# Patient Record
Sex: FEMALE | Race: BLACK OR AFRICAN AMERICAN | NOT HISPANIC OR LATINO
[De-identification: names, ages, dates, MRNs, and addresses within clinical notes are randomized per-mention and may not be internally consistent; named-entity substitution may affect disease eponyms.]

---

## 2017-01-27 ENCOUNTER — MEDICATION RENEWAL (OUTPATIENT)
Age: 32
End: 2017-01-27

## 2017-02-01 ENCOUNTER — APPOINTMENT (OUTPATIENT)
Dept: PULMONOLOGY | Facility: CLINIC | Age: 32
End: 2017-02-01

## 2017-02-22 ENCOUNTER — APPOINTMENT (OUTPATIENT)
Dept: PULMONOLOGY | Facility: CLINIC | Age: 32
End: 2017-02-22

## 2017-03-28 ENCOUNTER — MEDICATION RENEWAL (OUTPATIENT)
Age: 32
End: 2017-03-28

## 2017-03-28 ENCOUNTER — APPOINTMENT (OUTPATIENT)
Dept: PULMONOLOGY | Facility: CLINIC | Age: 32
End: 2017-03-28

## 2017-03-28 VITALS
DIASTOLIC BLOOD PRESSURE: 70 MMHG | RESPIRATION RATE: 17 BRPM | OXYGEN SATURATION: 94 % | BODY MASS INDEX: 43.4 KG/M2 | SYSTOLIC BLOOD PRESSURE: 130 MMHG | WEIGHT: 293 LBS | HEIGHT: 69 IN | TEMPERATURE: 97.7 F | HEART RATE: 89 BPM

## 2017-04-18 ENCOUNTER — APPOINTMENT (OUTPATIENT)
Dept: CARDIOLOGY | Facility: CLINIC | Age: 32
End: 2017-04-18

## 2017-04-26 ENCOUNTER — RX RENEWAL (OUTPATIENT)
Age: 32
End: 2017-04-26

## 2017-05-02 ENCOUNTER — MEDICATION RENEWAL (OUTPATIENT)
Age: 32
End: 2017-05-02

## 2017-05-30 ENCOUNTER — APPOINTMENT (OUTPATIENT)
Dept: CARDIOLOGY | Facility: CLINIC | Age: 32
End: 2017-05-30

## 2017-05-30 ENCOUNTER — NON-APPOINTMENT (OUTPATIENT)
Age: 32
End: 2017-05-30

## 2017-05-30 VITALS
HEIGHT: 69 IN | WEIGHT: 293 LBS | BODY MASS INDEX: 43.4 KG/M2 | DIASTOLIC BLOOD PRESSURE: 91 MMHG | SYSTOLIC BLOOD PRESSURE: 125 MMHG | HEART RATE: 82 BPM

## 2017-05-30 DIAGNOSIS — E66.01 MORBID (SEVERE) OBESITY DUE TO EXCESS CALORIES: ICD-10-CM

## 2017-05-30 RX ORDER — RIOCIGUAT 1 MG/1
1 TABLET, FILM COATED ORAL
Qty: 90 | Refills: 0 | Status: DISCONTINUED | COMMUNITY
Start: 2017-04-26 | End: 2017-05-30

## 2017-07-01 RX ORDER — RIOCIGUAT 1.5 MG/1
1 TABLET, FILM COATED ORAL
Qty: 0 | Refills: 0 | DISCHARGE
Start: 2017-07-01

## 2017-07-18 ENCOUNTER — APPOINTMENT (OUTPATIENT)
Dept: PULMONOLOGY | Facility: CLINIC | Age: 32
End: 2017-07-18

## 2017-08-14 ENCOUNTER — RX RENEWAL (OUTPATIENT)
Age: 32
End: 2017-08-14

## 2017-08-17 ENCOUNTER — MEDICATION RENEWAL (OUTPATIENT)
Age: 32
End: 2017-08-17

## 2017-10-06 ENCOUNTER — RX RENEWAL (OUTPATIENT)
Age: 32
End: 2017-10-06

## 2017-10-09 ENCOUNTER — RX RENEWAL (OUTPATIENT)
Age: 32
End: 2017-10-09

## 2017-10-24 ENCOUNTER — APPOINTMENT (OUTPATIENT)
Dept: PULMONOLOGY | Facility: CLINIC | Age: 32
End: 2017-10-24

## 2017-11-03 ENCOUNTER — APPOINTMENT (OUTPATIENT)
Dept: PULMONOLOGY | Facility: CLINIC | Age: 32
End: 2017-11-03

## 2017-12-06 ENCOUNTER — MOBILE ON CALL (OUTPATIENT)
Age: 32
End: 2017-12-06

## 2018-01-02 ENCOUNTER — APPOINTMENT (OUTPATIENT)
Dept: PULMONOLOGY | Facility: CLINIC | Age: 33
End: 2018-01-02
Payer: MEDICAID

## 2018-01-02 VITALS
OXYGEN SATURATION: 93 % | HEIGHT: 69 IN | TEMPERATURE: 96.7 F | WEIGHT: 293 LBS | DIASTOLIC BLOOD PRESSURE: 70 MMHG | RESPIRATION RATE: 15 BRPM | BODY MASS INDEX: 43.4 KG/M2 | HEART RATE: 85 BPM | SYSTOLIC BLOOD PRESSURE: 122 MMHG

## 2018-01-02 PROCEDURE — 36415 COLL VENOUS BLD VENIPUNCTURE: CPT

## 2018-01-02 PROCEDURE — 99215 OFFICE O/P EST HI 40 MIN: CPT | Mod: 25

## 2018-01-03 LAB
ALBUMIN SERPL ELPH-MCNC: 3.4 G/DL
ALP BLD-CCNC: 37 U/L
ALT SERPL-CCNC: 10 U/L
ANION GAP SERPL CALC-SCNC: 15 MMOL/L
AST SERPL-CCNC: 18 U/L
BASOPHILS # BLD AUTO: 0.03 K/UL
BASOPHILS NFR BLD AUTO: 0.8 %
BILIRUB SERPL-MCNC: 1.4 MG/DL
BUN SERPL-MCNC: 8 MG/DL
CALCIUM SERPL-MCNC: 9.3 MG/DL
CHLORIDE SERPL-SCNC: 100 MMOL/L
CO2 SERPL-SCNC: 25 MMOL/L
CREAT SERPL-MCNC: 1 MG/DL
EOSINOPHIL # BLD AUTO: 0.07 K/UL
EOSINOPHIL NFR BLD AUTO: 1.9 %
GLUCOSE SERPL-MCNC: 84 MG/DL
HCG SERPL-MCNC: <1 MIU/ML
HCT VFR BLD CALC: 43.2 %
HGB BLD-MCNC: 14 G/DL
IMM GRANULOCYTES NFR BLD AUTO: 0.3 %
INR PPP: 1.48 RATIO
LYMPHOCYTES # BLD AUTO: 2.05 K/UL
LYMPHOCYTES NFR BLD AUTO: 55.6 %
MAN DIFF?: NORMAL
MCHC RBC-ENTMCNC: 26.8 PG
MCHC RBC-ENTMCNC: 32.4 GM/DL
MCV RBC AUTO: 82.6 FL
MONOCYTES # BLD AUTO: 0.34 K/UL
MONOCYTES NFR BLD AUTO: 9.2 %
NEUTROPHILS # BLD AUTO: 1.19 K/UL
NEUTROPHILS NFR BLD AUTO: 32.2 %
NT-PROBNP SERPL-MCNC: 1821 PG/ML
PLATELET # BLD AUTO: 263 K/UL
POTASSIUM SERPL-SCNC: 3.5 MMOL/L
PROT SERPL-MCNC: 7.1 G/DL
PT BLD: 16.9 SEC
RBC # BLD: 5.23 M/UL
RBC # FLD: 16.2 %
SODIUM SERPL-SCNC: 140 MMOL/L
WBC # FLD AUTO: 3.69 K/UL

## 2018-01-10 ENCOUNTER — APPOINTMENT (OUTPATIENT)
Dept: INTERNAL MEDICINE | Facility: CLINIC | Age: 33
End: 2018-01-10

## 2018-01-17 ENCOUNTER — APPOINTMENT (OUTPATIENT)
Dept: INTERNAL MEDICINE | Facility: CLINIC | Age: 33
End: 2018-01-17

## 2018-01-18 ENCOUNTER — CHART COPY (OUTPATIENT)
Age: 33
End: 2018-01-18

## 2018-01-22 ENCOUNTER — CHART COPY (OUTPATIENT)
Age: 33
End: 2018-01-22

## 2018-01-23 ENCOUNTER — APPOINTMENT (OUTPATIENT)
Dept: INTERNAL MEDICINE | Facility: CLINIC | Age: 33
End: 2018-01-23
Payer: MEDICAID

## 2018-01-23 VITALS — OXYGEN SATURATION: 95 % | RESPIRATION RATE: 16 BRPM | HEART RATE: 96 BPM

## 2018-01-23 LAB
INR PPP: 1.3 RATIO
POCT-PROTHROMBIN TIME: 15.9 SECS
QUALITY CONTROL: YES

## 2018-01-23 PROCEDURE — 99211 OFF/OP EST MAY X REQ PHY/QHP: CPT | Mod: 25

## 2018-01-23 PROCEDURE — 85610 PROTHROMBIN TIME: CPT | Mod: QW

## 2018-01-30 ENCOUNTER — APPOINTMENT (OUTPATIENT)
Dept: INTERNAL MEDICINE | Facility: CLINIC | Age: 33
End: 2018-01-30
Payer: MEDICAID

## 2018-01-30 VITALS — HEART RATE: 99 BPM | RESPIRATION RATE: 17 BRPM | OXYGEN SATURATION: 97 %

## 2018-01-30 PROCEDURE — 85610 PROTHROMBIN TIME: CPT | Mod: QW

## 2018-01-30 PROCEDURE — 99211 OFF/OP EST MAY X REQ PHY/QHP: CPT | Mod: 25

## 2018-01-31 LAB
INR PPP: 1.4 RATIO
POCT-PROTHROMBIN TIME: 16.8 SECS
QUALITY CONTROL: YES

## 2018-02-02 ENCOUNTER — APPOINTMENT (OUTPATIENT)
Dept: CARDIOLOGY | Facility: CLINIC | Age: 33
End: 2018-02-02
Payer: MEDICAID

## 2018-02-02 PROCEDURE — 93306 TTE W/DOPPLER COMPLETE: CPT

## 2018-02-06 ENCOUNTER — APPOINTMENT (OUTPATIENT)
Dept: INTERNAL MEDICINE | Facility: CLINIC | Age: 33
End: 2018-02-06
Payer: MEDICAID

## 2018-02-06 VITALS — OXYGEN SATURATION: 97 % | RESPIRATION RATE: 17 BRPM | HEART RATE: 98 BPM

## 2018-02-06 LAB
INR PPP: 2.2 RATIO
POCT-PROTHROMBIN TIME: 26.2 SECS
QUALITY CONTROL: YES

## 2018-02-06 PROCEDURE — 85610 PROTHROMBIN TIME: CPT | Mod: QW

## 2018-02-06 PROCEDURE — 99211 OFF/OP EST MAY X REQ PHY/QHP: CPT | Mod: 25

## 2018-02-13 ENCOUNTER — APPOINTMENT (OUTPATIENT)
Dept: INTERNAL MEDICINE | Facility: CLINIC | Age: 33
End: 2018-02-13
Payer: MEDICAID

## 2018-02-13 ENCOUNTER — RX RENEWAL (OUTPATIENT)
Age: 33
End: 2018-02-13

## 2018-02-13 VITALS — HEART RATE: 79 BPM | OXYGEN SATURATION: 97 % | RESPIRATION RATE: 17 BRPM

## 2018-02-13 LAB
INR PPP: 3.1 RATIO
POCT-PROTHROMBIN TIME: 37.2 SECS
QUALITY CONTROL: YES

## 2018-02-13 PROCEDURE — 85610 PROTHROMBIN TIME: CPT | Mod: QW

## 2018-02-13 PROCEDURE — 99211 OFF/OP EST MAY X REQ PHY/QHP: CPT | Mod: 25

## 2018-02-15 ENCOUNTER — APPOINTMENT (OUTPATIENT)
Dept: PULMONOLOGY | Facility: CLINIC | Age: 33
End: 2018-02-15

## 2018-02-20 ENCOUNTER — APPOINTMENT (OUTPATIENT)
Dept: INTERNAL MEDICINE | Facility: CLINIC | Age: 33
End: 2018-02-20
Payer: MEDICAID

## 2018-02-20 VITALS — RESPIRATION RATE: 19 BRPM | OXYGEN SATURATION: 92 % | HEART RATE: 82 BPM

## 2018-02-20 LAB
INR PPP: 3 RATIO
POCT-PROTHROMBIN TIME: 35.5 SECS
QUALITY CONTROL: YES

## 2018-02-20 PROCEDURE — 85610 PROTHROMBIN TIME: CPT | Mod: QW

## 2018-02-20 PROCEDURE — 99211 OFF/OP EST MAY X REQ PHY/QHP: CPT | Mod: 25

## 2018-02-27 ENCOUNTER — APPOINTMENT (OUTPATIENT)
Dept: INTERNAL MEDICINE | Facility: CLINIC | Age: 33
End: 2018-02-27
Payer: MEDICAID

## 2018-02-27 VITALS — HEART RATE: 98 BPM | RESPIRATION RATE: 19 BRPM | OXYGEN SATURATION: 93 %

## 2018-02-27 LAB
INR PPP: 3 RATIO
POCT-PROTHROMBIN TIME: 35.7 SECS
QUALITY CONTROL: YES

## 2018-02-27 PROCEDURE — 85610 PROTHROMBIN TIME: CPT | Mod: QW

## 2018-02-27 PROCEDURE — 99211 OFF/OP EST MAY X REQ PHY/QHP: CPT | Mod: 25

## 2018-03-06 ENCOUNTER — APPOINTMENT (OUTPATIENT)
Dept: INTERNAL MEDICINE | Facility: CLINIC | Age: 33
End: 2018-03-06
Payer: MEDICAID

## 2018-03-06 VITALS — OXYGEN SATURATION: 91 % | HEART RATE: 99 BPM | RESPIRATION RATE: 19 BRPM

## 2018-03-06 LAB
INR PPP: 3.1 RATIO
POCT-PROTHROMBIN TIME: 37.7 SECS
QUALITY CONTROL: YES

## 2018-03-06 PROCEDURE — 85610 PROTHROMBIN TIME: CPT | Mod: QW

## 2018-03-06 PROCEDURE — 99211 OFF/OP EST MAY X REQ PHY/QHP: CPT | Mod: 25

## 2018-03-07 ENCOUNTER — APPOINTMENT (OUTPATIENT)
Dept: PULMONOLOGY | Facility: CLINIC | Age: 33
End: 2018-03-07

## 2018-03-16 ENCOUNTER — APPOINTMENT (OUTPATIENT)
Dept: INTERNAL MEDICINE | Facility: CLINIC | Age: 33
End: 2018-03-16
Payer: MEDICAID

## 2018-03-16 VITALS — OXYGEN SATURATION: 92 % | HEART RATE: 83 BPM

## 2018-03-16 LAB
INR PPP: 2.7 RATIO
POCT-PROTHROMBIN TIME: 23.5 SECS
QUALITY CONTROL: YES

## 2018-03-16 PROCEDURE — 99211 OFF/OP EST MAY X REQ PHY/QHP: CPT | Mod: 25

## 2018-03-16 PROCEDURE — 85610 PROTHROMBIN TIME: CPT | Mod: QW

## 2018-04-03 ENCOUNTER — APPOINTMENT (OUTPATIENT)
Dept: INTERNAL MEDICINE | Facility: CLINIC | Age: 33
End: 2018-04-03
Payer: MEDICAID

## 2018-04-03 ENCOUNTER — MEDICATION RENEWAL (OUTPATIENT)
Age: 33
End: 2018-04-03

## 2018-04-03 VITALS — RESPIRATION RATE: 20 BRPM | OXYGEN SATURATION: 96 % | HEART RATE: 74 BPM

## 2018-04-03 LAB
INR PPP: 1.4 RATIO
POCT-PROTHROMBIN TIME: 16.7 SECS
QUALITY CONTROL: YES

## 2018-04-03 PROCEDURE — 85610 PROTHROMBIN TIME: CPT | Mod: QW

## 2018-04-03 PROCEDURE — 99211 OFF/OP EST MAY X REQ PHY/QHP: CPT | Mod: 25

## 2018-04-10 ENCOUNTER — APPOINTMENT (OUTPATIENT)
Dept: INTERNAL MEDICINE | Facility: CLINIC | Age: 33
End: 2018-04-10

## 2018-04-11 ENCOUNTER — APPOINTMENT (OUTPATIENT)
Dept: INTERNAL MEDICINE | Facility: CLINIC | Age: 33
End: 2018-04-11
Payer: MEDICAID

## 2018-04-11 VITALS — RESPIRATION RATE: 18 BRPM | HEART RATE: 77 BPM | OXYGEN SATURATION: 99 %

## 2018-04-11 LAB
INR PPP: 1.9 RATIO
POCT-PROTHROMBIN TIME: 22.4 SECS
QUALITY CONTROL: YES

## 2018-04-11 PROCEDURE — 99211 OFF/OP EST MAY X REQ PHY/QHP: CPT | Mod: 25

## 2018-04-11 PROCEDURE — 85610 PROTHROMBIN TIME: CPT | Mod: QW

## 2018-04-24 ENCOUNTER — APPOINTMENT (OUTPATIENT)
Dept: INTERNAL MEDICINE | Facility: CLINIC | Age: 33
End: 2018-04-24
Payer: MEDICAID

## 2018-04-24 VITALS — OXYGEN SATURATION: 99 % | HEART RATE: 77 BPM | RESPIRATION RATE: 19 BRPM

## 2018-04-24 LAB
INR PPP: 2.1 RATIO
POCT-PROTHROMBIN TIME: 25.5 SECS
QUALITY CONTROL: YES

## 2018-04-24 PROCEDURE — 85610 PROTHROMBIN TIME: CPT | Mod: QW

## 2018-04-24 PROCEDURE — 99211 OFF/OP EST MAY X REQ PHY/QHP: CPT

## 2018-04-30 ENCOUNTER — APPOINTMENT (OUTPATIENT)
Dept: PULMONOLOGY | Facility: CLINIC | Age: 33
End: 2018-04-30

## 2018-05-02 ENCOUNTER — APPOINTMENT (OUTPATIENT)
Dept: INTERNAL MEDICINE | Facility: CLINIC | Age: 33
End: 2018-05-02
Payer: MEDICAID

## 2018-05-02 VITALS — OXYGEN SATURATION: 87 % | RESPIRATION RATE: 20 BRPM | HEART RATE: 98 BPM

## 2018-05-02 LAB
INR PPP: 2.6 RATIO
POCT-PROTHROMBIN TIME: 31 SECS
QUALITY CONTROL: YES

## 2018-05-02 PROCEDURE — 85610 PROTHROMBIN TIME: CPT | Mod: QW

## 2018-05-02 PROCEDURE — 99211 OFF/OP EST MAY X REQ PHY/QHP: CPT

## 2018-05-08 ENCOUNTER — APPOINTMENT (OUTPATIENT)
Dept: INTERNAL MEDICINE | Facility: CLINIC | Age: 33
End: 2018-05-08
Payer: MEDICAID

## 2018-05-08 VITALS — RESPIRATION RATE: 20 BRPM | OXYGEN SATURATION: 99 % | HEART RATE: 99 BPM

## 2018-05-08 LAB
INR PPP: 2.5 RATIO
POCT-PROTHROMBIN TIME: 30.3 SECS
QUALITY CONTROL: YES

## 2018-05-08 PROCEDURE — 99211 OFF/OP EST MAY X REQ PHY/QHP: CPT

## 2018-05-08 PROCEDURE — 85610 PROTHROMBIN TIME: CPT | Mod: QW

## 2018-05-15 ENCOUNTER — APPOINTMENT (OUTPATIENT)
Dept: INTERNAL MEDICINE | Facility: CLINIC | Age: 33
End: 2018-05-15
Payer: MEDICAID

## 2018-05-15 VITALS — OXYGEN SATURATION: 99 % | HEART RATE: 99 BPM | RESPIRATION RATE: 19 BRPM

## 2018-05-15 DIAGNOSIS — Z79.01 ENCOUNTER FOR THERAPEUTIC DRUG LVL MONITORING: ICD-10-CM

## 2018-05-15 DIAGNOSIS — Z79.01 LONG TERM (CURRENT) USE OF ANTICOAGULANTS: ICD-10-CM

## 2018-05-15 DIAGNOSIS — Z51.81 ENCOUNTER FOR THERAPEUTIC DRUG LVL MONITORING: ICD-10-CM

## 2018-05-15 LAB
INR PPP: 1.5 RATIO
POCT-PROTHROMBIN TIME: 17.4 SECS
QUALITY CONTROL: YES

## 2018-05-15 PROCEDURE — 93793 ANTICOAG MGMT PT WARFARIN: CPT

## 2018-05-17 PROBLEM — Z51.81 ANTICOAGULATION GOAL OF INR 2 TO 3: Status: ACTIVE | Noted: 2018-05-17

## 2018-05-20 ENCOUNTER — RESULT CHARGE (OUTPATIENT)
Age: 33
End: 2018-05-20

## 2018-05-21 ENCOUNTER — RESULT REVIEW (OUTPATIENT)
Age: 33
End: 2018-05-21

## 2018-05-21 ENCOUNTER — LABORATORY RESULT (OUTPATIENT)
Age: 33
End: 2018-05-21

## 2018-05-29 ENCOUNTER — APPOINTMENT (OUTPATIENT)
Dept: INTERNAL MEDICINE | Facility: CLINIC | Age: 33
End: 2018-05-29
Payer: MEDICAID

## 2018-05-29 VITALS — RESPIRATION RATE: 18 BRPM | HEART RATE: 99 BPM | OXYGEN SATURATION: 99 %

## 2018-05-29 LAB
INR PPP: 3.9 RATIO
POCT-PROTHROMBIN TIME: 46.8 SECS
QUALITY CONTROL: YES

## 2018-05-29 PROCEDURE — 93793 ANTICOAG MGMT PT WARFARIN: CPT

## 2018-06-12 ENCOUNTER — APPOINTMENT (OUTPATIENT)
Dept: INTERNAL MEDICINE | Facility: CLINIC | Age: 33
End: 2018-06-12
Payer: MEDICAID

## 2018-06-12 VITALS — RESPIRATION RATE: 17 BRPM | OXYGEN SATURATION: 99 % | HEART RATE: 89 BPM

## 2018-06-12 LAB
INR PPP: 3.6 RATIO
POCT-PROTHROMBIN TIME: 43.7 SECS
QUALITY CONTROL: YES

## 2018-06-12 PROCEDURE — 85610 PROTHROMBIN TIME: CPT | Mod: QW

## 2018-06-12 PROCEDURE — 93793 ANTICOAG MGMT PT WARFARIN: CPT

## 2018-06-16 ENCOUNTER — EMERGENCY (EMERGENCY)
Facility: HOSPITAL | Age: 33
LOS: 1 days | Discharge: HOME | End: 2018-06-16
Attending: EMERGENCY MEDICINE | Admitting: EMERGENCY MEDICINE
Payer: MEDICAID

## 2018-06-16 VITALS
HEART RATE: 80 BPM | TEMPERATURE: 98 F | SYSTOLIC BLOOD PRESSURE: 116 MMHG | RESPIRATION RATE: 16 BRPM | OXYGEN SATURATION: 100 % | DIASTOLIC BLOOD PRESSURE: 68 MMHG

## 2018-06-16 VITALS — RESPIRATION RATE: 19 BRPM | OXYGEN SATURATION: 95 %

## 2018-06-16 PROCEDURE — 71046 X-RAY EXAM CHEST 2 VIEWS: CPT | Mod: 26

## 2018-06-16 PROCEDURE — 99284 EMERGENCY DEPT VISIT MOD MDM: CPT | Mod: 25

## 2018-06-16 RX ORDER — ALBUTEROL 90 UG/1
3 AEROSOL, METERED ORAL
Qty: 90 | Refills: 0
Start: 2018-06-16 | End: 2018-07-15

## 2018-06-16 RX ORDER — IPRATROPIUM/ALBUTEROL SULFATE 18-103MCG
3 AEROSOL WITH ADAPTER (GRAM) INHALATION ONCE
Qty: 0 | Refills: 0 | Status: COMPLETED | OUTPATIENT
Start: 2018-06-16 | End: 2018-06-16

## 2018-06-16 RX ADMIN — Medication 3 MILLILITER(S): at 04:52

## 2018-06-16 NOTE — ED PROVIDER NOTE - ATTENDING CONTRIBUTION TO CARE
Dr. Antonio: I have personally seen and examined this patient at the bedside. I have fully participated in the care of this patient. I have reviewed all pertinent clinical information, including history, physical exam, plan and the Resident's note and agree except as noted. HPI above as by me. PE above as by me. DDX r/o pna, suspect related asthma or post nasal drip PLAN duoneb, xr

## 2018-06-16 NOTE — ED PROVIDER NOTE - PROGRESS NOTE DETAILS
Paco att: NAD. Spoke extensively with patient. Recommend incr lasix, decongestants and allegra po. Patient concerned re side effects. Patient advised to f/u with her Pulmonologist this week and discuss medication changes.

## 2018-06-16 NOTE — ED PROVIDER NOTE - OBJECTIVE STATEMENT
33 F h/o asthma, pulm HTN diagnosed in 2016, on coumadin, p/w cough x 1 mo. Had seen pmd approx one week ago given 5 days course of steroids and using albuterol bid, but still with non productive cough. Now also for last few days when patient is coughing she develops dizziness and had a few episodes of post tussive emesis. Felt better while on steroids but now coughing again and feels she is wheezing. No fevers/chills. 33 F h/o asthma, pulm HTN diagnosed in 2016, on coumadin, p/w cough x 1 mo. Had seen pmd approx one week ago given 5 days course of steroids and using albuterol bid, but still with non productive cough. Now also for last few days when patient is coughing she develops dizziness and had a few episodes of post tussive emesis. Felt better while on steroids but now coughing again and feels she is wheezing. No fevers/chills.    05:58 Antonio att: 33F h/o htn, asthma, pulm htn 2006, cva prophylaxis coumadin c/o cough x 1 mo. Cough x 1 mo, dry, frequent, throat irritation. Albuterol BID neg relief. Cough frequent, causes post tussive emesis and dizziness. Two weeks bilateral lower extremity duplex negative. Completed five day course of prednisone, tapered to spiriva and symbicort. Came to ER tonight for nagging hacking cough, chest congestion, and persistence of symptoms. PMH as above PSH x MED x ALL nkda SMOKE x PCP [facility] 33 F h/o asthma, pulm HTN diagnosed in 2016, on coumadin, p/w cough x 1 mo. Had seen pmd approx one week ago given 5 days course of steroids and using albuterol bid, but still with non productive cough. Now also for last few days when patient is coughing she develops dizziness and had a few episodes of post tussive emesis. Felt better while on steroids but now coughing again and feels she is wheezing. No fevers/chills.    05:58 Antonio att: 33F h/o htn, asthma, pulm htn 2006, cva prophylaxis coumadin c/o cough x 1 mo. Cough x 1 mo, dry, frequent, throat irritation. Albuterol BID neg relief. Cough frequent, causes post tussive emesis and dizziness. Two weeks bilateral lower extremity duplex negative. Completed five day course of prednisone, tapered to spiriva and symbicort. Came to ER tonight for nagging hacking cough, chest congestion, and persistence of symptoms. Patient also notes bilateral feet heavy and filled with fluid. Takes lasix 20 mg qd, has been advised if bipedal edema to double or triple dose. Patient has not increased dose, worried about side effects. PMH as above PSH x MED x ALL nkda SMOKE x PCP [facility]

## 2018-06-16 NOTE — ED PROVIDER NOTE - PLAN OF CARE
Seen in ER for persistent cough, chest congestion and feet heaviness. Please continue home medications. Please call Dr. Lisker and discuss your lasix dose, decongestants, and either Allegra or Zyrtec. Return to ER for any new or worsening symptoms.

## 2018-06-16 NOTE — ED ADULT TRIAGE NOTE - CHIEF COMPLAINT QUOTE
pt comes to ED for multiple medical complaints. pt comes to ED for cough x 1 month pt states when she coughs she get dizzy and she threw up twice today. pt states she was on steroids but not help cough. VSS NAD 18 g L AC by EMS

## 2018-06-16 NOTE — ED PROVIDER NOTE - CARE PLAN
Principal Discharge DX:	Cough  Assessment and plan of treatment:	Seen in ER for persistent cough, chest congestion and feet heaviness. Please continue home medications. Please call Dr. Lisker and discuss your lasix dose, decongestants, and either Allegra or Zyrtec. Return to ER for any new or worsening symptoms.

## 2018-06-16 NOTE — ED ADULT NURSE NOTE - OBJECTIVE STATEMENT
pt presents to ed c/o dizziness, cough and swelling BLE. pt is a&ox4 and ambulatory at baseline, skin intact, respirations even and unlabored, abd soft and non-distended. pt endorses hx of asthma, and pulmonary HTN. pt reports that she has had a productive cough f2ixuoc, states that the "coughing leads to vomiting which leads to her feeling dizzy". pt reports being on steroids and finished a few days ago- to "no relief". pt denies cp, fever, chills or any other symptoms. pt came in with an 18 gauge in left arm placed by EMS. Will continue to monitor.

## 2018-06-26 ENCOUNTER — APPOINTMENT (OUTPATIENT)
Dept: INTERNAL MEDICINE | Facility: CLINIC | Age: 33
End: 2018-06-26

## 2018-06-26 ENCOUNTER — RX RENEWAL (OUTPATIENT)
Age: 33
End: 2018-06-26

## 2018-06-26 ENCOUNTER — APPOINTMENT (OUTPATIENT)
Dept: CARDIOLOGY | Facility: CLINIC | Age: 33
End: 2018-06-26

## 2018-06-27 ENCOUNTER — MEDICATION RENEWAL (OUTPATIENT)
Age: 33
End: 2018-06-27

## 2018-07-01 ENCOUNTER — RESULT CHARGE (OUTPATIENT)
Age: 33
End: 2018-07-01

## 2018-07-01 ENCOUNTER — OUTPATIENT (OUTPATIENT)
Dept: OUTPATIENT SERVICES | Facility: HOSPITAL | Age: 33
LOS: 1 days | End: 2018-07-01
Payer: COMMERCIAL

## 2018-07-02 ENCOUNTER — LABORATORY RESULT (OUTPATIENT)
Age: 33
End: 2018-07-02

## 2018-07-03 ENCOUNTER — NON-APPOINTMENT (OUTPATIENT)
Age: 33
End: 2018-07-03

## 2018-07-07 ENCOUNTER — INPATIENT (INPATIENT)
Facility: HOSPITAL | Age: 33
LOS: 3 days | Discharge: HOME | End: 2018-07-11
Attending: INTERNAL MEDICINE | Admitting: INTERNAL MEDICINE
Payer: MEDICAID

## 2018-07-07 VITALS
SYSTOLIC BLOOD PRESSURE: 142 MMHG | TEMPERATURE: 98 F | DIASTOLIC BLOOD PRESSURE: 94 MMHG | OXYGEN SATURATION: 99 % | HEART RATE: 92 BPM | RESPIRATION RATE: 18 BRPM

## 2018-07-07 RX ORDER — ACETAMINOPHEN 500 MG
650 TABLET ORAL ONCE
Qty: 0 | Refills: 0 | Status: COMPLETED | OUTPATIENT
Start: 2018-07-07 | End: 2018-07-07

## 2018-07-07 RX ORDER — FAMOTIDINE 10 MG/ML
20 INJECTION INTRAVENOUS ONCE
Qty: 0 | Refills: 0 | Status: COMPLETED | OUTPATIENT
Start: 2018-07-07 | End: 2018-07-07

## 2018-07-07 RX ORDER — ONDANSETRON 8 MG/1
4 TABLET, FILM COATED ORAL ONCE
Qty: 0 | Refills: 0 | Status: COMPLETED | OUTPATIENT
Start: 2018-07-07 | End: 2018-07-07

## 2018-07-07 NOTE — ED PROVIDER NOTE - CARE PLAN
Principal Discharge DX:	Shortness of breath  Secondary Diagnosis:	Parainfluenza  Secondary Diagnosis:	Abdominal pain

## 2018-07-07 NOTE — ED PROVIDER NOTE - CARDIAC, MLM
Normal rate, regular rhythm.  Heart sounds S1, S2.  No murmurs, rubs or gallops. B/l LE pitting edema up to mid abdomen.

## 2018-07-07 NOTE — ED PROVIDER NOTE - PROGRESS NOTE DETAILS
Sanya PGY2: hypoxic to 91%, per patient- supposed to be on home o2 or was recommended but not compliant

## 2018-07-07 NOTE — ED PROVIDER NOTE - MEDICAL DECISION MAKING DETAILS
32yo female pmh pulmonary HTN, HTN, DM, obesity p/w cough, vomiting, posttussive emesis, sore throat. Appears to be fluid retaining with bilateral LE pitting edema, orthopnea, dyspnea. Pt of Dr Lisker. EKG, CXR, RUQ US (RUQ TTP), labs with lipase, lactate, LFTs. Will discuss with PCU for admission for decompensated pulmonary HTN if abdominal work up is negative.

## 2018-07-07 NOTE — ED PROVIDER NOTE - RESPIRATORY, MLM
Breath sounds clear and equal bilaterally, slightly labored breathing, mild use of accessory muscles. Dry cough during examinatoion

## 2018-07-07 NOTE — ED PROVIDER NOTE - FAMILY HISTORY
Sibling  Still living? Yes, Estimated age: Age Unknown  Family history of leukemia, Age at diagnosis: Age Unknown

## 2018-07-07 NOTE — ED ADULT TRIAGE NOTE - CHIEF COMPLAINT QUOTE
Complaining of vomiting ,decrease PO intake and bilateral leg edema x 1 month.  History of pulmonary hypertension on Lasix.  Denies chest pain, dizziness or fever.

## 2018-07-07 NOTE — ED PROVIDER NOTE - ATTENDING CONTRIBUTION TO CARE
DR. BLOCH, ATTENDING MD-  I performed a face to face bedside interview with patient regarding history of present illness, review of symptoms and past medical history. I completed an independent physical exam.  I have discussed patient's plan of care with the resident.  Patient is obese, tachypneic, lungs clear, ext 4+ edema, heart sounds nml neuro nml, abd obese,epigastric tenderness .

## 2018-07-07 NOTE — ED PROVIDER NOTE - OBJECTIVE STATEMENT
34yo female pmh pulmonary HTN, CVA ppx with coumadin, HTN p/w vomiting, posttussive emesis, sore throat, difficulty swallowing, cough productive of yellow sputum x 1 month. Emesis occasionally postprandial a/w epigastric pain, non-radiating. Denies fever, chills, chest pain, palpitations, dizziness, weakness, diarrhea, bladder and bowel problems, back pain, sick contact, recent long travel. Also c/o worsening LE swelling, orthopnea.     Significant past medical hx/surgical hx/social hx and review of systems can be found above in the history of present illness.       Pulmonologist - Dr Lisker

## 2018-07-08 DIAGNOSIS — J44.9 CHRONIC OBSTRUCTIVE PULMONARY DISEASE, UNSPECIFIED: ICD-10-CM

## 2018-07-08 DIAGNOSIS — Z29.9 ENCOUNTER FOR PROPHYLACTIC MEASURES, UNSPECIFIED: ICD-10-CM

## 2018-07-08 DIAGNOSIS — R10.9 UNSPECIFIED ABDOMINAL PAIN: ICD-10-CM

## 2018-07-08 DIAGNOSIS — B33.8 OTHER SPECIFIED VIRAL DISEASES: ICD-10-CM

## 2018-07-08 DIAGNOSIS — I27.20 PULMONARY HYPERTENSION, UNSPECIFIED: ICD-10-CM

## 2018-07-08 DIAGNOSIS — R06.02 SHORTNESS OF BREATH: ICD-10-CM

## 2018-07-08 DIAGNOSIS — R76.8 OTHER SPECIFIED ABNORMAL IMMUNOLOGICAL FINDINGS IN SERUM: ICD-10-CM

## 2018-07-08 LAB
ALBUMIN SERPL ELPH-MCNC: 3.8 G/DL — SIGNIFICANT CHANGE UP (ref 3.3–5)
ALP SERPL-CCNC: 57 U/L — SIGNIFICANT CHANGE UP (ref 40–120)
ALT FLD-CCNC: 13 U/L — SIGNIFICANT CHANGE UP (ref 4–33)
APTT BLD: 39.3 SEC — HIGH (ref 27.5–37.4)
AST SERPL-CCNC: 31 U/L — SIGNIFICANT CHANGE UP (ref 4–32)
B PERT DNA SPEC QL NAA+PROBE: SIGNIFICANT CHANGE UP
BASE EXCESS BLDV CALC-SCNC: -0.8 MMOL/L — SIGNIFICANT CHANGE UP
BASOPHILS # BLD AUTO: 0.04 K/UL — SIGNIFICANT CHANGE UP (ref 0–0.2)
BASOPHILS NFR BLD AUTO: 1.1 % — SIGNIFICANT CHANGE UP (ref 0–2)
BILIRUB SERPL-MCNC: 2.1 MG/DL — HIGH (ref 0.2–1.2)
BLOOD GAS VENOUS - CREATININE: 1 MG/DL — SIGNIFICANT CHANGE UP (ref 0.5–1.3)
BUN SERPL-MCNC: 7 MG/DL — SIGNIFICANT CHANGE UP (ref 7–23)
C PNEUM DNA SPEC QL NAA+PROBE: NOT DETECTED — SIGNIFICANT CHANGE UP
CALCIUM SERPL-MCNC: 9.2 MG/DL — SIGNIFICANT CHANGE UP (ref 8.4–10.5)
CHLORIDE BLDV-SCNC: 103 MMOL/L — SIGNIFICANT CHANGE UP (ref 96–108)
CHLORIDE SERPL-SCNC: 96 MMOL/L — LOW (ref 98–107)
CO2 SERPL-SCNC: 21 MMOL/L — LOW (ref 22–31)
CREAT SERPL-MCNC: 1.02 MG/DL — SIGNIFICANT CHANGE UP (ref 0.5–1.3)
EOSINOPHIL # BLD AUTO: 0.02 K/UL — SIGNIFICANT CHANGE UP (ref 0–0.5)
EOSINOPHIL NFR BLD AUTO: 0.5 % — SIGNIFICANT CHANGE UP (ref 0–6)
FLUAV H1 2009 PAND RNA SPEC QL NAA+PROBE: NOT DETECTED — SIGNIFICANT CHANGE UP
FLUAV H1 RNA SPEC QL NAA+PROBE: NOT DETECTED — SIGNIFICANT CHANGE UP
FLUAV H3 RNA SPEC QL NAA+PROBE: NOT DETECTED — SIGNIFICANT CHANGE UP
FLUAV SUBTYP SPEC NAA+PROBE: SIGNIFICANT CHANGE UP
FLUBV RNA SPEC QL NAA+PROBE: NOT DETECTED — SIGNIFICANT CHANGE UP
GAS PNL BLDV: 131 MMOL/L — LOW (ref 136–146)
GLUCOSE BLDV-MCNC: 105 — HIGH (ref 70–99)
GLUCOSE SERPL-MCNC: 102 MG/DL — HIGH (ref 70–99)
HADV DNA SPEC QL NAA+PROBE: NOT DETECTED — SIGNIFICANT CHANGE UP
HCG SERPL-ACNC: < 5 MIU/ML — SIGNIFICANT CHANGE UP
HCO3 BLDV-SCNC: 23 MMOL/L — SIGNIFICANT CHANGE UP (ref 20–27)
HCOV 229E RNA SPEC QL NAA+PROBE: NOT DETECTED — SIGNIFICANT CHANGE UP
HCOV HKU1 RNA SPEC QL NAA+PROBE: NOT DETECTED — SIGNIFICANT CHANGE UP
HCOV NL63 RNA SPEC QL NAA+PROBE: NOT DETECTED — SIGNIFICANT CHANGE UP
HCOV OC43 RNA SPEC QL NAA+PROBE: NOT DETECTED — SIGNIFICANT CHANGE UP
HCT VFR BLD CALC: 38.1 % — SIGNIFICANT CHANGE UP (ref 34.5–45)
HCT VFR BLDV CALC: 39.6 % — SIGNIFICANT CHANGE UP (ref 34.5–45)
HGB BLD-MCNC: 12.2 G/DL — SIGNIFICANT CHANGE UP (ref 11.5–15.5)
HGB BLDV-MCNC: 12.9 G/DL — SIGNIFICANT CHANGE UP (ref 11.5–15.5)
HIV COMBO RESULT: SIGNIFICANT CHANGE UP
HIV1+2 AB SPEC QL: SIGNIFICANT CHANGE UP
HMPV RNA SPEC QL NAA+PROBE: NOT DETECTED — SIGNIFICANT CHANGE UP
HPIV1 RNA SPEC QL NAA+PROBE: NOT DETECTED — SIGNIFICANT CHANGE UP
HPIV2 RNA SPEC QL NAA+PROBE: NOT DETECTED — SIGNIFICANT CHANGE UP
HPIV3 RNA SPEC QL NAA+PROBE: POSITIVE — HIGH
HPIV4 RNA SPEC QL NAA+PROBE: NOT DETECTED — SIGNIFICANT CHANGE UP
IMM GRANULOCYTES # BLD AUTO: 0.02 # — SIGNIFICANT CHANGE UP
IMM GRANULOCYTES NFR BLD AUTO: 0.5 % — SIGNIFICANT CHANGE UP (ref 0–1.5)
INR BLD: 3.98 — HIGH (ref 0.88–1.17)
LACTATE BLDV-MCNC: 3 MMOL/L — HIGH (ref 0.5–2)
LIDOCAIN IGE QN: 9.6 U/L — SIGNIFICANT CHANGE UP (ref 7–60)
LYMPHOCYTES # BLD AUTO: 1.48 K/UL — SIGNIFICANT CHANGE UP (ref 1–3.3)
LYMPHOCYTES # BLD AUTO: 40 % — SIGNIFICANT CHANGE UP (ref 13–44)
M PNEUMO DNA SPEC QL NAA+PROBE: NOT DETECTED — SIGNIFICANT CHANGE UP
MAGNESIUM SERPL-MCNC: 2 MG/DL — SIGNIFICANT CHANGE UP (ref 1.6–2.6)
MCHC RBC-ENTMCNC: 25.1 PG — LOW (ref 27–34)
MCHC RBC-ENTMCNC: 32 % — SIGNIFICANT CHANGE UP (ref 32–36)
MCV RBC AUTO: 78.2 FL — LOW (ref 80–100)
MONOCYTES # BLD AUTO: 0.44 K/UL — SIGNIFICANT CHANGE UP (ref 0–0.9)
MONOCYTES NFR BLD AUTO: 11.9 % — SIGNIFICANT CHANGE UP (ref 2–14)
NEUTROPHILS # BLD AUTO: 1.7 K/UL — LOW (ref 1.8–7.4)
NEUTROPHILS NFR BLD AUTO: 46 % — SIGNIFICANT CHANGE UP (ref 43–77)
NRBC # FLD: 0 — SIGNIFICANT CHANGE UP
PCO2 BLDV: 43 MMHG — SIGNIFICANT CHANGE UP (ref 41–51)
PH BLDV: 7.36 PH — SIGNIFICANT CHANGE UP (ref 7.32–7.43)
PLATELET # BLD AUTO: 248 K/UL — SIGNIFICANT CHANGE UP (ref 150–400)
PMV BLD: 10.8 FL — SIGNIFICANT CHANGE UP (ref 7–13)
PO2 BLDV: 33 MMHG — LOW (ref 35–40)
POTASSIUM BLDV-SCNC: 3.5 MMOL/L — SIGNIFICANT CHANGE UP (ref 3.4–4.5)
POTASSIUM SERPL-MCNC: 3.6 MMOL/L — SIGNIFICANT CHANGE UP (ref 3.5–5.3)
POTASSIUM SERPL-SCNC: 3.6 MMOL/L — SIGNIFICANT CHANGE UP (ref 3.5–5.3)
PROT SERPL-MCNC: 6.9 G/DL — SIGNIFICANT CHANGE UP (ref 6–8.3)
PROTHROM AB SERPL-ACNC: 47.1 SEC — HIGH (ref 9.8–13.1)
RBC # BLD: 4.87 M/UL — SIGNIFICANT CHANGE UP (ref 3.8–5.2)
RBC # FLD: 19.8 % — HIGH (ref 10.3–14.5)
RSV RNA SPEC QL NAA+PROBE: NOT DETECTED — SIGNIFICANT CHANGE UP
RV+EV RNA SPEC QL NAA+PROBE: NOT DETECTED — SIGNIFICANT CHANGE UP
SAO2 % BLDV: 51.1 % — LOW (ref 60–85)
SODIUM SERPL-SCNC: 132 MMOL/L — LOW (ref 135–145)
WBC # BLD: 3.7 K/UL — LOW (ref 3.8–10.5)
WBC # FLD AUTO: 3.7 K/UL — LOW (ref 3.8–10.5)

## 2018-07-08 PROCEDURE — 99223 1ST HOSP IP/OBS HIGH 75: CPT | Mod: AI

## 2018-07-08 PROCEDURE — 76705 ECHO EXAM OF ABDOMEN: CPT | Mod: 26

## 2018-07-08 PROCEDURE — 78226 HEPATOBILIARY SYSTEM IMAGING: CPT | Mod: 26,GC

## 2018-07-08 PROCEDURE — 71045 X-RAY EXAM CHEST 1 VIEW: CPT | Mod: 26

## 2018-07-08 RX ORDER — BENZOCAINE AND MENTHOL 5; 1 G/100ML; G/100ML
1 LIQUID ORAL ONCE
Qty: 0 | Refills: 0 | Status: COMPLETED | OUTPATIENT
Start: 2018-07-08 | End: 2018-07-08

## 2018-07-08 RX ORDER — ALBUTEROL 90 UG/1
2 AEROSOL, METERED ORAL EVERY 6 HOURS
Qty: 0 | Refills: 0 | Status: DISCONTINUED | OUTPATIENT
Start: 2018-07-08 | End: 2018-07-11

## 2018-07-08 RX ORDER — ALBUTEROL 90 UG/1
2.5 AEROSOL, METERED ORAL EVERY 4 HOURS
Qty: 0 | Refills: 0 | Status: DISCONTINUED | OUTPATIENT
Start: 2018-07-08 | End: 2018-07-08

## 2018-07-08 RX ORDER — WARFARIN SODIUM 2.5 MG/1
5 TABLET ORAL ONCE
Qty: 0 | Refills: 0 | Status: DISCONTINUED | OUTPATIENT
Start: 2018-07-08 | End: 2018-07-08

## 2018-07-08 RX ORDER — BUDESONIDE AND FORMOTEROL FUMARATE DIHYDRATE 160; 4.5 UG/1; UG/1
2 AEROSOL RESPIRATORY (INHALATION)
Qty: 0 | Refills: 0 | Status: DISCONTINUED | OUTPATIENT
Start: 2018-07-08 | End: 2018-07-11

## 2018-07-08 RX ORDER — INFLUENZA VIRUS VACCINE 15; 15; 15; 15 UG/.5ML; UG/.5ML; UG/.5ML; UG/.5ML
0.5 SUSPENSION INTRAMUSCULAR ONCE
Qty: 0 | Refills: 0 | Status: DISCONTINUED | OUTPATIENT
Start: 2018-07-08 | End: 2018-07-08

## 2018-07-08 RX ORDER — IPRATROPIUM/ALBUTEROL SULFATE 18-103MCG
3 AEROSOL WITH ADAPTER (GRAM) INHALATION EVERY 6 HOURS
Qty: 0 | Refills: 0 | Status: DISCONTINUED | OUTPATIENT
Start: 2018-07-08 | End: 2018-07-11

## 2018-07-08 RX ORDER — IPRATROPIUM/ALBUTEROL SULFATE 18-103MCG
3 AEROSOL WITH ADAPTER (GRAM) INHALATION ONCE
Qty: 0 | Refills: 0 | Status: COMPLETED | OUTPATIENT
Start: 2018-07-08 | End: 2018-07-08

## 2018-07-08 RX ORDER — DILTIAZEM HCL 120 MG
180 CAPSULE, EXT RELEASE 24 HR ORAL DAILY
Qty: 0 | Refills: 0 | Status: DISCONTINUED | OUTPATIENT
Start: 2018-07-08 | End: 2018-07-11

## 2018-07-08 RX ORDER — LOSARTAN POTASSIUM 100 MG/1
25 TABLET, FILM COATED ORAL DAILY
Qty: 0 | Refills: 0 | Status: DISCONTINUED | OUTPATIENT
Start: 2018-07-08 | End: 2018-07-11

## 2018-07-08 RX ORDER — FUROSEMIDE 40 MG
40 TABLET ORAL DAILY
Qty: 0 | Refills: 0 | Status: DISCONTINUED | OUTPATIENT
Start: 2018-07-08 | End: 2018-07-10

## 2018-07-08 RX ADMIN — BENZOCAINE AND MENTHOL 1 LOZENGE: 5; 1 LIQUID ORAL at 02:30

## 2018-07-08 RX ADMIN — Medication 650 MILLIGRAM(S): at 00:16

## 2018-07-08 RX ADMIN — Medication 650 MILLIGRAM(S): at 01:20

## 2018-07-08 RX ADMIN — ONDANSETRON 4 MILLIGRAM(S): 8 TABLET, FILM COATED ORAL at 00:16

## 2018-07-08 RX ADMIN — Medication 3 MILLILITER(S): at 21:19

## 2018-07-08 RX ADMIN — Medication 3 MILLILITER(S): at 00:26

## 2018-07-08 RX ADMIN — FAMOTIDINE 104 MILLIGRAM(S): 10 INJECTION INTRAVENOUS at 00:16

## 2018-07-08 RX ADMIN — Medication 0.5 MILLIGRAM(S): at 09:57

## 2018-07-08 RX ADMIN — Medication 40 MILLIGRAM(S): at 16:25

## 2018-07-08 RX ADMIN — LOSARTAN POTASSIUM 25 MILLIGRAM(S): 100 TABLET, FILM COATED ORAL at 13:23

## 2018-07-08 RX ADMIN — Medication 180 MILLIGRAM(S): at 13:23

## 2018-07-08 RX ADMIN — Medication 40 MILLIGRAM(S): at 13:23

## 2018-07-08 NOTE — ED ADULT NURSE REASSESSMENT NOTE - NS ED NURSE REASSESS COMMENT FT1
pt. awaiting US outside Rm. 21, c/o feeling uncomfortable and itchy on her throat, satting 95% on 2L O2. pt. denies any SOB, pt. speaks clearly and in full sentences. MD made aware. pt. awaiting US outside Rm. 21, c/o feeling uncomfortable and itchy on her throat, satting 95% on 2L O2. pt. denies any SOB, pt. speaks clearly and in full sentences. MD Segundo made aware.

## 2018-07-08 NOTE — H&P ADULT - PMH
Anticardiolipin antibody positive    COPD (chronic obstructive pulmonary disease)    Essential hypertension    Morbid obesity    Pulmonary HTN

## 2018-07-08 NOTE — CONSULT NOTE ADULT - ATTENDING COMMENTS
I have reviewed the history, pertinent labs and imaging, and discussed the care with the consult resident.  Agree.    The Acute Care Surgery (B Team) Attending Group Practice:  Dr. Dejon Nieto, Dr. Alejandra Cordero, Dr. Sohail Martinez, Dr. Kati Rios, Dr. Emery Etienne    urgent issues - spectra [id number] or [id number]  nonurgent issues - [phone]  patient appointments or afterhours - [phone]

## 2018-07-08 NOTE — H&P ADULT - HISTORY OF PRESENT ILLNESS
Pt is 33F severe pulmonary HTN, COPD, CVA ppx with warfarin, anticardiolipin Ab pos, HTN, p/w vomiting, posttussive emesis, sore throat, difficulty swallowing, cough productive of yellow sputum x 1 month. Emesis occasionally postprandial a/w epigastric pain, non-radiating. Denies fever, chills, chest pain, palpitations, dizziness, weakness, diarrhea, bladder and bowel problems, back pain, sick contact, recent long travel. Also c/o worsening LE swelling, orthopnea. In PCU this morning feels somewhat better, still with cough, denies abd pain, hungry, wants to eat.    Does not use BiPAP CPAP or supplemental O2 at home.

## 2018-07-08 NOTE — H&P ADULT - NSHPLABSRESULTS_GEN_ALL_CORE
< from: NM Hepatobiliary Scan w/wo Gall Bladder (07.08.18 @ 10:55) >  EXAM:  NM HEPATOBILIARY IMG    PROCEDURE DATE:  Jul 8 2018   INTERPRETATION:  RADIOPHARMACEUTICAL: 3 mCi Tc-99m-Mebrofenin, I.V.    CLINICAL INFORMATION: 33-year-old female with right upper quadrant and   epigastric tenderness, cholelithiasis, gallbladder wall thickening and   positive [place]'s sign on sonogram, evaluate for acute cholecystitis.    TECHNIQUE: Dynamic imaging of the anterior abdomen was performed for 60   minutes following injection of radiotracer. Static images of the abdomen   in the anterior, right lateral and right anterior oblique views were   obtained immediately thereafter.    COMPARISON: No prior hepatobiliary scan is available for comparison.     FINDINGS: There is prompt, homogeneous uptake of radiotracer by the   hepatocytes. Activity is first seen in the gallbladder at 30 minutes and   in the bowel at 50 minutes. There is good clearance of activity from the   liver by the end of the study.    IMPRESSION: Normal hepatobiliary scan.    No radionuclide evidence of acute cholecystitis.  KAZ WARNER M.D., NUCLEAR MEDICINE ATTENDING  This document has been electronically signed. Jul 8 2018 11:06AM     < end of copied text >    EKG NSR 90 RVH

## 2018-07-08 NOTE — CONSULT NOTE ADULT - ASSESSMENT
34yo F with PMH pulm htn, asthma, on coumadin presents with parainfluenza, LE swelling, supratherapeutic INR, and abdominal pain. Pt with gallbladder wall thickening and cholelithiasis on imaging with RUQ tenderness.    - npo, ivf  - HIDA scan  - serial abdominal exams  - will follow closely  - medical management of pulm HTN, parainfluenza, and edema    will discuss with Dr. Gil HYMAN Team   [id number] 34yo F with PMH pulm htn, asthma, on coumadin presents with parainfluenza, LE swelling, supratherapeutic INR, and abdominal pain. Pt with gallbladder wall thickening and cholelithiasis on imaging with RUQ tenderness.    - npo, ivf  - HIDA scan  - am LFTs - trend bilirubin  - serial abdominal exams  - will follow closely  - medical management of pulm HTN, parainfluenza, and edema    will discuss with Dr. Gil HYMAN Team   [id number] 34yo F with PMH pulm htn, asthma, on coumadin presents with parainfluenza, LE swelling, supratherapeutic INR, and abdominal pain. Pt with gallbladder wall thickening and cholelithiasis on imaging with RUQ tenderness.    - npo, ivf  - HIDA scan  - am LFTs - trend bilirubin  - serial abdominal exams  - will follow closely  - medical management of pulm HTN, parainfluenza, and edema  - hold coumadin for possible intervention - heparin drip as needed     will discuss with Dr. Gil HYMAN Team   [id number]

## 2018-07-08 NOTE — CONSULT NOTE ADULT - SUBJECTIVE AND OBJECTIVE BOX
GENERAL SURGERY CONSULT NOTE    Patient is a 33y old  Female who presents with a chief complaint of cough    HPI:  32yo F with PMH of pulm HTN, asthma on coumadin for CVA prevention presents with 1 month of cough and abdominal pain. Pt reports that she started feeling poorly 1 month ago with cough, constipation, nausea, vomiting, headache, dizziness, night sweats and chills. She has noticed recent increased LE swelling bilaterally that is not improving with lasix. She also reports on and off RUQ pain for 1 month that gets worse with drinking water. non radiating and crampy. No history of gallstones in the past. No recent travel. mom with a similar cold and cough and home.     PAST MEDICAL & SURGICAL HISTORY:  Pulmonary HTN  Morbid obesity  Diabetes  No significant past surgical history    [  ] No significant past history as reviewed with the patient and family    FAMILY HISTORY:  Family history of leukemia (Sibling)    [  ] Family history not pertinent as reviewed with the patient and family    SOCIAL HISTORY:  quit smoking as a teenager  does not drink    MEDICATIONS:   lasix, coumadin, irbesartan, adempas  pt does not recall doses at this time    Allergies    No Known Allergies    Intolerances        Vital Signs Last 24 Hrs  T(C): 35.9 (08 Jul 2018 01:05), Max: 36.4 (07 Jul 2018 22:03)  T(F): 96.7 (08 Jul 2018 01:05), Max: 97.6 (07 Jul 2018 22:03)  HR: 90 (08 Jul 2018 01:05) (90 - 92)  BP: 132/81 (08 Jul 2018 01:05) (132/81 - 142/94)  BP(mean): --  RR: 17 (08 Jul 2018 01:05) (17 - 18)  SpO2: 95% (08 Jul 2018 01:05) (95% - 99%)  Daily     Daily     Exam:  General: awake, alert, NAD  HEENT: NCAT, MMM  Resp: nonlabored, course breath sounds  Chest: equal chest rise  Abd: soft, ND, RUQ tenderness, no rebound, no guarding, obese  Ext: HARRISON  Neuro: intact                          12.2   3.70  )-----------( 248      ( 07 Jul 2018 23:43 )             38.1     07-07    132<L>  |  96<L>  |  7   ----------------------------<  102<H>  3.6   |  21<L>  |  1.02    Ca    9.2      07 Jul 2018 23:43  Mg     2.0     07-07    TPro  6.9  /  Alb  3.8  /  TBili  2.1<H>  /  DBili  x   /  AST  31  /  ALT  13  /  AlkPhos  57  07-07    PT/INR - ( 07 Jul 2018 23:56 )   PT: 47.1 SEC;   INR: 3.98          PTT - ( 07 Jul 2018 23:56 )  PTT:39.3 SEC      IMAGING STUDIES:    < from: Xray Chest 1 View- PORTABLE-Urgent (07.08.18 @ 00:19) >  INTERPRETATION:  no emergent findings    < end of copied text >    < from: US Abdomen Limited (07.08.18 @ 02:07) >  IMPRESSION:     Gallbladder wall thickening with cholelithiasis and positive sonographic   [place]'s sign most consistent with acute cholecystitis.    < end of copied text >

## 2018-07-08 NOTE — H&P ADULT - PROBLEM SELECTOR PLAN 4
Denies history of VTE.  Continue warfarin, supratherapeutic, will give reduced dose tonight 5mg instead of usual 10

## 2018-07-08 NOTE — ED ADULT NURSE NOTE - OBJECTIVE STATEMENT
Pt recd A+Ox3. C/o vomiting x1 month. Pt reports mid and lower abd pain with vomiting after she eats, and when she coughs very hard also triggers vomiting. Also c/o SOB but pt is SOB at baseline. Unable to lay down flat comfortably and had +3 pitting edema on b/l LE and abd. PMH pulm HTN, cardiomyopathy, afib on coumadin, asthma. O2 sat 95% on RA. Able to speak in full sentences with no distress at this time. Will continue to monitor.

## 2018-07-08 NOTE — H&P ADULT - ASSESSMENT
33F Severe pulmonary hypertension, obesity, anticardiolipin Ab pos on warfarin HTN with parainfluenza virus infection

## 2018-07-08 NOTE — PROVIDER CONTACT NOTE (OTHER) - ASSESSMENT
pt alert. no complaints of pain or discomfort. pt states she feels she will void soon and is not uncomfortable.

## 2018-07-08 NOTE — H&P ADULT - PROBLEM SELECTOR PLAN 2
Appreciate surgery recs.  HIDA negative, high surgery risk, not a candidate for cholecystectomy at this time.   Abd pain may be due to parainfluenza infection.  Currently pain free.  Will advance diet.

## 2018-07-08 NOTE — PROGRESS NOTE ADULT - SUBJECTIVE AND OBJECTIVE BOX
B Team Surgery Consult - Daily Progress Note    SUBJECTIVE:  Patient states that her pain is resolved.   Eating without increasing pain or nausea.   Denies fevers, chills, or diaphoresis.     OBJECTIVE:     ** VITAL SIGNS / I&O's **    T(C): 36.1 (07-08-18 @ 13:20), Max: 36.4 (07-07-18 @ 22:03)  T(F): 97 (07-08-18 @ 13:20), Max: 97.6 (07-07-18 @ 22:03)  HR: 80 (07-08-18 @ 13:20) (80 - 104)  BP: 116/68 (07-08-18 @ 13:20) (110/79 - 147/80)  RR: 22 (07-08-18 @ 13:20) (17 - 26)  SpO2: 98% (07-08-18 @ 13:20) (95% - 99%)      ** PHYSICAL EXAM **    -- CONSTITUTIONAL: AOx3. NAD.   -- CARDIOVASCULAR: normotensive, regular rate   -- RESPIRATORY: breathing comfortably   -- ABDOMEN: soft, nontender, nondistended     ** LABS **                 12.2   3.70   )----------(  248       ( 07 Jul 2018 23:43 )               38.1      132    |  96     |  7      ----------------------------<  102        ( 07 Jul 2018 23:43 )  3.6     |  21     |  1.02     Ca    9.2        ( 07 Jul 2018 23:43 )  Mg     2.0       ( 07 Jul 2018 23:43 )    TPro  6.9    /  Alb  3.8    /  TBili  2.1    /  DBili  x      /  AST  31     /  ALT  13     /  AlkPhos  57     ( 07 Jul 2018 23:43 )    PT/INR -  47.1 SEC / 3.98    ( 07 Jul 2018 23:56 )       PTT -  39.3 SEC   ( 07 Jul 2018 23:56 )  CAPILLARY BLOOD GLUCOSE    < from: NM Hepatobiliary Scan w/wo Gall Bladder (07.08.18 @ 10:55) >  No radionuclide evidence of acute cholecystitis.    < end of copied text >

## 2018-07-08 NOTE — H&P ADULT - NSHPPHYSICALEXAM_GEN_ALL_CORE
Vital Signs Last 24 Hrs  T(C): 36.2 (08 Jul 2018 09:58), Max: 36.4 (07 Jul 2018 22:03)  T(F): 97.2 (08 Jul 2018 09:58), Max: 97.6 (07 Jul 2018 22:03)  HR: 104 (08 Jul 2018 09:58) (82 - 104)  BP: 125/85 (08 Jul 2018 09:58) (110/79 - 147/80)  BP(mean): --  RR: 26 (08 Jul 2018 09:58) (17 - 26)  SpO2: 96% (08 Jul 2018 09:58) (95% - 99%)  GENERAL: NAD, obese  HEAD:  Atraumatic, Normocephalic  EYES: EOMI, conjunctiva and sclera clear  NECK: Supple, No JVD  CHEST/LUNG: scattered bilaterally; No wheeze  HEART: Regular rate and rhythm; + syst murmur  ABDOMEN: Soft, Nontender, Nondistended; Bowel sounds present  EXTREMITIES:  2+ Peripheral Pulses, No clubbing, cyanosis, 1+ BLE edema  PSYCH: AAOx3  NEUROLOGY: non-focal  SKIN: No rashes or lesions

## 2018-07-08 NOTE — ED ADULT NURSE REASSESSMENT NOTE - NS ED NURSE REASSESS COMMENT FT1
rec'd pt. a&ox3, on 2L O2 via nc, with g20 saline lock on left ac with no ss of infiltration. c/o "Itchiness" on throat worse with coughing, denies any SOB. awaits further eval. for US. will reassess upon return. rec'd pt. a&ox3, on 2L O2 via nc, with g20 saline lock on left ac with no ss of infiltration. c/o "Itchiness" on throat worse with coughing, denies any SOB. awaits further eval. noted Lactate level - 3.0, MD Segundo aware, no further interventions for it at this time as per Resident. pt. for US. will reassess upon return.

## 2018-07-08 NOTE — H&P ADULT - NSHPREVIEWOFSYSTEMS_GEN_ALL_CORE
CONSTITUTIONAL: No fever, weight loss, + fatigue  EYES: No eye pain, visual disturbances, or discharge  ENMT:  No difficulty hearing, tinnitus, vertigo; + sore throat  NECK: No pain or stiffness  RESPIRATORY: See HPI  CARDIOVASCULAR: No chest pain, palpitations, dizziness, + leg swelling  GASTROINTESTINAL: See HPI  GENITOURINARY: No dysuria, frequency, hematuria, or incontinence  NEUROLOGICAL: No headaches, memory loss, loss of strength, numbness, or tremors  SKIN: No itching, burning, rashes, or lesions   LYMPH NODES: No enlarged glands  ENDOCRINE: No heat or cold intolerance; No hair loss  MUSCULOSKELETAL: No joint pain or swelling; No muscle, back, or extremity pain  PSYCHIATRIC: No depression, anxiety, mood swings, or difficulty sleeping  HEME/LYMPH: No easy bruising, or bleeding gums  ALLERY AND IMMUNOLOGIC: No hives or eczema

## 2018-07-09 LAB
BASE EXCESS BLDA CALC-SCNC: -4.6 MMOL/L — SIGNIFICANT CHANGE UP
BUN SERPL-MCNC: 6 MG/DL — LOW (ref 7–23)
CALCIUM SERPL-MCNC: 8.9 MG/DL — SIGNIFICANT CHANGE UP (ref 8.4–10.5)
CHLORIDE SERPL-SCNC: 94 MMOL/L — LOW (ref 98–107)
CO2 SERPL-SCNC: 20 MMOL/L — LOW (ref 22–31)
CREAT SERPL-MCNC: 0.9 MG/DL — SIGNIFICANT CHANGE UP (ref 0.5–1.3)
GLUCOSE SERPL-MCNC: 228 MG/DL — HIGH (ref 70–99)
HCO3 BLDA-SCNC: 21 MMOL/L — LOW (ref 22–26)
HCT VFR BLD CALC: 38.4 % — SIGNIFICANT CHANGE UP (ref 34.5–45)
HGB BLD-MCNC: 12 G/DL — SIGNIFICANT CHANGE UP (ref 11.5–15.5)
INR BLD: 4.45 — HIGH (ref 0.88–1.17)
MCHC RBC-ENTMCNC: 24.8 PG — LOW (ref 27–34)
MCHC RBC-ENTMCNC: 31.3 % — LOW (ref 32–36)
MCV RBC AUTO: 79.3 FL — LOW (ref 80–100)
NRBC # FLD: 0 — SIGNIFICANT CHANGE UP
PCO2 BLDA: 36 MMHG — SIGNIFICANT CHANGE UP (ref 32–48)
PH BLDA: 7.36 PH — SIGNIFICANT CHANGE UP (ref 7.35–7.45)
PLATELET # BLD AUTO: 224 K/UL — SIGNIFICANT CHANGE UP (ref 150–400)
PMV BLD: 10.4 FL — SIGNIFICANT CHANGE UP (ref 7–13)
PO2 BLDA: 75 MMHG — LOW (ref 83–108)
POTASSIUM SERPL-MCNC: 3.9 MMOL/L — SIGNIFICANT CHANGE UP (ref 3.5–5.3)
POTASSIUM SERPL-SCNC: 3.9 MMOL/L — SIGNIFICANT CHANGE UP (ref 3.5–5.3)
PROTHROM AB SERPL-ACNC: 52.8 SEC — HIGH (ref 9.8–13.1)
RBC # BLD: 4.84 M/UL — SIGNIFICANT CHANGE UP (ref 3.8–5.2)
RBC # FLD: 19.8 % — HIGH (ref 10.3–14.5)
SAO2 % BLDA: 93.7 % — LOW (ref 95–99)
SODIUM SERPL-SCNC: 132 MMOL/L — LOW (ref 135–145)
SPECIMEN SOURCE: SIGNIFICANT CHANGE UP
SPECIMEN SOURCE: SIGNIFICANT CHANGE UP
WBC # BLD: 3.37 K/UL — LOW (ref 3.8–10.5)
WBC # FLD AUTO: 3.37 K/UL — LOW (ref 3.8–10.5)

## 2018-07-09 PROCEDURE — 99233 SBSQ HOSP IP/OBS HIGH 50: CPT | Mod: GC

## 2018-07-09 RX ORDER — SODIUM CHLORIDE 0.65 %
1 AEROSOL, SPRAY (ML) NASAL
Qty: 0 | Refills: 0 | Status: DISCONTINUED | OUTPATIENT
Start: 2018-07-09 | End: 2018-07-11

## 2018-07-09 RX ORDER — CHLORHEXIDINE GLUCONATE 213 G/1000ML
1 SOLUTION TOPICAL
Qty: 0 | Refills: 0 | Status: DISCONTINUED | OUTPATIENT
Start: 2018-07-09 | End: 2018-07-11

## 2018-07-09 RX ORDER — BENZOCAINE AND MENTHOL 5; 1 G/100ML; G/100ML
1 LIQUID ORAL
Qty: 0 | Refills: 0 | Status: DISCONTINUED | OUTPATIENT
Start: 2018-07-09 | End: 2018-07-11

## 2018-07-09 RX ADMIN — BUDESONIDE AND FORMOTEROL FUMARATE DIHYDRATE 2 PUFF(S): 160; 4.5 AEROSOL RESPIRATORY (INHALATION) at 09:11

## 2018-07-09 RX ADMIN — Medication 3 MILLILITER(S): at 21:50

## 2018-07-09 RX ADMIN — Medication 40 MILLIGRAM(S): at 05:15

## 2018-07-09 RX ADMIN — Medication 3 MILLILITER(S): at 03:27

## 2018-07-09 RX ADMIN — BENZOCAINE AND MENTHOL 1 LOZENGE: 5; 1 LIQUID ORAL at 01:26

## 2018-07-09 RX ADMIN — CHLORHEXIDINE GLUCONATE 1 APPLICATION(S): 213 SOLUTION TOPICAL at 21:38

## 2018-07-09 RX ADMIN — Medication 40 MILLIGRAM(S): at 05:14

## 2018-07-09 RX ADMIN — Medication 3 MILLILITER(S): at 16:31

## 2018-07-09 RX ADMIN — Medication 180 MILLIGRAM(S): at 05:14

## 2018-07-09 RX ADMIN — BUDESONIDE AND FORMOTEROL FUMARATE DIHYDRATE 2 PUFF(S): 160; 4.5 AEROSOL RESPIRATORY (INHALATION) at 21:53

## 2018-07-09 RX ADMIN — Medication 1 SPRAY(S): at 14:55

## 2018-07-09 RX ADMIN — Medication 3 MILLILITER(S): at 09:16

## 2018-07-09 NOTE — PROGRESS NOTE ADULT - SUBJECTIVE AND OBJECTIVE BOX
CHIEF COMPLAINT:    Interval Events:    REVIEW OF SYSTEMS:  Constitutional:   Eyes:  ENT:  CV:  Resp:  GI:  :  MSK:  Integumentary:  Neurological:  Psychiatric:  Endocrine:  Hematologic/Lymphatic:  Allergic/Immunologic:  [ ] All other systems negative  [ ] Unable to assess ROS because ________    OBJECTIVE:  ICU Vital Signs Last 24 Hrs  T(C): 36.3 (09 Jul 2018 05:08), Max: 36.3 (09 Jul 2018 05:08)  T(F): 97.4 (09 Jul 2018 05:08), Max: 97.4 (09 Jul 2018 05:08)  HR: 81 (09 Jul 2018 09:17) (77 - 93)  BP: 109/64 (09 Jul 2018 05:08) (109/64 - 116/85)  BP(mean): --  ABP: --  ABP(mean): --  RR: 20 (09 Jul 2018 05:08) (20 - 22)  SpO2: 92% (09 Jul 2018 09:17) (90% - 98%)        CAPILLARY BLOOD GLUCOSE          PHYSICAL EXAM:  General:   HEENT:   Lymph Nodes:  Neck:   Respiratory:   Cardiovascular:   Abdomen:   Extremities:   Skin:   Neurological:  Psychiatry:    HOSPITAL MEDICATIONS:  MEDICATIONS  (STANDING):  Adempas (riociguat) 1.5 milliGRAM(s) 1.5 milliGRAM(s) Oral three times a day  ALBUTerol/ipratropium for Nebulization 3 milliLiter(s) Nebulizer every 6 hours  buDESOnide 160 MICROgram(s)/formoterol 4.5 MICROgram(s) Inhaler 2 Puff(s) Inhalation two times a day  diltiazem    milliGRAM(s) Oral daily  furosemide    Tablet 40 milliGRAM(s) Oral daily  losartan 25 milliGRAM(s) Oral daily  methylPREDNISolone sodium succinate Injectable 40 milliGRAM(s) IV Push daily    MEDICATIONS  (PRN):  ALBUTerol    90 MICROgram(s) HFA Inhaler 2 Puff(s) Inhalation every 6 hours PRN Shortness of Breath and/or Wheezing  benzocaine 15 mG/menthol 3.6 mG Lozenge 1 Lozenge Oral four times a day PRN Sore Throat      LABS:                        12.0   3.37  )-----------( 224      ( 09 Jul 2018 06:35 )             38.4     07-09    132<L>  |  94<L>  |  6<L>  ----------------------------<  228<H>  3.9   |  20<L>  |  0.90    Ca    8.9      09 Jul 2018 06:35  Mg     2.0     07-07    TPro  6.9  /  Alb  3.8  /  TBili  2.1<H>  /  DBili  x   /  AST  31  /  ALT  13  /  AlkPhos  57  07-07    PT/INR - ( 09 Jul 2018 06:35 )   PT: 52.8 SEC;   INR: 4.45          PTT - ( 07 Jul 2018 23:56 )  PTT:39.3 SEC    Arterial Blood Gas:  07-09 @ 04:50  7.36/36/75/21/93.7/-4.6  ABG lactate: --    Venous Blood Gas:  07-07 @ 23:43  7.36/43/33/23/51.1  VBG Lactate: 3.0      MICROBIOLOGY:     ABG - ( 09 Jul 2018 04:50 )  pH, Arterial: 7.36  pH, Blood: x     /  pCO2: 36    /  pO2: 75    / HCO3: 21    / Base Excess: -4.6  /  SaO2: 93.7                RADIOLOGY:  [ ] Reviewed and interpreted by me    PULMONARY FUNCTION TESTS:    EKG: CHIEF COMPLAINT: SOB 2/2 to parainfluenza virus    Interval Events: no events overnight    REVIEW OF SYSTEMS:    CV: S1 S2  Resp: SOB on FERRO    Hematologic/Lymphatic:  Allergic/Immunologic:  [ ] All other systems negative  [ ] Unable to assess ROS because ________    OBJECTIVE:  ICU Vital Signs Last 24 Hrs  T(C): 36.3 (09 Jul 2018 05:08), Max: 36.3 (09 Jul 2018 05:08)  T(F): 97.4 (09 Jul 2018 05:08), Max: 97.4 (09 Jul 2018 05:08)  HR: 81 (09 Jul 2018 09:17) (77 - 93)  BP: 109/64 (09 Jul 2018 05:08) (109/64 - 116/85)  BP(mean): --  ABP: --  ABP(mean): --  RR: 20 (09 Jul 2018 05:08) (20 - 22)  SpO2: 92% (09 Jul 2018 09:17) (90% - 98%)        CAPILLARY BLOOD GLUCOSE          PHYSICAL EXAM:  General:   HEENT:   Lymph Nodes:  Neck:   Respiratory:   Cardiovascular:   Abdomen:   Extremities:   Skin:   Neurological:  Psychiatry:    HOSPITAL MEDICATIONS:  MEDICATIONS  (STANDING):  Adempas (riociguat) 1.5 milliGRAM(s) 1.5 milliGRAM(s) Oral three times a day  ALBUTerol/ipratropium for Nebulization 3 milliLiter(s) Nebulizer every 6 hours  buDESOnide 160 MICROgram(s)/formoterol 4.5 MICROgram(s) Inhaler 2 Puff(s) Inhalation two times a day  diltiazem    milliGRAM(s) Oral daily  furosemide    Tablet 40 milliGRAM(s) Oral daily  losartan 25 milliGRAM(s) Oral daily  methylPREDNISolone sodium succinate Injectable 40 milliGRAM(s) IV Push daily    MEDICATIONS  (PRN):  ALBUTerol    90 MICROgram(s) HFA Inhaler 2 Puff(s) Inhalation every 6 hours PRN Shortness of Breath and/or Wheezing  benzocaine 15 mG/menthol 3.6 mG Lozenge 1 Lozenge Oral four times a day PRN Sore Throat      LABS:                        12.0   3.37  )-----------( 224      ( 09 Jul 2018 06:35 )             38.4     07-09    132<L>  |  94<L>  |  6<L>  ----------------------------<  228<H>  3.9   |  20<L>  |  0.90    Ca    8.9      09 Jul 2018 06:35  Mg     2.0     07-07    TPro  6.9  /  Alb  3.8  /  TBili  2.1<H>  /  DBili  x   /  AST  31  /  ALT  13  /  AlkPhos  57  07-07    PT/INR - ( 09 Jul 2018 06:35 )   PT: 52.8 SEC;   INR: 4.45          PTT - ( 07 Jul 2018 23:56 )  PTT:39.3 SEC    Arterial Blood Gas:  07-09 @ 04:50  7.36/36/75/21/93.7/-4.6  ABG lactate: --    Venous Blood Gas:  07-07 @ 23:43  7.36/43/33/23/51.1  VBG Lactate: 3.0      MICROBIOLOGY:     ABG - ( 09 Jul 2018 04:50 )  pH, Arterial: 7.36  pH, Blood: x     /  pCO2: 36    /  pO2: 75    / HCO3: 21    / Base Excess: -4.6  /  SaO2: 93.7                RADIOLOGY:  [ ] Reviewed and interpreted by me    PULMONARY FUNCTION TESTS:    EKG:

## 2018-07-10 ENCOUNTER — TRANSCRIPTION ENCOUNTER (OUTPATIENT)
Age: 33
End: 2018-07-10

## 2018-07-10 ENCOUNTER — APPOINTMENT (OUTPATIENT)
Dept: INTERNAL MEDICINE | Facility: CLINIC | Age: 33
End: 2018-07-10

## 2018-07-10 LAB
ALBUMIN SERPL ELPH-MCNC: 3.9 G/DL — SIGNIFICANT CHANGE UP (ref 3.3–5)
ALP SERPL-CCNC: 55 U/L — SIGNIFICANT CHANGE UP (ref 40–120)
ALT FLD-CCNC: 18 U/L — SIGNIFICANT CHANGE UP (ref 4–33)
AST SERPL-CCNC: 44 U/L — HIGH (ref 4–32)
BASOPHILS # BLD AUTO: 0.01 K/UL — SIGNIFICANT CHANGE UP (ref 0–0.2)
BASOPHILS NFR BLD AUTO: 0.1 % — SIGNIFICANT CHANGE UP (ref 0–2)
BILIRUB SERPL-MCNC: 1.5 MG/DL — HIGH (ref 0.2–1.2)
BUN SERPL-MCNC: 5 MG/DL — LOW (ref 7–23)
CALCIUM SERPL-MCNC: 8.9 MG/DL — SIGNIFICANT CHANGE UP (ref 8.4–10.5)
CHLORIDE SERPL-SCNC: 98 MMOL/L — SIGNIFICANT CHANGE UP (ref 98–107)
CO2 SERPL-SCNC: 23 MMOL/L — SIGNIFICANT CHANGE UP (ref 22–31)
CREAT SERPL-MCNC: 0.82 MG/DL — SIGNIFICANT CHANGE UP (ref 0.5–1.3)
EOSINOPHIL # BLD AUTO: 0 K/UL — SIGNIFICANT CHANGE UP (ref 0–0.5)
EOSINOPHIL NFR BLD AUTO: 0 % — SIGNIFICANT CHANGE UP (ref 0–6)
GLUCOSE SERPL-MCNC: 153 MG/DL — HIGH (ref 70–99)
HCT VFR BLD CALC: 39.2 % — SIGNIFICANT CHANGE UP (ref 34.5–45)
HGB BLD-MCNC: 12.5 G/DL — SIGNIFICANT CHANGE UP (ref 11.5–15.5)
IMM GRANULOCYTES # BLD AUTO: 0.02 # — SIGNIFICANT CHANGE UP
IMM GRANULOCYTES NFR BLD AUTO: 0.3 % — SIGNIFICANT CHANGE UP (ref 0–1.5)
INR BLD: 4.97 — CRITICAL HIGH (ref 0.88–1.17)
LYMPHOCYTES # BLD AUTO: 0.87 K/UL — LOW (ref 1–3.3)
LYMPHOCYTES # BLD AUTO: 12.2 % — LOW (ref 13–44)
MCHC RBC-ENTMCNC: 24.9 PG — LOW (ref 27–34)
MCHC RBC-ENTMCNC: 31.9 % — LOW (ref 32–36)
MCV RBC AUTO: 78.1 FL — LOW (ref 80–100)
MONOCYTES # BLD AUTO: 0.54 K/UL — SIGNIFICANT CHANGE UP (ref 0–0.9)
MONOCYTES NFR BLD AUTO: 7.5 % — SIGNIFICANT CHANGE UP (ref 2–14)
NEUTROPHILS # BLD AUTO: 5.72 K/UL — SIGNIFICANT CHANGE UP (ref 1.8–7.4)
NEUTROPHILS NFR BLD AUTO: 79.9 % — HIGH (ref 43–77)
NRBC # FLD: 0 — SIGNIFICANT CHANGE UP
PLATELET # BLD AUTO: 247 K/UL — SIGNIFICANT CHANGE UP (ref 150–400)
PMV BLD: 10.3 FL — SIGNIFICANT CHANGE UP (ref 7–13)
POTASSIUM SERPL-MCNC: 3.9 MMOL/L — SIGNIFICANT CHANGE UP (ref 3.5–5.3)
POTASSIUM SERPL-SCNC: 3.9 MMOL/L — SIGNIFICANT CHANGE UP (ref 3.5–5.3)
PROT SERPL-MCNC: 7.2 G/DL — SIGNIFICANT CHANGE UP (ref 6–8.3)
PROTHROM AB SERPL-ACNC: 57 SEC — HIGH (ref 9.8–13.1)
RBC # BLD: 5.02 M/UL — SIGNIFICANT CHANGE UP (ref 3.8–5.2)
RBC # FLD: 19.6 % — HIGH (ref 10.3–14.5)
SODIUM SERPL-SCNC: 135 MMOL/L — SIGNIFICANT CHANGE UP (ref 135–145)
WBC # BLD: 7.16 K/UL — SIGNIFICANT CHANGE UP (ref 3.8–10.5)
WBC # FLD AUTO: 7.16 K/UL — SIGNIFICANT CHANGE UP (ref 3.8–10.5)

## 2018-07-10 PROCEDURE — 99233 SBSQ HOSP IP/OBS HIGH 50: CPT | Mod: GC

## 2018-07-10 RX ORDER — FUROSEMIDE 40 MG
40 TABLET ORAL
Qty: 0 | Refills: 0 | Status: DISCONTINUED | OUTPATIENT
Start: 2018-07-10 | End: 2018-07-11

## 2018-07-10 RX ORDER — CALCIUM CARBONATE 500(1250)
1 TABLET ORAL THREE TIMES A DAY
Qty: 0 | Refills: 0 | Status: DISCONTINUED | OUTPATIENT
Start: 2018-07-10 | End: 2018-07-11

## 2018-07-10 RX ADMIN — LOSARTAN POTASSIUM 25 MILLIGRAM(S): 100 TABLET, FILM COATED ORAL at 05:07

## 2018-07-10 RX ADMIN — Medication 3 MILLILITER(S): at 10:15

## 2018-07-10 RX ADMIN — Medication 40 MILLIGRAM(S): at 17:20

## 2018-07-10 RX ADMIN — Medication 1 TABLET(S): at 13:20

## 2018-07-10 RX ADMIN — BUDESONIDE AND FORMOTEROL FUMARATE DIHYDRATE 2 PUFF(S): 160; 4.5 AEROSOL RESPIRATORY (INHALATION) at 10:13

## 2018-07-10 RX ADMIN — Medication 3 MILLILITER(S): at 15:52

## 2018-07-10 RX ADMIN — CHLORHEXIDINE GLUCONATE 1 APPLICATION(S): 213 SOLUTION TOPICAL at 22:45

## 2018-07-10 RX ADMIN — Medication 40 MILLIGRAM(S): at 05:07

## 2018-07-10 RX ADMIN — Medication 40 MILLIGRAM(S): at 08:50

## 2018-07-10 RX ADMIN — Medication 3 MILLILITER(S): at 22:16

## 2018-07-10 RX ADMIN — Medication 3 MILLILITER(S): at 03:18

## 2018-07-10 RX ADMIN — BENZOCAINE AND MENTHOL 1 LOZENGE: 5; 1 LIQUID ORAL at 00:16

## 2018-07-10 RX ADMIN — Medication 180 MILLIGRAM(S): at 05:07

## 2018-07-10 RX ADMIN — BUDESONIDE AND FORMOTEROL FUMARATE DIHYDRATE 2 PUFF(S): 160; 4.5 AEROSOL RESPIRATORY (INHALATION) at 22:18

## 2018-07-10 NOTE — PROGRESS NOTE ADULT - PROBLEM SELECTOR PLAN 3
- Continue adempas.    - Outpt pulm f/u with Dr. Unger - Continue adempas.    - Outpt pulm f/u with Dr. Unger  - setting up home oxygen - Continue adempas.    - Outpt pulm f/u with Dr. Unger  -Pt requires home oxygen- O2 sat at rest on RA 82%                                          O2 sat at rest on 5L oxygen via NC 88%

## 2018-07-10 NOTE — DISCHARGE NOTE ADULT - HOSPITAL COURSE
33F Severe pulmonary hypertension, obesity, anticardiolipin Ab pos on warfarin HTN with parainfluenza virus infection 33F Severe pulmonary hypertension, obesity, anticardiolipin Ab pos on warfarin HTN with parainfluenza virus infection      Parainfluenza.    Supportive care.     Abdominal pain.    Appreciate surgery recs.  HIDA negative, high surgery risk, not a candidate for cholecystectomy at this time.   Abd pain may be due to parainfluenza infection.  Currently pain free.  Will advance diet.     Pulmonary HTN.    Continue adempas.  Outpt pulm f/u with Dr. Ute Nunez increased to 40mg 2x day     Anticardiolipin antibody positive.    Denies history of VTE.  Continue warfarin, supratherapeutic, COUMADIN IS ON HOLD - PT WILL FOLLOW UP AT HER COUMADIN CLINIC ON FRIDAY LAST INR 3.99   Pt was scheduled for VQ scan and refused when brought to nuclear medicine - explained importance by NP/MD and still refusing     COPD (chronic obstructive pulmonary disease).     Continue bronchodilators.     Prophylactic measure.    no VTE ppx, pt fully anticoagulated.

## 2018-07-10 NOTE — DISCHARGE NOTE ADULT - DURABLE MEDICAL EQUIPMENT AGENCY
Pending sale to [facility] [phone]: A Home Oxygen Concentrator w/Home Fill Portable System will be delivered to your home..  A  Portable Oxygen Tank will be delivered to your Hospital Room for Transport Home.

## 2018-07-10 NOTE — DISCHARGE NOTE ADULT - MEDICATION SUMMARY - MEDICATIONS TO TAKE
I will START or STAY ON the medications listed below when I get home from the hospital:    irbesartan 75 mg oral tablet  -- 1 tab(s) by mouth once a day  -- Indication: For Essential hypertension    calcium carbonate 500 mg (200 mg elemental calcium) oral tablet, chewable  -- 1 tab(s) by mouth 3 times a day, As needed, Heartburn  -- Indication: For gerd    Cartia  mg/24 hours oral capsule, extended release  -- 1 cap(s) by mouth once a day  -- Indication: For Essential hypertension    Symbicort 160 mcg-4.5 mcg/inh inhalation aerosol  -- 1 puff(s) inhaled 2 times a day  -- Indication: For COPD (chronic obstructive pulmonary disease)    albuterol 2.5 mg/3 mL (0.083%) inhalation solution  -- 3 milliliter(s) by nebulizer every 6 hours -for bronchospasm   -- For inhalation only.  It is very important that you take or use this exactly as directed.  Do not skip doses or discontinue unless directed by your doctor.  Obtain medical advice before taking any non-prescription drugs as some may affect the action of this medication.    -- Indication: For COPD (chronic obstructive pulmonary disease)    ProAir HFA 90 mcg/inh inhalation aerosol  -- 2 puff(s) inhaled 4 times a day, As Needed  -- Indication: For COPD (chronic obstructive pulmonary disease)    furosemide 40 mg oral tablet  -- 1 tab(s) by mouth 2 times a day  -- Indication: For Pulmonary HTN    sodium chloride 0.65% nasal spray  -- 2 puff(s) into nose 4 times a day, As Needed  -- Indication: For nasal congestin     riociguat 1.5 mg oral tablet  -- 1 tab(s) by mouth 3 times a day  -- Indication: For Pulmonary HTN

## 2018-07-10 NOTE — DISCHARGE NOTE ADULT - CARE PROVIDER_API CALL
Francy Unger), Critical Care Medicine; Internal Medicine; Pulmonary Disease  [address]  [address]  [place] [address]  Phone: [phone]  Fax: [phone]

## 2018-07-10 NOTE — DISCHARGE NOTE ADULT - MEDICATION SUMMARY - MEDICATIONS TO STOP TAKING
I will STOP taking the medications listed below when I get home from the hospital:    warfarin 5 mg oral tablet  -- 2 tab(s) by mouth once a day

## 2018-07-10 NOTE — DISCHARGE NOTE ADULT - PLAN OF CARE
symptom management and resolution use home oxygen as needed for shortness of breath and dyspnea. Follow up with pulmonologist Dr Unger within 1 week. HIDA negative, high surgery risk, not a candidate for cholecystectomy at this time.  Follow up with surgery as outpatient [phone]. INR-  coumadin Continue adempas. Follow up with Dr. Unger INR- 3.99  NO COUMADIN- YOU MUST FOLLOW UP AT COUMADIN CLINIC FOR BLOOD TO BE DONE   coumadin

## 2018-07-10 NOTE — DISCHARGE NOTE ADULT - CARE PLAN
Principal Discharge DX:	Parainfluenza  Goal:	symptom management and resolution  Assessment and plan of treatment:	use home oxygen as needed for shortness of breath and dyspnea. Follow up with pulmonologist Dr Unger within 1 week.  Secondary Diagnosis:	Abdominal pain  Assessment and plan of treatment:	HIDA negative, high surgery risk, not a candidate for cholecystectomy at this time.  Follow up with surgery as outpatient [phone].  Secondary Diagnosis:	Anticardiolipin antibody positive  Assessment and plan of treatment:	INR-  coumadin  Secondary Diagnosis:	Pulmonary HTN  Assessment and plan of treatment:	Continue adempas. Follow up with Dr. Unger Principal Discharge DX:	Parainfluenza  Goal:	symptom management and resolution  Assessment and plan of treatment:	use home oxygen as needed for shortness of breath and dyspnea. Follow up with pulmonologist Dr Unger within 1 week.  Secondary Diagnosis:	Abdominal pain  Assessment and plan of treatment:	HIDA negative, high surgery risk, not a candidate for cholecystectomy at this time.  Follow up with surgery as outpatient [phone].  Secondary Diagnosis:	Anticardiolipin antibody positive  Assessment and plan of treatment:	INR- 3.99  NO COUMADIN- YOU MUST FOLLOW UP AT COUMADIN CLINIC FOR BLOOD TO BE DONE   coumadin  Secondary Diagnosis:	Pulmonary HTN  Assessment and plan of treatment:	Continue adempas. Follow up with Dr. Unger

## 2018-07-10 NOTE — DISCHARGE NOTE ADULT - PATIENT PORTAL LINK FT
You can access the [email or web address] Patient Portal, offered by [facility], by registering with the following website: [email or web address]

## 2018-07-10 NOTE — PROGRESS NOTE ADULT - SUBJECTIVE AND OBJECTIVE BOX
CHIEF COMPLAINT: Patient is a 33y old  Female who presents with a chief complaint of cough, abd pain (08 Jul 2018 11:39)    Interval Events:    REVIEW OF SYSTEMS:  CV:  Resp:  [ ] All other systems negative  [ ] Unable to assess ROS because ________    OBJECTIVE:  ICU Vital Signs Last 24 Hrs  T(C): 36.3 (10 Jul 2018 05:04), Max: 36.3 (09 Jul 2018 13:34)  T(F): 97.4 (10 Jul 2018 05:04), Max: 97.4 (09 Jul 2018 13:34)  HR: 86 (10 Jul 2018 05:04) (81 - 94)  BP: 115/75 (10 Jul 2018 05:04) (114/68 - 129/75)  BP(mean): --  ABP: --  ABP(mean): --  RR: 18 (10 Jul 2018 05:04) (18 - 19)  SpO2: 97% (10 Jul 2018 05:04) (90% - 97%)        CAPILLARY BLOOD GLUCOSE          PHYSICAL EXAM:  General:   HEENT:   Lymph Nodes:  Neck:   Respiratory:   Cardiovascular:   Abdomen:   Extremities:   Skin:   Neurological:  Psychiatry:    HOSPITAL MEDICATIONS:  MEDICATIONS  (STANDING):  Adempas (riociguat) 1.5 milliGRAM(s) 1.5 milliGRAM(s) Oral three times a day  ALBUTerol/ipratropium for Nebulization 3 milliLiter(s) Nebulizer every 6 hours  buDESOnide 160 MICROgram(s)/formoterol 4.5 MICROgram(s) Inhaler 2 Puff(s) Inhalation two times a day  chlorhexidine 4% Liquid 1 Application(s) Topical <User Schedule>  diltiazem    milliGRAM(s) Oral daily  furosemide   Injectable 40 milliGRAM(s) IV Push two times a day  losartan 25 milliGRAM(s) Oral daily    MEDICATIONS  (PRN):  ALBUTerol    90 MICROgram(s) HFA Inhaler 2 Puff(s) Inhalation every 6 hours PRN Shortness of Breath and/or Wheezing  benzocaine 15 mG/menthol 3.6 mG Lozenge 1 Lozenge Oral four times a day PRN Sore Throat  sodium chloride 0.65% Nasal 1 Spray(s) Both Nostrils every 2 hours PRN Nasal Congestion      LABS:                        12.5   7.16  )-----------( 247      ( 10 Jul 2018 06:45 )             39.2     07-10    135  |  98  |  5<L>  ----------------------------<  153<H>  3.9   |  23  |  0.82    Ca    8.9      10 Jul 2018 06:45    TPro  7.2  /  Alb  3.9  /  TBili  1.5<H>  /  DBili  x   /  AST  44<H>  /  ALT  18  /  AlkPhos  55  07-10    PT/INR - ( 10 Jul 2018 06:45 )   PT: 57.0 SEC;   INR: 4.97              Arterial Blood Gas:  07-09 @ 04:50  7.36/36/75/21/93.7/-4.6  ABG lactate: --        MICROBIOLOGY:     RADIOLOGY:  [ ] Reviewed and interpreted by me    PULMONARY FUNCTION TESTS:    EKG: CHIEF COMPLAINT: Patient is a 33y old  Female who presents with a chief complaint of cough, abd pain (08 Jul 2018 11:39)    Interval Events:    REVIEW OF SYSTEMS:  CV: normal rate and rhythm  Resp: SOB/FERRO  [x ] All other systems negative  [ ] Unable to assess ROS because ________    OBJECTIVE:  ICU Vital Signs Last 24 Hrs  T(C): 36.3 (10 Jul 2018 05:04), Max: 36.3 (09 Jul 2018 13:34)  T(F): 97.4 (10 Jul 2018 05:04), Max: 97.4 (09 Jul 2018 13:34)  HR: 86 (10 Jul 2018 05:04) (81 - 94)  BP: 115/75 (10 Jul 2018 05:04) (114/68 - 129/75)  BP(mean): --  ABP: --  ABP(mean): --  RR: 18 (10 Jul 2018 05:04) (18 - 19)  SpO2: 97% (10 Jul 2018 05:04) (90% - 97%)        CAPILLARY BLOOD GLUCOSE          HOSPITAL MEDICATIONS:  MEDICATIONS  (STANDING):  Adempas (riociguat) 1.5 milliGRAM(s) 1.5 milliGRAM(s) Oral three times a day  ALBUTerol/ipratropium for Nebulization 3 milliLiter(s) Nebulizer every 6 hours  buDESOnide 160 MICROgram(s)/formoterol 4.5 MICROgram(s) Inhaler 2 Puff(s) Inhalation two times a day  chlorhexidine 4% Liquid 1 Application(s) Topical <User Schedule>  diltiazem    milliGRAM(s) Oral daily  furosemide   Injectable 40 milliGRAM(s) IV Push two times a day  losartan 25 milliGRAM(s) Oral daily    MEDICATIONS  (PRN):  ALBUTerol    90 MICROgram(s) HFA Inhaler 2 Puff(s) Inhalation every 6 hours PRN Shortness of Breath and/or Wheezing  benzocaine 15 mG/menthol 3.6 mG Lozenge 1 Lozenge Oral four times a day PRN Sore Throat  sodium chloride 0.65% Nasal 1 Spray(s) Both Nostrils every 2 hours PRN Nasal Congestion      LABS:                        12.5   7.16  )-----------( 247      ( 10 Jul 2018 06:45 )             39.2     07-10    135  |  98  |  5<L>  ----------------------------<  153<H>  3.9   |  23  |  0.82    Ca    8.9      10 Jul 2018 06:45    TPro  7.2  /  Alb  3.9  /  TBili  1.5<H>  /  DBili  x   /  AST  44<H>  /  ALT  18  /  AlkPhos  55  07-10    PT/INR - ( 10 Jul 2018 06:45 )   PT: 57.0 SEC;   INR: 4.97              Arterial Blood Gas:  07-09 @ 04:50  7.36/36/75/21/93.7/-4.6  ABG lactate: --        MICROBIOLOGY:     RADIOLOGY:  [ ] Reviewed and interpreted by me    PULMONARY FUNCTION TESTS:    EKG:

## 2018-07-11 VITALS
DIASTOLIC BLOOD PRESSURE: 51 MMHG | TEMPERATURE: 97 F | RESPIRATION RATE: 18 BRPM | HEART RATE: 84 BPM | SYSTOLIC BLOOD PRESSURE: 90 MMHG | OXYGEN SATURATION: 90 %

## 2018-07-11 LAB
BUN SERPL-MCNC: 6 MG/DL — LOW (ref 7–23)
CALCIUM SERPL-MCNC: 9.2 MG/DL — SIGNIFICANT CHANGE UP (ref 8.4–10.5)
CHLORIDE SERPL-SCNC: 96 MMOL/L — LOW (ref 98–107)
CO2 SERPL-SCNC: 27 MMOL/L — SIGNIFICANT CHANGE UP (ref 22–31)
CREAT SERPL-MCNC: 0.84 MG/DL — SIGNIFICANT CHANGE UP (ref 0.5–1.3)
GLUCOSE SERPL-MCNC: 144 MG/DL — HIGH (ref 70–99)
HCT VFR BLD CALC: 38.5 % — SIGNIFICANT CHANGE UP (ref 34.5–45)
HGB BLD-MCNC: 12.3 G/DL — SIGNIFICANT CHANGE UP (ref 11.5–15.5)
INR BLD: 3.99 — HIGH (ref 0.88–1.17)
MCHC RBC-ENTMCNC: 24.9 PG — LOW (ref 27–34)
MCHC RBC-ENTMCNC: 31.9 % — LOW (ref 32–36)
MCV RBC AUTO: 77.9 FL — LOW (ref 80–100)
NRBC # FLD: 0 — SIGNIFICANT CHANGE UP
PLATELET # BLD AUTO: 264 K/UL — SIGNIFICANT CHANGE UP (ref 150–400)
PMV BLD: 10.8 FL — SIGNIFICANT CHANGE UP (ref 7–13)
POTASSIUM SERPL-MCNC: 3.6 MMOL/L — SIGNIFICANT CHANGE UP (ref 3.5–5.3)
POTASSIUM SERPL-SCNC: 3.6 MMOL/L — SIGNIFICANT CHANGE UP (ref 3.5–5.3)
PROTHROM AB SERPL-ACNC: 47.2 SEC — HIGH (ref 9.8–13.1)
RBC # BLD: 4.94 M/UL — SIGNIFICANT CHANGE UP (ref 3.8–5.2)
RBC # FLD: 19.8 % — HIGH (ref 10.3–14.5)
SODIUM SERPL-SCNC: 135 MMOL/L — SIGNIFICANT CHANGE UP (ref 135–145)
WBC # BLD: 7.91 K/UL — SIGNIFICANT CHANGE UP (ref 3.8–10.5)
WBC # FLD AUTO: 7.91 K/UL — SIGNIFICANT CHANGE UP (ref 3.8–10.5)

## 2018-07-11 PROCEDURE — 71045 X-RAY EXAM CHEST 1 VIEW: CPT | Mod: 26

## 2018-07-11 PROCEDURE — 99233 SBSQ HOSP IP/OBS HIGH 50: CPT | Mod: GC

## 2018-07-11 RX ORDER — ACETAMINOPHEN 500 MG
650 TABLET ORAL ONCE
Qty: 0 | Refills: 0 | Status: COMPLETED | OUTPATIENT
Start: 2018-07-11 | End: 2018-07-11

## 2018-07-11 RX ORDER — CALCIUM CARBONATE 500(1250)
1 TABLET ORAL
Qty: 0 | Refills: 0 | DISCHARGE
Start: 2018-07-11

## 2018-07-11 RX ORDER — WARFARIN SODIUM 2.5 MG/1
2 TABLET ORAL
Qty: 0 | Refills: 0 | COMMUNITY

## 2018-07-11 RX ORDER — SODIUM CHLORIDE 0.65 %
2 AEROSOL, SPRAY (ML) NASAL
Qty: 0 | Refills: 0 | COMMUNITY
Start: 2018-07-11

## 2018-07-11 RX ORDER — FUROSEMIDE 40 MG
1 TABLET ORAL
Qty: 0 | Refills: 0 | COMMUNITY

## 2018-07-11 RX ADMIN — Medication 40 MILLIGRAM(S): at 05:01

## 2018-07-11 RX ADMIN — Medication 650 MILLIGRAM(S): at 00:40

## 2018-07-11 RX ADMIN — BUDESONIDE AND FORMOTEROL FUMARATE DIHYDRATE 2 PUFF(S): 160; 4.5 AEROSOL RESPIRATORY (INHALATION) at 11:20

## 2018-07-11 RX ADMIN — Medication 3 MILLILITER(S): at 11:13

## 2018-07-11 RX ADMIN — Medication 650 MILLIGRAM(S): at 00:10

## 2018-07-11 RX ADMIN — Medication 1 TABLET(S): at 02:28

## 2018-07-11 RX ADMIN — Medication 3 MILLILITER(S): at 03:47

## 2018-07-11 RX ADMIN — Medication 180 MILLIGRAM(S): at 05:01

## 2018-07-11 RX ADMIN — LOSARTAN POTASSIUM 25 MILLIGRAM(S): 100 TABLET, FILM COATED ORAL at 05:01

## 2018-07-11 NOTE — PROGRESS NOTE ADULT - ATTENDING COMMENTS
32yo F with obesity, underlying severe pulmonary HTN of unclear etiology despite extensive workup, on adempis  admitted for N/V, sore throat  +Parainfluenza  Chronic hypoxemic respiratory failure on 5 LPM -- will likely need home O2  Fluid overload -- diuresing with 40mg lasix IV BID  Will perform VQ scan
34yo F with obesity, underlying severe pulmonary HTN of unclear etiology despite extensive workup, on adempis  admitted for N/V, sore throat  +Parainfluenza  Chronic hypoxemic respiratory failure on 5 LPM -- will likely need home O2  Fluid overload -- will diurese with 40mg lasix IV BID
32yo F with obesity, underlying severe pulmonary HTN of unclear etiology despite extensive workup, on adempis  admitted for N/V, sore throat  +Parainfluenza  Chronic hypoxemic respiratory failure on 5 LPM  INR 4, holding coumadin

## 2018-07-11 NOTE — PROGRESS NOTE ADULT - PROBLEM SELECTOR PLAN 4
- continue warfarin,   -supratherapeutic-  will hold tonight
- continue warfarin,   -supratherapeutic-  will hold tonight
-supratherapeutic-  will hold tonight  -Pt to follow up with her coumdin clinic  - Pt went down for VQ scan and refused - explanation given and still refuses

## 2018-07-11 NOTE — PROGRESS NOTE ADULT - ASSESSMENT
ASSESSMENT  34yo F with PMH pulm htn, asthma, on coumadin presents with parainfluenza, LE swelling, supratherapeutic INR, and abdominal pain. Pt with gallbladder wall thickening and cholelithiasis on imaging with RUQ tenderness. Negative HIDA scan No acute cholecystitis     - No indication for surgery at this time; may anticoagulate   - medical management of pulm HTN, parainfluenza, and edema  - Call with further questions     Dakota Mantilla, PGY2  B Team   [id number]
33F Severe pulmonary hypertension, obesity, anticardiolipin Ab pos on warfarin HTN with parainfluenza virus infection

## 2018-07-11 NOTE — PROGRESS NOTE ADULT - CARDIOVASCULAR
detailed exam
Regular rate & rhythm, normal S1, S2; no murmurs, gallops or rubs; no S3, S4
Regular rate & rhythm, normal S1, S2; no murmurs, gallops or rubs; no S3, S4

## 2018-07-11 NOTE — PROGRESS NOTE ADULT - RS GEN PE MLT RESP DETAILS PC
airway patent/breath sounds equal/dminished at bases
diminished breath sounds, R/breath sounds equal/diminished breath sounds, L
wheezes/airway patent/breath sounds equal

## 2018-07-11 NOTE — PROGRESS NOTE ADULT - PROBLEM SELECTOR PLAN 5
Continue bronchodilators  - ABG reviewed  - oxygen supplement   - c/w steroids  - c/w pro-air

## 2018-07-11 NOTE — PROGRESS NOTE ADULT - SUBJECTIVE AND OBJECTIVE BOX
CHIEF COMPLAINT:    Interval Events:    REVIEW OF SYSTEMS:  Constitutional:   Eyes:  ENT:  CV:  Resp:  GI:  :  MSK:  Integumentary:  Neurological:  Psychiatric:  Endocrine:  Hematologic/Lymphatic:  Allergic/Immunologic:  [ ] All other systems negative  [ ] Unable to assess ROS because ________    OBJECTIVE:  ICU Vital Signs Last 24 Hrs  T(C): 36.3 (11 Jul 2018 05:00), Max: 36.4 (10 Jul 2018 13:22)  T(F): 97.4 (11 Jul 2018 05:00), Max: 97.6 (10 Jul 2018 13:22)  HR: 90 (11 Jul 2018 07:21) (81 - 98)  BP: 116/89 (11 Jul 2018 05:00) (106/68 - 119/77)  BP(mean): --  ABP: --  ABP(mean): --  RR: 20 (11 Jul 2018 07:21) (18 - 20)  SpO2: 92% (11 Jul 2018 07:21) (82% - 95%)        07-10 @ 07:01  -  07-11 @ 07:00  --------------------------------------------------------  IN: 0 mL / OUT: 2400 mL / NET: -2400 mL      CAPILLARY BLOOD GLUCOSE          PHYSICAL EXAM:  General:   HEENT:   Lymph Nodes:  Neck:   Respiratory:   Cardiovascular:   Abdomen:   Extremities:   Skin:   Neurological:  Psychiatry:    HOSPITAL MEDICATIONS:  MEDICATIONS  (STANDING):  Adempas (riociguat) 1.5 milliGRAM(s) 1.5 milliGRAM(s) Oral three times a day  ALBUTerol/ipratropium for Nebulization 3 milliLiter(s) Nebulizer every 6 hours  buDESOnide 160 MICROgram(s)/formoterol 4.5 MICROgram(s) Inhaler 2 Puff(s) Inhalation two times a day  chlorhexidine 4% Liquid 1 Application(s) Topical <User Schedule>  diltiazem    milliGRAM(s) Oral daily  furosemide   Injectable 40 milliGRAM(s) IV Push two times a day  losartan 25 milliGRAM(s) Oral daily    MEDICATIONS  (PRN):  ALBUTerol    90 MICROgram(s) HFA Inhaler 2 Puff(s) Inhalation every 6 hours PRN Shortness of Breath and/or Wheezing  benzocaine 15 mG/menthol 3.6 mG Lozenge 1 Lozenge Oral four times a day PRN Sore Throat  calcium carbonate    500 mG (Tums) Chewable 1 Tablet(s) Chew three times a day PRN Heartburn  sodium chloride 0.65% Nasal 1 Spray(s) Both Nostrils every 2 hours PRN Nasal Congestion      LABS:                        12.3   7.91  )-----------( 264      ( 11 Jul 2018 06:18 )             38.5     07-11    135  |  96<L>  |  6<L>  ----------------------------<  144<H>  3.6   |  27  |  0.84    Ca    9.2      11 Jul 2018 06:18    TPro  7.2  /  Alb  3.9  /  TBili  1.5<H>  /  DBili  x   /  AST  44<H>  /  ALT  18  /  AlkPhos  55  07-10    PT/INR - ( 11 Jul 2018 06:18 )   PT: 47.2 SEC;   INR: 3.99                    MICROBIOLOGY:     RADIOLOGY:  [ ] Reviewed and interpreted by me    PULMONARY FUNCTION TESTS:    EKG: CHIEF COMPLAINT: Patient is a 33y old  Female who presents with a chief complaint of cough, abd pain (10 Jul 2018 13:32)      Interval Events: refused VQ scan     REVIEW OF SYSTEMS:  Constitutional: No fever or chills, asking for dc home   CV: Denies   Resp: + FERRO   [x ] All other systems negative      OBJECTIVE:  ICU Vital Signs Last 24 Hrs  T(C): 36.3 (11 Jul 2018 05:00), Max: 36.4 (10 Jul 2018 13:22)  T(F): 97.4 (11 Jul 2018 05:00), Max: 97.6 (10 Jul 2018 13:22)  HR: 90 (11 Jul 2018 07:21) (81 - 98)  BP: 116/89 (11 Jul 2018 05:00) (106/68 - 119/77)  BP(mean): --  ABP: --  ABP(mean): --  RR: 20 (11 Jul 2018 07:21) (18 - 20)  SpO2: 92% (11 Jul 2018 07:21) (82% - 95%)        07-10 @ 07:01  -  07-11 @ 07:00  --------------------------------------------------------  IN: 0 mL / OUT: 2400 mL / NET: -2400 mL      CAPILLARY BLOOD GLUCOSE      [facility] MEDICATIONS:  MEDICATIONS  (STANDING):  Adempas (riociguat) 1.5 milliGRAM(s) 1.5 milliGRAM(s) Oral three times a day  ALBUTerol/ipratropium for Nebulization 3 milliLiter(s) Nebulizer every 6 hours  buDESOnide 160 MICROgram(s)/formoterol 4.5 MICROgram(s) Inhaler 2 Puff(s) Inhalation two times a day  chlorhexidine 4% Liquid 1 Application(s) Topical <User Schedule>  diltiazem    milliGRAM(s) Oral daily  furosemide   Injectable 40 milliGRAM(s) IV Push two times a day  losartan 25 milliGRAM(s) Oral daily    MEDICATIONS  (PRN):  ALBUTerol    90 MICROgram(s) HFA Inhaler 2 Puff(s) Inhalation every 6 hours PRN Shortness of Breath and/or Wheezing  benzocaine 15 mG/menthol 3.6 mG Lozenge 1 Lozenge Oral four times a day PRN Sore Throat  calcium carbonate    500 mG (Tums) Chewable 1 Tablet(s) Chew three times a day PRN Heartburn  sodium chloride 0.65% Nasal 1 Spray(s) Both Nostrils every 2 hours PRN Nasal Congestion      LABS:                        12.3   7.91  )-----------( 264      ( 11 Jul 2018 06:18 )             38.5     07-11    135  |  96<L>  |  6<L>  ----------------------------<  144<H>  3.6   |  27  |  0.84    Ca    9.2      11 Jul 2018 06:18    TPro  7.2  /  Alb  3.9  /  TBili  1.5<H>  /  DBili  x   /  AST  44<H>  /  ALT  18  /  AlkPhos  55  07-10    PT/INR - ( 11 Jul 2018 06:18 )   PT: 47.2 SEC;   INR: 3.99                    MICROBIOLOGY:     RADIOLOGY:  [ ] Reviewed and interpreted by me    PULMONARY FUNCTION TESTS:    EKG:

## 2018-07-11 NOTE — PROGRESS NOTE ADULT - PSYCHIATRIC
Affect and characteristics of appearance, verbalizations, behaviors are appropriate
detailed exam
Affect and characteristics of appearance, verbalizations, behaviors are appropriate

## 2018-07-11 NOTE — PROGRESS NOTE ADULT - PROBLEM SELECTOR PLAN 2
- surgery cs appreciated  - HIDA negative, high surgery risk, not a candidate for cholecystectomy at this time.   - Abd pain may be due to parainfluenza infection.    - Currently pain free.    - Will advance diet.
- surgery cs appreciated  - HIDA negative, high surgery risk, not a candidate for cholecystectomy at this time.   - Abd pain may be due to parainfluenza infection.    - Currently pain free.    - Will advance diet.
- surgery cs appreciated  - HIDA negative, high surgery risk, not a candidate for cholecystectomy at this time.   - Abd pain may be due to parainfluenza infection.    - Currently pain free.    - advance diet and tolerated

## 2018-07-11 NOTE — PROGRESS NOTE ADULT - PROBLEM SELECTOR PROBLEM 4
Anticardiolipin antibody positive

## 2018-07-11 NOTE — PROGRESS NOTE ADULT - PROBLEM SELECTOR PLAN 3
- Continue adempas.    - Outpt pulm f/u with Dr. Unger  -Pt requires home oxygen- O2 sat at rest on RA 82%                                          O2 sat at rest on 5L oxygen via NC 88%

## 2018-07-13 LAB
BACTERIA BLD CULT: SIGNIFICANT CHANGE UP
BACTERIA BLD CULT: SIGNIFICANT CHANGE UP

## 2018-07-18 ENCOUNTER — APPOINTMENT (OUTPATIENT)
Dept: INTERNAL MEDICINE | Facility: CLINIC | Age: 33
End: 2018-07-18

## 2018-07-18 PROBLEM — J44.9 CHRONIC OBSTRUCTIVE PULMONARY DISEASE, UNSPECIFIED: Chronic | Status: ACTIVE | Noted: 2018-07-08

## 2018-07-18 PROBLEM — R76.8 OTHER SPECIFIED ABNORMAL IMMUNOLOGICAL FINDINGS IN SERUM: Chronic | Status: ACTIVE | Noted: 2018-07-08

## 2018-07-18 PROBLEM — I10 ESSENTIAL (PRIMARY) HYPERTENSION: Chronic | Status: ACTIVE | Noted: 2018-07-08

## 2018-07-25 ENCOUNTER — INPATIENT (INPATIENT)
Facility: HOSPITAL | Age: 33
LOS: 15 days | Discharge: HOME | End: 2018-08-10
Attending: HOSPITALIST | Admitting: HOSPITALIST
Payer: MEDICAID

## 2018-07-25 VITALS
HEART RATE: 89 BPM | TEMPERATURE: 98 F | RESPIRATION RATE: 20 BRPM | SYSTOLIC BLOOD PRESSURE: 125 MMHG | OXYGEN SATURATION: 92 % | DIASTOLIC BLOOD PRESSURE: 88 MMHG

## 2018-07-25 DIAGNOSIS — I10 ESSENTIAL (PRIMARY) HYPERTENSION: ICD-10-CM

## 2018-07-25 DIAGNOSIS — R60.0 LOCALIZED EDEMA: ICD-10-CM

## 2018-07-25 DIAGNOSIS — I27.20 PULMONARY HYPERTENSION, UNSPECIFIED: ICD-10-CM

## 2018-07-25 DIAGNOSIS — R76.8 OTHER SPECIFIED ABNORMAL IMMUNOLOGICAL FINDINGS IN SERUM: ICD-10-CM

## 2018-07-25 DIAGNOSIS — R07.9 CHEST PAIN, UNSPECIFIED: ICD-10-CM

## 2018-07-25 DIAGNOSIS — E66.01 MORBID (SEVERE) OBESITY DUE TO EXCESS CALORIES: ICD-10-CM

## 2018-07-25 DIAGNOSIS — I50.811 ACUTE RIGHT HEART FAILURE: ICD-10-CM

## 2018-07-25 DIAGNOSIS — J44.9 CHRONIC OBSTRUCTIVE PULMONARY DISEASE, UNSPECIFIED: ICD-10-CM

## 2018-07-25 DIAGNOSIS — R55 SYNCOPE AND COLLAPSE: ICD-10-CM

## 2018-07-25 DIAGNOSIS — Z29.9 ENCOUNTER FOR PROPHYLACTIC MEASURES, UNSPECIFIED: ICD-10-CM

## 2018-07-25 LAB
ALBUMIN SERPL ELPH-MCNC: 3.6 G/DL — SIGNIFICANT CHANGE UP (ref 3.3–5)
ALP SERPL-CCNC: 52 U/L — SIGNIFICANT CHANGE UP (ref 40–120)
ALT FLD-CCNC: 15 U/L — SIGNIFICANT CHANGE UP (ref 4–33)
APPEARANCE UR: CLEAR — SIGNIFICANT CHANGE UP
APTT BLD: 32 SEC — SIGNIFICANT CHANGE UP (ref 27.5–37.4)
AST SERPL-CCNC: 28 U/L — SIGNIFICANT CHANGE UP (ref 4–32)
BACTERIA # UR AUTO: SIGNIFICANT CHANGE UP
BASE EXCESS BLDV CALC-SCNC: 4.2 MMOL/L — SIGNIFICANT CHANGE UP
BASOPHILS # BLD AUTO: 0.03 K/UL — SIGNIFICANT CHANGE UP (ref 0–0.2)
BASOPHILS NFR BLD AUTO: 0.8 % — SIGNIFICANT CHANGE UP (ref 0–2)
BILIRUB SERPL-MCNC: 2.5 MG/DL — HIGH (ref 0.2–1.2)
BILIRUB UR-MCNC: NEGATIVE — SIGNIFICANT CHANGE UP
BLOOD GAS VENOUS - CREATININE: 1 MG/DL — SIGNIFICANT CHANGE UP (ref 0.5–1.3)
BLOOD UR QL VISUAL: HIGH
BUN SERPL-MCNC: 10 MG/DL — SIGNIFICANT CHANGE UP (ref 7–23)
CALCIUM SERPL-MCNC: 8.9 MG/DL — SIGNIFICANT CHANGE UP (ref 8.4–10.5)
CHLORIDE BLDV-SCNC: 98 MMOL/L — SIGNIFICANT CHANGE UP (ref 96–108)
CHLORIDE SERPL-SCNC: 94 MMOL/L — LOW (ref 98–107)
CK MB BLD-MCNC: 1.2 — SIGNIFICANT CHANGE UP (ref 0–2.5)
CK MB BLD-MCNC: 2.58 NG/ML — SIGNIFICANT CHANGE UP (ref 1–4.7)
CK SERPL-CCNC: 208 U/L — HIGH (ref 25–170)
CO2 SERPL-SCNC: 28 MMOL/L — SIGNIFICANT CHANGE UP (ref 22–31)
COLOR SPEC: SIGNIFICANT CHANGE UP
CREAT SERPL-MCNC: 1.11 MG/DL — SIGNIFICANT CHANGE UP (ref 0.5–1.3)
EOSINOPHIL # BLD AUTO: 0.01 K/UL — SIGNIFICANT CHANGE UP (ref 0–0.5)
EOSINOPHIL NFR BLD AUTO: 0.3 % — SIGNIFICANT CHANGE UP (ref 0–6)
GAS PNL BLDV: 134 MMOL/L — LOW (ref 136–146)
GLUCOSE BLDV-MCNC: 89 — SIGNIFICANT CHANGE UP (ref 70–99)
GLUCOSE SERPL-MCNC: 91 MG/DL — SIGNIFICANT CHANGE UP (ref 70–99)
GLUCOSE UR-MCNC: NEGATIVE — SIGNIFICANT CHANGE UP
HCG SERPL-ACNC: < 5 MIU/ML — SIGNIFICANT CHANGE UP
HCO3 BLDV-SCNC: 26 MMOL/L — SIGNIFICANT CHANGE UP (ref 20–27)
HCT VFR BLD CALC: 36.7 % — SIGNIFICANT CHANGE UP (ref 34.5–45)
HCT VFR BLDV CALC: 38.5 % — SIGNIFICANT CHANGE UP (ref 34.5–45)
HGB BLD-MCNC: 11.9 G/DL — SIGNIFICANT CHANGE UP (ref 11.5–15.5)
HGB BLDV-MCNC: 12.5 G/DL — SIGNIFICANT CHANGE UP (ref 11.5–15.5)
IMM GRANULOCYTES # BLD AUTO: 0 # — SIGNIFICANT CHANGE UP
IMM GRANULOCYTES NFR BLD AUTO: 0 % — SIGNIFICANT CHANGE UP (ref 0–1.5)
INR BLD: 2.18 — HIGH (ref 0.88–1.17)
KETONES UR-MCNC: NEGATIVE — SIGNIFICANT CHANGE UP
LACTATE BLDV-MCNC: 1.8 MMOL/L — SIGNIFICANT CHANGE UP (ref 0.5–2)
LEUKOCYTE ESTERASE UR-ACNC: NEGATIVE — SIGNIFICANT CHANGE UP
LYMPHOCYTES # BLD AUTO: 1.52 K/UL — SIGNIFICANT CHANGE UP (ref 1–3.3)
LYMPHOCYTES # BLD AUTO: 41.1 % — SIGNIFICANT CHANGE UP (ref 13–44)
MCHC RBC-ENTMCNC: 25.4 PG — LOW (ref 27–34)
MCHC RBC-ENTMCNC: 32.4 % — SIGNIFICANT CHANGE UP (ref 32–36)
MCV RBC AUTO: 78.3 FL — LOW (ref 80–100)
MONOCYTES # BLD AUTO: 0.34 K/UL — SIGNIFICANT CHANGE UP (ref 0–0.9)
MONOCYTES NFR BLD AUTO: 9.2 % — SIGNIFICANT CHANGE UP (ref 2–14)
MUCOUS THREADS # UR AUTO: SIGNIFICANT CHANGE UP
NEUTROPHILS # BLD AUTO: 1.8 K/UL — SIGNIFICANT CHANGE UP (ref 1.8–7.4)
NEUTROPHILS NFR BLD AUTO: 48.6 % — SIGNIFICANT CHANGE UP (ref 43–77)
NITRITE UR-MCNC: NEGATIVE — SIGNIFICANT CHANGE UP
NRBC # FLD: 0 — SIGNIFICANT CHANGE UP
NT-PROBNP SERPL-SCNC: 1876 PG/ML — SIGNIFICANT CHANGE UP
PCO2 BLDV: 48 MMHG — SIGNIFICANT CHANGE UP (ref 41–51)
PH BLDV: 7.4 PH — SIGNIFICANT CHANGE UP (ref 7.32–7.43)
PH UR: 7 — SIGNIFICANT CHANGE UP (ref 4.6–8)
PLATELET # BLD AUTO: 295 K/UL — SIGNIFICANT CHANGE UP (ref 150–400)
PMV BLD: 9.9 FL — SIGNIFICANT CHANGE UP (ref 7–13)
PO2 BLDV: < 24 MMHG — LOW (ref 35–40)
POTASSIUM BLDV-SCNC: 3.6 MMOL/L — SIGNIFICANT CHANGE UP (ref 3.4–4.5)
POTASSIUM SERPL-MCNC: 3.9 MMOL/L — SIGNIFICANT CHANGE UP (ref 3.5–5.3)
POTASSIUM SERPL-SCNC: 3.9 MMOL/L — SIGNIFICANT CHANGE UP (ref 3.5–5.3)
PROT SERPL-MCNC: 6.8 G/DL — SIGNIFICANT CHANGE UP (ref 6–8.3)
PROT UR-MCNC: 10 MG/DL — SIGNIFICANT CHANGE UP
PROTHROM AB SERPL-ACNC: 24.6 SEC — HIGH (ref 9.8–13.1)
RBC # BLD: 4.69 M/UL — SIGNIFICANT CHANGE UP (ref 3.8–5.2)
RBC # FLD: 21.4 % — HIGH (ref 10.3–14.5)
RBC CASTS # UR COMP ASSIST: HIGH (ref 0–?)
SAO2 % BLDV: 24 % — LOW (ref 60–85)
SODIUM SERPL-SCNC: 134 MMOL/L — LOW (ref 135–145)
SP GR SPEC: 1 — LOW (ref 1–1.04)
SQUAMOUS # UR AUTO: SIGNIFICANT CHANGE UP
TROPONIN T, HIGH SENSITIVITY: 29 NG/L — SIGNIFICANT CHANGE UP (ref ?–14)
TROPONIN T, HIGH SENSITIVITY: 29 NG/L — SIGNIFICANT CHANGE UP (ref ?–14)
UROBILINOGEN FLD QL: NORMAL MG/DL — SIGNIFICANT CHANGE UP
WBC # BLD: 3.7 K/UL — LOW (ref 3.8–10.5)
WBC # FLD AUTO: 3.7 K/UL — LOW (ref 3.8–10.5)
WBC UR QL: SIGNIFICANT CHANGE UP (ref 0–?)

## 2018-07-25 PROCEDURE — 99233 SBSQ HOSP IP/OBS HIGH 50: CPT | Mod: GC

## 2018-07-25 PROCEDURE — 71275 CT ANGIOGRAPHY CHEST: CPT | Mod: 26

## 2018-07-25 PROCEDURE — 99223 1ST HOSP IP/OBS HIGH 75: CPT

## 2018-07-25 PROCEDURE — 71045 X-RAY EXAM CHEST 1 VIEW: CPT | Mod: 26

## 2018-07-25 RX ORDER — FUROSEMIDE 40 MG
40 TABLET ORAL DAILY
Qty: 0 | Refills: 0 | Status: DISCONTINUED | OUTPATIENT
Start: 2018-07-25 | End: 2018-07-25

## 2018-07-25 RX ORDER — LOSARTAN POTASSIUM 100 MG/1
25 TABLET, FILM COATED ORAL DAILY
Qty: 0 | Refills: 0 | Status: DISCONTINUED | OUTPATIENT
Start: 2018-07-25 | End: 2018-08-04

## 2018-07-25 RX ORDER — DILTIAZEM HCL 120 MG
180 CAPSULE, EXT RELEASE 24 HR ORAL DAILY
Qty: 0 | Refills: 0 | Status: DISCONTINUED | OUTPATIENT
Start: 2018-07-25 | End: 2018-07-29

## 2018-07-25 RX ORDER — ALBUTEROL 90 UG/1
2 AEROSOL, METERED ORAL EVERY 6 HOURS
Qty: 0 | Refills: 0 | Status: DISCONTINUED | OUTPATIENT
Start: 2018-07-25 | End: 2018-08-10

## 2018-07-25 RX ORDER — WARFARIN SODIUM 2.5 MG/1
5 TABLET ORAL ONCE
Qty: 0 | Refills: 0 | Status: COMPLETED | OUTPATIENT
Start: 2018-07-25 | End: 2018-07-25

## 2018-07-25 RX ORDER — BUDESONIDE AND FORMOTEROL FUMARATE DIHYDRATE 160; 4.5 UG/1; UG/1
2 AEROSOL RESPIRATORY (INHALATION)
Qty: 0 | Refills: 0 | Status: DISCONTINUED | OUTPATIENT
Start: 2018-07-25 | End: 2018-08-10

## 2018-07-25 RX ORDER — CALCIUM CARBONATE 500(1250)
1 TABLET ORAL THREE TIMES A DAY
Qty: 0 | Refills: 0 | Status: DISCONTINUED | OUTPATIENT
Start: 2018-07-25 | End: 2018-08-10

## 2018-07-25 RX ORDER — FUROSEMIDE 40 MG
5 TABLET ORAL
Qty: 500 | Refills: 0 | Status: DISCONTINUED | OUTPATIENT
Start: 2018-07-25 | End: 2018-07-27

## 2018-07-25 RX ORDER — FUROSEMIDE 40 MG
40 TABLET ORAL ONCE
Qty: 0 | Refills: 0 | Status: DISCONTINUED | OUTPATIENT
Start: 2018-07-25 | End: 2018-07-25

## 2018-07-25 RX ADMIN — Medication 180 MILLIGRAM(S): at 21:56

## 2018-07-25 RX ADMIN — Medication 2.5 MG/HR: at 21:56

## 2018-07-25 RX ADMIN — LOSARTAN POTASSIUM 25 MILLIGRAM(S): 100 TABLET, FILM COATED ORAL at 21:56

## 2018-07-25 RX ADMIN — Medication 40 MILLIGRAM(S): at 17:36

## 2018-07-25 RX ADMIN — BUDESONIDE AND FORMOTEROL FUMARATE DIHYDRATE 2 PUFF(S): 160; 4.5 AEROSOL RESPIRATORY (INHALATION) at 21:56

## 2018-07-25 RX ADMIN — ALBUTEROL 2 PUFF(S): 90 AEROSOL, METERED ORAL at 20:02

## 2018-07-25 NOTE — CONSULT NOTE ADULT - ATTENDING COMMENTS
Group 1 PAH.  Continue diuresis, no need for drip , can change to lasix 40 mg IV BID, but will need close I/O monitoring.

## 2018-07-25 NOTE — H&P ADULT - PROBLEM SELECTOR PLAN 9
Patient at high risk for VTE with PMHx. Patient should be kept on anticoagulation on Warfarin and monitor INRs

## 2018-07-25 NOTE — H&P ADULT - NEGATIVE GASTROINTESTINAL SYMPTOMS
no hematochezia/no change in bowel habits/no abdominal pain/no nausea/no vomiting/no diarrhea/no melena

## 2018-07-25 NOTE — H&P ADULT - NEUROLOGICAL DETAILS
sensation intact/alert and oriented x 3/cranial nerves intact/no spontaneous movement/responds to verbal commands

## 2018-07-25 NOTE — H&P ADULT - HISTORY OF PRESENT ILLNESS
33 year old female with PMHx of Anticardiolipin Ab positive, pulm HTN, CVA 33 year old female with PMHx of Anticardiolipin Ab positive, pulm HTN, CVA on Coumadin, HTN and COPD presents with 10/10 sharp constant left sided chest pain radiating to shoulder and down left arm causing numbness in the fingers, associated with SOB and dizziness, exertion and palpitation aggravating chest pain, beginning at 6:00 AM this morning. Patient woke up with the episode and attempted to alleviate SOB with utilizing home O2, without alleviation of symptoms, pain is not associated with diet, deep inhalation or position. Patient denies having this kind of pain before.  Patent admits to feeling 'wheezy' lately, but has not needed rescue inhaler since left the hospital on 7/11, and states was discharged feeling well. Pt admits to b/l swollen lower extremities for 1 month, stating that they are 'so heavy she cant even lift her feet' and that the swelling travels up to her upper thigh from her feet. Patient states she has difficulty walking and performing ADLs from the b/l LE swelling.   Patient states she takes all her medications daily and as prescribed.   Patient denies fever/chills, headache weight  loss or gain, CHange in vision, palpitations, difficulty breathing, abdominal pain, n/v/d, loss of sensation, confusion, slurred speech, hemiparesis, rashes, dysuria, change in urinary frequency 33 year old female with PMHx of Anticardiolipin Ab positive, pulm HTN, CVA on Coumadin, HTN and COPD presents with 10/10 sharp constant left sided chest pain radiating to shoulder and down left arm causing numbness in the fingers, associated with SOB and dizziness, exertion and palpitation aggravating chest pain, beginning at 6:00 AM this morning. Patient woke up with the episode and attempted to alleviate SOB with utilizing home O2, without alleviation of symptoms, pain is not associated with diet, deep inhalation or position. Patient denies having this kind of pain before.  Patent admits to feeling 'wheezy' lately, but has not needed rescue inhaler since left the hospital on 7/11, and states was discharged feeling well. Pt admits to b/l swollen lower extremities for 1 month, stating that they are 'so heavy she cant even lift her feet' and that the swelling travels up to her upper thigh from her feet. Patient states she has difficulty walking and performing ADLs from the b/l LE swelling.   Patient states she takes all her medications daily and as prescribed.  Patient also notes syncopal event last night prior to presentation, was feeling short of breath and lost consciousness, unsure of how long she was out, denied head trauma.   Patient denies fever/chills, headache weight  loss or gain, CHange in vision, palpitations, difficulty breathing, abdominal pain, n/v/d, loss of sensation, confusion, slurred speech, hemiparesis, rashes, dysuria, change in urinary frequency

## 2018-07-25 NOTE — CONSULT NOTE ADULT - ASSESSMENT
32 y/o female PMHx pulmonary HTN, CVA prophylaxis with coumadin, HTN, COPD presents with SOB, chest pain, and worsening leg swelling refractory to home dose of furosemide with right heart strain on EKG most likely in the setting of acute on chronic right systolic heart failure, chest pain most likely secondary to elevated cardiac pressures. An acute viral respiratory illness vs PE.       Acute on chronic right systolic heart failure  - Would recommend Furosemide 40 mg IVP and reassess for clinical improvement of SOB and LE b/l edema.   - Recommend ECHO to evaluate any changes in right sided pressures from previous one done in 2015.   - Recommend ruling out any infectious etiology maybe possibly DVT study.   - Continue with home meds as prescribed. 34 y/o female PMHx pulmonary HTN, CVA prophylaxis with coumadin, HTN, COPD presents with SOB, chest pain, and worsening leg swelling refractory to home dose of furosemide with right heart strain on EKG most likely in the setting of acute on chronic right systolic heart failure, chest pain most likely secondary to elevated cardiac pressures. An acute viral respiratory illness vs PE.       Acute on chronic right systolic heart failure  - Would recommend Furosemide 40 mg IVP BID and reassess for clinical improvement of SOB and LE b/l edema.   - Recommend ECHO to evaluate any changes in right sided pressures from previous one.   - Recommend Pulmonology consult.   - Recommend ruling out any infectious etiology maybe possibly DVT study.   - Continue with home meds as prescribed.

## 2018-07-25 NOTE — H&P ADULT - PROBLEM SELECTOR PLAN 1
Admit to Telemetry. Continuous monitoring to assess for arrhythmias.   Serial Alesia and EKGs to r/o ACS. Keep anticoagulated on Warfarin, Monitor INRs  Consider CTA to R/O PE. Consider Echo to assess RVHF  Cardiology consult

## 2018-07-25 NOTE — ED PROVIDER NOTE - MEDICAL DECISION MAKING DETAILS
32 y/o female c/o chest pain, sob and bl lower extremity swelling  -r/o acs, chf  -labs trop bnp cxr  -likely admit 32 y/o female c/o chest pain, sob and bl lower extremity swelling.  Labs, CXR, EKG, analgesia PRN and likely admission.    -labs trop bnp cxr  -likely admit

## 2018-07-25 NOTE — ED ADULT TRIAGE NOTE - CHIEF COMPLAINT QUOTE
pt brought in by ambulance c/o left sided chest pain x2 days. c/o progressively worsening sob x 1 week. b/l lower extremity edema noted. 02 88% on RA. on home 02 PRN.  hx-pulm htn,copd

## 2018-07-25 NOTE — H&P ADULT - ATTENDING COMMENTS
Agree with PA Note Above, edits made where appropriate, case discussed with PA    Patient seen and examined. This is a 33F being admitted for chest pain, dyspnea, and remote syncope. Currently she feels fine, the chest pain is intermittent, and is no longer dizzy or dyspnea. She is currently on 4 L NC. She also endorses abdominal bloating and severe leg swelling.  VS and PE as above  Labs, imaging reviewed  EKG - [id number], NSR   Agree with plan as above, appreciate Pulmonary and Cardiology consults. c/w aggressive diuresis, anticoagulation, will need CT PE given evidence of right heart strain on EKG, could be due to recurrent pulmonary embolism versus primary cardiac cause.

## 2018-07-25 NOTE — ED ADULT NURSE NOTE - OBJECTIVE STATEMENT
A&Ox3, labored breathing observed, speaks in full sentences A&Ox3, labored breathing observed, speaks in full sentences, O2 sat 94% 4LNC, bilateral lower extremity edema, non-palpable pulses, + capillary refill, right shin skin tear .5x4, patient refused to remove pants  for continue assessment of lower extremities, patient refused completion of skin assessment. Patient complains of chest pain "for a few days I don't remember how many days", accompanied by worsening of lower extremity edema, takes diuretics at home with no improvement, increase in shortness of breath. Patient reports worsening SOB today prompting EMS to be called. Hx pulmonary HTN, Asthma, on coumadin due to "my blood is too thick". Right AC 18g IV placed, labs drawn and sent.

## 2018-07-25 NOTE — H&P ADULT - PROBLEM SELECTOR PLAN 4
Continue Riociguat   EKG shows MANSI from the Pulm HTN  Consider Echo to assess RVHF Continue Cardia and Irbesartan and Furosemide  Monitor BP   Low salt diet Patient has anticardiolipin Ab positive. Keep patient on O2 if needed  Albuterol nebulizer as needed if feeling wheezing  PFTs  Pulmonary to consult

## 2018-07-25 NOTE — H&P ADULT - NSHPSOCIALHISTORY_GEN_ALL_CORE
Patient lives at home with sister and mother, who help her when necessary. Patient utilizes at home O2 on a PRN basis. Patient does not use cigarettes, tobacco products, ETOH, or illicit drug use.

## 2018-07-25 NOTE — H&P ADULT - PROBLEM SELECTOR PLAN 3
Continue Cardia and Irbesartan and Furosemide  Monitor BP   Low salt diet Patient has anticardiolipin Ab positive. Keep patient on O2 if needed  Albuterol nebulizer as needed if feeling wheezing  PFTs IV Lasix per cardiology consult - likely a lasix gtt once patient has a room  TTE to eval RVEF and pulm HTN.

## 2018-07-25 NOTE — H&P ADULT - PROBLEM SELECTOR PLAN 8
Hypercoagulable state  Keep patient anticoagulated with warfarin, monitor INR Nutritionist referral and education on diet and exercise

## 2018-07-25 NOTE — H&P ADULT - PROBLEM SELECTOR PLAN 5
Nutritionist referral and education on diet and exercise Continue Riociguat   EKG shows MANSI from the Pulm HTN  Consider Echo to assess RVHF Continue Cardia and Irbesartan and Furosemide  Monitor BP   Low salt diet

## 2018-07-25 NOTE — H&P ADULT - GASTROINTESTINAL DETAILS
nontender/no guarding/normal/no rebound tenderness/soft no rebound tenderness/normal/no guarding/nontender

## 2018-07-25 NOTE — H&P ADULT - NSHPPHYSICALEXAM_GEN_ALL_CORE
ICU Vital Signs Last 24 Hrs  T(C): 35.8 (25 Jul 2018 10:11), Max: 36.5 (25 Jul 2018 09:08)  T(F): 96.4 (25 Jul 2018 10:11), Max: 97.7 (25 Jul 2018 09:08)  HR: 78 (25 Jul 2018 14:28) (78 - 89)  BP: 90/60 (25 Jul 2018 14:28) (90/60 - 125/88)  BP(mean): --  ABP: --  ABP(mean): --  RR: 20 (25 Jul 2018 14:28) (18 - 20)  SpO2: 100% (25 Jul 2018 14:28) (92% - 100%)

## 2018-07-25 NOTE — CONSULT NOTE ADULT - SUBJECTIVE AND OBJECTIVE BOX
Patient seen and evaluated at bedside    Chief Complaint:    HPI:      PMHx:   COPD (chronic obstructive pulmonary disease)  Anticardiolipin antibody positive  Essential hypertension  Pulmonary HTN  Morbid obesity  Diabetes      PSHx:   No significant past surgical history      Allergies:  No Known Allergies      Home Meds:    Current Medications:       FAMILY HISTORY:  Family history of leukemia (Sibling)      Social History:  Smoking History:  Alcohol Use:  Drug Use:    REVIEW OF SYSTEMS:  CONSTITUTIONAL: No weakness, fevers or chills  EYES/ENT: No visual changes;  No dysphagia  NECK: No pain or stiffness  RESPIRATORY: No cough, wheezing, hemoptysis; No shortness of breath  CARDIOVASCULAR: No chest pain or palpitations; No lower extremity edema  GASTROINTESTINAL: No abdominal or epigastric pain. No nausea, vomiting, or hematemesis; No diarrhea or constipation. No melena or hematochezia.  BACK: No back pain  GENITOURINARY: No dysuria, frequency or hematuria  NEUROLOGICAL: No numbness or weakness  SKIN: No itching, burning, rashes, or lesions   All other review of systems is negative unless indicated above.    Physical Exam:  T(F): 96.4 (07-25), Max: 97.7 (07-25)  HR: 79 (07-25) (79 - 89)  BP: 110/58 (07-25) (110/58 - 125/88)  RR: 18 (07-25)  SpO2: 98% (07-25)  GENERAL: No acute distress, well-developed  HEAD:  Atraumatic, Normocephalic  ENT: EOMI, PERRLA, conjunctiva and sclera clear, Neck supple, No JVD, moist mucosa  CHEST/LUNG: Clear to auscultation bilaterally; No wheeze, equal breath sounds bilaterally   BACK: No spinal tenderness  HEART: Regular rate and rhythm; No murmurs, rubs, or gallops  ABDOMEN: Soft, Nontender, Nondistended; Bowel sounds present  EXTREMITIES:  No clubbing, cyanosis, or edema  PSYCH: Nl behavior, nl affect  NEUROLOGY: AAOx3, non-focal, cranial nerves intact  SKIN: Normal color, No rashes or lesions  LINES:    Cardiovascular Diagnostic Testing:    ECG: Personally reviewed:    Echo: Personally reviewed:    Stress Testing:    Cath:    Imaging:    CXR: Personally reviewed    Labs: Personally reviewed                        11.9   3.70  )-----------( 295      ( 25 Jul 2018 10:30 )             36.7     07-25    134<L>  |  94<L>  |  10  ----------------------------<  91  3.9   |  28  |  1.11    Ca    8.9      25 Jul 2018 10:30    TPro  6.8  /  Alb  3.6  /  TBili  2.5<H>  /  DBili  x   /  AST  28  /  ALT  15  /  AlkPhos  52  07-25    PT/INR - ( 25 Jul 2018 10:30 )   PT: 24.6 SEC;   INR: 2.18          PTT - ( 25 Jul 2018 10:30 )  PTT:32.0 SEC          Serum Pro-Brain Natriuretic Peptide: 1876 pg/mL (07-25 @ 10:30) Patient seen and evaluated at bedside    Chief Complaint: SOB    HPI:      PMHx:   COPD (chronic obstructive pulmonary disease)  Anticardiolipin antibody positive  Essential hypertension  Pulmonary HTN  Morbid obesity  Diabetes      PSHx:   No significant past surgical history      Allergies:  No Known Allergies      Home Meds:  · 	sodium chloride 0.65% nasal spray: 2 puff(s) nasal 4 times a day, As Needed  · 	calcium carbonate 500 mg (200 mg elemental calcium) oral tablet, chewable: 1 tab(s) orally 3 times a day, As needed, Heartburn  · 	albuterol 2.5 mg/3 mL (0.083%) inhalation solution: 3 milliliter(s) by nebulizer every 6 hours -for bronchospasm   · 	irbesartan 75 mg oral tablet: 1 tab(s) orally once a day  · 	riociguat 1.5 mg oral tablet: 1 tab(s) orally 3 times a day  · 	ProAir HFA 90 mcg/inh inhalation aerosol: 2 puff(s) inhaled 4 times a day, As Needed  · 	Cartia  mg/24 hours oral capsule, extended release: 1 cap(s) orally once a day  · 	Symbicort 160 mcg-4.5 mcg/inh inhalation aerosol: 1 puff(s) inhaled 2 times a day          · furosemide 40 mg oral tablet: 1 tab(s) orally 2 times a day    Current Medications:       FAMILY HISTORY:  Family history of leukemia (Sibling)      Social History:  Smoking History:  Alcohol Use:  Drug Use:    REVIEW OF SYSTEMS:  CONSTITUTIONAL: No weakness, fevers or chills  EYES/ENT: No visual changes;  No dysphagia  NECK: No pain or stiffness  RESPIRATORY: No cough, wheezing, hemoptysis; No shortness of breath  CARDIOVASCULAR: No chest pain or palpitations; No lower extremity edema  GASTROINTESTINAL: No abdominal or epigastric pain. No nausea, vomiting, or hematemesis; No diarrhea or constipation. No melena or hematochezia.  BACK: No back pain  GENITOURINARY: No dysuria, frequency or hematuria  NEUROLOGICAL: No numbness or weakness  SKIN: No itching, burning, rashes, or lesions   All other review of systems is negative unless indicated above.    Physical Exam:  T(F): 96.4 (07-25), Max: 97.7 (07-25)  HR: 79 (07-25) (79 - 89)  BP: 110/58 (07-25) (110/58 - 125/88)  RR: 18 (07-25)  SpO2: 98% (07-25)  GENERAL: No acute distress, well-developed  HEAD:  Atraumatic, Normocephalic  ENT: EOMI, PERRLA, conjunctiva and sclera clear, Neck supple, No JVD, moist mucosa  CHEST/LUNG: Clear to auscultation bilaterally; No wheeze, equal breath sounds bilaterally   BACK: No spinal tenderness  HEART: Regular rate and rhythm; No murmurs, rubs, or gallops  ABDOMEN: Soft, Nontender, Nondistended; Bowel sounds present  EXTREMITIES:  No clubbing, cyanosis, or edema  PSYCH: Nl behavior, nl affect  NEUROLOGY: AAOx3, non-focal, cranial nerves intact  SKIN: Normal color, No rashes or lesions  LINES:    Cardiovascular Diagnostic Testing:    ECG: Personally reviewed:    Echo: Personally reviewed:    Stress Testing:    Cath:    Imaging:    CXR: Personally reviewed    Labs: Personally reviewed                        11.9   3.70  )-----------( 295      ( 25 Jul 2018 10:30 )             36.7     07-25    134<L>  |  94<L>  |  10  ----------------------------<  91  3.9   |  28  |  1.11    Ca    8.9      25 Jul 2018 10:30    TPro  6.8  /  Alb  3.6  /  TBili  2.5<H>  /  DBili  x   /  AST  28  /  ALT  15  /  AlkPhos  52  07-25    PT/INR - ( 25 Jul 2018 10:30 )   PT: 24.6 SEC;   INR: 2.18          PTT - ( 25 Jul 2018 10:30 )  PTT:32.0 SEC    CARDIAC MARKERS ( 25 Jul 2018 11:35 )  x     / x     / x     / 208 u/L / 2.58 ng/mL / x            Serum Pro-Brain Natriuretic Peptide: 1876 pg/mL (07-25 @ 10:30) Patient seen and evaluated at bedside    Chief Complaint: SOB    HPI: 34 y/o female PMHx pulmonary HTN, CVA prophylaxis with coumadin, HTN, COPD presents to ED c/o chest pain and sob. Patient recently discharged  from [facility] weeks ago for parainfluenza/respiratory distress - states since being home has been developing worsening left sided chest pain (10/10 radiating down left arm with numbness tingling and mild weakness), shortness of breath worsened with exertion that started 2-3 days ago. She has worsening bilateral lower extremities swelling not responding to her home Furosemide (typically has edema which responds to Furosemide). She also noted cuts b/l on her LE that wasn't there before with fluid leak, but no bleeding. She endorses abdominal pain, nausea, vomiting, decreased PO intake, and dry/productive cough (mother was sick a week ago with cough). She denies any orthopnea, fevers, palpitations, calf pain, and pleuritic chest pain.     In the ED she is sitting with supplemental O2. Talking in full sentences. Endorses improved chest pain, not currently SOB on bed, no nausea or vomiting, no palpitations. Says that she has been taking her medications as prescribed.         PMHx:   COPD (chronic obstructive pulmonary disease) (Denies this diagnosis)   Anticardiolipin antibody positive  Essential hypertension  Pulmonary HTN  Morbid obesity  Diabetes  Asthma    PSHx:   No significant past surgical history      Allergies:  No Known Allergies    Home Meds:  · 	sodium chloride 0.65% nasal spray: 2 puff(s) nasal 4 times a day, As Needed  · 	calcium carbonate 500 mg (200 mg elemental calcium) oral tablet, chewable: 1 tab(s) orally 3 times a day, As needed, Heartburn  · 	albuterol 2.5 mg/3 mL (0.083%) inhalation solution: 3 milliliter(s) by nebulizer every 6 hours -for bronchospasm   · 	irbesartan 75 mg oral tablet: 1 tab(s) orally once a day  · 	riociguat 1.5 mg oral tablet: 1 tab(s) orally 3 times a day  · 	ProAir HFA 90 mcg/inh inhalation aerosol: 2 puff(s) inhaled 4 times a day, As Needed  · 	Cartia  mg/24 hours oral capsule, extended release: 1 cap(s) orally once a day  · 	Symbicort 160 mcg-4.5 mcg/inh inhalation aerosol: 1 puff(s) inhaled 2 times a day          ·      furosemide 40 mg oral tablet: 1 tab(s) orally 2 times a day    Current Medications:       FAMILY HISTORY:  - Family history of leukemia (Sibling)  - Recent Stroke in paternal uncle.       Social History: Lives at home with mom and sister.   Smoking History:   Alcohol Use: Denies ETOH use  Drug Use: Denies illicit or recreational drug use.     REVIEW OF SYSTEMS:  CONSTITUTIONAL: No weakness, fevers or chills  EYES/ENT: No visual changes;  No dysphagia  NECK: No pain or stiffness  RESPIRATORY: (+) SOB (+)cough   CARDIOVASCULAR: (+) chest pain  GASTROINTESTINAL: (+) abdominal pain with walking. (+) nausea/vomiting (+) constipation  BACK: No back pain  GENITOURINARY: No dysuria, frequency or hematuria  NEUROLOGICAL: (+numbness)  SKIN: (+) swelling of LE's , (+) cuts on b/l LE.   All other review of systems is negative unless indicated above.    Physical Exam:  T(F): 96.4 (07-25), Max: 97.7 (07-25)  HR: 79 (07-25) (79 - 89)  BP: 110/58 (07-25) (110/58 - 125/88)  RR: 18 (07-25)  SpO2: 98% (07-25)  GENERAL: No acute distress on NC supplemental O2. Obese female.   HEAD:  Atraumatic, Normocephalic  ENT: EOMI, PERRLA, conjunctiva and sclera clear, Neck supple, No JVD, moist mucosa  CHEST/LUNG: Clear to auscultation bilaterally; No wheeze, equal breath sounds bilaterally   BACK: No spinal tenderness  HEART: Regular rate and rhythm. No rubs, or gallops. (+)Soft systolic murmur  ABDOMEN: Soft, Nontender, Nondistended; Bowel sounds present  EXTREMITIES:  B/L pitting edema up to thighs +2. Cuts b/l anterior surface of legs tender.   PSYCH: Nl behavior, nl affect  NEUROLOGY: AAOx3, non-focal, cranial nerves intact  SKIN: Cuts b/l lower extremity  LINES: PIV    Cardiovascular Diagnostic Testing:    ECG: Personally reviewed: SR, Incomplete RBBB, T wave inversions maybe right heart strain.     Echo: Personally reviewed: Last ECHO 2015 showing Ef 56-60%, Tricuspid regurgitation, Grade 1 diastolic dysfunction, RV pressure systolic 95.     Stress Testing:    Cath: n/A    Imaging:    CXR: Personally reviewed     There is cardiomegaly despite projection but unchanged from the last   exam. Lungs are clear. Prominent bilateral hilar structures may indicate   pulmonary hypertension.    No effusions or congestion to indicate CHF.    Labs: Personally reviewed                        11.9   3.70  )-----------( 295      ( 25 Jul 2018 10:30 )             36.7     07-25    134<L>  |  94<L>  |  10  ----------------------------<  91  3.9   |  28  |  1.11    Ca    8.9      25 Jul 2018 10:30    TPro  6.8  /  Alb  3.6  /  TBili  2.5<H>  /  DBili  x   /  AST  28  /  ALT  15  /  AlkPhos  52  07-25    PT/INR - ( 25 Jul 2018 10:30 )   PT: 24.6 SEC;   INR: 2.18          PTT - ( 25 Jul 2018 10:30 )  PTT:32.0 SEC    CARDIAC MARKERS ( 25 Jul 2018 11:35 )  x     / x     / x     / 208 u/L / 2.58 ng/mL / x            Serum Pro-Brain Natriuretic Peptide: 1876 pg/mL (07-25 @ 10:30) Patient seen and evaluated at bedside    Chief Complaint: SOB    HPI: 32 y/o female PMHx pulmonary HTN, CVA prophylaxis with coumadin, HTN, COPD presents to ED c/o chest pain and sob. Patient recently discharged  from [facility] weeks ago for parainfluenza/respiratory distress - states since being home has been developing worsening left sided chest pain (10/10 radiating down left arm with numbness tingling and mild weakness), shortness of breath worsened with exertion that started 2-3 days ago. She has worsening bilateral lower extremities swelling not responding to her home Furosemide (typically has edema which responds to Furosemide). She also noted cuts b/l on her LE that wasn't there before with fluid leak, but no bleeding. She endorses abdominal pain, nausea, vomiting, decreased PO intake, and dry/productive cough (mother was sick a week ago with cough). She denies any orthopnea, fevers, palpitations, calf pain, and pleuritic chest pain.     In the ED she is sitting with supplemental O2. Talking in full sentences. Endorses improved chest pain, not currently SOB on bed, no nausea or vomiting, no palpitations. Says that she has been taking her medications as prescribed.         PMHx:   COPD (chronic obstructive pulmonary disease) (Denies this diagnosis)   Anticardiolipin antibody positive  Essential hypertension  Pulmonary HTN  Morbid obesity  Diabetes  Asthma    PSHx:   No significant past surgical history      Allergies:  No Known Allergies    Home Meds:  · 	sodium chloride 0.65% nasal spray: 2 puff(s) nasal 4 times a day, As Needed  · 	calcium carbonate 500 mg (200 mg elemental calcium) oral tablet, chewable: 1 tab(s) orally 3 times a day, As needed, Heartburn  · 	albuterol 2.5 mg/3 mL (0.083%) inhalation solution: 3 milliliter(s) by nebulizer every 6 hours -for bronchospasm   · 	irbesartan 75 mg oral tablet: 1 tab(s) orally once a day  · 	riociguat 1.5 mg oral tablet: 1 tab(s) orally 3 times a day  · 	ProAir HFA 90 mcg/inh inhalation aerosol: 2 puff(s) inhaled 4 times a day, As Needed  · 	Cartia  mg/24 hours oral capsule, extended release: 1 cap(s) orally once a day  · 	Symbicort 160 mcg-4.5 mcg/inh inhalation aerosol: 1 puff(s) inhaled 2 times a day          ·      furosemide 40 mg oral tablet: 1 tab(s) orally 2 times a day          ·     Warfarin oral tablet: 	  Current Medications:       FAMILY HISTORY:  - Family history of leukemia (Sibling)  - Recent Stroke in paternal uncle.       Social History: Lives at home with mom and sister.   Smoking History:   Alcohol Use: Denies ETOH use  Drug Use: Denies illicit or recreational drug use.     REVIEW OF SYSTEMS:  CONSTITUTIONAL: No weakness, fevers or chills  EYES/ENT: No visual changes;  No dysphagia  NECK: No pain or stiffness  RESPIRATORY: (+) SOB (+)cough   CARDIOVASCULAR: (+) chest pain  GASTROINTESTINAL: (+) abdominal pain with walking. (+) nausea/vomiting (+) constipation  BACK: No back pain  GENITOURINARY: No dysuria, frequency or hematuria  NEUROLOGICAL: (+numbness)  SKIN: (+) swelling of LE's , (+) cuts on b/l LE.   All other review of systems is negative unless indicated above.    Physical Exam:  T(F): 96.4 (07-25), Max: 97.7 (07-25)  HR: 79 (07-25) (79 - 89)  BP: 110/58 (07-25) (110/58 - 125/88)  RR: 18 (07-25)  SpO2: 98% (07-25)  GENERAL: No acute distress on NC supplemental O2. Obese female.   HEAD:  Atraumatic, Normocephalic  ENT: EOMI, PERRLA, conjunctiva and sclera clear, Neck supple, Mild JVD, moist mucosa  CHEST/LUNG: Clear to auscultation bilaterally; No wheeze, equal breath sounds bilaterally   BACK: No spinal tenderness  HEART: Regular rate and rhythm. No rubs, or gallops. (+)Soft systolic murmur  ABDOMEN: Soft, Nontender, Nondistended; Bowel sounds present  EXTREMITIES:  B/L pitting edema up to thighs +2. Cuts b/l anterior surface of legs tender.   PSYCH: Nl behavior, nl affect  NEUROLOGY: AAOx3, non-focal, cranial nerves intact  SKIN: Cuts b/l lower extremity  LINES: PIV    Cardiovascular Diagnostic Testing:    ECG: Personally reviewed: SR, Incomplete RBBB, T wave inversions maybe right heart strain.     Echo: Personally reviewed: Last ECHO 2015 showing Ef 56-60%, Tricuspid regurgitation, Grade 1 diastolic dysfunction, RV pressure systolic 95.     Stress Testing:    Cath: N/A    Imaging:    CXR: Personally reviewed     There is cardiomegaly despite projection but unchanged from the last   exam. Lungs are clear. Prominent bilateral hilar structures may indicate   pulmonary hypertension.    No effusions or congestion to indicate CHF.    Labs: Personally reviewed                        11.9   3.70  )-----------( 295      ( 25 Jul 2018 10:30 )             36.7     07-25    134<L>  |  94<L>  |  10  ----------------------------<  91  3.9   |  28  |  1.11    Ca    8.9      25 Jul 2018 10:30    TPro  6.8  /  Alb  3.6  /  TBili  2.5<H>  /  DBili  x   /  AST  28  /  ALT  15  /  AlkPhos  52  07-25    PT/INR - ( 25 Jul 2018 10:30 )   PT: 24.6 SEC;   INR: 2.18          PTT - ( 25 Jul 2018 10:30 )  PTT:32.0 SEC    CARDIAC MARKERS ( 25 Jul 2018 11:35 )  x     / x     / x     / 208 u/L / 2.58 ng/mL / x            Serum Pro-Brain Natriuretic Peptide: 1876 pg/mL (07-25 @ 10:30)

## 2018-07-25 NOTE — H&P ADULT - ASSESSMENT
33 year old female with PMHx of Anticardiolipin Ab positive, pulm HTN, CVA on Coumadin, HTN and COPD presents with 10/10 sharp constant left sided chest pain radiating to shoulder and down left arm causing numbness in the fingers, associated with SOB and dizziness, exertion and palpitation aggravating chest pain, beginning at 6:00 AM this morning. Admit patient to Telemetry to R/O PE vs ACS

## 2018-07-25 NOTE — H&P ADULT - PROBLEM SELECTOR PLAN 2
Patient has anticardiolipin Ab positive. Keep patient on O2 if needed  Albuterol nebulizer as needed if feeling wheezing  PFTs B/L LE edema for one month  patient continues to take furosemide  R/O nephrotic syndrome with UA   Echo results will also help asses heart function Syncopal event with unknown duration of loss of consciousness prior to admission  ?PE as cause - though on coumadin so may be less likely.

## 2018-07-25 NOTE — H&P ADULT - RS GEN PE MLT RESP DETAILS PC
respirations non-labored/no rhonchi/no wheezes/no rales/airway patent/normal/no chest wall tenderness/breath sounds equal/good air movement/clear to auscultation bilaterally/no intercostal retractions

## 2018-07-25 NOTE — CONSULT NOTE ADULT - ATTENDING COMMENTS
Patient appears to follow sporadically with outpt Pulmonary (Dr. Unger) and Cardiology   Most recent echo in outpt [facility] from Feb of this year done in Dr. Janes Jha's office, with chronic findings of RVSP of 75 preserved LV systolic function, RA and RV enlargement, normal RV systolic function  Unclear reason for decline in past month despite continuing her usual diuretic, and pulmonary meds  Gross anasarca, would diurese with lasix gtt  Continue ARB, Dose coumadin  Would obtain Pulmonary input  ??Primary pulm htn, etiology is unclear

## 2018-07-25 NOTE — CONSULT NOTE ADULT - SUBJECTIVE AND OBJECTIVE BOX
CHIEF COMPLAINT:  Dyspnea, Chest pain    HPI:  33 F with anticardiolipin Ab, severe pHTN on Adempas, CVA on Coumadin here with sharp left sided chest pain and dyspnea that has been present since recent discharge here (7/7 - 7/11) where she was admitted for parainfluenza virus. Since discharge has had intermittent left sided chest pain that radiates at times down left arm. Pain is not associated with movement or inspiration. It comes and goes without warning. Shortness of breath is also associated with worsening LE swelling. She takes Lasix at home and says she has been compliant with her medications. Currently at bedside she appears comfortable, saturating well on 3 L NC. She denies fevers or coughing. Does says she's "blacks out" several times but denies any trauma.    PAST MEDICAL & SURGICAL HISTORY:  COPD (chronic obstructive pulmonary disease)  Anticardiolipin antibody positive  Essential hypertension  Pulmonary HTN  Morbid obesity  No significant past surgical history      FAMILY HISTORY:  Family history of leukemia (Sibling)      SOCIAL HISTORY:  Smoking: [ ] Never Smoked [x] Former Smoker (__ packs x ___ years) [ ] Current Smoker  (__ packs x ___ years)  Substance Use: [x] Never Used [ ] Used ____  EtOH Use: Denies  Marital Status: [x] Single [ ]  [ ]  [ ]   Sexual History:   Occupation:  Recent Travel:  Country of Birth:  Advance Directives:    Allergies    No Known Allergies    Intolerances        HOME MEDICATIONS:    REVIEW OF SYSTEMS:  Constitutional: [ ] negative [-] fevers [-] chills [ ] weight loss [ ] weight gain  HEENT: [ ] negative [ ] dry eyes [ ] eye irritation [ ] postnasal drip [ ] nasal congestion  CV: [ ] negative  [+] chest pain [-] orthopnea [-] palpitations [ ] murmur  Resp: [ ] negative [-] cough [+] shortness of breath [ ] dyspnea [-] wheezing [-] sputum [-] hemoptysis  GI: [ ] negative [-] nausea [-] vomiting [-] diarrhea [-] constipation [-] abd pain [ ] dysphagia   : [ ] negative [-] dysuria [ ] nocturia [ ] hematuria [ ] increased urinary frequency  Musculoskeletal: [ ] negative [ ] back pain [-] myalgias [-] arthralgias [ ] fracture  Skin: [ ] negative [-] rash [ ] itch  Neurological: [ ] negative [-] headache [-] dizziness [ ] syncope [ ] weakness [ ] numbness  Psychiatric: [ ] negative [ ] anxiety [ ] depression  Endocrine: [ ] negative [ ] diabetes [ ] thyroid problem  Hematologic/Lymphatic: [ ] negative [ ] anemia [ ] bleeding problem  Allergic/Immunologic: [ ] negative [ ] itchy eyes [ ] nasal discharge [ ] hives [ ] angioedema  [ ] All other systems negative  [ ] Unable to assess ROS because ________    OBJECTIVE:  ICU Vital Signs Last 24 Hrs  T(C): 36.7 (25 Jul 2018 16:58), Max: 36.7 (25 Jul 2018 16:58)  T(F): 98 (25 Jul 2018 16:58), Max: 98 (25 Jul 2018 16:58)  HR: 80 (25 Jul 2018 16:58) (78 - 89)  BP: 129/69 (25 Jul 2018 16:58) (90/60 - 129/69)  BP(mean): --  ABP: --  ABP(mean): --  RR: 20 (25 Jul 2018 16:58) (18 - 20)  SpO2: 98% (25 Jul 2018 16:58) (92% - 100%)        PHYSICAL EXAM:  General: No apparent distress  HEENT: NC/AT  Neck: Supple  Respiratory: CTAB, no wheezing or crackles, good air entry  Chest: Reproducible pain left side with palpation  Cardiovascular: RRR, no murmur, 3+ pitting edema  Abdomen: Soft, nontender, nondistended  Extremities: Warm  Skin: Intact  Neurological: A&Ox3, no focal deficits  Psychiatry: Normal mood and affect    HOSPITAL MEDICATIONS:  warfarin 5 milliGRAM(s) Oral once      diltiazem    milliGRAM(s) Oral daily  furosemide   Injectable 40 milliGRAM(s) IV Push daily  losartan 25 milliGRAM(s) Oral daily      ALBUTerol    90 MICROgram(s) HFA Inhaler 2 Puff(s) Inhalation every 6 hours PRN  buDESOnide  80 MICROgram(s)/formoterol 4.5 MICROgram(s) Inhaler 2 Puff(s) Inhalation two times a day      calcium carbonate    500 mG (Tums) Chewable 1 Tablet(s) Chew three times a day PRN        LABS:                        11.9   3.70  )-----------( 295      ( 25 Jul 2018 10:30 )             36.7     Hgb Trend: 11.9<--  07-25    134<L>  |  94<L>  |  10  ----------------------------<  91  3.9   |  28  |  1.11    Ca    8.9      25 Jul 2018 10:30    TPro  6.8  /  Alb  3.6  /  TBili  2.5<H>  /  DBili  x   /  AST  28  /  ALT  15  /  AlkPhos  52  07-25    Creatinine Trend: 1.11<--, 0.84<--, 0.82<--, 0.90<--, 1.02<--  PT/INR - ( 25 Jul 2018 10:30 )   PT: 24.6 SEC;   INR: 2.18          PTT - ( 25 Jul 2018 10:30 )  PTT:32.0 SEC      Venous Blood Gas:  07-25 @ 10:02  7.40/48/< 24/26/24.0  VBG Lactate: 1.8      MICROBIOLOGY:     RADIOLOGY:  [ ] Reviewed and interpreted by me    ASSESSMENT AND RECOMMENDATION:    33 F with anticardiolipin Ab, severe pHTN on Adempas, CVA on Coumadin here with dyspnea and intermittent chest pain. She appears volume overloaded with severe LE edema extending up to proximal lower extremity. Chest pain is reproducible on exam. Does not sound cardiac. INR is therapeutic. PE on differential but less likely. Patient needs diuresis. She appears comfortable at this time and saturating well with 3 L NC.    Diurese with Lasix IV with goal of net neg 1 L over next 24 hours  Check echo  Follow up CT chest (pending order by ED)  Continue Adezuleykaas    Arpan Pagan MD  Pulmonary and Critical Care Fellow  [phone]

## 2018-07-25 NOTE — ED ADULT NURSE REASSESSMENT NOTE - NS ED NURSE REASSESS COMMENT FT1
pt a/o x3 ambulate to bathroom with out assistance.  steady gait . feeling shortness off breathing after ambulation . pt having bilateral leg edema with right leg skin break on the shin and back of  the leg. on o2 supplement 3l by nc. tolerates po food .  right 18 gauge iv ml present . flushing good. pt tRY ing to give urine specimen for ucg and UA . REPORT GIVEN TO night rn pt a/o x3 ambulate to bathroom with out assistance.  steady gait . feeling shortness off breathing after ambulation . pt having bilateral leg edema with right leg skin break on the shin and back of  the leg. on o2 supplement 3l by nc. tolerates po food .  right 18 gauge iv ml present . flushing good. pt tRY ing to give urine specimen for ucg and UA . REPORT GIVEN TO night dimitri curran ed, rn

## 2018-07-25 NOTE — ED PROVIDER NOTE - OBJECTIVE STATEMENT
32yo female pmh pulmonary HTN, CVA ppx with coumadin, HTN, COPD presents to ED c/o chest pain and sob. Pt. recently dc'd from [facility] 2 weeks ago for parainfluenza/respiratory distress - states since being home has been developing worsening left sided chest pain, shortness of breath worsened with exertion and states that her bl lower extremities are becoming much more swollen. States at times 10/10 left sided chest pain radiating to left arm and says pain worse with arm movement at times and admits to intermittent numbness of left arm.  Denies fever chills nt sweats weakness.

## 2018-07-25 NOTE — H&P ADULT - NEGATIVE NEUROLOGICAL SYMPTOMS
no syncope/no headache/no confusion/no paresthesias/no transient paralysis/no weakness/no loss of sensation/no loss of consciousness/no hemiparesis/no facial palsy no loss of sensation/no transient paralysis/no hemiparesis/no paresthesias/no weakness/no headache/no facial palsy/no confusion

## 2018-07-25 NOTE — H&P ADULT - NSHPLABSRESULTS_GEN_ALL_CORE
11.9   3.70  )-----------( 295      ( 25 Jul 2018 10:30 )             36.7   07-25    134<L>  |  94<L>  |  10  ----------------------------<  91  3.9   |  28  |  1.11    Ca    8.9      25 Jul 2018 10:30    TPro  6.8  /  Alb  3.6  /  TBili  2.5<H>  /  DBili  x   /  AST  28  /  ALT  15  /  AlkPhos  52  07-25    CXR: There is cardiomegaly despite projection but unchanged from the last exam. Lungs are clear. Prominent bilateral hilar structures may indicate pulmonary hypertension.  No effusions or congestion to indicate CHF.    EKG -- NSR @ 79 bpm, S1Q3 and TWI in III, possible MANSI with enlarged P waves

## 2018-07-25 NOTE — H&P ADULT - MUSCULOSKELETAL
details… detailed exam normal/ROM intact/no calf tenderness/no joint erythema/normal strength b/l UE and LE 5/5

## 2018-07-25 NOTE — H&P ADULT - PROBLEM SELECTOR PLAN 6
Patient at high risk for VTE with PMHx. Patient should be kept on anticoagulation on Warfarin and monitor INRs Nutritionist referral and education on diet and exercise Continue Riociguat   EKG shows MANSI from the Pulm HTN  Consider Echo to assess RVHF

## 2018-07-25 NOTE — H&P ADULT - NEGATIVE OPHTHALMOLOGIC SYMPTOMS
no diplopia/no photophobia/no loss of vision L/no blurred vision R/no blurred vision L/no loss of vision R

## 2018-07-25 NOTE — H&P ADULT - PROBLEM SELECTOR PLAN 7
Hypercoagulable state  Keep patient anticoagulated with warfarin, monitor INR Patient at high risk for VTE with PMHx. Patient should be kept on anticoagulation on Warfarin and monitor INRs

## 2018-07-26 DIAGNOSIS — Z71.89 OTHER SPECIFIED COUNSELING: ICD-10-CM

## 2018-07-26 LAB
BUN SERPL-MCNC: 9 MG/DL — SIGNIFICANT CHANGE UP (ref 7–23)
CALCIUM SERPL-MCNC: 8.9 MG/DL — SIGNIFICANT CHANGE UP (ref 8.4–10.5)
CHLORIDE SERPL-SCNC: 99 MMOL/L — SIGNIFICANT CHANGE UP (ref 98–107)
CHOLEST SERPL-MCNC: 87 MG/DL — LOW (ref 120–199)
CO2 SERPL-SCNC: 27 MMOL/L — SIGNIFICANT CHANGE UP (ref 22–31)
CREAT SERPL-MCNC: 1.02 MG/DL — SIGNIFICANT CHANGE UP (ref 0.5–1.3)
GLUCOSE SERPL-MCNC: 92 MG/DL — SIGNIFICANT CHANGE UP (ref 70–99)
HBA1C BLD-MCNC: 6.7 % — HIGH (ref 4–5.6)
HCT VFR BLD CALC: 38.2 % — SIGNIFICANT CHANGE UP (ref 34.5–45)
HDLC SERPL-MCNC: 22 MG/DL — LOW (ref 45–65)
HGB BLD-MCNC: 12.2 G/DL — SIGNIFICANT CHANGE UP (ref 11.5–15.5)
INR BLD: 2.29 — HIGH (ref 0.88–1.17)
LIPID PNL WITH DIRECT LDL SERPL: 60 MG/DL — SIGNIFICANT CHANGE UP
MAGNESIUM SERPL-MCNC: 1.9 MG/DL — SIGNIFICANT CHANGE UP (ref 1.6–2.6)
MCHC RBC-ENTMCNC: 24.8 PG — LOW (ref 27–34)
MCHC RBC-ENTMCNC: 31.9 % — LOW (ref 32–36)
MCV RBC AUTO: 77.8 FL — LOW (ref 80–100)
NRBC # FLD: 0 — SIGNIFICANT CHANGE UP
PLATELET # BLD AUTO: 299 K/UL — SIGNIFICANT CHANGE UP (ref 150–400)
PMV BLD: 10.2 FL — SIGNIFICANT CHANGE UP (ref 7–13)
POTASSIUM SERPL-MCNC: 3.9 MMOL/L — SIGNIFICANT CHANGE UP (ref 3.5–5.3)
POTASSIUM SERPL-SCNC: 3.9 MMOL/L — SIGNIFICANT CHANGE UP (ref 3.5–5.3)
PROTHROM AB SERPL-ACNC: 25.9 SEC — HIGH (ref 9.8–13.1)
RBC # BLD: 4.91 M/UL — SIGNIFICANT CHANGE UP (ref 3.8–5.2)
RBC # FLD: 21.8 % — HIGH (ref 10.3–14.5)
SODIUM SERPL-SCNC: 140 MMOL/L — SIGNIFICANT CHANGE UP (ref 135–145)
TRIGL SERPL-MCNC: 58 MG/DL — SIGNIFICANT CHANGE UP (ref 10–149)
WBC # BLD: 3.18 K/UL — LOW (ref 3.8–10.5)
WBC # FLD AUTO: 3.18 K/UL — LOW (ref 3.8–10.5)

## 2018-07-26 PROCEDURE — 93306 TTE W/DOPPLER COMPLETE: CPT | Mod: 26

## 2018-07-26 PROCEDURE — 99232 SBSQ HOSP IP/OBS MODERATE 35: CPT | Mod: GC

## 2018-07-26 PROCEDURE — 99233 SBSQ HOSP IP/OBS HIGH 50: CPT

## 2018-07-26 PROCEDURE — 99223 1ST HOSP IP/OBS HIGH 75: CPT | Mod: GC

## 2018-07-26 RX ORDER — DEXTROSE 50 % IN WATER 50 %
15 SYRINGE (ML) INTRAVENOUS ONCE
Qty: 0 | Refills: 0 | Status: DISCONTINUED | OUTPATIENT
Start: 2018-07-26 | End: 2018-07-28

## 2018-07-26 RX ORDER — WARFARIN SODIUM 2.5 MG/1
5 TABLET ORAL ONCE
Qty: 0 | Refills: 0 | Status: COMPLETED | OUTPATIENT
Start: 2018-07-26 | End: 2018-07-26

## 2018-07-26 RX ORDER — INSULIN LISPRO 100/ML
VIAL (ML) SUBCUTANEOUS AT BEDTIME
Qty: 0 | Refills: 0 | Status: DISCONTINUED | OUTPATIENT
Start: 2018-07-26 | End: 2018-07-28

## 2018-07-26 RX ORDER — SODIUM CHLORIDE 9 MG/ML
1000 INJECTION, SOLUTION INTRAVENOUS
Qty: 0 | Refills: 0 | Status: DISCONTINUED | OUTPATIENT
Start: 2018-07-26 | End: 2018-07-28

## 2018-07-26 RX ORDER — DEXTROSE 50 % IN WATER 50 %
25 SYRINGE (ML) INTRAVENOUS ONCE
Qty: 0 | Refills: 0 | Status: DISCONTINUED | OUTPATIENT
Start: 2018-07-26 | End: 2018-07-28

## 2018-07-26 RX ORDER — INFLUENZA VIRUS VACCINE 15; 15; 15; 15 UG/.5ML; UG/.5ML; UG/.5ML; UG/.5ML
0.5 SUSPENSION INTRAMUSCULAR ONCE
Qty: 0 | Refills: 0 | Status: DISCONTINUED | OUTPATIENT
Start: 2018-07-25 | End: 2018-08-10

## 2018-07-26 RX ORDER — INSULIN LISPRO 100/ML
VIAL (ML) SUBCUTANEOUS
Qty: 0 | Refills: 0 | Status: DISCONTINUED | OUTPATIENT
Start: 2018-07-26 | End: 2018-07-28

## 2018-07-26 RX ORDER — GLUCAGON INJECTION, SOLUTION 0.5 MG/.1ML
1 INJECTION, SOLUTION SUBCUTANEOUS ONCE
Qty: 0 | Refills: 0 | Status: DISCONTINUED | OUTPATIENT
Start: 2018-07-26 | End: 2018-07-28

## 2018-07-26 RX ORDER — DEXTROSE 50 % IN WATER 50 %
12.5 SYRINGE (ML) INTRAVENOUS ONCE
Qty: 0 | Refills: 0 | Status: DISCONTINUED | OUTPATIENT
Start: 2018-07-26 | End: 2018-07-28

## 2018-07-26 RX ADMIN — Medication 2.5 MG/HR: at 18:19

## 2018-07-26 RX ADMIN — LOSARTAN POTASSIUM 25 MILLIGRAM(S): 100 TABLET, FILM COATED ORAL at 06:45

## 2018-07-26 RX ADMIN — BUDESONIDE AND FORMOTEROL FUMARATE DIHYDRATE 2 PUFF(S): 160; 4.5 AEROSOL RESPIRATORY (INHALATION) at 22:09

## 2018-07-26 RX ADMIN — WARFARIN SODIUM 5 MILLIGRAM(S): 2.5 TABLET ORAL at 10:28

## 2018-07-26 RX ADMIN — Medication 180 MILLIGRAM(S): at 06:45

## 2018-07-26 RX ADMIN — BUDESONIDE AND FORMOTEROL FUMARATE DIHYDRATE 2 PUFF(S): 160; 4.5 AEROSOL RESPIRATORY (INHALATION) at 09:30

## 2018-07-26 NOTE — PROGRESS NOTE ADULT - SUBJECTIVE AND OBJECTIVE BOX
Patient seen and examined at bedside.    Overnight Events:     Review Of Systems: No chest pain, shortness of breath, or palpitations            Current Meds:  ALBUTerol    90 MICROgram(s) HFA Inhaler 2 Puff(s) Inhalation every 6 hours PRN  buDESOnide  80 MICROgram(s)/formoterol 4.5 MICROgram(s) Inhaler 2 Puff(s) Inhalation two times a day  calcium carbonate    500 mG (Tums) Chewable 1 Tablet(s) Chew three times a day PRN  dextrose 40% Gel 15 Gram(s) Oral once PRN  dextrose 5%. 1000 milliLiter(s) IV Continuous <Continuous>  dextrose 50% Injectable 12.5 Gram(s) IV Push once  dextrose 50% Injectable 25 Gram(s) IV Push once  dextrose 50% Injectable 25 Gram(s) IV Push once  diltiazem    milliGRAM(s) Oral daily  furosemide Infusion 5 mG/Hr IV Continuous <Continuous>  glucagon  Injectable 1 milliGRAM(s) IntraMuscular once PRN  influenza   Vaccine 0.5 milliLiter(s) IntraMuscular once  insulin lispro (HumaLOG) corrective regimen sliding scale   SubCutaneous three times a day before meals  insulin lispro (HumaLOG) corrective regimen sliding scale   SubCutaneous at bedtime  losartan 25 milliGRAM(s) Oral daily  warfarin 5 milliGRAM(s) Oral once      Vitals:  T(F): 98.3 (-), Max: 98.4 ()  HR: 84 (-) (78 - 89)  BP: 112/63 (-) (90/60 - 129/69)  RR: 20 (-)  SpO2: 94% (-)  I&O's Summary      Physical Exam:  Appearance: No acute distress; well appearing  Eyes: PERRL, EOMI, pink conjunctiva  HENT: Normal oral mucosa  Cardiovascular: RRR, S1, S2, no murmurs, rubs, or gallops; no edema; no JVD  Respiratory: Clear to auscultation bilaterally  Gastrointestinal: soft, non-tender, non-distended with normal bowel sounds  Musculoskeletal: No clubbing; no joint deformity   Neurologic: Non-focal  Lymphatic: No lymphadenopathy  Psychiatry: AAOx3, mood & affect appropriate  Skin: No rashes, ecchymoses, or cyanosis                          12.2   3.18  )-----------( 299      ( 2018 07:05 )             38.2         140  |  99  |  9   ----------------------------<  92  3.9   |  27  |  1.02    Ca    8.9      2018 07:05  Mg     1.9         TPro  6.8  /  Alb  3.6  /  TBili  2.5<H>  /  DBili  x   /  AST  28  /  ALT  15  /  AlkPhos  52      PT/INR - ( 2018 07:05 )   PT: 25.9 SEC;   INR: 2.29          PTT - ( 2018 10:30 )  PTT:32.0 SEC  CARDIAC MARKERS ( 2018 11:35 )  x     / x     / x     / 208 u/L / 2.58 ng/mL / x          Serum Pro-Brain Natriuretic Peptide: 1876 pg/mL ( @ 10:30)    Total Cholesterol: 87  LDL: 60  HDL: 22  T    Hemoglobin A1C, Whole Blood: 6.7 % ( @ 07:05)      New ECG(s): Personally reviewed    Echo:    Stress Testing:     Cath:    Imaging:    Interpretation of Telemetry: Patient seen and examined at bedside. No complaints. Subjective improvement of  B/L leg swelling and pain. Has good UOP with Furosemide infusion.    Overnight Events: No acute events overnight.    Review Of Systems: No chest pain, shortness of breath, or palpitations, nausea, vomiting, abdominal pain. B/L LE Swelling is present but has subjectively improved. Denies any new calf tenderness.            Current Meds:  ALBUTerol    90 MICROgram(s) HFA Inhaler 2 Puff(s) Inhalation every 6 hours PRN  buDESOnide  80 MICROgram(s)/formoterol 4.5 MICROgram(s) Inhaler 2 Puff(s) Inhalation two times a day  calcium carbonate    500 mG (Tums) Chewable 1 Tablet(s) Chew three times a day PRN  dextrose 40% Gel 15 Gram(s) Oral once PRN  dextrose 5%. 1000 milliLiter(s) IV Continuous <Continuous>  dextrose 50% Injectable 12.5 Gram(s) IV Push once  dextrose 50% Injectable 25 Gram(s) IV Push once  dextrose 50% Injectable 25 Gram(s) IV Push once  diltiazem    milliGRAM(s) Oral daily  furosemide Infusion 5 mG/Hr IV Continuous <Continuous>  glucagon  Injectable 1 milliGRAM(s) IntraMuscular once PRN  influenza   Vaccine 0.5 milliLiter(s) IntraMuscular once  insulin lispro (HumaLOG) corrective regimen sliding scale   SubCutaneous three times a day before meals  insulin lispro (HumaLOG) corrective regimen sliding scale   SubCutaneous at bedtime  losartan 25 milliGRAM(s) Oral daily  warfarin 5 milliGRAM(s) Oral once      Vitals:  T(F): 98.3 (-), Max: 98.4 ()  HR: 84 (-) (78 - 89)  BP: 112/63 () (90/60 - 129/69)  RR: 20 (-)  SpO2: 94% (-)  I&O's Summary      Physical Exam:  Appearance: No acute distress; well appearing  Eyes: PERRL, EOMI, pink conjunctiva  HENT: Normal oral mucosa  Cardiovascular: RRR, S1, S2, no murmurs, rubs, or gallops; no edema; mild JVD difficult appreciate.  Respiratory: Clear to auscultation bilaterally  Gastrointestinal: soft, non-tender, non-distended with normal bowel sounds  Musculoskeletal: No clubbing; no joint deformity   Neurologic: Non-focal  Lymphatic: No lymphadenopathy  Psychiatry: AAOx3, mood & affect appropriate  Skin: No rashes, ecchymoses, or cyanosis. B/L LE pitting edema +2 up to thighs with minimal improvement compared to yesterday.                           12.2   3.18  )-----------( 299      ( 2018 07:05 )             38.2         140  |  99  |  9   ----------------------------<  92  3.9   |  27  |  1.02    Ca    8.9      2018 07:05  Mg     1.9         TPro  6.8  /  Alb  3.6  /  TBili  2.5<H>  /  DBili  x   /  AST  28  /  ALT  15  /  AlkPhos  52      PT/INR - ( 2018 07:05 )   PT: 25.9 SEC;   INR: 2.29          PTT - ( 2018 10:30 )  PTT:32.0 SEC  CARDIAC MARKERS ( 2018 11:35 )  x     / x     / x     / 208 u/L / 2.58 ng/mL / x          Serum Pro-Brain Natriuretic Peptide: 1876 pg/mL ( @ 10:30)    Total Cholesterol: 87  LDL: 60  HDL: 22  T    Hemoglobin A1C, Whole Blood: 6.7 % ( @ 07:05)      New ECG(s): Personally reviewed    Echo: New Echo Pending    Stress Testing: N/A    Cath: N/A    Imaging:  CT Angio Done yesterday (18)    Shows    No pulmonary embolism.    Extensive right heart enlargement and pulmonary arterial dilatation   consistent with known pulmonary hypertension.    Interpretation of Telemetry: Sinus Rhythm in the s.

## 2018-07-26 NOTE — PROGRESS NOTE ADULT - ASSESSMENT
34 y/o female PMHx pulmonary HTN, CVA prophylaxis with coumadin, HTN, COPD presents with SOB, chest pain, and worsening leg swelling refractory to home dose of furosemide with right heart strain on EKG most likely in the setting of acute on chronic right systolic heart failure, chest pain most likely secondary to elevated cardiac pressures with negative CTA for PE. 34 y/o female PMHx pulmonary HTN, CVA prophylaxis with coumadin, HTN, COPD presents with SOB, chest pain, and worsening leg swelling refractory to home dose of furosemide with right heart strain on EKG most likely in the setting of acute on chronic right systolic heart failure, chest pain most likely secondary to elevated cardiac pressures with negative CTA for PE.     Acute Exacerbation of Pulmonary HTN  - Continue with Furosemide IV drip, monitor strict I&O's. Continue to assess for clinical improvement.   - TTE to evaluate heart.   - Pulmonary recs noted.

## 2018-07-26 NOTE — PROGRESS NOTE ADULT - SUBJECTIVE AND OBJECTIVE BOX
Patient is a 33y old  Female who presents with a chief complaint of "I had chest pain and SOB" this morning (2018 15:47)    SUBJECTIVE / OVERNIGHT EVENTS:  Patient denying any chest pain/dyspnea at this time. She reports the leg edema/abdominal swelling has been progressing over months.     MEDICATIONS  (STANDING):  buDESOnide  80 MICROgram(s)/formoterol 4.5 MICROgram(s) Inhaler 2 Puff(s) Inhalation two times a day  dextrose 5%. 1000 milliLiter(s) (50 mL/Hr) IV Continuous <Continuous>  dextrose 50% Injectable 12.5 Gram(s) IV Push once  dextrose 50% Injectable 25 Gram(s) IV Push once  dextrose 50% Injectable 25 Gram(s) IV Push once  diltiazem    milliGRAM(s) Oral daily  furosemide Infusion 5 mG/Hr (2.5 mL/Hr) IV Continuous <Continuous>  influenza   Vaccine 0.5 milliLiter(s) IntraMuscular once  insulin lispro (HumaLOG) corrective regimen sliding scale   SubCutaneous three times a day before meals  insulin lispro (HumaLOG) corrective regimen sliding scale   SubCutaneous at bedtime  losartan 25 milliGRAM(s) Oral daily    MEDICATIONS  (PRN):  ALBUTerol    90 MICROgram(s) HFA Inhaler 2 Puff(s) Inhalation every 6 hours PRN Shortness of Breath and/or Wheezing  calcium carbonate    500 mG (Tums) Chewable 1 Tablet(s) Chew three times a day PRN Heartburn  dextrose 40% Gel 15 Gram(s) Oral once PRN Blood Glucose LESS THAN 70 milliGRAM(s)/deciliter  glucagon  Injectable 1 milliGRAM(s) IntraMuscular once PRN Glucose LESS THAN 70 milligrams/deciliter      Vital Signs Last 24 Hrs  T(C): 36.8 (2018 06:41), Max: 36.9 (2018 21:57)  T(F): 98.3 (2018 06:41), Max: 98.4 (2018 21:57)  HR: 84 (2018 06:41) (78 - 89)  BP: 112/63 (2018 06:41) (90/60 - 129/69)  BP(mean): --  RR: 20 (2018 06:41) (19 - 22)  SpO2: 94% (2018 06:41) (91% - 100%)  CAPILLARY BLOOD GLUCOSE        I&O's Summary    2018 07:01  -  2018 12:21  --------------------------------------------------------  IN: 120 mL / OUT: 0 mL / NET: 120 mL      PHYSICAL EXAM  GENERAL: NAD, morbidly obese woman, very cooperative with exam  HEAD:  Atraumatic, Normocephalic  EYES: EOMI, PERRLA, conjunctiva and sclera clear  NECK: Supple, No JVD  CHEST/LUNG: Clear to auscultation bilaterally; No wheeze  HEART: Regular rate and rhythm; No murmurs, rubs, or gallops  ABDOMEN: Soft, non-tender, distended, bowel sounds present  EXTREMITIES:  2+ Peripheral Pulses, 4+ pitting edema   PSYCH: AAOx3  SKIN: No rashes or lesions    LABS:                        12.2   3.18  )-----------( 299      ( 2018 07:05 )             38.2     07-26    140  |  99  |  9   ----------------------------<  92  3.9   |  27  |  1.02    Ca    8.9      2018 07:05  Mg     1.9     -    TPro  6.8  /  Alb  3.6  /  TBili  2.5<H>  /  DBili  x   /  AST  28  /  ALT  15  /  AlkPhos  52  07-25    PT/INR - ( 2018 07:05 )   PT: 25.9 SEC;   INR: 2.29          PTT - ( 2018 10:30 )  PTT:32.0 SEC  CARDIAC MARKERS ( 2018 11:35 )  x     / x     / 208 u/L / 2.58 ng/mL / x          Urinalysis Basic - ( 2018 21:02 )    Color: PLYEL / Appearance: CLEAR / S.004 / pH: 7.0  Gluc: NEGATIVE / Ketone: NEGATIVE  / Bili: NEGATIVE / Urobili: NORMAL mg/dL   Blood: MODERATE / Protein: 10 mg/dL / Nitrite: NEGATIVE   Leuk Esterase: NEGATIVE / RBC: 5-10 / WBC 0-2   Sq Epi: OCC / Non Sq Epi: x / Bacteria: FEW          RADIOLOGY & ADDITIONAL TESTS:    Imaging Personally Reviewed:  Consultant(s) Notes Reviewed:    Care Discussed with Consultants/Other Providers:

## 2018-07-26 NOTE — PROGRESS NOTE ADULT - PROBLEM SELECTOR PLAN 2
CTA negative for PE, characterization of pain atypical for ACS, most likely secondary to elevated cardiac pressures, EKG with Rt heart strain consistent with acute on chronic Rt systolic heart failure  - continue tx for CHF as above

## 2018-07-26 NOTE — PROGRESS NOTE ADULT - PROBLEM SELECTOR PLAN 1
Likely cor pulmonale in the setting of severe pulmonary HTN, Group 4  - continue with lasix gtt  - continue adempas for PHTN (patient to bring from home)  - F/U TTE to eval RVEF and pulm HTN  - Cards consult, recommendations appreciated  - Pulm consult, recommendations appreciated

## 2018-07-26 NOTE — PROGRESS NOTE ADULT - PROBLEM SELECTOR PLAN 4
Syncopal event with unknown duration of loss of consciousness prior to admission, suspected in the setting of CHFe  - continue to monitor closely

## 2018-07-26 NOTE — PROGRESS NOTE ADULT - ASSESSMENT
[facility]  [address] [place] [address]  Phone: [phone]  Fax: [phone]                  Gabby Dill   2017 2:00 PM   Office Visit    Description:  Female : 1961   Provider:  Harinder Galdamez MD   Department:  [facility]           Reason for Visit     Follow-up     Cyst           Diagnoses this Visit        Comments    Routine health maintenance    -  Primary     Need for diphtheria-tetanus-pertussis (Tdap) vaccine         Need for Tdap vaccination                To Do List           Goals (5 Years of Data)     None      [facility]     [facility] -  Assistance  Unless otherwise directed by your provider, please contact [facility], our nurse care line that is available for  assistance.     Registered nurses in the [facility] Center provide: appointment scheduling, clinical advisement, health education, and other advisory services.  Call: [phone] (toll free)               Medications           Message regarding Medications     Verify the changes and/or additions to your medication regime listed below are the same as discussed with your clinician today.  If any of these changes or additions are incorrect, please notify your healthcare provider.        STOP taking these medications     tramadol (ULTRAM) 50 mg tablet Take 50 mg by mouth every 6 (six) hours as needed.            Verify that the below list of medications is an accurate representation of the medications you are currently taking.  If none reported, the list may be blank. If incorrect, please contact your healthcare provider. Carry this list with you in case of emergency.           Current Medications     alprazolam (XANAX) 0.5 MG tablet Take 1 tablet (0.5 mg total) by mouth daily as needed for Anxiety.    BAICALIN/CATECHIN (LIMBREL ORAL) Take by mouth 2 (two) times daily.    hydrocodone-acetaminophen 5-325mg (NORCO) 5-325 mg per  tablet Take 1 tablet by mouth 2 (two) times daily.     LIALDA 1.2 gram TbEC Take 1.2 g by mouth daily with breakfast.     lisinopril-hydrochlorothiazide (PRINZIDE,ZESTORETIC) 20-12.5 mg per tablet Take 1 tablet by mouth once daily.    methocarbamol (ROBAXIN) 750 MG Tab 750 mg 2 (two) times daily.     multivitamin (ONE DAILY MULTIVITAMIN) per tablet Take 1 tablet by mouth once daily.    rabeprazole (ACIPHEX) 20 mg tablet Take 20 mg by mouth once daily.            Clinical Reference Information           Your Vitals Were     BP                   128/84           Blood Pressure          Most Recent Value    BP  128/84      Allergies as of 4/18/2017     Nsaids (Non-steroidal Anti-inflammatory Drug)    Sulfa (Sulfonamide Antibiotics)      Immunizations Administered on Date of Encounter - 4/18/2017     Name Date Dose VIS Date Route    TDAP  Incomplete 0.5 mL 2/24/2015 Intramuscular    TDAP  Incomplete 0.5 mL 2/24/2015 Intramuscular      Orders Placed During Today's Visit      Normal Orders This Visit    Tdap Vaccine     Tdap Vaccine     Future Labs/Procedures Expected by Expires    Comprehensive metabolic panel  As directed 4/18/2018    Lipid panel  As directed 4/18/2018      Language Assistance Services     ATTENTION: Language assistance services are available, free of charge. Please call [phone].      ATENCIÓN: Si michael samaria, tiene a payan disposición servicios gratuitos de asistencia lingüística. Llame al [phone].     [facility] Ý: N?u b?n nói Ti?ng Vi?t, có các d?ch v? h? tr? ngôn ng? mi?n phí dành cho b?n. G?i s? [phone].         [facility] complies with applicable Federal civil rights laws and does not discriminate on the basis of race, color, national origin, age, disability, or sex.         Ms. Burleson is a 33 year old female with PMHx of anticardiolipin Ab positive, pulm HTN, CVA on Coumadin, HTN and COPD recently DC July 11th, hospitalization for a parainfluenza infection who presents on this admission with chest pain and worsening leg edema/abdominal swelling admitted for further management of a CHF exacerbation.

## 2018-07-26 NOTE — PROGRESS NOTE ADULT - PROBLEM SELECTOR PLAN 3
Patient has anticardiolipin Ab positive, O2 at baseline  - continue albuterol prn, symbicort  - Pulmonary consult

## 2018-07-26 NOTE — PROGRESS NOTE ADULT - ASSESSMENT
33 F with anticardiolipin Ab, severe pHTN on Adempas, CVA on Coumadin here with dyspnea and intermittent chest pain. She appears volume overloaded with severe LE edema extending up to proximal lower extremity. Chest pain is reproducible on exam. Does not sound cardiac. INR is therapeutic. PE on differential but less likely. Patient needs diuresis. She appears comfortable at this time and saturating well off NC.     Continue diureses with Lasix IV (consider switching to TID dosing)  Please monitor ins/outs  Check TTE  Continue Adempas    Emery Del Rosario MD, PGY4  Pulmonary and Critical Care Fellow  [phone] 33 F with anticardiolipin Ab, severe pHTN on Adempas, CVA on Coumadin here with dyspnea and intermittent chest pain. She appears volume overloaded with severe LE edema extending up to proximal lower extremity. Chest pain is reproducible on exam. Does not sound cardiac. INR is therapeutic. PE on differential but less likely. Patient needs diuresis. She appears comfortable at this time and saturating well off NC.     Continue diureses with Lasix IV (would switch to 40 TID IVP for now)  Please monitor ins/outs  Continue Adempas  Will discuss with Dr. Unger regarding plans for further optimization of PAH  Needs outpatient sleep study    Emery Del Rosario MD, PGY4  Pulmonary and Critical Care Fellow  [phone]

## 2018-07-26 NOTE — PROGRESS NOTE ADULT - SUBJECTIVE AND OBJECTIVE BOX
CHIEF COMPLAINT:    Interval Events: Started on lasix gtt overnight. Patient reporting increased UOP but no I/Os available. Continues to note cough, dry throat and LE edema. Frustrated that she is unable to ambulate.     REVIEW OF SYSTEMS:  Constitutional: No fevers or chills. No weight loss. No fatigue or generalised malaise.  Eyes: No itching or discharge from the eyes  ENT: No ear pain. No ear discharge. No nasal congestion. No post nasal drip. No epistaxis. No throat pain. No sore throat. No difficulty swallowing.   CV: No chest pain. No palpitations. No lightheadedness or dizziness.   Resp: No dyspnea at rest. No dyspnea on exertion. No orthopnea. No wheezing. No cough. No stridor. No sputum production. No chest pain with respiration.  GI: No nausea. No vomiting. No diarrhea.  MSK: No joint pain or pain in any extremities  Integumentary: No skin lesions. No pedal edema.  Neurological: No gross motor weakness. No sensory changes.  [ ] All other systems negative  [ ] Unable to assess ROS because ________    OBJECTIVE:  ICU Vital Signs Last 24 Hrs  T(C): 36.8 (2018 06:41), Max: 36.9 (2018 21:57)  T(F): 98.3 (2018 06:41), Max: 98.4 (2018 21:57)  HR: 84 (2018 06:41) (78 - 89)  BP: 112/63 (2018 06:41) (90/60 - 129/69)  BP(mean): --  ABP: --  ABP(mean): --  RR: 20 (2018 06:41) (19 - 22)  SpO2: 94% (2018 06:41) (91% - 100%)         @ 07:  -   @ 10:11  --------------------------------------------------------  IN: 120 mL / OUT: 0 mL / NET: 120 mL      CAPILLARY BLOOD GLUCOSE      PHYSICAL EXAM:  General: No apparent distress  HEENT: NC/AT  Neck: Supple  Respiratory: CTAB, no wheezing or crackles, good air entry  Chest: Reproducible pain left side with palpation  Cardiovascular: RRR, no murmur, 3+ pitting edema above the knees  Abdomen: Soft, nontender, nondistended  Extremities: Warm  Skin: Intact  Neurological: A&Ox3, no focal deficits  Psychiatry: Normal mood and affect    HOSPITAL MEDICATIONS:  MEDICATIONS  (STANDING):  buDESOnide  80 MICROgram(s)/formoterol 4.5 MICROgram(s) Inhaler 2 Puff(s) Inhalation two times a day  dextrose 5%. 1000 milliLiter(s) (50 mL/Hr) IV Continuous <Continuous>  dextrose 50% Injectable 12.5 Gram(s) IV Push once  dextrose 50% Injectable 25 Gram(s) IV Push once  dextrose 50% Injectable 25 Gram(s) IV Push once  diltiazem    milliGRAM(s) Oral daily  furosemide Infusion 5 mG/Hr (2.5 mL/Hr) IV Continuous <Continuous>  influenza   Vaccine 0.5 milliLiter(s) IntraMuscular once  insulin lispro (HumaLOG) corrective regimen sliding scale   SubCutaneous three times a day before meals  insulin lispro (HumaLOG) corrective regimen sliding scale   SubCutaneous at bedtime  losartan 25 milliGRAM(s) Oral daily  warfarin 5 milliGRAM(s) Oral once    MEDICATIONS  (PRN):  ALBUTerol    90 MICROgram(s) HFA Inhaler 2 Puff(s) Inhalation every 6 hours PRN Shortness of Breath and/or Wheezing  calcium carbonate    500 mG (Tums) Chewable 1 Tablet(s) Chew three times a day PRN Heartburn  dextrose 40% Gel 15 Gram(s) Oral once PRN Blood Glucose LESS THAN 70 milliGRAM(s)/deciliter  glucagon  Injectable 1 milliGRAM(s) IntraMuscular once PRN Glucose LESS THAN 70 milligrams/deciliter      LABS:                        12.2   3.18  )-----------( 299      ( 2018 07:05 )             38.2     07-    140  |  99  |  9   ----------------------------<  92  3.9   |  27  |  1.02    Ca    8.9      2018 07:05  Mg     1.9     -    TPro  6.8  /  Alb  3.6  /  TBili  2.5<H>  /  DBili  x   /  AST  28  /  ALT  15  /  AlkPhos  52  07-25    PT/INR - ( 2018 07:05 )   PT: 25.9 SEC;   INR: 2.29          PTT - ( 2018 10:30 )  PTT:32.0 SEC  Urinalysis Basic - ( 2018 21:02 )    Color: PLYEL / Appearance: CLEAR / S.004 / pH: 7.0  Gluc: NEGATIVE / Ketone: NEGATIVE  / Bili: NEGATIVE / Urobili: NORMAL mg/dL   Blood: MODERATE / Protein: 10 mg/dL / Nitrite: NEGATIVE   Leuk Esterase: NEGATIVE / RBC: 5-10 / WBC 0-2   Sq Epi: OCC / Non Sq Epi: x / Bacteria: FEW      Venous Blood Gas:   @ 10:02  7.40/48/< 24/26/24.0  VBG Lactate: 1.8      MICROBIOLOGY:     RADIOLOGY:  [x ] Reviewed and interpreted by me

## 2018-07-26 NOTE — PROGRESS NOTE ADULT - ATTENDING COMMENTS
Need accurate I/O's   Daily weights  Continue lasix gtt  No PE on CTPA  Significantly elevated right heart pressures with enlargement  Will obtain HF input tomorrow  ? Primary Pulm HTN, unclear depth of workup in the past known anticardiolipin antibody positive, has been compliant with Riociguat but apparently has not had significant benefit

## 2018-07-27 DIAGNOSIS — I51.9 HEART DISEASE, UNSPECIFIED: ICD-10-CM

## 2018-07-27 DIAGNOSIS — R05 COUGH: ICD-10-CM

## 2018-07-27 DIAGNOSIS — I27.20 PULMONARY HYPERTENSION, UNSPECIFIED: ICD-10-CM

## 2018-07-27 DIAGNOSIS — F33.1 MAJOR DEPRESSIVE DISORDER, RECURRENT, MODERATE: ICD-10-CM

## 2018-07-27 DIAGNOSIS — F32.9 MAJOR DEPRESSIVE DISORDER, SINGLE EPISODE, UNSPECIFIED: ICD-10-CM

## 2018-07-27 LAB
BUN SERPL-MCNC: 8 MG/DL — SIGNIFICANT CHANGE UP (ref 7–23)
CALCIUM SERPL-MCNC: 8.9 MG/DL — SIGNIFICANT CHANGE UP (ref 8.4–10.5)
CHLORIDE SERPL-SCNC: 99 MMOL/L — SIGNIFICANT CHANGE UP (ref 98–107)
CO2 SERPL-SCNC: 26 MMOL/L — SIGNIFICANT CHANGE UP (ref 22–31)
CREAT SERPL-MCNC: 0.92 MG/DL — SIGNIFICANT CHANGE UP (ref 0.5–1.3)
GLUCOSE SERPL-MCNC: 86 MG/DL — SIGNIFICANT CHANGE UP (ref 70–99)
HCT VFR BLD CALC: 36.9 % — SIGNIFICANT CHANGE UP (ref 34.5–45)
HGB BLD-MCNC: 11.9 G/DL — SIGNIFICANT CHANGE UP (ref 11.5–15.5)
INR BLD: 2.03 — HIGH (ref 0.88–1.17)
MAGNESIUM SERPL-MCNC: 1.9 MG/DL — SIGNIFICANT CHANGE UP (ref 1.6–2.6)
MCHC RBC-ENTMCNC: 25.2 PG — LOW (ref 27–34)
MCHC RBC-ENTMCNC: 32.2 % — SIGNIFICANT CHANGE UP (ref 32–36)
MCV RBC AUTO: 78.2 FL — LOW (ref 80–100)
NRBC # FLD: 0 — SIGNIFICANT CHANGE UP
PLATELET # BLD AUTO: 304 K/UL — SIGNIFICANT CHANGE UP (ref 150–400)
PMV BLD: 10.4 FL — SIGNIFICANT CHANGE UP (ref 7–13)
POTASSIUM SERPL-MCNC: 3.2 MMOL/L — LOW (ref 3.5–5.3)
POTASSIUM SERPL-SCNC: 3.2 MMOL/L — LOW (ref 3.5–5.3)
PROTHROM AB SERPL-ACNC: 23.7 SEC — HIGH (ref 9.8–13.1)
RBC # BLD: 4.72 M/UL — SIGNIFICANT CHANGE UP (ref 3.8–5.2)
RBC # FLD: 21.7 % — HIGH (ref 10.3–14.5)
SODIUM SERPL-SCNC: 139 MMOL/L — SIGNIFICANT CHANGE UP (ref 135–145)
WBC # BLD: 3.5 K/UL — LOW (ref 3.8–10.5)
WBC # FLD AUTO: 3.5 K/UL — LOW (ref 3.8–10.5)

## 2018-07-27 PROCEDURE — 99233 SBSQ HOSP IP/OBS HIGH 50: CPT

## 2018-07-27 PROCEDURE — 99232 SBSQ HOSP IP/OBS MODERATE 35: CPT | Mod: GC

## 2018-07-27 PROCEDURE — 90792 PSYCH DIAG EVAL W/MED SRVCS: CPT

## 2018-07-27 RX ORDER — FUROSEMIDE 40 MG
10 TABLET ORAL
Qty: 500 | Refills: 0 | Status: DISCONTINUED | OUTPATIENT
Start: 2018-07-27 | End: 2018-07-30

## 2018-07-27 RX ORDER — POTASSIUM CHLORIDE 20 MEQ
40 PACKET (EA) ORAL ONCE
Qty: 0 | Refills: 0 | Status: DISCONTINUED | OUTPATIENT
Start: 2018-07-27 | End: 2018-07-27

## 2018-07-27 RX ORDER — LORATADINE 10 MG/1
10 TABLET ORAL DAILY
Qty: 0 | Refills: 0 | Status: DISCONTINUED | OUTPATIENT
Start: 2018-07-27 | End: 2018-08-10

## 2018-07-27 RX ORDER — SERTRALINE 25 MG/1
25 TABLET, FILM COATED ORAL DAILY
Qty: 0 | Refills: 0 | Status: DISCONTINUED | OUTPATIENT
Start: 2018-07-27 | End: 2018-08-10

## 2018-07-27 RX ORDER — POTASSIUM CHLORIDE 20 MEQ
40 PACKET (EA) ORAL EVERY 4 HOURS
Qty: 0 | Refills: 0 | Status: COMPLETED | OUTPATIENT
Start: 2018-07-27 | End: 2018-07-27

## 2018-07-27 RX ORDER — WARFARIN SODIUM 2.5 MG/1
5 TABLET ORAL ONCE
Qty: 0 | Refills: 0 | Status: COMPLETED | OUTPATIENT
Start: 2018-07-27 | End: 2018-07-27

## 2018-07-27 RX ADMIN — Medication 40 MILLIEQUIVALENT(S): at 10:12

## 2018-07-27 RX ADMIN — LOSARTAN POTASSIUM 25 MILLIGRAM(S): 100 TABLET, FILM COATED ORAL at 05:41

## 2018-07-27 RX ADMIN — Medication 180 MILLIGRAM(S): at 05:41

## 2018-07-27 RX ADMIN — BUDESONIDE AND FORMOTEROL FUMARATE DIHYDRATE 2 PUFF(S): 160; 4.5 AEROSOL RESPIRATORY (INHALATION) at 09:06

## 2018-07-27 RX ADMIN — Medication 40 MILLIEQUIVALENT(S): at 14:21

## 2018-07-27 RX ADMIN — WARFARIN SODIUM 5 MILLIGRAM(S): 2.5 TABLET ORAL at 18:19

## 2018-07-27 RX ADMIN — Medication 5 MG/HR: at 16:56

## 2018-07-27 RX ADMIN — BUDESONIDE AND FORMOTEROL FUMARATE DIHYDRATE 2 PUFF(S): 160; 4.5 AEROSOL RESPIRATORY (INHALATION) at 23:03

## 2018-07-27 NOTE — PROGRESS NOTE ADULT - PROBLEM SELECTOR PLAN 5
Continue cardia, losartan 25 mg, lasix drip as above  Monitor BP   Low salt diet Continue diltiazem 180 mg, losartan 25 mg, lasix drip as above  Monitor BP   Low salt diet Patient has anticardiolipin Ab positive, O2 at baseline  - continue albuterol prn, symbicort  - Pulmonary consult

## 2018-07-27 NOTE — PROGRESS NOTE ADULT - SUBJECTIVE AND OBJECTIVE BOX
Patient seen and examined at bedside. Symptoms have improved since admission, is no longer SOB. Denies any nausea and vomiting. Has a cough similar to the one she had prior to admission which is dry/productive at times. Subjectively feels less discomfort and pain from leg swelling. Has PO intake and is urinating with IV diuretics. Has concerns about going home. Endorses constipation since prior to admission.    Overnight Events: No acute events overnight.    Review Of Systems: No chest pain, shortness of breath, palpitations, new leg swelling, abdominal pain.           Current Meds:  Adempas(riociguat) 1.5mg Tablet 1 Tablet(s) 1 Tablet(s) Oral every 8 hours  ALBUTerol    90 MICROgram(s) HFA Inhaler 2 Puff(s) Inhalation every 6 hours PRN  buDESOnide  80 MICROgram(s)/formoterol 4.5 MICROgram(s) Inhaler 2 Puff(s) Inhalation two times a day  calcium carbonate    500 mG (Tums) Chewable 1 Tablet(s) Chew three times a day PRN  dextrose 40% Gel 15 Gram(s) Oral once PRN  dextrose 5%. 1000 milliLiter(s) IV Continuous <Continuous>  dextrose 50% Injectable 12.5 Gram(s) IV Push once  dextrose 50% Injectable 25 Gram(s) IV Push once  dextrose 50% Injectable 25 Gram(s) IV Push once  diltiazem    milliGRAM(s) Oral daily  furosemide Infusion 5 mG/Hr IV Continuous <Continuous>  glucagon  Injectable 1 milliGRAM(s) IntraMuscular once PRN  influenza   Vaccine 0.5 milliLiter(s) IntraMuscular once  insulin lispro (HumaLOG) corrective regimen sliding scale   SubCutaneous three times a day before meals  insulin lispro (HumaLOG) corrective regimen sliding scale   SubCutaneous at bedtime  losartan 25 milliGRAM(s) Oral daily  potassium chloride    Tablet ER 40 milliEquivalent(s) Oral every 4 hours      Vitals:  T(F): 97.8 (), Max: 97.8 ()  HR: 80 () (80 - 82)  BP: 126/73 () (109/58 - 126/73)  RR: 18 (-)  SpO2: 94% ()  I&O's Summary    2018 07:01  -  2018 07:00  --------------------------------------------------------  IN: 360 mL / OUT: 1600 mL / NET: -1240 mL    2018 07:01  -  2018 11:24  --------------------------------------------------------  IN: 150 mL / OUT: 1600 mL / NET: -1450 mL        Physical Exam:  Appearance: No acute distress; well appearing  Eyes: PERRL, EOMI, pink conjunctiva  HENT: Normal oral mucosa  Cardiovascular: RRR, S1, S2, no murmurs, rubs, or gallops; no edema; difficult to appreciate JVD.  Respiratory: Clear to auscultation bilaterally  Gastrointestinal: soft, non-tender, non-distended with normal bowel sounds  Musculoskeletal: No clubbing; no joint deformity   Neurologic: Non-focal  Lymphatic: No lymphadenopathy  Psychiatry: AAOx3, mood & affect appropriate  Skin: No rashes, ecchymoses, or cyanosis. +2 pitting edema now up to lower thigh. Mild improvement compared to yesterday.                          11.9   3.50  )-----------( 304      ( 2018 07:57 )             36.9         139  |  99  |  8   ----------------------------<  86  3.2<L>   |  26  |  0.92    Ca    8.9      2018 07:57  Mg     1.9           PT/INR - ( 2018 07:57 )   PT: 23.7 SEC;   INR: 2.03            CARDIAC MARKERS ( 2018 11:35 )  x     / x     / x     / 208 u/L / 2.58 ng/mL / x          Serum Pro-Brain Natriuretic Peptide: 1876 pg/mL ( @ 10:30)          New ECG(s): Personally reviewed    Echo: 18    1. Normal mitral valve. Minimal mitral regurgitation.  2. Endocardium not well visualized; grossly normal left  ventricular systolic function.  3. Severe right atrial enlargement.  4. Right ventricular enlargement with decreased right  ventricular systolic function.  Systolic and diastolic  flattening of the interventricular septum, consistent with  RV volume and pressure overload.  5. Estimated right ventricular systolic pressure equals 98  mm Hg, assuming right atrial pressure equals 10 mm Hg,  consistent with severe pulmonary hypertension.  6. Normal tricuspid valve.  Moderate-severe tricuspid  regurgitation.    Stress Testing:     Cath:    Imagin/25 CT Angiogram    No pulmonary embolism.    Extensive right heart enlargement and pulmonary arterial dilatation   consistent with known pulmonary hypertension.  Interpretation of Telemetry: Sinus Rhythm 80's.

## 2018-07-27 NOTE — BEHAVIORAL HEALTH ASSESSMENT NOTE - NSBHCHARTREVIEWLAB_PSY_A_CORE FT
Labs reviewed personally [X]                        11.9   3.50  )-----------( 304      ( 2018 07:57 )             36.9     Hgb Trend: 11.9<--, 12.2<--, 11.9<--    07-27    139  |  99  |  8   ----------------------------<  86  3.2<L>   |  26  |  0.92    Ca    8.9      2018 07:57  Mg     1.9           Creatinine Trend: 0.92<--, 1.02<--, 1.11<--, 0.84<--, 0.82<--, 0.90<--  PT/INR - ( 2018 07:57 )   PT: 23.7 SEC;   INR: 2.03            Urinalysis Basic - ( 2018 21:02 )    Color: PLYEL / Appearance: CLEAR / S.004 / pH: 7.0  Gluc: NEGATIVE / Ketone: NEGATIVE  / Bili: NEGATIVE / Urobili: NORMAL mg/dL   Blood: MODERATE / Protein: 10 mg/dL / Nitrite: NEGATIVE   Leuk Esterase: NEGATIVE / RBC: 5-10 / WBC 0-2   Sq Epi: OCC / Non Sq Epi: x / Bacteria: FEW

## 2018-07-27 NOTE — PROGRESS NOTE ADULT - ASSESSMENT
32 y/o female PMHx pulmonary HTN, CVA prophylaxis with coumadin, HTN, COPD presents with SOB, chest pain, and worsening leg swelling refractory to home dose of furosemide with right heart strain on EKG most likely in the setting of acute on chronic right systolic heart failure, chest pain most likely secondary to elevated cardiac pressures with negative CTA for PE now with improvement in symptoms but still appears clinically volume overloaded      Pulmonary HTN with Right Sided CHF Exacerbation  - Continue with diuresis with STRICT I&O's assess clinically for improvement.  - Will discuss with Heart Failure team for further management.  - Continue with Riociguat as per Pulmonary team.

## 2018-07-27 NOTE — BEHAVIORAL HEALTH ASSESSMENT NOTE - RISK ASSESSMENT
Risk factors: h/o remote suicide attempt (OD on tylenol in adolescence), poor physical health, hopelessness, single    Protective factors: no current active suicidal ideation/intent/plan, no h/o psych admissions, no active substance abuse, no psychosis, domiciled, social supports (mother, sister, niece)    Overall, pt is a low acute risk of harm to self/others and does not require psychiatric admission for safety and stabilization.

## 2018-07-27 NOTE — DIETITIAN INITIAL EVALUATION ADULT. - NS AS NUTRI INTERV MEALS SNACK
Other (specify)/Diets modified for specific foods and ingredients/1. Suggest: PO diet rx: Regular, Consistent Carbohydrate (no snacks), DASH/TLC (cholesterol and sodium restricted), Low Fat; Fluid Restriction per MD discretion;              2. Encourage & assist Pt with meals; Monitor PO diet tolerance;          3. Monitor labs, weights, hydration status;          4. Recommend: to follow up with weight management dietitians;

## 2018-07-27 NOTE — PROGRESS NOTE ADULT - PROBLEM SELECTOR PLAN 3
Patient has anticardiolipin Ab positive, O2 at baseline  - continue albuterol prn, symbicort  - Pulmonary consult Does not appear to be infectious in etiology, suspected upper airway cough syndrome in setting of recent parainfluenza infection  - start zyrtec  - Pulm consult, recommendations appreciated Does not appear to be infectious in etiology, suspected upper airway cough syndrome in setting of recent parainfluenza infection  - start loratidine  - Pulm consult, recommendations appreciated

## 2018-07-27 NOTE — PROGRESS NOTE ADULT - PROBLEM SELECTOR PLAN 2
CTA negative for PE, characterization of pain atypical for ACS, most likely secondary to elevated cardiac pressures, EKG with Rt heart strain consistent with acute on chronic Rt systolic heart failure  - continue tx for CHF as above Pleuritic in nature, CTA negative for PE, atypical for ACS, most likely secondary to elevated cardiac pressures, EKG with Rt heart strain consistent with acute on chronic Rt systolic heart failure  - continue tx for CHF as above

## 2018-07-27 NOTE — DIETITIAN INITIAL EVALUATION ADULT. - PROBLEM SELECTOR PLAN 2
Syncopal event with unknown duration of loss of consciousness prior to admission  ?PE as cause - though on coumadin so may be less likely.

## 2018-07-27 NOTE — PROGRESS NOTE ADULT - ASSESSMENT
Ms. Burleson is a 33 year old female with PMHx of anticardiolipin Ab positive, pulm HTN, CVA on Coumadin, HTN and COPD recently DC July 11th, hospitalization for a parainfluenza infection who presents on this admission with chest pain and worsening leg edema/abdominal swelling admitted for further management of a CHF exacerbation.

## 2018-07-27 NOTE — PROGRESS NOTE ADULT - PROBLEM SELECTOR PLAN 6
EKG shows MANSI from the Pulm HTN, TTE significant for RVSP 98 mmHg  - continue adempas as above  - Pulmonary consult as above Syncopal event with unknown duration of loss of consciousness prior to admission, suspected in the setting of CHFe  - continue to monitor closely Multiple stressors in life, affecting ability to care for comorbid conditions  - Psych consult, recommendations appreciated

## 2018-07-27 NOTE — PROGRESS NOTE ADULT - PROBLEM SELECTOR PLAN 9
Patient at high risk for VTE with PMHx. Patient should be kept on anticoagulation on Warfarin and monitor INRs Nutritionist referral and education on diet and exercise Hypercoagulable state  - continue with warfarin, monitor INR

## 2018-07-27 NOTE — BEHAVIORAL HEALTH ASSESSMENT NOTE - NSBHCHARTREVIEWINVESTIGATE_PSY_A_CORE FT
Ventricular Rate 79 BPM    Atrial Rate 79 BPM    P-R Interval 174 ms    QRS Duration 102 ms    Q-T Interval 392 ms    QTC Calculation(Bezet) 449 ms    P Axis 43 degrees    R Axis 110 degrees    T Axis -33 degrees    Diagnosis Line Normal sinus rhythm  Possible Left atrial enlargement  Right ventricular hypertrophy with repolarization abnormality  Cannot rule out Inferior infarct , age undetermined  T wave abnormality, consider lateral ischemia  Abnormal ECG

## 2018-07-27 NOTE — DIETITIAN INITIAL EVALUATION ADULT. - PERTINENT LABORATORY DATA
(7/27) WBC 3.50 L, K 3.2 L;     (7/26) Cholesterol 87 L, HDL 22 L, HbA1c 6.7% H;         (7/25) Albumin 3.6

## 2018-07-27 NOTE — DIETITIAN INITIAL EVALUATION ADULT. - OTHER INFO
Nutrition Consult X Education, Assessment. Pt 34 yo female appears alert, oriented. Per Pt her appetite usually "so so". No chew/swallow problem voiced; no nausea/vomiting/diarrhea reported @ present. At home Pt usually eats Regular food reported. Per Pt her UBW “Two hundred and change”. Pt gained weight 2/2 fluid retention reported. Of note Pt's HbA1c level6.7% (7/26). Better food choices discussed with Pt. Weight management nutrition therapy discussed with Pt as well. Written materials on diet also provided; Pt verbalized understanding. RDN remains available, Pt made aware.

## 2018-07-27 NOTE — BEHAVIORAL HEALTH ASSESSMENT NOTE - NSBHCONSULTRECOMMENDOTHER_PSY_A_CORE FT
Recommend patient follow up with outpatient psychiatry and therapy. Referral provided to [facility] ([address], [address]nd [place] [address], phone [phone])

## 2018-07-27 NOTE — PROGRESS NOTE ADULT - PROBLEM SELECTOR PLAN 1
Likely cor pulmonale in the setting of severe pulmonary HTN, Group 4; TTE significant for severe right atrial enlargement, right ventricular enlargement with decreased right ventricular systolic function, systolic and diastolic flattening of  the interventricular septum, consistent with RV volume and pressure overload   - continue with lasix gtt  - replete K accordingly  - continue adempas for PHTN (patient to bring from home)  - Cards consult, recommendations appreciated  - Pulm consult, recommendations appreciated
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
## 2018-07-27 NOTE — CONSULT NOTE ADULT - PROBLEM SELECTOR RECOMMENDATION 9
RHC from 2013 noted. 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   Now coming in w/ gross volume overload.   Suggest increase Lasix to 10 mg/hr.   SBP stable.   Continue Adempas, cardizem.  Discussed w/ Dr. Adamson who recommends transfer to [facility].

## 2018-07-27 NOTE — BEHAVIORAL HEALTH ASSESSMENT NOTE - NSBHSUICRISKFACTOR_PSY_A_CORE
Hopelessness/Access to means (pills, firearms, etc.)/Perceived burden on family and others/Chronic pain or acute medical issue/History of abuse/trauma

## 2018-07-27 NOTE — CONSULT NOTE ADULT - ATTENDING COMMENTS
Severe pre-capillary pHTN due to ?pulm emboli, ?obesity/hypoventilation syndrome.  On riociguat.  Still with significant edema and dyspnea with minimal exertion.  Increase Lasix to 10 mg/hr.  May transfer to [facility] on Monday. Severe pre-capillary pHTN due to ?pulm emboli, ?obesity/hypoventilation syndrome (however, PCO2 36 on 7/9).  On riociguat.  Still with significant edema and dyspnea with minimal exertion.  Increase Lasix to 10 mg/hr.  May transfer to [facility] on Monday.

## 2018-07-27 NOTE — PROGRESS NOTE ADULT - PROBLEM SELECTOR PLAN 4
Syncopal event with unknown duration of loss of consciousness prior to admission, suspected in the setting of CHFe  - continue to monitor closely EKG shows MANSI from the Pulm HTN, TTE significant for RVSP 98 mmHg  - continue adempas as above  - Pulmonary consult as above

## 2018-07-27 NOTE — PROGRESS NOTE ADULT - ASSESSMENT
33 F with anticardiolipin Ab, severe pHTN on Adempas, CVA on Coumadin here with dyspnea and intermittent chest pain. She appears volume overloaded with severe LE edema extending up to proximal lower extremity. Chest pain is reproducible on exam. Does not sound cardiac. INR is therapeutic. PE on differential but less likely. Patient needs diuresis. She appears comfortable at this time and saturating well off NC.     Continue diureses with Lasix IV (can switch to TID dosing)  Please monitor ins/outs  Continue Adempas  Will discuss with Dr. Unger regarding plans for further optimization of PAH  Needs outpatient sleep study    Emery Del Rosario MD, PGY4  Pulmonary and Critical Care Fellow  [phone] 33 F with anticardiolipin Ab, severe pHTN on Adempas, CVA on Coumadin here with dyspnea and intermittent chest pain. She appears volume overloaded with severe LE edema extending up to proximal lower extremity. Chest pain is reproducible on exam. Does not sound cardiac. INR is therapeutic. PE on differential but less likely. Patient needs diuresis. She appears comfortable at this time and saturating well off NC though desaturates with minimal effort      Continue diureses with Lasix IV -- would switch to TID dosing to best assess home diuretic needs and for patient comfort  Please monitor ins/outs  Continue Adempas  Will discuss with Dr. Unger regarding plans for further optimization of PAH  Needs outpatient sleep study    Emery Del Rosario MD, PGY4  Pulmonary and Critical Care Fellow  [phone]

## 2018-07-27 NOTE — DIETITIAN INITIAL EVALUATION ADULT. - PROBLEM SELECTOR PLAN 4
Patient has anticardiolipin Ab positive. Keep patient on O2 if needed  Albuterol nebulizer as needed if feeling wheezing  PFTs  Pulmonary to consult

## 2018-07-27 NOTE — CONSULT NOTE ADULT - SUBJECTIVE AND OBJECTIVE BOX
Date of Admission:    CHIEF COMPLAINT:    HISTORY OF PRESENT ILLNESS:      Allergies    No Known Allergies    Intolerances      MEDICATIONS:  diltiazem    milliGRAM(s) Oral daily  furosemide Infusion 5 mG/Hr IV Continuous <Continuous>  losartan 25 milliGRAM(s) Oral daily  ALBUTerol    90 MICROgram(s) HFA Inhaler 2 Puff(s) Inhalation every 6 hours PRN  buDESOnide  80 MICROgram(s)/formoterol 4.5 MICROgram(s) Inhaler 2 Puff(s) Inhalation two times a day  loratadine 10 milliGRAM(s) Oral daily  calcium carbonate    500 mG (Tums) Chewable 1 Tablet(s) Chew three times a day PRN  dextrose 40% Gel 15 Gram(s) Oral once PRN  dextrose 50% Injectable 12.5 Gram(s) IV Push once  dextrose 50% Injectable 25 Gram(s) IV Push once  dextrose 50% Injectable 25 Gram(s) IV Push once  glucagon  Injectable 1 milliGRAM(s) IntraMuscular once PRN  insulin lispro (HumaLOG) corrective regimen sliding scale   SubCutaneous three times a day before meals  insulin lispro (HumaLOG) corrective regimen sliding scale   SubCutaneous at bedtime  dextrose 5%. 1000 milliLiter(s) IV Continuous <Continuous>  influenza   Vaccine 0.5 milliLiter(s) IntraMuscular once  potassium chloride    Tablet ER 40 milliEquivalent(s) Oral every 4 hours      PAST MEDICAL & SURGICAL HISTORY:  COPD (chronic obstructive pulmonary disease)  Anticardiolipin antibody positive  Essential hypertension  Pulmonary HTN  Morbid obesity  No significant past surgical history      FAMILY HISTORY:  Family history of leukemia (Sibling)      SOCIAL HISTORY:      REVIEW OF SYSTEMS:  CONSTITUTIONAL: No fever, weight loss, or fatigue  EYES: No eye pain, visual disturbances, or discharge  ENMT:  No difficulty hearing, tinnitus, vertigo; No sinus or throat pain  NECK: No pain or stiffness  RESPIRATORY: No cough, wheezing, chills or hemoptysis; No Shortness of Breath  CARDIOVASCULAR: No chest pain, palpitations, passing out, dizziness, or leg swelling  GASTROINTESTINAL: No abdominal or epigastric pain. No nausea, vomiting, or hematemesis; No diarrhea or constipation. No melena or hematochezia.  GENITOURINARY: No dysuria, frequency, hematuria, or incontinence  NEUROLOGICAL: No headaches, memory loss, loss of strength, numbness, or tremors  SKIN: No itching, burning, rashes, or lesions   LYMPH Nodes: No enlarged glands  ENDOCRINE: No heat or cold intolerance; No hair loss  MUSCULOSKELETAL: No joint pain or swelling; No muscle, back, or extremity pain  PSYCHIATRIC: No depression, anxiety, mood swings, or difficulty sleeping  HEME/LYMPH: No easy bruising, or bleeding gums  ALLERY AND IMMUNOLOGIC: No hives or eczema	    [ ] All others negative	  [ ] Unable to obtain    PHYSICAL EXAM:  T(C): 36.6 (18 @ 13:20), Max: 36.6 (18 @ 05:38)  HR: 80 (18 @ 13:20) (80 - 80)  BP: 126/73 (18 @ 05:38) (110/71 - 126/73)  RR: 18 (18 @ 13:20) (18 - 18)  SpO2: 99% (18 @ 13:20) (91% - 99%)  Wt(kg): --  I&O's Summary    2018 07:  -  2018 07:00  --------------------------------------------------------  IN: 360 mL / OUT: 1600 mL / NET: -1240 mL    2018 07:  -  2018 13:39  --------------------------------------------------------  IN: 150 mL / OUT: 1600 mL / NET: -1450 mL        Appearance: Normal	  HEENT:   Normal oral mucosa, PERRL, EOMI	  Lymphatic: No lymphadenopathy  Cardiovascular: Normal S1 S2, No JVD, No murmurs, No edema  Respiratory: Lungs clear to auscultation	  Psychiatry: A & O x 3, Mood & affect appropriate  Gastrointestinal:  Soft, Non-tender, + BS	  Skin: No rashes, No ecchymoses, No cyanosis	  Neurologic: Non-focal  Extremities: Normal range of motion, No clubbing, cyanosis or edema  Vascular: Peripheral pulses palpable 2+ bilaterally        LABS:	 	    CBC Full  -  ( 2018 07:57 )  WBC Count : 3.50 K/uL  Hemoglobin : 11.9 g/dL  Hematocrit : 36.9 %  Platelet Count - Automated : 304 K/uL  Mean Cell Volume : 78.2 fL  Mean Cell Hemoglobin : 25.2 pg  Mean Cell Hemoglobin Concentration : 32.2 %  Auto Neutrophil # : x  Auto Lymphocyte # : x  Auto Monocyte # : x  Auto Eosinophil # : x  Auto Basophil # : x  Auto Neutrophil % : x  Auto Lymphocyte % : x  Auto Monocyte % : x  Auto Eosinophil % : x  Auto Basophil % : x        139  |  99  |  8   ----------------------------<  86  3.2<L>   |  26  |  0.92      140  |  99  |  9   ----------------------------<  92  3.9   |  27  |  1.02    Ca    8.9      2018 07:57  Ca    8.9      2018 07:05  Mg     1.9       Mg     1.9             proBNP:   Lipid Profile:   HgA1c:   TSH:       CARDIAC MARKERS:      CKMB: 2.58 ng/mL ( @ 11:35)    CKMB Relative Index: 1.2 ( @ 11:35)    < from: Transthoracic Echocardiogram (18 @ 15:19) >  OBSERVATIONS:  Mitral Valve: Normal mitral valve. Minimal mitral  regurgitation.  AorticRoot: Normal aortic root.  Aortic Valve: Normal trileaflet aortic valve.  Left Atrium: Normal left atrium.  Left Ventricle: Endocardium not well visualized; grossly  normal left ventricular systolic function.  Right Heart: Severe right atrial enlargement. Right  ventricular enlargement with decreased right ventricular  systolic function.  Systolic and diastolic flattening of  the interventricular septum, consistent with RV volume and  pressure overload. Normal tricuspid valve.  Moderate-severe  tricuspid regurgitation. Normal pulmonic valve. Mild  pulmonic regurgitation.  Pericardium/PleuraNormal pericardium with no pericardial  effusion.  Hemodynamic: Estimated right ventricular systolic pressure  equals 98 mm Hg, assuming right atrial pressure equals 10  mm Hg, consistent with severe pulmonary hypertension.  ------------------------------------------------------------------------  CONCLUSIONS:  1. Normal mitral valve. Minimal mitral regurgitation.  2. Endocardium not well visualized; grossly normal left  ventricular systolic function.  3. Severe right atrial enlargement.  4. Right ventricular enlargement with decreased right  ventricular systolic function.  Systolic and diastolic  flattening of the interventricular septum, consistent with  RV volume and pressure overload.  5. Estimated right ventricular systolic pressure equals 98  mm Hg, assuming right atrial pressure equals 10 mm Hg,  consistent with severe pulmonary hypertension.  6. Normal tricuspid valve.  Moderate-severe tricuspid  regurgitation.  *** Compared with echocardiogram of 2014, no  significant changes noted.  ------------------------------------------------------------------------    < end of copied text >    < from: Cardiac Cath Lab (13 @ 14:08) >  LAD:   --  LAD: Normal.  CX:   --  Circumflex: Normal.  RCA:   --  RCA: Normal. The vessel arose anomalously high and anterior.  COMPLICATIONS: There were no complications.  SUMMARY:  HEMODYNAMICS: Hemodynamic assessment demonstrates moderate systemic  hypertension, normal LVEDP, normal cardiac output, and normal pulmonary  capillary wedge pressure.  DIAGNOSTIC RECOMMENDATIONS: Consider Cardiac CT (gated) for evaluation of  anomalous coronaries. Patient management should include aggressive medical  therapy. The patient should follow a low fat and low calorie diet. Medical  management is recommended.  INTERVENTIONAL RECOMMENDATIONS: Consider Cardiac CT (gated) for evaluation  of anomalous coronaries.  Prepared and signed by  Magaly Lane M.D.  Signed 2013 17:32:46  HEMODYNAMIC TABLES  Pressures:  Baseline  Pressures:  - HR: 68  Pressures:  - Rhythm:  Pressures:  -- Aortic Pressure (S/D/M): 112/85/96  Pressures:  -- Left Ventricle (s/edp): 118/10/--  Pressures:  -- Pulmonary Artery (S/D/M): 107/43/65  Pressures:  -- Pulmonary Capillary Wedge: 13/15/9  Pressures:  -- Right Atrium (a/v/M): 20/16/15  Pressures:  -- Right Ventricle (s/edp): 107/26/--  O2 Sats:  Baseline  O2 Sats:  - HR: 68  O2 Sats:  - Rhythm:  O2 Sats:  -- AO: 14.6/90.5/17.97  O2 Sats:  -- PA: 14.8/62.1/12.5  Outputs:  Baseline  Outputs:  -- CALCULATIONS[age]  Outputs:  -- CALCULATIONS: Body Surface Area: 2.43  Outputs:  -- CALCULATIONS: Height in cm: 175.00  Outputs:  -- CALCULATIONS: Sex: Female  Outputs:  -- CALCULATIONS: Weight in k.00  Outputs:  -- OUTPUTS: CO by Todd: 5.55  Outputs:  -- OUTPUTS: Todd cardiac index: 2.29  Outputs:  -- OUTPUTS: O2 consumption: 303.47  Outputs:  -- OUTPUTS: Vo2 Indexed: 125.00  Outputs:  -- RESISTANCES: Left ventricular stroke work: 97.97  Outputs:  -- RESISTANCES: Left Ventricular Stroke Work index: 40.35  Outputs:  -- RESISTANCES: Pulmonary vascular index (dsc): 1960.03  Outputs:  -- RESISTANCES: Pulmonary vascular index (Wood Units): 24.51  Outputs:  -- RESISTANCES: Pulmonary vascular resistance (dsc): 807.34  Outputs:  -- RESISTANCES: Pulmonary vascular resistance (Wood Units): 10.09  Outputs:  -- RESISTANCES: PVR_SVR Ratio: 0.69  Outputs:  -- RESISTANCES: Right ventricular stroke work: 50.98  Outputs:  -- RESISTANCES: Right ventricular stroke work index: 21.00  Outputs:  -- RESISTANCES: Systemic vascular index (dsc): 2835.04  Outputs:  -- RESISTANCES: Systemic vascular index (Wood Units): 35.45  Outputs:  -- RESISTANCES: Systemic vascular resistance (dsc): 1167.76  Outputs:  -- RESISTANCES: Systemic vascular resistance (Wood Units): 14.60  Outputs:  -- RESISTANCES: Total pulmonary index (dsc): 2275.03  Outputs:  -- RESISTANCES: Total pulmonary index (Wood Units): 28.45  Outputs:  -- RESISTANCES: Total pulmonary resistance (dsc): 937.09  Outputs:  -- RESISTANCES: Total pulmonary resistance (Wood Units): 11.72  Outputs:-- RESISTANCES: Total vascular index (Wood Units): 42.01  Outputs:  -- RESISTANCES: Total vascular resistance (dsc): 1384.01  Outputs:  -- RESISTANCES: Total vascular resistance (Wood Units): 17.30  Outputs:  -- RESISTANCES: Total vascular resistanceindex (dsc): 3360.05  Outputs:  -- RESISTANCES: TPR_TVR Ratio: 0.68  Outputs:  -- SHUNTS: Pulmonary flow: 5.55  Outputs:  -- SHUNTS: Qp Indexed: 2.29  Outputs:  -- SHUNTS: Qs Indexed: 2.29  Outputs:  -- SHUNTS: Systemic flow: 5.55    < end of copied text > Date of Admission: 18    CHIEF COMPLAINT: LE edema and FERRO     HISTORY OF PRESENT ILLNESS:    32 y/o pleasant  female w/ PMHX of obesity, severe pulmonary hypertension and asthma comes in with complaints of worsening SOB and LE x 2 weeks. She was first told she had pulmonary hypertension and heart failure in  at an OSH in [place] but tells me that she was never offered treatment. She was then re-hospitilzied in  at [facility] and had a LHC- which showed normal coronaries, and RHC which showed that she had severe pulmonary hypertension w/ low normal wedge. Since then she has been followed by Dr. Unger and Dr. Haines.   She states she is not able to work or go to school b/c of frequent LE edema, fatigue and inability to function normally.     She was recently admitted for SOB and was found to have parainfluenza 3, and was told to increase her PO fluids. She was discharged after 3 days, and for a while felt good, but then started developing FERRO. Currently she can only take about 10 steps before she gets SOB, and has frequent PND that wakes her up. No SOB at rest, orthopnea, CP, palpitations, dizziness. Her dry weight is about 290 lbs, currently she is 330 lbs.       Allergies    No Known Allergies    MEDICATIONS:  diltiazem    milliGRAM(s) Oral daily  furosemide Infusion 5 mG/Hr IV Continuous <Continuous>  losartan 25 milliGRAM(s) Oral daily  ALBUTerol    90 MICROgram(s) HFA Inhaler 2 Puff(s) Inhalation every 6 hours PRN  buDESOnide  80 MICROgram(s)/formoterol 4.5 MICROgram(s) Inhaler 2 Puff(s) Inhalation two times a day  loratadine 10 milliGRAM(s) Oral daily  calcium carbonate    500 mG (Tums) Chewable 1 Tablet(s) Chew three times a day PRN  dextrose 40% Gel 15 Gram(s) Oral once PRN  dextrose 50% Injectable 12.5 Gram(s) IV Push once  dextrose 50% Injectable 25 Gram(s) IV Push once  dextrose 50% Injectable 25 Gram(s) IV Push once  glucagon  Injectable 1 milliGRAM(s) IntraMuscular once PRN  insulin lispro (HumaLOG) corrective regimen sliding scale   SubCutaneous three times a day before meals  insulin lispro (HumaLOG) corrective regimen sliding scale   SubCutaneous at bedtime  dextrose 5%. 1000 milliLiter(s) IV Continuous <Continuous>  influenza   Vaccine 0.5 milliLiter(s) IntraMuscular once  potassium chloride    Tablet ER 40 milliEquivalent(s) Oral every 4 hours      PAST MEDICAL & SURGICAL HISTORY:  COPD (chronic obstructive pulmonary disease)  Anticardiolipin antibody positive  Essential hypertension  Pulmonary HTN  Morbid obesity  No significant past surgical history      FAMILY HISTORY:  Family history of leukemia (Sibling)      SOCIAL HISTORY:      REVIEW OF SYSTEMS:  CONSTITUTIONAL: No fever, weight loss, admits to fatigue  EYES: No eye pain, visual disturbances, or discharge  ENMT:  No difficulty hearing, tinnitus, vertigo; No sinus or throat pain  NECK: No pain or stiffness  RESPIRATORY: No cough, wheezing, chills or hemoptysis; admits to Shortness of Breath  CARDIOVASCULAR: No chest pain, palpitations, passing out, dizziness, admits to leg swelling  GASTROINTESTINAL: No abdominal or epigastric pain. No nausea, vomiting, or hematemesis; No diarrhea or constipation. No melena or hematochezia.  GENITOURINARY: No dysuria, frequency, hematuria, or incontinence  NEUROLOGICAL: No headaches, memory loss, loss of strength, numbness, or tremors  SKIN: No itching, burning, rashes, or lesions   LYMPH Nodes: No enlarged glands  ENDOCRINE: No heat or cold intolerance; No hair loss  MUSCULOSKELETAL: No joint pain or swelling; No muscle, back, or extremity pain  PSYCHIATRIC: No depression, anxiety, mood swings, or difficulty sleeping  HEME/LYMPH: No easy bruising, or bleeding gums  ALLERY AND IMMUNOLOGIC: No hives or eczema	      PHYSICAL EXAM:  T(C): 36.6 (18 @ 13:20), Max: 36.6 (18 @ 05:38)  HR: 80 (18 @ 13:20) (80 - 80)  BP: 126/73 (18 @ 05:38) (110/71 - 126/73)  RR: 18 (18 @ 13:20) (18 - 18)  SpO2: 99% (18 @ 13:20) (91% - 99%)  Wt(kg): --  I&O's Summary    2018 07:01  -  2018 07:00  --------------------------------------------------------  IN: 360 mL / OUT: 1600 mL / NET: -1240 mL    2018 07:01  -  2018 13:39  --------------------------------------------------------  IN: 150 mL / OUT: 1600 mL / NET: -1450 mL        Appearance: Normal	  HEENT:   Normal oral mucosa, PERRL, EOMI	  Lymphatic: No lymphadenopathy  Cardiovascular: Normal S1 S2, moderately elevated JVP, No murmurs, 3+ LE edema b/l. Warm  Respiratory: Lungs clear to auscultation	  Psychiatry: A & O x 3, Mood & affect appropriate  Gastrointestinal:  Soft, Non-tender, + BS	  Skin: No rashes, No ecchymoses, No cyanosis	  Neurologic: Non-focal  Extremities: Normal range of motion, No clubbing, cyanosis   Vascular: Peripheral pulses palpable 2+ bilaterally        LABS:	 	    CBC Full  -  ( 2018 07:57 )  WBC Count : 3.50 K/uL  Hemoglobin : 11.9 g/dL  Hematocrit : 36.9 %  Platelet Count - Automated : 304 K/uL  Mean Cell Volume : 78.2 fL  Mean Cell Hemoglobin : 25.2 pg  Mean Cell Hemoglobin Concentration : 32.2 %  Auto Neutrophil # : x  Auto Lymphocyte # : x  Auto Monocyte # : x  Auto Eosinophil # : x  Auto Basophil # : x  Auto Neutrophil % : x  Auto Lymphocyte % : x  Auto Monocyte % : x  Auto Eosinophil % : x  Auto Basophil % : x        139  |  99  |  8   ----------------------------<  86  3.2<L>   |  26  |  0.92      140  |  99  |  9   ----------------------------<  92  3.9   |  27  |  1.02    Ca    8.9      2018 07:57  Ca    8.9      2018 07:05  Mg     1.9     07-27  Mg     1.9             proBNP:   Lipid Profile:   HgA1c:   TSH:       CARDIAC MARKERS:      CKMB: 2.58 ng/mL ( @ 11:35)    CKMB Relative Index: 1.2 ( @ 11:35)    < from: Transthoracic Echocardiogram (18 @ 15:19) >  OBSERVATIONS:  Mitral Valve: Normal mitral valve. Minimal mitral  regurgitation.  AorticRoot: Normal aortic root.  Aortic Valve: Normal trileaflet aortic valve.  Left Atrium: Normal left atrium.  Left Ventricle: Endocardium not well visualized; grossly  normal left ventricular systolic function.  Right Heart: Severe right atrial enlargement. Right  ventricular enlargement with decreased right ventricular  systolic function.  Systolic and diastolic flattening of  the interventricular septum, consistent with RV volume and  pressure overload. Normal tricuspid valve.  Moderate-severe  tricuspid regurgitation. Normal pulmonic valve. Mild  pulmonic regurgitation.  Pericardium/PleuraNormal pericardium with no pericardial  effusion.  Hemodynamic: Estimated right ventricular systolic pressure  equals 98 mm Hg, assuming right atrial pressure equals 10  mm Hg, consistent with severe pulmonary hypertension.  ------------------------------------------------------------------------  CONCLUSIONS:  1. Normal mitral valve. Minimal mitral regurgitation.  2. Endocardium not well visualized; grossly normal left  ventricular systolic function.  3. Severe right atrial enlargement.  4. Right ventricular enlargement with decreased right  ventricular systolic function.  Systolic and diastolic  flattening of the interventricular septum, consistent with  RV volume and pressure overload.  5. Estimated right ventricular systolic pressure equals 98  mm Hg, assuming right atrial pressure equals 10 mm Hg,  consistent with severe pulmonary hypertension.  6. Normal tricuspid valve.  Moderate-severe tricuspid  regurgitation.  *** Compared with echocardiogram of 2014, no  significant changes noted.  ------------------------------------------------------------------------    < end of copied text >    < from: Cardiac Cath Lab (13 @ 14:08) >  LAD:   --  LAD: Normal.  CX:   --  Circumflex: Normal.  RCA:   --  RCA: Normal. The vessel arose anomalously high and anterior.  COMPLICATIONS: There were no complications.  SUMMARY:  HEMODYNAMICS: Hemodynamic assessment demonstrates moderate systemic  hypertension, normal LVEDP, normal cardiac output, and normal pulmonary  capillary wedge pressure.  DIAGNOSTIC RECOMMENDATIONS: Consider Cardiac CT (gated) for evaluation of  anomalous coronaries. Patient management should include aggressive medical  therapy. The patient should follow a low fat and low calorie diet. Medical  management is recommended.  INTERVENTIONAL RECOMMENDATIONS: Consider Cardiac CT (gated) for evaluation  of anomalous coronaries.  Prepared and signed by  Magaly Lane M.D.  Signed 2013 17:32:46  HEMODYNAMIC TABLES  Pressures:  Baseline  Pressures:  - HR: 68  Pressures:  - Rhythm:  Pressures:  -- Aortic Pressure (S/D/M): 112/85/96  Pressures:  -- Left Ventricle (s/edp): 118/10/--  Pressures:  -- Pulmonary Artery (S/D/M): 107/43/65  Pressures:  -- Pulmonary Capillary Wedge: 13/15/9  Pressures:  -- Right Atrium (a/v/M): 20/16/15  Pressures:  -- Right Ventricle (s/edp): 107/26/--  O2 Sats:  Baseline  O2 Sats:  - HR: 68  O2 Sats:  - Rhythm:  O2 Sats:  -- AO: 14.6/90.5/17.97  O2 Sats:  -- PA: 14.8/62.1/12.5  Outputs:  Baseline  Outputs:  -- CALCULATIONS[age]  Outputs:  -- CALCULATIONS: Body Surface Area: 2.43  Outputs:  -- CALCULATIONS: Height in cm: 175.00  Outputs:  -- CALCULATIONS: Sex: Female  Outputs:  -- CALCULATIONS: Weight in k.00  Outputs:  -- OUTPUTS: CO by Todd: 5.55  Outputs:  -- OUTPUTS: Tdod cardiac index: 2.29  Outputs:  -- OUTPUTS: O2 consumption: 303.47  Outputs:  -- OUTPUTS: Vo2 Indexed: 125.00  Outputs:  -- RESISTANCES: Left ventricular stroke work: 97.97  Outputs:  -- RESISTANCES: Left Ventricular Stroke Work index: 40.35  Outputs:  -- RESISTANCES: Pulmonary vascular index (dsc): 1960.03  Outputs:  -- RESISTANCES: Pulmonary vascular index (Wood Units): 24.51  Outputs:  -- RESISTANCES: Pulmonary vascular resistance (dsc): 807.34  Outputs:  -- RESISTANCES: Pulmonary vascular resistance (Wood Units): 10.09  Outputs:  -- RESISTANCES: PVR_SVR Ratio: 0.69  Outputs:  -- RESISTANCES: Right ventricular stroke work: 50.98  Outputs:  -- RESISTANCES: Right ventricular stroke work index: 21.00  Outputs:  -- RESISTANCES: Systemic vascular index (dsc): 2835.04  Outputs:  -- RESISTANCES: Systemic vascular index (Wood Units): 35.45  Outputs:  -- RESISTANCES: Systemic vascular resistance (dsc): 1167.76  Outputs:  -- RESISTANCES: Systemic vascular resistance (Wood Units): 14.60  Outputs:  -- RESISTANCES: Total pulmonary index (dsc): 2275.03  Outputs:  -- RESISTANCES: Total pulmonary index (Wood Units): 28.45  Outputs:  -- RESISTANCES: Total pulmonary resistance (dsc): 937.09  Outputs:  -- RESISTANCES: Total pulmonary resistance (Wood Units): 11.72  Outputs:-- RESISTANCES: Total vascular index (Wood Units): 42.01  Outputs:  -- RESISTANCES: Total vascular resistance (dsc): 1384.01  Outputs:  -- RESISTANCES: Total vascular resistance (Wood Units): 17.30  Outputs:  -- RESISTANCES: Total vascular resistanceindex (dsc): 3360.05  Outputs:  -- RESISTANCES: TPR_TVR Ratio: 0.68  Outputs:  -- SHUNTS: Pulmonary flow: 5.55  Outputs:  -- SHUNTS: Qp Indexed: 2.29  Outputs:  -- SHUNTS: Qs Indexed: 2.29  Outputs:  -- SHUNTS: Systemic flow: 5.55    < end of copied text >
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
## 2018-07-27 NOTE — BEHAVIORAL HEALTH ASSESSMENT NOTE - DIFFERENTIAL
Major Depressive Disorder, recurrent, moderate without psychotic features vs. Adjustment disorder 2/2 medical illness

## 2018-07-27 NOTE — PROGRESS NOTE ADULT - PROBLEM SELECTOR PLAN 8
Nutritionist referral and education on diet and exercise Hypercoagulable state  - continue with warfarin, monitor INR Continue diltiazem 180 mg, losartan 25 mg, lasix drip as above  Monitor BP   Low salt diet

## 2018-07-27 NOTE — BEHAVIORAL HEALTH ASSESSMENT NOTE - NSBHCHARTREVIEWVS_PSY_A_CORE FT
Vital Signs Last 24 Hrs  T(C): 36.6 (27 Jul 2018 13:20), Max: 36.6 (27 Jul 2018 05:38)  T(F): 97.9 (27 Jul 2018 13:20), Max: 97.9 (27 Jul 2018 13:20)  HR: 87 (27 Jul 2018 14:17) (80 - 87)  BP: 118/54 (27 Jul 2018 14:17) (110/71 - 126/73)  BP(mean): --  RR: 18 (27 Jul 2018 13:20) (18 - 18)  SpO2: 99% (27 Jul 2018 13:20) (91% - 99%)
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
## 2018-07-27 NOTE — PROGRESS NOTE ADULT - PROBLEM SELECTOR PLAN 7
Hypercoagulable state  - continue with warfarin, monitor INR Continue diltiazem 180 mg, losartan 25 mg, lasix drip as above  Monitor BP   Low salt diet Syncopal event with unknown duration of loss of consciousness prior to admission, suspected in the setting of CHFe  - continue to monitor closely

## 2018-07-27 NOTE — DIETITIAN INITIAL EVALUATION ADULT. - PROBLEM SELECTOR PLAN 3
IV Lasix per cardiology consult - likely a lasix gtt once patient has a room  TTE to eval RVEF and pulm HTN.
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
## 2018-07-27 NOTE — BEHAVIORAL HEALTH ASSESSMENT NOTE - NSBHSUICPROTECTFACT_PSY_A_CORE
Supportive social network or family/Responsibility to family and others/Identifies reasons for living

## 2018-07-27 NOTE — PROGRESS NOTE ADULT - SUBJECTIVE AND OBJECTIVE BOX
Patient is a 33y old  Female who presents with a chief complaint of "I had chest pain and SOB" this morning (2018 15:47)    SUBJECTIVE / OVERNIGHT EVENTS:      MEDICATIONS  (STANDING):  Adempas(riociguat) 1.5mg Tablet 1 Tablet(s) 1 Tablet(s) Oral every 8 hours  buDESOnide  80 MICROgram(s)/formoterol 4.5 MICROgram(s) Inhaler 2 Puff(s) Inhalation two times a day  dextrose 5%. 1000 milliLiter(s) (50 mL/Hr) IV Continuous <Continuous>  dextrose 50% Injectable 12.5 Gram(s) IV Push once  dextrose 50% Injectable 25 Gram(s) IV Push once  dextrose 50% Injectable 25 Gram(s) IV Push once  diltiazem    milliGRAM(s) Oral daily  furosemide Infusion 5 mG/Hr (2.5 mL/Hr) IV Continuous <Continuous>  influenza   Vaccine 0.5 milliLiter(s) IntraMuscular once  insulin lispro (HumaLOG) corrective regimen sliding scale   SubCutaneous three times a day before meals  insulin lispro (HumaLOG) corrective regimen sliding scale   SubCutaneous at bedtime  losartan 25 milliGRAM(s) Oral daily  potassium chloride    Tablet ER 40 milliEquivalent(s) Oral every 4 hours    MEDICATIONS  (PRN):  ALBUTerol    90 MICROgram(s) HFA Inhaler 2 Puff(s) Inhalation every 6 hours PRN Shortness of Breath and/or Wheezing  calcium carbonate    500 mG (Tums) Chewable 1 Tablet(s) Chew three times a day PRN Heartburn  dextrose 40% Gel 15 Gram(s) Oral once PRN Blood Glucose LESS THAN 70 milliGRAM(s)/deciliter  glucagon  Injectable 1 milliGRAM(s) IntraMuscular once PRN Glucose LESS THAN 70 milligrams/deciliter      Vital Signs Last 24 Hrs  T(C): 36.6 (2018 05:38), Max: 36.6 (2018 05:38)  T(F): 97.8 (2018 05:38), Max: 97.8 (2018 05:38)  HR: 80 (2018 05:38) (80 - 82)  BP: 126/73 (2018 05:38) (109/58 - 126/73)  BP(mean): --  RR: 18 (2018 05:38) (18 - 18)  SpO2: 94% (2018 05:38) (91% - 94%)  CAPILLARY BLOOD GLUCOSE      POCT Blood Glucose.: 85 mg/dL (2018 09:06)  POCT Blood Glucose.: 111 mg/dL (2018 21:51)  POCT Blood Glucose.: 90 mg/dL (2018 17:40)  POCT Blood Glucose.: 101 mg/dL (2018 13:19)    I&O's Summary    2018 07:  -  2018 07:00  --------------------------------------------------------  IN: 360 mL / OUT: 1600 mL / NET: -1240 mL    2018 07:01  -  2018 09:46  --------------------------------------------------------  IN: 0 mL / OUT: 1600 mL / NET: -1600 mL      PHYSICAL EXAM  GENERAL: NAD, morbidly obese woman, very cooperative with exam  HEAD:  Atraumatic, Normocephalic  EYES: EOMI, PERRLA, conjunctiva and sclera clear  NECK: Supple, No JVD  CHEST/LUNG: Clear to auscultation bilaterally; No wheeze  HEART: Regular rate and rhythm; No murmurs, rubs, or gallops  ABDOMEN: Soft, non-tender, distended, bowel sounds present  EXTREMITIES:  2+ Peripheral Pulses, 4+ pitting edema   PSYCH: AAOx3  SKIN: No rashes or lesions      LABS:                        11.9   3.50  )-----------( 304      ( 2018 07:57 )             36.9         139  |  99  |  8   ----------------------------<  86  3.2<L>   |  26  |  0.92    Ca    8.9      2018 07:57  Mg     1.9         TPro  6.8  /  Alb  3.6  /  TBili  2.5<H>  /  DBili  x   /  AST  28  /  ALT  15  /  AlkPhos  52  07-25    PT/INR - ( 2018 07:57 )   PT: 23.7 SEC;   INR: 2.03          PTT - ( 2018 10:30 )  PTT:32.0 SEC  CARDIAC MARKERS ( 2018 11:35 )  x     / x     / 208 u/L / 2.58 ng/mL / x          Urinalysis Basic - ( 2018 21:02 )    Color: PLYEL / Appearance: CLEAR / S.004 / pH: 7.0  Gluc: NEGATIVE / Ketone: NEGATIVE  / Bili: NEGATIVE / Urobili: NORMAL mg/dL   Blood: MODERATE / Protein: 10 mg/dL / Nitrite: NEGATIVE   Leuk Esterase: NEGATIVE / RBC: 5-10 / WBC 0-2   Sq Epi: OCC / Non Sq Epi: x / Bacteria: FEW          RADIOLOGY & ADDITIONAL TESTS:    Imaging Personally Reviewed:  Consultant(s) Notes Reviewed:    Care Discussed with Consultants/Other Providers: Patient is a 33y old  Female who presents with a chief complaint of "I had chest pain and SOB" this morning (2018 15:47)    SUBJECTIVE / OVERNIGHT EVENTS:  Patient complaining of chest pain assocated with a dry cough that has been going on before admission, denies any fevers/chills. Very tearful, admits that she has not been taking her adempas (PHTN rx) for a year, reports stress from being the primary caretaker of her sister with down syndrome and mother, father passed away, also unable to maintain a job due to declining health.     MEDICATIONS  (STANDING):  Adempas(riociguat) 1.5mg Tablet 1 Tablet(s) 1 Tablet(s) Oral every 8 hours  buDESOnide  80 MICROgram(s)/formoterol 4.5 MICROgram(s) Inhaler 2 Puff(s) Inhalation two times a day  dextrose 5%. 1000 milliLiter(s) (50 mL/Hr) IV Continuous <Continuous>  dextrose 50% Injectable 12.5 Gram(s) IV Push once  dextrose 50% Injectable 25 Gram(s) IV Push once  dextrose 50% Injectable 25 Gram(s) IV Push once  diltiazem    milliGRAM(s) Oral daily  furosemide Infusion 5 mG/Hr (2.5 mL/Hr) IV Continuous <Continuous>  influenza   Vaccine 0.5 milliLiter(s) IntraMuscular once  insulin lispro (HumaLOG) corrective regimen sliding scale   SubCutaneous three times a day before meals  insulin lispro (HumaLOG) corrective regimen sliding scale   SubCutaneous at bedtime  losartan 25 milliGRAM(s) Oral daily  potassium chloride    Tablet ER 40 milliEquivalent(s) Oral every 4 hours    MEDICATIONS  (PRN):  ALBUTerol    90 MICROgram(s) HFA Inhaler 2 Puff(s) Inhalation every 6 hours PRN Shortness of Breath and/or Wheezing  calcium carbonate    500 mG (Tums) Chewable 1 Tablet(s) Chew three times a day PRN Heartburn  dextrose 40% Gel 15 Gram(s) Oral once PRN Blood Glucose LESS THAN 70 milliGRAM(s)/deciliter  glucagon  Injectable 1 milliGRAM(s) IntraMuscular once PRN Glucose LESS THAN 70 milligrams/deciliter    Vital Signs Last 24 Hrs  T(C): 36.6 (2018 05:38), Max: 36.6 (2018 05:38)  T(F): 97.8 (2018 05:38), Max: 97.8 (2018 05:38)  HR: 80 (2018 05:38) (80 - 82)  BP: 126/73 (2018 05:38) (109/58 - 126/73)  BP(mean): --  RR: 18 (2018 05:38) (18 - 18)  SpO2: 94% (2018 05:38) (91% - 94%)  CAPILLARY BLOOD GLUCOSE      POCT Blood Glucose.: 85 mg/dL (2018 09:06)  POCT Blood Glucose.: 111 mg/dL (2018 21:51)  POCT Blood Glucose.: 90 mg/dL (2018 17:40)  POCT Blood Glucose.: 101 mg/dL (2018 13:19)    I&O's Summary    2018 07:01  -  2018 07:00  --------------------------------------------------------  IN: 360 mL / OUT: 1600 mL / NET: -1240 mL    2018 07:01  -  2018 09:46  --------------------------------------------------------  IN: 0 mL / OUT: 1600 mL / NET: -1600 mL      PHYSICAL EXAM  GENERAL: NAD, morbidly obese woman, very cooperative with exam  HEAD:  Atraumatic, Normocephalic  EYES: EOMI, PERRLA, conjunctiva and sclera clear  NECK: Supple, No JVD  CHEST/LUNG: Clear to auscultation bilaterally; No wheeze  HEART: Regular rate and rhythm; No murmurs, rubs, or gallops  ABDOMEN: Soft, non-tender, distended, bowel sounds present  EXTREMITIES:  2+ Peripheral Pulses, 4+ pitting edema   PSYCH: AAOx3  SKIN: No rashes or lesions      LABS:                        11.9   3.50  )-----------( 304      ( 2018 07:57 )             36.9     07-27    139  |  99  |  8   ----------------------------<  86  3.2<L>   |  26  |  0.92    Ca    8.9      2018 07:57  Mg     1.9     07-27    TPro  6.8  /  Alb  3.6  /  TBili  2.5<H>  /  DBili  x   /  AST  28  /  ALT  15  /  AlkPhos  52  07-25    PT/INR - ( 2018 07:57 )   PT: 23.7 SEC;   INR: 2.03          PTT - ( 2018 10:30 )  PTT:32.0 SEC  CARDIAC MARKERS ( 2018 11:35 )  x     / x     / 208 u/L / 2.58 ng/mL / x          Urinalysis Basic - ( 2018 21:02 )    Color: PLYEL / Appearance: CLEAR / S.004 / pH: 7.0  Gluc: NEGATIVE / Ketone: NEGATIVE  / Bili: NEGATIVE / Urobili: NORMAL mg/dL   Blood: MODERATE / Protein: 10 mg/dL / Nitrite: NEGATIVE   Leuk Esterase: NEGATIVE / RBC: 5-10 / WBC 0-2   Sq Epi: OCC / Non Sq Epi: x / Bacteria: FEW        RADIOLOGY & ADDITIONAL TESTS:    Imaging Personally Reviewed:  Consultant(s) Notes Reviewed:    Care Discussed with Consultants/Other Providers:

## 2018-07-27 NOTE — PROGRESS NOTE ADULT - SUBJECTIVE AND OBJECTIVE BOX
CHIEF COMPLAINT: SOB    Interval Events: Symptoms improving. Still with pleuritic chest pain and cough. LE slowly improving    REVIEW OF SYSTEMS:  [x] All other systems negative  [ ] Unable to assess ROS because ________    OBJECTIVE:  ICU Vital Signs Last 24 Hrs  T(C): 36.6 (2018 13:20), Max: 36.6 (2018 05:38)  T(F): 97.9 (2018 13:20), Max: 97.9 (2018 13:20)  HR: 87 (2018 14:17) (80 - 87)  BP: 118/54 (2018 14:17) (110/71 - 126/73)  BP(mean): --  ABP: --  ABP(mean): --  RR: 18 (2018 13:20) (18 - 18)  SpO2: 99% (2018 13:20) (91% - 99%)         @ 07: @ 07:00  --------------------------------------------------------  IN: 360 mL / OUT: 1600 mL / NET: -1240 mL     @ 07: @ 15:29  --------------------------------------------------------  IN: 268 mL / OUT: 1600 mL / NET: -1332 mL      CAPILLARY BLOOD GLUCOSE      POCT Blood Glucose.: 103 mg/dL (2018 13:19)    PHYSICAL EXAM:  General: No apparent distress  HEENT: NC/AT  Neck: Supple  Respiratory: CTAB, no wheezing or crackles, good air entry  Chest: Reproducible pain left side with palpation  Cardiovascular: RRR, no murmur, 3+ pitting edema above the knees  Abdomen: Soft, nontender, nondistended  Extremities: Warm  Skin: Intact  Neurological: A&Ox3, no focal deficits  Psychiatry: Normal mood and affect    HOSPITAL MEDICATIONS:  MEDICATIONS  (STANDING):  Adempas(riociguat) 1.5mg Tablet 1 Tablet(s) 1 Tablet(s) Oral every 8 hours  buDESOnide  80 MICROgram(s)/formoterol 4.5 MICROgram(s) Inhaler 2 Puff(s) Inhalation two times a day  dextrose 5%. 1000 milliLiter(s) (50 mL/Hr) IV Continuous <Continuous>  dextrose 50% Injectable 12.5 Gram(s) IV Push once  dextrose 50% Injectable 25 Gram(s) IV Push once  dextrose 50% Injectable 25 Gram(s) IV Push once  diltiazem    milliGRAM(s) Oral daily  furosemide Infusion 5 mG/Hr (2.5 mL/Hr) IV Continuous <Continuous>  influenza   Vaccine 0.5 milliLiter(s) IntraMuscular once  insulin lispro (HumaLOG) corrective regimen sliding scale   SubCutaneous three times a day before meals  insulin lispro (HumaLOG) corrective regimen sliding scale   SubCutaneous at bedtime  loratadine 10 milliGRAM(s) Oral daily  losartan 25 milliGRAM(s) Oral daily  sertraline 25 milliGRAM(s) Oral daily    MEDICATIONS  (PRN):  ALBUTerol    90 MICROgram(s) HFA Inhaler 2 Puff(s) Inhalation every 6 hours PRN Shortness of Breath and/or Wheezing  calcium carbonate    500 mG (Tums) Chewable 1 Tablet(s) Chew three times a day PRN Heartburn  dextrose 40% Gel 15 Gram(s) Oral once PRN Blood Glucose LESS THAN 70 milliGRAM(s)/deciliter  glucagon  Injectable 1 milliGRAM(s) IntraMuscular once PRN Glucose LESS THAN 70 milligrams/deciliter  LORazepam     Tablet 0.5 milliGRAM(s) Oral every 8 hours PRN Anxiety      LABS:                        11.9   3.50  )-----------( 304      ( 2018 07:57 )             36.9     07-27    139  |  99  |  8   ----------------------------<  86  3.2<L>   |  26  |  0.92    Ca    8.9      2018 07:57  Mg     1.9     07-27      PT/INR - ( 2018 07:57 )   PT: 23.7 SEC;   INR: 2.03            Urinalysis Basic - ( 2018 21:02 )    Color: PLYEL / Appearance: CLEAR / S.004 / pH: 7.0  Gluc: NEGATIVE / Ketone: NEGATIVE  / Bili: NEGATIVE / Urobili: NORMAL mg/dL   Blood: MODERATE / Protein: 10 mg/dL / Nitrite: NEGATIVE   Leuk Esterase: NEGATIVE / RBC: 5-10 / WBC 0-2   Sq Epi: OCC / Non Sq Epi: x / Bacteria: FEW

## 2018-07-27 NOTE — BEHAVIORAL HEALTH ASSESSMENT NOTE - NSBHCONSULTFOLLOWAFTERCARE_PSY_A_CORE FT
Recommend patient follow up with outpatient psychiatry and therapy. Referral provided to [facility] ([address], [address] [place] [address], phone [phone])

## 2018-07-27 NOTE — CONSULT NOTE ADULT - ASSESSMENT
34 y/o pleasant  female w/ PMHX of obesity, severe pulmonary hypertension w/ RV systolic failure and asthma comes in with complaints of worsening SOB and LE x 2 weeks. She was first told she had pulmonary hypertension and heart failure in 2006 at an OSH in [place] but tells me that she was never offered treatment. She was then re-hospitilzied in 2013 at [facility] and had a LHC- which showed normal coronaries, and RHC which showed that she had severe pulmonary hypertension w/ low normal wedge. Since then she has been followed by Dr. Unger and Dr. Haines. She states she is not able to work or go to school b/c of frequent LE edema, fatigue and inability to function normally.     She was recently admitted for SOB and was found to have parainfluenza 3, and was told to increase her PO fluids. She was discharged after 3 days, and for a while felt good, but then started developing FERRO. Currently she can only take about 10 steps before she gets SOB, and has frequent PND that wakes her up. No SOB at rest, orthopnea, CP, palpitations, dizziness. Her dry weight is about 290 lbs, currently she is 330 lbs.       Severe volume overload

## 2018-07-28 LAB
BUN SERPL-MCNC: 7 MG/DL — SIGNIFICANT CHANGE UP (ref 7–23)
CALCIUM SERPL-MCNC: 9 MG/DL — SIGNIFICANT CHANGE UP (ref 8.4–10.5)
CHLORIDE SERPL-SCNC: 98 MMOL/L — SIGNIFICANT CHANGE UP (ref 98–107)
CO2 SERPL-SCNC: 27 MMOL/L — SIGNIFICANT CHANGE UP (ref 22–31)
CREAT SERPL-MCNC: 0.99 MG/DL — SIGNIFICANT CHANGE UP (ref 0.5–1.3)
GLUCOSE SERPL-MCNC: 106 MG/DL — HIGH (ref 70–99)
HCT VFR BLD CALC: 37.7 % — SIGNIFICANT CHANGE UP (ref 34.5–45)
HGB BLD-MCNC: 12.3 G/DL — SIGNIFICANT CHANGE UP (ref 11.5–15.5)
INR BLD: 1.86 — HIGH (ref 0.88–1.17)
MAGNESIUM SERPL-MCNC: 2 MG/DL — SIGNIFICANT CHANGE UP (ref 1.6–2.6)
MCHC RBC-ENTMCNC: 25.4 PG — LOW (ref 27–34)
MCHC RBC-ENTMCNC: 32.6 % — SIGNIFICANT CHANGE UP (ref 32–36)
MCV RBC AUTO: 77.9 FL — LOW (ref 80–100)
NRBC # FLD: 0 — SIGNIFICANT CHANGE UP
PLATELET # BLD AUTO: 316 K/UL — SIGNIFICANT CHANGE UP (ref 150–400)
PMV BLD: 10.1 FL — SIGNIFICANT CHANGE UP (ref 7–13)
POTASSIUM SERPL-MCNC: 3.1 MMOL/L — LOW (ref 3.5–5.3)
POTASSIUM SERPL-SCNC: 3.1 MMOL/L — LOW (ref 3.5–5.3)
PROTHROM AB SERPL-ACNC: 20.9 SEC — HIGH (ref 9.8–13.1)
RBC # BLD: 4.84 M/UL — SIGNIFICANT CHANGE UP (ref 3.8–5.2)
RBC # FLD: 21.8 % — HIGH (ref 10.3–14.5)
SODIUM SERPL-SCNC: 139 MMOL/L — SIGNIFICANT CHANGE UP (ref 135–145)
WBC # BLD: 3.93 K/UL — SIGNIFICANT CHANGE UP (ref 3.8–10.5)
WBC # FLD AUTO: 3.93 K/UL — SIGNIFICANT CHANGE UP (ref 3.8–10.5)

## 2018-07-28 PROCEDURE — 99233 SBSQ HOSP IP/OBS HIGH 50: CPT

## 2018-07-28 RX ORDER — WARFARIN SODIUM 2.5 MG/1
7.5 TABLET ORAL ONCE
Qty: 0 | Refills: 0 | Status: COMPLETED | OUTPATIENT
Start: 2018-07-28 | End: 2018-07-28

## 2018-07-28 RX ORDER — ACETAMINOPHEN 500 MG
650 TABLET ORAL EVERY 6 HOURS
Qty: 0 | Refills: 0 | Status: DISCONTINUED | OUTPATIENT
Start: 2018-07-28 | End: 2018-08-10

## 2018-07-28 RX ORDER — POTASSIUM CHLORIDE 20 MEQ
40 PACKET (EA) ORAL EVERY 4 HOURS
Qty: 0 | Refills: 0 | Status: COMPLETED | OUTPATIENT
Start: 2018-07-28 | End: 2018-07-28

## 2018-07-28 RX ADMIN — LOSARTAN POTASSIUM 25 MILLIGRAM(S): 100 TABLET, FILM COATED ORAL at 13:55

## 2018-07-28 RX ADMIN — Medication 40 MILLIEQUIVALENT(S): at 11:13

## 2018-07-28 RX ADMIN — BUDESONIDE AND FORMOTEROL FUMARATE DIHYDRATE 2 PUFF(S): 160; 4.5 AEROSOL RESPIRATORY (INHALATION) at 23:03

## 2018-07-28 RX ADMIN — BUDESONIDE AND FORMOTEROL FUMARATE DIHYDRATE 2 PUFF(S): 160; 4.5 AEROSOL RESPIRATORY (INHALATION) at 11:13

## 2018-07-28 RX ADMIN — Medication 650 MILLIGRAM(S): at 20:26

## 2018-07-28 RX ADMIN — Medication 40 MILLIEQUIVALENT(S): at 18:02

## 2018-07-28 RX ADMIN — Medication 40 MILLIEQUIVALENT(S): at 15:25

## 2018-07-28 RX ADMIN — WARFARIN SODIUM 7.5 MILLIGRAM(S): 2.5 TABLET ORAL at 10:09

## 2018-07-28 RX ADMIN — Medication 650 MILLIGRAM(S): at 19:26

## 2018-07-28 RX ADMIN — LORATADINE 10 MILLIGRAM(S): 10 TABLET ORAL at 12:17

## 2018-07-28 RX ADMIN — Medication 180 MILLIGRAM(S): at 05:09

## 2018-07-28 NOTE — PROGRESS NOTE ADULT - PROBLEM SELECTOR PLAN 8
Hypercoagulable state  - continue with warfarin --> increased dose to 7.5mg today due to supratherapeutic INR  - monitor INR

## 2018-07-28 NOTE — PROGRESS NOTE ADULT - SUBJECTIVE AND OBJECTIVE BOX
Patient is a 33y old  Female who presents with a chief complaint of "I had chest pain and SOB" this morning (25 Jul 2018 15:47)    SUBJECTIVE / OVERNIGHT EVENTS:  Patient complaining of urinating a lot but no SOB. Pt currently on 4L sat'ing 92%. No chest pain.    MEDICATIONS  (STANDING):  Adempas(riociguat) 1.5mg Tablet 1 Tablet(s) 1 Tablet(s) Oral every 8 hours  buDESOnide  80 MICROgram(s)/formoterol 4.5 MICROgram(s) Inhaler 2 Puff(s) Inhalation two times a day  dextrose 5%. 1000 milliLiter(s) (50 mL/Hr) IV Continuous <Continuous>  dextrose 50% Injectable 12.5 Gram(s) IV Push once  dextrose 50% Injectable 25 Gram(s) IV Push once  dextrose 50% Injectable 25 Gram(s) IV Push once  diltiazem    milliGRAM(s) Oral daily  furosemide Infusion 10 mG/Hr (5 mL/Hr) IV Continuous <Continuous>  influenza   Vaccine 0.5 milliLiter(s) IntraMuscular once  insulin lispro (HumaLOG) corrective regimen sliding scale   SubCutaneous three times a day before meals  insulin lispro (HumaLOG) corrective regimen sliding scale   SubCutaneous at bedtime  loratadine 10 milliGRAM(s) Oral daily  losartan 25 milliGRAM(s) Oral daily  potassium chloride    Tablet ER 40 milliEquivalent(s) Oral every 4 hours  sertraline 25 milliGRAM(s) Oral daily    MEDICATIONS  (PRN):  ALBUTerol    90 MICROgram(s) HFA Inhaler 2 Puff(s) Inhalation every 6 hours PRN Shortness of Breath and/or Wheezing  calcium carbonate    500 mG (Tums) Chewable 1 Tablet(s) Chew three times a day PRN Heartburn  dextrose 40% Gel 15 Gram(s) Oral once PRN Blood Glucose LESS THAN 70 milliGRAM(s)/deciliter  glucagon  Injectable 1 milliGRAM(s) IntraMuscular once PRN Glucose LESS THAN 70 milligrams/deciliter  LORazepam     Tablet 0.5 milliGRAM(s) Oral every 8 hours PRN Anxiety      Vital Signs Last 24 Hrs  T(C): 37.3 (28 Jul 2018 13:46), Max: 37.3 (28 Jul 2018 13:46)  T(F): 99.1 (28 Jul 2018 13:46), Max: 99.1 (28 Jul 2018 13:46)  HR: 83 (28 Jul 2018 13:46) (82 - 95)  BP: 107/51 (28 Jul 2018 13:46) (107/51 - 126/63)  BP(mean): --  RR: 18 (28 Jul 2018 13:46) (18 - 18)  SpO2: 92% (28 Jul 2018 13:46) (92% - 98%)  CAPILLARY BLOOD GLUCOSE      POCT Blood Glucose.: 85 mg/dL (28 Jul 2018 13:09)  POCT Blood Glucose.: 95 mg/dL (28 Jul 2018 08:41)  POCT Blood Glucose.: 82 mg/dL (27 Jul 2018 22:29)  POCT Blood Glucose.: 131 mg/dL (27 Jul 2018 17:29)    I&O's Summary    27 Jul 2018 07:01  -  28 Jul 2018 07:00  --------------------------------------------------------  IN: 1249 mL / OUT: 9450 mL / NET: -8201 mL    28 Jul 2018 07:01  -  28 Jul 2018 15:17  --------------------------------------------------------  IN: 653 mL / OUT: 3750 mL / NET: -3097 mL        PHYSICAL EXAM  GENERAL: NAD, morbidly obese woman  HEAD:  Atraumatic, Normocephalic  EYES: EOMI, PERRLA, conjunctiva and sclera clear  NECK: Supple, No JVD  CHEST/LUNG: Clear to auscultation bilaterally; No wheeze  HEART: Regular rate and rhythm; No murmurs, rubs, or gallops  ABDOMEN: Soft, non-tender, distended, bowel sounds present  EXTREMITIES:  2+ Peripheral Pulses, 4+ pitting edema   PSYCH: AAOx3        LABS:                        12.3   3.93  )-----------( 316      ( 28 Jul 2018 05:45 )             37.7     07-28    139  |  98  |  7   ----------------------------<  106<H>  3.1<L>   |  27  |  0.99    Ca    9.0      28 Jul 2018 05:45  Mg     2.0     07-28      PT/INR - ( 28 Jul 2018 05:45 )   PT: 20.9 SEC;   INR: 1.86            Consultant(s) Notes Reviewed:  Yes  Care Discussed with Consultants/Other Providers: Yes

## 2018-07-28 NOTE — PROGRESS NOTE ADULT - SUBJECTIVE AND OBJECTIVE BOX
24H hour events: no acute events overnight. volume in legs improving slightly.     MEDICATIONS:  diltiazem    milliGRAM(s) Oral daily  furosemide Infusion 10 mG/Hr IV Continuous <Continuous>  losartan 25 milliGRAM(s) Oral daily  ALBUTerol    90 MICROgram(s) HFA Inhaler 2 Puff(s) Inhalation every 6 hours PRN  buDESOnide  80 MICROgram(s)/formoterol 4.5 MICROgram(s) Inhaler 2 Puff(s) Inhalation two times a day  loratadine 10 milliGRAM(s) Oral daily  LORazepam     Tablet 0.5 milliGRAM(s) Oral every 8 hours PRN  sertraline 25 milliGRAM(s) Oral daily  calcium carbonate    500 mG (Tums) Chewable 1 Tablet(s) Chew three times a day PRN  dextrose 40% Gel 15 Gram(s) Oral once PRN  dextrose 50% Injectable 12.5 Gram(s) IV Push once  dextrose 50% Injectable 25 Gram(s) IV Push once  dextrose 50% Injectable 25 Gram(s) IV Push once  glucagon  Injectable 1 milliGRAM(s) IntraMuscular once PRN  insulin lispro (HumaLOG) corrective regimen sliding scale   SubCutaneous three times a day before meals  insulin lispro (HumaLOG) corrective regimen sliding scale   SubCutaneous at bedtime  dextrose 5%. 1000 milliLiter(s) IV Continuous <Continuous>  influenza   Vaccine 0.5 milliLiter(s) IntraMuscular once  potassium chloride    Tablet ER 40 milliEquivalent(s) Oral every 4 hours      REVIEW OF SYSTEMS:  Complete 10point ROS negative except as documented above.    PHYSICAL EXAM:  T(C): 36.6 (07-28-18 @ 05:06), Max: 36.7 (07-27-18 @ 23:05)  HR: 95 (07-28-18 @ 05:06) (80 - 95)  BP: 126/63 (07-28-18 @ 05:06) (112/75 - 126/63)  RR: 18 (07-28-18 @ 05:06) (18 - 18)  SpO2: 97% (07-28-18 @ 05:06) (97% - 99%)  Wt(kg): --  I&O's Summary    27 Jul 2018 07:01  -  28 Jul 2018 07:00  --------------------------------------------------------  IN: 1249 mL / OUT: 9450 mL / NET: -8201 mL    28 Jul 2018 07:01  -  28 Jul 2018 11:06  --------------------------------------------------------  IN: 118 mL / OUT: 1550 mL / NET: -1432 mL    Appearance: Normal	  HEENT:   Normal oral mucosa, PERRL, EOMI	  Lymphatic: No lymphadenopathy  Cardiovascular: Normal S1 S2, No JVD, No murmurs, No edema  Respiratory: Lungs clear to auscultation	  Psychiatry: A & O x 3, Mood & affect appropriate  Gastrointestinal:  Soft, Non-tender, + BS	  Skin: No rashes, No ecchymoses, No cyanosis	  Neurologic: Non-focal  Extremities: +3 edema  Vascular: Peripheral pulses palpable 2+ bilaterally    LABS:	 	    CBC Full  -  ( 28 Jul 2018 05:45 )  WBC Count : 3.93 K/uL  Hemoglobin : 12.3 g/dL  Hematocrit : 37.7 %  Platelet Count - Automated : 316 K/uL  Mean Cell Volume : 77.9 fL  Mean Cell Hemoglobin : 25.4 pg  Mean Cell Hemoglobin Concentration : 32.6 %  Auto Neutrophil # : x  Auto Lymphocyte # : x  Auto Monocyte # : x  Auto Eosinophil # : x  Auto Basophil # : x  Auto Neutrophil % : x  Auto Lymphocyte % : x  Auto Monocyte % : x  Auto Eosinophil % : x  Auto Basophil % : x    07-28    139  |  98  |  7   ----------------------------<  106<H>  3.1<L>   |  27  |  0.99  07-27    139  |  99  |  8   ----------------------------<  86  3.2<L>   |  26  |  0.92    Ca    9.0      28 Jul 2018 05:45  Ca    8.9      27 Jul 2018 07:57  Mg     2.0     07-28  Mg     1.9     07-27      proBNP: Serum Pro-Brain Natriuretic Peptide: 1876 pg/mL (07-25 @ 10:30)    PREVIOUS DIAGNOSTIC TESTING:    [ ] Echocardiogram:  [ ]  Catheterization:  [ ] Stress Test:  	  	  ASSESSMENT/PLAN:

## 2018-07-28 NOTE — PROGRESS NOTE ADULT - PROBLEM SELECTOR PLAN 6
Multiple stressors in life, affecting ability to care for comorbid conditions  - Psych consult, recommendations appreciated

## 2018-07-28 NOTE — PROGRESS NOTE ADULT - PROBLEM SELECTOR PLAN 4
EKG shows MANSI from the Pulm HTN, TTE significant for RVSP 98 mmHg  - continue adempas as above  - Pulmonary consult as above

## 2018-07-28 NOTE — PROGRESS NOTE ADULT - PROBLEM SELECTOR PLAN 3
Does not appear to be infectious in etiology, suspected upper airway cough syndrome in setting of recent parainfluenza infection  - continue loratidine  - Pulm consult, recommendations appreciated

## 2018-07-28 NOTE — PROGRESS NOTE ADULT - ASSESSMENT
34 y/o female PMHx pulmonary HTN, CVA prophylaxis with coumadin, HTN, COPD presents with SOB, chest pain, and worsening leg swelling refractory to home dose of furosemide with right heart strain on EKG most likely in the setting of acute on chronic right systolic heart failure, chest pain most likely secondary to elevated cardiac pressures with negative CTA for PE now with improvement in symptoms but still appears clinically volume overloaded.    Pulmonary HTN with Right Sided CHF Exacerbation  Severe Pulmonary hypertension with RV dysfunction. No signs of cardiac output compromise but still massively overloaded.   - Continue with diuresis of furosemide 10 mg gtt with STRICT I&O's assess clinically for improvement.  - Will discuss with Heart Failure team for further management.  - Continue with Riociguat as per Pulmonary team.     Please call on call cardiology team at [phone] with any further questions.

## 2018-07-28 NOTE — PROGRESS NOTE ADULT - PROBLEM SELECTOR PLAN 2
Pleuritic in nature, CTA negative for PE, atypical for ACS, most likely secondary to elevated cardiac pressures, EKG with Rt heart strain consistent with acute on chronic Rt systolic heart failure  - continue tx for CHF as above

## 2018-07-29 LAB
BUN SERPL-MCNC: 9 MG/DL — SIGNIFICANT CHANGE UP (ref 7–23)
BUN SERPL-MCNC: 9 MG/DL — SIGNIFICANT CHANGE UP (ref 7–23)
CALCIUM SERPL-MCNC: 9.1 MG/DL — SIGNIFICANT CHANGE UP (ref 8.4–10.5)
CALCIUM SERPL-MCNC: 9.6 MG/DL — SIGNIFICANT CHANGE UP (ref 8.4–10.5)
CHLORIDE SERPL-SCNC: 95 MMOL/L — LOW (ref 98–107)
CHLORIDE SERPL-SCNC: 95 MMOL/L — LOW (ref 98–107)
CO2 SERPL-SCNC: 26 MMOL/L — SIGNIFICANT CHANGE UP (ref 22–31)
CO2 SERPL-SCNC: 26 MMOL/L — SIGNIFICANT CHANGE UP (ref 22–31)
CREAT SERPL-MCNC: 0.95 MG/DL — SIGNIFICANT CHANGE UP (ref 0.5–1.3)
CREAT SERPL-MCNC: 1.03 MG/DL — SIGNIFICANT CHANGE UP (ref 0.5–1.3)
GLUCOSE SERPL-MCNC: 87 MG/DL — SIGNIFICANT CHANGE UP (ref 70–99)
GLUCOSE SERPL-MCNC: 89 MG/DL — SIGNIFICANT CHANGE UP (ref 70–99)
HCT VFR BLD CALC: 44.6 % — SIGNIFICANT CHANGE UP (ref 34.5–45)
HGB BLD-MCNC: 14.3 G/DL — SIGNIFICANT CHANGE UP (ref 11.5–15.5)
INR BLD: 2.23 — HIGH (ref 0.88–1.17)
MAGNESIUM SERPL-MCNC: 1.9 MG/DL — SIGNIFICANT CHANGE UP (ref 1.6–2.6)
MAGNESIUM SERPL-MCNC: 2 MG/DL — SIGNIFICANT CHANGE UP (ref 1.6–2.6)
MCHC RBC-ENTMCNC: 25.2 PG — LOW (ref 27–34)
MCHC RBC-ENTMCNC: 32.1 % — SIGNIFICANT CHANGE UP (ref 32–36)
MCV RBC AUTO: 78.7 FL — LOW (ref 80–100)
NRBC # FLD: 0 — SIGNIFICANT CHANGE UP
PLATELET # BLD AUTO: 369 K/UL — SIGNIFICANT CHANGE UP (ref 150–400)
PMV BLD: 10 FL — SIGNIFICANT CHANGE UP (ref 7–13)
POTASSIUM SERPL-MCNC: 3.4 MMOL/L — LOW (ref 3.5–5.3)
POTASSIUM SERPL-MCNC: 3.7 MMOL/L — SIGNIFICANT CHANGE UP (ref 3.5–5.3)
POTASSIUM SERPL-SCNC: 3.4 MMOL/L — LOW (ref 3.5–5.3)
POTASSIUM SERPL-SCNC: 3.7 MMOL/L — SIGNIFICANT CHANGE UP (ref 3.5–5.3)
PROTHROM AB SERPL-ACNC: 26.1 SEC — HIGH (ref 9.8–13.1)
RBC # BLD: 5.67 M/UL — HIGH (ref 3.8–5.2)
RBC # FLD: 22.3 % — HIGH (ref 10.3–14.5)
SODIUM SERPL-SCNC: 135 MMOL/L — SIGNIFICANT CHANGE UP (ref 135–145)
SODIUM SERPL-SCNC: 138 MMOL/L — SIGNIFICANT CHANGE UP (ref 135–145)
WBC # BLD: 4.46 K/UL — SIGNIFICANT CHANGE UP (ref 3.8–10.5)
WBC # FLD AUTO: 4.46 K/UL — SIGNIFICANT CHANGE UP (ref 3.8–10.5)

## 2018-07-29 PROCEDURE — 99233 SBSQ HOSP IP/OBS HIGH 50: CPT

## 2018-07-29 RX ORDER — WARFARIN SODIUM 2.5 MG/1
5 TABLET ORAL ONCE
Qty: 0 | Refills: 0 | Status: COMPLETED | OUTPATIENT
Start: 2018-07-29 | End: 2018-07-29

## 2018-07-29 RX ORDER — POTASSIUM CHLORIDE 20 MEQ
40 PACKET (EA) ORAL EVERY 4 HOURS
Qty: 0 | Refills: 0 | Status: COMPLETED | OUTPATIENT
Start: 2018-07-29 | End: 2018-07-30

## 2018-07-29 RX ORDER — POTASSIUM CHLORIDE 20 MEQ
40 PACKET (EA) ORAL EVERY 4 HOURS
Qty: 0 | Refills: 0 | Status: COMPLETED | OUTPATIENT
Start: 2018-07-29 | End: 2018-07-29

## 2018-07-29 RX ORDER — BACITRACIN ZINC 500 UNIT/G
1 OINTMENT IN PACKET (EA) TOPICAL
Qty: 0 | Refills: 0 | Status: DISCONTINUED | OUTPATIENT
Start: 2018-07-29 | End: 2018-08-03

## 2018-07-29 RX ADMIN — BUDESONIDE AND FORMOTEROL FUMARATE DIHYDRATE 2 PUFF(S): 160; 4.5 AEROSOL RESPIRATORY (INHALATION) at 21:43

## 2018-07-29 RX ADMIN — Medication 40 MILLIEQUIVALENT(S): at 12:18

## 2018-07-29 RX ADMIN — Medication 180 MILLIGRAM(S): at 05:45

## 2018-07-29 RX ADMIN — LORATADINE 10 MILLIGRAM(S): 10 TABLET ORAL at 11:22

## 2018-07-29 RX ADMIN — Medication 40 MILLIEQUIVALENT(S): at 16:35

## 2018-07-29 RX ADMIN — Medication 40 MILLIEQUIVALENT(S): at 21:43

## 2018-07-29 RX ADMIN — BUDESONIDE AND FORMOTEROL FUMARATE DIHYDRATE 2 PUFF(S): 160; 4.5 AEROSOL RESPIRATORY (INHALATION) at 10:57

## 2018-07-29 RX ADMIN — LOSARTAN POTASSIUM 25 MILLIGRAM(S): 100 TABLET, FILM COATED ORAL at 05:45

## 2018-07-29 RX ADMIN — WARFARIN SODIUM 5 MILLIGRAM(S): 2.5 TABLET ORAL at 17:21

## 2018-07-29 RX ADMIN — Medication 1 APPLICATION(S): at 23:58

## 2018-07-29 NOTE — PROGRESS NOTE ADULT - PROBLEM SELECTOR PLAN 7
Patient with low normal BP  - discontinue diltiazem 180 mg  - continue losartan 25 mg, lasix drip as above  Monitor BP   Low salt diet

## 2018-07-29 NOTE — PROGRESS NOTE ADULT - ATTENDING COMMENTS
Agree with above assessment and plan  Cont current medications as outline above  Await possible transfer to [facility] in AM

## 2018-07-29 NOTE — PROGRESS NOTE ADULT - ASSESSMENT
34 y/o female PMHx pulmonary HTN, CVA prophylaxis with coumadin, HTN, ?COPD presents with SOB, chest pain, and worsening leg swelling refractory to home dose of furosemide with right heart strain on EKG most likely in the setting of acute on chronic right systolic heart failure, chest pain most likely secondary to elevated cardiac pressures with negative CTA for PE now with improvement in symptoms but still appears clinically volume overloaded.    ??diagnosis COPD in 33 year old woman - appears pulm following, patient on riociguat, known pre cap pulm HTN    Pulmonary HTN with Right Sided CHF Exacerbation; precap pulm HTN, see above [facility] 2013  Severe Pulmonary hypertension with RV dysfunction. No signs of cardiac output compromise but still massively overloaded.   - Continue with diuresis of furosemide 10 mg gtt with STRICT I&O's assess clinically for improvement. - 11lb weight loss  - - please check electrolytes bid and replete  - Continue with Riociguat as per Pulmonary team.   -? why is she on diltiazem? - does not appear hypertensive, if no other need please consider d/c in setting RV failure    Please call on call cardiology team at [phone] with any further questions.

## 2018-07-29 NOTE — PROGRESS NOTE ADULT - SUBJECTIVE AND OBJECTIVE BOX
Patient is a 33y old  Female who presents with a chief complaint of "I had chest pain and SOB" this morning (25 Jul 2018 15:47)        SUBJECTIVE / OVERNIGHT EVENTS:      MEDICATIONS  (STANDING):  Adempas(riociguat) 1.5mg Tablet 1 Tablet(s) 1 Tablet(s) Oral every 8 hours  buDESOnide  80 MICROgram(s)/formoterol 4.5 MICROgram(s) Inhaler 2 Puff(s) Inhalation two times a day  diltiazem    milliGRAM(s) Oral daily  furosemide Infusion 10 mG/Hr (5 mL/Hr) IV Continuous <Continuous>  influenza   Vaccine 0.5 milliLiter(s) IntraMuscular once  loratadine 10 milliGRAM(s) Oral daily  losartan 25 milliGRAM(s) Oral daily  sertraline 25 milliGRAM(s) Oral daily    MEDICATIONS  (PRN):  acetaminophen   Tablet. 650 milliGRAM(s) Oral every 6 hours PRN Mild Pain (1 - 3)  ALBUTerol    90 MICROgram(s) HFA Inhaler 2 Puff(s) Inhalation every 6 hours PRN Shortness of Breath and/or Wheezing  calcium carbonate    500 mG (Tums) Chewable 1 Tablet(s) Chew three times a day PRN Heartburn  LORazepam     Tablet 0.5 milliGRAM(s) Oral every 8 hours PRN Anxiety      Vital Signs Last 24 Hrs  T(C): 37 (29 Jul 2018 05:33), Max: 37.3 (28 Jul 2018 13:46)  T(F): 98.6 (29 Jul 2018 05:33), Max: 99.1 (28 Jul 2018 13:46)  HR: 98 (29 Jul 2018 05:33) (83 - 98)  BP: 122/70 (29 Jul 2018 05:33) (98/51 - 122/70)  BP(mean): --  RR: 18 (29 Jul 2018 05:33) (18 - 18)  SpO2: 94% (29 Jul 2018 05:33) (92% - 94%)  CAPILLARY BLOOD GLUCOSE      POCT Blood Glucose.: 85 mg/dL (28 Jul 2018 13:09)    I&O's Summary    28 Jul 2018 07:01  -  29 Jul 2018 07:00  --------------------------------------------------------  IN: 1108 mL / OUT: 5700 mL / NET: -4592 mL          PHYSICAL EXAM  GENERAL: NAD, well-developed  HEAD:  Atraumatic, Normocephalic  EYES: EOMI, PERRLA, conjunctiva and sclera clear  NECK: Supple, No JVD  CHEST/LUNG: Clear to auscultation bilaterally; No wheeze  HEART: Regular rate and rhythm; No murmurs, rubs, or gallops  ABDOMEN: Soft, Nontender, Nondistended; Bowel sounds present  EXTREMITIES:  2+ Peripheral Pulses, No clubbing, cyanosis, or edema  PSYCH: AAOx3  SKIN: No rashes or lesions    LABS:                        14.3   4.46  )-----------( 369      ( 29 Jul 2018 06:28 )             44.6     07-29    138  |  95<L>  |  9   ----------------------------<  87  3.4<L>   |  26  |  1.03    Ca    9.6      29 Jul 2018 06:28  Mg     2.0     07-29      PT/INR - ( 29 Jul 2018 07:30 )   PT: 26.1 SEC;   INR: 2.23                    RADIOLOGY & ADDITIONAL TESTS:    Imaging Personally Reviewed:  Consultant(s) Notes Reviewed:    Care Discussed with Consultants/Other Providers: Patient is a 33y old  Female who presents with a chief complaint of "I had chest pain and SOB" this morning (25 Jul 2018 15:47)    SUBJECTIVE / OVERNIGHT EVENTS:  No events overnight. Patient reports she notices improvement in her leg swelling bilaterally and she is still urinating a lot. No chest pain or SOB currently    MEDICATIONS  (STANDING):  Adempas(riociguat) 1.5mg Tablet 1 Tablet(s) 1 Tablet(s) Oral every 8 hours  buDESOnide  80 MICROgram(s)/formoterol 4.5 MICROgram(s) Inhaler 2 Puff(s) Inhalation two times a day  diltiazem    milliGRAM(s) Oral daily  furosemide Infusion 10 mG/Hr (5 mL/Hr) IV Continuous <Continuous>  influenza   Vaccine 0.5 milliLiter(s) IntraMuscular once  loratadine 10 milliGRAM(s) Oral daily  losartan 25 milliGRAM(s) Oral daily  sertraline 25 milliGRAM(s) Oral daily    MEDICATIONS  (PRN):  acetaminophen   Tablet. 650 milliGRAM(s) Oral every 6 hours PRN Mild Pain (1 - 3)  ALBUTerol    90 MICROgram(s) HFA Inhaler 2 Puff(s) Inhalation every 6 hours PRN Shortness of Breath and/or Wheezing  calcium carbonate    500 mG (Tums) Chewable 1 Tablet(s) Chew three times a day PRN Heartburn  LORazepam     Tablet 0.5 milliGRAM(s) Oral every 8 hours PRN Anxiety      Vital Signs Last 24 Hrs  T(C): 37 (29 Jul 2018 05:33), Max: 37.3 (28 Jul 2018 13:46)  T(F): 98.6 (29 Jul 2018 05:33), Max: 99.1 (28 Jul 2018 13:46)  HR: 98 (29 Jul 2018 05:33) (83 - 98)  BP: 122/70 (29 Jul 2018 05:33) (98/51 - 122/70)  BP(mean): --  RR: 18 (29 Jul 2018 05:33) (18 - 18)  SpO2: 94% (29 Jul 2018 05:33) (92% - 94%)  CAPILLARY BLOOD GLUCOSE      POCT Blood Glucose.: 85 mg/dL (28 Jul 2018 13:09)    I&O's Summary    28 Jul 2018 07:01  -  29 Jul 2018 07:00  --------------------------------------------------------  IN: 1108 mL / OUT: 5700 mL / NET: -4592 mL        PHYSICAL EXAM  GENERAL: NAD, morbidly obese woman  CHEST/LUNG: Clear to auscultation bilaterally; No wheeze  HEART: Regular rate and rhythm; No murmurs, rubs, or gallops  ABDOMEN: Soft, non-tender, distended, bowel sounds present  EXTREMITIES:  2+ Peripheral Pulses, 4+ LE pitting edema   PSYCH: AAOx3      LABS:                        14.3   4.46  )-----------( 369      ( 29 Jul 2018 06:28 )             44.6     07-29    138  |  95<L>  |  9   ----------------------------<  87  3.4<L>   |  26  |  1.03    Ca    9.6      29 Jul 2018 06:28  Mg     2.0     07-29      PT/INR - ( 29 Jul 2018 07:30 )   PT: 26.1 SEC;   INR: 2.23              Consultant(s) Notes Reviewed:  Yes  Care Discussed with Consultants/Other Providers: Yes

## 2018-07-29 NOTE — PROGRESS NOTE ADULT - SUBJECTIVE AND OBJECTIVE BOX
24H hour events: No acute events.    MEDICATIONS:  diltiazem    milliGRAM(s) Oral daily  furosemide Infusion 10 mG/Hr IV Continuous <Continuous>  losartan 25 milliGRAM(s) Oral daily  ALBUTerol    90 MICROgram(s) HFA Inhaler 2 Puff(s) Inhalation every 6 hours PRN  buDESOnide  80 MICROgram(s)/formoterol 4.5 MICROgram(s) Inhaler 2 Puff(s) Inhalation two times a day  loratadine 10 milliGRAM(s) Oral daily  acetaminophen   Tablet. 650 milliGRAM(s) Oral every 6 hours PRN  LORazepam     Tablet 0.5 milliGRAM(s) Oral every 8 hours PRN  sertraline 25 milliGRAM(s) Oral daily  calcium carbonate    500 mG (Tums) Chewable 1 Tablet(s) Chew three times a day PRN  influenza   Vaccine 0.5 milliLiter(s) IntraMuscular once    REVIEW OF SYSTEMS:  Complete 10point ROS negative.  Everything "ok," disinterested in my presence.     PHYSICAL EXAM:  T(C): 37 (07-29-18 @ 05:33), Max: 37.3 (07-28-18 @ 13:46)  HR: 98 (07-29-18 @ 05:33) (83 - 98)  BP: 122/70 (07-29-18 @ 05:33) (98/51 - 122/70)  RR: 18 (07-29-18 @ 05:33) (18 - 18)  SpO2: 94% (07-29-18 @ 05:33) (92% - 94%)  Wt(kg): --  I&O's Summary    28 Jul 2018 07:01  -  29 Jul 2018 07:00  --------------------------------------------------------  IN: 1108 mL / OUT: 5700 mL / NET: -4592 mL      Appearance: NAD, sleeping comfortably, morbidly obese  HEENT:   Normal oral mucosa, PERRL, EOMI	  Cardiovascular: Normal S1 S2, No JVD, No murmurs, LE pitting edema to thigh 2+  Respiratory: Lungs clear to auscultation	  Psychiatry: A & O x 3, Mood & affect appropriate although poor insight  Gastrointestinal:  Soft, Non-tender, + BS	  Skin: No rashes, No ecchymoses, No cyanosis	  Neurologic: Non-focal  Vascular: Peripheral pulses palpable 2+ bilaterally      LABS:	 	    CBC Full  -  ( 29 Jul 2018 06:28 )  WBC Count : 4.46 K/uL  Hemoglobin : 14.3 g/dL  Hematocrit : 44.6 %  Platelet Count - Automated : 369 K/uL  Mean Cell Volume : 78.7 fL  Mean Cell Hemoglobin : 25.2 pg  Mean Cell Hemoglobin Concentration : 32.1 %    07-29    138  |  95<L>  |  9   ----------------------------<  87  3.4<L>   |  26  |  1.03  07-28    139  |  98  |  7   ----------------------------<  106<H>  3.1<L>   |  27  |  0.99    Ca    9.6      29 Jul 2018 06:28  Ca    9.0      28 Jul 2018 05:45  Mg     2.0     07-29  Mg     2.0     07-28      proBNP: Serum Pro-Brain Natriuretic Peptide: 1876 pg/mL (07-25 @ 10:30)    TELEMETRY: sinus 80s        < from: Transthoracic Echocardiogram (07.26.18 @ 15:19) >  CONCLUSIONS:  1. Normal mitral valve. Minimal mitral regurgitation.  2. Endocardium not well visualized; grossly normal left  ventricular systolic function.  3. Severe right atrial enlargement.  4. Right ventricular enlargement with decreased right  ventricular systolic function.  Systolic and diastolic  flattening of the interventricular septum, consistent with  RV volume and pressure overload.  5. Estimated right ventricular systolic pressure equals 98  mm Hg, assuming right atrial pressure equals 10 mm Hg,  consistent with severe pulmonary hypertension.  6. Normal tricuspid valve.  Moderate-severe tricuspid  regurgitation.  *** Compared with echocardiogram of 12/16/2014, no  significant changes noted.    < end of copied text >    < from: Cardiac Cath Lab (12.30.13 @ 14:08) >  Pressures:  -- Aortic Pressure (S/D/M): 112/85/96  Pressures:  -- Left Ventricle (s/edp): 118/10/--  Pressures:  -- Pulmonary Artery (S/D/M): 107/43/65  Pressures:  -- Pulmonary Capillary Wedge: 13/15/9  Pressures:  -- Right Atrium (a/v/M): 20/16/15  Pressures:  -- Right Ventricle (s/edp): 107/26/--    < end of copied text >  < from: Cardiac Cath Lab (12.30.13 @ 14:08) >  Outputs:  -- RESISTANCES: Pulmonary vascular resistance (Wood Units): 10.09    < end of copied text >

## 2018-07-30 ENCOUNTER — OTHER (OUTPATIENT)
Age: 33
End: 2018-07-30

## 2018-07-30 ENCOUNTER — TRANSCRIPTION ENCOUNTER (OUTPATIENT)
Age: 33
End: 2018-07-30

## 2018-07-30 LAB
BUN SERPL-MCNC: 10 MG/DL — SIGNIFICANT CHANGE UP (ref 7–23)
BUN SERPL-MCNC: 9 MG/DL — SIGNIFICANT CHANGE UP (ref 7–23)
BUN SERPL-MCNC: 9 MG/DL — SIGNIFICANT CHANGE UP (ref 7–23)
CALCIUM SERPL-MCNC: 9.2 MG/DL — SIGNIFICANT CHANGE UP (ref 8.4–10.5)
CALCIUM SERPL-MCNC: 9.4 MG/DL — SIGNIFICANT CHANGE UP (ref 8.4–10.5)
CALCIUM SERPL-MCNC: 9.6 MG/DL — SIGNIFICANT CHANGE UP (ref 8.4–10.5)
CHLORIDE SERPL-SCNC: 94 MMOL/L — LOW (ref 98–107)
CHLORIDE SERPL-SCNC: 95 MMOL/L — LOW (ref 98–107)
CHLORIDE SERPL-SCNC: 96 MMOL/L — LOW (ref 98–107)
CO2 SERPL-SCNC: 27 MMOL/L — SIGNIFICANT CHANGE UP (ref 22–31)
CREAT SERPL-MCNC: 0.95 MG/DL — SIGNIFICANT CHANGE UP (ref 0.5–1.3)
CREAT SERPL-MCNC: 0.96 MG/DL — SIGNIFICANT CHANGE UP (ref 0.5–1.3)
CREAT SERPL-MCNC: 1.05 MG/DL — SIGNIFICANT CHANGE UP (ref 0.5–1.3)
GLUCOSE SERPL-MCNC: 83 MG/DL — SIGNIFICANT CHANGE UP (ref 70–99)
GLUCOSE SERPL-MCNC: 93 MG/DL — SIGNIFICANT CHANGE UP (ref 70–99)
GLUCOSE SERPL-MCNC: 94 MG/DL — SIGNIFICANT CHANGE UP (ref 70–99)
HCT VFR BLD CALC: 43.7 % — SIGNIFICANT CHANGE UP (ref 34.5–45)
HGB BLD-MCNC: 14 G/DL — SIGNIFICANT CHANGE UP (ref 11.5–15.5)
INR BLD: 1.87 — HIGH (ref 0.88–1.17)
MAGNESIUM SERPL-MCNC: 1.9 MG/DL — SIGNIFICANT CHANGE UP (ref 1.6–2.6)
MAGNESIUM SERPL-MCNC: 2.1 MG/DL — SIGNIFICANT CHANGE UP (ref 1.6–2.6)
MCHC RBC-ENTMCNC: 24.8 PG — LOW (ref 27–34)
MCHC RBC-ENTMCNC: 32 % — SIGNIFICANT CHANGE UP (ref 32–36)
MCV RBC AUTO: 77.5 FL — LOW (ref 80–100)
NRBC # FLD: 0 — SIGNIFICANT CHANGE UP
PLATELET # BLD AUTO: 387 K/UL — SIGNIFICANT CHANGE UP (ref 150–400)
PMV BLD: 10.3 FL — SIGNIFICANT CHANGE UP (ref 7–13)
POTASSIUM SERPL-MCNC: 3.5 MMOL/L — SIGNIFICANT CHANGE UP (ref 3.5–5.3)
POTASSIUM SERPL-MCNC: 3.5 MMOL/L — SIGNIFICANT CHANGE UP (ref 3.5–5.3)
POTASSIUM SERPL-MCNC: 3.7 MMOL/L — SIGNIFICANT CHANGE UP (ref 3.5–5.3)
POTASSIUM SERPL-SCNC: 3.5 MMOL/L — SIGNIFICANT CHANGE UP (ref 3.5–5.3)
POTASSIUM SERPL-SCNC: 3.5 MMOL/L — SIGNIFICANT CHANGE UP (ref 3.5–5.3)
POTASSIUM SERPL-SCNC: 3.7 MMOL/L — SIGNIFICANT CHANGE UP (ref 3.5–5.3)
PROTHROM AB SERPL-ACNC: 21 SEC — HIGH (ref 9.8–13.1)
RBC # BLD: 5.64 M/UL — HIGH (ref 3.8–5.2)
RBC # FLD: 22.1 % — HIGH (ref 10.3–14.5)
SODIUM SERPL-SCNC: 136 MMOL/L — SIGNIFICANT CHANGE UP (ref 135–145)
SODIUM SERPL-SCNC: 136 MMOL/L — SIGNIFICANT CHANGE UP (ref 135–145)
SODIUM SERPL-SCNC: 137 MMOL/L — SIGNIFICANT CHANGE UP (ref 135–145)
WBC # BLD: 4.2 K/UL — SIGNIFICANT CHANGE UP (ref 3.8–10.5)
WBC # FLD AUTO: 4.2 K/UL — SIGNIFICANT CHANGE UP (ref 3.8–10.5)

## 2018-07-30 PROCEDURE — 99233 SBSQ HOSP IP/OBS HIGH 50: CPT

## 2018-07-30 PROCEDURE — 99232 SBSQ HOSP IP/OBS MODERATE 35: CPT

## 2018-07-30 PROCEDURE — 99232 SBSQ HOSP IP/OBS MODERATE 35: CPT | Mod: GC

## 2018-07-30 RX ORDER — SPIRONOLACTONE 25 MG/1
25 TABLET, FILM COATED ORAL EVERY 12 HOURS
Qty: 0 | Refills: 0 | Status: DISCONTINUED | OUTPATIENT
Start: 2018-07-30 | End: 2018-07-31

## 2018-07-30 RX ORDER — FUROSEMIDE 40 MG
40 TABLET ORAL ONCE
Qty: 0 | Refills: 0 | Status: COMPLETED | OUTPATIENT
Start: 2018-07-30 | End: 2018-07-30

## 2018-07-30 RX ORDER — WARFARIN SODIUM 2.5 MG/1
2 TABLET ORAL ONCE
Qty: 0 | Refills: 0 | Status: COMPLETED | OUTPATIENT
Start: 2018-07-30 | End: 2018-07-30

## 2018-07-30 RX ORDER — SPIRONOLACTONE 25 MG/1
25 TABLET, FILM COATED ORAL DAILY
Qty: 0 | Refills: 0 | Status: DISCONTINUED | OUTPATIENT
Start: 2018-07-30 | End: 2018-07-30

## 2018-07-30 RX ORDER — FUROSEMIDE 40 MG
15 TABLET ORAL
Qty: 500 | Refills: 0 | Status: DISCONTINUED | OUTPATIENT
Start: 2018-07-30 | End: 2018-07-30

## 2018-07-30 RX ORDER — LANOLIN ALCOHOL/MO/W.PET/CERES
6 CREAM (GRAM) TOPICAL AT BEDTIME
Qty: 0 | Refills: 0 | Status: DISCONTINUED | OUTPATIENT
Start: 2018-07-30 | End: 2018-08-10

## 2018-07-30 RX ORDER — POTASSIUM CHLORIDE 20 MEQ
40 PACKET (EA) ORAL EVERY 4 HOURS
Qty: 0 | Refills: 0 | Status: COMPLETED | OUTPATIENT
Start: 2018-07-30 | End: 2018-07-30

## 2018-07-30 RX ORDER — POTASSIUM CHLORIDE 20 MEQ
40 PACKET (EA) ORAL ONCE
Qty: 0 | Refills: 0 | Status: COMPLETED | OUTPATIENT
Start: 2018-07-30 | End: 2018-07-30

## 2018-07-30 RX ADMIN — Medication 1 APPLICATION(S): at 06:07

## 2018-07-30 RX ADMIN — Medication 40 MILLIGRAM(S): at 12:39

## 2018-07-30 RX ADMIN — Medication 7.5 MG/HR: at 13:08

## 2018-07-30 RX ADMIN — LOSARTAN POTASSIUM 25 MILLIGRAM(S): 100 TABLET, FILM COATED ORAL at 06:07

## 2018-07-30 RX ADMIN — Medication 40 MILLIEQUIVALENT(S): at 03:30

## 2018-07-30 RX ADMIN — BUDESONIDE AND FORMOTEROL FUMARATE DIHYDRATE 2 PUFF(S): 160; 4.5 AEROSOL RESPIRATORY (INHALATION) at 23:13

## 2018-07-30 RX ADMIN — Medication 40 MILLIEQUIVALENT(S): at 09:16

## 2018-07-30 RX ADMIN — Medication 40 MILLIEQUIVALENT(S): at 18:33

## 2018-07-30 RX ADMIN — BUDESONIDE AND FORMOTEROL FUMARATE DIHYDRATE 2 PUFF(S): 160; 4.5 AEROSOL RESPIRATORY (INHALATION) at 09:16

## 2018-07-30 RX ADMIN — WARFARIN SODIUM 2 MILLIGRAM(S): 2.5 TABLET ORAL at 13:09

## 2018-07-30 RX ADMIN — LORATADINE 10 MILLIGRAM(S): 10 TABLET ORAL at 13:09

## 2018-07-30 NOTE — DISCHARGE NOTE ADULT - CARE PLAN
Principal Discharge DX:	Acute right-sided heart failure  Goal:	To relieve and prevent worsening symptoms associated with congestive heart failure, to improve quality of life, and to treat underlying conditions such as coronary heart disease, high blood pressure, or diabetes, and to maintain euvolemia.  Assessment and plan of treatment:	Low salt diet, fluid restriction to 1500 ml daily, monitor your fluid intake and weight daily, exercise as tolerated, and follow up with cardiology within 1-2 weeks; call for appointment.  Secondary Diagnosis:	Pulmonary HTN  Goal:	Resolution of symptoms.  Assessment and plan of treatment:	Continue adempas as prescribed.  Continue supplemental oxygen.  Follow up with your pulmonologist within 1-2 weeks; call for appointment.  Secondary Diagnosis:	Essential hypertension  Goal:	To maintain a normal blood pressure to prevent heart attack, stroke and renal failure.  Assessment and plan of treatment:	Low sodium and fat diet, continue anti-hypertensive medications, and follow up with primary care physician.  Secondary Diagnosis:	COPD (chronic obstructive pulmonary disease)  Goal:	To slow down the progression of the disease by avoiding triggers, such as air pollution.  Continue current medications as prescribed to prevent as shortness of breath, COPD exacerbation and to improve your overall health with regular activity and quality of life.  Follow up routine appointment.  Assessment and plan of treatment:	Continue current medications as prescribed. Follow up with your routine physician's appointments.  Continue supplemental oxygen.  Secondary Diagnosis:	Anticardiolipin antibody positive  Goal:	Continue blood thinner to prevent clots from forming.  Assessment and plan of treatment:	Continue coumadin as prescribed.  Follow up with your PCP to have your INR level checked; call for appointment.  Secondary Diagnosis:	Depression, unspecified depression type  Goal:	Outpatient psychiatry follow up.  Assessment and plan of treatment:	Continue sertraline as prescribed.  Follow up with outpatient psychiatry and therapy.  You may follow up at the [facility] ([address] [facility], 2nd [place] [address], phone [phone]); call for appointment.  Secondary Diagnosis:	Morbid obesity  Goal:	Weight loss.  Assessment and plan of treatment:	Eat a low salt, low fat, fluid restricted (less than 1.5 L/day) diet.  Exercise as tolerated.  Follow up with your PCP within 1-2 weeks; call for appointment. Principal Discharge DX:	Acute right-sided heart failure  Goal:	To relieve and prevent worsening symptoms associated with congestive heart failure, to improve quality of life, and to treat underlying conditions such as coronary heart disease, high blood pressure, or diabetes, and to maintain euvolemia.  Assessment and plan of treatment:	Low salt diet, fluid restriction to 1500 ml daily, monitor your fluid intake and weight daily, exercise as tolerated, and follow up with cardiology within 1-2 weeks; call for appointment.  Secondary Diagnosis:	Pulmonary HTN  Goal:	Resolution of symptoms.  Assessment and plan of treatment:	Continue adempas as prescribed.  Continue supplemental oxygen.  You have an appointment with Dr. Unger on 8/13/18 at 12:30 pm at [address] [place] [address]. Please all [phone] if you have to reschedule.  Secondary Diagnosis:	Essential hypertension  Goal:	To maintain a normal blood pressure to prevent heart attack, stroke and renal failure.  Assessment and plan of treatment:	Low sodium and fat diet, continue anti-hypertensive medications, and follow up with primary care physician.  Secondary Diagnosis:	COPD (chronic obstructive pulmonary disease)  Goal:	To slow down the progression of the disease by avoiding triggers, such as air pollution.  Continue current medications as prescribed to prevent as shortness of breath, COPD exacerbation and to improve your overall health with regular activity and quality of life.  Follow up routine appointment.  Assessment and plan of treatment:	Continue current medications as prescribed. Follow up with your routine physician's appointments.  Continue supplemental oxygen.  Secondary Diagnosis:	Anticardiolipin antibody positive  Goal:	Continue blood thinner to prevent clots from forming.  Assessment and plan of treatment:	Continue coumadin as prescribed.  Follow up with your PCP to have your INR level checked; call for appointment.  Secondary Diagnosis:	Depression, unspecified depression type  Goal:	Outpatient psychiatry follow up.  Assessment and plan of treatment:	Continue sertraline as prescribed.  Follow up with outpatient psychiatry and therapy.  You may follow up at the [facility] ([address] [facility], 2nd [place] [address], phone [phone]); call for appointment.  Secondary Diagnosis:	Morbid obesity  Goal:	Weight loss.  Assessment and plan of treatment:	Eat a low salt, low fat, fluid restricted (less than 1.5 L/day) diet.  Exercise as tolerated.  Follow up with your PCP within 1-2 weeks; call for appointment. Principal Discharge DX:	Acute right-sided heart failure  Goal:	To relieve and prevent worsening symptoms associated with congestive heart failure, to improve quality of life, and to treat underlying conditions such as coronary heart disease, high blood pressure, or diabetes, and to maintain euvolemia.  Assessment and plan of treatment:	Low salt diet, fluid restriction to 1500 ml daily, monitor your fluid intake and weight daily, exercise as tolerated, and follow up with cardiology/heart failure specialist Dr Shrestha 8/15 @ 3:15pm  Aldactone, bumex, potassium supplement  Please follow up with  Secondary Diagnosis:	Pulmonary HTN  Goal:	Resolution of symptoms.  Assessment and plan of treatment:	Continue adempas as prescribed.  Continue supplemental oxygen.  - She has an outpatient appt with Dr. Unger on 8/13/18 which is included in her discharge note.  You have an appointment with Dr. Unger on 8/13/18 at 12:30 pm at [address] [place] [address]. Please all [phone] if you have to reschedule.  Needs outpatient sleep study & VQ scan as per CHF  Secondary Diagnosis:	Essential hypertension  Goal:	To maintain a normal blood pressure to prevent heart attack, stroke and renal failure.  Assessment and plan of treatment:	Low sodium and fat diet, continue anti-hypertensive medications, and follow up with primary care physician.  Secondary Diagnosis:	COPD (chronic obstructive pulmonary disease)  Goal:	To slow down the progression of the disease by avoiding triggers, such as air pollution.  Continue current medications as prescribed to prevent as shortness of breath, COPD exacerbation and to improve your overall health with regular activity and quality of life.  Follow up routine appointment.  Assessment and plan of treatment:	Continue current medications as prescribed. Follow up with your routine physician's appointments.  Continue supplemental oxygen.  Symcort, albuterol.  Secondary Diagnosis:	Anticardiolipin antibody positive  Goal:	Continue blood thinner to prevent clots from forming.  Assessment and plan of treatment:	Continue coumadin as prescribed.  Follow up with your PCP to have your INR level checked; call for an appointment. Follow up on Monday to check PTINR labs with your primary care doctor  Secondary Diagnosis:	Depression, unspecified depression type  Goal:	Outpatient psychiatry follow up.  Assessment and plan of treatment:	Continue sertraline as prescribed.  Follow up with outpatient psychiatry and therapy.  You may follow up at the [facility] ([address] [facility], 2nd [place] [address], phone [phone]); call for appointment.  Secondary Diagnosis:	Morbid obesity  Goal:	Weight loss.  Assessment and plan of treatment:	Eat a low salt, low fat, fluid restricted (less than 1.5 L/day) diet.  Exercise as tolerated.  Follow up with your PCP within 1-2 weeks; call for appointment.

## 2018-07-30 NOTE — PROGRESS NOTE BEHAVIORAL HEALTH - NSBHFUPINTERVALHXFT_PSY_A_CORE
Pt received no PRNs overnight. Pt seen and examined at the bedside. Pt states that she did not take recommended Zoloft because she was fearful that it would effect her other medications. Pt also states that she was nervous of experiencing side effects. Pt informed that psych team spoke with medicine team and agreed that Zoloft would be safe to begin, patient agreeable to taking Zoloft today 7/30. Pt reports decreased lower extremity swelling and that she is able to walk with more ease. Pt states that she is hopeful things are going to get better.

## 2018-07-30 NOTE — PROGRESS NOTE ADULT - SUBJECTIVE AND OBJECTIVE BOX
CHIEF COMPLAINT: dyspnea and swelling    Interval Events:    REVIEW OF SYSTEMS:  Constitutional: [x ] negative [ ] fevers [ ] chills [ ] weight loss [ ] weight gain  HEENT: [x ] negative [ ] dry eyes [ ] eye irritation [ ] postnasal drip [ ] nasal congestion  CV: [x ] negative  [ ] chest pain [ ] orthopnea [ ] palpitations [ ] murmur  Resp: [x ] negative [ ] cough [ ] shortness of breath [ ] dyspnea [ ] wheezing [ ] sputum [ ] hemoptysis  GI: [x ] negative [ ] nausea [ ] vomiting [ ] diarrhea [ ] constipation [ ] abd pain [ ] dysphagia   : [x ] negative [ ] dysuria [ ] nocturia [ ] hematuria [ ] increased urinary frequency  Musculoskeletal: [ ] negative [ ] back pain [ ] myalgias [ ] arthralgias [x ] swelling in legs.  Skin: [x ] negative [ ] rash [ ] itch  Neurological: [x ] negative [ ] headache [ ] dizziness [ ] syncope [ ] weakness [ ] numbness  Psychiatric: [ ] negative [ ] anxiety [ ] depression  Endocrine: [x ] negative [ ] diabetes [ ] thyroid problem  Hematologic/Lymphatic: [ ] negative [ ] anemia [ ] bleeding problem  Allergic/Immunologic: [ ] negative [ ] itchy eyes [ ] nasal discharge [ ] hives [ ] angioedema  [ ] All other systems negative  [ ] Unable to assess ROS because ________    OBJECTIVE:  T(C): 36.9 (30 Jul 2018 05:54), Max: 37.3 (29 Jul 2018 19:57)  T(F): 98.4 (30 Jul 2018 05:54), Max: 99.1 (29 Jul 2018 19:57)  HR: 70 (30 Jul 2018 05:54) (70 - 93)  BP: 110/72 (30 Jul 2018 05:54) (90/55 - 131/68)  RR: 18 (30 Jul 2018 05:54) (18 - 18)  SpO2: 94% (30 Jul 2018 05:54) (92% - 94%)        07-29 @ 07:01  -  07-30 @ 07:00  --------------------------------------------------------  IN: 610 mL / OUT: 4100 mL / NET: -3490 mL    07-30 @ 07:01  -  07-30 @ 13:38  --------------------------------------------------------  IN: 0 mL / OUT: 2200 mL / NET: -2200 mL      CAPILLARY BLOOD GLUCOSE          PHYSICAL EXAM:  General: NAD  HEENT: HECTOR  Lymph Nodes:  Neck: No JVD  Respiratory: CTA b/l  Cardiovascular: RRR  Abdomen: S/NT/ND  Extremities:2+ edema pitting to below knees b/l.   Skin: stable lymphedema changes in skin in b/l LE.  Neurological: non-focal.  Psychiatry:    HOSPITAL MEDICATIONS:      furosemide Infusion 15 mG/Hr IV Continuous <Continuous>  losartan 25 milliGRAM(s) Oral daily  spironolactone 25 milliGRAM(s) Oral daily      ALBUTerol    90 MICROgram(s) HFA Inhaler 2 Puff(s) Inhalation every 6 hours PRN  buDESOnide  80 MICROgram(s)/formoterol 4.5 MICROgram(s) Inhaler 2 Puff(s) Inhalation two times a day  loratadine 10 milliGRAM(s) Oral daily    acetaminophen   Tablet. 650 milliGRAM(s) Oral every 6 hours PRN  LORazepam     Tablet 0.5 milliGRAM(s) Oral every 8 hours PRN  sertraline 25 milliGRAM(s) Oral daily    calcium carbonate    500 mG (Tums) Chewable 1 Tablet(s) Chew three times a day PRN          influenza   Vaccine 0.5 milliLiter(s) IntraMuscular once    BACItracin   Ointment 1 Application(s) Topical two times a day    Adempas(riociguat) 1.5mg Tablet 1 Tablet(s) 1 Tablet(s) Oral every 8 hours      LABS:                        14.0   4.20  )-----------( 387      ( 30 Jul 2018 03:42 )             43.7     Hgb Trend: 14.0<--, 14.3<--, 12.3<--, 11.9<--, 12.2<--  07-30    136  |  94<L>  |  9   ----------------------------<  94  3.5   |  27  |  0.96    Ca    9.4      30 Jul 2018 03:42  Mg     1.9     07-30      Creatinine Trend: 0.96<--, 0.95<--, 1.03<--, 0.99<--, 0.92<--, 1.02<--  PT/INR - ( 30 Jul 2018 03:42 )   PT: 21.0 SEC;   INR: 1.87                    MICROBIOLOGY:     RADIOLOGY:  [ ] Reviewed and interpreted by me    PULMONARY FUNCTION TESTS:    EKG:

## 2018-07-30 NOTE — PROGRESS NOTE ADULT - PROBLEM SELECTOR PLAN 2
Plan as above.   Diuretics as above.  Is losartan for SBP?  K low 3.5. Supplement to keep 4.0-4.5.  Was given 2 x 40 meq.  Will give [place] 25 mg po qd.

## 2018-07-30 NOTE — PROGRESS NOTE ADULT - SUBJECTIVE AND OBJECTIVE BOX
Patient is a 33y old  Female who presents with chest pain, SOB        SUBJECTIVE / OVERNIGHT EVENTS: feeling better, legs less heavy, able to walk easier; SOB improved      MEDICATIONS  (STANDING):  Adempas(riociguat) 1.5mg Tablet 1 Tablet(s) 1 Tablet(s) Oral every 8 hours  BACItracin   Ointment 1 Application(s) Topical two times a day  buDESOnide  80 MICROgram(s)/formoterol 4.5 MICROgram(s) Inhaler 2 Puff(s) Inhalation two times a day  furosemide Infusion 15 mG/Hr (7.5 mL/Hr) IV Continuous <Continuous>  influenza   Vaccine 0.5 milliLiter(s) IntraMuscular once  loratadine 10 milliGRAM(s) Oral daily  losartan 25 milliGRAM(s) Oral daily  sertraline 25 milliGRAM(s) Oral daily  spironolactone 25 milliGRAM(s) Oral daily    MEDICATIONS  (PRN):  acetaminophen   Tablet. 650 milliGRAM(s) Oral every 6 hours PRN Mild Pain (1 - 3)  ALBUTerol    90 MICROgram(s) HFA Inhaler 2 Puff(s) Inhalation every 6 hours PRN Shortness of Breath and/or Wheezing  calcium carbonate    500 mG (Tums) Chewable 1 Tablet(s) Chew three times a day PRN Heartburn  LORazepam     Tablet 0.5 milliGRAM(s) Oral every 8 hours PRN Anxiety      Vital Signs Last 24 Hrs  T(F): 98.4 (30 Jul 2018 05:54), Max: 99.1 (29 Jul 2018 19:57)  HR: 70 (30 Jul 2018 05:54) (70 - 93)  BP: 110/72 (30 Jul 2018 05:54) (110/72 - 131/68)  RR: 18 (30 Jul 2018 05:54) (18 - 18)  SpO2: 94% (30 Jul 2018 05:54) (92% - 94%)          I&O's Summary    29 Jul 2018 07:01  -  30 Jul 2018 07:00  --------------------------------------------------------  IN: 610 mL / OUT: 4100 mL / NET: -3490 mL    30 Jul 2018 07:01  -  30 Jul 2018 13:44  --------------------------------------------------------  IN: 0 mL / OUT: 2200 mL / NET: -2200 mL          PHYSICAL EXAM  GENERAL: NAD, well-developed  HEAD:  Atraumatic, Normocephalic  EYES: EOMI, PERRLA, conjunctiva and sclera clear  CHEST/LUNG: dec BS at bases  HEART: Regular rate and rhythm  ABDOMEN: Soft, Nontender, Nondistended; Bowel sounds present; obese  EXTREMITIES:  ++ edema  PSYCH: AAOx3      LABS:                        14.0   4.20  )-----------( 387      ( 30 Jul 2018 03:42 )             43.7     07-30    136  |  94<L>  |  9   ----------------------------<  94  3.5   |  27  |  0.96    Ca    9.4      30 Jul 2018 03:42  Mg     1.9     07-30      PT/INR - ( 30 Jul 2018 03:42 )   PT: 21.0 SEC;   INR: 1.87                Care Discussed with Consultants/Other Providers: Dr Alcantar

## 2018-07-30 NOTE — DISCHARGE NOTE ADULT - MEDICATION SUMMARY - MEDICATIONS TO TAKE
I will START or STAY ON the medications listed below when I get home from the hospital:    Aldactone 25 mg oral tablet  -- 2 tab(s) by mouth 2 times a day  -- Indication: For Acute on chronic right heart failure    calcium carbonate 500 mg (200 mg elemental calcium) oral tablet, chewable  -- 1 tab(s) by mouth 3 times a day, As needed, Heartburn  -- Indication: For Supplements    warfarin 2 mg oral tablet  -- 1 tab(s) by mouth once a day  -- Indication: For Antiphospholipid antibody syndrome    albuterol 2.5 mg/3 mL (0.083%) inhalation solution  -- 3 milliliter(s) by nebulizer every 6 hours -for bronchospasm   -- For inhalation only.  It is very important that you take or use this exactly as directed.  Do not skip doses or discontinue unless directed by your doctor.  Obtain medical advice before taking any non-prescription drugs as some may affect the action of this medication.    -- Indication: For COPD (chronic obstructive pulmonary disease)    ProAir HFA 90 mcg/inh inhalation aerosol  -- 2 puff(s) inhaled 4 times a day, As Needed  -- Indication: For COPD (chronic obstructive pulmonary disease)    Symbicort 160 mcg-4.5 mcg/inh inhalation aerosol  -- 1 puff(s) inhaled 2 times a day  -- Indication: For COPD (chronic obstructive pulmonary disease)    bumetanide 2 mg oral tablet  -- 1 tab(s) by mouth 2 times a day  -- Indication: For Acute right-sided heart failure    Innerclean oral tablet  -- 1 tab(s) by mouth once  -- Indication: For COnstipation    Colace 100 mg oral capsule  -- 1 cap(s) by mouth once a day  -- Indication: For COnstipation    Klor-Con M20 oral tablet, extended release  -- 2 tab(s) by mouth once a day   -- Indication: For Supplements    riociguat 1.5 mg oral tablet  -- 1 tab(s) by mouth 3 times a day  -- Indication: For Pulmonary hypertension I will START or STAY ON the medications listed below when I get home from the hospital:    Aldactone 25 mg oral tablet  -- 2 tab(s) by mouth 2 times a day  -- Indication: For Acute on chronic right heart failure    calcium carbonate 500 mg (200 mg elemental calcium) oral tablet, chewable  -- 1 tab(s) by mouth 3 times a day, As needed, Heartburn  -- Indication: For Antacid    warfarin 2 mg oral tablet  -- 1 tab(s) by mouth once a day  -- Indication: For Anticardiolipin antibody positive    albuterol 2.5 mg/3 mL (0.083%) inhalation solution  -- 3 milliliter(s) by nebulizer every 6 hours -for bronchospasm   -- For inhalation only.  It is very important that you take or use this exactly as directed.  Do not skip doses or discontinue unless directed by your doctor.  Obtain medical advice before taking any non-prescription drugs as some may affect the action of this medication.    -- Indication: For COPD (chronic obstructive pulmonary disease)    ProAir HFA 90 mcg/inh inhalation aerosol  -- 2 puff(s) inhaled 4 times a day, As Needed  -- Indication: For COPD (chronic obstructive pulmonary disease)    Symbicort 160 mcg-4.5 mcg/inh inhalation aerosol  -- 1 puff(s) inhaled 2 times a day  -- Indication: For COPD (chronic obstructive pulmonary disease)    bumetanide 2 mg oral tablet  -- 1 tab(s) by mouth 2 times a day  -- Indication: For Acute on chronic right heart failure    Innerclean oral tablet  -- 1 tab(s) by mouth once  -- Indication: For COnstipation    Colace 100 mg oral capsule  -- 1 cap(s) by mouth once a day  -- Indication: For COnstipation    Klor-Con M20 oral tablet, extended release  -- 2 tab(s) by mouth once a day   -- Indication: For Supplement     riociguat 1.5 mg oral tablet  -- 1 tab(s) by mouth 3 times a day  -- Indication: For Pulmonary HTN

## 2018-07-30 NOTE — DISCHARGE NOTE ADULT - CARE PROVIDER_API CALL
Natalie Frost (MD; PhD), Adv Heart Fail Trnsplnt Cardio; Cardiovascular Disease; Internal Medicine  [address] [place] [address]  Phone: [phone]  Fax: [phone]    [facility],   [facility] [facility], [address] [place] [address]  Phone: [phone]  Fax: (       - Natalie Frost; PhD), Adv Heart Fail Trnsplnt Cardio; Cardiovascular Disease; Internal Medicine  [address] [place] [address]  Phone: [phone]  Fax: [phone]    [facility],   [address] [facility], [address]nd [place] [address]  Phone: [phone]  Fax: (   )    -    Day Lu), Critical Care Medicine; Internal Medicine; Pulmonary Disease; Sleep Medicine  [address] [place] [address]  Phone: [phone]  Fax: [phone] Natalie Frost (MD; PhD), Adv Heart Fail Trnsplnt Cardio; Cardiovascular Disease; Internal Medicine  [address] [place] [address]  Phone: [phone]  Fax: [phone]    [facility],   [facility] [facility], 2nd [place] [address]  Phone: [phone]  Fax: (   )    -    Ute MOON), Critical Care Medicine,  Pulmonary Disease, [facility]  You have an appointment scheduled for 8/13/13 at 12:30 pm with Dr. Unger at   [address] [place] [address], [address]  Phone: [phone]  Fax: [phone]

## 2018-07-30 NOTE — DISCHARGE NOTE ADULT - CARE PROVIDERS DIRECT ADDRESSES
,[email or web address],DirectAddress_Unknown ,[email or web address],[email or web address],[email or web address],DirectAddress_Unknown,DirectAddress_Unknown

## 2018-07-30 NOTE — DISCHARGE NOTE ADULT - INSTRUCTIONS
Low salt, low fat, fluid restricted (less than 1.5 L/day) diet. Low salt, low fat, fluid restricted (less than 1.5 L/day) diet.  low salt, low fat, low cholesterol  No driving x 24 hours. No strenuous activity for three weeks. Monitor site of procedure and notify your doctor for any  bleeding / swelling/redness/ discharge/pain. You may shower but no baths or swimming x one week. No heavy lifting x 1 week.

## 2018-07-30 NOTE — PROGRESS NOTE ADULT - SUBJECTIVE AND OBJECTIVE BOX
Patient seen and examined at bedside.    Overnight Events:     Review Of Systems: No chest pain, shortness of breath, or palpitations            Current Meds:  acetaminophen   Tablet. 650 milliGRAM(s) Oral every 6 hours PRN  Adempas(riociguat) 1.5mg Tablet 1 Tablet(s) 1 Tablet(s) Oral every 8 hours  ALBUTerol    90 MICROgram(s) HFA Inhaler 2 Puff(s) Inhalation every 6 hours PRN  BACItracin   Ointment 1 Application(s) Topical two times a day  buDESOnide  80 MICROgram(s)/formoterol 4.5 MICROgram(s) Inhaler 2 Puff(s) Inhalation two times a day  calcium carbonate    500 mG (Tums) Chewable 1 Tablet(s) Chew three times a day PRN  furosemide Infusion 10 mG/Hr IV Continuous <Continuous>  influenza   Vaccine 0.5 milliLiter(s) IntraMuscular once  loratadine 10 milliGRAM(s) Oral daily  LORazepam     Tablet 0.5 milliGRAM(s) Oral every 8 hours PRN  losartan 25 milliGRAM(s) Oral daily  potassium chloride    Tablet ER 40 milliEquivalent(s) Oral once  sertraline 25 milliGRAM(s) Oral daily      Vitals:  T(F): 98.4 (07-30), Max: 99.1 (07-29)  HR: 70 (07-30) (70 - 93)  BP: 110/72 (07-30) (90/55 - 131/68)  RR: 18 (07-30)  SpO2: 94% (07-30)  I&O's Summary    29 Jul 2018 07:01  -  30 Jul 2018 07:00  --------------------------------------------------------  IN: 610 mL / OUT: 4100 mL / NET: -3490 mL    30 Jul 2018 07:01  -  30 Jul 2018 09:09  --------------------------------------------------------  IN: 0 mL / OUT: 1000 mL / NET: -1000 mL        Physical Exam:  Appearance: No acute distress; well appearing  Eyes: PERRL, EOMI, pink conjunctiva  HENT: Normal oral mucosa  Cardiovascular: RRR, S1, S2, no murmurs, rubs, or gallops; no edema; no JVD  Respiratory: Clear to auscultation bilaterally  Gastrointestinal: soft, non-tender, non-distended with normal bowel sounds  Musculoskeletal: No clubbing; no joint deformity   Neurologic: Non-focal  Lymphatic: No lymphadenopathy  Psychiatry: AAOx3, mood & affect appropriate  Skin: No rashes, ecchymoses, or cyanosis                          14.0   4.20  )-----------( 387      ( 30 Jul 2018 03:42 )             43.7     07-30    136  |  94<L>  |  9   ----------------------------<  94  3.5   |  27  |  0.96    Ca    9.4      30 Jul 2018 03:42  Mg     1.9     07-30      PT/INR - ( 30 Jul 2018 03:42 )   PT: 21.0 SEC;   INR: 1.87            CARDIAC MARKERS ( 25 Jul 2018 11:35 )  x     / x     / x     / 208 u/L / 2.58 ng/mL / x          Serum Pro-Brain Natriuretic Peptide: 1876 pg/mL (07-25 @ 10:30)          New ECG(s): Personally reviewed    Echo:    Stress Testing:     Cath:    Imaging:    Interpretation of Telemetry: Patient seen and examined at bedside. Denies any current chest pain, palpitations, SOB, new leg swelling. Reports further symptomatic relief of leg swelling and SOB.    Overnight Events: No acute events overnight.     Review Of Systems: No chest pain, shortness of breath, or palpitations            Current Meds:  acetaminophen   Tablet. 650 milliGRAM(s) Oral every 6 hours PRN  Adempas(riociguat) 1.5mg Tablet 1 Tablet(s) 1 Tablet(s) Oral every 8 hours  ALBUTerol    90 MICROgram(s) HFA Inhaler 2 Puff(s) Inhalation every 6 hours PRN  BACItracin   Ointment 1 Application(s) Topical two times a day  buDESOnide  80 MICROgram(s)/formoterol 4.5 MICROgram(s) Inhaler 2 Puff(s) Inhalation two times a day  calcium carbonate    500 mG (Tums) Chewable 1 Tablet(s) Chew three times a day PRN  furosemide Infusion 10 mG/Hr IV Continuous <Continuous>  influenza   Vaccine 0.5 milliLiter(s) IntraMuscular once  loratadine 10 milliGRAM(s) Oral daily  LORazepam     Tablet 0.5 milliGRAM(s) Oral every 8 hours PRN  losartan 25 milliGRAM(s) Oral daily  potassium chloride    Tablet ER 40 milliEquivalent(s) Oral once  sertraline 25 milliGRAM(s) Oral daily      Vitals:  T(F): 98.4 (07-30), Max: 99.1 (07-29)  HR: 70 (07-30) (70 - 93)  BP: 110/72 (07-30) (90/55 - 131/68)  RR: 18 (07-30)  SpO2: 94% (07-30)  I&O's Summary    29 Jul 2018 07:01  -  30 Jul 2018 07:00  --------------------------------------------------------  IN: 610 mL / OUT: 4100 mL / NET: -3490 mL    30 Jul 2018 07:01  -  30 Jul 2018 09:09  --------------------------------------------------------  IN: 0 mL / OUT: 1000 mL / NET: -1000 mL        Physical Exam:  Appearance: No acute distress; well appearing  Eyes: PERRL, EOMI, pink conjunctiva  HENT: Normal oral mucosa  Cardiovascular: RRR, S1, S2, no murmurs, rubs, or gallops; no edema; difficult to appreciate JVD.   Respiratory: Clear to auscultation bilaterally  Gastrointestinal: soft, non-tender, non-distended with normal bowel sounds  Musculoskeletal: No clubbing; no joint deformity   Neurologic: Non-focal  Lymphatic: No lymphadenopathy  Psychiatry: AAOx3, mood & affect appropriate  Skin: No rashes, ecchymoses, or cyanosis. 2+ pitting edema up to thigh right above knee.                          14.0   4.20  )-----------( 387      ( 30 Jul 2018 03:42 )             43.7     07-30    136  |  94<L>  |  9   ----------------------------<  94  3.5   |  27  |  0.96    Ca    9.4      30 Jul 2018 03:42  Mg     1.9     07-30      PT/INR - ( 30 Jul 2018 03:42 )   PT: 21.0 SEC;   INR: 1.87            CARDIAC MARKERS ( 25 Jul 2018 11:35 )  x     / x     / x     / 208 u/L / 2.58 ng/mL / x          Serum Pro-Brain Natriuretic Peptide: 1876 pg/mL (07-25 @ 10:30)          New ECG(s): Personally reviewed    Echo: As per prior note    Stress Testing: As per prior note    Cath: As per prior note    Imaging: As per prior note    Interpretation of Telemetry:

## 2018-07-30 NOTE — DISCHARGE NOTE ADULT - PLAN OF CARE
Resolution of symptoms. Continue adempas as prescribed.  Continue supplemental oxygen.  Follow up with your pulmonologist within 1-2 weeks; call for appointment. To maintain a normal blood pressure to prevent heart attack, stroke and renal failure. Low sodium and fat diet, continue anti-hypertensive medications, and follow up with primary care physician. To slow down the progression of the disease by avoiding triggers, such as air pollution.  Continue current medications as prescribed to prevent as shortness of breath, COPD exacerbation and to improve your overall health with regular activity and quality of life.  Follow up routine appointment. Continue current medications as prescribed. Follow up with your routine physician's appointments.  Continue supplemental oxygen. Continue blood thinner to prevent clots from forming. Continue coumadin as prescribed.  Follow up with your PCP to have your INR level checked; call for appointment. Outpatient psychiatry follow up. Continue sertraline as prescribed.  Follow up with outpatient psychiatry and therapy.  You may follow up at the [facility] ([address] [facility], [address] [place] [address], phone [phone]); call for appointment. Weight loss. Eat a low salt, low fat, fluid restricted (less than 1.5 L/day) diet.  Exercise as tolerated.  Follow up with your PCP within 1-2 weeks; call for appointment. To relieve and prevent worsening symptoms associated with congestive heart failure, to improve quality of life, and to treat underlying conditions such as coronary heart disease, high blood pressure, or diabetes, and to maintain euvolemia. Low salt diet, fluid restriction to 1500 ml daily, monitor your fluid intake and weight daily, exercise as tolerated, and follow up with cardiology within 1-2 weeks; call for appointment. Continue adempas as prescribed.  Continue supplemental oxygen.  You have an appointment with Dr. Unger on 8/13/18 at 12:30 pm at [address] [place] [address]. Please all [phone] if you have to reschedule. Low salt diet, fluid restriction to 1500 ml daily, monitor your fluid intake and weight daily, exercise as tolerated, and follow up with cardiology/heart failure specialist Dr Shrestha 8/15 @ 3:15pm  Aldactone, bumex, potassium supplement  Please follow up with Continue adempas as prescribed.  Continue supplemental oxygen.  - She has an outpatient appt with Dr. Unger on 8/13/18 which is included in her discharge note.  You have an appointment with Dr. Unger on 8/13/18 at 12:30 pm at [address] [place] [address]. Please all [phone] if you have to reschedule.  Needs outpatient sleep study & VQ scan as per CHF Continue current medications as prescribed. Follow up with your routine physician's appointments.  Continue supplemental oxygen.  Symcort, albuterol. Continue coumadin as prescribed.  Follow up with your PCP to have your INR level checked; call for an appointment. Follow up on Monday to check PTINR labs with your primary care doctor
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
## 2018-07-30 NOTE — DISCHARGE NOTE ADULT - PROVIDER TOKENS
TOKEN:'[id number]',FREE:[LAST:[[facility]],PHONE:[phone]],FAX:[(   )    -],ADDRESS:[[address] [place] [address]]] TOKEN:'[id number]',FREE:[LAST:[[facility]],PHONE:[phone]:[(   )    -],ADDRESS:[[address] [place] [address]]],TOKEN:'[id number]' TOKEN:'[id number]',FREE:[LAST:[[facility]],PHONE:[phone]],FAX:[(   )    -],ADDRESS:[[address] [place] [address], [address] [place] [address]]],FREE:[LAST:[[facility]), Critical Care Medicine,  Pulmonary Disease],FIRST:[[facility]],PHONE:[phone]:[phone]],ADDRESS:[You have an appointment scheduled for 8/13/13 at 12:30 pm with Dr. Unger at   [address] [place] [address], [address]]]

## 2018-07-30 NOTE — PROGRESS NOTE ADULT - ATTENDING COMMENTS
She is feeling better and notices an improvement in her LE edema, but she remains massively fluid overloaded despite the aggressive diuresis. She has had hypokalemia requiring significant repletion. Lasix infusion increased to 15 mg/hr this morning, K 3.5, normal SCr.    Please increase the spironolactone to 50 mg po BID starting this evening and check BMP BID while she is requiring this much diuretic. When K > 4.8, can reduce the spironolactone back to 25 mg BID. Unclear why she is on cardizem as this is a negative inotrope for the RV and may be worsening her status. Will need to review with Dr. Unger whether she had responded to NO in the past. If not, would stop the cardizem.

## 2018-07-30 NOTE — PROGRESS NOTE ADULT - PROBLEM SELECTOR PLAN 1
RHC from 2013 noted. 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   Still w/ gross volume overload. But weight is trending down (admission 152 kg, now 136 kg. Diuresing 3.5-4.5 L a day.   Gave Lasix 40 mg IV bolus now, and inceased  Lasix to 15 mg/hr.   SBP stable.   Continue Adempas. Why was cardizem held?  Discussed w/ Dr. Adamson who recommends transfer to [facility]. Patient wanted to stay at [facility] over the weekend, but now is open to transfer. Will d/w Dr. Frost.  Please get O2 sat on room air. D/w nurse. RHC from 2013 noted. 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   Still w/ gross volume overload. But weight is trending down (admission 152 kg, now 136 kg. Diuresing 3.5-4.5 L a day.   Gave Lasix 40 mg IV bolus now, and increased  Lasix to 15 mg/hr. Ordered STAT BMP to check K. Made Spironolactone 25 mg po BID. If K under 4.0, please hold Lasix gtt supplement K to 4.0-4.5, then restart Lasix gtt at current dose. Consider increase Krishna to 50 mg po BID while to aggressive diuresis.   SBP stable.   Continue Adempas. Cardizem was for pulm HTN (home meds) although I do not see documentation of NO challenge in cath.   Discussed w/ Dr. Frost, would be best managed at [facility], but patient does not want to go right now.

## 2018-07-30 NOTE — PROGRESS NOTE ADULT - SUBJECTIVE AND OBJECTIVE BOX
Patient seen and examined. She is feeling a lot better as far as SOB at rest, but still has orthopnea, cough,  and LE edema w/ wounds.   Weight is trending down.     Medications:  acetaminophen   Tablet. 650 milliGRAM(s) Oral every 6 hours PRN  Adempas(riociguat) 1.5mg Tablet 1 Tablet(s) 1 Tablet(s) Oral every 8 hours  ALBUTerol    90 MICROgram(s) HFA Inhaler 2 Puff(s) Inhalation every 6 hours PRN  BACItracin   Ointment 1 Application(s) Topical two times a day  buDESOnide  80 MICROgram(s)/formoterol 4.5 MICROgram(s) Inhaler 2 Puff(s) Inhalation two times a day  calcium carbonate    500 mG (Tums) Chewable 1 Tablet(s) Chew three times a day PRN  furosemide   Injectable 40 milliGRAM(s) IV Push once  furosemide Infusion 15 mG/Hr IV Continuous <Continuous>  influenza   Vaccine 0.5 milliLiter(s) IntraMuscular once  loratadine 10 milliGRAM(s) Oral daily  LORazepam     Tablet 0.5 milliGRAM(s) Oral every 8 hours PRN  losartan 25 milliGRAM(s) Oral daily  sertraline 25 milliGRAM(s) Oral daily  warfarin 2 milliGRAM(s) Oral once      Vitals:  Vital Signs Last 24 Hrs  T(C): 36.9 (2018 05:54), Max: 37.3 (2018 19:57)  T(F): 98.4 (2018 05:54), Max: 99.1 (2018 19:57)  HR: 70 (2018 05:54) (70 - 93)  BP: 110/72 (2018 05:54) (90/55 - 131/68)  BP(mean): --  RR: 18 (2018 05:54) (18 - 18)  SpO2: 94% (2018 05:54) (92% - 94%)    Daily     Daily Weight in k.7 (2018 02:49)    I&O's Detail    2018 07:01  -  2018 07:00  --------------------------------------------------------  IN:    Oral Fluid: 610 mL  Total IN: 610 mL    OUT:    Voided: 4100 mL  Total OUT: 4100 mL    Total NET: -3490 mL      2018 07:01  -  2018 12:28  --------------------------------------------------------  IN:  Total IN: 0 mL    OUT:    Voided: 1000 mL  Total OUT: 1000 mL    Total NET: -1000 mL          Physical Exam:     General: No distress. Comfortable.  HEENT: EOM intact.  Neck: Neck supple. JVP hard to evaluate. No masses  Chest: Clear to auscultation bilaterally  CV: Normal S1 and S2. No murmurs, rub, or gallops. Radial pulses normal. 3 + pitting  LE edema b/l.   Abdomen: Soft, non-distended, non-tender  Skin: No rashes or skin breakdown  Neurology: Alert and oriented times three. Sensation intact  Psych: Affect normal    Labs:                        14.0   4.20  )-----------( 387      ( 2018 03:42 )             43.7     07    136  |  94<L>  |  9   ----------------------------<  94  3.5   |  27  |  0.96    Ca    9.4      2018 03:42  Mg     1.9           PT/INR - ( 2018 03:42 )   PT: 21.0 SEC;   INR: 1.87       < from: Transthoracic Echocardiogram (18 @ 15:19) >  OBSERVATIONS:  Mitral Valve: Normal mitral valve. Minimal mitral  regurgitation.  AorticRoot: Normal aortic root.  Aortic Valve: Normal trileaflet aortic valve.  Left Atrium: Normal left atrium.  Left Ventricle: Endocardium not well visualized; grossly  normal left ventricular systolic function.  Right Heart: Severe right atrial enlargement. Right  ventricular enlargement with decreased right ventricular  systolic function.  Systolic and diastolic flattening of  the interventricular septum, consistent with RV volume and  pressure overload. Normal tricuspid valve.  Moderate-severe  tricuspid regurgitation. Normal pulmonic valve. Mild  pulmonic regurgitation.  Pericardium/PleuraNormal pericardium with no pericardial  effusion.  Hemodynamic: Estimated right ventricular systolic pressure  equals 98 mm Hg, assuming right atrial pressure equals 10  mm Hg, consistent with severe pulmonary hypertension.  ------------------------------------------------------------------------  CONCLUSIONS:  1. Normal mitral valve. Minimal mitral regurgitation.  2. Endocardium not well visualized; grossly normal left  ventricular systolic function.  3. Severe right atrial enlargement.  4. Right ventricular enlargement with decreased right  ventricular systolic function.  Systolic and diastolic  flattening of the interventricular septum, consistent with  RV volume and pressure overload.  5. Estimated right ventricular systolic pressure equals 98  mm Hg, assuming right atrial pressure equals 10 mm Hg,  consistent with severe pulmonary hypertension.  6. Normal tricuspid valve.  Moderate-severe tricuspid  regurgitation.  *** Compared with echocardiogram of 2014, no  significant changes noted.    < end of copied text > Patient seen and examined. She is feeling a lot better as far as SOB at rest, but still has orthopnea, cough,  and LE edema w/ wounds.   Weight is trending down.     Medications:  acetaminophen   Tablet. 650 milliGRAM(s) Oral every 6 hours PRN  Adempas(riociguat) 1.5mg Tablet 1 Tablet(s) 1 Tablet(s) Oral every 8 hours  ALBUTerol    90 MICROgram(s) HFA Inhaler 2 Puff(s) Inhalation every 6 hours PRN  BACItracin   Ointment 1 Application(s) Topical two times a day  buDESOnide  80 MICROgram(s)/formoterol 4.5 MICROgram(s) Inhaler 2 Puff(s) Inhalation two times a day  calcium carbonate    500 mG (Tums) Chewable 1 Tablet(s) Chew three times a day PRN  furosemide   Injectable 40 milliGRAM(s) IV Push once  furosemide Infusion 15 mG/Hr IV Continuous <Continuous>  influenza   Vaccine 0.5 milliLiter(s) IntraMuscular once  loratadine 10 milliGRAM(s) Oral daily  LORazepam     Tablet 0.5 milliGRAM(s) Oral every 8 hours PRN  losartan 25 milliGRAM(s) Oral daily  sertraline 25 milliGRAM(s) Oral daily  warfarin 2 milliGRAM(s) Oral once      Vitals:  Vital Signs Last 24 Hrs  T(C): 36.9 (2018 05:54), Max: 37.3 (2018 19:57)  T(F): 98.4 (2018 05:54), Max: 99.1 (2018 19:57)  HR: 70 (2018 05:54) (70 - 93)  BP: 110/72 (2018 05:54) (90/55 - 131/68)  BP(mean): --  RR: 18 (2018 05:54) (18 - 18)  SpO2: 94% (2018 05:54) (92% - 94%)    Daily     Daily Weight in k.7 (2018 02:49)    I&O's Detail    2018 07:01  -  2018 07:00  --------------------------------------------------------  IN:    Oral Fluid: 610 mL  Total IN: 610 mL    OUT:    Voided: 4100 mL  Total OUT: 4100 mL    Total NET: -3490 mL      2018 07:01  -  2018 12:28  --------------------------------------------------------  IN:  Total IN: 0 mL    OUT:    Voided: 1000 mL  Total OUT: 1000 mL    Total NET: -1000 mL          Physical Exam:     General: No distress. Comfortable.  HEENT: EOM intact.  Neck: Neck supple. JVP hard to evaluate. No masses  Chest: Clear to auscultation bilaterally  CV: Normal S1 and S2. No murmurs, rub, or gallops. Radial pulses normal. 3 + pitting  LE edema b/l.   Abdomen: Soft, non-distended, non-tender  Skin: No rashes or skin breakdown  Neurology: Alert and oriented times three. Sensation intact  Psych: Affect normal    Labs:                        14.0   4.20  )-----------( 387      ( 2018 03:42 )             43.7     07    136  |  94<L>  |  9   ----------------------------<  94  3.5   |  27  |  0.96    Ca    9.4      2018 03:42  Mg     1.9           PT/INR - ( 2018 03:42 )   PT: 21.0 SEC;   INR: 1.87       < from: Transthoracic Echocardiogram (18 @ 15:19) >  OBSERVATIONS:  Mitral Valve: Normal mitral valve. Minimal mitral  regurgitation.  AorticRoot: Normal aortic root.  Aortic Valve: Normal trileaflet aortic valve.  Left Atrium: Normal left atrium.  Left Ventricle: Endocardium not well visualized; grossly  normal left ventricular systolic function.  Right Heart: Severe right atrial enlargement. Right  ventricular enlargement with decreased right ventricular  systolic function.  Systolic and diastolic flattening of  the interventricular septum, consistent with RV volume and  pressure overload. Normal tricuspid valve.  Moderate-severe  tricuspid regurgitation. Normal pulmonic valve. Mild  pulmonic regurgitation.  Pericardium/PleuraNormal pericardium with no pericardial  effusion.  Hemodynamic: Estimated right ventricular systolic pressure  equals 98 mm Hg, assuming right atrial pressure equals 10  mm Hg, consistent with severe pulmonary hypertension.  ------------------------------------------------------------------------  CONCLUSIONS:  1. Normal mitral valve. Minimal mitral regurgitation.  2. Endocardium not well visualized; grossly normal left  ventricular systolic function.  3. Severe right atrial enlargement.  4. Right ventricular enlargement with decreased right  ventricular systolic function.  Systolic and diastolic  flattening of the interventricular septum, consistent with  RV volume and pressure overload.  5. Estimated right ventricular systolic pressure equals 98  mm Hg, assuming right atrial pressure equals 10 mm Hg,  consistent with severe pulmonary hypertension.  6. Normal tricuspid valve.  Moderate-severe tricuspid  regurgitation.  *** Compared with echocardiogram of 2014, no  significant changes noted.    < from: Cardiac Cath Lab (13 @ 14:08) >  HEMODYNAMIC TABLES  Pressures:  Baseline  Pressures:  - HR: 68  Pressures:  - Rhythm:  Pressures:  -- Aortic Pressure (S/D/M): 112/85/96  Pressures:  -- Left Ventricle (s/edp): 118/10/--  Pressures:  -- Pulmonary Artery (S/D/M): 107/43/65  Pressures:  -- Pulmonary Capillary Wedge: 13/15/9  Pressures:  -- Right Atrium (a/v/M): 20/16/15  Pressures:  -- Right Ventricle (s/edp): 107/26/--  O2 Sats:  Baseline  O2 Sats:  - HR: 68  O2 Sats:  - Rhythm:  O2 Sats:  -- AO: 14.6/90.5/17.97  O2 Sats:  -- PA: 14.8/62.1/12.5  Outputs:  Baseline  Outputs:  -- CALCULATIONS[age]  Outputs:  -- CALCULATIONS: Body Surface Area: 2.43  Outputs:  -- CALCULATIONS: Height in cm: 175.00  Outputs:  -- CALCULATIONS: Sex: Female  Outputs:  -- CALCULATIONS: Weight in k.00  Outputs:  -- OUTPUTS: CO by Todd: 5.55  Outputs:  -- OUTPUTS: Todd cardiac index: 2.29  Outputs:  -- OUTPUTS: O2 consumption: 303.47  Outputs:  -- OUTPUTS: Vo2 Indexed: 125.00  Outputs:  -- RESISTANCES: Left ventricular stroke work: 97.97  Outputs:  -- RESISTANCES: Left Ventricular Stroke Work index: 40.35  Outputs:  -- RESISTANCES: Pulmonary vascular index (dsc): 1960.03  Outputs:  -- RESISTANCES: Pulmonary vascular index (Wood Units): 24.51  Outputs:  -- RESISTANCES: Pulmonary vascular resistance (dsc): 807.34  Outputs:  -- RESISTANCES: Pulmonary vascular resistance (Wood Units): 10.09  Outputs:  -- RESISTANCES: PVR_SVR Ratio: 0.69  Outputs:  -- RESISTANCES: Right ventricular stroke work: 50.98  Outputs:  -- RESISTANCES: Right ventricular stroke work index: 21.00  Outputs:  -- RESISTANCES: Systemic vascular index (dsc): 2835.04  Outputs:  -- RESISTANCES: Systemic vascular index (Wood Units): 35.45  Outputs:  -- RESISTANCES: Systemic vascular resistance (dsc): 1167.76  Outputs:  -- RESISTANCES: Systemic vascular resistance (Wood Units): 14.60  Outputs:  -- RESISTANCES: Total pulmonary index (dsc): 2275.03  Outputs:  -- RESISTANCES: Total pulmonary index (Wood Units): 28.45  Outputs:  -- RESISTANCES: Total pulmonary resistance (dsc): 937.09  Outputs:  -- RESISTANCES: Total pulmonary resistance (Wood Units): 11.72  Outputs:-- RESISTANCES: Total vascular index (Wood Units): 42.01  Outputs:  -- RESISTANCES: Total vascular resistance (dsc): 1384.01  Outputs:  -- RESISTANCES: Total vascular resistance (Wood Units): 17.30  Outputs:  -- RESISTANCES: Total vascular resistanceindex (dsc): 3360.05  Outputs:  -- RESISTANCES: TPR_TVR Ratio: 0.68  Outputs:  -- SHUNTS: Pulmonary flow: 5.55  Outputs:  -- SHUNTS: Qp Indexed: 2.29  Outputs:  -- SHUNTS: Qs Indexed: 2.29  Outputs:  -- SHUNTS: Systemic flow: 5.55    < end of copied text >      < end of copied text >    Magnesium, Serum: 1.9 mg/dL (18 @ 03:42)

## 2018-07-30 NOTE — PROGRESS NOTE ADULT - ASSESSMENT
32 y/o pleasant  female w/ PMHX of obesity, severe pulmonary hypertension w/ RV systolic failure and asthma comes in with complaints of worsening SOB and LE x 2 weeks. She was first told she had pulmonary hypertension and heart failure in 2006 at an OSH in [place] but tells me that she was never offered treatment. She was then re-hospitilzied in 2013 at [facility] and had a LHC- which showed normal coronaries, and RHC which showed that she had severe pulmonary hypertension w/ low normal wedge. Since then she has been followed by Dr. Unger and Dr. Haines. She states she is not able to work or go to school b/c of frequent LE edema, fatigue and inability to function normally.     She was recently admitted for SOB and was found to have parainfluenza 3, and was told to increase her PO fluids. She was discharged after 3 days, and for a while felt good, but then started developing FERRO. Currently she can only take about 10 steps before she gets SOB, and has frequent PND that wakes her up. No SOB at rest, orthopnea, CP, palpitations, dizziness. Her dry weight is about 290 lbs, currently she is 330 lbs.       Still with severe volume overload, but weight is trending down.
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
## 2018-07-30 NOTE — DISCHARGE NOTE ADULT - MEDICATION SUMMARY - MEDICATIONS TO STOP TAKING
I will STOP taking the medications listed below when I get home from the hospital:    irbesartan 75 mg oral tablet  -- 1 tab(s) by mouth once a day    Cartia  mg/24 hours oral capsule, extended release  -- 1 cap(s) by mouth once a day    furosemide 40 mg oral tablet  -- 1 tab(s) by mouth 2 times a day

## 2018-07-30 NOTE — DISCHARGE NOTE ADULT - ADDITIONAL INSTRUCTIONS
Follow up with your PCP within 1-2 weeks; call for appointment.  Follow up with cardiology & pulmonology within 1-2 weeks; call for appointments.  You may follow up at the [facility] ([address] [place] [address], phone [phone]); call for appointment. Follow up with your PCP within 1-2 weeks; call for appointment.  Follow up with cardiology & pulmonology within 1-2 weeks; call for appointments.  You may follow up at the [facility] ([address] [place] [address], [address] [place] [id number], phone [phone]); call for appointment.    Psych: Recommend patient follow up with outpatient psychiatry and therapy. Referral provided to [facility] ([address] [place] [address], [address] [place] [address], phone [phone])    No driving x 24 hours. No strenuous activity for three weeks. Monitor site of procedure ( right arm) and notify your doctor for any  bleeding / swelling/redness/ discharge/pain. You may shower but no baths or swimming x one week. No heavy lifting x 1 week.

## 2018-07-30 NOTE — PROGRESS NOTE ADULT - PROBLEM SELECTOR PLAN 4
Speech therapy order acknowledged, chart reviewed, and RN consulted. Per RN, pt is not appropriate for a clinical swallow evaluation as he is currently strict NPO. ST will continue to monitor and attempt again as able. on lasix drip   may be tx to [facility] for further mgmt  cards and HF team to determine need of diltiazem; currently on hold due to cards recs

## 2018-07-30 NOTE — DISCHARGE NOTE ADULT - SECONDARY DIAGNOSIS.
Essential hypertension COPD (chronic obstructive pulmonary disease) Anticardiolipin antibody positive Depression, unspecified depression type Morbid obesity Pulmonary HTN

## 2018-07-30 NOTE — PROGRESS NOTE ADULT - ASSESSMENT
33 F with anticardiolipin Ab, severe pHTN on Adempas, CVA on Coumadin here with dyspnea and intermittent chest pain. She appears volume overloaded with severe LE edema extending up to proximal lower extremity. Chest pain is reproducible on exam. Does not sound cardiac. INR is therapeutic. PE on differential but less likely. Patient needs diuresis. She appears comfortable at this time and saturating well off NC though desaturates with minimal effort      - Please continue Lasix gtt for now. She still has edema in b/l LE and is still tight but she is less Orthopneic and has improved breathing.  - Most likely can get off Lasix gtt tomorrow to 80mg IV Q 8  - Goal to discharge home on Bumex or Torsemide +/- Metalozone 5mg. She will need supplemental KCL on discharge.  - Please continue Adempas.  - Will continue to follow.  - Consider repeating TTE in 2 days.

## 2018-07-30 NOTE — PROGRESS NOTE ADULT - ASSESSMENT
34 y/o female PMHx pulmonary HTN, CVA prophylaxis with coumadin, HTN, presents with SOB, chest pain, and worsening leg swelling refractory to home dose of furosemide with right heart strain on EKG most likely in the setting of acute on chronic right systolic heart failure, chest pain most likely secondary to elevated cardiac pressures with negative CTA for PE now with improvement in symptoms but still appears clinically volume overloaded.    Pulmonary HTN with Right Sided CHF Exacerbation; precap pulm HTN, see above RHC 2013  Severe Pulmonary hypertension with RV dysfunction. No signs of cardiac output compromise but still massively overloaded.   - Continue with diuresis of furosemide 10 mg gtt with STRICT I&O's assess clinically for improvement. - 11lb weight loss. Since admission is negative 18L.   - Replete electrolytes as needed.   - Continue with Riociguat as per Pulmonary team.   - If no other need for Diltiazem patient can be discontinued on it.     Please call on [place] cardiology [place] at [phone] with any further questions. 32 y/o female PMHx pulmonary HTN, CVA prophylaxis with coumadin, HTN, presents with SOB, chest pain, and worsening leg swelling refractory to home dose of furosemide with right heart strain on EKG most likely in the setting of acute on chronic right systolic heart failure, chest pain most likely secondary to elevated cardiac pressures with negative CTA for PE now with improvement in symptoms but still appears clinically volume overloaded.    Pulmonary HTN with Right Sided CHF Exacerbation; precap pulm HTN, see above RHC 2013  Severe Pulmonary hypertension with RV dysfunction. No signs of cardiac output compromise but still massively overloaded.   - Management as per HF team.   - Continue with diuresis of furosemide 10 mg gtt with STRICT I&O's assess clinically for improvement. - 11lb weight loss. Since admission is negative 18L.   - Replete electrolytes as needed.   - Continue with Riociguat as per Pulmonary team.   - If no other need for Diltiazem patient can be discontinued on it.     Please call on call cardiology team at [phone] with any further questions.

## 2018-07-30 NOTE — PROGRESS NOTE BEHAVIORAL HEALTH - NSBHCHARTREVIEWVS_PSY_A_CORE FT
Vital Signs Last 24 Hrs  T(C): 36.9 (30 Jul 2018 05:54), Max: 37.3 (29 Jul 2018 19:57)  T(F): 98.4 (30 Jul 2018 05:54), Max: 99.1 (29 Jul 2018 19:57)  HR: 70 (30 Jul 2018 05:54) (70 - 93)  BP: 110/72 (30 Jul 2018 05:54) (110/72 - 131/68)  BP(mean): --  RR: 18 (30 Jul 2018 05:54) (18 - 18)  SpO2: 94% (30 Jul 2018 05:54) (92% - 94%)

## 2018-07-30 NOTE — DISCHARGE NOTE ADULT - PATIENT PORTAL LINK FT
You can access the [facility] Patient Portal, offered by [facility], by registering with the following website: [email or web address]

## 2018-07-31 ENCOUNTER — APPOINTMENT (OUTPATIENT)
Dept: PULMONOLOGY | Facility: CLINIC | Age: 33
End: 2018-07-31

## 2018-07-31 ENCOUNTER — APPOINTMENT (OUTPATIENT)
Dept: INTERNAL MEDICINE | Facility: CLINIC | Age: 33
End: 2018-07-31

## 2018-07-31 DIAGNOSIS — E87.6 HYPOKALEMIA: ICD-10-CM

## 2018-07-31 DIAGNOSIS — F43.23 ADJUSTMENT DISORDER WITH MIXED ANXIETY AND DEPRESSED MOOD: ICD-10-CM

## 2018-07-31 LAB
BUN SERPL-MCNC: 7 MG/DL — SIGNIFICANT CHANGE UP (ref 7–23)
BUN SERPL-MCNC: 8 MG/DL — SIGNIFICANT CHANGE UP (ref 7–23)
CALCIUM SERPL-MCNC: 8.9 MG/DL — SIGNIFICANT CHANGE UP (ref 8.4–10.5)
CALCIUM SERPL-MCNC: 9.1 MG/DL — SIGNIFICANT CHANGE UP (ref 8.4–10.5)
CHLORIDE SERPL-SCNC: 96 MMOL/L — LOW (ref 98–107)
CHLORIDE SERPL-SCNC: 99 MMOL/L — SIGNIFICANT CHANGE UP (ref 98–107)
CO2 SERPL-SCNC: 24 MMOL/L — SIGNIFICANT CHANGE UP (ref 22–31)
CO2 SERPL-SCNC: 26 MMOL/L — SIGNIFICANT CHANGE UP (ref 22–31)
CREAT SERPL-MCNC: 0.87 MG/DL — SIGNIFICANT CHANGE UP (ref 0.5–1.3)
CREAT SERPL-MCNC: 0.87 MG/DL — SIGNIFICANT CHANGE UP (ref 0.5–1.3)
GLUCOSE SERPL-MCNC: 81 MG/DL — SIGNIFICANT CHANGE UP (ref 70–99)
GLUCOSE SERPL-MCNC: 93 MG/DL — SIGNIFICANT CHANGE UP (ref 70–99)
HCT VFR BLD CALC: 41.2 % — SIGNIFICANT CHANGE UP (ref 34.5–45)
HGB BLD-MCNC: 13.5 G/DL — SIGNIFICANT CHANGE UP (ref 11.5–15.5)
INR BLD: 1.64 — HIGH (ref 0.88–1.17)
MAGNESIUM SERPL-MCNC: 2.1 MG/DL — SIGNIFICANT CHANGE UP (ref 1.6–2.6)
MCHC RBC-ENTMCNC: 25.4 PG — LOW (ref 27–34)
MCHC RBC-ENTMCNC: 32.8 % — SIGNIFICANT CHANGE UP (ref 32–36)
MCV RBC AUTO: 77.4 FL — LOW (ref 80–100)
NRBC # FLD: 0 — SIGNIFICANT CHANGE UP
PLATELET # BLD AUTO: 333 K/UL — SIGNIFICANT CHANGE UP (ref 150–400)
PMV BLD: 9.7 FL — SIGNIFICANT CHANGE UP (ref 7–13)
POTASSIUM SERPL-MCNC: 3.5 MMOL/L — SIGNIFICANT CHANGE UP (ref 3.5–5.3)
POTASSIUM SERPL-MCNC: 3.7 MMOL/L — SIGNIFICANT CHANGE UP (ref 3.5–5.3)
POTASSIUM SERPL-SCNC: 3.5 MMOL/L — SIGNIFICANT CHANGE UP (ref 3.5–5.3)
POTASSIUM SERPL-SCNC: 3.7 MMOL/L — SIGNIFICANT CHANGE UP (ref 3.5–5.3)
PROTHROM AB SERPL-ACNC: 19.1 SEC — HIGH (ref 9.8–13.1)
RBC # BLD: 5.32 M/UL — HIGH (ref 3.8–5.2)
RBC # FLD: 22.2 % — HIGH (ref 10.3–14.5)
SODIUM SERPL-SCNC: 136 MMOL/L — SIGNIFICANT CHANGE UP (ref 135–145)
SODIUM SERPL-SCNC: 137 MMOL/L — SIGNIFICANT CHANGE UP (ref 135–145)
WBC # BLD: 4.22 K/UL — SIGNIFICANT CHANGE UP (ref 3.8–10.5)
WBC # FLD AUTO: 4.22 K/UL — SIGNIFICANT CHANGE UP (ref 3.8–10.5)

## 2018-07-31 PROCEDURE — 99233 SBSQ HOSP IP/OBS HIGH 50: CPT

## 2018-07-31 PROCEDURE — 99232 SBSQ HOSP IP/OBS MODERATE 35: CPT

## 2018-07-31 RX ORDER — POTASSIUM CHLORIDE 20 MEQ
20 PACKET (EA) ORAL ONCE
Qty: 0 | Refills: 0 | Status: COMPLETED | OUTPATIENT
Start: 2018-07-31 | End: 2018-07-31

## 2018-07-31 RX ORDER — WARFARIN SODIUM 2.5 MG/1
5 TABLET ORAL ONCE
Qty: 0 | Refills: 0 | Status: COMPLETED | OUTPATIENT
Start: 2018-07-31 | End: 2018-07-31

## 2018-07-31 RX ORDER — BENZOCAINE AND MENTHOL 5; 1 G/100ML; G/100ML
1 LIQUID ORAL ONCE
Qty: 0 | Refills: 0 | Status: COMPLETED | OUTPATIENT
Start: 2018-07-31 | End: 2018-07-31

## 2018-07-31 RX ORDER — SPIRONOLACTONE 25 MG/1
50 TABLET, FILM COATED ORAL EVERY 12 HOURS
Qty: 0 | Refills: 0 | Status: DISCONTINUED | OUTPATIENT
Start: 2018-07-31 | End: 2018-08-01

## 2018-07-31 RX ORDER — POTASSIUM CHLORIDE 20 MEQ
40 PACKET (EA) ORAL ONCE
Qty: 0 | Refills: 0 | Status: COMPLETED | OUTPATIENT
Start: 2018-07-31 | End: 2018-07-31

## 2018-07-31 RX ORDER — FUROSEMIDE 40 MG
15 TABLET ORAL
Qty: 500 | Refills: 0 | Status: DISCONTINUED | OUTPATIENT
Start: 2018-07-31 | End: 2018-08-01

## 2018-07-31 RX ADMIN — BENZOCAINE AND MENTHOL 1 LOZENGE: 5; 1 LIQUID ORAL at 18:45

## 2018-07-31 RX ADMIN — BUDESONIDE AND FORMOTEROL FUMARATE DIHYDRATE 2 PUFF(S): 160; 4.5 AEROSOL RESPIRATORY (INHALATION) at 10:06

## 2018-07-31 RX ADMIN — Medication 7.5 MG/HR: at 11:24

## 2018-07-31 RX ADMIN — Medication 40 MILLIEQUIVALENT(S): at 08:39

## 2018-07-31 RX ADMIN — SPIRONOLACTONE 50 MILLIGRAM(S): 25 TABLET, FILM COATED ORAL at 22:01

## 2018-07-31 RX ADMIN — BUDESONIDE AND FORMOTEROL FUMARATE DIHYDRATE 2 PUFF(S): 160; 4.5 AEROSOL RESPIRATORY (INHALATION) at 22:00

## 2018-07-31 RX ADMIN — LOSARTAN POTASSIUM 25 MILLIGRAM(S): 100 TABLET, FILM COATED ORAL at 08:39

## 2018-07-31 RX ADMIN — LORATADINE 10 MILLIGRAM(S): 10 TABLET ORAL at 11:24

## 2018-07-31 RX ADMIN — Medication 20 MILLIEQUIVALENT(S): at 18:44

## 2018-07-31 RX ADMIN — WARFARIN SODIUM 5 MILLIGRAM(S): 2.5 TABLET ORAL at 11:24

## 2018-07-31 RX ADMIN — SPIRONOLACTONE 50 MILLIGRAM(S): 25 TABLET, FILM COATED ORAL at 08:38

## 2018-07-31 NOTE — PROGRESS NOTE ADULT - PROBLEM SELECTOR PLAN 1
RHC from 2013 noted. 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   Still w/ gross volume overload. But weight is trending down (admission 152 kg, now 132.2 kg. Lost 4 kg in 24 hrs.   Overnight holding of Lasix noted, due to hypokalemia. Patient had also refused K and blood draws earlier, but now has agreed. Made Spironolactone 50 mg po BID. Restart Lasix at 15 mg/hr.  If K under 4.0, please hold Lasix gtt supplement K to 4.0-4.5, then restart Lasix gtt at current dose.   SBP stable.   Continue Adempas. Cardizem was for pulm HTN (home meds) although I do not see documentation of NO challenge in cath.   Discussed w/ Dr. Frost, would be best managed at [facility], but patient does not want to go right now, just wants diuresis.

## 2018-07-31 NOTE — PROGRESS NOTE ADULT - SUBJECTIVE AND OBJECTIVE BOX
Patient seen and examined. Had to hold Lasix lastnight into today as patient was hypokalemic. She has lost another 10 lbs in 24 hrs.   SOB improving, but still has cough. Still only walking in room. No CP, palpitations.       Medications:  acetaminophen   Tablet. 650 milliGRAM(s) Oral every 6 hours PRN  Adempas(riociguat) 1.5mg Tablet 1 Tablet(s) 1 Tablet(s) Oral every 8 hours  ALBUTerol    90 MICROgram(s) HFA Inhaler 2 Puff(s) Inhalation every 6 hours PRN  BACItracin   Ointment 1 Application(s) Topical two times a day  buDESOnide  80 MICROgram(s)/formoterol 4.5 MICROgram(s) Inhaler 2 Puff(s) Inhalation two times a day  calcium carbonate    500 mG (Tums) Chewable 1 Tablet(s) Chew three times a day PRN  furosemide Infusion 15 mG/Hr IV Continuous <Continuous>  influenza   Vaccine 0.5 milliLiter(s) IntraMuscular once  loratadine 10 milliGRAM(s) Oral daily  LORazepam     Tablet 0.5 milliGRAM(s) Oral every 8 hours PRN  losartan 25 milliGRAM(s) Oral daily  melatonin 6 milliGRAM(s) Oral at bedtime  sertraline 25 milliGRAM(s) Oral daily  spironolactone 50 milliGRAM(s) Oral every 12 hours      Vitals:  Vital Signs Last 24 Hrs  T(C): 36.7 (2018 06:20), Max: 36.7 (2018 06:20)  T(F): 98.1 (2018 06:20), Max: 98.1 (2018 06:20)  HR: 97 (2018 06:20) (97 - 98)  BP: 113/89 (2018 06:20) (112/67 - 113/89)  BP(mean): --  RR: 16 (2018 06:20) (16 - 16)  SpO2: 96% (2018 06:20) (96% - 97%)    Daily     Daily Weight in k.2 (2018 07:44)    I&O's Detail    2018 07:01  -  2018 07:00  --------------------------------------------------------  IN:    Oral Fluid: 420 mL  Total IN: 420 mL    OUT:    Voided: 3800 mL  Total OUT: 3800 mL    Total NET: -3380 mL      2018 07:01  -  2018 12:43  --------------------------------------------------------  IN:    Oral Fluid: 120 mL  Total IN: 120 mL    OUT:    Voided: 1050 mL  Total OUT: 1050 mL    Total NET: -930 mL      Physical Exam:     General: No distress. Comfortable.  HEENT: EOM intact.  Neck: Neck supple. JVP not elevated. No masses  Chest: Clear to auscultation bilaterally  CV: Normal S1 and S2. No murmurs, rub, or gallops. Radial pulses normal.  Abdomen: Soft, non-distended, non-tender  Skin: No rashes or skin breakdown  Neurology: Alert and oriented times three. Sensation intact  Psych: Affect normal    Labs:                        13.5   4.22  )-----------( 333      ( 2018 08:45 )             41.2         137  |  99  |  8   ----------------------------<  93  3.7   |  24  |  0.87    Ca    8.9      2018 08:45  Mg     2.1           PT/INR - ( 2018 08:45 )   PT: 19.1 SEC;   INR: 1.64       < from: Transthoracic Echocardiogram (18 @ 15:19) >  OBSERVATIONS:  Mitral Valve: Normal mitral valve. Minimal mitral  regurgitation.  AorticRoot: Normal aortic root.  Aortic Valve: Normal trileaflet aortic valve.  Left Atrium: Normal left atrium.  Left Ventricle: Endocardium not well visualized; grossly  normal left ventricular systolic function.  Right Heart: Severe right atrial enlargement. Right  ventricular enlargement with decreased right ventricular  systolic function.  Systolic and diastolic flattening of  the interventricular septum, consistent with RV volume and  pressure overload. Normal tricuspid valve.  Moderate-severe  tricuspid regurgitation. Normal pulmonic valve. Mild  pulmonic regurgitation.  Pericardium/PleuraNormal pericardium with no pericardial  effusion.  Hemodynamic: Estimated right ventricular systolic pressure  equals 98 mm Hg, assuming right atrial pressure equals 10  mm Hg, consistent with severe pulmonary hypertension.  ------------------------------------------------------------------------  CONCLUSIONS:  1. Normal mitral valve. Minimal mitral regurgitation.  2. Endocardium not well visualized; grossly normal left  ventricular systolic function.  3. Severe right atrial enlargement.  4. Right ventricular enlargement with decreased right  ventricular systolic function.  Systolic and diastolic  flattening of the interventricular septum, consistent with  RV volume and pressure overload.  5. Estimated right ventricular systolic pressure equals 98  mm Hg, assuming right atrial pressure equals 10 mm Hg,  consistent with severe pulmonary hypertension.  6. Normal tricuspid valve.  Moderate-severe tricuspid  regurgitation.  *** Compared with echocardiogram of 2014, no  significant changes noted.    < end of copied text >

## 2018-07-31 NOTE — PROGRESS NOTE ADULT - PROBLEM SELECTOR PLAN 2
Plan as above.   Diuretics as above.  Is losartan for SBP?  K low 3.5. Supplement to keep 4.0-4.5.  Was given 2 x 40 meq.  Will give [place] 25 mg po qd.

## 2018-07-31 NOTE — PROGRESS NOTE ADULT - ASSESSMENT
34 y/o pleasant  female w/ PMHX of obesity, severe pulmonary hypertension w/ RV systolic failure and asthma comes in with complaints of worsening SOB and LE x 2 weeks. She was first told she had pulmonary hypertension and heart failure in 2006 at an OSH in [place] but tells me that she was never offered treatment. She was then re-hospitilzied in 2013 at [facility] and had a LHC- which showed normal coronaries, and RHC which showed that she had severe pulmonary hypertension w/ low normal wedge. Since then she has been followed by Dr. Unger and Dr. Haines. She states she is not able to work or go to school b/c of frequent LE edema, fatigue and inability to function normally.     She was recently admitted for SOB and was found to have parainfluenza 3, and was told to increase her PO fluids. She was discharged after 3 days, and for a while felt good, but then started developing FERRO. Currently she can only take about 10 steps before she gets SOB, and has frequent PND that wakes her up. No SOB at rest, orthopnea, CP, palpitations, dizziness. Her dry weight is about 290 lbs, currently she is 330 lbs.       Still with severe volume overload, but weight is trending down.

## 2018-07-31 NOTE — PROGRESS NOTE BEHAVIORAL HEALTH - NSBHCHARTREVIEWVS_PSY_A_CORE FT
Vital Signs Last 24 Hrs  T(C): 36.7 (31 Jul 2018 06:20), Max: 36.7 (31 Jul 2018 06:20)  T(F): 98.1 (31 Jul 2018 06:20), Max: 98.1 (31 Jul 2018 06:20)  HR: 97 (31 Jul 2018 06:20) (97 - 98)  BP: 113/89 (31 Jul 2018 06:20) (112/67 - 113/89)  BP(mean): --  RR: 16 (31 Jul 2018 06:20) (16 - 16)  SpO2: 96% (31 Jul 2018 06:20) (96% - 97%)

## 2018-07-31 NOTE — PROGRESS NOTE BEHAVIORAL HEALTH - NSBHFUPINTERVALHXFT_PSY_A_CORE
Pt received no PRNs overnight. Pt seen and examined at the bedside. Pt states that she is feeling better this AM, and is ready to go home. Pt states that she slept well last night and that her mood is "okay" today. Pt states that she is frustrated that they switched her meds from IV to PO Lasix because she feels that it does not make her pee as quickly. Pt states that she did not take Zoloft yesterday because "my mother doesn't want me to take it and I kind of agree with her". Pt concerned about side effects of Zoloft, educated pt on side effects (nausea and sexual dysfunction). Pt asked if she could take the day to think about whether she would like to start Zoloft or not. Pt received no PRNs overnight. Pt seen and examined at the bedside. Pt states that she is feeling better this AM, and is ready to go home. Pt states that she slept well last night and that her mood is "okay" today. Pt states that she is frustrated that they switched her meds from IV to PO Lasix because she feels that it does not make her urinate as quickly. Pt states that she did not take Zoloft yesterday because "my mother doesn't want me to take it and I kind of agree with her". Pt concerned about side effects of Zoloft, educated pt on side effects (nausea and sexual dysfunction). Pt asked if she could take the day to think about whether she would like to start Zoloft.  PT is feeling better and is interested in being interviewed during case conference on Friday if she is still at [facility].
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
## 2018-07-31 NOTE — PROGRESS NOTE ADULT - ASSESSMENT
33 F with anticardiolipin Ab, severe pHTN on Adempas, CVA on Coumadin here with dyspnea and intermittent chest pain. Here in exacerbation of her RHF and Pulm HTN resulting in anasarca responding to diuresis.      - Please restart Lasix gtt for now. She still has edema in b/l LE  but is no longer Orthopneic and has improved dsypnea  - Heart failure following and managing diuretics but goal to discharge home on Bumex or Torsemide +/- Metalozone. - - She will need supplemental KCL on discharge.   Please continue Adempas.  - Will continue to follow.  - May repeat TTE before discharge or as outpatient.

## 2018-07-31 NOTE — PROGRESS NOTE ADULT - ATTENDING COMMENTS
Clinically doing well. Diuresed ~4 kg overnight.  Would continue lasix 15 mg/hr and spironolactone 50 mg po BID.  She doesn't need anything more than labs BID (6 am & 2 or 3 pm, please).  Labs are stable.    When K > 4.5, can increase the lasix infusion to 20 mg/hr. Would then keep spironolactone at 50 mg BID.   She likely needs another 4-5 days of this aggressive diuresis.

## 2018-07-31 NOTE — PROGRESS NOTE ADULT - PROBLEM SELECTOR PLAN 4
on lasix drip, restarted  cards and HF team to determine need of diltiazem; currently on hold due to cards recs

## 2018-07-31 NOTE — PROGRESS NOTE ADULT - SUBJECTIVE AND OBJECTIVE BOX
CHIEF COMPLAINT: swelling and dsypnea    Interval Events: Patient has been off Lasix gtt since 6:30 AM. Her Potassium has been repleted. No more Orthopnea.    REVIEW OF SYSTEMS:  Constitutional: [ x] negative [ ] fevers [ ] chills [ ] weight loss [ ] weight gain  HEENT: [ ] negative [ ] dry eyes [ ] eye irritation [ ] postnasal drip [ ] nasal congestion  CV: [ ] negative  [ ] chest pain [ ] orthopnea [ ] palpitations [x ] edema  Resp: [x ] negative [ ] cough [ ] shortness of breath [ ] dyspnea [ ] wheezing [ ] sputum [ ] hemoptysis  GI: [ x] negative [ ] nausea [ ] vomiting [ ] diarrhea [ ] constipation [ ] abd pain [ ] dysphagia   : [x ] negative [ ] dysuria [ ] nocturia [ ] hematuria [ ] increased urinary frequency  Musculoskeletal: [ ] negative [ ] back pain [ ] myalgias [ ] arthralgias [ ] fracture  Skin: [x ] negative [ ] rash [ ] itch  Neurological: [ ] negative [ ] headache [ ] dizziness [ ] syncope [ ] weakness [ ] numbness  Psychiatric: [ ] negative [ ] anxiety [ ] depression  Endocrine: [ ] negative [ ] diabetes [ ] thyroid problem  Hematologic/Lymphatic: [ ] negative [ ] anemia [ ] bleeding problem  Allergic/Immunologic: [ ] negative [ ] itchy eyes [ ] nasal discharge [ ] hives [ ] angioedema  [ ] All other systems negative  [ ] Unable to assess ROS because ________    OBJECTIVE:  T(C): 36.7 (31 Jul 2018 06:20), Max: 36.7 (31 Jul 2018 06:20)  T(F): 98.1 (31 Jul 2018 06:20), Max: 98.1 (31 Jul 2018 06:20)  HR: 97 (31 Jul 2018 06:20) (97 - 98)  BP: 113/89 (31 Jul 2018 06:20) (112/67 - 113/89)  RR: 16 (31 Jul 2018 06:20) (16 - 16)  SpO2: 96% (31 Jul 2018 06:20) (96% - 97%)        07-30 @ 07:01  -  07-31 @ 07:00  --------------------------------------------------------  IN: 420 mL / OUT: 3800 mL / NET: -3380 mL    07-31 @ 07:01  - 07-31 @ 09:16  --------------------------------------------------------  IN: 0 mL / OUT: 50 mL / NET: -50 mL      CAPILLARY BLOOD GLUCOSE          PHYSICAL EXAM:  General: NAD  HEENT: HECTOR  Lymph Nodes:  Neck: No JVD  Respiratory: CTA b/l  Cardiovascular: RRR  Abdomen:   Extremities: 2+ edema b/l.  Skin: stable chronic edema changes.  Neurological: non-focal.  Psychiatry:    HOSPITAL MEDICATIONS:      losartan 25 milliGRAM(s) Oral daily  spironolactone 50 milliGRAM(s) Oral every 12 hours      ALBUTerol    90 MICROgram(s) HFA Inhaler 2 Puff(s) Inhalation every 6 hours PRN  buDESOnide  80 MICROgram(s)/formoterol 4.5 MICROgram(s) Inhaler 2 Puff(s) Inhalation two times a day  loratadine 10 milliGRAM(s) Oral daily    acetaminophen   Tablet. 650 milliGRAM(s) Oral every 6 hours PRN  LORazepam     Tablet 0.5 milliGRAM(s) Oral every 8 hours PRN  melatonin 6 milliGRAM(s) Oral at bedtime  sertraline 25 milliGRAM(s) Oral daily    calcium carbonate    500 mG (Tums) Chewable 1 Tablet(s) Chew three times a day PRN          influenza   Vaccine 0.5 milliLiter(s) IntraMuscular once    BACItracin   Ointment 1 Application(s) Topical two times a day    Adempas(riociguat) 1.5mg Tablet 1 Tablet(s) 1 Tablet(s) Oral every 8 hours      LABS:                        13.5   4.22  )-----------( 333      ( 31 Jul 2018 08:45 )             41.2     Hgb Trend: 13.5<--, 14.0<--, 14.3<--, 12.3<--, 11.9<--  07-30    137  |  95<L>  |  10  ----------------------------<  93  3.7   |  27  |  0.95    Ca    9.6      30 Jul 2018 23:14  Mg     2.1     07-30      Creatinine Trend: 0.95<--, 1.05<--, 0.96<--, 0.95<--, 1.03<--, 0.99<--  PT/INR - ( 30 Jul 2018 03:42 )   PT: 21.0 SEC;   INR: 1.87                    MICROBIOLOGY:     RADIOLOGY:  [ ] Reviewed and interpreted by me    PULMONARY FUNCTION TESTS:    EKG:

## 2018-07-31 NOTE — PROVIDER CONTACT NOTE (MEDICATION) - ASSESSMENT
Pt states she will take the pills when she is up for the day; states she will have chest pain if she takes pills at this time

## 2018-07-31 NOTE — PROGRESS NOTE ADULT - SUBJECTIVE AND OBJECTIVE BOX
Patient is a 33y old  Female who presents with a chief complaint of "I had chest pain and SOB" this morning         SUBJECTIVE / OVERNIGHT EVENTS: overall improved; legs better, breathing sig improved      MEDICATIONS  (STANDING):  Adempas(riociguat) 1.5mg Tablet 1 Tablet(s) 1 Tablet(s) Oral every 8 hours  BACItracin   Ointment 1 Application(s) Topical two times a day  buDESOnide  80 MICROgram(s)/formoterol 4.5 MICROgram(s) Inhaler 2 Puff(s) Inhalation two times a day  furosemide Infusion 15 mG/Hr (7.5 mL/Hr) IV Continuous <Continuous>  influenza   Vaccine 0.5 milliLiter(s) IntraMuscular once  loratadine 10 milliGRAM(s) Oral daily  losartan 25 milliGRAM(s) Oral daily  melatonin 6 milliGRAM(s) Oral at bedtime  sertraline 25 milliGRAM(s) Oral daily  spironolactone 50 milliGRAM(s) Oral every 12 hours    MEDICATIONS  (PRN):  acetaminophen   Tablet. 650 milliGRAM(s) Oral every 6 hours PRN Mild Pain (1 - 3)  ALBUTerol    90 MICROgram(s) HFA Inhaler 2 Puff(s) Inhalation every 6 hours PRN Shortness of Breath and/or Wheezing  calcium carbonate    500 mG (Tums) Chewable 1 Tablet(s) Chew three times a day PRN Heartburn  LORazepam     Tablet 0.5 milliGRAM(s) Oral every 8 hours PRN Anxiety      Vital Signs Last 24 Hrs  T(F): 98.1 (31 Jul 2018 06:20), Max: 98.1 (31 Jul 2018 06:20)  HR: 97 (31 Jul 2018 06:20) (97 - 98)  BP: 113/89 (31 Jul 2018 06:20) (112/67 - 113/89)  RR: 16 (31 Jul 2018 06:20) (16 - 16)  SpO2: 96% (31 Jul 2018 06:20) (96% - 97%)          PHYSICAL EXAM  GENERAL: NAD, well-developed  HEAD:  Atraumatic, Normocephalic  EYES: EOMI, PERRLA, conjunctiva and sclera clear  CHEST/LUNG: good AE b/l  HEART: Regular rate and rhythm; No murmurs, rubs, or gallops  ABDOMEN: Soft, Nontender, Nondistended; Bowel sounds present; obese  EXTREMITIES:  legs improved  PSYCH: AAOx3      LABS:                        13.5   4.22  )-----------( 333      ( 31 Jul 2018 08:45 )             41.2     07-31    137  |  99  |  8   ----------------------------<  93  3.7   |  24  |  0.87    Ca    8.9      31 Jul 2018 08:45  Mg     2.1     07-31      PT/INR - ( 31 Jul 2018 08:45 )   PT: 19.1 SEC;   INR: 1.64

## 2018-08-01 LAB
BUN SERPL-MCNC: 6 MG/DL — LOW (ref 7–23)
BUN SERPL-MCNC: 6 MG/DL — LOW (ref 7–23)
CALCIUM SERPL-MCNC: 9.3 MG/DL — SIGNIFICANT CHANGE UP (ref 8.4–10.5)
CALCIUM SERPL-MCNC: 9.5 MG/DL — SIGNIFICANT CHANGE UP (ref 8.4–10.5)
CHLORIDE SERPL-SCNC: 95 MMOL/L — LOW (ref 98–107)
CHLORIDE SERPL-SCNC: 97 MMOL/L — LOW (ref 98–107)
CO2 SERPL-SCNC: 25 MMOL/L — SIGNIFICANT CHANGE UP (ref 22–31)
CO2 SERPL-SCNC: 27 MMOL/L — SIGNIFICANT CHANGE UP (ref 22–31)
CREAT SERPL-MCNC: 0.83 MG/DL — SIGNIFICANT CHANGE UP (ref 0.5–1.3)
CREAT SERPL-MCNC: 0.83 MG/DL — SIGNIFICANT CHANGE UP (ref 0.5–1.3)
GLUCOSE SERPL-MCNC: 102 MG/DL — HIGH (ref 70–99)
GLUCOSE SERPL-MCNC: 92 MG/DL — SIGNIFICANT CHANGE UP (ref 70–99)
HCT VFR BLD CALC: 43.3 % — SIGNIFICANT CHANGE UP (ref 34.5–45)
HGB BLD-MCNC: 13.9 G/DL — SIGNIFICANT CHANGE UP (ref 11.5–15.5)
INR BLD: 1.55 — HIGH (ref 0.88–1.17)
MAGNESIUM SERPL-MCNC: 2.2 MG/DL — SIGNIFICANT CHANGE UP (ref 1.6–2.6)
MCHC RBC-ENTMCNC: 24.7 PG — LOW (ref 27–34)
MCHC RBC-ENTMCNC: 32.1 % — SIGNIFICANT CHANGE UP (ref 32–36)
MCV RBC AUTO: 77 FL — LOW (ref 80–100)
NRBC # FLD: 0 — SIGNIFICANT CHANGE UP
PLATELET # BLD AUTO: 362 K/UL — SIGNIFICANT CHANGE UP (ref 150–400)
PMV BLD: 10.3 FL — SIGNIFICANT CHANGE UP (ref 7–13)
POTASSIUM SERPL-MCNC: 3.2 MMOL/L — LOW (ref 3.5–5.3)
POTASSIUM SERPL-MCNC: 3.4 MMOL/L — LOW (ref 3.5–5.3)
POTASSIUM SERPL-SCNC: 3.2 MMOL/L — LOW (ref 3.5–5.3)
POTASSIUM SERPL-SCNC: 3.4 MMOL/L — LOW (ref 3.5–5.3)
PROTHROM AB SERPL-ACNC: 17.4 SEC — HIGH (ref 9.8–13.1)
RBC # BLD: 5.62 M/UL — HIGH (ref 3.8–5.2)
RBC # FLD: 22.5 % — HIGH (ref 10.3–14.5)
SODIUM SERPL-SCNC: 136 MMOL/L — SIGNIFICANT CHANGE UP (ref 135–145)
SODIUM SERPL-SCNC: 137 MMOL/L — SIGNIFICANT CHANGE UP (ref 135–145)
WBC # BLD: 3.72 K/UL — LOW (ref 3.8–10.5)
WBC # FLD AUTO: 3.72 K/UL — LOW (ref 3.8–10.5)

## 2018-08-01 PROCEDURE — 99233 SBSQ HOSP IP/OBS HIGH 50: CPT

## 2018-08-01 PROCEDURE — 99232 SBSQ HOSP IP/OBS MODERATE 35: CPT | Mod: GC

## 2018-08-01 RX ORDER — SPIRONOLACTONE 25 MG/1
50 TABLET, FILM COATED ORAL ONCE
Qty: 0 | Refills: 0 | Status: COMPLETED | OUTPATIENT
Start: 2018-08-01 | End: 2018-08-01

## 2018-08-01 RX ORDER — BUMETANIDE 0.25 MG/ML
1 INJECTION INTRAMUSCULAR; INTRAVENOUS
Qty: 10 | Refills: 0 | Status: DISCONTINUED | OUTPATIENT
Start: 2018-08-01 | End: 2018-08-03

## 2018-08-01 RX ORDER — POTASSIUM CHLORIDE 20 MEQ
40 PACKET (EA) ORAL DAILY
Qty: 0 | Refills: 0 | Status: DISCONTINUED | OUTPATIENT
Start: 2018-08-01 | End: 2018-08-01

## 2018-08-01 RX ORDER — POTASSIUM CHLORIDE 20 MEQ
40 PACKET (EA) ORAL
Qty: 0 | Refills: 0 | Status: DISCONTINUED | OUTPATIENT
Start: 2018-08-01 | End: 2018-08-03

## 2018-08-01 RX ORDER — BUMETANIDE 0.25 MG/ML
2 INJECTION INTRAMUSCULAR; INTRAVENOUS ONCE
Qty: 0 | Refills: 0 | Status: COMPLETED | OUTPATIENT
Start: 2018-08-01 | End: 2018-08-01

## 2018-08-01 RX ORDER — SPIRONOLACTONE 25 MG/1
100 TABLET, FILM COATED ORAL EVERY 12 HOURS
Qty: 0 | Refills: 0 | Status: DISCONTINUED | OUTPATIENT
Start: 2018-08-01 | End: 2018-08-03

## 2018-08-01 RX ORDER — POTASSIUM CHLORIDE 20 MEQ
40 PACKET (EA) ORAL
Qty: 0 | Refills: 0 | Status: DISCONTINUED | OUTPATIENT
Start: 2018-08-01 | End: 2018-08-01

## 2018-08-01 RX ORDER — WARFARIN SODIUM 2.5 MG/1
7.5 TABLET ORAL ONCE
Qty: 0 | Refills: 0 | Status: COMPLETED | OUTPATIENT
Start: 2018-08-01 | End: 2018-08-01

## 2018-08-01 RX ADMIN — BUDESONIDE AND FORMOTEROL FUMARATE DIHYDRATE 2 PUFF(S): 160; 4.5 AEROSOL RESPIRATORY (INHALATION) at 09:45

## 2018-08-01 RX ADMIN — WARFARIN SODIUM 7.5 MILLIGRAM(S): 2.5 TABLET ORAL at 10:01

## 2018-08-01 RX ADMIN — SPIRONOLACTONE 50 MILLIGRAM(S): 25 TABLET, FILM COATED ORAL at 05:32

## 2018-08-01 RX ADMIN — Medication 650 MILLIGRAM(S): at 22:03

## 2018-08-01 RX ADMIN — BUDESONIDE AND FORMOTEROL FUMARATE DIHYDRATE 2 PUFF(S): 160; 4.5 AEROSOL RESPIRATORY (INHALATION) at 21:18

## 2018-08-01 RX ADMIN — Medication 650 MILLIGRAM(S): at 21:18

## 2018-08-01 RX ADMIN — SERTRALINE 25 MILLIGRAM(S): 25 TABLET, FILM COATED ORAL at 11:16

## 2018-08-01 RX ADMIN — BUMETANIDE 10 MG/HR: 0.25 INJECTION INTRAMUSCULAR; INTRAVENOUS at 18:18

## 2018-08-01 RX ADMIN — SPIRONOLACTONE 50 MILLIGRAM(S): 25 TABLET, FILM COATED ORAL at 12:42

## 2018-08-01 RX ADMIN — Medication 7.5 MG/HR: at 10:02

## 2018-08-01 RX ADMIN — ALBUTEROL 2 PUFF(S): 90 AEROSOL, METERED ORAL at 21:18

## 2018-08-01 RX ADMIN — Medication 40 MILLIEQUIVALENT(S): at 11:15

## 2018-08-01 RX ADMIN — LORATADINE 10 MILLIGRAM(S): 10 TABLET ORAL at 11:16

## 2018-08-01 RX ADMIN — Medication 1 APPLICATION(S): at 05:31

## 2018-08-01 RX ADMIN — BUMETANIDE 2 MILLIGRAM(S): 0.25 INJECTION INTRAMUSCULAR; INTRAVENOUS at 18:17

## 2018-08-01 RX ADMIN — LOSARTAN POTASSIUM 25 MILLIGRAM(S): 100 TABLET, FILM COATED ORAL at 05:32

## 2018-08-01 RX ADMIN — SPIRONOLACTONE 100 MILLIGRAM(S): 25 TABLET, FILM COATED ORAL at 17:44

## 2018-08-01 RX ADMIN — Medication 40 MILLIEQUIVALENT(S): at 18:47

## 2018-08-01 NOTE — PROGRESS NOTE BEHAVIORAL HEALTH - PRIMARY DX
Moderate episode of recurrent major depressive disorder
Adjustment disorder with mixed anxiety and depressed mood
Adjustment disorder with mixed anxiety and depressed mood

## 2018-08-01 NOTE — PROGRESS NOTE ADULT - SUBJECTIVE AND OBJECTIVE BOX
Patient is a 33y old  Female who presents with acute on chronic systolic HF        SUBJECTIVE / OVERNIGHT EVENTS: cont to improve; asking about going home; tired of being in the hospital      MEDICATIONS  (STANDING):  Adempas(riociguat) 1.5mg Tablet 1 Tablet(s) 1 Tablet(s) Oral every 8 hours  BACItracin   Ointment 1 Application(s) Topical two times a day  buDESOnide  80 MICROgram(s)/formoterol 4.5 MICROgram(s) Inhaler 2 Puff(s) Inhalation two times a day  furosemide Infusion 15 mG/Hr (7.5 mL/Hr) IV Continuous <Continuous>  influenza   Vaccine 0.5 milliLiter(s) IntraMuscular once  loratadine 10 milliGRAM(s) Oral daily  losartan 25 milliGRAM(s) Oral daily  melatonin 6 milliGRAM(s) Oral at bedtime  potassium chloride    Tablet ER 40 milliEquivalent(s) Oral daily  sertraline 25 milliGRAM(s) Oral daily  spironolactone 100 milliGRAM(s) Oral every 12 hours    MEDICATIONS  (PRN):  acetaminophen   Tablet. 650 milliGRAM(s) Oral every 6 hours PRN Mild Pain (1 - 3)  ALBUTerol    90 MICROgram(s) HFA Inhaler 2 Puff(s) Inhalation every 6 hours PRN Shortness of Breath and/or Wheezing  calcium carbonate    500 mG (Tums) Chewable 1 Tablet(s) Chew three times a day PRN Heartburn  LORazepam     Tablet 0.5 milliGRAM(s) Oral every 8 hours PRN Anxiety      Vital Signs Last 24 Hrs  T(F): 98.1 (01 Aug 2018 12:37), Max: 98.1 (01 Aug 2018 12:37)  HR: 93 (01 Aug 2018 12:37) (87 - 93)  BP: 91/53 (01 Aug 2018 12:37) (91/53 - 110/84)  RR: 16 (01 Aug 2018 12:37) (16 - 18)  SpO2: 100% (01 Aug 2018 12:37) (100% - 100%)          PHYSICAL EXAM  GENERAL: NAD, well-developed  HEAD:  Atraumatic, Normocephalic  EYES: EOMI, PERRLA, conjunctiva and sclera clear  CHEST/LUNG: good AE b/l  HEART: Regular rate and rhythm  ABDOMEN: Soft, Nontender, Nondistended; Bowel sounds present; obese  EXTREMITIES:  edema improving      LABS:                        13.9   3.72  )-----------( 362      ( 01 Aug 2018 05:45 )             43.3     08-01    137  |  95<L>  |  6<L>  ----------------------------<  92  3.2<L>   |  27  |  0.83    Ca    9.5      01 Aug 2018 05:45  Mg     2.2     08-01      PT/INR - ( 01 Aug 2018 05:45 )   PT: 17.4 SEC;   INR: 1.55

## 2018-08-01 NOTE — PROGRESS NOTE ADULT - SUBJECTIVE AND OBJECTIVE BOX
CHIEF COMPLAINT: dyspnea    Interval Events: swelling continues to improve.    REVIEW OF SYSTEMS:  Constitutional: [x ] negative [ ] fevers [ ] chills [ ] weight loss [ ] weight gain  HEENT: [ x] negative [ ] dry eyes [ ] eye irritation [ ] postnasal drip [ ] nasal congestion  CV: [x ] negative  [ ] chest pain [ ] orthopnea [ ] palpitations [ ] murmur  Resp: [x ] negative [ ] cough [ ] shortness of breath [ ] dyspnea [ ] wheezing [ ] sputum [ ] hemoptysis  GI: [ x] negative [ ] nausea [ ] vomiting [ ] diarrhea [ ] constipation [ ] abd pain [ ] dysphagia   : [ x] negative [ ] dysuria [ ] nocturia [ ] hematuria [ ] increased urinary frequency  Musculoskeletal: [ ] negative [ ] back pain [ ] myalgias [ ] arthralgias [ ] fracture  Skin: [x ] negative [ ] rash [ ] itch  Neurological: [x ] negative [ ] headache [ ] dizziness [ ] syncope [ ] weakness [ ] numbness  Psychiatric: [x ] negative [ ] anxiety [ ] depression  Endocrine: [x ] negative [ ] diabetes [ ] thyroid problem  Hematologic/Lymphatic: [ ] negative [ ] anemia [ ] bleeding problem  Allergic/Immunologic: [ ] negative [ ] itchy eyes [ ] nasal discharge [ ] hives [ ] angioedema  [ ] All other systems negative  [ ] Unable to assess ROS because ________    OBJECTIVE:  ICU Vital Signs Last 24 Hrs  T(C): 36.7 (01 Aug 2018 12:37), Max: 36.7 (01 Aug 2018 12:37)  T(F): 98.1 (01 Aug 2018 12:37), Max: 98.1 (01 Aug 2018 12:37)  HR: 93 (01 Aug 2018 12:37) (87 - 93)  BP: 91/53 (01 Aug 2018 12:37) (91/53 - 110/84)  RR: 16 (01 Aug 2018 12:37) (16 - 18)  SpO2: 100% (01 Aug 2018 12:37) (100% - 100%)        07-31 @ 07:01  -  08-01 @ 07:00  --------------------------------------------------------  IN: 900 mL / OUT: 6650 mL / NET: -5750 mL    08-01 @ 07:01 - 08-01 @ 12:51  --------------------------------------------------------  IN: 445 mL / OUT: 2200 mL / NET: -1755 mL      CAPILLARY BLOOD GLUCOSE          PHYSICAL EXAM:  General: NAD  HEENT: HECTOR  Lymph Nodes:  Neck: No JVD  Respiratory: CTA b.l  Cardiovascular: RRR  Abdomen: S/NT/ND  Extremities: 2+ edema on lower legs and feet. Improved in thigh and knees.  Skin: stable skin changes  Neurological: non-focal.  Psychiatry:    HOSPITAL MEDICATIONS:      furosemide Infusion 15 mG/Hr IV Continuous <Continuous>  losartan 25 milliGRAM(s) Oral daily  spironolactone 100 milliGRAM(s) Oral every 12 hours      ALBUTerol    90 MICROgram(s) HFA Inhaler 2 Puff(s) Inhalation every 6 hours PRN  buDESOnide  80 MICROgram(s)/formoterol 4.5 MICROgram(s) Inhaler 2 Puff(s) Inhalation two times a day  loratadine 10 milliGRAM(s) Oral daily    acetaminophen   Tablet. 650 milliGRAM(s) Oral every 6 hours PRN  LORazepam     Tablet 0.5 milliGRAM(s) Oral every 8 hours PRN  melatonin 6 milliGRAM(s) Oral at bedtime  sertraline 25 milliGRAM(s) Oral daily    calcium carbonate    500 mG (Tums) Chewable 1 Tablet(s) Chew three times a day PRN        potassium chloride    Tablet ER 40 milliEquivalent(s) Oral daily    influenza   Vaccine 0.5 milliLiter(s) IntraMuscular once    BACItracin   Ointment 1 Application(s) Topical two times a day    Adempas(riociguat) 1.5mg Tablet 1 Tablet(s) 1 Tablet(s) Oral every 8 hours      LABS:                        13.9   3.72  )-----------( 362      ( 01 Aug 2018 05:45 )             43.3     Hgb Trend: 13.9<--, 13.5<--, 14.0<--, 14.3<--, 12.3<--  08-01    137  |  95<L>  |  6<L>  ----------------------------<  92  3.2<L>   |  27  |  0.83    Ca    9.5      01 Aug 2018 05:45  Mg     2.2     08-01      Creatinine Trend: 0.83<--, 0.87<--, 0.87<--, 0.95<--, 1.05<--, 0.96<--  PT/INR - ( 01 Aug 2018 05:45 )   PT: 17.4 SEC;   INR: 1.55                    MICROBIOLOGY:     RADIOLOGY:  [ ] Reviewed and interpreted by me    PULMONARY FUNCTION TESTS:    EKG:

## 2018-08-01 NOTE — PROGRESS NOTE ADULT - ASSESSMENT
32 y/o pleasant  female w/ PMHX of obesity, severe pulmonary hypertension w/ RV systolic failure and asthma comes in with complaints of worsening SOB and LE x 2 weeks. She was first told she had pulmonary hypertension and heart failure in 2006 at an OSH in [place] but tells me that she was never offered treatment. She was then re-hospitilzied in 2013 at [facility] and had a LHC- which showed normal coronaries, and RHC which showed that she had severe pulmonary hypertension w/ low normal wedge. Since then she has been followed by Dr. Unger and Dr. Haines. She states she is not able to work or go to school b/c of frequent LE edema, fatigue and inability to function normally.     She was recently admitted for SOB and was found to have parainfluenza 3, and was told to increase her PO fluids. She was discharged after 3 days, and for a while felt good, but then started developing FERRO. Currently she can only take about 10 steps before she gets SOB, and has frequent PND that wakes her up. No SOB at rest, orthopnea, CP, palpitations, dizziness. Her dry weight is about 290 lbs, currently she is 330 lbs.       Still with severe volume overload, but weight is trending down.

## 2018-08-01 NOTE — PROGRESS NOTE ADULT - ATTENDING COMMENTS
She is quite despondent about staying in the hospital. We spent time reviewing her care plan and the need for ongoing fluid removal.  She remains massively volume overloaded. Diuretic on hold for hypokalemia.  Her weight is down 7 kg!    Would resume diuretics with bumex 2 mg IV push then infusion of 1 mg/hr.  Add KDur 40 m Eq po TID with meals and at bedtime with a snack.  Continue spironolactone 100 mg po BID.  BID chem panel.  She is willing to stay at this point.

## 2018-08-01 NOTE — PROGRESS NOTE BEHAVIORAL HEALTH - NSBHCONSULTFOLLOWAFTERCARE_PSY_A_CORE FT
Recommend patient follow up with outpatient psychiatry and therapy. Referral provided to [facility] ([address] [facility], [address]nd [place] [address], phone [phone])
Recommend patient follow up with outpatient psychiatry and therapy. Referral provided to [facility] ([address] [facility], [address]nd [place] [address], phone [phone])
Recommend patient follow up with outpatient psychiatry and therapy. Referral provided to [facility] ([address] [facility], [address]nd [place] [address], phone [phone])

## 2018-08-01 NOTE — PROGRESS NOTE BEHAVIORAL HEALTH - NSBHCONSULTRECOMMENDOTHER_PSY_A_CORE FT
Recommend patient follow up with outpatient psychiatry and therapy. Referral provided to [facility] ([address] [facility], [address]nd [place] [address], phone [phone])
Recommend patient follow up with outpatient psychiatry and therapy. Referral provided to [facility] ([address] [facility], [address]nd [place] [address], phone [phone])
Recommend patient follow up with outpatient psychiatry and therapy. Referral provided to [facility] ([address] [facility], [address]nd [place] [address], phone [phone])
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
## 2018-08-01 NOTE — PROGRESS NOTE BEHAVIORAL HEALTH - NSBHFUPINTERVALHXFT_PSY_A_CORE
Pt did not receive any PRNs overnight. Pt seen and examined at the bedside. Pt reports decrease in lower extremity swelling and to feeling better this AM. Pt states that she slept well last night. Pt with continued reluctance to starting an antidepressant. Pt expresses interest in outpt therapy, will provide referral.

## 2018-08-01 NOTE — PROGRESS NOTE BEHAVIORAL HEALTH - NSBHCHARTREVIEWLAB_PSY_A_CORE FT
Labs reviewed personally [X]                        14.0   4.20  )-----------( 387      ( 30 Jul 2018 03:42 )             43.7     Hgb Trend: 14.0<--, 14.3<--, 12.3<--, 11.9<--, 12.2<--    07-30    136  |  94<L>  |  9   ----------------------------<  94  3.5   |  27  |  0.96    Ca    9.4      30 Jul 2018 03:42  Mg     1.9     07-30      Creatinine Trend: 0.96<--, 0.95<--, 1.03<--, 0.99<--, 0.92<--, 1.02<--  PT/INR - ( 30 Jul 2018 03:42 )   PT: 21.0 SEC;   INR: 1.87
Labs reviewed personally [X]                        13.5   4.22  )-----------( 333      ( 31 Jul 2018 08:45 )             41.2     Hgb Trend: 13.5<--, 14.0<--, 14.3<--, 12.3<--, 11.9<--    07-31    137  |  99  |  8   ----------------------------<  93  3.7   |  24  |  0.87    Ca    8.9      31 Jul 2018 08:45  Mg     2.1     07-31      Creatinine Trend: 0.87<--, 0.95<--, 1.05<--, 0.96<--, 0.95<--, 1.03<--  PT/INR - ( 31 Jul 2018 08:45 )   PT: 19.1 SEC;   INR: 1.64
Labs reviewed personally [X]                        13.9   3.72  )-----------( 362      ( 01 Aug 2018 05:45 )             43.3     Hgb Trend: 13.9<--, 13.5<--, 14.0<--, 14.3<--, 12.3<--    08-01    137  |  95<L>  |  6<L>  ----------------------------<  92  3.2<L>   |  27  |  0.83    Ca    9.5      01 Aug 2018 05:45  Mg     2.2     08-01      Creatinine Trend: 0.83<--, 0.87<--, 0.87<--, 0.95<--, 1.05<--, 0.96<--  PT/INR - ( 01 Aug 2018 05:45 )   PT: 17.4 SEC;   INR: 1.55

## 2018-08-01 NOTE — ADVANCED PRACTICE NURSE CONSULT - REASON FOR CONSULT
Patient seen on skin care rounds after wound care referral received for assessment of skin impairment and recommendations of topical management. Chart reviewed: WBC 3.72k/uL, Serum albumin 3.6g/dL, Total serum protein 6.8g/dL, INR 1.55, BMI 43.9kg/m2, Darrell 21, U/A (-), patient interviewed. Pt diagnosed with CHF several years ago, wears oxygen at home, when legs swell she notices fluid filled blisters. The right lower leg has had opened blisters for approximately 1 month. As per pt they have been improving. Patient H/O morbid obesity, severe pulmonary HTN, systolic HF a/w acute decompensated systolic HF. Pt seen and followed by internal medicine, Adult Hear Failure for severe pulmonary hypertension with RV failure, Pulmonology for dyspnea, intermittent chest pain x 1 week, Cardiology for chest pain, SOB.
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
## 2018-08-01 NOTE — PROGRESS NOTE ADULT - ASSESSMENT
33 F with anticardiolipin Ab, severe pHTN on Adempas, CVA on Coumadin here with dyspnea and intermittent chest pain. Here in exacerbation of her RHF and Pulm HTN resulting in anasarca responding to diuresis.      - Please restart Lasix gtt for now on hold again due to hyperkalemia. She still has edema in b/l LE  but is no longer Orthopneic and has improved dsypnea  - Heart failure following and managing diuretics but patient still with fluid on board but wants to be discharged.  - Will discuss with heart failure possible PO diuretic regimen.  - Please continue Adempas.  - Will continue to follow.  - She has an outpatient appt with Dr. Unger on 8/13/18 which is included in her discharge note.

## 2018-08-01 NOTE — PROGRESS NOTE BEHAVIORAL HEALTH - NSBHCONSULTOBSREASON_PSY_A_CORE FT
No safety concerns. Denies active suicidal ideation, intent or plan. Denies active suicidal ideation, intent or plan. however pt should not leave AMA

## 2018-08-01 NOTE — PROGRESS NOTE BEHAVIORAL HEALTH - NSBHCHARTREVIEWVS_PSY_A_CORE FT
Vital Signs Last 24 Hrs  T(C): --  T(F): --  HR: 87 (01 Aug 2018 05:25) (80 - 92)  BP: 102/70 (01 Aug 2018 05:25) (100/68 - 110/84)  BP(mean): --  RR: 18 (01 Aug 2018 05:25) (18 - 18)  SpO2: 100% (01 Aug 2018 05:25) (98% - 100%) Vital Signs Last 24 Hrs  T(C): 36.7 (01 Aug 2018 12:37), Max: 36.7 (01 Aug 2018 12:37)  T(F): 98.1 (01 Aug 2018 12:37), Max: 98.1 (01 Aug 2018 12:37)  HR: 93 (01 Aug 2018 12:37) (87 - 93)  BP: 91/53 (01 Aug 2018 12:37) (91/53 - 110/84)  BP(mean): --  RR: 16 (01 Aug 2018 12:37) (16 - 18)  SpO2: 100% (01 Aug 2018 12:37) (100% - 100%)

## 2018-08-01 NOTE — PROGRESS NOTE ADULT - PROBLEM SELECTOR PLAN 1
RHC from 2013 noted. 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   Still w/ gross volume overload. But weight is trending down (admission 152 kg, now 125 kg. Lost 7 kg in 24 hrs.   I held Lasix gtt this morning as her K was 3.2. She was given Spironolactone 50 mg po BID yesterday and got 60 meq of potassium as well. Got 40 meq of potassium and 50 of monica this morning. She wants to leave, if so it will be AMA.    SBP stable.   Continue Adempas. Cardizem was for pulm HTN (home meds) was held by primary cards team, although I do not see documentation of NO challenge in cath.   Discussed w/ Dr. Frost, would be best managed at [facility], but patient does not want to be transferred, despite lengthy conversations regarding her specific illness. RHC from 2013 noted. 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   Still w/ gross volume overload. But weight is trending down (admission 152 kg, now 125 kg. Lost 7 kg in 24 hrs.   I held Lasix gtt this morning as her K was 3.2. Krishna was increased to 100 mg po BID (to help w/ K). Repeat K 3.4. We added KCL 40 meq four times a day w/ meals.   Discontinued Lasix all together. Added Bumex 2 mg IVP x 1, then followed by Bumex 1 mg/hr. BMP q 12 hrs, keep K 4-5, mag >2.   SBP stable.   Continue Adempas. Cardizem was for pulm HTN (home meds) was held by primary cards team, although I do not see documentation of NO challenge in cath.   Discussed w/ Dr. Frost, would be best managed at [facility], but patient does not want to be transferred, despite lengthy conversations regarding her specific illness.  She agreed to stay.

## 2018-08-01 NOTE — ADVANCED PRACTICE NURSE CONSULT - RECOMMEDATIONS
Recommend follow up care at [facility] for management of frequent blisters; deroofed blisters in setting of acute CHF: [phone]. Address: [address] [place] [address].     Topical Recommendations:  Right anterior and posterior deroofed blister- Cleanse with NS. Pat dry. Apply Liquid barrier film to periwound skin. Apply Medihoney gel to wound base, cover with silicone foam with border. Change daily.   **Wound not recommend compression wraps at this time due to acute HF.     Continue low air loss bed therapy while in patient, continue heel elevation with Z-flex fluidized positioning boots while in bed, continue to encourage frequent positional changes while in bed and/or chair, continue moisture management with barrier creams & single breathable pad, continue measures to decrease friction/shear/pressure.   Continue with nutritional support as per dietary/orders.    Findings and plan discussed with patient and primary team. Patient educated on topical wound therapy, all questions and concerns addressed.    Please contact Wound Care Service Line if we can be of further assistance (ext [phone]).

## 2018-08-01 NOTE — PROGRESS NOTE ADULT - SUBJECTIVE AND OBJECTIVE BOX
Patient seen and examined. She has lost 7 kg in 24 hours. Insisting on going home today because she has to take care of her sister who has down syndrome. No SOB at rest, FERRO, CP, palpitations. Still has a generous amount of LE edema b/l.       Medications:  acetaminophen   Tablet. 650 milliGRAM(s) Oral every 6 hours PRN  Adempas(riociguat) 1.5mg Tablet 1 Tablet(s) 1 Tablet(s) Oral every 8 hours  ALBUTerol    90 MICROgram(s) HFA Inhaler 2 Puff(s) Inhalation every 6 hours PRN  BACItracin   Ointment 1 Application(s) Topical two times a day  buDESOnide  80 MICROgram(s)/formoterol 4.5 MICROgram(s) Inhaler 2 Puff(s) Inhalation two times a day  calcium carbonate    500 mG (Tums) Chewable 1 Tablet(s) Chew three times a day PRN  furosemide Infusion 15 mG/Hr IV Continuous <Continuous>  influenza   Vaccine 0.5 milliLiter(s) IntraMuscular once  loratadine 10 milliGRAM(s) Oral daily  LORazepam     Tablet 0.5 milliGRAM(s) Oral every 8 hours PRN  losartan 25 milliGRAM(s) Oral daily  melatonin 6 milliGRAM(s) Oral at bedtime  potassium chloride    Tablet ER 40 milliEquivalent(s) Oral daily  sertraline 25 milliGRAM(s) Oral daily  spironolactone 50 milliGRAM(s) Oral every 12 hours      Vitals:  Vital Signs Last 24 Hrs  T(C): --  T(F): --  HR: 87 (01 Aug 2018 05:25) (80 - 92)  BP: 102/70 (01 Aug 2018 05:25) (100/68 - 110/84)  BP(mean): --  RR: 18 (01 Aug 2018 05:25) (18 - 18)  SpO2: 100% (01 Aug 2018 05:25) (98% - 100%)    Daily     Daily Weight in k.6 (01 Aug 2018 07:42)    I&O's Detail    2018 07:01  -  01 Aug 2018 07:00  --------------------------------------------------------  IN:    Oral Fluid: 900 mL  Total IN: 900 mL    OUT:    Voided: 6650 mL  Total OUT: 6650 mL    Total NET: -5750 mL      01 Aug 2018 07:01  -  01 Aug 2018 11:14  --------------------------------------------------------  IN:    furosemide Infusion: 22.5 mL    Oral Fluid: 178 mL  Total IN: 200.5 mL    OUT:    Voided: 1000 mL  Total OUT: 1000 mL    Total NET: -799.5 mL          Physical Exam:     General: No distress. Comfortable.  HEENT: EOM intact.  Neck: Neck supple. JVP elevated. No masses  Chest: Clear to auscultation bilaterally  CV: Normal S1 and S2. No murmurs, rub, or gallops. Radial pulses normal. 3+ LE edema b/l.  Abdomen: Soft, non-distended, non-tender  Skin: No rashes or skin breakdown  Neurology: Alert and oriented times three. Sensation intact  Psych: Affect normal    Labs:                        13.9   3.72  )-----------( 362      ( 01 Aug 2018 05:45 )             43.3     08    137  |  95<L>  |  6<L>  ----------------------------<  92  3.2<L>   |  27  |  0.83    Ca    9.5      01 Aug 2018 05:45  Mg     2.2           PT/INR - ( 01 Aug 2018 05:45 )   PT: 17.4 SEC;   INR: 1.55

## 2018-08-01 NOTE — ADVANCED PRACTICE NURSE CONSULT - ASSESSMENT
General: A&Ox 4, ambulates independently. Received pt sitting in chair with supplemental oxygen in place via nasal cannula, Right leg elevated on chair. Continent of urine and stool. Skin warm, dry with increased moisture in intertriginous folds, adequate skin turgor.    Vascular: Bilateral lower extremities with scattered areas of hyper and hypopigmentation, scattered intact stable scabs noted. Left anterior lower leg with serous filled blister 0.5cmx0.5cm. Toenails in adequate hygiene  No temperature changes noted. +4 pitting edema. Capillary refill <3 seconds. Non-palpable DP/PT pulses due to severe swelling, with          biphasic doppler sounds. Denies numbness and tingling of bilateral lower legs, endorses pain when legs are dangling, relieved with elevation.    Right anterior lower leg- deroofed blister- 5.5x1.5cmx0.2cm, 100% pink-moist exposed dermis with thin fibrin film. Scant serosanguinous drainage, no odor. Periwound skin with hyperpigmentation. No increased warmth, no edema no erythema noted. Goals of care: decrease bioburden, maintain moist environment to promote wound healing, protect periwound skin.    Right posterior lower leg- deroofed blister- 2.1hms3tsx9.2cm, 100% fibrin film. Scant serofibrinous drainage, no odor. Periwound skin with hyperpigmentation. No increased warmth, no edema no erythema noted. Goals of care: decrease bioburden, maintain moist environment to promote wound healing, protect periwound skin.

## 2018-08-02 DIAGNOSIS — I50.813 ACUTE ON CHRONIC RIGHT HEART FAILURE: ICD-10-CM

## 2018-08-02 LAB
APTT BLD: 26.8 SEC — LOW (ref 27.5–37.4)
BUN SERPL-MCNC: 7 MG/DL — SIGNIFICANT CHANGE UP (ref 7–23)
CALCIUM SERPL-MCNC: 9.8 MG/DL — SIGNIFICANT CHANGE UP (ref 8.4–10.5)
CHLORIDE SERPL-SCNC: 93 MMOL/L — LOW (ref 98–107)
CO2 SERPL-SCNC: 22 MMOL/L — SIGNIFICANT CHANGE UP (ref 22–31)
CREAT SERPL-MCNC: 0.83 MG/DL — SIGNIFICANT CHANGE UP (ref 0.5–1.3)
GLUCOSE SERPL-MCNC: 97 MG/DL — SIGNIFICANT CHANGE UP (ref 70–99)
INR BLD: 1.73 — HIGH (ref 0.88–1.17)
MAGNESIUM SERPL-MCNC: 2.2 MG/DL — SIGNIFICANT CHANGE UP (ref 1.6–2.6)
POTASSIUM SERPL-MCNC: 4 MMOL/L — SIGNIFICANT CHANGE UP (ref 3.5–5.3)
POTASSIUM SERPL-SCNC: 4 MMOL/L — SIGNIFICANT CHANGE UP (ref 3.5–5.3)
PROTHROM AB SERPL-ACNC: 20.1 SEC — HIGH (ref 9.8–13.1)
SODIUM SERPL-SCNC: 135 MMOL/L — SIGNIFICANT CHANGE UP (ref 135–145)

## 2018-08-02 PROCEDURE — 99233 SBSQ HOSP IP/OBS HIGH 50: CPT

## 2018-08-02 PROCEDURE — 99232 SBSQ HOSP IP/OBS MODERATE 35: CPT

## 2018-08-02 RX ORDER — ONDANSETRON 8 MG/1
4 TABLET, FILM COATED ORAL ONCE
Qty: 0 | Refills: 0 | Status: COMPLETED | OUTPATIENT
Start: 2018-08-02 | End: 2018-08-02

## 2018-08-02 RX ORDER — WARFARIN SODIUM 2.5 MG/1
7.5 TABLET ORAL ONCE
Qty: 0 | Refills: 0 | Status: DISCONTINUED | OUTPATIENT
Start: 2018-08-02 | End: 2018-08-02

## 2018-08-02 RX ORDER — WARFARIN SODIUM 2.5 MG/1
7.5 TABLET ORAL ONCE
Qty: 0 | Refills: 0 | Status: COMPLETED | OUTPATIENT
Start: 2018-08-02 | End: 2018-08-02

## 2018-08-02 RX ADMIN — BUDESONIDE AND FORMOTEROL FUMARATE DIHYDRATE 2 PUFF(S): 160; 4.5 AEROSOL RESPIRATORY (INHALATION) at 08:03

## 2018-08-02 RX ADMIN — ONDANSETRON 4 MILLIGRAM(S): 8 TABLET, FILM COATED ORAL at 18:02

## 2018-08-02 RX ADMIN — SPIRONOLACTONE 100 MILLIGRAM(S): 25 TABLET, FILM COATED ORAL at 05:51

## 2018-08-02 RX ADMIN — BUDESONIDE AND FORMOTEROL FUMARATE DIHYDRATE 2 PUFF(S): 160; 4.5 AEROSOL RESPIRATORY (INHALATION) at 22:52

## 2018-08-02 RX ADMIN — Medication 40 MILLIEQUIVALENT(S): at 12:20

## 2018-08-02 RX ADMIN — SPIRONOLACTONE 100 MILLIGRAM(S): 25 TABLET, FILM COATED ORAL at 18:02

## 2018-08-02 RX ADMIN — Medication 40 MILLIEQUIVALENT(S): at 18:01

## 2018-08-02 RX ADMIN — LOSARTAN POTASSIUM 25 MILLIGRAM(S): 100 TABLET, FILM COATED ORAL at 05:51

## 2018-08-02 RX ADMIN — Medication 40 MILLIEQUIVALENT(S): at 22:51

## 2018-08-02 RX ADMIN — LORATADINE 10 MILLIGRAM(S): 10 TABLET ORAL at 12:21

## 2018-08-02 RX ADMIN — WARFARIN SODIUM 7.5 MILLIGRAM(S): 2.5 TABLET ORAL at 10:41

## 2018-08-02 RX ADMIN — Medication 1 APPLICATION(S): at 05:51

## 2018-08-02 RX ADMIN — SERTRALINE 25 MILLIGRAM(S): 25 TABLET, FILM COATED ORAL at 12:21

## 2018-08-02 RX ADMIN — BUMETANIDE 10 MG/HR: 0.25 INJECTION INTRAMUSCULAR; INTRAVENOUS at 08:03

## 2018-08-02 RX ADMIN — Medication 40 MILLIEQUIVALENT(S): at 05:51

## 2018-08-02 NOTE — PROGRESS NOTE ADULT - ASSESSMENT
34 y/o pleasant [place] female w/ PMHX of obesity, severe pulmonary hypertension w/ RV systolic failure and asthma comes in with complaints of worsening SOB and LE x 2 weeks. She was first told she had pulmonary hypertension and heart failure in 2006 at an OSH in [place] but was never treated. She was then re-hospitilzied in 2013 at [facility] and had a LHC- which showed normal coronaries, and RHC which showed that she had severe pulmonary hypertension w/ low normal wedge. Since then she has been followed by Dr. Unger and Dr. Haines. She states she is not able to work or go to school b/c of frequent LE edema, fatigue and inability to function normally. She did not complete the V/Q scan as it "took too long" and her back was hurting. Still with significant volume overload, but weight is trending down.

## 2018-08-02 NOTE — PROGRESS NOTE ADULT - SUBJECTIVE AND OBJECTIVE BOX
CHIEF COMPLAINT:    Interval Events:     REVIEW OF SYSTEMS:  Constitutional: [ ] negative [ ] fevers [ ] chills [ ] weight loss [ ] weight gain  HEENT: [ ] negative [ ] dry eyes [ ] eye irritation [ ] postnasal drip [ ] nasal congestion  CV: [ ] negative  [ ] chest pain [ ] orthopnea [ ] palpitations [ ] murmur  Resp: [ ] negative [ ] cough [ ] shortness of breath [ ] dyspnea [ ] wheezing [ ] sputum [ ] hemoptysis  GI: [ ] negative [ ] nausea [ ] vomiting [ ] diarrhea [ ] constipation [ ] abd pain [ ] dysphagia   : [ ] negative [ ] dysuria [ ] nocturia [ ] hematuria [ ] increased urinary frequency  Musculoskeletal: [ ] negative [ ] back pain [ ] myalgias [ ] arthralgias [ ] fracture  Skin: [ ] negative [ ] rash [ ] itch  Neurological: [ ] negative [ ] headache [ ] dizziness [ ] syncope [ ] weakness [ ] numbness  Psychiatric: [ ] negative [ ] anxiety [ ] depression  Endocrine: [ ] negative [ ] diabetes [ ] thyroid problem  Hematologic/Lymphatic: [ ] negative [ ] anemia [ ] bleeding problem  Allergic/Immunologic: [ ] negative [ ] itchy eyes [ ] nasal discharge [ ] hives [ ] angioedema  [ ] All other systems negative  [ ] Unable to assess ROS because ________    OBJECTIVE:  ICU Vital Signs Last 24 Hrs  T(C): 37.9 (02 Aug 2018 05:45), Max: 37.9 (02 Aug 2018 05:45)  T(F): 100.3 (02 Aug 2018 05:45), Max: 100.3 (02 Aug 2018 05:45)  HR: 108 (02 Aug 2018 07:15) (93 - 108)  BP: 106/60 (02 Aug 2018 05:45) (91/53 - 106/60)  BP(mean): --  ABP: --  ABP(mean): --  RR: 16 (02 Aug 2018 05:45) (16 - 16)  SpO2: 100% (02 Aug 2018 05:45) (98% - 100%)        08-01 @ 07:01  -  08-02 @ 07:00  --------------------------------------------------------  IN: 921 mL / OUT: 5800 mL / NET: -4879 mL    08-02 @ 07:01 - 08-02 @ 09:35  --------------------------------------------------------  IN: 0 mL / OUT: 1500 mL / NET: -1500 mL      CAPILLARY BLOOD GLUCOSE          PHYSICAL EXAM:  General:   HEENT:   Lymph Nodes:  Neck:   Respiratory:   Cardiovascular:   Abdomen:   Extremities:   Skin:   Neurological:  Psychiatry:    HOSPITAL MEDICATIONS:  warfarin 7.5 milliGRAM(s) Oral once      buMETAnide Infusion 1 mG/Hr IV Continuous <Continuous>  losartan 25 milliGRAM(s) Oral daily  spironolactone 100 milliGRAM(s) Oral every 12 hours      ALBUTerol    90 MICROgram(s) HFA Inhaler 2 Puff(s) Inhalation every 6 hours PRN  buDESOnide  80 MICROgram(s)/formoterol 4.5 MICROgram(s) Inhaler 2 Puff(s) Inhalation two times a day  loratadine 10 milliGRAM(s) Oral daily    acetaminophen   Tablet. 650 milliGRAM(s) Oral every 6 hours PRN  LORazepam     Tablet 0.5 milliGRAM(s) Oral every 8 hours PRN  melatonin 6 milliGRAM(s) Oral at bedtime  ondansetron Injectable 4 milliGRAM(s) IV Push once  sertraline 25 milliGRAM(s) Oral daily    calcium carbonate    500 mG (Tums) Chewable 1 Tablet(s) Chew three times a day PRN        potassium chloride    Tablet ER 40 milliEquivalent(s) Oral four times a day    influenza   Vaccine 0.5 milliLiter(s) IntraMuscular once    BACItracin   Ointment 1 Application(s) Topical two times a day    Adempas(riociguat) 1.5mg Tablet 1 Tablet(s) 1 Tablet(s) Oral every 8 hours      LABS:                        13.9   3.72  )-----------( 362      ( 01 Aug 2018 05:45 )             43.3     Hgb Trend: 13.9<--, 13.5<--, 14.0<--, 14.3<--, 12.3<--  08-02    135  |  93<L>  |  7   ----------------------------<  97  4.0   |  22  |  0.83    Ca    9.8      02 Aug 2018 06:25  Mg     2.2     08-02      Creatinine Trend: 0.83<--, 0.83<--, 0.83<--, 0.87<--, 0.87<--, 0.95<--  PT/INR - ( 02 Aug 2018 06:25 )   PT: 20.1 SEC;   INR: 1.73          PTT - ( 02 Aug 2018 06:25 )  PTT:26.8 SEC          MICROBIOLOGY:     RADIOLOGY:  [ ] Reviewed and interpreted by me    PULMONARY FUNCTION TESTS:    EKG:

## 2018-08-02 NOTE — PROGRESS NOTE ADULT - SUBJECTIVE AND OBJECTIVE BOX
Subjective: c/o nausea that waxes/wanes, but definitely notices the volume is coming off. Appetite remains poor. No LH/dizziness.    Medications:  acetaminophen   Tablet. 650 milliGRAM(s) Oral every 6 hours PRN  Adempas(riociguat) 1.5mg Tablet 1 Tablet(s) 1 Tablet(s) Oral every 8 hours  ALBUTerol    90 MICROgram(s) HFA Inhaler 2 Puff(s) Inhalation every 6 hours PRN  BACItracin   Ointment 1 Application(s) Topical two times a day  buDESOnide  80 MICROgram(s)/formoterol 4.5 MICROgram(s) Inhaler 2 Puff(s) Inhalation two times a day  buMETAnide Infusion 1 mG/Hr IV Continuous <Continuous>  calcium carbonate    500 mG (Tums) Chewable 1 Tablet(s) Chew three times a day PRN  influenza   Vaccine 0.5 milliLiter(s) IntraMuscular once  loratadine 10 milliGRAM(s) Oral daily  LORazepam     Tablet 0.5 milliGRAM(s) Oral every 8 hours PRN  losartan 25 milliGRAM(s) Oral daily  melatonin 6 milliGRAM(s) Oral at bedtime  ondansetron Injectable 4 milliGRAM(s) IV Push once  potassium chloride    Tablet ER 40 milliEquivalent(s) Oral four times a day  sertraline 25 milliGRAM(s) Oral daily  spironolactone 100 milliGRAM(s) Oral every 12 hours    Vitals:  T(C): 36.9 (18 @ 13:27), Max: 37.9 (18 @ 05:45)  HR: 108 (18 @ 18:00) (102 - 116)  BP: 110/65 (18 @ 18:00) (102/60 - 111/65)  RR: 16 (18 @ 13:27) (16 - 16)  SpO2: 99% (18 @ 13:27) (98% - 100%)    Daily     Daily Weight in k.7 (02 Aug 2018 05:00)  Admission weight 152.9 kg      I&O's Summary    01 Aug 2018 07:01  -  02 Aug 2018 07:00  --------------------------------------------------------  IN: 921 mL / OUT: 5800 mL / NET: -4879 mL    02 Aug 2018 07:01  -  02 Aug 2018 19:20  --------------------------------------------------------  IN: 738 mL / OUT: 3040 mL / NET: -2302 mL        Physical Exam:  Appearance: No Acute Distress  HEENT: JVP 16 cm H2O, no HJR  Cardiovascular: tachy, but regular S1 S2, II/VI SM at LSB, no gallops  Respiratory: Diminished at bases, but clear to auscultation bilaterally  Gastrointestinal: morbidly obese, soft, Non-tender, non-distended	  Skin: no skin lesions  Neurologic: Non-focal  Extremities: 3+ pitting BLE edema into posterior thighs, warm and well perfused  Psychiatry: A & O x 3, Mood & affect appropriate      Labs:                        13.9   3.72  )-----------( 362      ( 01 Aug 2018 05:45 )             43.3     08-02    135  |  93<L>  |  7   ----------------------------<  97  4.0   |  22  |  0.83    Ca    9.8      02 Aug 2018 06:25  Mg     2.2     08-02        PT/INR - ( 02 Aug 2018 06:25 )   PT: 20.1 SEC;   INR: 1.73     PTT - ( 02 Aug 2018 06:25 )  PTT:26.8 SEC

## 2018-08-02 NOTE — PROGRESS NOTE ADULT - PROBLEM SELECTOR PLAN 2
- Followed by Dr. Unger as an outpatient.  - When euvolemic, would try to repeat the V/Q scan to complete her evaluation

## 2018-08-02 NOTE — PROGRESS NOTE ADULT - ASSESSMENT
33 F with anticardiolipin Ab, severe pHTN on Adempas, CVA on Coumadin here with dyspnea and intermittent chest pain. Here in exacerbation of her RHF and Pulm HTN resulting in anasarca responding to diuresis.    - Patient now on Bumex gtt and responding. She is net 33.4L for the whole hospital stay.  - She has significantly reduced edema in the LE bilaterally.  - Heart failure managing diuresis. Would recommend transitioning off gtt to IV Bumex.  - Continue Aldactone.  - Patient can be discharged without Cardizem and can follow up with Dr. Unger as outpatient on 8/13/18. Appt already made and patient made aware.  - Please continue Adempas.  - Please call with questions.

## 2018-08-02 NOTE — PROGRESS NOTE ADULT - SUBJECTIVE AND OBJECTIVE BOX
CHIEF COMPLAINT:    Interval Events:    REVIEW OF SYSTEMS:  Constitutional: [ ] negative [ ] fevers [ ] chills [ ] weight loss [ ] weight gain  HEENT: [ ] negative [ ] dry eyes [ ] eye irritation [ ] postnasal drip [ ] nasal congestion  CV: [ ] negative  [ ] chest pain [ ] orthopnea [ ] palpitations [ ] murmur  Resp: [ ] negative [ ] cough [ ] shortness of breath [ ] dyspnea [ ] wheezing [ ] sputum [ ] hemoptysis  GI: [ ] negative [ ] nausea [ ] vomiting [ ] diarrhea [ ] constipation [ ] abd pain [ ] dysphagia   : [ ] negative [ ] dysuria [ ] nocturia [ ] hematuria [ ] increased urinary frequency  Musculoskeletal: [ ] negative [ ] back pain [ ] myalgias [ ] arthralgias [ ] fracture  Skin: [ ] negative [ ] rash [ ] itch  Neurological: [ ] negative [ ] headache [ ] dizziness [ ] syncope [ ] weakness [ ] numbness  Psychiatric: [ ] negative [ ] anxiety [ ] depression  Endocrine: [ ] negative [ ] diabetes [ ] thyroid problem  Hematologic/Lymphatic: [ ] negative [ ] anemia [ ] bleeding problem  Allergic/Immunologic: [ ] negative [ ] itchy eyes [ ] nasal discharge [ ] hives [ ] angioedema  [ ] All other systems negative  [ ] Unable to assess ROS because ________    OBJECTIVE:  ICU Vital Signs Last 24 Hrs  T(C): 37.9 (02 Aug 2018 05:45), Max: 37.9 (02 Aug 2018 05:45)  T(F): 100.3 (02 Aug 2018 05:45), Max: 100.3 (02 Aug 2018 05:45)  HR: 108 (02 Aug 2018 07:15) (98 - 108)  BP: 106/60 (02 Aug 2018 05:45) (96/76 - 106/60)  BP(mean): --  ABP: --  ABP(mean): --  RR: 16 (02 Aug 2018 05:45) (16 - 16)  SpO2: 100% (02 Aug 2018 05:45) (98% - 100%)        08-01 @ 07:01  -  08-02 @ 07:00  --------------------------------------------------------  IN: 921 mL / OUT: 5800 mL / NET: -4879 mL    08-02 @ 07:01  -  08-02 @ 12:45  --------------------------------------------------------  IN: 460 mL / OUT: 2400 mL / NET: -1940 mL      CAPILLARY BLOOD GLUCOSE          PHYSICAL EXAM:  General:   HEENT:   Lymph Nodes:  Neck:   Respiratory:   Cardiovascular:   Abdomen:   Extremities:   Skin:   Neurological:  Psychiatry:    HOSPITAL MEDICATIONS:      buMETAnide Infusion 1 mG/Hr IV Continuous <Continuous>  losartan 25 milliGRAM(s) Oral daily  spironolactone 100 milliGRAM(s) Oral every 12 hours      ALBUTerol    90 MICROgram(s) HFA Inhaler 2 Puff(s) Inhalation every 6 hours PRN  buDESOnide  80 MICROgram(s)/formoterol 4.5 MICROgram(s) Inhaler 2 Puff(s) Inhalation two times a day  loratadine 10 milliGRAM(s) Oral daily    acetaminophen   Tablet. 650 milliGRAM(s) Oral every 6 hours PRN  LORazepam     Tablet 0.5 milliGRAM(s) Oral every 8 hours PRN  melatonin 6 milliGRAM(s) Oral at bedtime  ondansetron Injectable 4 milliGRAM(s) IV Push once  sertraline 25 milliGRAM(s) Oral daily    calcium carbonate    500 mG (Tums) Chewable 1 Tablet(s) Chew three times a day PRN        potassium chloride    Tablet ER 40 milliEquivalent(s) Oral four times a day    influenza   Vaccine 0.5 milliLiter(s) IntraMuscular once    BACItracin   Ointment 1 Application(s) Topical two times a day    Adempas(riociguat) 1.5mg Tablet 1 Tablet(s) 1 Tablet(s) Oral every 8 hours      LABS:                        13.9   3.72  )-----------( 362      ( 01 Aug 2018 05:45 )             43.3     Hgb Trend: 13.9<--, 13.5<--, 14.0<--, 14.3<--, 12.3<--  08-02    135  |  93<L>  |  7   ----------------------------<  97  4.0   |  22  |  0.83    Ca    9.8      02 Aug 2018 06:25  Mg     2.2     08-02      Creatinine Trend: 0.83<--, 0.83<--, 0.83<--, 0.87<--, 0.87<--, 0.95<--  PT/INR - ( 02 Aug 2018 06:25 )   PT: 20.1 SEC;   INR: 1.73          PTT - ( 02 Aug 2018 06:25 )  PTT:26.8 SEC          MICROBIOLOGY:     RADIOLOGY:  [ ] Reviewed and interpreted by me    PULMONARY FUNCTION TESTS:    EKG: CHIEF COMPLAINT: dyspnea    Interval Events: no dyspnea. resolved orthopnea    REVIEW OF SYSTEMS:  Constitutional: [x ] negative [ ] fevers [ ] chills [ ] weight loss [ ] weight gain  HEENT: [x ] negative [ ] dry eyes [ ] eye irritation [ ] postnasal drip [ ] nasal congestion  CV: [x ] negative  [ ] chest pain [ ] orthopnea [ ] palpitations [ ] murmur  Resp: [x ] negative [ ] cough [ ] shortness of breath [ ] dyspnea [ ] wheezing [ ] sputum [ ] hemoptysis  GI: [x ] negative [ ] nausea [ ] vomiting [ ] diarrhea [ ] constipation [ ] abd pain [ ] dysphagia   : [x ] negative [ ] dysuria [ ] nocturia [ ] hematuria [ ] increased urinary frequency  Musculoskeletal: [ ] negative [ ] back pain [ ] myalgias [ ] arthralgias [ ] fracture  Skin: [x ] negative [ ] rash [ ] itch  Neurological: [x ] negative [ ] headache [ ] dizziness [ ] syncope [ ] weakness [ ] numbness  Psychiatric: [x ] negative [ ] anxiety [ ] depression  Endocrine: [ x] negative [ ] diabetes [ ] thyroid problem  Hematologic/Lymphatic: [ ] negative [ ] anemia [ ] bleeding problem  Allergic/Immunologic: [ ] negative [ ] itchy eyes [ ] nasal discharge [ ] hives [ ] angioedema  [ ] All other systems negative  [ ] Unable to assess ROS because ________    OBJECTIVE:  T(C): 37.9 (02 Aug 2018 05:45), Max: 37.9 (02 Aug 2018 05:45)  T(F): 100.3 (02 Aug 2018 05:45), Max: 100.3 (02 Aug 2018 05:45)  HR: 108 (02 Aug 2018 07:15) (98 - 108)  BP: 106/60 (02 Aug 2018 05:45) (96/76 - 106/60)  RR: 16 (02 Aug 2018 05:45) (16 - 16)  SpO2: 100% (02 Aug 2018 05:45) (98% - 100%)        08-01 @ 07:01  -  08-02 @ 07:00  --------------------------------------------------------  IN: 921 mL / OUT: 5800 mL / NET: -4879 mL    08-02 @ 07:01 - 08-02 @ 12:45  --------------------------------------------------------  IN: 460 mL / OUT: 2400 mL / NET: -1940 mL      CAPILLARY BLOOD GLUCOSE          PHYSICAL EXAM:  General: NAD  HEENT: HECTOR  Lymph Nodes:  Neck: No JVD  Respiratory: CTA B/L  Cardiovascular: RR  Abdomen: S/NT/ND  Extremities: 1+ edema b/l  Skin: stable  Neurological: non-focal.  Psychiatry:    HOSPITAL MEDICATIONS:      buMETAnide Infusion 1 mG/Hr IV Continuous <Continuous>  losartan 25 milliGRAM(s) Oral daily  spironolactone 100 milliGRAM(s) Oral every 12 hours      ALBUTerol    90 MICROgram(s) HFA Inhaler 2 Puff(s) Inhalation every 6 hours PRN  buDESOnide  80 MICROgram(s)/formoterol 4.5 MICROgram(s) Inhaler 2 Puff(s) Inhalation two times a day  loratadine 10 milliGRAM(s) Oral daily    acetaminophen   Tablet. 650 milliGRAM(s) Oral every 6 hours PRN  LORazepam     Tablet 0.5 milliGRAM(s) Oral every 8 hours PRN  melatonin 6 milliGRAM(s) Oral at bedtime  ondansetron Injectable 4 milliGRAM(s) IV Push once  sertraline 25 milliGRAM(s) Oral daily    calcium carbonate    500 mG (Tums) Chewable 1 Tablet(s) Chew three times a day PRN        potassium chloride    Tablet ER 40 milliEquivalent(s) Oral four times a day    influenza   Vaccine 0.5 milliLiter(s) IntraMuscular once    BACItracin   Ointment 1 Application(s) Topical two times a day    Adempas(riociguat) 1.5mg Tablet 1 Tablet(s) 1 Tablet(s) Oral every 8 hours      LABS:                        13.9   3.72  )-----------( 362      ( 01 Aug 2018 05:45 )             43.3     Hgb Trend: 13.9<--, 13.5<--, 14.0<--, 14.3<--, 12.3<--  08-02    135  |  93<L>  |  7   ----------------------------<  97  4.0   |  22  |  0.83    Ca    9.8      02 Aug 2018 06:25  Mg     2.2     08-02      Creatinine Trend: 0.83<--, 0.83<--, 0.83<--, 0.87<--, 0.87<--, 0.95<--  PT/INR - ( 02 Aug 2018 06:25 )   PT: 20.1 SEC;   INR: 1.73          PTT - ( 02 Aug 2018 06:25 )  PTT:26.8 SEC          MICROBIOLOGY:     RADIOLOGY:  [ ] Reviewed and interpreted by me    PULMONARY FUNCTION TESTS:    EKG:

## 2018-08-02 NOTE — PROGRESS NOTE ADULT - PROBLEM SELECTOR PLAN 3
- Continue spironolactone 100 mg po BID while she is on the bumex infusion. It must be reduced once her diuretics are dropped down.  - Continue KDur 40 mEq po QID with food, but again, discontinue once the diuretics are reduced.  - BMP BID.

## 2018-08-02 NOTE — PROGRESS NOTE ADULT - SUBJECTIVE AND OBJECTIVE BOX
Patient is a 33y old  Female who presents with a chief complaint of "I had chest pain and SOB"         SUBJECTIVE / OVERNIGHT EVENTS: getting tired of being in the hospital but understands need to stay       MEDICATIONS  (STANDING):  Adempas(riociguat) 1.5mg Tablet 1 Tablet(s) 1 Tablet(s) Oral every 8 hours  BACItracin   Ointment 1 Application(s) Topical two times a day  buDESOnide  80 MICROgram(s)/formoterol 4.5 MICROgram(s) Inhaler 2 Puff(s) Inhalation two times a day  buMETAnide Infusion 1 mG/Hr (10 mL/Hr) IV Continuous <Continuous>  influenza   Vaccine 0.5 milliLiter(s) IntraMuscular once  loratadine 10 milliGRAM(s) Oral daily  losartan 25 milliGRAM(s) Oral daily  melatonin 6 milliGRAM(s) Oral at bedtime  ondansetron Injectable 4 milliGRAM(s) IV Push once  potassium chloride    Tablet ER 40 milliEquivalent(s) Oral four times a day  sertraline 25 milliGRAM(s) Oral daily  spironolactone 100 milliGRAM(s) Oral every 12 hours    MEDICATIONS  (PRN):  acetaminophen   Tablet. 650 milliGRAM(s) Oral every 6 hours PRN Mild Pain (1 - 3)  ALBUTerol    90 MICROgram(s) HFA Inhaler 2 Puff(s) Inhalation every 6 hours PRN Shortness of Breath and/or Wheezing  calcium carbonate    500 mG (Tums) Chewable 1 Tablet(s) Chew three times a day PRN Heartburn  LORazepam     Tablet 0.5 milliGRAM(s) Oral every 8 hours PRN Anxiety      Vital Signs Last 24 Hrs  T(F): 98.5 (02 Aug 2018 13:27), Max: 100.3 (02 Aug 2018 05:45)  HR: 116 (02 Aug 2018 13:27) (98 - 116)  BP: 111/65 (02 Aug 2018 13:27) (96/76 - 111/65)  RR: 16 (02 Aug 2018 13:27) (16 - 16)  SpO2: 99% (02 Aug 2018 13:27) (98% - 100%)            PHYSICAL EXAM  GENERAL: NAD, well-developed  HEAD:  Atraumatic, Normocephalic  EYES: EOMI, PERRLA, conjunctiva and sclera clear  CHEST/LUNG: good AE  HEART: Regular rate and rhythms  ABDOMEN: Soft, Nontender, Nondistended; Bowel sounds present  EXTREMITIES:  edema sig improved  s    LABS:                        13.9   3.72  )-----------( 362      ( 01 Aug 2018 05:45 )             43.3     08-02    135  |  93<L>  |  7   ----------------------------<  97  4.0   |  22  |  0.83    Ca    9.8      02 Aug 2018 06:25  Mg     2.2     08-02      PT/INR - ( 02 Aug 2018 06:25 )   PT: 20.1 SEC;   INR: 1.73          PTT - ( 02 Aug 2018 06:25 )  PTT:26.8 SEC

## 2018-08-03 LAB
ALBUMIN SERPL ELPH-MCNC: 3.8 G/DL — SIGNIFICANT CHANGE UP (ref 3.3–5)
ALP SERPL-CCNC: 48 U/L — SIGNIFICANT CHANGE UP (ref 40–120)
ALT FLD-CCNC: 23 U/L — SIGNIFICANT CHANGE UP (ref 4–33)
APTT BLD: 37.1 SEC — SIGNIFICANT CHANGE UP (ref 27.5–37.4)
AST SERPL-CCNC: 27 U/L — SIGNIFICANT CHANGE UP (ref 4–32)
BILIRUB SERPL-MCNC: 2.7 MG/DL — HIGH (ref 0.2–1.2)
BUN SERPL-MCNC: 14 MG/DL — SIGNIFICANT CHANGE UP (ref 7–23)
BUN SERPL-MCNC: 22 MG/DL — SIGNIFICANT CHANGE UP (ref 7–23)
CALCIUM SERPL-MCNC: 9.6 MG/DL — SIGNIFICANT CHANGE UP (ref 8.4–10.5)
CALCIUM SERPL-MCNC: 9.7 MG/DL — SIGNIFICANT CHANGE UP (ref 8.4–10.5)
CHLORIDE SERPL-SCNC: 94 MMOL/L — LOW (ref 98–107)
CHLORIDE SERPL-SCNC: 94 MMOL/L — LOW (ref 98–107)
CO2 SERPL-SCNC: 22 MMOL/L — SIGNIFICANT CHANGE UP (ref 22–31)
CO2 SERPL-SCNC: 24 MMOL/L — SIGNIFICANT CHANGE UP (ref 22–31)
CREAT SERPL-MCNC: 1.36 MG/DL — HIGH (ref 0.5–1.3)
CREAT SERPL-MCNC: 2.23 MG/DL — HIGH (ref 0.5–1.3)
GLUCOSE SERPL-MCNC: 100 MG/DL — HIGH (ref 70–99)
GLUCOSE SERPL-MCNC: 123 MG/DL — HIGH (ref 70–99)
HCT VFR BLD CALC: 45.5 % — HIGH (ref 34.5–45)
HGB BLD-MCNC: 14.5 G/DL — SIGNIFICANT CHANGE UP (ref 11.5–15.5)
INR BLD: 2.17 — HIGH (ref 0.88–1.17)
MAGNESIUM SERPL-MCNC: 2 MG/DL — SIGNIFICANT CHANGE UP (ref 1.6–2.6)
MAGNESIUM SERPL-MCNC: 2.2 MG/DL — SIGNIFICANT CHANGE UP (ref 1.6–2.6)
MCHC RBC-ENTMCNC: 24.7 PG — LOW (ref 27–34)
MCHC RBC-ENTMCNC: 31.9 % — LOW (ref 32–36)
MCV RBC AUTO: 77.5 FL — LOW (ref 80–100)
NRBC # FLD: 0 — SIGNIFICANT CHANGE UP
PHOSPHATE SERPL-MCNC: 4.4 MG/DL — SIGNIFICANT CHANGE UP (ref 2.5–4.5)
PLATELET # BLD AUTO: 294 K/UL — SIGNIFICANT CHANGE UP (ref 150–400)
PMV BLD: 10.1 FL — SIGNIFICANT CHANGE UP (ref 7–13)
POTASSIUM SERPL-MCNC: 4.9 MMOL/L — SIGNIFICANT CHANGE UP (ref 3.5–5.3)
POTASSIUM SERPL-MCNC: 4.9 MMOL/L — SIGNIFICANT CHANGE UP (ref 3.5–5.3)
POTASSIUM SERPL-SCNC: 4.9 MMOL/L — SIGNIFICANT CHANGE UP (ref 3.5–5.3)
POTASSIUM SERPL-SCNC: 4.9 MMOL/L — SIGNIFICANT CHANGE UP (ref 3.5–5.3)
PROT SERPL-MCNC: 7.5 G/DL — SIGNIFICANT CHANGE UP (ref 6–8.3)
PROTHROM AB SERPL-ACNC: 24.5 SEC — HIGH (ref 9.8–13.1)
RBC # BLD: 5.87 M/UL — HIGH (ref 3.8–5.2)
RBC # FLD: 22.4 % — HIGH (ref 10.3–14.5)
SODIUM SERPL-SCNC: 132 MMOL/L — LOW (ref 135–145)
SODIUM SERPL-SCNC: 133 MMOL/L — LOW (ref 135–145)
WBC # BLD: 6.7 K/UL — SIGNIFICANT CHANGE UP (ref 3.8–10.5)
WBC # FLD AUTO: 6.7 K/UL — SIGNIFICANT CHANGE UP (ref 3.8–10.5)

## 2018-08-03 PROCEDURE — 99233 SBSQ HOSP IP/OBS HIGH 50: CPT

## 2018-08-03 PROCEDURE — 99232 SBSQ HOSP IP/OBS MODERATE 35: CPT

## 2018-08-03 RX ORDER — SPIRONOLACTONE 25 MG/1
50 TABLET, FILM COATED ORAL DAILY
Qty: 0 | Refills: 0 | Status: DISCONTINUED | OUTPATIENT
Start: 2018-08-04 | End: 2018-08-04

## 2018-08-03 RX ORDER — SPIRONOLACTONE 25 MG/1
50 TABLET, FILM COATED ORAL DAILY
Qty: 0 | Refills: 0 | Status: DISCONTINUED | OUTPATIENT
Start: 2018-08-03 | End: 2018-08-03

## 2018-08-03 RX ORDER — WARFARIN SODIUM 2.5 MG/1
7.5 TABLET ORAL ONCE
Qty: 0 | Refills: 0 | Status: COMPLETED | OUTPATIENT
Start: 2018-08-03 | End: 2018-08-03

## 2018-08-03 RX ADMIN — BUDESONIDE AND FORMOTEROL FUMARATE DIHYDRATE 2 PUFF(S): 160; 4.5 AEROSOL RESPIRATORY (INHALATION) at 08:18

## 2018-08-03 RX ADMIN — BUMETANIDE 10 MG/HR: 0.25 INJECTION INTRAMUSCULAR; INTRAVENOUS at 06:49

## 2018-08-03 RX ADMIN — LOSARTAN POTASSIUM 25 MILLIGRAM(S): 100 TABLET, FILM COATED ORAL at 06:48

## 2018-08-03 RX ADMIN — Medication 650 MILLIGRAM(S): at 18:06

## 2018-08-03 RX ADMIN — Medication 6 MILLIGRAM(S): at 22:11

## 2018-08-03 RX ADMIN — Medication 1 APPLICATION(S): at 17:08

## 2018-08-03 RX ADMIN — BUDESONIDE AND FORMOTEROL FUMARATE DIHYDRATE 2 PUFF(S): 160; 4.5 AEROSOL RESPIRATORY (INHALATION) at 22:12

## 2018-08-03 RX ADMIN — SPIRONOLACTONE 100 MILLIGRAM(S): 25 TABLET, FILM COATED ORAL at 06:48

## 2018-08-03 RX ADMIN — Medication 650 MILLIGRAM(S): at 17:06

## 2018-08-03 RX ADMIN — WARFARIN SODIUM 7.5 MILLIGRAM(S): 2.5 TABLET ORAL at 08:17

## 2018-08-03 RX ADMIN — LORATADINE 10 MILLIGRAM(S): 10 TABLET ORAL at 13:28

## 2018-08-03 RX ADMIN — Medication 40 MILLIEQUIVALENT(S): at 06:48

## 2018-08-03 RX ADMIN — Medication 40 MILLIGRAM(S): at 18:43

## 2018-08-03 NOTE — PROGRESS NOTE ADULT - ASSESSMENT
Briefly, 32 y/o AA femal with h/o severe pulmonary HTN (Dx 2006; unclear etiology), asthma who presented with SOB and LE edema x 2 weeks found to be in acute on chronic right sided heart failure. Was started on diuresis with excellent result: admission weight 336 pounds now 264 pounds. States she feels improved. Unclear etiology of PH and was followed by Dr. Unger up until Feb 2018. Had PFTs done as outpt 2014 which showed mild restrictive component and depressed DLCO (51%). CTA on admission was negative for PE. Unable to tolerate V/Q scan d/t back pain and never had sleep study. Denies family h/o PH. Was diagnosed with anticardiolipin Ab in past but on subsequent testing came back negative. Currently, on exam, still volume overloaded. Labs reviewed - BUN/Cr 14/1.36 (b/l Cr 0.8), K 4.9, INR 2.1, Tbili 2.7. TTE 7/26 - severe MANSI, RV enlargement, RV pressure/volume overload, mod-severe TR, RVSP 98. Overall class III symptoms with WHO group I PH possibly, still volume overloaded.  - d/c bumex gtt; start torsemide 40 mg PO twice daily; likely goal weight 250s  - reduce monica to 50 mg daily  - replete K as needed to maintain >4  - standing weights daily  - please send anti-Scl 70 and EYNNY  - will need outpt sleep study and V/Q scan as well as possible RHC  - continue Adempas; would consider uptitrating to 2 mg every 8 hours as she has been on 1.5 mg for several months  - will arrange for outpt follow up

## 2018-08-03 NOTE — PROGRESS NOTE ADULT - PROBLEM SELECTOR PLAN 5
[facility]          3/19/2018        María Nolan       : 1987  [address]  [place] [address]        To Whom It May Concern,    This is to certify that María Nolan was seen in the clinic on  3/19/2018.    Please excuse María from work tomorrow on  she may return on  only if symptoms have improved.        SIGNATURE:_________________________________________, 3/19/2018       George Paulson MD                                .      [facility]  [facility]  [address]  [place] [address]  [phone]  [phone]   resolved  cont to monitor as diuretics are titrated down

## 2018-08-03 NOTE — PROGRESS NOTE ADULT - SUBJECTIVE AND OBJECTIVE BOX
CHIEF COMPLAINT: dyspnea    Interval Events: now off Bumex gtt    REVIEW OF SYSTEMS:  Constitutional: [x ] negative [ ] fevers [ ] chills [ ] weight loss [ ] weight gain  HEENT: [x ] negative [ ] dry eyes [ ] eye irritation [ ] postnasal drip [ ] nasal congestion  CV: [x ] negative  [ ] chest pain [ ] orthopnea [ ] palpitations [ ] murmur  Resp: [x ] negative [ ] cough [ ] shortness of breath [ ] dyspnea [ ] wheezing [ ] sputum [ ] hemoptysis  GI: [ x] negative [ ] nausea [ ] vomiting [ ] diarrhea [ ] constipation [ ] abd pain [ ] dysphagia   : [x ] negative [ ] dysuria [ ] nocturia [ ] hematuria [ ] increased urinary frequency  Musculoskeletal: [ x] negative [ ] back pain [ ] myalgias [ ] arthralgias [ ] fracture  Skin: [ x] negative [ ] rash [ ] itch  Neurological: [x ] negative [ ] headache [ ] dizziness [ ] syncope [ ] weakness [ ] numbness  Psychiatric: [ x] negative [ ] anxiety [ ] depression  Endocrine: [x ] negative [ ] diabetes [ ] thyroid problem  Hematologic/Lymphatic: [ ] negative [ ] anemia [ ] bleeding problem  Allergic/Immunologic: [ ] negative [ ] itchy eyes [ ] nasal discharge [ ] hives [ ] angioedema  [ ] All other systems negative  [ ] Unable to assess ROS because ________    OBJECTIVE:  T(C): 36.8 (03 Aug 2018 06:32), Max: 37.3 (02 Aug 2018 22:23)  T(F): 98.2 (03 Aug 2018 06:32), Max: 99.1 (02 Aug 2018 22:23)  HR: 96 (03 Aug 2018 06:32) (96 - 108)  BP: 108/60 (03 Aug 2018 06:32) (102/62 - 110/65)  RR: 16 (03 Aug 2018 06:32) (16 - 16)  SpO2: 100% (03 Aug 2018 06:32) (100% - 100%)        08-02 @ 07:01  -  08-03 @ 07:00  --------------------------------------------------------  IN: 938 mL / OUT: 4640 mL / NET: -3702 mL      CAPILLARY BLOOD GLUCOSE          PHYSICAL EXAM:  General: NAD  HEENT: HECTOR  Lymph Nodes:  Neck: No JVD  Respiratory: cta b/l  Cardiovascular: RRR  Abdomen: S/NT/ND  Extremities: Trace to 1+ edema b/l  Skin: No rash  Neurological: non-focal  Psychiatry:    HOSPITAL MEDICATIONS:      losartan 25 milliGRAM(s) Oral daily      ALBUTerol    90 MICROgram(s) HFA Inhaler 2 Puff(s) Inhalation every 6 hours PRN  buDESOnide  80 MICROgram(s)/formoterol 4.5 MICROgram(s) Inhaler 2 Puff(s) Inhalation two times a day  loratadine 10 milliGRAM(s) Oral daily    acetaminophen   Tablet. 650 milliGRAM(s) Oral every 6 hours PRN  LORazepam     Tablet 0.5 milliGRAM(s) Oral every 8 hours PRN  melatonin 6 milliGRAM(s) Oral at bedtime  ondansetron Injectable 4 milliGRAM(s) IV Push once  sertraline 25 milliGRAM(s) Oral daily    calcium carbonate    500 mG (Tums) Chewable 1 Tablet(s) Chew three times a day PRN          influenza   Vaccine 0.5 milliLiter(s) IntraMuscular once    BACItracin   Ointment 1 Application(s) Topical two times a day    Adempas(riociguat) 1.5mg Tablet 1 Tablet(s) 1 Tablet(s) Oral every 8 hours      LABS:                        14.5   6.70  )-----------( 294      ( 03 Aug 2018 05:05 )             45.5     Hgb Trend: 14.5<--, 13.9<--, 13.5<--, 14.0<--, 14.3<--  08-03    133<L>  |  94<L>  |  14  ----------------------------<  100<H>  4.9   |  22  |  1.36<H>    Ca    9.7      03 Aug 2018 05:05  Phos  4.4     08-03  Mg     2.0     08-03    TPro  7.5  /  Alb  3.8  /  TBili  2.7<H>  /  DBili  x   /  AST  27  /  ALT  23  /  AlkPhos  48  08-03    Creatinine Trend: 1.36<--, 0.83<--, 0.83<--, 0.83<--, 0.87<--, 0.87<--  PT/INR - ( 03 Aug 2018 05:05 )   PT: 24.5 SEC;   INR: 2.17          PTT - ( 03 Aug 2018 05:05 )  PTT:37.1 SEC          MICROBIOLOGY:     RADIOLOGY:  [ ] Reviewed and interpreted by me    PULMONARY FUNCTION TESTS:    EKG:

## 2018-08-03 NOTE — PROGRESS NOTE ADULT - SUBJECTIVE AND OBJECTIVE BOX
Interval History:  - diuresed very well  - reports back pain which is chronic  - has dry cough which is chronic    Medications:  acetaminophen   Tablet. 650 milliGRAM(s) Oral every 6 hours PRN  Adempas(riociguat) 1.5mg Tablet 1 Tablet(s) 1 Tablet(s) Oral every 8 hours  ALBUTerol    90 MICROgram(s) HFA Inhaler 2 Puff(s) Inhalation every 6 hours PRN  BACItracin   Ointment 1 Application(s) Topical two times a day  buDESOnide  80 MICROgram(s)/formoterol 4.5 MICROgram(s) Inhaler 2 Puff(s) Inhalation two times a day  calcium carbonate    500 mG (Tums) Chewable 1 Tablet(s) Chew three times a day PRN  influenza   Vaccine 0.5 milliLiter(s) IntraMuscular once  loratadine 10 milliGRAM(s) Oral daily  LORazepam     Tablet 0.5 milliGRAM(s) Oral every 8 hours PRN  losartan 25 milliGRAM(s) Oral daily  melatonin 6 milliGRAM(s) Oral at bedtime  ondansetron Injectable 4 milliGRAM(s) IV Push once  sertraline 25 milliGRAM(s) Oral daily  torsemide 40 milliGRAM(s) Oral two times a day    Vitals:  T(C): 36.8 (18 @ 14:15), Max: 37.3 (18 @ 22:23)  HR: 101 (18 @ 14:15) (96 - 106)  BP: 112/93 (18 @ 14:15) (102/62 - 112/93)  RR: 16 (18 @ 14:15) (16 - 16)  SpO2: 98% (18 @ 14:15) (98% - 100%)    Daily     Daily Weight in k.8 (03 Aug 2018 09:25)    I&O's Summary    02 Aug 2018 07:01  -  03 Aug 2018 07:00  --------------------------------------------------------  IN: 938 mL / OUT: 4640 mL / NET: -3702 mL    03 Aug 2018 07:01  -  03 Aug 2018 18:54  --------------------------------------------------------  IN: 840 mL / OUT: 900 mL / NET: -60 mL    Physical Exam:  Appearance: No Acute Distress  HEENT: PERRL  Neck: JVD approx 12 cm  Cardiovascular: RRR, grade II/VI systolic murmur, prominent P2  Respiratory: Clear to auscultation bilaterally  Gastrointestinal: obese; nontender  Skin: No cyanosis	  Neurologic: Non-focal  Extremities: 1+ LE edema b/l  Psychiatry: A & O x 3, Mood & affect appropriate    Labs:                        14.5   6.70  )-----------( 294      ( 03 Aug 2018 05:05 )             45.5     08-03    133<L>  |  94<L>  |  14  ----------------------------<  100<H>  4.9   |  22  |  1.36<H>    Ca    9.7      03 Aug 2018 05:05  Phos  4.4     08-03  Mg     2.0     08-03    TPro  7.5  /  Alb  3.8  /  TBili  2.7<H>  /  DBili  x   /  AST  27  /  ALT  23  /  AlkPhos  48  08-03    PT/INR - ( 03 Aug 2018 05:05 )   PT: 24.5 SEC;   INR: 2.17        PTT - ( 03 Aug 2018 05:05 )  PTT:37.1 SEC

## 2018-08-03 NOTE — PROGRESS NOTE ADULT - ASSESSMENT
33 F with anticardiolipin Ab, severe pHTN on Adempas, CVA on Coumadin here with dyspnea and intermittent chest pain. Here in exacerbation of her RHF and Pulm HTN resulting in anasarca responding to diuresis.    - Patient now off Bumex gtt and Aldactone reduced.  - Heart failure to make recs on home diuretics.  - Please repeat a TTE before discharge. Discussed with Dr. Unger and he would like a repeat TTE.  RHC may be scheduled as outpatient.  - Patient can be discharged without Cardizem and can follow up with Dr. Unger as outpatient on 8/13/18. Appt already made and patient made aware.  - Please continue Adempas.  - Please call with questions.

## 2018-08-03 NOTE — PROGRESS NOTE ADULT - SUBJECTIVE AND OBJECTIVE BOX
Patient is a 33y old  Female who presents with a chief complaint of "I had chest pain and SOB"         SUBJECTIVE / OVERNIGHT EVENTS: cont to improve; hopeful to go home over the weekend      MEDICATIONS  (STANDING):  Adempas(riociguat) 1.5mg Tablet 1 Tablet(s) 1 Tablet(s) Oral every 8 hours  BACItracin   Ointment 1 Application(s) Topical two times a day  buDESOnide  80 MICROgram(s)/formoterol 4.5 MICROgram(s) Inhaler 2 Puff(s) Inhalation two times a day  influenza   Vaccine 0.5 milliLiter(s) IntraMuscular once  loratadine 10 milliGRAM(s) Oral daily  losartan 25 milliGRAM(s) Oral daily  melatonin 6 milliGRAM(s) Oral at bedtime  ondansetron Injectable 4 milliGRAM(s) IV Push once  sertraline 25 milliGRAM(s) Oral daily    MEDICATIONS  (PRN):  acetaminophen   Tablet. 650 milliGRAM(s) Oral every 6 hours PRN Mild Pain (1 - 3)  ALBUTerol    90 MICROgram(s) HFA Inhaler 2 Puff(s) Inhalation every 6 hours PRN Shortness of Breath and/or Wheezing  calcium carbonate    500 mG (Tums) Chewable 1 Tablet(s) Chew three times a day PRN Heartburn  LORazepam     Tablet 0.5 milliGRAM(s) Oral every 8 hours PRN Anxiety      Vital Signs Last 24 Hrs  T(F): 98.2 (03 Aug 2018 06:32), Max: 99.1 (02 Aug 2018 22:23)  HR: 96 (03 Aug 2018 06:32) (96 - 116)  BP: 108/60 (03 Aug 2018 06:32) (102/62 - 111/65)  RR: 16 (03 Aug 2018 06:32) (16 - 16)  SpO2: 100% (03 Aug 2018 06:32) (99% - 100%)          PHYSICAL EXAM  GENERAL: NAD, well-developed  HEAD:  Atraumatic, Normocephalic  EYES: EOMI, PERRLA, conjunctiva and sclera clear  CHEST/LUNG: Clear to auscultation bilaterally  HEART: Regular rate and rhythm  ABDOMEN: Soft, Nontender, Nondistended; Bowel sounds present; obese  EXTREMITIES:  edema sig improved; R antecub tender from IV infiltration; no erythema or drainage  PSYCH: AAOx3      LABS:                        14.5   6.70  )-----------( 294      ( 03 Aug 2018 05:05 )             45.5     08-03    133<L>  |  94<L>  |  14  ----------------------------<  100<H>  4.9   |  22  |  1.36<H>    Ca    9.7      03 Aug 2018 05:05  Phos  4.4     08-03  Mg     2.0     08-03    TPro  7.5  /  Alb  3.8  /  TBili  2.7<H>  /  DBili  x   /  AST  27  /  ALT  23  /  AlkPhos  48  08-03    PT/INR - ( 03 Aug 2018 05:05 )   PT: 24.5 SEC;   INR: 2.17          PTT - ( 03 Aug 2018 05:05 )  PTT:37.1 SEC

## 2018-08-04 DIAGNOSIS — N17.9 ACUTE KIDNEY FAILURE, UNSPECIFIED: ICD-10-CM

## 2018-08-04 LAB
ALBUMIN SERPL ELPH-MCNC: 3.6 G/DL — SIGNIFICANT CHANGE UP (ref 3.3–5)
ALP SERPL-CCNC: 50 U/L — SIGNIFICANT CHANGE UP (ref 40–120)
ALT FLD-CCNC: 19 U/L — SIGNIFICANT CHANGE UP (ref 4–33)
AST SERPL-CCNC: 22 U/L — SIGNIFICANT CHANGE UP (ref 4–32)
BILIRUB SERPL-MCNC: 1.8 MG/DL — HIGH (ref 0.2–1.2)
BUN SERPL-MCNC: 23 MG/DL — SIGNIFICANT CHANGE UP (ref 7–23)
BUN SERPL-MCNC: 24 MG/DL — HIGH (ref 7–23)
CALCIUM SERPL-MCNC: 9.2 MG/DL — SIGNIFICANT CHANGE UP (ref 8.4–10.5)
CALCIUM SERPL-MCNC: 9.8 MG/DL — SIGNIFICANT CHANGE UP (ref 8.4–10.5)
CHLORIDE SERPL-SCNC: 90 MMOL/L — LOW (ref 98–107)
CHLORIDE SERPL-SCNC: 94 MMOL/L — LOW (ref 98–107)
CHLORIDE UR-SCNC: 31 MMOL/L — SIGNIFICANT CHANGE UP
CO2 SERPL-SCNC: 23 MMOL/L — SIGNIFICANT CHANGE UP (ref 22–31)
CO2 SERPL-SCNC: 25 MMOL/L — SIGNIFICANT CHANGE UP (ref 22–31)
CREAT ?TM UR-MCNC: 119.8 MG/DL — SIGNIFICANT CHANGE UP
CREAT SERPL-MCNC: 1.33 MG/DL — HIGH (ref 0.5–1.3)
CREAT SERPL-MCNC: 1.81 MG/DL — HIGH (ref 0.5–1.3)
ENA SCL70 AB SER-ACNC: <0.2 — SIGNIFICANT CHANGE UP
GLUCOSE SERPL-MCNC: 88 MG/DL — SIGNIFICANT CHANGE UP (ref 70–99)
GLUCOSE SERPL-MCNC: 99 MG/DL — SIGNIFICANT CHANGE UP (ref 70–99)
HCT VFR BLD CALC: 43.9 % — SIGNIFICANT CHANGE UP (ref 34.5–45)
HGB BLD-MCNC: 14 G/DL — SIGNIFICANT CHANGE UP (ref 11.5–15.5)
INR BLD: 3.28 — HIGH (ref 0.88–1.17)
MAGNESIUM SERPL-MCNC: 2.2 MG/DL — SIGNIFICANT CHANGE UP (ref 1.6–2.6)
MAGNESIUM SERPL-MCNC: 2.2 MG/DL — SIGNIFICANT CHANGE UP (ref 1.6–2.6)
MCHC RBC-ENTMCNC: 24.7 PG — LOW (ref 27–34)
MCHC RBC-ENTMCNC: 31.9 % — LOW (ref 32–36)
MCV RBC AUTO: 77.4 FL — LOW (ref 80–100)
NRBC # FLD: 0 — SIGNIFICANT CHANGE UP
PHOSPHATE SERPL-MCNC: 5.3 MG/DL — HIGH (ref 2.5–4.5)
PLATELET # BLD AUTO: 281 K/UL — SIGNIFICANT CHANGE UP (ref 150–400)
PMV BLD: 10.9 FL — SIGNIFICANT CHANGE UP (ref 7–13)
POTASSIUM SERPL-MCNC: 4.7 MMOL/L — SIGNIFICANT CHANGE UP (ref 3.5–5.3)
POTASSIUM SERPL-MCNC: SIGNIFICANT CHANGE UP MMOL/L (ref 3.5–5.3)
POTASSIUM SERPL-SCNC: 4.7 MMOL/L — SIGNIFICANT CHANGE UP (ref 3.5–5.3)
POTASSIUM SERPL-SCNC: SIGNIFICANT CHANGE UP MMOL/L (ref 3.5–5.3)
POTASSIUM UR-SCNC: 53.6 MMOL/L — SIGNIFICANT CHANGE UP
PROT SERPL-MCNC: 7.6 G/DL — SIGNIFICANT CHANGE UP (ref 6–8.3)
PROTHROM AB SERPL-ACNC: 37.3 SEC — HIGH (ref 9.8–13.1)
RBC # BLD: 5.67 M/UL — HIGH (ref 3.8–5.2)
RBC # FLD: 22.9 % — HIGH (ref 10.3–14.5)
SODIUM SERPL-SCNC: 128 MMOL/L — LOW (ref 135–145)
SODIUM SERPL-SCNC: 133 MMOL/L — LOW (ref 135–145)
SODIUM UR-SCNC: < 20 MMOL/L — SIGNIFICANT CHANGE UP
UUN UR-MCNC: 414.2 MG/DL — SIGNIFICANT CHANGE UP
WBC # BLD: 5.56 K/UL — SIGNIFICANT CHANGE UP (ref 3.8–10.5)
WBC # FLD AUTO: 5.56 K/UL — SIGNIFICANT CHANGE UP (ref 3.8–10.5)

## 2018-08-04 PROCEDURE — 99233 SBSQ HOSP IP/OBS HIGH 50: CPT

## 2018-08-04 RX ORDER — WARFARIN SODIUM 2.5 MG/1
7.5 TABLET ORAL ONCE
Qty: 0 | Refills: 0 | Status: DISCONTINUED | OUTPATIENT
Start: 2018-08-04 | End: 2018-08-04

## 2018-08-04 RX ORDER — WARFARIN SODIUM 2.5 MG/1
4 TABLET ORAL ONCE
Qty: 0 | Refills: 0 | Status: COMPLETED | OUTPATIENT
Start: 2018-08-04 | End: 2018-08-04

## 2018-08-04 RX ADMIN — BUDESONIDE AND FORMOTEROL FUMARATE DIHYDRATE 2 PUFF(S): 160; 4.5 AEROSOL RESPIRATORY (INHALATION) at 22:27

## 2018-08-04 RX ADMIN — WARFARIN SODIUM 4 MILLIGRAM(S): 2.5 TABLET ORAL at 17:35

## 2018-08-04 RX ADMIN — Medication 40 MILLIGRAM(S): at 17:35

## 2018-08-04 RX ADMIN — LORATADINE 10 MILLIGRAM(S): 10 TABLET ORAL at 09:17

## 2018-08-04 RX ADMIN — BUDESONIDE AND FORMOTEROL FUMARATE DIHYDRATE 2 PUFF(S): 160; 4.5 AEROSOL RESPIRATORY (INHALATION) at 09:17

## 2018-08-04 NOTE — PROGRESS NOTE ADULT - SUBJECTIVE AND OBJECTIVE BOX
Patient is a 33y old  Female who presents with a chief complaint of "I had chest pain and SOB"         SUBJECTIVE / OVERNIGHT EVENTS: feeling sleepy this AM; not happy that she needs more blod work bc she was told she wouldn't need anymore; explained about rising Cr due to the diuresis      MEDICATIONS  (STANDING):  Adempas(riociguat) 1.5mg Tablet 1 Tablet(s) 1 Tablet(s) Oral every 8 hours  buDESOnide  80 MICROgram(s)/formoterol 4.5 MICROgram(s) Inhaler 2 Puff(s) Inhalation two times a day  influenza   Vaccine 0.5 milliLiter(s) IntraMuscular once  loratadine 10 milliGRAM(s) Oral daily  melatonin 6 milliGRAM(s) Oral at bedtime  sertraline 25 milliGRAM(s) Oral daily  torsemide 40 milliGRAM(s) Oral two times a day    MEDICATIONS  (PRN):  acetaminophen   Tablet. 650 milliGRAM(s) Oral every 6 hours PRN Mild Pain (1 - 3)  ALBUTerol    90 MICROgram(s) HFA Inhaler 2 Puff(s) Inhalation every 6 hours PRN Shortness of Breath and/or Wheezing  calcium carbonate    500 mG (Tums) Chewable 1 Tablet(s) Chew three times a day PRN Heartburn      Vital Signs Last 24 Hrs  T(F): 97.8 (04 Aug 2018 05:54), Max: 98.3 (03 Aug 2018 14:15)  HR: 95 (04 Aug 2018 05:54) (87 - 101)  BP: 92/58 (04 Aug 2018 05:54) (92/58 - 112/93)  RR: 17 (04 Aug 2018 05:54) (16 - 17)  SpO2: 96% (04 Aug 2018 05:54) (96% - 98%)          PHYSICAL EXAM  GENERAL: NAD, well-developed  HEAD:  Atraumatic, Normocephalic  EYES: EOMI, PERRLA, conjunctiva and sclera clear  CHEST/LUNG: good AE ant/lat  HEART: Regular rate and rhythm  ABDOMEN: Soft, Nontender, Nondistended; Bowel sounds present; obese  EXTREMITIES: improving edema      LABS:             P       Care Discussed with Consultants/Other Providers: Dr Ceullar

## 2018-08-04 NOTE — PROGRESS NOTE ADULT - SUBJECTIVE AND OBJECTIVE BOX
S/24 Events: No acute events overnight. Cr increased from 1.3-->2.2     O:  T(C): 36.6 (08-04-18 @ 05:54), Max: 36.8 (08-03-18 @ 14:15)  HR: 95 (08-04-18 @ 05:54) (87 - 101)  BP: 92/58 (08-04-18 @ 05:54) (92/58 - 112/93)  RR: 17 (08-04-18 @ 05:54) (16 - 17)  SpO2: 96% (08-04-18 @ 05:54) (96% - 98%)  Wt(kg): --    24H I/Os: 840/900    Tele:    O/E:  Gen: NAD  HEENT: EOMI  CV: RRR, normal S1 + S2, II/VI systolic murmur,   Lungs: CTAB  Abd: soft, non-tender  Ext:         Labs:                        14.5   6.70  )-----------( 294      ( 03 Aug 2018 05:05 )             45.5     08-03    132<L>  |  94<L>  |  22  ----------------------------<  123<H>  4.9   |  24  |  2.23<H>    Ca    9.6      03 Aug 2018 20:58  Phos  4.4     08-03  Mg     2.2     08-03    TPro  7.5  /  Alb  3.8  /  TBili  2.7<H>  /  DBili  x   /  AST  27  /  ALT  23  /  AlkPhos  48  08-03    PT/INR - ( 03 Aug 2018 05:05 )   PT: 24.5 SEC;   INR: 2.17          PTT - ( 03 Aug 2018 05:05 )  PTT:37.1 SEC          Meds:  MEDICATIONS  (STANDING):  Adempas(riociguat) 1.5mg Tablet 1 Tablet(s) 1 Tablet(s) Oral every 8 hours  buDESOnide  80 MICROgram(s)/formoterol 4.5 MICROgram(s) Inhaler 2 Puff(s) Inhalation two times a day  influenza   Vaccine 0.5 milliLiter(s) IntraMuscular once  loratadine 10 milliGRAM(s) Oral daily  losartan 25 milliGRAM(s) Oral daily  melatonin 6 milliGRAM(s) Oral at bedtime  sertraline 25 milliGRAM(s) Oral daily  spironolactone 50 milliGRAM(s) Oral daily  torsemide 40 milliGRAM(s) Oral two times a day    TTE 7/26 - severe MANSI, RV enlargement, RV pressure/volume overload, mod-severe TR, RVSP 98.    ***Patient not yet seen; Cr elevation noted. Will make recs afters assessing patient.     A/P:  34 y/o AA female with h/o severe pulmonary HTN (Dx 2006; unclear etiology), asthma who presented with SOB and LE edema x 2 weeks found to be in acute on chronic right sided heart failure. Was started on diuresis with excellent result: admission weight 336 pounds now 264 pounds. States she feels improved. Unclear etiology of PH and was followed by Dr. Unger up until Feb 2018. Had PFTs done as outpt 2014 which showed mild restrictive component and depressed DLCO (51%). CTA on admission was negative for PE. Unable to tolerate V/Q scan d/t back pain and never had sleep study. Denies family h/o PH. Was diagnosed with anticardiolipin Ab in past but on subsequent testing came back negative.  Overall class III symptoms with WHO group I PH possibly, still volume overloaded.  - ?continue torsemide 40 mg PO twice daily; likely goal weight 250s  - ?continue monica to 50 mg daily  - ?continue losartan 25mg daily  - replete K as needed to maintain >4  - standing weights daily  - please send anti-Scl 70 and YENNY  - will need outpt sleep study and V/Q scan as well as possible RHC  - continue Adempas; will consider uptitrating to 2 mg every 8 hours as she has been on 1.5 mg for several months  - will arrange for outpt follow up      Rivera Polo  Cardiology Fellow S/24 Events: No acute events overnight. Cr increased from 1.3-->2.2     O:  T(C): 36.6 (08-04-18 @ 05:54), Max: 36.8 (08-03-18 @ 14:15)  HR: 95 (08-04-18 @ 05:54) (87 - 101)  BP: 92/58 (08-04-18 @ 05:54) (92/58 - 112/93)  RR: 17 (08-04-18 @ 05:54) (16 - 17)  SpO2: 96% (08-04-18 @ 05:54) (96% - 98%)  Wt(kg): --    24H I/Os: 840/900    Tele: SR 90s    O/E:  Gen: NAD  HEENT: EOMI  CV: RRR, normal S1 + S2, II/VI systolic murmur,   Lungs: CTAB  Abd: soft, non-tender  Ext: 2+ LE edema        Labs:                        14.5   6.70  )-----------( 294      ( 03 Aug 2018 05:05 )             45.5     08-03    132<L>  |  94<L>  |  22  ----------------------------<  123<H>  4.9   |  24  |  2.23<H>    Ca    9.6      03 Aug 2018 20:58  Phos  4.4     08-03  Mg     2.2     08-03    TPro  7.5  /  Alb  3.8  /  TBili  2.7<H>  /  DBili  x   /  AST  27  /  ALT  23  /  AlkPhos  48  08-03    PT/INR - ( 03 Aug 2018 05:05 )   PT: 24.5 SEC;   INR: 2.17          PTT - ( 03 Aug 2018 05:05 )  PTT:37.1 SEC          Meds:  MEDICATIONS  (STANDING):  Adempas(riociguat) 1.5mg Tablet 1 Tablet(s) 1 Tablet(s) Oral every 8 hours  buDESOnide  80 MICROgram(s)/formoterol 4.5 MICROgram(s) Inhaler 2 Puff(s) Inhalation two times a day  influenza   Vaccine 0.5 milliLiter(s) IntraMuscular once  loratadine 10 milliGRAM(s) Oral daily  losartan 25 milliGRAM(s) Oral daily  melatonin 6 milliGRAM(s) Oral at bedtime  sertraline 25 milliGRAM(s) Oral daily  spironolactone 50 milliGRAM(s) Oral daily  torsemide 40 milliGRAM(s) Oral two times a day    TTE 7/26 - severe MANSI, RV enlargement, RV pressure/volume overload, mod-severe TR, RVSP 98.      A/P:  32 y/o AA female with h/o severe pulmonary HTN (Dx 2006; unclear etiology), asthma who presented with SOB and LE edema x 2 weeks found to be in acute on chronic right sided heart failure. Was started on diuresis with excellent result: admission weight 336 pounds now 264 pounds. States she feels improved. Unclear etiology of PH and was followed by Dr. Unger up until Feb 2018. Had PFTs done as outpt 2014 which showed mild restrictive component and depressed DLCO (51%). CTA on admission was negative for PE. Unable to tolerate V/Q scan d/t back pain and never had sleep study. Denies family h/o PH. Was diagnosed with anticardiolipin Ab in past but on subsequent testing came back negative.  Overall class III symptoms with WHO group I PH possibly, still volume overloaded. Now with DIANN.   - continue torsemide 40 mg PO twice daily; likely goal weight 250s  - hold spironolactone as K 4.9 and Cr uptrending.   - hold losartan  - will consider RHC this admission  - f/up BMP from this morning.   - standing weights daily  - f/up anti-Scl 70 and YENNY  - will need outpt sleep study and V/Q scan  - continue Adempas; will consider uptitrating to 2 mg every 8 hours as she has been on 1.5 mg for several months  - will arrange for outpt follow up      [facility]  Cardiology Fellow

## 2018-08-05 DIAGNOSIS — D68.61 ANTIPHOSPHOLIPID SYNDROME: ICD-10-CM

## 2018-08-05 LAB
ALBUMIN SERPL ELPH-MCNC: 3.7 G/DL — SIGNIFICANT CHANGE UP (ref 3.3–5)
ALP SERPL-CCNC: 47 U/L — SIGNIFICANT CHANGE UP (ref 40–120)
ALT FLD-CCNC: 19 U/L — SIGNIFICANT CHANGE UP (ref 4–33)
AST SERPL-CCNC: 21 U/L — SIGNIFICANT CHANGE UP (ref 4–32)
BILIRUB SERPL-MCNC: 1.8 MG/DL — HIGH (ref 0.2–1.2)
BUN SERPL-MCNC: 18 MG/DL — SIGNIFICANT CHANGE UP (ref 7–23)
BUN SERPL-MCNC: 21 MG/DL — SIGNIFICANT CHANGE UP (ref 7–23)
CALCIUM SERPL-MCNC: 9.5 MG/DL — SIGNIFICANT CHANGE UP (ref 8.4–10.5)
CALCIUM SERPL-MCNC: 9.6 MG/DL — SIGNIFICANT CHANGE UP (ref 8.4–10.5)
CHLORIDE SERPL-SCNC: 93 MMOL/L — LOW (ref 98–107)
CHLORIDE SERPL-SCNC: 93 MMOL/L — LOW (ref 98–107)
CO2 SERPL-SCNC: 26 MMOL/L — SIGNIFICANT CHANGE UP (ref 22–31)
CO2 SERPL-SCNC: 27 MMOL/L — SIGNIFICANT CHANGE UP (ref 22–31)
CREAT SERPL-MCNC: 1.01 MG/DL — SIGNIFICANT CHANGE UP (ref 0.5–1.3)
CREAT SERPL-MCNC: 1.17 MG/DL — SIGNIFICANT CHANGE UP (ref 0.5–1.3)
GLUCOSE SERPL-MCNC: 106 MG/DL — HIGH (ref 70–99)
GLUCOSE SERPL-MCNC: 93 MG/DL — SIGNIFICANT CHANGE UP (ref 70–99)
HCT VFR BLD CALC: 42.3 % — SIGNIFICANT CHANGE UP (ref 34.5–45)
HGB BLD-MCNC: 13.7 G/DL — SIGNIFICANT CHANGE UP (ref 11.5–15.5)
INR BLD: 3.95 — HIGH (ref 0.88–1.17)
MAGNESIUM SERPL-MCNC: 2.1 MG/DL — SIGNIFICANT CHANGE UP (ref 1.6–2.6)
MAGNESIUM SERPL-MCNC: 2.1 MG/DL — SIGNIFICANT CHANGE UP (ref 1.6–2.6)
MCHC RBC-ENTMCNC: 25 PG — LOW (ref 27–34)
MCHC RBC-ENTMCNC: 32.4 % — SIGNIFICANT CHANGE UP (ref 32–36)
MCV RBC AUTO: 77.2 FL — LOW (ref 80–100)
NRBC # FLD: 0 — SIGNIFICANT CHANGE UP
PHOSPHATE SERPL-MCNC: 4.2 MG/DL — SIGNIFICANT CHANGE UP (ref 2.5–4.5)
PLATELET # BLD AUTO: 268 K/UL — SIGNIFICANT CHANGE UP (ref 150–400)
PMV BLD: 10.8 FL — SIGNIFICANT CHANGE UP (ref 7–13)
POTASSIUM SERPL-MCNC: 3.9 MMOL/L — SIGNIFICANT CHANGE UP (ref 3.5–5.3)
POTASSIUM SERPL-MCNC: 4 MMOL/L — SIGNIFICANT CHANGE UP (ref 3.5–5.3)
POTASSIUM SERPL-SCNC: 3.9 MMOL/L — SIGNIFICANT CHANGE UP (ref 3.5–5.3)
POTASSIUM SERPL-SCNC: 4 MMOL/L — SIGNIFICANT CHANGE UP (ref 3.5–5.3)
PROT SERPL-MCNC: 7.6 G/DL — SIGNIFICANT CHANGE UP (ref 6–8.3)
PROTHROM AB SERPL-ACNC: 45.1 SEC — HIGH (ref 9.8–13.1)
RBC # BLD: 5.48 M/UL — HIGH (ref 3.8–5.2)
RBC # FLD: 22.3 % — HIGH (ref 10.3–14.5)
SODIUM SERPL-SCNC: 133 MMOL/L — LOW (ref 135–145)
SODIUM SERPL-SCNC: 134 MMOL/L — LOW (ref 135–145)
WBC # BLD: 5.22 K/UL — SIGNIFICANT CHANGE UP (ref 3.8–10.5)
WBC # FLD AUTO: 5.22 K/UL — SIGNIFICANT CHANGE UP (ref 3.8–10.5)

## 2018-08-05 PROCEDURE — 99233 SBSQ HOSP IP/OBS HIGH 50: CPT

## 2018-08-05 RX ORDER — WARFARIN SODIUM 2.5 MG/1
2 TABLET ORAL ONCE
Qty: 0 | Refills: 0 | Status: COMPLETED | OUTPATIENT
Start: 2018-08-05 | End: 2018-08-05

## 2018-08-05 RX ADMIN — BUDESONIDE AND FORMOTEROL FUMARATE DIHYDRATE 2 PUFF(S): 160; 4.5 AEROSOL RESPIRATORY (INHALATION) at 09:43

## 2018-08-05 RX ADMIN — WARFARIN SODIUM 2 MILLIGRAM(S): 2.5 TABLET ORAL at 10:14

## 2018-08-05 RX ADMIN — BUDESONIDE AND FORMOTEROL FUMARATE DIHYDRATE 2 PUFF(S): 160; 4.5 AEROSOL RESPIRATORY (INHALATION) at 21:32

## 2018-08-05 RX ADMIN — LORATADINE 10 MILLIGRAM(S): 10 TABLET ORAL at 09:44

## 2018-08-05 RX ADMIN — Medication 40 MILLIGRAM(S): at 06:38

## 2018-08-05 RX ADMIN — Medication 40 MILLIGRAM(S): at 17:00

## 2018-08-05 NOTE — PROGRESS NOTE ADULT - SUBJECTIVE AND OBJECTIVE BOX
S/24 Events: No acute events overnight. Losartan/spironolactone stopped yesterday d/t rising Cr. Did not receive doses of these meds yesterday.     O:  T(C): 36.3 (08-05-18 @ 06:39), Max: 36.6 (08-04-18 @ 13:32)  HR: 93 (08-05-18 @ 06:39) (88 - 98)  BP: 109/58 (08-05-18 @ 06:39) (94/56 - 130/87)  RR: 16 (08-05-18 @ 06:39) (16 - 17)  SpO2: 100% (08-05-18 @ 06:39) (97% - 100%)  Wt(kg): --    24H I/Os: 1320/2350    Tele:    O/E:  Gen: NAD  HEENT: EOMI  CV: RRR, normal S1 + S2, II/VI systolic murmur,   Lungs: CTAB  Abd: soft, non-tender  Ext: 2+ LE edema      Labs:                        14.0   5.56  )-----------( 281      ( 04 Aug 2018 09:00 )             43.9     08-04    128<L>  |  90<L>  |  24<H>  ----------------------------<  88  Test not performed SPECIMEN GROSSLY HEMOLYZED   |  23  |  1.33<H>    Ca    9.2      04 Aug 2018 18:00  Phos  5.3     08-04  Mg     2.2     08-04    TPro  7.6  /  Alb  3.6  /  TBili  1.8<H>  /  DBili  x   /  AST  22  /  ALT  19  /  AlkPhos  50  08-04    PT/INR - ( 04 Aug 2018 09:00 )   PT: 37.3 SEC;   INR: 3.28                    Meds:  MEDICATIONS  (STANDING):  Adempas(riociguat) 1.5mg Tablet 1 Tablet(s) 1 Tablet(s) Oral every 8 hours  buDESOnide  80 MICROgram(s)/formoterol 4.5 MICROgram(s) Inhaler 2 Puff(s) Inhalation two times a day  influenza   Vaccine 0.5 milliLiter(s) IntraMuscular once  loratadine 10 milliGRAM(s) Oral daily  melatonin 6 milliGRAM(s) Oral at bedtime  sertraline 25 milliGRAM(s) Oral daily  torsemide 40 milliGRAM(s) Oral two times a day      TTE 7/26 - severe MANSI, RV enlargement, RV pressure/volume overload, mod-severe TR, RVSP 98.      A/P:  32 y/o AA female with h/o severe pulmonary HTN (Dx 2006; unclear etiology), asthma who presented with SOB and LE edema x 2 weeks found to be in acute on chronic right sided heart failure. Was started on diuresis with excellent result: admission weight 336 pounds now 264 pounds. States she feels improved. Unclear etiology of PH and was followed by Dr. Unger up until Feb 2018. Had PFTs done as outpt 2014 which showed mild restrictive component and depressed DLCO (51%). CTA on admission was negative for PE. Unable to tolerate V/Q scan d/t back pain and never had sleep study. Denies family h/o PH. Was diagnosed with anticardiolipin Ab in past but on subsequent testing came back negative.  Overall class III symptoms with WHO group I PH possibly, still volume overloaded. Now with DIANN.   - continue torsemide 40 mg PO twice daily; likely goal weight 250s  - continue holing spironolactone and losartan for now.   - will consider RHC this admission  - standing weights daily  - f/up anti-Scl 70 and YENNY  - will need outpt sleep study and V/Q scan  - continue Adempas; will consider uptitrating to 2 mg every 8 hours as she has been on 1.5 mg for several months  - will arrange for outpt follow up    [facility]  Cardiology Fellow S/24 Events: No acute events overnight. Losartan/spironolactone stopped yesterday d/t rising Cr. Did not receive doses of these meds yesterday. Feels well. No CP, SOB. Legs still swollen.     O:  T(C): 36.3 (08-05-18 @ 06:39), Max: 36.6 (08-04-18 @ 13:32)  HR: 93 (08-05-18 @ 06:39) (88 - 98)  BP: 109/58 (08-05-18 @ 06:39) (94/56 - 130/87)  RR: 16 (08-05-18 @ 06:39) (16 - 17)  SpO2: 100% (08-05-18 @ 06:39) (97% - 100%)  Wt(kg): --    24H I/Os: 1320/2350    Tele: SR 80-90    O/E:  Gen: NAD  HEENT: EOMI  CV: RRR, normal S1 + S2, II/VI systolic murmur  Lungs: CTAB  Abd: soft, non-tender  Ext: 2+ LE edema      Labs:                        14.0   5.56  )-----------( 281      ( 04 Aug 2018 09:00 )             43.9     08-04    128<L>  |  90<L>  |  24<H>  ----------------------------<  88  Test not performed SPECIMEN GROSSLY HEMOLYZED   |  23  |  1.33<H>    Ca    9.2      04 Aug 2018 18:00  Phos  5.3     08-04  Mg     2.2     08-04    TPro  7.6  /  Alb  3.6  /  TBili  1.8<H>  /  DBili  x   /  AST  22  /  ALT  19  /  AlkPhos  50  08-04    PT/INR - ( 04 Aug 2018 09:00 )   PT: 37.3 SEC;   INR: 3.28                    Meds:  MEDICATIONS  (STANDING):  Adempas(riociguat) 1.5mg Tablet 1 Tablet(s) 1 Tablet(s) Oral every 8 hours  buDESOnide  80 MICROgram(s)/formoterol 4.5 MICROgram(s) Inhaler 2 Puff(s) Inhalation two times a day  influenza   Vaccine 0.5 milliLiter(s) IntraMuscular once  loratadine 10 milliGRAM(s) Oral daily  melatonin 6 milliGRAM(s) Oral at bedtime  sertraline 25 milliGRAM(s) Oral daily  torsemide 40 milliGRAM(s) Oral two times a day      TTE 7/26 - severe MANSI, RV enlargement, RV pressure/volume overload, mod-severe TR, RVSP 98.      A/P:  32 y/o AA female with h/o severe pulmonary HTN (Dx 2006; unclear etiology), asthma who presented with SOB and LE edema x 2 weeks found to be in acute on chronic right sided heart failure. Was started on diuresis with excellent result: admission weight 336 pounds now 264 pounds. States she feels improved. Unclear etiology of PH and was followed by Dr. Unger up until Feb 2018. Had PFTs done as outpt 2014 which showed mild restrictive component and depressed DLCO (51%). CTA on admission was negative for PE. Unable to tolerate V/Q scan d/t back pain and never had sleep study. Denies family h/o PH. Was diagnosed with anticardiolipin Ab in past but on subsequent testing came back negative.  Overall class III symptoms with WHO group I PH possibly, still volume overloaded. Now with DIANN.   - continue torsemide 40 mg PO twice daily; likely goal weight 250s  - continue holding spironolactone and losartan for now.   - will consider RHC this admission  - standing weights daily  - f/up anti-Scl 70 and YENNY  - will need outpt sleep study and V/Q scan  - continue Adempas; will consider uptitrating to 2 mg every 8 hours as she has been on 1.5 mg for several months  - will arrange for outpt follow up    [facility]  Cardiology Fellow

## 2018-08-05 NOTE — PROGRESS NOTE ADULT - SUBJECTIVE AND OBJECTIVE BOX
Patient is a 33y old  Female who presents with a chief complaint of "I had chest pain and SOB"        SUBJECTIVE / OVERNIGHT EVENTS: getting more and more tired of being here; d/w that kidneys are getting better and this was due to the diuretics, she expresses understanding      MEDICATIONS  (STANDING):  Adempas(riociguat) 1.5mg Tablet 1 Tablet(s) 1 Tablet(s) Oral every 8 hours  buDESOnide  80 MICROgram(s)/formoterol 4.5 MICROgram(s) Inhaler 2 Puff(s) Inhalation two times a day  influenza   Vaccine 0.5 milliLiter(s) IntraMuscular once  loratadine 10 milliGRAM(s) Oral daily  melatonin 6 milliGRAM(s) Oral at bedtime  sertraline 25 milliGRAM(s) Oral daily  torsemide 40 milliGRAM(s) Oral two times a day  warfarin 2 milliGRAM(s) Oral once    MEDICATIONS  (PRN):  acetaminophen   Tablet. 650 milliGRAM(s) Oral every 6 hours PRN Mild Pain (1 - 3)  ALBUTerol    90 MICROgram(s) HFA Inhaler 2 Puff(s) Inhalation every 6 hours PRN Shortness of Breath and/or Wheezing  calcium carbonate    500 mG (Tums) Chewable 1 Tablet(s) Chew three times a day PRN Heartburn      Vital Signs Last 24 Hrs  T(F): 97.4 (05 Aug 2018 06:39), Max: 97.9 (04 Aug 2018 13:32)  HR: 93 (05 Aug 2018 06:39) (88 - 98)  BP: 109/58 (05 Aug 2018 06:39) (94/56 - 130/87)  RR: 16 (05 Aug 2018 06:39) (16 - 17)  SpO2: 100% (05 Aug 2018 06:39) (97% - 100%)    down 0.4 kg today          PHYSICAL EXAM  GENERAL: NAD, well-developed  HEAD:  Atraumatic, Normocephalic  EYES: EOMI, PERRLA, conjunctiva and sclera clear  ABDOMEN: obese  EXTREMITIES:  edema cont to improve; R antecub remains tender, no drainage or erythema  PSYCH: AAOx3    LABS:                        13.7   5.22  )-----------( 268      ( 05 Aug 2018 08:00 )             42.3     08-05    133<L>  |  93<L>  |  21  ----------------------------<  106<H>  4.0   |  27  |  1.17    Ca    9.5      05 Aug 2018 08:00  Phos  4.2     08-05  Mg     2.1     08-05    TPro  7.6  /  Alb  3.7  /  TBili  1.8<H>  /  DBili  x   /  AST  21  /  ALT  19  /  AlkPhos  47  08-05    PT/INR - ( 05 Aug 2018 08:00 )   PT: 45.1 SEC;   INR: 3.95

## 2018-08-06 ENCOUNTER — MESSAGE (OUTPATIENT)
Age: 33
End: 2018-08-06

## 2018-08-06 LAB
ALBUMIN SERPL ELPH-MCNC: 3.9 G/DL — SIGNIFICANT CHANGE UP (ref 3.3–5)
ALP SERPL-CCNC: 54 U/L — SIGNIFICANT CHANGE UP (ref 40–120)
ALT FLD-CCNC: 20 U/L — SIGNIFICANT CHANGE UP (ref 4–33)
AST SERPL-CCNC: 22 U/L — SIGNIFICANT CHANGE UP (ref 4–32)
BILIRUB SERPL-MCNC: 2.1 MG/DL — HIGH (ref 0.2–1.2)
BUN SERPL-MCNC: 13 MG/DL — SIGNIFICANT CHANGE UP (ref 7–23)
CALCIUM SERPL-MCNC: 9.5 MG/DL — SIGNIFICANT CHANGE UP (ref 8.4–10.5)
CHLORIDE SERPL-SCNC: 95 MMOL/L — LOW (ref 98–107)
CO2 SERPL-SCNC: 28 MMOL/L — SIGNIFICANT CHANGE UP (ref 22–31)
CREAT SERPL-MCNC: 0.86 MG/DL — SIGNIFICANT CHANGE UP (ref 0.5–1.3)
GLUCOSE SERPL-MCNC: 108 MG/DL — HIGH (ref 70–99)
HCT VFR BLD CALC: 42.7 % — SIGNIFICANT CHANGE UP (ref 34.5–45)
HGB BLD-MCNC: 13.8 G/DL — SIGNIFICANT CHANGE UP (ref 11.5–15.5)
INR BLD: 3.21 — HIGH (ref 0.88–1.17)
MAGNESIUM SERPL-MCNC: 2.1 MG/DL — SIGNIFICANT CHANGE UP (ref 1.6–2.6)
MCHC RBC-ENTMCNC: 25.2 PG — LOW (ref 27–34)
MCHC RBC-ENTMCNC: 32.3 % — SIGNIFICANT CHANGE UP (ref 32–36)
MCV RBC AUTO: 78.1 FL — LOW (ref 80–100)
NRBC # FLD: 0 — SIGNIFICANT CHANGE UP
PHOSPHATE SERPL-MCNC: 3.4 MG/DL — SIGNIFICANT CHANGE UP (ref 2.5–4.5)
PLATELET # BLD AUTO: 291 K/UL — SIGNIFICANT CHANGE UP (ref 150–400)
PMV BLD: 10.6 FL — SIGNIFICANT CHANGE UP (ref 7–13)
POTASSIUM SERPL-MCNC: 3.4 MMOL/L — LOW (ref 3.5–5.3)
POTASSIUM SERPL-SCNC: 3.4 MMOL/L — LOW (ref 3.5–5.3)
PROT SERPL-MCNC: 8.1 G/DL — SIGNIFICANT CHANGE UP (ref 6–8.3)
PROTHROM AB SERPL-ACNC: 37.8 SEC — HIGH (ref 9.8–13.1)
RBC # BLD: 5.47 M/UL — HIGH (ref 3.8–5.2)
RBC # FLD: 22.3 % — HIGH (ref 10.3–14.5)
SODIUM SERPL-SCNC: 138 MMOL/L — SIGNIFICANT CHANGE UP (ref 135–145)
WBC # BLD: 4.92 K/UL — SIGNIFICANT CHANGE UP (ref 3.8–10.5)
WBC # FLD AUTO: 4.92 K/UL — SIGNIFICANT CHANGE UP (ref 3.8–10.5)

## 2018-08-06 PROCEDURE — 99233 SBSQ HOSP IP/OBS HIGH 50: CPT

## 2018-08-06 PROCEDURE — 93306 TTE W/DOPPLER COMPLETE: CPT | Mod: 26

## 2018-08-06 RX ORDER — BUMETANIDE 0.25 MG/ML
1 INJECTION INTRAMUSCULAR; INTRAVENOUS
Qty: 10 | Refills: 0 | Status: DISCONTINUED | OUTPATIENT
Start: 2018-08-06 | End: 2018-08-08

## 2018-08-06 RX ORDER — WARFARIN SODIUM 2.5 MG/1
1 TABLET ORAL ONCE
Qty: 0 | Refills: 0 | Status: COMPLETED | OUTPATIENT
Start: 2018-08-06 | End: 2018-08-06

## 2018-08-06 RX ORDER — MILRINONE LACTATE 1 MG/ML
0.25 INJECTION, SOLUTION INTRAVENOUS
Qty: 20 | Refills: 0 | Status: DISCONTINUED | OUTPATIENT
Start: 2018-08-06 | End: 2018-08-09

## 2018-08-06 RX ORDER — POTASSIUM CHLORIDE 20 MEQ
40 PACKET (EA) ORAL EVERY 4 HOURS
Qty: 0 | Refills: 0 | Status: DISCONTINUED | OUTPATIENT
Start: 2018-08-06 | End: 2018-08-06

## 2018-08-06 RX ORDER — SPIRONOLACTONE 25 MG/1
50 TABLET, FILM COATED ORAL DAILY
Qty: 0 | Refills: 0 | Status: DISCONTINUED | OUTPATIENT
Start: 2018-08-06 | End: 2018-08-07

## 2018-08-06 RX ORDER — POTASSIUM CHLORIDE 20 MEQ
40 PACKET (EA) ORAL ONCE
Qty: 0 | Refills: 0 | Status: COMPLETED | OUTPATIENT
Start: 2018-08-06 | End: 2018-08-06

## 2018-08-06 RX ADMIN — BUMETANIDE 10 MG/HR: 0.25 INJECTION INTRAMUSCULAR; INTRAVENOUS at 19:14

## 2018-08-06 RX ADMIN — BUDESONIDE AND FORMOTEROL FUMARATE DIHYDRATE 2 PUFF(S): 160; 4.5 AEROSOL RESPIRATORY (INHALATION) at 09:20

## 2018-08-06 RX ADMIN — MILRINONE LACTATE 8.93 MICROGRAM(S)/KG/MIN: 1 INJECTION, SOLUTION INTRAVENOUS at 21:49

## 2018-08-06 RX ADMIN — Medication 40 MILLIEQUIVALENT(S): at 09:36

## 2018-08-06 RX ADMIN — WARFARIN SODIUM 1 MILLIGRAM(S): 2.5 TABLET ORAL at 09:20

## 2018-08-06 RX ADMIN — Medication 40 MILLIGRAM(S): at 05:20

## 2018-08-06 RX ADMIN — SPIRONOLACTONE 50 MILLIGRAM(S): 25 TABLET, FILM COATED ORAL at 19:14

## 2018-08-06 RX ADMIN — LORATADINE 10 MILLIGRAM(S): 10 TABLET ORAL at 09:20

## 2018-08-06 RX ADMIN — Medication 40 MILLIGRAM(S): at 17:02

## 2018-08-06 NOTE — PROGRESS NOTE ADULT - ASSESSMENT
Pt is a 34 yo female with mordbid obesity, severe pHTN and RHF 2/2 unclear pulm disease, a/w acute decomp HF

## 2018-08-06 NOTE — PROGRESS NOTE ADULT - ASSESSMENT
32 y/o pleasant  female w/ PMHX of obesity, severe pulmonary hypertension w/ RV systolic failure and asthma comes in with complaints of worsening SOB and LE x 2 weeks. She was first told she had pulmonary hypertension and heart failure in 2006 at an OSH in [place] but tells me that she was never offered treatment. She was then re-hospitilzied in 2013 at [facility] and had a LHC- which showed normal coronaries, and RHC which showed that she had severe pulmonary hypertension w/ low normal wedge. Since then she has been followed by Dr. Unger and Dr. Haines. She states she is not able to work or go to school b/c of frequent LE edema, fatigue and inability to function normally.     She was recently admitted for SOB and was found to have parainfluenza 3, and was told to increase her PO fluids. She was discharged after 3 days, and for a while felt good, but then started developing FERRO. Currently she can only take about 10 steps before she gets SOB, and has frequent PND that wakes her up. No SOB at rest, orthopnea, CP, palpitations, dizziness. Her dry weight is about 290 lbs, currently she is 330 lbs.       Still with moderate volume overload. DIANN improving.

## 2018-08-06 NOTE — PROGRESS NOTE ADULT - SUBJECTIVE AND OBJECTIVE BOX
Patient is a 33y old  Female who presents with a chief complaint of "I had chest pain and SOB" this morning (30 Jul 2018 15:11)      SUBJECTIVE / OVERNIGHT EVENTS: Pt report symptomatic improvement overall, very anxious for d/c, s/p TTE    MEDICATIONS  (STANDING):  Adempas(riociguat) 1.5mg Tablet 1 Tablet(s) 1 Tablet(s) Oral every 8 hours  buDESOnide  80 MICROgram(s)/formoterol 4.5 MICROgram(s) Inhaler 2 Puff(s) Inhalation two times a day  influenza   Vaccine 0.5 milliLiter(s) IntraMuscular once  loratadine 10 milliGRAM(s) Oral daily  melatonin 6 milliGRAM(s) Oral at bedtime  sertraline 25 milliGRAM(s) Oral daily  torsemide 40 milliGRAM(s) Oral two times a day    MEDICATIONS  (PRN):  acetaminophen   Tablet. 650 milliGRAM(s) Oral every 6 hours PRN Mild Pain (1 - 3)  ALBUTerol    90 MICROgram(s) HFA Inhaler 2 Puff(s) Inhalation every 6 hours PRN Shortness of Breath and/or Wheezing  calcium carbonate    500 mG (Tums) Chewable 1 Tablet(s) Chew three times a day PRN Heartburn      Vital Signs Last 24 Hrs  T(C): 36.7 (08-06-18 @ 13:33), Max: 36.8 (08-05-18 @ 20:00)  T(F): 98.1 (08-06-18 @ 13:33), Max: 98.3 (08-05-18 @ 20:00)  HR: 93 (08-06-18 @ 13:33) (87 - 98)  BP: 133/72 (08-06-18 @ 13:33) (99/65 - 133/72)  BP(mean): --  RR: 18 (08-06-18 @ 13:33) (17 - 18)  SpO2: 95% (08-06-18 @ 13:33) (95% - 100%)  CAPILLARY BLOOD GLUCOSE        I&O's Summary    05 Aug 2018 07:01  -  06 Aug 2018 07:00  --------------------------------------------------------  IN: 618 mL / OUT: 800 mL / NET: -182 mL    06 Aug 2018 07:01  -  06 Aug 2018 14:49  --------------------------------------------------------  IN: 480 mL / OUT: 800 mL / NET: -320 mL        PHYSICAL EXAM:  GENERAL: NAD, well-nourished  HEENT: mmm, elevated JVD  CHEST/LUNG: CTABL  HEART: RRR, no m/r/g  ABDOMEN: soft, nt, nd  EXTREMITIES:  wwp, +2 LE edema  PSYCH: AAOx3  NEUROLOGY: non-focal  SKIN: No rashes or lesions    LABS:                        13.8   4.92  )-----------( 291      ( 06 Aug 2018 08:01 )             42.7     08-06    138  |  95<L>  |  13  ----------------------------<  108<H>  3.4<L>   |  28  |  0.86    Ca    9.5      06 Aug 2018 08:01  Phos  3.4     08-06  Mg     2.1     08-06    TPro  8.1  /  Alb  3.9  /  TBili  2.1<H>  /  DBili  x   /  AST  22  /  ALT  20  /  AlkPhos  54  08-06    PT/INR - ( 06 Aug 2018 08:01 )   PT: 37.8 SEC;   INR: 3.21                    RADIOLOGY & ADDITIONAL TESTS:    Imaging Personally Reviewed:    Consultant(s) Notes Reviewed:      Care Discussed with Consultants/Other Providers:

## 2018-08-06 NOTE — PROGRESS NOTE ADULT - SUBJECTIVE AND OBJECTIVE BOX
Patient seen and examined. She has lost 70 lbs on this admission. Overall she feels great, but still has 2+ LE edema from knees down.   No acute SOB, orthopnea, PND, CP, palpitations. Had DIANN over the past few days, which is now resolved s/p decreasing diuretics.     Medications:  acetaminophen   Tablet. 650 milliGRAM(s) Oral every 6 hours PRN  Adempas(riociguat) 1.5mg Tablet 1 Tablet(s) 1 Tablet(s) Oral every 8 hours  ALBUTerol    90 MICROgram(s) HFA Inhaler 2 Puff(s) Inhalation every 6 hours PRN  buDESOnide  80 MICROgram(s)/formoterol 4.5 MICROgram(s) Inhaler 2 Puff(s) Inhalation two times a day  calcium carbonate    500 mG (Tums) Chewable 1 Tablet(s) Chew three times a day PRN  influenza   Vaccine 0.5 milliLiter(s) IntraMuscular once  loratadine 10 milliGRAM(s) Oral daily  melatonin 6 milliGRAM(s) Oral at bedtime  sertraline 25 milliGRAM(s) Oral daily  torsemide 40 milliGRAM(s) Oral two times a day      Vitals:  Vital Signs Last 24 Hrs  T(C): 36.7 (06 Aug 2018 13:33), Max: 36.8 (05 Aug 2018 20:00)  T(F): 98.1 (06 Aug 2018 13:33), Max: 98.3 (05 Aug 2018 20:00)  HR: 93 (06 Aug 2018 13:33) (87 - 98)  BP: 133/72 (06 Aug 2018 13:33) (99/65 - 133/72)  BP(mean): --  RR: 18 (06 Aug 2018 13:33) (17 - 18)  SpO2: 95% (06 Aug 2018 13:33) (95% - 100%)    Daily     Daily Weight in k.1 (06 Aug 2018 02:44)    I&O's Detail    05 Aug 2018 07:01  -  06 Aug 2018 07:00  --------------------------------------------------------  IN:    Oral Fluid: 618 mL  Total IN: 618 mL    OUT:    Voided: 800 mL  Total OUT: 800 mL    Total NET: -182 mL      06 Aug 2018 07:01  -  06 Aug 2018 14:25  --------------------------------------------------------  IN:    Oral Fluid: 480 mL  Total IN: 480 mL    OUT:    Voided: 800 mL  Total OUT: 800 mL    Total NET: -320 mL          Physical Exam:     General: No distress. Comfortable.  HEENT: EOM intact.  Neck: Neck supple. JVP not elevated. No masses  Chest: Faint crackles b/l.   CV: Normal S1 and S2. No murmurs, rub, or gallops. Radial pulses normal. 2+ pitting edema b/l below knees  Abdomen: Soft, non-distended, non-tender  Skin: No rashes or skin breakdown  Neurology: Alert and oriented times three. Sensation intact  Psych: Affect normal    Labs:                        13.8   4.92  )-----------( 291      ( 06 Aug 2018 08:01 )             42.7     08-06    138  |  95<L>  |  13  ----------------------------<  108<H>  3.4<L>   |  28  |  0.86    Ca    9.5      06 Aug 2018 08:01  Phos  3.4     08-06  Mg     2.1     08-06    TPro  8.1  /  Alb  3.9  /  TBili  2.1<H>  /  DBili  x   /  AST  22  /  ALT  20  /  AlkPhos  54  08-06    PT/INR - ( 06 Aug 2018 08:01 )   PT: 37.8 SEC;   INR: 3.21       < from: Transthoracic Echocardiogram (18 @ 15:19) >  CONCLUSIONS:  1. Normal mitral valve. Minimal mitral regurgitation.  2. Endocardium not well visualized; grossly normal left  ventricular systolic function.  3. Severe right atrial enlargement.  4. Right ventricular enlargement with decreased right  ventricular systolic function.  Systolic and diastolic  flattening of the interventricular septum, consistent with  RV volume and pressure overload.  5. Estimated right ventricular systolic pressure equals 98  mm Hg, assuming right atrial pressure equals 10 mm Hg,  consistent with severe pulmonary hypertension.  6. Normal tricuspid valve.  Moderate-severe tricuspid  regurgitation.  *** Compared with echocardiogram of 2014, no  significant changes noted.    < end of copied text >    < from: Cardiac Cath Lab (13 @ 14:08) >  CORONARY VESSELS: The coronary circulation is right dominant.  LM:   --LM: Normal. The left main coronary artery appeared to course  posterior to the aorta and pulmonary artery.  LAD:   --  LAD: Normal.  CX:   --  Circumflex: Normal.  RCA:   --  RCA: Normal. The vessel arose anomalously high and anterior.  COMPLICATIONS: There were no complications.  SUMMARY:  HEMODYNAMICS: Hemodynamic assessment demonstrates moderate systemic  hypertension, normal LVEDP, normal cardiac output, and normal pulmonary  capillary wedge pressure.  DIAGNOSTIC RECOMMENDATIONS: Consider Cardiac CT (gated) for evaluation of  anomalous coronaries. Patient management should include aggressive medical  therapy. The patient should follow a low fat and low calorie diet. Medical  management is recommended.  INTERVENTIONAL RECOMMENDATIONS: Consider Cardiac CT (gated) for evaluation  of anomalous coronaries.  Prepared and signed by  Magaly Lane M.D.  Signed 2013 17:32:46  HEMODYNAMIC TABLES  Pressures:  Baseline  Pressures:  - HR: 68  Pressures:  - Rhythm:  Pressures:  -- Aortic Pressure (S/D/M): 112/85/96  Pressures:  -- Left Ventricle (s/edp): 118/10/--  Pressures:  -- Pulmonary Artery (S/D/M): 107/43/65  Pressures:  -- Pulmonary Capillary Wedge: 13/15/9  Pressures:  -- Right Atrium (a/v/M): 20/16/15  Pressures:  -- Right Ventricle (s/edp): 107/26/--  O2 Sats:  Baseline  O2 Sats:  - HR: 68  O2 Sats:  - Rhythm:  O2 Sats:  -- AO: 14.6/90.5/17.97  O2 Sats:  -- PA: 14.8/62.1/12.5  Outputs:  Baseline  Outputs:  -- CALCULATIONS[age]  Outputs:  -- CALCULATIONS: Body Surface Area: 2.43  Outputs:  -- CALCULATIONS: Height in cm: 175.00  Outputs:  -- CALCULATIONS: Sex: Female  Outputs:  -- CALCULATIONS: Weight in k.00  Outputs:  -- OUTPUTS: CO by Todd: 5.55  Outputs:  -- OUTPUTS: Todd cardiac index: 2.29  Outputs:  -- OUTPUTS: O2 consumption: 303.47  Outputs:  -- OUTPUTS: Vo2 Indexed: 125.00  Outputs:  -- RESISTANCES: Left ventricular stroke work: 97.97  Outputs:  -- RESISTANCES: Left Ventricular Stroke Work index: 40.35  Outputs:  -- RESISTANCES: Pulmonary vascular index (dsc): 1960.03  Outputs:  -- RESISTANCES: Pulmonary vascular index (Wood Units): 24.51  Outputs:  -- RESISTANCES: Pulmonary vascular resistance (dsc): 807.34  Outputs:  -- RESISTANCES: Pulmonary vascular resistance (Wood Units): 10.09  Outputs:  -- RESISTANCES: PVR_SVR Ratio: 0.69  Outputs:  -- RESISTANCES: Right ventricular stroke work: 50.98  Outputs:  -- RESISTANCES: Right ventricular stroke work index: 21.00  Outputs:  -- RESISTANCES: Systemic vascular index (dsc): 2835.04  Outputs:  -- RESISTANCES: Systemic vascular index (Wood Units): 35.45  Outputs:  -- RESISTANCES: Systemic vascular resistance (dsc): 1167.76  Outputs:  -- RESISTANCES: Systemic vascular resistance (Wood Units): 14.60  Outputs:  -- RESISTANCES: Total pulmonary index (dsc): 2275.03  Outputs:  -- RESISTANCES: Total pulmonary index (Wood Units): 28.45  Outputs:  -- RESISTANCES: Total pulmonary resistance (dsc): 937.09  Outputs:  -- RESISTANCES: Total pulmonary resistance (Wood Units): 11.72  Outputs:-- RESISTANCES: Total vascular index (Wood Units): 42.01  Outputs:  -- RESISTANCES: Total vascular resistance (dsc): 1384.01  Outputs:  -- RESISTANCES: Total vascular resistance (Wood Units): 17.30  Outputs:  -- RESISTANCES: Total vascular resistanceindex (dsc): 3360.05  Outputs:  -- RESISTANCES: TPR_TVR Ratio: 0.68  Outputs:  -- SHUNTS: Pulmonary flow: 5.55  Outputs:  -- SHUNTS: Qp Indexed: 2.29  Outputs:  -- SHUNTS: Qs Indexed: 2.29  Outputs:  -- SHUNTS: Systemic flow: 5.55    < end of copied text >

## 2018-08-06 NOTE — PROGRESS NOTE ADULT - PROBLEM SELECTOR PLAN 5
daily coumadin and INR  INR fluctuating bc of dosing times; pt takes coumadin in AM at home; doses have been alternating AM and PM  will give only 1mg today and cont to monitor  no evidence of bleeding

## 2018-08-06 NOTE — PROGRESS NOTE ADULT - ATTENDING COMMENTS
33yrs, severe pulm HTN known since 2006. followed by Dr Unger since 2017. Managed at home on [facility]. one prior admission for ADHF.   History of antiphosolipid . No history of venous or arterial thrombus. CTA this admission negative.   Declining exercise tolerance prior to admission but not on home O2.   Now admitted with decompensated RHF. admitted since July 24.   IV bumex drip with 70lb diuresis. No inotropes. Still volume up.   Meds: torsemide 40 bid, edempas 1.5mg Q8, coumadin, aldactone 100 bid stopped recently.   115/-130/, 80-90SR  EKG: SR biatrial enlargement. RBBB  TTE: normal LA, LV size and function. RV severe enlarged with decreased infunction. septum shows RV volume overload. mod-sev TR. RVSP 98.   I/O:   08-06    138  |  95<L>  |  13  ----------------------------<  108<H>  3.4<L>   |  28  |  0.86    Ca    9.5      06 Aug 2018 08:01  Phos  3.4     08-06  Mg     2.1     08-06    TPro  8.1  /  Alb  3.9  /  TBili  2.1<H>  /  DBili  x   /  AST  22  /  ALT  20  /  AlkPhos  54  08-06                        13.8   4.92  )-----------( 291      ( 06 Aug 2018 08:01 )             42.7 33yrs, severe pulm HTN known since 2006. followed by Dr Unger since 2017. Managed at home on [facility]. one prior admission for ADHF.   History of antiphosolipid . No history of venous or arterial thrombus. CTA this admission negative.   Declining exercise tolerance prior to admission but not on home O2.   Now admitted with decompensated RHF. admitted since July 24.   IV bumex drip with 70lb diuresis. No inotropes. Still volume up.   Meds: torsemide 40 bid, edempas 1.5mg Q8, coumadin, aldactone 100 bid stopped recently.   115/-130/, 80-90SR  JVP and abdomen difficult to assess. Severe LE edema  EKG: SR biatrial enlargement. RBBB  TTE: normal LA, LV size and function. RV severe enlarged with decreased infunction. septum shows RV volume overload. mod-sev TR. RVSP 98.   I/O:   08-06    138  |  95<L>  |  13  ----------------------------<  108<H>  3.4<L>   |  28  |  0.86    Ca    9.5      06 Aug 2018 08:01  Phos  3.4     08-06  Mg     2.1     08-06    TPro  8.1  /  Alb  3.9  /  TBili  2.1<H>  /  DBili  x   /  AST  22  /  ALT  20  /  AlkPhos  54  08-06                        13.8   4.92  )-----------( 291      ( 06 Aug 2018 08:01 )             42.7  Remains with evidence of significant LE edema. Renal function worse over the weekend and resolved with reduction in diuretics.   Long discussion with patient who agrees to continue to stay in hospital to adequately treat RHF.  Will also discuss need for RHC with Dr Unger.  Suggest:  Milinone 0.25, resume bumex drip, aldactone at 50.   Vinicio Phelps

## 2018-08-06 NOTE — PROGRESS NOTE ADULT - PROBLEM SELECTOR PLAN 1
RHC from 2013 noted. 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   Still w/ moderate volume overload. But weight is trending down, has lost 70 lbs on this admission.   Diuretics were down titrated and held monica due to DIANN.   Continue torsemide 40 mg po BID.   keep K 4-5, mag >2.   SBP stable.   Continue Adempas. Cardizem was for pulm HTN (home meds) was held by primary cards team, although I do not see documentation of NO challenge in cath.   Pulm HTN would be best managed at [facility], but patient does not want to be transferred, despite lengthy conversations regarding her specific illness.  Would like patient to get RHC to evaluate pulm HTN which inpatient.  Pt was counseled not to get pregnant.  Should follow up with Dr. Moncho Adamson as new pt and Dr. Unger on d/c.

## 2018-08-06 NOTE — PROGRESS NOTE ADULT - PROBLEM SELECTOR PLAN 1
- cont diuretics as per HF recs, pt continues to be overloaded on exam  - given unclear response to NO, HF would like to pursue IP RHC however pt refusing  - has lost 70 lbs on this admission.   - fu repeat TTE now that s/p diuresis  - fu HF recs re dispo meds and fu  - cont I&O, daily weights

## 2018-08-07 LAB
APTT BLD: 42.3 SEC — HIGH (ref 27.5–37.4)
BUN SERPL-MCNC: 8 MG/DL — SIGNIFICANT CHANGE UP (ref 7–23)
CALCIUM SERPL-MCNC: 9.2 MG/DL — SIGNIFICANT CHANGE UP (ref 8.4–10.5)
CHLORIDE SERPL-SCNC: 92 MMOL/L — LOW (ref 98–107)
CO2 SERPL-SCNC: 28 MMOL/L — SIGNIFICANT CHANGE UP (ref 22–31)
CREAT SERPL-MCNC: 0.78 MG/DL — SIGNIFICANT CHANGE UP (ref 0.5–1.3)
GLUCOSE SERPL-MCNC: 140 MG/DL — HIGH (ref 70–99)
HCT VFR BLD CALC: 42.8 % — SIGNIFICANT CHANGE UP (ref 34.5–45)
HGB BLD-MCNC: 13.9 G/DL — SIGNIFICANT CHANGE UP (ref 11.5–15.5)
INR BLD: 3.06 — HIGH (ref 0.88–1.17)
MAGNESIUM SERPL-MCNC: 1.9 MG/DL — SIGNIFICANT CHANGE UP (ref 1.6–2.6)
MCHC RBC-ENTMCNC: 24.7 PG — LOW (ref 27–34)
MCHC RBC-ENTMCNC: 32.5 % — SIGNIFICANT CHANGE UP (ref 32–36)
MCV RBC AUTO: 76.2 FL — LOW (ref 80–100)
NRBC # FLD: 0 — SIGNIFICANT CHANGE UP
PLATELET # BLD AUTO: 288 K/UL — SIGNIFICANT CHANGE UP (ref 150–400)
PMV BLD: 11 FL — SIGNIFICANT CHANGE UP (ref 7–13)
POTASSIUM SERPL-MCNC: 3.2 MMOL/L — LOW (ref 3.5–5.3)
POTASSIUM SERPL-SCNC: 3.2 MMOL/L — LOW (ref 3.5–5.3)
PROTHROM AB SERPL-ACNC: 34.8 SEC — HIGH (ref 9.8–13.1)
RBC # BLD: 5.62 M/UL — HIGH (ref 3.8–5.2)
RBC # FLD: 21.9 % — HIGH (ref 10.3–14.5)
SODIUM SERPL-SCNC: 135 MMOL/L — SIGNIFICANT CHANGE UP (ref 135–145)
WBC # BLD: 5.57 K/UL — SIGNIFICANT CHANGE UP (ref 3.8–10.5)
WBC # FLD AUTO: 5.57 K/UL — SIGNIFICANT CHANGE UP (ref 3.8–10.5)

## 2018-08-07 PROCEDURE — 99233 SBSQ HOSP IP/OBS HIGH 50: CPT

## 2018-08-07 PROCEDURE — 99233 SBSQ HOSP IP/OBS HIGH 50: CPT | Mod: GC

## 2018-08-07 RX ORDER — MAGNESIUM OXIDE 400 MG ORAL TABLET 241.3 MG
400 TABLET ORAL
Qty: 0 | Refills: 0 | Status: COMPLETED | OUTPATIENT
Start: 2018-08-07 | End: 2018-08-08

## 2018-08-07 RX ORDER — SPIRONOLACTONE 25 MG/1
50 TABLET, FILM COATED ORAL
Qty: 0 | Refills: 0 | Status: DISCONTINUED | OUTPATIENT
Start: 2018-08-08 | End: 2018-08-08

## 2018-08-07 RX ORDER — SPIRONOLACTONE 25 MG/1
50 TABLET, FILM COATED ORAL ONCE
Qty: 0 | Refills: 0 | Status: COMPLETED | OUTPATIENT
Start: 2018-08-07 | End: 2018-08-07

## 2018-08-07 RX ORDER — POTASSIUM CHLORIDE 20 MEQ
40 PACKET (EA) ORAL EVERY 4 HOURS
Qty: 0 | Refills: 0 | Status: COMPLETED | OUTPATIENT
Start: 2018-08-07 | End: 2018-08-07

## 2018-08-07 RX ORDER — BENZOCAINE AND MENTHOL 5; 1 G/100ML; G/100ML
1 LIQUID ORAL ONCE
Qty: 0 | Refills: 0 | Status: DISCONTINUED | OUTPATIENT
Start: 2018-08-07 | End: 2018-08-10

## 2018-08-07 RX ORDER — WARFARIN SODIUM 2.5 MG/1
1 TABLET ORAL ONCE
Qty: 0 | Refills: 0 | Status: COMPLETED | OUTPATIENT
Start: 2018-08-07 | End: 2018-08-07

## 2018-08-07 RX ADMIN — BUMETANIDE 10 MG/HR: 0.25 INJECTION INTRAMUSCULAR; INTRAVENOUS at 03:33

## 2018-08-07 RX ADMIN — WARFARIN SODIUM 1 MILLIGRAM(S): 2.5 TABLET ORAL at 09:05

## 2018-08-07 RX ADMIN — MAGNESIUM OXIDE 400 MG ORAL TABLET 400 MILLIGRAM(S): 241.3 TABLET ORAL at 18:17

## 2018-08-07 RX ADMIN — Medication 40 MILLIEQUIVALENT(S): at 13:02

## 2018-08-07 RX ADMIN — MILRINONE LACTATE 8.93 MICROGRAM(S)/KG/MIN: 1 INJECTION, SOLUTION INTRAVENOUS at 03:33

## 2018-08-07 RX ADMIN — LORATADINE 10 MILLIGRAM(S): 10 TABLET ORAL at 09:05

## 2018-08-07 RX ADMIN — SPIRONOLACTONE 50 MILLIGRAM(S): 25 TABLET, FILM COATED ORAL at 05:39

## 2018-08-07 RX ADMIN — Medication 40 MILLIEQUIVALENT(S): at 09:05

## 2018-08-07 RX ADMIN — BUDESONIDE AND FORMOTEROL FUMARATE DIHYDRATE 2 PUFF(S): 160; 4.5 AEROSOL RESPIRATORY (INHALATION) at 21:04

## 2018-08-07 RX ADMIN — BUMETANIDE 10 MG/HR: 0.25 INJECTION INTRAMUSCULAR; INTRAVENOUS at 13:45

## 2018-08-07 RX ADMIN — SPIRONOLACTONE 50 MILLIGRAM(S): 25 TABLET, FILM COATED ORAL at 11:27

## 2018-08-07 RX ADMIN — MILRINONE LACTATE 8.93 MICROGRAM(S)/KG/MIN: 1 INJECTION, SOLUTION INTRAVENOUS at 13:45

## 2018-08-07 RX ADMIN — BUDESONIDE AND FORMOTEROL FUMARATE DIHYDRATE 2 PUFF(S): 160; 4.5 AEROSOL RESPIRATORY (INHALATION) at 09:05

## 2018-08-07 NOTE — PROGRESS NOTE ADULT - SUBJECTIVE AND OBJECTIVE BOX
Patient seen and examined. She lost 9 lbs in 24 hrs. Diuresing well on milrinone and bumex gtt.   No acute SOB, orthopnea, PND, CP, palpitations, dizziness.       Medications:  acetaminophen   Tablet. 650 milliGRAM(s) Oral every 6 hours PRN  Adempas(riociguat) 1.5mg Tablet 1 Tablet(s) 1 Tablet(s) Oral every 8 hours  ALBUTerol    90 MICROgram(s) HFA Inhaler 2 Puff(s) Inhalation every 6 hours PRN  benzocaine 15 mG/menthol 3.6 mG Lozenge 1 Lozenge Oral once PRN  buDESOnide  80 MICROgram(s)/formoterol 4.5 MICROgram(s) Inhaler 2 Puff(s) Inhalation two times a day  buMETAnide Infusion 1 mG/Hr IV Continuous <Continuous>  calcium carbonate    500 mG (Tums) Chewable 1 Tablet(s) Chew three times a day PRN  influenza   Vaccine 0.5 milliLiter(s) IntraMuscular once  loratadine 10 milliGRAM(s) Oral daily  melatonin 6 milliGRAM(s) Oral at bedtime  milrinone Infusion 0.25 MICROgram(s)/kG/Min IV Continuous <Continuous>  potassium chloride    Tablet ER 40 milliEquivalent(s) Oral every 4 hours  sertraline 25 milliGRAM(s) Oral daily  spironolactone 50 milliGRAM(s) Oral once      Vitals:  Vital Signs Last 24 Hrs  T(C): 36.7 (07 Aug 2018 05:44), Max: 36.7 (06 Aug 2018 13:33)  T(F): 98.1 (07 Aug 2018 05:44), Max: 98.1 (06 Aug 2018 13:33)  HR: 88 (07 Aug 2018 05:44) (88 - 93)  BP: 104/62 (07 Aug 2018 05:44) (104/62 - 133/72)  BP(mean): --  RR: 16 (07 Aug 2018 05:44) (16 - 18)  SpO2: 96% (07 Aug 2018 05:44) (95% - 98%)    Daily     Daily Weight in k (07 Aug 2018 07:17)    I&O's Detail    06 Aug 2018 07:01  -  07 Aug 2018 07:00  --------------------------------------------------------  IN:    bumetanide Infusion: 130 mL    milrinone  Infusion: 97.9 mL    Oral Fluid: 780 mL  Total IN: 1007.9 mL    OUT:    Voided: 5000 mL  Total OUT: 5000 mL    Total NET: -3992.1 mL      07 Aug 2018 07:01  -  07 Aug 2018 10:45  --------------------------------------------------------  IN:    bumetanide Infusion: 20 mL    milrinone  Infusion: 17.8 mL    Oral Fluid: 358 mL  Total IN: 395.8 mL    OUT:    Voided: 800 mL  Total OUT: 800 mL    Total NET: -404.2 mL          Physical Exam:     General: No distress. Comfortable.  HEENT: EOM intact.  Neck: Neck supple. JVP hard to evaluate. No masses  Chest: Clear to auscultation bilaterally  CV: Normal S1 and S2. No murmurs, rub, or gallops. Radial pulses normal. 1-2+ LE edema b/l.   Abdomen: Soft, non-distended, non-tender  Skin: No rashes or skin breakdown  Neurology: Alert and oriented times three. Sensation intact  Psych: Affect normal    Labs:                        13.9   5.57  )-----------( 288      ( 07 Aug 2018 07:15 )             42.8     08-07    135  |  92<L>  |  8   ----------------------------<  140<H>  3.2<L>   |  28  |  0.78    Ca    9.2      07 Aug 2018 07:15  Phos  3.4     08-06  Mg     1.9     08-07    TPro  8.1  /  Alb  3.9  /  TBili  2.1<H>  /  DBili  x   /  AST  22  /  ALT  20  /  AlkPhos  54  08-06    PT/INR - ( 07 Aug 2018 07:15 )   PT: 34.8 SEC;   INR: 3.06          PTT - ( 07 Aug 2018 07:15 )  PTT:42.3 SEC

## 2018-08-07 NOTE — PROGRESS NOTE ADULT - NSHPATTENDINGPLANDISCUSS_GEN_ALL_CORE
Dr. Beyer
team
Dr. Betancourt
team
Dr. Beyer
fellow
patient, team
primary team

## 2018-08-07 NOTE — PROGRESS NOTE ADULT - ASSESSMENT
34 y/o pleasant  female w/ PMHX of obesity, severe pulmonary hypertension w/ RV systolic failure and asthma comes in with complaints of worsening SOB and LE x 2 weeks. She was first told she had pulmonary hypertension and heart failure in 2006 at an OSH in [place] but tells me that she was never offered treatment. She was then re-hospitilzied in 2013 at [facility] and had a LHC- which showed normal coronaries, and RHC which showed that she had severe pulmonary hypertension w/ low normal wedge. Since then she has been followed by Dr. Unger and Dr. Haines. She states she is not able to work or go to school b/c of frequent LE edema, fatigue and inability to function normally.     She was recently admitted for SOB and was found to have parainfluenza 3, and was told to increase her PO fluids. She was discharged after 3 days, and for a while felt good, but then started developing FERRO. Currently she can only take about 10 steps before she gets SOB, and has frequent PND that wakes her up. No SOB at rest, orthopnea, CP, palpitations, dizziness. Her dry weight is about 290 lbs, currently she is 330 lbs.       Still with moderate volume overload.

## 2018-08-07 NOTE — PROGRESS NOTE ADULT - SUBJECTIVE AND OBJECTIVE BOX
Patient is a 33y old  Female who presents with a chief complaint of "I had chest pain and SOB" this morning (30 Jul 2018 15:11)      SUBJECTIVE / OVERNIGHT EVENTS: Pt continues to be resistant to RHC, however is considering it.    MEDICATIONS  (STANDING):  Adempas(riociguat) 1.5mg Tablet 1 Tablet(s) 1 Tablet(s) Oral every 8 hours  buDESOnide  80 MICROgram(s)/formoterol 4.5 MICROgram(s) Inhaler 2 Puff(s) Inhalation two times a day  buMETAnide Infusion 1 mG/Hr (10 mL/Hr) IV Continuous <Continuous>  influenza   Vaccine 0.5 milliLiter(s) IntraMuscular once  loratadine 10 milliGRAM(s) Oral daily  melatonin 6 milliGRAM(s) Oral at bedtime  milrinone Infusion 0.25 MICROgram(s)/kG/Min (8.932 mL/Hr) IV Continuous <Continuous>  sertraline 25 milliGRAM(s) Oral daily    MEDICATIONS  (PRN):  acetaminophen   Tablet. 650 milliGRAM(s) Oral every 6 hours PRN Mild Pain (1 - 3)  ALBUTerol    90 MICROgram(s) HFA Inhaler 2 Puff(s) Inhalation every 6 hours PRN Shortness of Breath and/or Wheezing  benzocaine 15 mG/menthol 3.6 mG Lozenge 1 Lozenge Oral once PRN Sore Throat  calcium carbonate    500 mG (Tums) Chewable 1 Tablet(s) Chew three times a day PRN Heartburn      Vital Signs Last 24 Hrs  T(C): 36.9 (08-07-18 @ 12:56), Max: 36.9 (08-07-18 @ 12:56)  T(F): 98.5 (08-07-18 @ 12:56), Max: 98.5 (08-07-18 @ 12:56)  HR: 101 (08-07-18 @ 12:56) (88 - 101)  BP: 124/75 (08-07-18 @ 12:56) (104/62 - 130/70)  BP(mean): --  RR: 16 (08-07-18 @ 12:56) (16 - 16)  SpO2: 95% (08-07-18 @ 12:56) (95% - 98%)  CAPILLARY BLOOD GLUCOSE        I&O's Summary    06 Aug 2018 07:01  -  07 Aug 2018 07:00  --------------------------------------------------------  IN: 1007.9 mL / OUT: 5000 mL / NET: -3992.1 mL    07 Aug 2018 07:01  -  07 Aug 2018 15:18  --------------------------------------------------------  IN: 589.4 mL / OUT: 1600 mL / NET: -1010.6 mL    PHYSICAL EXAM:  GENERAL: NAD, well-nourished  HEENT: mmm, elevated JVD  CHEST/LUNG: CTABL  HEART: RRR, no m/r/g  ABDOMEN: soft, nt, nd  EXTREMITIES:  wwp, +2 LE edema  PSYCH: AAOx3  NEUROLOGY: non-focal  SKIN: No rashes or lesions      LABS:                        13.9   5.57  )-----------( 288      ( 07 Aug 2018 07:15 )             42.8     08-07    135  |  92<L>  |  8   ----------------------------<  140<H>  3.2<L>   |  28  |  0.78    Ca    9.2      07 Aug 2018 07:15  Phos  3.4     08-06  Mg     1.9     08-07    TPro  8.1  /  Alb  3.9  /  TBili  2.1<H>  /  DBili  x   /  AST  22  /  ALT  20  /  AlkPhos  54  08-06    PT/INR - ( 07 Aug 2018 07:15 )   PT: 34.8 SEC;   INR: 3.06          PTT - ( 07 Aug 2018 07:15 )  PTT:42.3 SEC          RADIOLOGY & ADDITIONAL TESTS:    Imaging Personally Reviewed:    Consultant(s) Notes Reviewed:      Care Discussed with Consultants/Other Providers:

## 2018-08-07 NOTE — PROGRESS NOTE ADULT - SUBJECTIVE AND OBJECTIVE BOX
CHIEF COMPLAINT: anasarca    Interval Events: Patient restarted on Milrinone at 0.2mg/kg and Bumex 1mg/hr.    REVIEW OF SYSTEMS:  Constitutional: [x ] negative [ ] fevers [ ] chills [ ] weight loss [ ] weight gain  HEENT: [x ] negative [ ] dry eyes [ ] eye irritation [ ] postnasal drip [ ] nasal congestion  CV: [x ] negative  [ ] chest pain [ ] orthopnea [ ] palpitations [ ] murmur  Resp: [x ] negative [ ] cough [ ] shortness of breath [ ] dyspnea [ ] wheezing [ ] sputum [ ] hemoptysis  GI: [x ] negative [ ] nausea [ ] vomiting [ ] diarrhea [ ] constipation [ ] abd pain [ ] dysphagia   : [x ] negative [ ] dysuria [ ] nocturia [ ] hematuria [ ] increased urinary frequency  Musculoskeletal: [ ] negative [ ] back pain [ ] myalgias [ ] arthralgias [ ] fracture  Skin: [x ] negative [ ] rash [ ] itch  Neurological: [x ] negative [ ] headache [ ] dizziness [ ] syncope [ ] weakness [ ] numbness  Psychiatric: [ x] negative [ ] anxiety [ ] depression  Endocrine: [x ] negative [ ] diabetes [ ] thyroid problem  Hematologic/Lymphatic: [ ] negative [ ] anemia [ ] bleeding problem  Allergic/Immunologic: [ ] negative [ ] itchy eyes [ ] nasal discharge [ ] hives [ ] angioedema  [ ] All other systems negative  [ ] Unable to assess ROS because ________    OBJECTIVE:  T(C): 36.7 (07 Aug 2018 05:44), Max: 36.7 (06 Aug 2018 13:33)  T(F): 98.1 (07 Aug 2018 05:44), Max: 98.1 (06 Aug 2018 13:33)  HR: 88 (07 Aug 2018 05:44) (88 - 93)  BP: 104/62 (07 Aug 2018 05:44) (104/62 - 133/72)  RR: 16 (07 Aug 2018 05:44) (16 - 18)  SpO2: 96% (07 Aug 2018 05:44) (95% - 98%)        08-06 @ 07:01  -  08-07 @ 07:00  --------------------------------------------------------  IN: 1007.9 mL / OUT: 5000 mL / NET: -3992.1 mL    08-07 @ 07:01  -  08-07 @ 08:59  --------------------------------------------------------  IN: 0 mL / OUT: 400 mL / NET: -400 mL      CAPILLARY BLOOD GLUCOSE          PHYSICAL EXAM:  General: NAD  HEENT: HECTOR   Lymph Nodes:  Neck: No JVD  Respiratory: CTA b/l  Cardiovascular: RRR  Abdomen: S/NT/ND  Extremities: 1-2+ edema on feet. Rest of edema in legs/thighs all resolved.  Skin: stable lymphedema changes.  Neurological:  Psychiatry:    HOSPITAL MEDICATIONS:  warfarin 1 milliGRAM(s) Oral once      buMETAnide Infusion 1 mG/Hr IV Continuous <Continuous>  milrinone Infusion 0.25 MICROgram(s)/kG/Min IV Continuous <Continuous>  spironolactone 50 milliGRAM(s) Oral daily      ALBUTerol    90 MICROgram(s) HFA Inhaler 2 Puff(s) Inhalation every 6 hours PRN  buDESOnide  80 MICROgram(s)/formoterol 4.5 MICROgram(s) Inhaler 2 Puff(s) Inhalation two times a day  loratadine 10 milliGRAM(s) Oral daily    acetaminophen   Tablet. 650 milliGRAM(s) Oral every 6 hours PRN  melatonin 6 milliGRAM(s) Oral at bedtime  sertraline 25 milliGRAM(s) Oral daily    calcium carbonate    500 mG (Tums) Chewable 1 Tablet(s) Chew three times a day PRN        potassium chloride    Tablet ER 40 milliEquivalent(s) Oral every 4 hours    influenza   Vaccine 0.5 milliLiter(s) IntraMuscular once    benzocaine 15 mG/menthol 3.6 mG Lozenge 1 Lozenge Oral once PRN    Adempas(riociguat) 1.5mg Tablet 1 Tablet(s) 1 Tablet(s) Oral every 8 hours      LABS:                        13.9   5.57  )-----------( 288      ( 07 Aug 2018 07:15 )             42.8     Hgb Trend: 13.9<--, 13.8<--, 13.7<--, 14.0<--, 14.5<--  08-07    135  |  92<L>  |  8   ----------------------------<  140<H>  3.2<L>   |  28  |  0.78    Ca    9.2      07 Aug 2018 07:15  Phos  3.4     08-06  Mg     2.1     08-06    TPro  8.1  /  Alb  3.9  /  TBili  2.1<H>  /  DBili  x   /  AST  22  /  ALT  20  /  AlkPhos  54  08-06    Creatinine Trend: 0.78<--, 0.86<--, 1.01<--, 1.17<--, 1.33<--, 1.81<--  PT/INR - ( 07 Aug 2018 07:15 )   PT: 34.8 SEC;   INR: 3.06          PTT - ( 07 Aug 2018 07:15 )  PTT:42.3 SEC          MICROBIOLOGY:     RADIOLOGY:  [ x] Reviewed and interpreted by me  TTE: . Severe right atrial enlargement.  2. Right ventricular enlargement with decreased right  ventricular systolic function.  3. Estimated right ventricular systolic pressure equals 93  mm Hg, assuming right atrial pressure equals 10 mm Hg,  consistent with severe pulmonary hypertension.    PULMONARY FUNCTION TESTS:    EKG: CHIEF COMPLAINT: anasarca    Interval Events: Patient restarted on Milrinone at 0.2mg/kg and Bumex 1mg/hr.    REVIEW OF SYSTEMS:  Constitutional: [x ] negative [ ] fevers [ ] chills [ ] weight loss [ ] weight gain  HEENT: [x ] negative [ ] dry eyes [ ] eye irritation [ ] postnasal drip [ ] nasal congestion  CV: [x ] negative  [ ] chest pain [ ] orthopnea [ ] palpitations [ ] murmur  Resp: [x ] negative [ ] cough [ ] shortness of breath [ ] dyspnea [ ] wheezing [ ] sputum [ ] hemoptysis  GI: [x ] negative [ ] nausea [ ] vomiting [ ] diarrhea [ ] constipation [ ] abd pain [ ] dysphagia   : [x ] negative [ ] dysuria [ ] nocturia [ ] hematuria [ ] increased urinary frequency  Musculoskeletal: [ ] negative [ ] back pain [ ] myalgias [ ] arthralgias [ ] fracture  Skin: [x ] negative [ ] rash [ ] itch  Neurological: [x ] negative [ ] headache [ ] dizziness [ ] syncope [ ] weakness [ ] numbness  Psychiatric: [ x] negative [ ] anxiety [ ] depression  Endocrine: [x ] negative [ ] diabetes [ ] thyroid problem  Hematologic/Lymphatic: [ ] negative [ ] anemia [ ] bleeding problem  Allergic/Immunologic: [ ] negative [ ] itchy eyes [ ] nasal discharge [ ] hives [ ] angioedema  [x] All other systems negative  [ ] Unable to assess ROS because ________    OBJECTIVE:  T(C): 36.7 (07 Aug 2018 05:44), Max: 36.7 (06 Aug 2018 13:33)  T(F): 98.1 (07 Aug 2018 05:44), Max: 98.1 (06 Aug 2018 13:33)  HR: 88 (07 Aug 2018 05:44) (88 - 93)  BP: 104/62 (07 Aug 2018 05:44) (104/62 - 133/72)  RR: 16 (07 Aug 2018 05:44) (16 - 18)  SpO2: 96% (07 Aug 2018 05:44) (95% - 98%)        08-06 @ 07:01  -  08-07 @ 07:00  --------------------------------------------------------  IN: 1007.9 mL / OUT: 5000 mL / NET: -3992.1 mL    08-07 @ 07:01  -  08-07 @ 08:59  --------------------------------------------------------  IN: 0 mL / OUT: 400 mL / NET: -400 mL      CAPILLARY BLOOD GLUCOSE          PHYSICAL EXAM:  General: NAD  HEENT: HECTOR   Neck: No JVD  Respiratory: CTA b/l  Cardiovascular: RRR  Abdomen: S/NT/ND  Extremities: 1-2+ edema on feet. Rest of edema in legs/thighs all resolved.  Skin: stable lymphedema changes.  Neurological: No focal deficits  Psychiatry: Normal affect and mood.    HOSPITAL MEDICATIONS:  warfarin 1 milliGRAM(s) Oral once      buMETAnide Infusion 1 mG/Hr IV Continuous <Continuous>  milrinone Infusion 0.25 MICROgram(s)/kG/Min IV Continuous <Continuous>  spironolactone 50 milliGRAM(s) Oral daily      ALBUTerol    90 MICROgram(s) HFA Inhaler 2 Puff(s) Inhalation every 6 hours PRN  buDESOnide  80 MICROgram(s)/formoterol 4.5 MICROgram(s) Inhaler 2 Puff(s) Inhalation two times a day  loratadine 10 milliGRAM(s) Oral daily    acetaminophen   Tablet. 650 milliGRAM(s) Oral every 6 hours PRN  melatonin 6 milliGRAM(s) Oral at bedtime  sertraline 25 milliGRAM(s) Oral daily    calcium carbonate    500 mG (Tums) Chewable 1 Tablet(s) Chew three times a day PRN        potassium chloride    Tablet ER 40 milliEquivalent(s) Oral every 4 hours    influenza   Vaccine 0.5 milliLiter(s) IntraMuscular once    benzocaine 15 mG/menthol 3.6 mG Lozenge 1 Lozenge Oral once PRN    Adempas(riociguat) 1.5mg Tablet 1 Tablet(s) 1 Tablet(s) Oral every 8 hours      LABS:                        13.9   5.57  )-----------( 288      ( 07 Aug 2018 07:15 )             42.8     Hgb Trend: 13.9<--, 13.8<--, 13.7<--, 14.0<--, 14.5<--  08-07    135  |  92<L>  |  8   ----------------------------<  140<H>  3.2<L>   |  28  |  0.78    Ca    9.2      07 Aug 2018 07:15  Phos  3.4     08-06  Mg     2.1     08-06    TPro  8.1  /  Alb  3.9  /  TBili  2.1<H>  /  DBili  x   /  AST  22  /  ALT  20  /  AlkPhos  54  08-06    Creatinine Trend: 0.78<--, 0.86<--, 1.01<--, 1.17<--, 1.33<--, 1.81<--  PT/INR - ( 07 Aug 2018 07:15 )   PT: 34.8 SEC;   INR: 3.06          PTT - ( 07 Aug 2018 07:15 )  PTT:42.3 SEC          MICROBIOLOGY:     RADIOLOGY:  [ x] Reviewed and interpreted by me  TTE: . Severe right atrial enlargement.  2. Right ventricular enlargement with decreased right  ventricular systolic function.  3. Estimated right ventricular systolic pressure equals 93  mm Hg, assuming right atrial pressure equals 10 mm Hg,  consistent with severe pulmonary hypertension.    PULMONARY FUNCTION TESTS:    EKG:

## 2018-08-07 NOTE — PROGRESS NOTE ADULT - PROBLEM SELECTOR PLAN 1
RHC from 2013 noted. 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   Still w/ moderate volume overload. Lost 9 lbs in 24 hrs.   Continue milrinone 0.25 mcg/kg/min and Bumex 1 mg/hr. Renal function improving.   keep K 4-5, mag >2.   SBP stable.   Continue Adempas. Cardizem was for pulm HTN (home meds) was held by primary cards team, although I do not see documentation of NO challenge in cath.   Pulm HTN would be best managed at [facility], but patient does not want to be transferred, despite lengthy conversations regarding her specific illness.  Would like patient to get RHC to evaluate pulm HTN while in patient, but patient is refusing.   Pt was counseled not to get pregnant.  Should follow up with Dr. Moncho Adamson as new pt and Dr. Unger on d/c. RHC from 2013 noted. 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   Still w/ moderate volume overload. Lost 9 lbs in 24 hrs.   Continue milrinone 0.25 mcg/kg/min and Bumex 1 mg/hr. Renal function improving.   K 3.2- Increased monica to 100 mg today. Also getting potassium supplementation. Tomorrow Randolph 50 mg po BID. keep K 4-5, mag >2.   SBP stable.   Continue Adempas. Cardizem was for pulm HTN (home meds) was held by primary cards team, although I do not see documentation of NO challenge in cath.   Pulm HTN would be best managed at [facility], but patient does not want to be transferred, despite lengthy conversations regarding her specific illness.  Would like patient to get RHC to evaluate pulm HTN while in patient, but patient is refusing.   Pt was counseled not to get pregnant.  Should follow up with Dr. Moncho Adamson as new pt and Dr. Unger on d/c.

## 2018-08-07 NOTE — PROGRESS NOTE ADULT - ATTENDING COMMENTS
meds: milrione 0.25, bumex 1mg/hr, edempas, Ald increased to 100 QD, K 40 bid,   I/O: -4L  weight -9lbs  104/-120/, 80-90,   08-07    135  |  92<L>  |  8   ----------------------------<  140<H>  3.2<L>   |  28  |  0.78    Ca    9.2      07 Aug 2018 07:15  Phos  3.4     08-06  Mg     1.9     08-07    TPro  8.1  /  Alb  3.9  /  TBili  2.1<H>  /  DBili  x   /  AST  22  /  ALT  20  /  AlkPhos  54  08-06                        13.9   5.57  )-----------( 288      ( 07 Aug 2018 07:15 )             42.8   Suggest:   Continue current regimen until patient euvolemic. Would not send patient home until LE edema has resolved.  Patient should undergo RHC and Nitric oxide study one she is euvolemic to guide ongoing pulmonary vasodilator therapy.   Increase standing oral K and Mg repletion.   Vinicio Phelps meds: milrione 0.25, bumex 1mg/hr, edempas, Ald increased to 100 QD, K 40 bid,   I/O: -4L  weight -9lbs  104/-120/, 80-90,   no change in clininical exam.   Legs remain edematous.   08-07    135  |  92<L>  |  8   ----------------------------<  140<H>  3.2<L>   |  28  |  0.78    Ca    9.2      07 Aug 2018 07:15  Phos  3.4     08-06  Mg     1.9     08-07    TPro  8.1  /  Alb  3.9  /  TBili  2.1<H>  /  DBili  x   /  AST  22  /  ALT  20  /  AlkPhos  54  08-06                        13.9   5.57  )-----------( 288      ( 07 Aug 2018 07:15 )             42.8   Suggest:   Continue current regimen until patient euvolemic. Would not send patient home until LE edema has resolved.  Patient should undergo RHC and Nitric oxide study one she is euvolemic to guide ongoing pulmonary vasodilator therapy.   Increase standing oral K and Mg repletion.   Vinicio Phelps

## 2018-08-07 NOTE — PROGRESS NOTE ADULT - ATTENDING COMMENTS
33 year old female with severe pulmonary hypertension on Adempas admitted with right heart failure.  Continues to have significant lower extremity edema. Continue diuresis.  Need eventual RHC to guide therapy but patient is hesitant. She is willing to consider.

## 2018-08-07 NOTE — PROGRESS NOTE ADULT - PROBLEM SELECTOR PLAN 1
- pt diuresing well on bumex gtt and midodrine diuretics, continues to be overloaded on exam  - given unclear response to NO, HF would like to pursue IP RHC however pt resistant  - fu repeat TTE now that s/p diuresis  - cont I&O, daily weights, has thus far lost ~75 lbs

## 2018-08-07 NOTE — PROGRESS NOTE ADULT - ASSESSMENT
33 F with anticardiolipin Ab, severe pHTN on Adempas, CVA on Coumadin here with dyspnea and intermittent chest pain. Here in exacerbation of her RHF and Pulm HTN resulting in anasarca responding to diuresis.    - Patient's DIANN resolved. She is now back on IV diuretics and an Inotrope/Lusitrope Milrinone.  - She has lost 40L since her hospitalization and has good from 154kg on admission to 119kg now.  - TTE results noted. She has known severe pulm HTN.  - Given that she is more euvolemic than on admission, would recommend obtaining a RHC now given use of Milrinone and IV diuretics again.  - Case in ongoing discussion with Dr. Unger. 33 F with anticardiolipin Ab, severe pHTN on Adempas, CVA on Coumadin here with dyspnea and intermittent chest pain. Here in exacerbation of her RHF and Pulm HTN resulting in anasarca responding to diuresis.    - Patient's DIANN resolved. She is now back on IV diuretics and an Inotrope/Lusitrope Milrinone.  - She has lost 40L since her hospitalization and has good from 154kg on admission to 119kg now.  - TTE results noted. She has known severe pulm HTN.  - Given that she has improved volume status than on admission, would recommend obtaining a RHC given use of Milrinone and IV diuretics again.  - Case in ongoing discussion with Dr. Unger.

## 2018-08-08 LAB
ANA TITR SER: NEGATIVE — SIGNIFICANT CHANGE UP
BASOPHILS # BLD AUTO: 0.06 K/UL — SIGNIFICANT CHANGE UP (ref 0–0.2)
BASOPHILS NFR BLD AUTO: 1 % — SIGNIFICANT CHANGE UP (ref 0–2)
BUN SERPL-MCNC: 6 MG/DL — LOW (ref 7–23)
CALCIUM SERPL-MCNC: 9.4 MG/DL — SIGNIFICANT CHANGE UP (ref 8.4–10.5)
CHLORIDE SERPL-SCNC: 89 MMOL/L — LOW (ref 98–107)
CO2 SERPL-SCNC: 28 MMOL/L — SIGNIFICANT CHANGE UP (ref 22–31)
CREAT SERPL-MCNC: 0.76 MG/DL — SIGNIFICANT CHANGE UP (ref 0.5–1.3)
EOSINOPHIL # BLD AUTO: 0.06 K/UL — SIGNIFICANT CHANGE UP (ref 0–0.5)
EOSINOPHIL NFR BLD AUTO: 1 % — SIGNIFICANT CHANGE UP (ref 0–6)
GLUCOSE SERPL-MCNC: 105 MG/DL — HIGH (ref 70–99)
HCT VFR BLD CALC: 42.4 % — SIGNIFICANT CHANGE UP (ref 34.5–45)
HGB BLD-MCNC: 14 G/DL — SIGNIFICANT CHANGE UP (ref 11.5–15.5)
IMM GRANULOCYTES # BLD AUTO: 0.03 # — SIGNIFICANT CHANGE UP
IMM GRANULOCYTES NFR BLD AUTO: 0.5 % — SIGNIFICANT CHANGE UP (ref 0–1.5)
INR BLD: 2.38 — HIGH (ref 0.88–1.17)
LYMPHOCYTES # BLD AUTO: 2.15 K/UL — SIGNIFICANT CHANGE UP (ref 1–3.3)
LYMPHOCYTES # BLD AUTO: 35.7 % — SIGNIFICANT CHANGE UP (ref 13–44)
MAGNESIUM SERPL-MCNC: 1.8 MG/DL — SIGNIFICANT CHANGE UP (ref 1.6–2.6)
MCHC RBC-ENTMCNC: 25.2 PG — LOW (ref 27–34)
MCHC RBC-ENTMCNC: 33 % — SIGNIFICANT CHANGE UP (ref 32–36)
MCV RBC AUTO: 76.3 FL — LOW (ref 80–100)
MONOCYTES # BLD AUTO: 0.87 K/UL — SIGNIFICANT CHANGE UP (ref 0–0.9)
MONOCYTES NFR BLD AUTO: 14.5 % — HIGH (ref 2–14)
NEUTROPHILS # BLD AUTO: 2.85 K/UL — SIGNIFICANT CHANGE UP (ref 1.8–7.4)
NEUTROPHILS NFR BLD AUTO: 47.3 % — SIGNIFICANT CHANGE UP (ref 43–77)
NRBC # FLD: 0 — SIGNIFICANT CHANGE UP
PLATELET # BLD AUTO: 310 K/UL — SIGNIFICANT CHANGE UP (ref 150–400)
PMV BLD: 11.3 FL — SIGNIFICANT CHANGE UP (ref 7–13)
POTASSIUM SERPL-MCNC: 3.2 MMOL/L — LOW (ref 3.5–5.3)
POTASSIUM SERPL-SCNC: 3.2 MMOL/L — LOW (ref 3.5–5.3)
PROTHROM AB SERPL-ACNC: 27.9 SEC — HIGH (ref 9.8–13.1)
RBC # BLD: 5.56 M/UL — HIGH (ref 3.8–5.2)
RBC # FLD: 22.1 % — HIGH (ref 10.3–14.5)
SODIUM SERPL-SCNC: 133 MMOL/L — LOW (ref 135–145)
WBC # BLD: 6.02 K/UL — SIGNIFICANT CHANGE UP (ref 3.8–10.5)
WBC # FLD AUTO: 6.02 K/UL — SIGNIFICANT CHANGE UP (ref 3.8–10.5)

## 2018-08-08 PROCEDURE — 99233 SBSQ HOSP IP/OBS HIGH 50: CPT

## 2018-08-08 RX ORDER — BUMETANIDE 0.25 MG/ML
0.5 INJECTION INTRAMUSCULAR; INTRAVENOUS
Qty: 10 | Refills: 0 | Status: DISCONTINUED | OUTPATIENT
Start: 2018-08-08 | End: 2018-08-09

## 2018-08-08 RX ORDER — POTASSIUM CHLORIDE 20 MEQ
40 PACKET (EA) ORAL EVERY 4 HOURS
Qty: 0 | Refills: 0 | Status: COMPLETED | OUTPATIENT
Start: 2018-08-08 | End: 2018-08-08

## 2018-08-08 RX ORDER — SPIRONOLACTONE 25 MG/1
50 TABLET, FILM COATED ORAL
Qty: 0 | Refills: 0 | Status: DISCONTINUED | OUTPATIENT
Start: 2018-08-08 | End: 2018-08-10

## 2018-08-08 RX ORDER — WARFARIN SODIUM 2.5 MG/1
3 TABLET ORAL ONCE
Qty: 0 | Refills: 0 | Status: COMPLETED | OUTPATIENT
Start: 2018-08-08 | End: 2018-08-08

## 2018-08-08 RX ADMIN — BUMETANIDE 5 MG/HR: 0.25 INJECTION INTRAMUSCULAR; INTRAVENOUS at 10:34

## 2018-08-08 RX ADMIN — BUDESONIDE AND FORMOTEROL FUMARATE DIHYDRATE 2 PUFF(S): 160; 4.5 AEROSOL RESPIRATORY (INHALATION) at 08:25

## 2018-08-08 RX ADMIN — Medication 40 MILLIEQUIVALENT(S): at 10:04

## 2018-08-08 RX ADMIN — BUDESONIDE AND FORMOTEROL FUMARATE DIHYDRATE 2 PUFF(S): 160; 4.5 AEROSOL RESPIRATORY (INHALATION) at 21:52

## 2018-08-08 RX ADMIN — SERTRALINE 25 MILLIGRAM(S): 25 TABLET, FILM COATED ORAL at 11:48

## 2018-08-08 RX ADMIN — Medication 40 MILLIEQUIVALENT(S): at 14:12

## 2018-08-08 RX ADMIN — SPIRONOLACTONE 50 MILLIGRAM(S): 25 TABLET, FILM COATED ORAL at 06:08

## 2018-08-08 RX ADMIN — MAGNESIUM OXIDE 400 MG ORAL TABLET 400 MILLIGRAM(S): 241.3 TABLET ORAL at 11:48

## 2018-08-08 RX ADMIN — MAGNESIUM OXIDE 400 MG ORAL TABLET 400 MILLIGRAM(S): 241.3 TABLET ORAL at 16:51

## 2018-08-08 RX ADMIN — MILRINONE LACTATE 8.93 MICROGRAM(S)/KG/MIN: 1 INJECTION, SOLUTION INTRAVENOUS at 06:08

## 2018-08-08 RX ADMIN — MILRINONE LACTATE 8.93 MICROGRAM(S)/KG/MIN: 1 INJECTION, SOLUTION INTRAVENOUS at 10:36

## 2018-08-08 RX ADMIN — SPIRONOLACTONE 50 MILLIGRAM(S): 25 TABLET, FILM COATED ORAL at 17:52

## 2018-08-08 RX ADMIN — MAGNESIUM OXIDE 400 MG ORAL TABLET 400 MILLIGRAM(S): 241.3 TABLET ORAL at 08:25

## 2018-08-08 RX ADMIN — BUMETANIDE 10 MG/HR: 0.25 INJECTION INTRAMUSCULAR; INTRAVENOUS at 06:08

## 2018-08-08 RX ADMIN — LORATADINE 10 MILLIGRAM(S): 10 TABLET ORAL at 11:50

## 2018-08-08 RX ADMIN — WARFARIN SODIUM 3 MILLIGRAM(S): 2.5 TABLET ORAL at 09:00

## 2018-08-08 NOTE — PROGRESS NOTE ADULT - ATTENDING COMMENTS
Briefly, 32 y/o AA female with h/o severe pulmonary HTN (Dx 2006; unclear etiology), asthma who presented with SOB and LE edema x 2 weeks on 7/25 found to be in acute on chronic right sided heart failure. Was started on diuresis with excellent result: admission weight 336 pounds now 246 pounds, on bumex gtt at 1 mg/hr and milrinone 0.25 mcg/kg/min. States she feels improved. Unclear etiology of PH and was followed by Dr. Unger up until Feb 2018. Had PFTs done as outpt 2014 which showed mild restrictive component and depressed DLCO (51%). CTA on admission was negative for PE. Unable to tolerate V/Q scan d/t back pain in past and never had sleep study. Denies family h/o PH. Was diagnosed with anticardiolipin Ab in past but on subsequent testing came back negative. Currently feels much improved - able to walk to bathroom w/o significant SOB, denies orthopnea/PND. Currently, on exam, NAD, JVD approx 14 cm with HJR, RRR, clear lungs, nontender/soft abdomen with active BS, 1+ pitting edema b/l, WWP. Labs reviewed - BUN/Cr 8/0.8 (at b/l), K 3.3, INR 2.4, Tbili 2.1. TTE 7/26 - severe MANSI, severe RV enlargement, RV pressure/volume overload, underfilled LV, severe TR, RVSP 98. Overall class III symptoms with WHO group I PH possibly, still volume overloaded but much improved.  - reduce bumex gtt to 0.5 mg/hr for now  - continue monica 50 mg twice daily  - replete K as needed to maintain >4  - standing weights daily  - unclear etiology of PH; anti-Scl 70 negative; no sleep study done previously; CTA negative; will order V/Q scan for tomorrow; plan to do RHC with NO challenge and sat run on Friday (pt agreeable to as long as well medicated)  - will need outpt sleep study   - continue Adempas; would consider uptitrating to 2 mg every 8 hours as she has been on 1.5 mg for several months  - repeat anticardiolipin labs and YENNY

## 2018-08-08 NOTE — PROGRESS NOTE ADULT - PROBLEM SELECTOR PLAN 1
RHC from 2013 noted. 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   Responding well to milrinone and bumex gtt. Lost 5 lbs in 24 hrs.   Continue milrinone 0.25 mcg/kg/min but decreased Bumex to 0.5 mg/hr. Renal function stable.   K 3.2- Increased monica to 100 mg po BID. Also getting potassium supplementation.  keep K 4-5, mag >2.   SBP stable.   Agreed to do VQ scan tomorrow and RHC w/ NO challenge on Friday.  Continue Adempas. Cardizem was for pulm HTN (home meds) was held by primary cards team, although I do not see documentation of NO challenge in cath.   Pulm HTN would be best managed at [facility], but patient does not want to be transferred, despite lengthy conversations regarding her specific illness.  Would like patient to get RHC to evaluate pulm HTN while in patient, but patient is refusing.   Pt was counseled not to get pregnant.  Should follow up with Dr. Moncho Adamson as new pt and Dr. Unger on d/c. RHC from 2013 noted. 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   Responding well to milrinone and bumex gtt. Lost 5 lbs in 24 hrs.   Continue milrinone 0.25 mcg/kg/min but decreased Bumex to 0.5 mg/hr. Renal function stable.   K 3.2- Increased monica to 100 mg po BID. Also getting potassium supplementation.  keep K 4-5, mag >2.   SBP stable.   Agreed to do VQ scan tomorrow and RHC w/ NO challenge on Friday.  Continue Adempas. Cardizem was for pulm HTN (home meds) was held by primary cards team, although I do not see documentation of NO challenge in cath.   Pulm HTN would be best managed at [facility], but patient does not want to be transferred, despite lengthy conversations regarding her specific illness.  Would like patient to get RHC to evaluate pulm HTN while in patient, but patient is refusing.   Pt was counseled not to get pregnant.  Should follow up with Dr. Moncho Adamson as new pt and Dr. Unger on d/c.  Ordered BMP to check K this evening. Please follow up and supplement as needed.   Ordered lupus panel and YENNY in this evenings labs.

## 2018-08-08 NOTE — PROGRESS NOTE ADULT - PROBLEM SELECTOR PLAN 3
INR 2.3. Keep low 2 in anticipation of RHC on Friday. INR 2.3. Keep low 2 in anticipation of RHC on Friday.  Checking Anticardiolipin ab test today.

## 2018-08-08 NOTE — PROGRESS NOTE ADULT - PROBLEM SELECTOR PLAN 5
daily coumadin and INR  INR fluctuating bc of dosing times; pt takes coumadin in AM at home; doses have been alternating AM and PM  Sp 3mg today  no evidence of bleeding  Monitor INR daily

## 2018-08-08 NOTE — PROGRESS NOTE ADULT - ASSESSMENT
32 y/o pleasant  female w/ PMHX of obesity, severe pulmonary hypertension w/ RV systolic failure and asthma comes in with complaints of worsening SOB and LE x 2 weeks. She was first told she had pulmonary hypertension and heart failure in 2006 at an OSH in [place] but tells me that she was never offered treatment. She was then re-hospitilzied in 2013 at [facility] and had a LHC- which showed normal coronaries, and RHC which showed that she had severe pulmonary hypertension w/ low normal wedge. Since then she has been followed by Dr. Unger and Dr. Haines. She states she is not able to work or go to school b/c of frequent LE edema, fatigue and inability to function normally.     She was recently admitted for SOB and was found to have parainfluenza 3, and was told to increase her PO fluids. She was discharged after 3 days, and for a while felt good, but then started developing FERRO. Currently she can only take about 10 steps before she gets SOB, and has frequent PND that wakes her up. No SOB at rest, orthopnea, CP, palpitations, dizziness.       Approaching euvolemia

## 2018-08-08 NOTE — PROGRESS NOTE ADULT - SUBJECTIVE AND OBJECTIVE BOX
Patient is a 33y old  Female who presents with a chief complaint of "I had chest pain and SOB" this morning (30 Jul 2018 15:11)      SUBJECTIVE / OVERNIGHT EVENTS: Pt's only complaint is LUE anticubital pain, otherwise reports she would now be willing to undergo RHC.    MEDICATIONS  (STANDING):  Adempas(riociguat) 1.5mg Tablet 1 Tablet(s) 1 Tablet(s) Oral every 8 hours  buDESOnide  80 MICROgram(s)/formoterol 4.5 MICROgram(s) Inhaler 2 Puff(s) Inhalation two times a day  buMETAnide Infusion 0.5 mG/Hr (5 mL/Hr) IV Continuous <Continuous>  influenza   Vaccine 0.5 milliLiter(s) IntraMuscular once  loratadine 10 milliGRAM(s) Oral daily  magnesium oxide 400 milliGRAM(s) Oral three times a day with meals  melatonin 6 milliGRAM(s) Oral at bedtime  milrinone Infusion 0.25 MICROgram(s)/kG/Min (8.932 mL/Hr) IV Continuous <Continuous>  sertraline 25 milliGRAM(s) Oral daily  spironolactone 50 milliGRAM(s) Oral two times a day    MEDICATIONS  (PRN):  acetaminophen   Tablet. 650 milliGRAM(s) Oral every 6 hours PRN Mild Pain (1 - 3)  ALBUTerol    90 MICROgram(s) HFA Inhaler 2 Puff(s) Inhalation every 6 hours PRN Shortness of Breath and/or Wheezing  benzocaine 15 mG/menthol 3.6 mG Lozenge 1 Lozenge Oral once PRN Sore Throat  calcium carbonate    500 mG (Tums) Chewable 1 Tablet(s) Chew three times a day PRN Heartburn      Vital Signs Last 24 Hrs  T(C): 36.9 (08-08-18 @ 13:28), Max: 36.9 (08-08-18 @ 06:07)  T(F): 98.5 (08-08-18 @ 13:28), Max: 98.5 (08-08-18 @ 13:28)  HR: 95 (08-08-18 @ 13:28) (84 - 98)  BP: 118/74 (08-08-18 @ 13:28) (112/69 - 118/75)  BP(mean): --  RR: 17 (08-08-18 @ 13:28) (16 - 17)  SpO2: 95% (08-08-18 @ 13:28) (93% - 96%)  CAPILLARY BLOOD GLUCOSE        I&O's Summary    07 Aug 2018 07:01  -  08 Aug 2018 07:00  --------------------------------------------------------  IN: 1110.7 mL / OUT: 3600 mL / NET: -2489.3 mL    08 Aug 2018 07:01  -  08 Aug 2018 14:17  --------------------------------------------------------  IN: 621.5 mL / OUT: 1800 mL / NET: -1178.5 mL    PHYSICAL EXAM:  GENERAL: NAD, well-nourished  HEENT: mmm, elevated JVD  CHEST/LUNG: CTABL  HEART: RRR, no m/r/g  ABDOMEN: soft, nt, nd  EXTREMITIES:  wwp, +2 LE edema, LUE with superficial thrombophlebitis in antecubital space  PSYCH: AAOx3  NEUROLOGY: non-focal  SKIN: No rashes or lesions      LABS:                        14.0   6.02  )-----------( 310      ( 08 Aug 2018 06:30 )             42.4     08-08    133<L>  |  89<L>  |  6<L>  ----------------------------<  105<H>  3.2<L>   |  28  |  0.76    Ca    9.4      08 Aug 2018 06:30  Mg     1.8     08-08      PT/INR - ( 08 Aug 2018 06:30 )   PT: 27.9 SEC;   INR: 2.38          PTT - ( 07 Aug 2018 07:15 )  PTT:42.3 SEC          RADIOLOGY & ADDITIONAL TESTS:    Imaging Personally Reviewed:    Consultant(s) Notes Reviewed:      Care Discussed with Consultants/Other Providers:

## 2018-08-08 NOTE — PROGRESS NOTE ADULT - PROBLEM SELECTOR PLAN 1
- pt diuresing well on bumex gtt and midodrine diuretics, continues to be overloaded on exam  - given unclear response to NO, HF would like to pursue IP RHC pt now in agreement, plan to undergo RHC this friday  - fu repeat TTE s/p diuresis

## 2018-08-08 NOTE — PROGRESS NOTE ADULT - SUBJECTIVE AND OBJECTIVE BOX
Patient seen and examined. She feels well- no acute SOB, orthopnea, PND, CP, palpitations.   LE edema greatly improved.     Medications:  acetaminophen   Tablet. 650 milliGRAM(s) Oral every 6 hours PRN  Adempas(riociguat) 1.5mg Tablet 1 Tablet(s) 1 Tablet(s) Oral every 8 hours  ALBUTerol    90 MICROgram(s) HFA Inhaler 2 Puff(s) Inhalation every 6 hours PRN  benzocaine 15 mG/menthol 3.6 mG Lozenge 1 Lozenge Oral once PRN  buDESOnide  80 MICROgram(s)/formoterol 4.5 MICROgram(s) Inhaler 2 Puff(s) Inhalation two times a day  buMETAnide Infusion 0.5 mG/Hr IV Continuous <Continuous>  calcium carbonate    500 mG (Tums) Chewable 1 Tablet(s) Chew three times a day PRN  influenza   Vaccine 0.5 milliLiter(s) IntraMuscular once  loratadine 10 milliGRAM(s) Oral daily  magnesium oxide 400 milliGRAM(s) Oral three times a day with meals  melatonin 6 milliGRAM(s) Oral at bedtime  milrinone Infusion 0.25 MICROgram(s)/kG/Min IV Continuous <Continuous>  potassium chloride    Tablet ER 40 milliEquivalent(s) Oral every 4 hours  sertraline 25 milliGRAM(s) Oral daily  spironolactone 50 milliGRAM(s) Oral two times a day      Vitals:  Vital Signs Last 24 Hrs  T(C): 36.9 (08 Aug 2018 06:07), Max: 36.9 (07 Aug 2018 12:56)  T(F): 98.4 (08 Aug 2018 06:07), Max: 98.5 (07 Aug 2018 12:56)  HR: 92 (08 Aug 2018 10:31) (84 - 101)  BP: 113/63 (08 Aug 2018 10:31) (112/69 - 124/75)  BP(mean): --  RR: 16 (08 Aug 2018 10:31) (16 - 16)  SpO2: 93% (08 Aug 2018 10:31) (93% - 96%)    Daily     Daily Weight in k.6 (08 Aug 2018 06:15)    I&O's Detail    07 Aug 2018 07:01  -  08 Aug 2018 07:00  --------------------------------------------------------  IN:    bumetanide Infusion: 230 mL    milrinone  Infusion: 204.7 mL    Oral Fluid: 676 mL  Total IN: 1110.7 mL    OUT:    Voided: 3600 mL  Total OUT: 3600 mL    Total NET: -2489.3 mL      08 Aug 2018 07:01  -  08 Aug 2018 12:43  --------------------------------------------------------  IN:    bumetanide Infusion: 30 mL    bumetanide Infusion: 10 mL    milrinone  Infusion: 44.5 mL    Oral Fluid: 537 mL  Total IN: 621.5 mL    OUT:    Voided: 1800 mL  Total OUT: 1800 mL    Total NET: -1178.5 mL          Physical Exam:     General: No distress. Comfortable.  HEENT: EOM intact.  Neck: Neck supple. JVP mild-moderately elevated. No masses  Chest: Clear to auscultation bilaterally  CV: Normal S1 and S2. No murmurs, rub, or gallops. Radial pulses normal. 1+ LE edema b/l, and is warm b/l.   Abdomen: Soft, non-distended, non-tender  Skin: No rashes or skin breakdown  Neurology: Alert and oriented times three. Sensation intact  Psych: Affect normal    Labs:                        14.0   6.02  )-----------( 310      ( 08 Aug 2018 06:30 )             42.4     -    133<L>  |  89<L>  |  6<L>  ----------------------------<  105<H>  3.2<L>   |  28  |  0.76    Ca    9.4      08 Aug 2018 06:30  Mg     1.8     08-08      PT/INR - ( 08 Aug 2018 06:30 )   PT: 27.9 SEC;   INR: 2.38          PTT - ( 07 Aug 2018 07:15 )  PTT:42.3 SEC    < from: Transthoracic Echocardiogram (18 @ 12:33) >  CONCLUSIONS:  1. Severe right atrial enlargement.  2. Right ventricular enlargement with decreased right  ventricular systolic function.  3. Estimated right ventricular systolic pressure equals 93  mm Hg, assuming right atrial pressure equals 10 mm Hg,  consistent with severe pulmonary hypertension.    < end of copied text >

## 2018-08-09 ENCOUNTER — OTHER (OUTPATIENT)
Age: 33
End: 2018-08-09

## 2018-08-09 LAB
BASOPHILS # BLD AUTO: 0.05 K/UL — SIGNIFICANT CHANGE UP (ref 0–0.2)
BASOPHILS NFR BLD AUTO: 1 % — SIGNIFICANT CHANGE UP (ref 0–2)
BUN SERPL-MCNC: 6 MG/DL — LOW (ref 7–23)
CALCIUM SERPL-MCNC: 9.7 MG/DL — SIGNIFICANT CHANGE UP (ref 8.4–10.5)
CHLORIDE SERPL-SCNC: 90 MMOL/L — LOW (ref 98–107)
CO2 SERPL-SCNC: 28 MMOL/L — SIGNIFICANT CHANGE UP (ref 22–31)
CREAT SERPL-MCNC: 0.7 MG/DL — SIGNIFICANT CHANGE UP (ref 0.5–1.3)
EOSINOPHIL # BLD AUTO: 0.04 K/UL — SIGNIFICANT CHANGE UP (ref 0–0.5)
EOSINOPHIL NFR BLD AUTO: 0.8 % — SIGNIFICANT CHANGE UP (ref 0–6)
GLUCOSE SERPL-MCNC: 126 MG/DL — HIGH (ref 70–99)
HCT VFR BLD CALC: 41.8 % — SIGNIFICANT CHANGE UP (ref 34.5–45)
HGB BLD-MCNC: 13.4 G/DL — SIGNIFICANT CHANGE UP (ref 11.5–15.5)
IMM GRANULOCYTES # BLD AUTO: 0.02 # — SIGNIFICANT CHANGE UP
IMM GRANULOCYTES NFR BLD AUTO: 0.4 % — SIGNIFICANT CHANGE UP (ref 0–1.5)
INR BLD: 2.15 — HIGH (ref 0.88–1.17)
LYMPHOCYTES # BLD AUTO: 1.54 K/UL — SIGNIFICANT CHANGE UP (ref 1–3.3)
LYMPHOCYTES # BLD AUTO: 30.9 % — SIGNIFICANT CHANGE UP (ref 13–44)
MAGNESIUM SERPL-MCNC: 1.9 MG/DL — SIGNIFICANT CHANGE UP (ref 1.6–2.6)
MCHC RBC-ENTMCNC: 24.8 PG — LOW (ref 27–34)
MCHC RBC-ENTMCNC: 32.1 % — SIGNIFICANT CHANGE UP (ref 32–36)
MCV RBC AUTO: 77.4 FL — LOW (ref 80–100)
MONOCYTES # BLD AUTO: 0.71 K/UL — SIGNIFICANT CHANGE UP (ref 0–0.9)
MONOCYTES NFR BLD AUTO: 14.2 % — HIGH (ref 2–14)
NEUTROPHILS # BLD AUTO: 2.63 K/UL — SIGNIFICANT CHANGE UP (ref 1.8–7.4)
NEUTROPHILS NFR BLD AUTO: 52.7 % — SIGNIFICANT CHANGE UP (ref 43–77)
NRBC # FLD: 0 — SIGNIFICANT CHANGE UP
PLATELET # BLD AUTO: 339 K/UL — SIGNIFICANT CHANGE UP (ref 150–400)
PMV BLD: 10.8 FL — SIGNIFICANT CHANGE UP (ref 7–13)
POTASSIUM SERPL-MCNC: 3.3 MMOL/L — LOW (ref 3.5–5.3)
POTASSIUM SERPL-SCNC: 3.3 MMOL/L — LOW (ref 3.5–5.3)
PROTHROM AB SERPL-ACNC: 25.1 SEC — HIGH (ref 9.8–13.1)
RBC # BLD: 5.4 M/UL — HIGH (ref 3.8–5.2)
RBC # FLD: 21.6 % — HIGH (ref 10.3–14.5)
SODIUM SERPL-SCNC: 133 MMOL/L — LOW (ref 135–145)
WBC # BLD: 4.99 K/UL — SIGNIFICANT CHANGE UP (ref 3.8–10.5)
WBC # FLD AUTO: 4.99 K/UL — SIGNIFICANT CHANGE UP (ref 3.8–10.5)

## 2018-08-09 PROCEDURE — 71045 X-RAY EXAM CHEST 1 VIEW: CPT | Mod: 26

## 2018-08-09 PROCEDURE — 99233 SBSQ HOSP IP/OBS HIGH 50: CPT

## 2018-08-09 RX ORDER — BUMETANIDE 0.25 MG/ML
2 INJECTION INTRAMUSCULAR; INTRAVENOUS
Qty: 0 | Refills: 0 | Status: DISCONTINUED | OUTPATIENT
Start: 2018-08-10 | End: 2018-08-10

## 2018-08-09 RX ORDER — WARFARIN SODIUM 2.5 MG/1
2 TABLET ORAL ONCE
Qty: 0 | Refills: 0 | Status: COMPLETED | OUTPATIENT
Start: 2018-08-09 | End: 2018-08-09

## 2018-08-09 RX ORDER — DOCUSATE SODIUM 100 MG
100 CAPSULE ORAL DAILY
Qty: 0 | Refills: 0 | Status: DISCONTINUED | OUTPATIENT
Start: 2018-08-09 | End: 2018-08-10

## 2018-08-09 RX ORDER — POTASSIUM CHLORIDE 20 MEQ
40 PACKET (EA) ORAL EVERY 4 HOURS
Qty: 0 | Refills: 0 | Status: COMPLETED | OUTPATIENT
Start: 2018-08-09 | End: 2018-08-09

## 2018-08-09 RX ADMIN — Medication 650 MILLIGRAM(S): at 16:41

## 2018-08-09 RX ADMIN — SPIRONOLACTONE 50 MILLIGRAM(S): 25 TABLET, FILM COATED ORAL at 06:10

## 2018-08-09 RX ADMIN — BUDESONIDE AND FORMOTEROL FUMARATE DIHYDRATE 2 PUFF(S): 160; 4.5 AEROSOL RESPIRATORY (INHALATION) at 07:38

## 2018-08-09 RX ADMIN — BUDESONIDE AND FORMOTEROL FUMARATE DIHYDRATE 2 PUFF(S): 160; 4.5 AEROSOL RESPIRATORY (INHALATION) at 22:08

## 2018-08-09 RX ADMIN — WARFARIN SODIUM 2 MILLIGRAM(S): 2.5 TABLET ORAL at 15:41

## 2018-08-09 RX ADMIN — BUMETANIDE 5 MG/HR: 0.25 INJECTION INTRAMUSCULAR; INTRAVENOUS at 06:10

## 2018-08-09 RX ADMIN — SPIRONOLACTONE 50 MILLIGRAM(S): 25 TABLET, FILM COATED ORAL at 15:40

## 2018-08-09 RX ADMIN — LORATADINE 10 MILLIGRAM(S): 10 TABLET ORAL at 13:17

## 2018-08-09 RX ADMIN — Medication 650 MILLIGRAM(S): at 15:41

## 2018-08-09 RX ADMIN — Medication 40 MILLIEQUIVALENT(S): at 17:29

## 2018-08-09 RX ADMIN — MILRINONE LACTATE 8.93 MICROGRAM(S)/KG/MIN: 1 INJECTION, SOLUTION INTRAVENOUS at 06:10

## 2018-08-09 NOTE — PROGRESS NOTE ADULT - SUBJECTIVE AND OBJECTIVE BOX
Patient seen and examined. She feels great, no acute SOB, orthopnea, PND, CP, palpitations, dizziness.     Medications:  acetaminophen   Tablet. 650 milliGRAM(s) Oral every 6 hours PRN  Adempas(riociguat) 1.5mg Tablet 1 Tablet(s) 1 Tablet(s) Oral every 8 hours  ALBUTerol    90 MICROgram(s) HFA Inhaler 2 Puff(s) Inhalation every 6 hours PRN  benzocaine 15 mG/menthol 3.6 mG Lozenge 1 Lozenge Oral once PRN  buDESOnide  80 MICROgram(s)/formoterol 4.5 MICROgram(s) Inhaler 2 Puff(s) Inhalation two times a day  buMETAnide Infusion 0.5 mG/Hr IV Continuous <Continuous>  calcium carbonate    500 mG (Tums) Chewable 1 Tablet(s) Chew three times a day PRN  influenza   Vaccine 0.5 milliLiter(s) IntraMuscular once  loratadine 10 milliGRAM(s) Oral daily  melatonin 6 milliGRAM(s) Oral at bedtime  milrinone Infusion 0.25 MICROgram(s)/kG/Min IV Continuous <Continuous>  potassium chloride    Tablet ER 40 milliEquivalent(s) Oral every 4 hours  sertraline 25 milliGRAM(s) Oral daily  spironolactone 50 milliGRAM(s) Oral two times a day  warfarin 2 milliGRAM(s) Oral once      Vitals:  Vital Signs Last 24 Hrs  T(C): 36.4 (09 Aug 2018 12:47), Max: 36.9 (09 Aug 2018 06:08)  T(F): 97.5 (09 Aug 2018 12:47), Max: 98.4 (09 Aug 2018 06:08)  HR: 105 (09 Aug 2018 12:47) (94 - 105)  BP: 124/80 (09 Aug 2018 12:47) (109/69 - 124/80)  BP(mean): --  RR: 17 (09 Aug 2018 12:47) (16 - 17)  SpO2: 94% (09 Aug 2018 12:47) (94% - 96%)    Daily     Daily Weight in k.6 (09 Aug 2018 06:44)    I&O's Detail    08 Aug 2018 07:01  -  09 Aug 2018 07:00  --------------------------------------------------------  IN:    bumetanide Infusion: 30 mL    bumetanide Infusion: 100 mL    milrinone  Infusion: 284.8 mL    Oral Fluid: 1350 mL  Total IN: 1764.8 mL    OUT:    Voided: 4150 mL  Total OUT: 4150 mL    Total NET: -2385.2 mL          Physical Exam:     General: No distress. Comfortable.  HEENT: EOM intact.  Neck: Neck supple. JVP appears normal. No masses  Chest: Clear to auscultation bilaterally  CV: S1 and S2 RRR. No murmurs, rub, or gallops. Radial pulses normal. Trace b/l LE edema, warm b/l.   Abdomen: Soft, non-distended, non-tender  Skin: No rashes or skin breakdown  Neurology: Alert and oriented times three. Sensation intact  Psych: Affect normal    Labs:                        13.4   4.99  )-----------( 339      ( 09 Aug 2018 10:39 )             41.8     -    133<L>  |  90<L>  |  6<L>  ----------------------------<  126<H>  3.3<L>   |  28  |  0.70    Ca    9.7      09 Aug 2018 10:39  Mg     1.9           PT/INR - ( 09 Aug 2018 10:39 )   PT: 25.1 SEC;   INR: 2.15       < from: Transthoracic Echocardiogram (18 @ 12:33) >  1. Severe right atrial enlargement.  2. Right ventricular enlargement with decreased right  ventricular systolic function.  3. Estimated right ventricular systolic pressure equals 93  mm Hg, assuming right atrial pressure equals 10 mm Hg,  consistent with severe pulmonary hypertension.    < end of copied text >

## 2018-08-09 NOTE — PROVIDER CONTACT NOTE (OTHER) - NAME OF MD/NP/PA/DO NOTIFIED:
TERRY Boswell
TERRY Garza
TERRY Magana
TERRY Rivers
TERRY clay
Tele Bart Houser
Tele TERRY Houser
tar[place] [id number]
tar[place] [id number]
tar[place] [id number]
tar[place] [id number]
tar[place] le [id number]
TERRY Ziegler

## 2018-08-09 NOTE — PROGRESS NOTE ADULT - ASSESSMENT
34 y/o pleasant  female w/ PMHX of obesity, severe pulmonary hypertension w/ RV systolic failure and asthma comes in with complaints of worsening SOB and LE x 2 weeks. She was first told she had pulmonary hypertension and heart failure in 2006 at an OSH in [place] but tells me that she was never offered treatment. She was then re-hospitilzied in 2013 at [facility] and had a LHC- which showed normal coronaries, and RHC which showed that she had severe pulmonary hypertension w/ low normal wedge. Since then she has been followed by Dr. Unger and Dr. Haines. She states she is not able to work or go to school b/c of frequent LE edema, fatigue and inability to function normally.     She was recently admitted for SOB and was found to have parainfluenza 3, and was told to increase her PO fluids. She was discharged after 3 days, and for a while felt good, but then started developing FERRO. Currently she can only take about 10 steps before she gets SOB, and has frequent PND that wakes her up. No SOB at rest, orthopnea, CP, palpitations, dizziness.       Approaching euvolemia

## 2018-08-09 NOTE — PROVIDER CONTACT NOTE (OTHER) - ACTION/TREATMENT ORDERED:
warm compress
Pt educated on the importance of taking blood draws; pt verbalized understanding; TERRY Magana made aware
Will continue to monitor.
pa aware.
pa aware. continue to monitor.
pa aware. pt refused.
pa aware. pt refusing.
pa aware. pt refusing.
Keep trying
Notified tele TERRY Houser. Will continue to monitor.
Notified tele tarun Houser. Clean cut. Mik order bactracin. Will continue to monitor.

## 2018-08-09 NOTE — PROVIDER CONTACT NOTE (OTHER) - DATE AND TIME:
04-Aug-2018 06:07
05-Aug-2018 10:11
07-Aug-2018 18:22
08-Aug-2018 19:34
08-Aug-2018 23:05
09-Aug-2018 00:57
09-Aug-2018 02:54
09-Aug-2018 06:03
09-Aug-2018 11:11
29-Jul-2018 23:32
30-Jul-2018 02:32
31-Jul-2018 06:36
04-Aug-2018 16:30

## 2018-08-09 NOTE — PROGRESS NOTE ADULT - PROBLEM SELECTOR PLAN 1
- pt diuresing well on bumex gtt and midodrine diuretics, continues to be overloaded on exam  - wt stable today, but net neg 2.3L, ensure PM labs for electrolytes  - given unclear response to NO, HF would like to pursue IP RHC pt now in agreement, plan to undergo RHC tomorrow  - fu repeat TTE s/p diuresis

## 2018-08-09 NOTE — PROVIDER CONTACT NOTE (OTHER) - SITUATION
RN attempted to take BP on right lower arm- patient yelled when lightly touched. Patient states old IV from right AC site is very sore, tender to touch and per patient swollen
PA aware INR 3.95, as per PA okay to give 2 mg coumadin dosing now at this time as ordered
Pt BP 94/49 electronic. Re-took manually BP 92/58. Pt currently laying in bed sleeping.
Pt claims her right toe hurts and her right leg cut opened and is leaking.
Pt has 5 beats of vtach
Tele PA made aware patient refusing lab draw at this time
attempted to draw bloods, said she does not want them drawn now and will call when she's ready. reasoning explained to pt, says she will when she's ready. will continue to attempt in a bit.
pt refused wound care, order is for daily, pt refused to have the dressing changed.
pt refusing am labs at this time, reasoning explained to pt, still refusing.
reattempted to draw labs and she does not want to at this time.
refusing xray.

## 2018-08-09 NOTE — PROVIDER CONTACT NOTE (MEDICATION) - ACTION/TREATMENT ORDERED:
Pt educated on the importance of taking meds when they are due; pt verbalized understanding; meds rescheduled. TERRY Magana made aware
As per PA okay to give dose of coumadin
Give coumadin as ordered
Try again later

## 2018-08-09 NOTE — PROVIDER CONTACT NOTE (OTHER) - REASON
INR 3.95
Low BP
Pt had 5 beats of vtach
Pt states her cut on her right leg open and leaking fluids. One toe on right leg hurts
Refusing STAT bmp
pt refused AM labs; states she wants to do it at a later time
pt refusing VQ scan
reattempted to draw labs
refused wound care
refusing am labs
refusing lab draw
refusing xray
Right arm tenderness

## 2018-08-09 NOTE — PROVIDER CONTACT NOTE (OTHER) - ASSESSMENT
Right and left arm equally swollen, unable to assess if site had infiltrated IV, patient is guarding area and hesitant to remove tape. Very tender to touch
Pt refusing VQ scan at this moment will try again later
Pt states she does not want to do AM labs at this time
Pt is asymptomatic
fluid leaking out of right leg. With red swelling

## 2018-08-09 NOTE — PROVIDER CONTACT NOTE (OTHER) - RECOMMENDATIONS
Patient does not want pain medicine, PA aware
Keep trying to get her to go down for the scan
continue to monitor for s/s of bleeding, safety and fall education reinforced with patient
Patient educated on purpose and importance, patient verbalizes her frustration and refusing lab draw at this time but is agreeing to have it done later madi Rivers made aware
applied normal saline.

## 2018-08-09 NOTE — PROGRESS NOTE ADULT - PROBLEM SELECTOR PLAN 1
RHC from 2013 noted. 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   Responding well to milrinone and bumex gtt. Lost 5 lbs in 24 hrs.   Continue milrinone 0.25 mcg/kg/min and Bumex 0.5 mg/hr. Renal function stable.   K 3.3- Continue Krishna 50 mg po BID. Also getting potassium supplementation.  keep K 4-5, mag >2. Will order mag ox 400 mg po BID.   My hope is to wean her off both milrinone and bumex gtt this evening  SBP stable.   Agreed to do VQ scan today and RHC w/ NO challenge on Friday.  Continue Adempas. Cardizem was for pulm HTN (home meds) was held by primary cards team, although I do not see documentation of NO challenge in cath.   Pulm HTN would be best managed at [facility], but patient does not want to be transferred, despite lengthy conversations regarding her specific illness.  Would like patient to get RHC to evaluate pulm HTN while in patient, but patient is refusing.   Pt was counseled not to get pregnant.  Should follow up with Dr. Moncho Adamson as new pt and Dr. Unger on d/c.  Ordered BMP to check K this evening. Please follow up and supplement as needed.   Ordered lupus panel and YENNY.

## 2018-08-09 NOTE — PROGRESS NOTE ADULT - ATTENDING COMMENTS
meds: Milrinone 0.25, bumex .5/hr, Edampas, Ald 50 bid,   I/O: -2.3  aprox weight loss 80lbs. 336 to 248lbs.   109//70, 90s  08-09    133<L>  |  90<L>  |  6<L>  ----------------------------<  126<H>  3.3<L>   |  28  |  0.70    Ca    9.7      09 Aug 2018 10:39  Mg     1.9     08-09 meds: Milrinone 0.25, bumex .5/hr, Edampas, Ald 50 bid,   Patient says LE are at their baseline and she is not SOB.   I/O: -2.3  aprox weight loss 80lbs. 336 to 248lbs.   109//70, 90s  LE appear less swollen.   08-09    133<L>  |  90<L>  |  6<L>  ----------------------------<  126<H>  3.3<L>   |  28  |  0.70    Ca    9.7      09 Aug 2018 10:39  Mg     1.9     08-09  For RHC (with LVEDP if PCWP elevated) and Nitric oxide study.  D/c milrinone. PO K supplementation. PO diuretics.   Hemodynamics will dictate d/c planning.   Vinicio Phelps

## 2018-08-09 NOTE — PROGRESS NOTE ADULT - SUBJECTIVE AND OBJECTIVE BOX
Patient is a 33y old  Female who presents with a chief complaint of "I had chest pain and SOB" this morning (30 Jul 2018 15:11)      SUBJECTIVE / OVERNIGHT EVENTS: Pt without complaints, refused labs but will be willing to have them drawn this pm.    MEDICATIONS  (STANDING):  Adempas(riociguat) 1.5mg Tablet 1 Tablet(s) 1 Tablet(s) Oral every 8 hours  buDESOnide  80 MICROgram(s)/formoterol 4.5 MICROgram(s) Inhaler 2 Puff(s) Inhalation two times a day  buMETAnide Infusion 0.5 mG/Hr (5 mL/Hr) IV Continuous <Continuous>  influenza   Vaccine 0.5 milliLiter(s) IntraMuscular once  loratadine 10 milliGRAM(s) Oral daily  melatonin 6 milliGRAM(s) Oral at bedtime  milrinone Infusion 0.25 MICROgram(s)/kG/Min (8.932 mL/Hr) IV Continuous <Continuous>  sertraline 25 milliGRAM(s) Oral daily  spironolactone 50 milliGRAM(s) Oral two times a day    MEDICATIONS  (PRN):  acetaminophen   Tablet. 650 milliGRAM(s) Oral every 6 hours PRN Mild Pain (1 - 3)  ALBUTerol    90 MICROgram(s) HFA Inhaler 2 Puff(s) Inhalation every 6 hours PRN Shortness of Breath and/or Wheezing  benzocaine 15 mG/menthol 3.6 mG Lozenge 1 Lozenge Oral once PRN Sore Throat  calcium carbonate    500 mG (Tums) Chewable 1 Tablet(s) Chew three times a day PRN Heartburn      Vital Signs Last 24 Hrs  T(C): 36.9 (08-09-18 @ 06:08), Max: 36.9 (08-08-18 @ 13:28)  T(F): 98.4 (08-09-18 @ 06:08), Max: 98.5 (08-08-18 @ 13:28)  HR: 94 (08-09-18 @ 06:08) (92 - 99)  BP: 109/69 (08-09-18 @ 06:08) (109/69 - 118/74)  BP(mean): --  RR: 17 (08-09-18 @ 06:08) (16 - 17)  SpO2: 94% (08-09-18 @ 06:08) (93% - 96%)  CAPILLARY BLOOD GLUCOSE        I&O's Summary    08 Aug 2018 07:01  -  09 Aug 2018 07:00  --------------------------------------------------------  IN: 1764.8 mL / OUT: 4150 mL / NET: -2385.2 mL        PHYSICAL EXAM:  GENERAL: NAD, well-nourished  HEENT: mmm, elevated JVD  CHEST/LUNG: CTABL  HEART: RRR, no m/r/g  ABDOMEN: soft, nt, nd  EXTREMITIES:  wwp, +2 LE edema, LUE with superficial thrombophlebitis in antecubital space  PSYCH: AAOx3  NEUROLOGY: non-focal  SKIN: No rashes or lesions        LABS:                        14.0   6.02  )-----------( 310      ( 08 Aug 2018 06:30 )             42.4     08-08    133<L>  |  89<L>  |  6<L>  ----------------------------<  105<H>  3.2<L>   |  28  |  0.76    Ca    9.4      08 Aug 2018 06:30  Mg     1.8     08-08      PT/INR - ( 08 Aug 2018 06:30 )   PT: 27.9 SEC;   INR: 2.38                    RADIOLOGY & ADDITIONAL TESTS:    Imaging Personally Reviewed:    Consultant(s) Notes Reviewed:      Care Discussed with Consultants/Other Providers:

## 2018-08-10 VITALS
SYSTOLIC BLOOD PRESSURE: 126 MMHG | DIASTOLIC BLOOD PRESSURE: 75 MMHG | HEART RATE: 98 BPM | OXYGEN SATURATION: 97 % | RESPIRATION RATE: 16 BRPM

## 2018-08-10 LAB
BUN SERPL-MCNC: 9 MG/DL — SIGNIFICANT CHANGE UP (ref 7–23)
CALCIUM SERPL-MCNC: 9.6 MG/DL — SIGNIFICANT CHANGE UP (ref 8.4–10.5)
CHLORIDE SERPL-SCNC: 94 MMOL/L — LOW (ref 98–107)
CO2 SERPL-SCNC: 25 MMOL/L — SIGNIFICANT CHANGE UP (ref 22–31)
CREAT SERPL-MCNC: 0.81 MG/DL — SIGNIFICANT CHANGE UP (ref 0.5–1.3)
GLUCOSE SERPL-MCNC: 100 MG/DL — HIGH (ref 70–99)
HCT VFR BLD CALC: 39.6 % — SIGNIFICANT CHANGE UP (ref 34.5–45)
HGB BLD-MCNC: 13 G/DL — SIGNIFICANT CHANGE UP (ref 11.5–15.5)
MAGNESIUM SERPL-MCNC: 1.9 MG/DL — SIGNIFICANT CHANGE UP (ref 1.6–2.6)
MCHC RBC-ENTMCNC: 25.4 PG — LOW (ref 27–34)
MCHC RBC-ENTMCNC: 32.8 % — SIGNIFICANT CHANGE UP (ref 32–36)
MCV RBC AUTO: 77.5 FL — LOW (ref 80–100)
NRBC # FLD: 0 — SIGNIFICANT CHANGE UP
PLATELET # BLD AUTO: 349 K/UL — SIGNIFICANT CHANGE UP (ref 150–400)
PMV BLD: 11.1 FL — SIGNIFICANT CHANGE UP (ref 7–13)
POTASSIUM SERPL-MCNC: 3.5 MMOL/L — SIGNIFICANT CHANGE UP (ref 3.5–5.3)
POTASSIUM SERPL-SCNC: 3.5 MMOL/L — SIGNIFICANT CHANGE UP (ref 3.5–5.3)
RBC # BLD: 5.11 M/UL — SIGNIFICANT CHANGE UP (ref 3.8–5.2)
RBC # FLD: 21.9 % — HIGH (ref 10.3–14.5)
SODIUM SERPL-SCNC: 135 MMOL/L — SIGNIFICANT CHANGE UP (ref 135–145)
WBC # BLD: 5.3 K/UL — SIGNIFICANT CHANGE UP (ref 3.8–10.5)
WBC # FLD AUTO: 5.3 K/UL — SIGNIFICANT CHANGE UP (ref 3.8–10.5)

## 2018-08-10 PROCEDURE — 93451 RIGHT HEART CATH: CPT | Mod: 26

## 2018-08-10 PROCEDURE — 76937 US GUIDE VASCULAR ACCESS: CPT | Mod: 26

## 2018-08-10 RX ORDER — POLYETHYLENE GLYCOL 3350 17 G/17G
17 POWDER, FOR SOLUTION ORAL ONCE
Qty: 0 | Refills: 0 | Status: COMPLETED | OUTPATIENT
Start: 2018-08-10 | End: 2018-08-10

## 2018-08-10 RX ORDER — WARFARIN SODIUM 2.5 MG/1
1 TABLET ORAL
Qty: 0 | Refills: 0 | DISCHARGE
Start: 2018-08-10

## 2018-08-10 RX ORDER — SENNA PLUS 8.6 MG/1
1 TABLET ORAL ONCE
Qty: 0 | Refills: 0 | Status: DISCONTINUED | OUTPATIENT
Start: 2018-08-10 | End: 2018-08-10

## 2018-08-10 RX ORDER — BUMETANIDE 0.25 MG/ML
1 INJECTION INTRAMUSCULAR; INTRAVENOUS
Qty: 60 | Refills: 0
Start: 2018-08-10 | End: 2018-09-08

## 2018-08-10 RX ORDER — WARFARIN SODIUM 2.5 MG/1
2 TABLET ORAL ONCE
Qty: 0 | Refills: 0 | Status: COMPLETED | OUTPATIENT
Start: 2018-08-10 | End: 2018-08-10

## 2018-08-10 RX ORDER — POTASSIUM CHLORIDE 20 MEQ
40 PACKET (EA) ORAL EVERY 4 HOURS
Qty: 0 | Refills: 0 | Status: COMPLETED | OUTPATIENT
Start: 2018-08-10 | End: 2018-08-10

## 2018-08-10 RX ORDER — POTASSIUM CHLORIDE 20 MEQ
2 PACKET (EA) ORAL
Qty: 28 | Refills: 0
Start: 2018-08-10 | End: 2018-08-23

## 2018-08-10 RX ORDER — FUROSEMIDE 40 MG
1 TABLET ORAL
Qty: 0 | Refills: 0 | COMMUNITY

## 2018-08-10 RX ORDER — SPIRONOLACTONE 25 MG/1
2 TABLET, FILM COATED ORAL
Qty: 120 | Refills: 0
Start: 2018-08-10 | End: 2018-09-08

## 2018-08-10 RX ORDER — SENNA PLUS 8.6 MG/1
1 TABLET ORAL
Qty: 0 | Refills: 0 | DISCHARGE
Start: 2018-08-10

## 2018-08-10 RX ORDER — DOCUSATE SODIUM 100 MG
1 CAPSULE ORAL
Qty: 0 | Refills: 0 | DISCHARGE
Start: 2018-08-10

## 2018-08-10 RX ORDER — DILTIAZEM HCL 120 MG
1 CAPSULE, EXT RELEASE 24 HR ORAL
Qty: 0 | Refills: 0 | COMMUNITY

## 2018-08-10 RX ADMIN — Medication 40 MILLIEQUIVALENT(S): at 17:53

## 2018-08-10 RX ADMIN — LORATADINE 10 MILLIGRAM(S): 10 TABLET ORAL at 12:53

## 2018-08-10 RX ADMIN — SPIRONOLACTONE 50 MILLIGRAM(S): 25 TABLET, FILM COATED ORAL at 06:40

## 2018-08-10 RX ADMIN — Medication 40 MILLIEQUIVALENT(S): at 14:22

## 2018-08-10 RX ADMIN — SPIRONOLACTONE 50 MILLIGRAM(S): 25 TABLET, FILM COATED ORAL at 17:53

## 2018-08-10 RX ADMIN — WARFARIN SODIUM 2 MILLIGRAM(S): 2.5 TABLET ORAL at 17:53

## 2018-08-10 RX ADMIN — POLYETHYLENE GLYCOL 3350 17 GRAM(S): 17 POWDER, FOR SOLUTION ORAL at 14:23

## 2018-08-10 RX ADMIN — BUMETANIDE 2 MILLIGRAM(S): 0.25 INJECTION INTRAMUSCULAR; INTRAVENOUS at 17:53

## 2018-08-10 RX ADMIN — BUMETANIDE 2 MILLIGRAM(S): 0.25 INJECTION INTRAMUSCULAR; INTRAVENOUS at 06:40

## 2018-08-10 RX ADMIN — BUDESONIDE AND FORMOTEROL FUMARATE DIHYDRATE 2 PUFF(S): 160; 4.5 AEROSOL RESPIRATORY (INHALATION) at 09:15

## 2018-08-10 NOTE — PROGRESS NOTE ADULT - PROVIDER SPECIALTY LIST ADULT
Cardiology
Heart Failure
Hospitalist
Pulmonology
Hospitalist
Pulmonology
Heart Failure
Hospitalist

## 2018-08-10 NOTE — PROGRESS NOTE ADULT - PROBLEM SELECTOR PROBLEM 2
Right ventricular systolic dysfunction
Pulmonary HTN
Pulmonary HTN
Chest pain
Pulmonary HTN
Pulmonary hypertension
Right ventricular systolic dysfunction
Pulmonary HTN
Chest pain

## 2018-08-10 NOTE — PROGRESS NOTE ADULT - SUBJECTIVE AND OBJECTIVE BOX
Patient is a 33y old  Female who presents with a chief complaint of "I had chest pain and SOB" this morning (30 Jul 2018 15:11)      SUBJECTIVE / OVERNIGHT EVENTS: Pt reports constipation, otherwise without complaints. Would like d/c home.    MEDICATIONS  (STANDING):  Adempas(riociguat) 1.5mg Tablet 1 Tablet(s) 1 Tablet(s) Oral every 8 hours  buDESOnide  80 MICROgram(s)/formoterol 4.5 MICROgram(s) Inhaler 2 Puff(s) Inhalation two times a day  buMETAnide 2 milliGRAM(s) Oral two times a day  docusate sodium 100 milliGRAM(s) Oral daily  influenza   Vaccine 0.5 milliLiter(s) IntraMuscular once  loratadine 10 milliGRAM(s) Oral daily  melatonin 6 milliGRAM(s) Oral at bedtime  polyethylene glycol 3350 17 Gram(s) Oral once  senna 1 Tablet(s) Oral once  sertraline 25 milliGRAM(s) Oral daily  spironolactone 50 milliGRAM(s) Oral two times a day    MEDICATIONS  (PRN):  acetaminophen   Tablet. 650 milliGRAM(s) Oral every 6 hours PRN Mild Pain (1 - 3)  ALBUTerol    90 MICROgram(s) HFA Inhaler 2 Puff(s) Inhalation every 6 hours PRN Shortness of Breath and/or Wheezing  benzocaine 15 mG/menthol 3.6 mG Lozenge 1 Lozenge Oral once PRN Sore Throat  calcium carbonate    500 mG (Tums) Chewable 1 Tablet(s) Chew three times a day PRN Heartburn      Vital Signs Last 24 Hrs  T(C): 36.7 (08-10-18 @ 06:39), Max: 36.8 (08-09-18 @ 22:07)  T(F): 98 (08-10-18 @ 06:39), Max: 98.3 (08-09-18 @ 22:07)  HR: 88 (08-10-18 @ 06:39) (88 - 99)  BP: 116/78 (08-10-18 @ 06:39) (110/62 - 116/78)  BP(mean): --  RR: 17 (08-10-18 @ 06:39) (17 - 18)  SpO2: 95% (08-10-18 @ 06:39) (95% - 95%)  CAPILLARY BLOOD GLUCOSE        I&O's Summary    09 Aug 2018 07:01  -  10 Aug 2018 07:00  --------------------------------------------------------  IN: 580 mL / OUT: 0 mL / NET: 580 mL    10 Aug 2018 07:01  -  10 Aug 2018 13:40  --------------------------------------------------------  IN: 0 mL / OUT: 400 mL / NET: -400 mL        PHYSICAL EXAM:  GENERAL: NAD, well-nourished  HEENT: mmm, elevated JVD  CHEST/LUNG: CTABL  HEART: RRR, no m/r/g  ABDOMEN: soft, nt, nd  EXTREMITIES:  wwp, +2 LE edema improved, LUE with superficial thrombophlebitis in antecubital space  PSYCH: AAOx3  NEUROLOGY: non-focal  SKIN: No rashes or lesions  LABS:                        13.0   5.30  )-----------( 349      ( 10 Aug 2018 11:37 )             39.6     08-10    135  |  94<L>  |  9   ----------------------------<  100<H>  3.5   |  25  |  0.81    Ca    9.6      10 Aug 2018 11:37  Mg     1.9     08-10      PT/INR - ( 09 Aug 2018 10:39 )   PT: 25.1 SEC;   INR: 2.15                    RADIOLOGY & ADDITIONAL TESTS:    Imaging Personally Reviewed:    Consultant(s) Notes Reviewed:      Care Discussed with Consultants/Other Providers:

## 2018-08-10 NOTE — PROGRESS NOTE ADULT - PROBLEM SELECTOR PLAN 1
RHC from 2013 noted. 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   Await RHC results.   K 3.5- Continue Krishna 50 mg po BID. Also getting potassium supplementation.  keep K 4-5, mag >2. Will order mag ox 400 mg po BID.   Agreed to do VQ scan today and RHC w/ NO challenge on Friday.  Continue Adempas. Cardizem was for pulm HTN (home meds) was held by primary cards team, although I do not see documentation of NO challenge in cath.   Pulm HTN would be best managed at [facility], but patient does not want to be transferred, despite lengthy conversations regarding her specific illness.  Would like patient to get RHC to evaluate pulm HTN while in patient, but patient is refusing.   Pt was counseled not to get pregnant.  Should follow up with Dr. Moncho Adamson as new pt and Dr. Unger on d/c.  Ordered BMP to check K this evening. Please follow up and supplement as needed.   Ordered lupus panel and YENNY.

## 2018-08-10 NOTE — PROGRESS NOTE ADULT - ATTENDING COMMENTS
s/p RHC:   , /77 108, Ao 96,   RA 12, /18,  /43 67,     PCWP 7, PA sat 54, Matilda 4.0/1.8, PVR 15.o response to nitric.  meds: bumex 2 bid, ald 50 bid, edempas tid,   I/O: 11/6/80    08-10    135  |  94<L>  |  9   ----------------------------<  100<H>  3.5   |  25  |  0.81    Ca    9.6      10 Aug 2018 11:37  Mg     1.9     08-10                          13.0   5.30  )-----------( 349      ( 10 Aug 2018 11:37 )             39.6   Hemodynamics reviewed.  Will make Dr Unger aware, patient likely needs augmented pulmonary hypertensive therapy.   Could be discharged on K supplements.   Labs monday. F/u HF center one week.  Vinicio Phelps

## 2018-08-10 NOTE — PROGRESS NOTE ADULT - ASSESSMENT
32 y/o pleasant  female w/ PMHX of obesity, severe pulmonary hypertension w/ RV systolic failure and asthma comes in with complaints of worsening SOB and LE x 2 weeks. She was first told she had pulmonary hypertension and heart failure in 2006 at an OSH in [place] but tells me that she was never offered treatment. She was then re-hospitilzied in 2013 at [facility] and had a LHC- which showed normal coronaries, and RHC which showed that she had severe pulmonary hypertension w/ low normal wedge. Since then she has been followed by Dr. Unger and Dr. Haines. She states she is not able to work or go to school b/c of frequent LE edema, fatigue and inability to function normally.     She was recently admitted for SOB and was found to have parainfluenza 3, and was told to increase her PO fluids. She was discharged after 3 days, and for a while felt good, but then started developing FERRO. Currently she can only take about 10 steps before she gets SOB, and has frequent PND that wakes her up. No SOB at rest, orthopnea, CP, palpitations, dizziness.       Appears euvolemic.

## 2018-08-10 NOTE — PROGRESS NOTE ADULT - PROBLEM SELECTOR PROBLEM 1
Chest pain
Chest pain
Acute on chronic right heart failure
Acute right-sided heart failure
Chest pain
Pulmonary hypertension
Acute right-sided heart failure

## 2018-08-10 NOTE — PROGRESS NOTE ADULT - ASSESSMENT
Pt is a 32 yo female with mordbid obesity, severe pHTN and RHF 2/2 unclear pulm disease, a/w acute decomp HF

## 2018-08-10 NOTE — PROGRESS NOTE ADULT - SUBJECTIVE AND OBJECTIVE BOX
Patient seen and examined prior to cath. No acute SOB, orthopnea, PND, CP, palpitations.     Medications:  acetaminophen   Tablet. 650 milliGRAM(s) Oral every 6 hours PRN  Adempas(riociguat) 1.5mg Tablet 1 Tablet(s) 1 Tablet(s) Oral every 8 hours  ALBUTerol    90 MICROgram(s) HFA Inhaler 2 Puff(s) Inhalation every 6 hours PRN  benzocaine 15 mG/menthol 3.6 mG Lozenge 1 Lozenge Oral once PRN  buDESOnide  80 MICROgram(s)/formoterol 4.5 MICROgram(s) Inhaler 2 Puff(s) Inhalation two times a day  buMETAnide 2 milliGRAM(s) Oral two times a day  calcium carbonate    500 mG (Tums) Chewable 1 Tablet(s) Chew three times a day PRN  docusate sodium 100 milliGRAM(s) Oral daily  influenza   Vaccine 0.5 milliLiter(s) IntraMuscular once  loratadine 10 milliGRAM(s) Oral daily  melatonin 6 milliGRAM(s) Oral at bedtime  polyethylene glycol 3350 17 Gram(s) Oral once  senna 1 Tablet(s) Oral once  sertraline 25 milliGRAM(s) Oral daily  spironolactone 50 milliGRAM(s) Oral two times a day      Vitals:  Vital Signs Last 24 Hrs  T(C): 36.7 (10 Aug 2018 06:39), Max: 36.8 (09 Aug 2018 22:07)  T(F): 98 (10 Aug 2018 06:39), Max: 98.3 (09 Aug 2018 22:07)  HR: 88 (10 Aug 2018 06:39) (88 - 99)  BP: 116/78 (10 Aug 2018 06:39) (110/62 - 116/78)  BP(mean): --  RR: 17 (10 Aug 2018 06:39) (17 - 18)  SpO2: 95% (10 Aug 2018 06:39) (95% - 95%)    Daily     Daily     I&O's Detail    09 Aug 2018 07:01  -  10 Aug 2018 07:00  --------------------------------------------------------  IN:    Oral Fluid: 580 mL  Total IN: 580 mL    OUT:  Total OUT: 0 mL    Total NET: 580 mL      10 Aug 2018 07:01  -  10 Aug 2018 13:41  --------------------------------------------------------  IN:  Total IN: 0 mL    OUT:    Voided: 400 mL  Total OUT: 400 mL    Total NET: -400 mL          Physical Exam:     General: No distress. Comfortable.  HEENT: EOM intact.  Neck: Neck supple. JVP not elevated. No masses  Chest: Clear to auscultation bilaterally  CV: Normal S1 and S2. No murmurs, rub, or gallops. Radial pulses normal. No LE edema b/l.   Abdomen: Soft, non-distended, non-tender  Skin: No rashes or skin breakdown  Neurology: Alert and oriented times three. Sensation intact  Psych: Affect normal    Labs:                        13.0   5.30  )-----------( 349      ( 10 Aug 2018 11:37 )             39.6     08-10    135  |  94<L>  |  9   ----------------------------<  100<H>  3.5   |  25  |  0.81    Ca    9.6      10 Aug 2018 11:37  Mg     1.9     08-10      PT/INR - ( 09 Aug 2018 10:39 )   PT: 25.1 SEC;   INR: 2.15       < from: Transthoracic Echocardiogram (08.06.18 @ 12:33) >  1. Severe right atrial enlargement.  2. Right ventricular enlargement with decreased right  ventricular systolic function.  3. Estimated right ventricular systolic pressure equals 93  mm Hg, assuming right atrial pressure equals 10 mm Hg,  consistent with severe pulmonary hypertension.    < end of copied text >

## 2018-08-13 ENCOUNTER — APPOINTMENT (OUTPATIENT)
Dept: PULMONOLOGY | Facility: CLINIC | Age: 33
End: 2018-08-13

## 2018-08-13 ENCOUNTER — INBOUND DOCUMENT (OUTPATIENT)
Age: 33
End: 2018-08-13

## 2018-08-15 ENCOUNTER — APPOINTMENT (OUTPATIENT)
Dept: INTERNAL MEDICINE | Facility: CLINIC | Age: 33
End: 2018-08-15
Payer: MEDICAID

## 2018-08-15 VITALS
BODY MASS INDEX: 38.69 KG/M2 | WEIGHT: 262 LBS | RESPIRATION RATE: 16 BRPM | OXYGEN SATURATION: 95 % | HEART RATE: 88 BPM

## 2018-08-15 LAB
INR PPP: 2.5 RATIO
POCT-PROTHROMBIN TIME: 29.5 SECS
QUALITY CONTROL: YES

## 2018-08-15 PROCEDURE — 93793 ANTICOAG MGMT PT WARFARIN: CPT

## 2018-08-15 PROCEDURE — 85610 PROTHROMBIN TIME: CPT | Mod: QW

## 2018-08-21 ENCOUNTER — INBOUND DOCUMENT (OUTPATIENT)
Age: 33
End: 2018-08-21

## 2018-08-21 ENCOUNTER — APPOINTMENT (OUTPATIENT)
Dept: INTERNAL MEDICINE | Facility: CLINIC | Age: 33
End: 2018-08-21
Payer: MEDICAID

## 2018-08-21 VITALS — RESPIRATION RATE: 15 BRPM | OXYGEN SATURATION: 92 % | HEART RATE: 78 BPM

## 2018-08-21 LAB
INR PPP: 2.5 RATIO
POCT-PROTHROMBIN TIME: 29.4 SECS
QUALITY CONTROL: YES

## 2018-08-21 PROCEDURE — 93793 ANTICOAG MGMT PT WARFARIN: CPT

## 2018-08-21 PROCEDURE — 85610 PROTHROMBIN TIME: CPT | Mod: QW

## 2018-08-28 ENCOUNTER — APPOINTMENT (OUTPATIENT)
Dept: INTERNAL MEDICINE | Facility: CLINIC | Age: 33
End: 2018-08-28
Payer: MEDICAID

## 2018-08-28 VITALS — OXYGEN SATURATION: 96 % | RESPIRATION RATE: 17 BRPM | HEART RATE: 82 BPM

## 2018-08-28 LAB
INR PPP: 1.5 RATIO
POCT-PROTHROMBIN TIME: 17.9 SECS
QUALITY CONTROL: YES

## 2018-08-28 PROCEDURE — 85610 PROTHROMBIN TIME: CPT | Mod: QW

## 2018-08-28 PROCEDURE — 99211 OFF/OP EST MAY X REQ PHY/QHP: CPT | Mod: 25

## 2018-08-28 PROCEDURE — 93793 ANTICOAG MGMT PT WARFARIN: CPT

## 2018-09-05 ENCOUNTER — APPOINTMENT (OUTPATIENT)
Dept: CARDIOLOGY | Facility: CLINIC | Age: 33
End: 2018-09-05

## 2018-09-05 ENCOUNTER — APPOINTMENT (OUTPATIENT)
Dept: INTERNAL MEDICINE | Facility: CLINIC | Age: 33
End: 2018-09-05
Payer: MEDICAID

## 2018-09-05 VITALS — RESPIRATION RATE: 18 BRPM | HEART RATE: 89 BPM | OXYGEN SATURATION: 99 %

## 2018-09-05 PROCEDURE — 85610 PROTHROMBIN TIME: CPT | Mod: QW

## 2018-09-05 PROCEDURE — 93793 ANTICOAG MGMT PT WARFARIN: CPT

## 2018-09-10 LAB
INR PPP: 2 RATIO
POCT-PROTHROMBIN TIME: 23.5 SECS
QUALITY CONTROL: YES

## 2018-09-11 ENCOUNTER — APPOINTMENT (OUTPATIENT)
Dept: INTERNAL MEDICINE | Facility: CLINIC | Age: 33
End: 2018-09-11
Payer: MEDICAID

## 2018-09-11 VITALS — OXYGEN SATURATION: 100 % | HEART RATE: 80 BPM | RESPIRATION RATE: 18 BRPM

## 2018-09-11 LAB
INR PPP: 2.5 RATIO
POCT-PROTHROMBIN TIME: 30.2 SECS
QUALITY CONTROL: YES

## 2018-09-11 PROCEDURE — 85610 PROTHROMBIN TIME: CPT | Mod: QW

## 2018-09-11 PROCEDURE — 99211 OFF/OP EST MAY X REQ PHY/QHP: CPT | Mod: 25

## 2018-09-11 PROCEDURE — 93793 ANTICOAG MGMT PT WARFARIN: CPT

## 2018-09-17 ENCOUNTER — APPOINTMENT (OUTPATIENT)
Dept: PULMONOLOGY | Facility: CLINIC | Age: 33
End: 2018-09-17

## 2018-09-18 ENCOUNTER — OTHER (OUTPATIENT)
Age: 33
End: 2018-09-18

## 2018-09-19 ENCOUNTER — MEDICATION RENEWAL (OUTPATIENT)
Age: 33
End: 2018-09-19

## 2018-09-19 ENCOUNTER — RESULT REVIEW (OUTPATIENT)
Age: 33
End: 2018-09-19

## 2018-09-25 ENCOUNTER — APPOINTMENT (OUTPATIENT)
Dept: INTERNAL MEDICINE | Facility: CLINIC | Age: 33
End: 2018-09-25
Payer: MEDICAID

## 2018-09-25 VITALS — RESPIRATION RATE: 16 BRPM | OXYGEN SATURATION: 99 % | HEART RATE: 78 BPM

## 2018-09-25 LAB
INR PPP: 1.4 RATIO
POCT-PROTHROMBIN TIME: 17.3 SECS
QUALITY CONTROL: YES

## 2018-09-25 PROCEDURE — 85610 PROTHROMBIN TIME: CPT | Mod: QW

## 2018-09-25 PROCEDURE — 93793 ANTICOAG MGMT PT WARFARIN: CPT

## 2018-10-02 ENCOUNTER — APPOINTMENT (OUTPATIENT)
Dept: INTERNAL MEDICINE | Facility: CLINIC | Age: 33
End: 2018-10-02
Payer: MEDICAID

## 2018-10-02 VITALS — OXYGEN SATURATION: 99 % | HEART RATE: 66 BPM | RESPIRATION RATE: 17 BRPM

## 2018-10-02 LAB
INR PPP: 1.4 RATIO
INR PPP: 1.57 RATIO
POCT-PROTHROMBIN TIME: 16.9 SECS
PT BLD: 17.9 SEC
QUALITY CONTROL: YES

## 2018-10-02 PROCEDURE — 85610 PROTHROMBIN TIME: CPT | Mod: QW

## 2018-10-02 PROCEDURE — 93793 ANTICOAG MGMT PT WARFARIN: CPT

## 2018-10-09 ENCOUNTER — APPOINTMENT (OUTPATIENT)
Dept: CARDIOLOGY | Facility: CLINIC | Age: 33
End: 2018-10-09

## 2018-10-16 ENCOUNTER — APPOINTMENT (OUTPATIENT)
Dept: CARDIOLOGY | Facility: CLINIC | Age: 33
End: 2018-10-16

## 2018-10-17 ENCOUNTER — APPOINTMENT (OUTPATIENT)
Dept: INTERNAL MEDICINE | Facility: CLINIC | Age: 33
End: 2018-10-17

## 2018-10-22 ENCOUNTER — APPOINTMENT (OUTPATIENT)
Dept: PULMONOLOGY | Facility: CLINIC | Age: 33
End: 2018-10-22

## 2018-10-31 ENCOUNTER — APPOINTMENT (OUTPATIENT)
Dept: INTERNAL MEDICINE | Facility: CLINIC | Age: 33
End: 2018-10-31
Payer: MEDICAID

## 2018-10-31 VITALS — HEART RATE: 68 BPM | RESPIRATION RATE: 16 BRPM | OXYGEN SATURATION: 99 %

## 2018-10-31 LAB
INR PPP: 1.3 RATIO
POCT-PROTHROMBIN TIME: 15.5 SECS
QUALITY CONTROL: YES

## 2018-10-31 PROCEDURE — 85610 PROTHROMBIN TIME: CPT | Mod: QW

## 2018-10-31 PROCEDURE — 93793 ANTICOAG MGMT PT WARFARIN: CPT

## 2018-11-03 LAB
INR PPP: 1.32 RATIO
PT BLD: 15.2 SEC

## 2018-11-07 ENCOUNTER — APPOINTMENT (OUTPATIENT)
Dept: INTERNAL MEDICINE | Facility: CLINIC | Age: 33
End: 2018-11-07
Payer: MEDICAID

## 2018-11-07 ENCOUNTER — MEDICATION RENEWAL (OUTPATIENT)
Age: 33
End: 2018-11-07

## 2018-11-07 VITALS — RESPIRATION RATE: 17 BRPM | HEART RATE: 78 BPM | OXYGEN SATURATION: 99 %

## 2018-11-07 LAB
INR PPP: 2 RATIO
POCT-PROTHROMBIN TIME: 23.5 SECS
QUALITY CONTROL: YES

## 2018-11-07 PROCEDURE — 85610 PROTHROMBIN TIME: CPT | Mod: QW

## 2018-11-07 PROCEDURE — 93793 ANTICOAG MGMT PT WARFARIN: CPT

## 2018-11-13 ENCOUNTER — APPOINTMENT (OUTPATIENT)
Dept: INTERNAL MEDICINE | Facility: CLINIC | Age: 33
End: 2018-11-13
Payer: MEDICAID

## 2018-11-13 VITALS — OXYGEN SATURATION: 99 % | HEART RATE: 77 BPM | RESPIRATION RATE: 15 BRPM

## 2018-11-13 LAB
INR PPP: 1.5 RATIO
POCT-PROTHROMBIN TIME: 17.8 SECS
QUALITY CONTROL: YES

## 2018-11-13 PROCEDURE — 85610 PROTHROMBIN TIME: CPT | Mod: QW

## 2018-11-13 PROCEDURE — 93793 ANTICOAG MGMT PT WARFARIN: CPT

## 2018-11-20 ENCOUNTER — APPOINTMENT (OUTPATIENT)
Dept: INTERNAL MEDICINE | Facility: CLINIC | Age: 33
End: 2018-11-20
Payer: MEDICAID

## 2018-11-20 VITALS — RESPIRATION RATE: 17 BRPM | HEART RATE: 66 BPM | OXYGEN SATURATION: 99 %

## 2018-11-20 LAB
INR PPP: 1.3 RATIO
POCT-PROTHROMBIN TIME: 15.4 SECS
QUALITY CONTROL: YES

## 2018-11-20 PROCEDURE — 93793 ANTICOAG MGMT PT WARFARIN: CPT

## 2018-11-20 PROCEDURE — 85610 PROTHROMBIN TIME: CPT | Mod: QW

## 2018-11-27 ENCOUNTER — RESULT CHARGE (OUTPATIENT)
Age: 33
End: 2018-11-27

## 2018-11-27 ENCOUNTER — APPOINTMENT (OUTPATIENT)
Dept: INTERNAL MEDICINE | Facility: CLINIC | Age: 33
End: 2018-11-27
Payer: MEDICAID

## 2018-11-27 VITALS — OXYGEN SATURATION: 99 % | RESPIRATION RATE: 16 BRPM | HEART RATE: 69 BPM

## 2018-11-27 LAB
INR PPP: 2.6 RATIO
POCT-PROTHROMBIN TIME: 31.5 SECS
QUALITY CONTROL: YES

## 2018-11-27 PROCEDURE — 93793 ANTICOAG MGMT PT WARFARIN: CPT

## 2018-11-27 PROCEDURE — 85610 PROTHROMBIN TIME: CPT | Mod: QW

## 2018-11-28 ENCOUNTER — RX RENEWAL (OUTPATIENT)
Age: 33
End: 2018-11-28

## 2018-12-04 ENCOUNTER — APPOINTMENT (OUTPATIENT)
Dept: INTERNAL MEDICINE | Facility: CLINIC | Age: 33
End: 2018-12-04
Payer: MEDICAID

## 2018-12-04 VITALS — RESPIRATION RATE: 16 BRPM | OXYGEN SATURATION: 99 % | HEART RATE: 66 BPM

## 2018-12-04 LAB
INR PPP: 1.6 RATIO
POCT-PROTHROMBIN TIME: 19.6 SECS
QUALITY CONTROL: YES

## 2018-12-04 PROCEDURE — 93793 ANTICOAG MGMT PT WARFARIN: CPT

## 2018-12-04 PROCEDURE — 85610 PROTHROMBIN TIME: CPT | Mod: QW

## 2018-12-12 ENCOUNTER — APPOINTMENT (OUTPATIENT)
Dept: INTERNAL MEDICINE | Facility: CLINIC | Age: 33
End: 2018-12-12
Payer: MEDICAID

## 2018-12-12 VITALS — HEART RATE: 66 BPM | RESPIRATION RATE: 17 BRPM | OXYGEN SATURATION: 99 %

## 2018-12-12 LAB
INR PPP: 1.5 RATIO
POCT-PROTHROMBIN TIME: 17.9 SECS
QUALITY CONTROL: YES

## 2018-12-12 PROCEDURE — 85610 PROTHROMBIN TIME: CPT | Mod: QW

## 2018-12-12 PROCEDURE — 93793 ANTICOAG MGMT PT WARFARIN: CPT

## 2018-12-19 ENCOUNTER — APPOINTMENT (OUTPATIENT)
Dept: INTERNAL MEDICINE | Facility: CLINIC | Age: 33
End: 2018-12-19
Payer: MEDICAID

## 2018-12-19 VITALS — RESPIRATION RATE: 18 BRPM | OXYGEN SATURATION: 99 % | HEART RATE: 68 BPM

## 2018-12-19 LAB
INR PPP: 1.6 RATIO
POCT-PROTHROMBIN TIME: 19.8 SECS
QUALITY CONTROL: YES

## 2018-12-19 PROCEDURE — 93793 ANTICOAG MGMT PT WARFARIN: CPT

## 2018-12-19 PROCEDURE — 85610 PROTHROMBIN TIME: CPT | Mod: QW

## 2018-12-24 ENCOUNTER — APPOINTMENT (OUTPATIENT)
Dept: INTERNAL MEDICINE | Facility: CLINIC | Age: 33
End: 2018-12-24
Payer: MEDICAID

## 2018-12-24 VITALS — RESPIRATION RATE: 18 BRPM | HEART RATE: 67 BPM | OXYGEN SATURATION: 99 %

## 2018-12-24 LAB
INR PPP: 4.7 RATIO
POCT-PROTHROMBIN TIME: 56.9 SECS
QUALITY CONTROL: YES

## 2018-12-24 PROCEDURE — 85610 PROTHROMBIN TIME: CPT | Mod: QW

## 2018-12-24 PROCEDURE — 93793 ANTICOAG MGMT PT WARFARIN: CPT

## 2018-12-27 ENCOUNTER — RX RENEWAL (OUTPATIENT)
Age: 33
End: 2018-12-27

## 2019-01-02 ENCOUNTER — NON-APPOINTMENT (OUTPATIENT)
Age: 34
End: 2019-01-02

## 2019-01-02 LAB
INR PPP: 5.08 RATIO
PT BLD: 59.8 SEC

## 2019-01-04 ENCOUNTER — LABORATORY RESULT (OUTPATIENT)
Age: 34
End: 2019-01-04

## 2019-01-06 ENCOUNTER — NON-APPOINTMENT (OUTPATIENT)
Age: 34
End: 2019-01-06

## 2019-01-08 ENCOUNTER — APPOINTMENT (OUTPATIENT)
Dept: INTERNAL MEDICINE | Facility: CLINIC | Age: 34
End: 2019-01-08
Payer: MEDICAID

## 2019-01-08 VITALS — HEART RATE: 66 BPM | RESPIRATION RATE: 16 BRPM | OXYGEN SATURATION: 99 %

## 2019-01-08 LAB
INR PPP: 2 RATIO
POCT-PROTHROMBIN TIME: 23.6 SECS
QUALITY CONTROL: YES

## 2019-01-08 PROCEDURE — 85610 PROTHROMBIN TIME: CPT | Mod: QW

## 2019-01-08 PROCEDURE — 93793 ANTICOAG MGMT PT WARFARIN: CPT

## 2019-01-15 ENCOUNTER — APPOINTMENT (OUTPATIENT)
Dept: INTERNAL MEDICINE | Facility: CLINIC | Age: 34
End: 2019-01-15
Payer: MEDICAID

## 2019-01-15 VITALS — RESPIRATION RATE: 17 BRPM | HEART RATE: 78 BPM | OXYGEN SATURATION: 99 %

## 2019-01-15 LAB
INR PPP: 1.4 RATIO
POCT-PROTHROMBIN TIME: 17.2 SECS
QUALITY CONTROL: YES

## 2019-01-15 PROCEDURE — 85610 PROTHROMBIN TIME: CPT | Mod: QW

## 2019-01-15 PROCEDURE — 93793 ANTICOAG MGMT PT WARFARIN: CPT

## 2019-01-23 ENCOUNTER — APPOINTMENT (OUTPATIENT)
Dept: INTERNAL MEDICINE | Facility: CLINIC | Age: 34
End: 2019-01-23
Payer: MEDICAID

## 2019-01-23 VITALS — HEART RATE: 76 BPM | OXYGEN SATURATION: 99 % | RESPIRATION RATE: 18 BRPM

## 2019-01-23 LAB
INR PPP: 1.9 RATIO
POCT-PROTHROMBIN TIME: 22.3 SECS

## 2019-01-23 PROCEDURE — 85610 PROTHROMBIN TIME: CPT | Mod: QW

## 2019-01-23 PROCEDURE — 93793 ANTICOAG MGMT PT WARFARIN: CPT

## 2019-01-29 ENCOUNTER — LABORATORY RESULT (OUTPATIENT)
Age: 34
End: 2019-01-29

## 2019-01-29 ENCOUNTER — APPOINTMENT (OUTPATIENT)
Dept: INTERNAL MEDICINE | Facility: CLINIC | Age: 34
End: 2019-01-29
Payer: MEDICAID

## 2019-01-29 VITALS — RESPIRATION RATE: 16 BRPM | OXYGEN SATURATION: 99 % | HEART RATE: 77 BPM

## 2019-01-29 LAB
INR PPP: 1.5 RATIO
POCT-PROTHROMBIN TIME: 18.3 SECS
QUALITY CONTROL: YES

## 2019-01-29 PROCEDURE — 85610 PROTHROMBIN TIME: CPT | Mod: QW

## 2019-01-29 PROCEDURE — 93793 ANTICOAG MGMT PT WARFARIN: CPT

## 2019-01-31 ENCOUNTER — RESULT CHARGE (OUTPATIENT)
Age: 34
End: 2019-01-31

## 2019-02-04 ENCOUNTER — APPOINTMENT (OUTPATIENT)
Dept: INTERNAL MEDICINE | Facility: CLINIC | Age: 34
End: 2019-02-04
Payer: MEDICAID

## 2019-02-04 VITALS — RESPIRATION RATE: 16 BRPM | OXYGEN SATURATION: 99 % | HEART RATE: 67 BPM

## 2019-02-04 LAB
INR PPP: 2.5 RATIO
POCT-PROTHROMBIN TIME: 29.6 SECS
QUALITY CONTROL: YES

## 2019-02-04 PROCEDURE — 93793 ANTICOAG MGMT PT WARFARIN: CPT

## 2019-02-04 PROCEDURE — 85610 PROTHROMBIN TIME: CPT | Mod: QW

## 2019-02-06 ENCOUNTER — RESULT CHARGE (OUTPATIENT)
Age: 34
End: 2019-02-06

## 2019-02-08 ENCOUNTER — LABORATORY RESULT (OUTPATIENT)
Age: 34
End: 2019-02-08

## 2019-02-11 ENCOUNTER — NON-APPOINTMENT (OUTPATIENT)
Age: 34
End: 2019-02-11

## 2019-02-12 ENCOUNTER — APPOINTMENT (OUTPATIENT)
Dept: INTERNAL MEDICINE | Facility: CLINIC | Age: 34
End: 2019-02-12
Payer: MEDICAID

## 2019-02-12 VITALS — OXYGEN SATURATION: 99 % | RESPIRATION RATE: 17 BRPM | HEART RATE: 66 BPM

## 2019-02-12 VITALS — RESPIRATION RATE: 17 BRPM | OXYGEN SATURATION: 99 % | HEART RATE: 72 BPM

## 2019-02-12 LAB
INR PPP: 3.6 RATIO
POCT-PROTHROMBIN TIME: 43.3 SECS
QUALITY CONTROL: YES

## 2019-02-12 PROCEDURE — 85610 PROTHROMBIN TIME: CPT | Mod: QW

## 2019-02-12 PROCEDURE — 93793 ANTICOAG MGMT PT WARFARIN: CPT

## 2019-02-15 ENCOUNTER — APPOINTMENT (OUTPATIENT)
Dept: INTERNAL MEDICINE | Facility: CLINIC | Age: 34
End: 2019-02-15
Payer: MEDICAID

## 2019-02-15 VITALS — OXYGEN SATURATION: 99 % | HEART RATE: 66 BPM | RESPIRATION RATE: 18 BRPM

## 2019-02-15 LAB
INR PPP: 2 RATIO
POCT-PROTHROMBIN TIME: 22.1 SECS
QUALITY CONTROL: YES

## 2019-02-15 PROCEDURE — 85610 PROTHROMBIN TIME: CPT | Mod: QW

## 2019-02-15 PROCEDURE — 93793 ANTICOAG MGMT PT WARFARIN: CPT

## 2019-02-18 NOTE — REVIEW OF SYSTEMS
[Fatigue] : fatigue [Diabetes] : diabetes mellitus [As Noted in HPI] : as noted in HPI [Difficulty Maintaining Sleep] : difficulty maintaining sleep [Snoring] : snoring [Negative] : Psychiatric

## 2019-02-18 NOTE — CONSULT LETTER
[Dear  ___] : Dear  [unfilled], [Courtesy Letter:] : I had the pleasure of seeing your patient, [unfilled], in my office today. [Sincerely,] : Sincerely, [DrElma  ___] : Dr. COOPER [Francy Unger MD, FCCP] : Francy Unger MD, FCCP [Director, Pulmonary Hypertension Program] : Director, Pulmonary Hypertension Program [[facility]] : [facility] [Division of Pulmonary, Critical Care and Sleep Medicine] : Division of Pulmonary, Critical Care and Sleep Medicine [Associate Professor of Medicine] : Associate Professor of Medicine

## 2019-02-19 ENCOUNTER — APPOINTMENT (OUTPATIENT)
Dept: INTERNAL MEDICINE | Facility: CLINIC | Age: 34
End: 2019-02-19
Payer: MEDICAID

## 2019-02-19 VITALS — HEART RATE: 67 BPM | RESPIRATION RATE: 16 BRPM | OXYGEN SATURATION: 99 %

## 2019-02-19 LAB
INR PPP: 1.6 RATIO
POCT-PROTHROMBIN TIME: 19.3 SECS
QUALITY CONTROL: YES

## 2019-02-19 PROCEDURE — 93793 ANTICOAG MGMT PT WARFARIN: CPT

## 2019-02-19 PROCEDURE — 85610 PROTHROMBIN TIME: CPT | Mod: QW

## 2019-02-21 ENCOUNTER — APPOINTMENT (OUTPATIENT)
Dept: PULMONOLOGY | Facility: CLINIC | Age: 34
End: 2019-02-21
Payer: MEDICAID

## 2019-02-21 VITALS
RESPIRATION RATE: 15 BRPM | HEART RATE: 91 BPM | SYSTOLIC BLOOD PRESSURE: 107 MMHG | WEIGHT: 266 LBS | DIASTOLIC BLOOD PRESSURE: 74 MMHG | OXYGEN SATURATION: 93 % | BODY MASS INDEX: 39.4 KG/M2 | HEIGHT: 69 IN | TEMPERATURE: 97.3 F

## 2019-02-21 PROCEDURE — 99215 OFFICE O/P EST HI 40 MIN: CPT | Mod: 25

## 2019-02-21 PROCEDURE — ZZZZZ: CPT

## 2019-02-21 PROCEDURE — 94618 PULMONARY STRESS TESTING: CPT

## 2019-02-21 NOTE — PHYSICAL EXAM
[General Appearance - Well Developed] : well developed [Normal Appearance] : normal appearance [Well Groomed] : well groomed [General Appearance - Well Nourished] : well nourished [No Deformities] : no deformities [General Appearance - In No Acute Distress] : no acute distress [Normal Conjunctiva] : the conjunctiva exhibited no abnormalities [Eyelids - No Xanthelasma] : the eyelids demonstrated no xanthelasmas [IV] : IV [Neck Appearance] : the appearance of the neck was normal [Neck Cervical Mass (___cm)] : no neck mass was observed [Jugular Venous Distention Increased] : there was no jugular-venous distention [Thyroid Diffuse Enlargement] : the thyroid was not enlarged [Thyroid Nodule] : there were no palpable thyroid nodules [Heart Rate And Rhythm] : heart rate and rhythm were normal [Heart Sounds] : normal S1 and S2 [Respiration, Rhythm And Depth] : normal respiratory rhythm and effort [Exaggerated Use Of Accessory Muscles For Inspiration] : no accessory muscle use [Auscultation Breath Sounds / Voice Sounds] : lungs were clear to auscultation bilaterally [Abdomen Soft] : soft [Abdomen Mass (___ Cm)] : no abdominal mass palpated [Abnormal Walk] : normal gait [Gait - Sufficient For Exercise Testing] : the gait was sufficient for exercise testing [Nail Clubbing] : no clubbing of the fingernails [Cyanosis, Localized] : no localized cyanosis [Petechial Hemorrhages (___cm)] : no petechial hemorrhages [Skin Color & Pigmentation] : normal skin color and pigmentation [] : no rash [No Venous Stasis] : no venous stasis [Skin Lesions] : no skin lesions [No Skin Ulcers] : no skin ulcer [No Xanthoma] : no  xanthoma was observed [Deep Tendon Reflexes (DTR)] : deep tendon reflexes were 2+ and symmetric [Sensation] : the sensory exam was normal to light touch and pinprick [No Focal Deficits] : no focal deficits [Oriented To Time, Place, And Person] : oriented to person, place, and time [Impaired Insight] : insight and judgment were intact [Affect] : the affect was normal [FreeTextEntry1] : no spine tenderness

## 2019-02-21 NOTE — HISTORY OF PRESENT ILLNESS
[FreeTextEntry1] : followup pulmonary hypertension.-----This letter  is regarding your patient  who  attended pulmonary out patient office today.  I have reviewed  patient's  past history, social history, family history and medication list. I also  reviewed nurse practitioners/ and fellows  notes and assessment and agree with it.  ------ patient is followed by Dr. García for over last few years for her pulmonary hypertension on ADAMPAS-------\par ------No history of , fever, chills , rigors, chestpain, or hemoptysis. Questionable history of Raynauds phenomenon. No h/o significant weight loss in last few months. No history of liver dysfunction ,  or chronic thromboembolic disease. I would classify her dyspnea as WHO  FUNCTIONAL CLASS II\par \par Tolerating riociguat dose. On Lasix 20 daily, sometimes increasing to twice a day if there is increased lower extremity edema. On Coumadin. All being followed by her primary care physician, Dr. Ricky Plata she reports\par \par Did not do a sleep study.\par ---ON COUMDIN  INR AS PER PT MONITORED BY DR Rowan FEB 2019---CLAIMS SHE IS TAKING HER MEDS off and on    , complaints of dyspnea and   leg swelling

## 2019-02-21 NOTE — DISCUSSION/SUMMARY
[FreeTextEntry1] : -----------------ASSESSMENT  AND PLAN -----patient has been referred here for further opinion regarding  pulmonary problem.--------. 33-year-old woman with pulmonary hypertension, poorly compliant generally.\par \par ----She does report that she is taking her riociguat, tolerating it well. ----- needs   to be compliant with her Lasix. She gained a few pounds, but clinically appears euvolemic.\par ---needs echo, CTA\par -----pft--contd symbicort\par ---needs  EXERCISE OXYMTERY, PFT\par \par TO RETURN IN ONE MONTH AFTER THE TESTS\par \par ------Really needs sleep study, still gets the excuse is that she doesn't have time to do it. I gave her the information again and advised her to please call. She has symptoms of excessive daytime somnolence, obstructive sleep apnea, and also need to check her sats well she is sleeping.\par \par Followup in 3 months, this time I would like her to followup with Dr. Unger

## 2019-02-24 ENCOUNTER — RX RENEWAL (OUTPATIENT)
Age: 34
End: 2019-02-24

## 2019-02-25 ENCOUNTER — RX RENEWAL (OUTPATIENT)
Age: 34
End: 2019-02-25

## 2019-02-25 ENCOUNTER — MEDICATION RENEWAL (OUTPATIENT)
Age: 34
End: 2019-02-25

## 2019-02-26 ENCOUNTER — CHART COPY (OUTPATIENT)
Age: 34
End: 2019-02-26

## 2019-02-26 ENCOUNTER — APPOINTMENT (OUTPATIENT)
Dept: INTERNAL MEDICINE | Facility: CLINIC | Age: 34
End: 2019-02-26
Payer: MEDICAID

## 2019-02-26 VITALS — RESPIRATION RATE: 16 BRPM | HEART RATE: 90 BPM | OXYGEN SATURATION: 94 %

## 2019-02-26 DIAGNOSIS — E03.9 HYPOTHYROIDISM, UNSPECIFIED: ICD-10-CM

## 2019-02-26 LAB
ALBUMIN SERPL ELPH-MCNC: 4 G/DL
ALP BLD-CCNC: 91 U/L
ALT SERPL-CCNC: 15 U/L
ANION GAP SERPL CALC-SCNC: 15 MMOL/L
AST SERPL-CCNC: 27 U/L
BASOPHILS # BLD AUTO: 0.06 K/UL
BASOPHILS NFR BLD AUTO: 1.3 %
BILIRUB SERPL-MCNC: 2.8 MG/DL
BUN SERPL-MCNC: 14 MG/DL
CALCIUM SERPL-MCNC: 10 MG/DL
CHLORIDE SERPL-SCNC: 100 MMOL/L
CO2 SERPL-SCNC: 27 MMOL/L
CREAT SERPL-MCNC: 1.09 MG/DL
EOSINOPHIL # BLD AUTO: 0.07 K/UL
EOSINOPHIL NFR BLD AUTO: 1.6 %
GLUCOSE SERPL-MCNC: 90 MG/DL
HCT VFR BLD CALC: 41.2 %
HGB BLD-MCNC: 12.2 G/DL
IMM GRANULOCYTES NFR BLD AUTO: 0.2 %
INR PPP: 1.5 RATIO
LYMPHOCYTES # BLD AUTO: 1.76 K/UL
LYMPHOCYTES NFR BLD AUTO: 39.4 %
MAN DIFF?: NORMAL
MCHC RBC-ENTMCNC: 23.5 PG
MCHC RBC-ENTMCNC: 29.6 GM/DL
MCV RBC AUTO: 79.2 FL
MONOCYTES # BLD AUTO: 0.49 K/UL
MONOCYTES NFR BLD AUTO: 11 %
NEUTROPHILS # BLD AUTO: 2.08 K/UL
NEUTROPHILS NFR BLD AUTO: 46.5 %
NT-PROBNP SERPL-MCNC: 1476 PG/ML
PLATELET # BLD AUTO: 319 K/UL
POCT-PROTHROMBIN TIME: 18.1 SECS
POTASSIUM SERPL-SCNC: 3.9 MMOL/L
PROT SERPL-MCNC: 7.7 G/DL
QUALITY CONTROL: YES
RBC # BLD: 5.2 M/UL
RBC # FLD: 23.4 %
SODIUM SERPL-SCNC: 142 MMOL/L
TSH SERPL-ACNC: 9.67 UIU/ML
WBC # FLD AUTO: 4.47 K/UL

## 2019-02-26 PROCEDURE — 85610 PROTHROMBIN TIME: CPT | Mod: QW

## 2019-02-26 PROCEDURE — 93793 ANTICOAG MGMT PT WARFARIN: CPT

## 2019-02-27 LAB
INR PPP: 1.63 RATIO
PT BLD: 18.7 SEC

## 2019-03-01 ENCOUNTER — LABORATORY RESULT (OUTPATIENT)
Age: 34
End: 2019-03-01

## 2019-03-01 ENCOUNTER — NON-APPOINTMENT (OUTPATIENT)
Age: 34
End: 2019-03-01

## 2019-03-05 ENCOUNTER — APPOINTMENT (OUTPATIENT)
Dept: INTERNAL MEDICINE | Facility: CLINIC | Age: 34
End: 2019-03-05
Payer: MEDICAID

## 2019-03-05 VITALS — RESPIRATION RATE: 16 BRPM | HEART RATE: 90 BPM | OXYGEN SATURATION: 98 %

## 2019-03-05 LAB
INR PPP: 1.7 RATIO
POCT-PROTHROMBIN TIME: 20.3 SECS
QUALITY CONTROL: YES

## 2019-03-05 PROCEDURE — 85610 PROTHROMBIN TIME: CPT | Mod: QW

## 2019-03-05 PROCEDURE — 93793 ANTICOAG MGMT PT WARFARIN: CPT

## 2019-03-08 ENCOUNTER — LABORATORY RESULT (OUTPATIENT)
Age: 34
End: 2019-03-08

## 2019-03-08 ENCOUNTER — MOBILE ON CALL (OUTPATIENT)
Age: 34
End: 2019-03-08

## 2019-03-08 ENCOUNTER — NON-APPOINTMENT (OUTPATIENT)
Age: 34
End: 2019-03-08

## 2019-03-08 LAB — WARFARIN SERPL-MCNC: 0.8 MCG/ML

## 2019-03-12 ENCOUNTER — RX RENEWAL (OUTPATIENT)
Age: 34
End: 2019-03-12

## 2019-03-12 ENCOUNTER — APPOINTMENT (OUTPATIENT)
Dept: INTERNAL MEDICINE | Facility: CLINIC | Age: 34
End: 2019-03-12

## 2019-03-14 ENCOUNTER — NON-APPOINTMENT (OUTPATIENT)
Age: 34
End: 2019-03-14

## 2019-03-14 ENCOUNTER — LABORATORY RESULT (OUTPATIENT)
Age: 34
End: 2019-03-14

## 2019-03-15 NOTE — CONSULT LETTER
[Dear  ___] : Dear  [unfilled], [Courtesy Letter:] : I had the pleasure of seeing your patient, [unfilled], in my office today. [Sincerely,] : Sincerely, [DrElma  ___] : Dr. COOPER [Francy Unger MD, FCCP] : Francy Unger MD, FCCP [Director, Pulmonary Hypertension Program] : Director, Pulmonary Hypertension Program [[facility]] : [facility] [Division of Pulmonary, Critical Care and Sleep Medicine] : Division of Pulmonary, Critical Care and Sleep Medicine [Associate Professor of Medicine] : Associate Professor of Medicine

## 2019-03-18 ENCOUNTER — APPOINTMENT (OUTPATIENT)
Dept: PULMONOLOGY | Facility: CLINIC | Age: 34
End: 2019-03-18
Payer: MEDICAID

## 2019-03-18 VITALS
DIASTOLIC BLOOD PRESSURE: 76 MMHG | WEIGHT: 258 LBS | SYSTOLIC BLOOD PRESSURE: 127 MMHG | BODY MASS INDEX: 38.1 KG/M2 | OXYGEN SATURATION: 93 % | RESPIRATION RATE: 17 BRPM | TEMPERATURE: 97.5 F | HEART RATE: 56 BPM

## 2019-03-18 DIAGNOSIS — R06.02 SHORTNESS OF BREATH: ICD-10-CM

## 2019-03-18 PROCEDURE — ZZZZZ: CPT

## 2019-03-18 PROCEDURE — 94618 PULMONARY STRESS TESTING: CPT

## 2019-03-18 PROCEDURE — 99215 OFFICE O/P EST HI 40 MIN: CPT | Mod: 25

## 2019-03-18 PROCEDURE — 36415 COLL VENOUS BLD VENIPUNCTURE: CPT

## 2019-03-18 NOTE — PHYSICAL EXAM
[General Appearance - Well Developed] : well developed [Normal Appearance] : normal appearance [Well Groomed] : well groomed [General Appearance - Well Nourished] : well nourished [No Deformities] : no deformities [General Appearance - In No Acute Distress] : no acute distress [Normal Conjunctiva] : the conjunctiva exhibited no abnormalities [Eyelids - No Xanthelasma] : the eyelids demonstrated no xanthelasmas [IV] : IV [Neck Appearance] : the appearance of the neck was normal [Neck Cervical Mass (___cm)] : no neck mass was observed [Jugular Venous Distention Increased] : there was no jugular-venous distention [Thyroid Diffuse Enlargement] : the thyroid was not enlarged [Thyroid Nodule] : there were no palpable thyroid nodules [Heart Rate And Rhythm] : heart rate and rhythm were normal [Heart Sounds] : normal S1 and S2 [Respiration, Rhythm And Depth] : normal respiratory rhythm and effort [Exaggerated Use Of Accessory Muscles For Inspiration] : no accessory muscle use [Auscultation Breath Sounds / Voice Sounds] : lungs were clear to auscultation bilaterally [Abdomen Soft] : soft [Abdomen Mass (___ Cm)] : no abdominal mass palpated [Abnormal Walk] : normal gait [Gait - Sufficient For Exercise Testing] : the gait was sufficient for exercise testing [Nail Clubbing] : no clubbing of the fingernails [Cyanosis, Localized] : no localized cyanosis [Petechial Hemorrhages (___cm)] : no petechial hemorrhages [Skin Color & Pigmentation] : normal skin color and pigmentation [] : no rash [No Venous Stasis] : no venous stasis [Skin Lesions] : no skin lesions [No Skin Ulcers] : no skin ulcer [No Xanthoma] : no  xanthoma was observed [Deep Tendon Reflexes (DTR)] : deep tendon reflexes were 2+ and symmetric [Sensation] : the sensory exam was normal to light touch and pinprick [No Focal Deficits] : no focal deficits [Oriented To Time, Place, And Person] : oriented to person, place, and time [Impaired Insight] : insight and judgment were intact [Affect] : the affect was normal [3+ Pitting] : 3+  pitting  [FreeTextEntry1] : no spine tenderness

## 2019-03-18 NOTE — DISCUSSION/SUMMARY
[FreeTextEntry1] : -----------------ASSESSMENT  AND PLAN -----patient has been referred here for further opinion regarding  pulmonary problem.--------. 33-year-old woman with pulmonary hypertension, poorly compliant generally. --NOT CLEAR F SHE IS TAKING DAMAPAS\par \par 1) She does report that she is taking her riociguat 1.5mg tid, despite lack of shipment in 3 months. Stressed compliance w/meds as directed. renewal erx sent to [facility] specialty and pt given their phone # to call them for reorder. Needs repeat echo\par \par 2) pedal edema- continue bumex and aldactone-  add metolazone- referred to renal-- labs drawn today to check creat & electrolytes. She has also been referred to Dr Adamson in the heart failure clinic\par \par 3) cteph- on coumadin- monitored by Edwina Jung. will repeat CTA chest\par 4) elevated t. arnulfo- has been referred to hepatology for consult\par 5) hypothyroid- start synthyroid- referred to endo\par 6)  NEEDS TO F/U NEPHROLOGY\par 7]needs to f/u in my office next week\par \par \par 7) ? SKYE- Really needs sleep study, still gets the excuse is that she doesn't have time to do it. I gave her the information again and advised her to please call. She has symptoms of excessive daytime somnolence, obstructive sleep apnea, and also need to check her sats well she is sleeping.\par \par Thanks for allowing  me to participate  in the care of this patient.  Patient at this time  will follow  the above mentioned recommendations and return back for follow up visit. If you have any questions  I can be reached  at #[phone] (beeper)  or  [phone] (office).\par \par Francy Unger MD, FCCP \par Director, Pulmonary Hypertension Program \par [facility] \par Division of Pulmonary, Critical Care and Sleep Medicine \par  Professor of Medicine \par [employer]\par \par

## 2019-03-18 NOTE — HISTORY OF PRESENT ILLNESS
[FreeTextEntry1] : followup pulmonary hypertension.-----This letter  is regarding your patient  who  attended pulmonary out patient office today.  I have reviewed  patient's  past history, social history, family history and medication list. I also  reviewed nurse practitioners/ and fellows  notes and assessment and agree with it.  ------ patient is followed by Dr. García for over last few years for her pulmonary hypertension on ADAMPAS-------NON COMPLIANT \par ------No history of , fever, chills , rigors, chestpain, or hemoptysis. Questionable history of Raynauds phenomenon. No h/o significant weight loss in last few months. No history of liver dysfunction ,  or chronic thromboembolic disease. I would classify her dyspnea as WHO  FUNCTIONAL CLASS II\par \par 2018 Tolerating riociguat dose. On Lasix 20 daily, sometimes increasing to twice a day if there is increased lower extremity edema. On Coumadin. All being followed by her primary care physician, Dr. Ricky Plata she reports\par \par Did not do a sleep study.\par --\par \par march 2019 -ON COUMDIN  INR AS PER PT MONITORED BY Aiyana Jung in coumadin clinic\par She has cteph- ? compliance w/adempas-- specialty pharm reports her last shipment was in 12/2018---NON COMPLIANT \par \par She is c/o exertional dyspnea and increasing pedal edema w/diminished urine output- on bumex bid and spironolactone bid- reports compliance to diuretics

## 2019-03-19 LAB
ALBUMIN SERPL ELPH-MCNC: 3.9 G/DL
ALP BLD-CCNC: 100 U/L
ALT SERPL-CCNC: 16 U/L
ANION GAP SERPL CALC-SCNC: 15 MMOL/L
AST SERPL-CCNC: 28 U/L
BASOPHILS # BLD AUTO: 0.05 K/UL
BASOPHILS NFR BLD AUTO: 1.1 %
BILIRUB SERPL-MCNC: 3.1 MG/DL
BUN SERPL-MCNC: 10 MG/DL
CALCIUM SERPL-MCNC: 9.6 MG/DL
CHLORIDE SERPL-SCNC: 92 MMOL/L
CO2 SERPL-SCNC: 29 MMOL/L
CREAT SERPL-MCNC: 1.06 MG/DL
EOSINOPHIL # BLD AUTO: 0.04 K/UL
EOSINOPHIL NFR BLD AUTO: 0.9 %
GLUCOSE SERPL-MCNC: 96 MG/DL
HCT VFR BLD CALC: 40.1 %
HGB BLD-MCNC: 12.1 G/DL
IMM GRANULOCYTES NFR BLD AUTO: 0.2 %
LYMPHOCYTES # BLD AUTO: 1.53 K/UL
LYMPHOCYTES NFR BLD AUTO: 33.3 %
MAN DIFF?: NORMAL
MCHC RBC-ENTMCNC: 23.1 PG
MCHC RBC-ENTMCNC: 30.2 GM/DL
MCV RBC AUTO: 76.5 FL
MONOCYTES # BLD AUTO: 0.53 K/UL
MONOCYTES NFR BLD AUTO: 11.5 %
NEUTROPHILS # BLD AUTO: 2.43 K/UL
NEUTROPHILS NFR BLD AUTO: 53 %
PLATELET # BLD AUTO: 259 K/UL
POTASSIUM SERPL-SCNC: 4.1 MMOL/L
PROT SERPL-MCNC: 7.7 G/DL
RAPID RVP RESULT: NOT DETECTED
RBC # BLD: 5.24 M/UL
RBC # FLD: 23.2 %
SODIUM SERPL-SCNC: 136 MMOL/L
WBC # FLD AUTO: 4.59 K/UL

## 2019-03-22 ENCOUNTER — LABORATORY RESULT (OUTPATIENT)
Age: 34
End: 2019-03-22

## 2019-03-22 ENCOUNTER — NON-APPOINTMENT (OUTPATIENT)
Age: 34
End: 2019-03-22

## 2019-03-26 ENCOUNTER — APPOINTMENT (OUTPATIENT)
Dept: INTERNAL MEDICINE | Facility: CLINIC | Age: 34
End: 2019-03-26
Payer: MEDICAID

## 2019-03-26 VITALS — OXYGEN SATURATION: 91 % | RESPIRATION RATE: 18 BRPM | HEART RATE: 58 BPM

## 2019-03-26 LAB
INR PPP: 2.1 RATIO
POCT-PROTHROMBIN TIME: 24.9 SECS
QUALITY CONTROL: YES

## 2019-03-26 PROCEDURE — 93793 ANTICOAG MGMT PT WARFARIN: CPT

## 2019-03-26 PROCEDURE — 85610 PROTHROMBIN TIME: CPT | Mod: QW

## 2019-03-28 ENCOUNTER — APPOINTMENT (OUTPATIENT)
Dept: PULMONOLOGY | Facility: CLINIC | Age: 34
End: 2019-03-28

## 2019-03-29 ENCOUNTER — NON-APPOINTMENT (OUTPATIENT)
Age: 34
End: 2019-03-29

## 2019-03-29 ENCOUNTER — LABORATORY RESULT (OUTPATIENT)
Age: 34
End: 2019-03-29

## 2019-04-01 PROCEDURE — G9005: CPT

## 2019-04-03 ENCOUNTER — APPOINTMENT (OUTPATIENT)
Dept: INTERNAL MEDICINE | Facility: CLINIC | Age: 34
End: 2019-04-03
Payer: MEDICAID

## 2019-04-03 VITALS — RESPIRATION RATE: 17 BRPM | OXYGEN SATURATION: 89 % | HEART RATE: 61 BPM

## 2019-04-03 LAB
INR PPP: 7.6 RATIO
POCT-PROTHROMBIN TIME: 91.5 SECS
QUALITY CONTROL: YES

## 2019-04-03 PROCEDURE — 93793 ANTICOAG MGMT PT WARFARIN: CPT

## 2019-04-03 PROCEDURE — 85610 PROTHROMBIN TIME: CPT | Mod: QW

## 2019-04-04 ENCOUNTER — CHART COPY (OUTPATIENT)
Age: 34
End: 2019-04-04

## 2019-04-04 ENCOUNTER — MEDICATION RENEWAL (OUTPATIENT)
Age: 34
End: 2019-04-04

## 2019-04-05 ENCOUNTER — LABORATORY RESULT (OUTPATIENT)
Age: 34
End: 2019-04-05

## 2019-04-08 ENCOUNTER — NON-APPOINTMENT (OUTPATIENT)
Age: 34
End: 2019-04-08

## 2019-04-08 ENCOUNTER — APPOINTMENT (OUTPATIENT)
Dept: INTERNAL MEDICINE | Facility: CLINIC | Age: 34
End: 2019-04-08

## 2019-04-09 ENCOUNTER — NON-APPOINTMENT (OUTPATIENT)
Age: 34
End: 2019-04-09

## 2019-04-09 ENCOUNTER — CHART COPY (OUTPATIENT)
Age: 34
End: 2019-04-09

## 2019-04-09 LAB
INR PPP: 4.23 RATIO
PT BLD: 50.4 SEC

## 2019-04-10 ENCOUNTER — RX CHANGE (OUTPATIENT)
Age: 34
End: 2019-04-10

## 2019-04-10 NOTE — DISCUSSION/SUMMARY
[FreeTextEntry1] : -----------------ASSESSMENT  AND PLAN -----patient has been referred here for further opinion regarding  pulmonary problem.--------. 33-year-old woman with pulmonary hypertension, poorly compliant generally. --NOT CLEAR F SHE IS TAKING DAMAPAS\par \par 1) She does report that she is taking her riociguat 1.5mg tid, despite lack of shipment in 3 months. Stressed compliance w/meds as directed. renewal erx sent to [facility] specialty and pt given their phone # to call them for reorder. Needs repeat echo\par \par 2) pedal edema- continue bumex and aldactone-  add metolazone- referred to renal-- labs drawn today to check creat & electrolytes. She has also been referred to Dr Adamson in the heart failure clinic\par \par 3) cteph- on coumadin- monitored by Edwina Jung. will repeat CTA chest\par 4) elevated t. arnulfo- has been referred to hepatology for consult\par 5) hypothyroid- start synthyroid- referred to endo\par 6)  NEEDS TO F/U NEPHROLOGY\par 7]needs to f/u in my office next week\par \par \par 7) ? SKYE- Really needs sleep study, still gets the excuse is that she doesn't have time to do it. I gave her the information again and advised her to please call. She has symptoms of excessive daytime somnolence, obstructive sleep apnea, and also need to check her sats well she is sleeping.\par \par Thanks for allowing  me to participate  in the care of this patient.  Patient at this time  will follow  the above mentioned recommendations and return back for follow up visit. If you have any questions  I can be reached  at #[phone] (beeper)  or  [phone] (office).\par \par Francy Unger MD, FCCP \par Director, Pulmonary Hypertension Program \par [facility] \par Division of Pulmonary, Critical Care and Sleep Medicine \par  Professor of Medicine \par [employer]\par \par

## 2019-04-10 NOTE — CONSULT LETTER
[Dear  ___] : Dear  [unfilled], [DrElma  ___] : Dr. COOPER [Sincerely,] : Sincerely, [Courtesy Letter:] : I had the pleasure of seeing your patient, [unfilled], in my office today. [Director, Pulmonary Hypertension Program] : Director, Pulmonary Hypertension Program [Francy Unger MD, FCCP] : Francy Unger MD, FCCP [[facility]] : [facility] [Division of Pulmonary, Critical Care and Sleep Medicine] : Division of Pulmonary, Critical Care and Sleep Medicine [Associate Professor of Medicine] : Associate Professor of Medicine

## 2019-04-10 NOTE — PHYSICAL EXAM
[General Appearance - Well Developed] : well developed [Normal Appearance] : normal appearance [Well Groomed] : well groomed [General Appearance - Well Nourished] : well nourished [No Deformities] : no deformities [General Appearance - In No Acute Distress] : no acute distress [Normal Conjunctiva] : the conjunctiva exhibited no abnormalities [Eyelids - No Xanthelasma] : the eyelids demonstrated no xanthelasmas [IV] : IV [Neck Appearance] : the appearance of the neck was normal [Jugular Venous Distention Increased] : there was no jugular-venous distention [Neck Cervical Mass (___cm)] : no neck mass was observed [Thyroid Diffuse Enlargement] : the thyroid was not enlarged [Thyroid Nodule] : there were no palpable thyroid nodules [Heart Rate And Rhythm] : heart rate and rhythm were normal [Heart Sounds] : normal S1 and S2 [Respiration, Rhythm And Depth] : normal respiratory rhythm and effort [Exaggerated Use Of Accessory Muscles For Inspiration] : no accessory muscle use [Auscultation Breath Sounds / Voice Sounds] : lungs were clear to auscultation bilaterally [FreeTextEntry1] : no spine tenderness [Abdomen Soft] : soft [Abdomen Mass (___ Cm)] : no abdominal mass palpated [Abnormal Walk] : normal gait [Gait - Sufficient For Exercise Testing] : the gait was sufficient for exercise testing [Nail Clubbing] : no clubbing of the fingernails [Cyanosis, Localized] : no localized cyanosis [Petechial Hemorrhages (___cm)] : no petechial hemorrhages [3+ Pitting] : 3+  pitting  [Skin Color & Pigmentation] : normal skin color and pigmentation [] : no rash [Skin Lesions] : no skin lesions [No Venous Stasis] : no venous stasis [No Skin Ulcers] : no skin ulcer [Sensation] : the sensory exam was normal to light touch and pinprick [Deep Tendon Reflexes (DTR)] : deep tendon reflexes were 2+ and symmetric [No Xanthoma] : no  xanthoma was observed [No Focal Deficits] : no focal deficits [Oriented To Time, Place, And Person] : oriented to person, place, and time [Affect] : the affect was normal [Impaired Insight] : insight and judgment were intact

## 2019-04-11 ENCOUNTER — APPOINTMENT (OUTPATIENT)
Dept: PULMONOLOGY | Facility: CLINIC | Age: 34
End: 2019-04-11

## 2019-04-12 ENCOUNTER — MOBILE ON CALL (OUTPATIENT)
Age: 34
End: 2019-04-12

## 2019-04-23 ENCOUNTER — APPOINTMENT (OUTPATIENT)
Dept: INTERNAL MEDICINE | Facility: CLINIC | Age: 34
End: 2019-04-23
Payer: MEDICAID

## 2019-04-23 VITALS — HEART RATE: 68 BPM | RESPIRATION RATE: 18 BRPM | OXYGEN SATURATION: 90 %

## 2019-04-23 LAB
INR PPP: 1.3 RATIO
POCT-PROTHROMBIN TIME: 15.4 SECS
QUALITY CONTROL: YES

## 2019-04-23 PROCEDURE — 93793 ANTICOAG MGMT PT WARFARIN: CPT

## 2019-04-23 PROCEDURE — 85610 PROTHROMBIN TIME: CPT | Mod: QW

## 2019-04-29 ENCOUNTER — APPOINTMENT (OUTPATIENT)
Dept: PULMONOLOGY | Facility: CLINIC | Age: 34
End: 2019-04-29

## 2019-04-30 ENCOUNTER — APPOINTMENT (OUTPATIENT)
Dept: INTERNAL MEDICINE | Facility: CLINIC | Age: 34
End: 2019-04-30

## 2019-04-30 ENCOUNTER — RESULT REVIEW (OUTPATIENT)
Age: 34
End: 2019-04-30

## 2019-05-01 ENCOUNTER — APPOINTMENT (OUTPATIENT)
Dept: HEMATOLOGY ONCOLOGY | Facility: CLINIC | Age: 34
End: 2019-05-01

## 2019-05-28 ENCOUNTER — MEDICATION RENEWAL (OUTPATIENT)
Age: 34
End: 2019-05-28

## 2019-06-28 ENCOUNTER — NON-APPOINTMENT (OUTPATIENT)
Age: 34
End: 2019-06-28

## 2019-06-28 ENCOUNTER — MEDICATION RENEWAL (OUTPATIENT)
Age: 34
End: 2019-06-28

## 2019-07-03 ENCOUNTER — RX RENEWAL (OUTPATIENT)
Age: 34
End: 2019-07-03

## 2019-07-03 RX ORDER — BUDESONIDE AND FORMOTEROL FUMARATE DIHYDRATE 160; 4.5 UG/1; UG/1
160-4.5 AEROSOL RESPIRATORY (INHALATION)
Qty: 1 | Refills: 2 | Status: ACTIVE | COMMUNITY
Start: 2018-05-02 | End: 1900-01-01

## 2019-07-08 ENCOUNTER — APPOINTMENT (OUTPATIENT)
Dept: PULMONOLOGY | Facility: CLINIC | Age: 34
End: 2019-07-08

## 2019-07-08 ENCOUNTER — APPOINTMENT (OUTPATIENT)
Dept: HEPATOLOGY | Facility: CLINIC | Age: 34
End: 2019-07-08

## 2019-07-09 ENCOUNTER — MEDICATION RENEWAL (OUTPATIENT)
Age: 34
End: 2019-07-09

## 2019-07-10 ENCOUNTER — LABORATORY RESULT (OUTPATIENT)
Age: 34
End: 2019-07-10

## 2019-07-10 ENCOUNTER — APPOINTMENT (OUTPATIENT)
Dept: HEPATOLOGY | Facility: CLINIC | Age: 34
End: 2019-07-10
Payer: MEDICAID

## 2019-07-10 VITALS
BODY MASS INDEX: 40.58 KG/M2 | TEMPERATURE: 97.6 F | DIASTOLIC BLOOD PRESSURE: 67 MMHG | SYSTOLIC BLOOD PRESSURE: 103 MMHG | HEART RATE: 91 BPM | HEIGHT: 69 IN | RESPIRATION RATE: 19 BRPM | WEIGHT: 274 LBS

## 2019-07-10 DIAGNOSIS — I27.20 PULMONARY HYPERTENSION, UNSPECIFIED: ICD-10-CM

## 2019-07-10 DIAGNOSIS — E80.6 OTHER DISORDERS OF BILIRUBIN METABOLISM: ICD-10-CM

## 2019-07-10 PROCEDURE — 99203 OFFICE O/P NEW LOW 30 MIN: CPT

## 2019-07-10 RX ORDER — METOLAZONE 2.5 MG/1
2.5 TABLET ORAL
Qty: 8 | Refills: 1 | Status: DISCONTINUED | COMMUNITY
Start: 2019-03-18 | End: 2019-07-10

## 2019-07-11 LAB
A1AT SERPL-MCNC: 177 MG/DL
ALBUMIN SERPL ELPH-MCNC: 4.2 G/DL
ALP BLD-CCNC: 94 U/L
ALT SERPL-CCNC: 14 U/L
ANION GAP SERPL CALC-SCNC: 20 MMOL/L
AST SERPL-CCNC: 27 U/L
BASOPHILS # BLD AUTO: 0.05 K/UL
BASOPHILS NFR BLD AUTO: 1.2 %
BILIRUB DIRECT SERPL-MCNC: 1.6 MG/DL
BILIRUB SERPL-MCNC: 3 MG/DL
BUN SERPL-MCNC: 57 MG/DL
CALCIUM SERPL-MCNC: 9.7 MG/DL
CERULOPLASMIN SERPL-MCNC: 49 MG/DL
CHLORIDE SERPL-SCNC: 93 MMOL/L
CO2 SERPL-SCNC: 18 MMOL/L
CREAT SERPL-MCNC: 4.35 MG/DL
DEPRECATED KAPPA LC FREE/LAMBDA SER: 1.28 RATIO
EOSINOPHIL # BLD AUTO: 0.08 K/UL
EOSINOPHIL NFR BLD AUTO: 1.9 %
ERYTHROCYTE [SEDIMENTATION RATE] IN BLOOD BY WESTERGREN METHOD: 49 MM/HR
FERRITIN SERPL-MCNC: 38 NG/ML
GLUCOSE SERPL-MCNC: 96 MG/DL
HBV CORE IGG+IGM SER QL: NONREACTIVE
HBV SURFACE AB SER QL: REACTIVE
HBV SURFACE AG SER QL: NONREACTIVE
HCT VFR BLD CALC: 36.9 %
HCV AB SER QL: NONREACTIVE
HCV S/CO RATIO: 0.18 S/CO
HEPATITIS A IGG ANTIBODY: REACTIVE
HGB BLD-MCNC: 11.9 G/DL
IGA SER QL IEP: 340 MG/DL
IGG SER QL IEP: 2390 MG/DL
IGM SER QL IEP: 288 MG/DL
IMM GRANULOCYTES NFR BLD AUTO: 0.2 %
IRON SATN MFR SERPL: 4 %
IRON SERPL-MCNC: 22 UG/DL
KAPPA LC CSF-MCNC: 9.03 MG/DL
KAPPA LC SERPL-MCNC: 11.6 MG/DL
LDH SERPL-CCNC: 473 U/L
LYMPHOCYTES # BLD AUTO: 1.45 K/UL
LYMPHOCYTES NFR BLD AUTO: 33.6 %
MAN DIFF?: NORMAL
MCHC RBC-ENTMCNC: 23.7 PG
MCHC RBC-ENTMCNC: 32.2 GM/DL
MCV RBC AUTO: 73.5 FL
MONOCYTES # BLD AUTO: 0.66 K/UL
MONOCYTES NFR BLD AUTO: 15.3 %
MPO AB + PR3 PNL SER: NORMAL
NEUTROPHILS # BLD AUTO: 2.06 K/UL
NEUTROPHILS NFR BLD AUTO: 47.8 %
PLATELET # BLD AUTO: 279 K/UL
POTASSIUM SERPL-SCNC: 5.3 MMOL/L
PROT SERPL-MCNC: 8.4 G/DL
RBC # BLD: 5.01 M/UL
RBC # BLD: 5.02 M/UL
RBC # FLD: 21.4 %
RETICS # AUTO: 2.4 %
RETICS AGGREG/RBC NFR: 119.2 K/UL
SODIUM SERPL-SCNC: 131 MMOL/L
TIBC SERPL-MCNC: 533 UG/DL
UIBC SERPL-MCNC: 511 UG/DL
WBC # FLD AUTO: 4.31 K/UL

## 2019-07-12 LAB
ANA PAT FLD IF-IMP: ABNORMAL
ANA SER IF-ACNC: ABNORMAL
G6PD SER-CCNC: 19 U/G HGB
MITOCHONDRIA AB SER IF-ACNC: NORMAL
SMOOTH MUSCLE AB SER QL IF: NORMAL

## 2019-07-14 LAB
CARDIOLIPIN AB SER IA-ACNC: POSITIVE
LKM AB SER QL IF: <20.1 UNITS

## 2019-07-15 ENCOUNTER — MEDICATION RENEWAL (OUTPATIENT)
Age: 34
End: 2019-07-15

## 2019-07-22 LAB — SOLUBLE LIVER IGG SER IA-ACNC: < 20.1 UNITS

## 2019-07-26 ENCOUNTER — INPATIENT (INPATIENT)
Facility: HOSPITAL | Age: 34
LOS: 12 days | Discharge: HOME | End: 2019-08-08
Attending: HOSPITALIST | Admitting: HOSPITALIST
Payer: MEDICAID

## 2019-07-26 ENCOUNTER — APPOINTMENT (OUTPATIENT)
Dept: PULMONOLOGY | Facility: CLINIC | Age: 34
End: 2019-07-26

## 2019-07-26 VITALS
HEART RATE: 89 BPM | TEMPERATURE: 98 F | OXYGEN SATURATION: 92 % | RESPIRATION RATE: 24 BRPM | HEIGHT: 69 IN | DIASTOLIC BLOOD PRESSURE: 78 MMHG | SYSTOLIC BLOOD PRESSURE: 131 MMHG | WEIGHT: 274.03 LBS

## 2019-07-26 DIAGNOSIS — Z29.9 ENCOUNTER FOR PROPHYLACTIC MEASURES, UNSPECIFIED: ICD-10-CM

## 2019-07-26 DIAGNOSIS — J45.909 UNSPECIFIED ASTHMA, UNCOMPLICATED: ICD-10-CM

## 2019-07-26 DIAGNOSIS — I50.9 HEART FAILURE, UNSPECIFIED: ICD-10-CM

## 2019-07-26 LAB
ALBUMIN SERPL ELPH-MCNC: 3.5 G/DL — SIGNIFICANT CHANGE UP (ref 3.3–5)
ALP SERPL-CCNC: 83 U/L — SIGNIFICANT CHANGE UP (ref 40–120)
ALT FLD-CCNC: 23 U/L — SIGNIFICANT CHANGE UP (ref 12–78)
ANION GAP SERPL CALC-SCNC: 16 MMOL/L — SIGNIFICANT CHANGE UP (ref 5–17)
ANION GAP SERPL CALC-SCNC: 17 MMOL/L — SIGNIFICANT CHANGE UP (ref 5–17)
ANISOCYTOSIS BLD QL: SIGNIFICANT CHANGE UP
APTT BLD: 28.3 SEC — SIGNIFICANT CHANGE UP (ref 27.5–36.3)
AST SERPL-CCNC: 45 U/L — HIGH (ref 15–37)
BASOPHILS # BLD AUTO: 0.05 K/UL — SIGNIFICANT CHANGE UP (ref 0–0.2)
BASOPHILS NFR BLD AUTO: 1 % — SIGNIFICANT CHANGE UP (ref 0–2)
BILIRUB SERPL-MCNC: 6 MG/DL — HIGH (ref 0.2–1.2)
BUN SERPL-MCNC: 26 MG/DL — HIGH (ref 7–23)
BUN SERPL-MCNC: 26 MG/DL — HIGH (ref 7–23)
BURR CELLS BLD QL SMEAR: PRESENT — SIGNIFICANT CHANGE UP
BURR CELLS BLD QL SMEAR: SLIGHT — SIGNIFICANT CHANGE UP
CALCIUM SERPL-MCNC: 9 MG/DL — SIGNIFICANT CHANGE UP (ref 8.5–10.1)
CALCIUM SERPL-MCNC: 9.1 MG/DL — SIGNIFICANT CHANGE UP (ref 8.5–10.1)
CHLORIDE SERPL-SCNC: 93 MMOL/L — LOW (ref 96–108)
CHLORIDE SERPL-SCNC: 97 MMOL/L — SIGNIFICANT CHANGE UP (ref 96–108)
CK MB BLD-MCNC: 1.4 % — SIGNIFICANT CHANGE UP (ref 0–3.5)
CK MB BLD-MCNC: 1.5 % — SIGNIFICANT CHANGE UP (ref 0–3.5)
CK MB CFR SERPL CALC: 5.4 NG/ML — HIGH (ref 0.5–3.6)
CK MB CFR SERPL CALC: 5.5 NG/ML — HIGH (ref 0.5–3.6)
CK SERPL-CCNC: 356 U/L — HIGH (ref 26–192)
CK SERPL-CCNC: 388 U/L — HIGH (ref 26–192)
CO2 SERPL-SCNC: 21 MMOL/L — LOW (ref 22–31)
CO2 SERPL-SCNC: 22 MMOL/L — SIGNIFICANT CHANGE UP (ref 22–31)
CREAT SERPL-MCNC: 1.95 MG/DL — HIGH (ref 0.5–1.3)
CREAT SERPL-MCNC: 2.06 MG/DL — HIGH (ref 0.5–1.3)
DACRYOCYTES BLD QL SMEAR: SLIGHT — SIGNIFICANT CHANGE UP
EOSINOPHIL # BLD AUTO: 0.05 K/UL — SIGNIFICANT CHANGE UP (ref 0–0.5)
EOSINOPHIL NFR BLD AUTO: 1 % — SIGNIFICANT CHANGE UP (ref 0–6)
GLUCOSE BLDC GLUCOMTR-MCNC: 99 MG/DL — SIGNIFICANT CHANGE UP (ref 70–99)
GLUCOSE SERPL-MCNC: 95 MG/DL — SIGNIFICANT CHANGE UP (ref 70–99)
GLUCOSE SERPL-MCNC: 98 MG/DL — SIGNIFICANT CHANGE UP (ref 70–99)
HCG SERPL-ACNC: <1 MIU/ML — SIGNIFICANT CHANGE UP
HCT VFR BLD CALC: 36.7 % — SIGNIFICANT CHANGE UP (ref 34.5–45)
HCT VFR BLD CALC: 37.1 % — SIGNIFICANT CHANGE UP (ref 34.5–45)
HGB BLD-MCNC: 11.3 G/DL — LOW (ref 11.5–15.5)
HGB BLD-MCNC: 11.8 G/DL — SIGNIFICANT CHANGE UP (ref 11.5–15.5)
HYPOCHROMIA BLD QL: SLIGHT — SIGNIFICANT CHANGE UP
INR BLD: 2.37 RATIO — HIGH (ref 0.88–1.16)
LG PLATELETS BLD QL AUTO: SLIGHT — SIGNIFICANT CHANGE UP
LIDOCAIN IGE QN: 86 U/L — SIGNIFICANT CHANGE UP (ref 73–393)
LYMPHOCYTES # BLD AUTO: 1.37 K/UL — SIGNIFICANT CHANGE UP (ref 1–3.3)
LYMPHOCYTES # BLD AUTO: 26 % — SIGNIFICANT CHANGE UP (ref 13–44)
MACROCYTES BLD QL: SLIGHT — SIGNIFICANT CHANGE UP
MANUAL SMEAR VERIFICATION: SIGNIFICANT CHANGE UP
MCHC RBC-ENTMCNC: 22.8 PG — LOW (ref 27–34)
MCHC RBC-ENTMCNC: 23.5 PG — LOW (ref 27–34)
MCHC RBC-ENTMCNC: 30.8 GM/DL — LOW (ref 32–36)
MCHC RBC-ENTMCNC: 31.8 GM/DL — LOW (ref 32–36)
MCV RBC AUTO: 73.9 FL — LOW (ref 80–100)
MCV RBC AUTO: 74 FL — LOW (ref 80–100)
MICROCYTES BLD QL: SLIGHT — SIGNIFICANT CHANGE UP
MONOCYTES # BLD AUTO: 0.58 K/UL — SIGNIFICANT CHANGE UP (ref 0–0.9)
MONOCYTES NFR BLD AUTO: 11 % — SIGNIFICANT CHANGE UP (ref 2–14)
NEUTROPHILS # BLD AUTO: 3.2 K/UL — SIGNIFICANT CHANGE UP (ref 1.8–7.4)
NEUTROPHILS NFR BLD AUTO: 61 % — SIGNIFICANT CHANGE UP (ref 43–77)
NRBC # BLD: 0 /100 — SIGNIFICANT CHANGE UP (ref 0–0)
NRBC # BLD: 2 /100 WBCS — HIGH (ref 0–0)
NRBC # BLD: SIGNIFICANT CHANGE UP /100 WBCS (ref 0–0)
NT-PROBNP SERPL-SCNC: 4049 PG/ML — HIGH (ref 0–125)
OVALOCYTES BLD QL SMEAR: SLIGHT — SIGNIFICANT CHANGE UP
PLAT MORPH BLD: ABNORMAL
PLATELET # BLD AUTO: 224 K/UL — SIGNIFICANT CHANGE UP (ref 150–400)
PLATELET # BLD AUTO: 242 K/UL — SIGNIFICANT CHANGE UP (ref 150–400)
PLATELET COUNT - ESTIMATE: NORMAL — SIGNIFICANT CHANGE UP
POIKILOCYTOSIS BLD QL AUTO: SLIGHT — SIGNIFICANT CHANGE UP
POLYCHROMASIA BLD QL SMEAR: SLIGHT — SIGNIFICANT CHANGE UP
POTASSIUM SERPL-MCNC: 3.6 MMOL/L — SIGNIFICANT CHANGE UP (ref 3.5–5.3)
POTASSIUM SERPL-MCNC: 3.7 MMOL/L — SIGNIFICANT CHANGE UP (ref 3.5–5.3)
POTASSIUM SERPL-SCNC: 3.6 MMOL/L — SIGNIFICANT CHANGE UP (ref 3.5–5.3)
POTASSIUM SERPL-SCNC: 3.7 MMOL/L — SIGNIFICANT CHANGE UP (ref 3.5–5.3)
PROT SERPL-MCNC: 8.8 GM/DL — HIGH (ref 6–8.3)
PROTHROM AB SERPL-ACNC: 27.3 SEC — HIGH (ref 10–12.9)
RBC # BLD: 4.96 M/UL — SIGNIFICANT CHANGE UP (ref 3.8–5.2)
RBC # BLD: 5.02 M/UL — SIGNIFICANT CHANGE UP (ref 3.8–5.2)
RBC # FLD: 21.8 % — HIGH (ref 10.3–14.5)
RBC # FLD: 21.8 % — HIGH (ref 10.3–14.5)
RBC BLD AUTO: ABNORMAL
SCHISTOCYTES BLD QL AUTO: SLIGHT — SIGNIFICANT CHANGE UP
SODIUM SERPL-SCNC: 131 MMOL/L — LOW (ref 135–145)
SODIUM SERPL-SCNC: 135 MMOL/L — SIGNIFICANT CHANGE UP (ref 135–145)
TARGETS BLD QL SMEAR: SLIGHT — SIGNIFICANT CHANGE UP
TROPONIN I SERPL-MCNC: 0.43 NG/ML — HIGH (ref 0.01–0.04)
TROPONIN I SERPL-MCNC: 0.44 NG/ML — HIGH (ref 0.01–0.04)
TROPONIN I SERPL-MCNC: 0.5 NG/ML — HIGH (ref 0.01–0.04)
WBC # BLD: 5.25 K/UL — SIGNIFICANT CHANGE UP (ref 3.8–10.5)
WBC # BLD: 5.26 K/UL — SIGNIFICANT CHANGE UP (ref 3.8–10.5)
WBC # FLD AUTO: 5.25 K/UL — SIGNIFICANT CHANGE UP (ref 3.8–10.5)
WBC # FLD AUTO: 5.26 K/UL — SIGNIFICANT CHANGE UP (ref 3.8–10.5)

## 2019-07-26 PROCEDURE — 99223 1ST HOSP IP/OBS HIGH 75: CPT

## 2019-07-26 PROCEDURE — 99285 EMERGENCY DEPT VISIT HI MDM: CPT

## 2019-07-26 PROCEDURE — 12345: CPT | Mod: NC

## 2019-07-26 PROCEDURE — 99233 SBSQ HOSP IP/OBS HIGH 50: CPT

## 2019-07-26 PROCEDURE — 71045 X-RAY EXAM CHEST 1 VIEW: CPT | Mod: 26

## 2019-07-26 PROCEDURE — 93010 ELECTROCARDIOGRAM REPORT: CPT

## 2019-07-26 RX ORDER — BUDESONIDE AND FORMOTEROL FUMARATE DIHYDRATE 160; 4.5 UG/1; UG/1
2 AEROSOL RESPIRATORY (INHALATION)
Refills: 0 | Status: DISCONTINUED | OUTPATIENT
Start: 2019-07-26 | End: 2019-08-08

## 2019-07-26 RX ORDER — ASPIRIN/CALCIUM CARB/MAGNESIUM 324 MG
325 TABLET ORAL ONCE
Refills: 0 | Status: COMPLETED | OUTPATIENT
Start: 2019-07-26 | End: 2019-07-26

## 2019-07-26 RX ORDER — SPIRONOLACTONE 25 MG/1
50 TABLET, FILM COATED ORAL DAILY
Refills: 0 | Status: DISCONTINUED | OUTPATIENT
Start: 2019-07-26 | End: 2019-08-08

## 2019-07-26 RX ORDER — BUMETANIDE 0.25 MG/ML
1 INJECTION INTRAMUSCULAR; INTRAVENOUS EVERY 8 HOURS
Refills: 0 | Status: DISCONTINUED | OUTPATIENT
Start: 2019-07-26 | End: 2019-07-26

## 2019-07-26 RX ORDER — ALBUTEROL 90 UG/1
1 AEROSOL, METERED ORAL EVERY 4 HOURS
Refills: 0 | Status: DISCONTINUED | OUTPATIENT
Start: 2019-07-26 | End: 2019-08-08

## 2019-07-26 RX ORDER — BUMETANIDE 0.25 MG/ML
1 INJECTION INTRAMUSCULAR; INTRAVENOUS ONCE
Refills: 0 | Status: COMPLETED | OUTPATIENT
Start: 2019-07-26 | End: 2019-07-26

## 2019-07-26 RX ORDER — HEPARIN SODIUM 5000 [USP'U]/ML
INJECTION INTRAVENOUS; SUBCUTANEOUS
Qty: 25000 | Refills: 0 | Status: DISCONTINUED | OUTPATIENT
Start: 2019-07-26 | End: 2019-07-26

## 2019-07-26 RX ORDER — WARFARIN SODIUM 2.5 MG/1
5 TABLET ORAL ONCE
Refills: 0 | Status: COMPLETED | OUTPATIENT
Start: 2019-07-26 | End: 2019-07-26

## 2019-07-26 RX ORDER — TRAMADOL HYDROCHLORIDE 50 MG/1
50 TABLET ORAL EVERY 6 HOURS
Refills: 0 | Status: DISCONTINUED | OUTPATIENT
Start: 2019-07-26 | End: 2019-07-27

## 2019-07-26 RX ORDER — HEPARIN SODIUM 5000 [USP'U]/ML
6000 INJECTION INTRAVENOUS; SUBCUTANEOUS EVERY 6 HOURS
Refills: 0 | Status: DISCONTINUED | OUTPATIENT
Start: 2019-07-26 | End: 2019-07-26

## 2019-07-26 RX ORDER — IPRATROPIUM/ALBUTEROL SULFATE 18-103MCG
3 AEROSOL WITH ADAPTER (GRAM) INHALATION EVERY 6 HOURS
Refills: 0 | Status: DISCONTINUED | OUTPATIENT
Start: 2019-07-26 | End: 2019-08-08

## 2019-07-26 RX ORDER — TRAMADOL HYDROCHLORIDE 50 MG/1
25 TABLET ORAL ONCE
Refills: 0 | Status: DISCONTINUED | OUTPATIENT
Start: 2019-07-26 | End: 2019-07-26

## 2019-07-26 RX ORDER — HEPARIN SODIUM 5000 [USP'U]/ML
5000 INJECTION INTRAVENOUS; SUBCUTANEOUS ONCE
Refills: 0 | Status: DISCONTINUED | OUTPATIENT
Start: 2019-07-26 | End: 2019-07-26

## 2019-07-26 RX ORDER — HEPARIN SODIUM 5000 [USP'U]/ML
5000 INJECTION INTRAVENOUS; SUBCUTANEOUS EVERY 8 HOURS
Refills: 0 | Status: DISCONTINUED | OUTPATIENT
Start: 2019-07-26 | End: 2019-07-27

## 2019-07-26 RX ORDER — LOSARTAN POTASSIUM 100 MG/1
25 TABLET, FILM COATED ORAL DAILY
Refills: 0 | Status: DISCONTINUED | OUTPATIENT
Start: 2019-07-26 | End: 2019-07-27

## 2019-07-26 RX ORDER — BUMETANIDE 0.25 MG/ML
1 INJECTION INTRAMUSCULAR; INTRAVENOUS EVERY 8 HOURS
Refills: 0 | Status: DISCONTINUED | OUTPATIENT
Start: 2019-07-26 | End: 2019-07-29

## 2019-07-26 RX ORDER — TIOTROPIUM BROMIDE 18 UG/1
1 CAPSULE ORAL; RESPIRATORY (INHALATION) DAILY
Refills: 0 | Status: DISCONTINUED | OUTPATIENT
Start: 2019-07-26 | End: 2019-08-08

## 2019-07-26 RX ADMIN — BUDESONIDE AND FORMOTEROL FUMARATE DIHYDRATE 2 PUFF(S): 160; 4.5 AEROSOL RESPIRATORY (INHALATION) at 10:53

## 2019-07-26 RX ADMIN — TRAMADOL HYDROCHLORIDE 25 MILLIGRAM(S): 50 TABLET ORAL at 10:52

## 2019-07-26 RX ADMIN — BUMETANIDE 1 MILLIGRAM(S): 0.25 INJECTION INTRAMUSCULAR; INTRAVENOUS at 13:15

## 2019-07-26 RX ADMIN — HEPARIN SODIUM 5000 UNIT(S): 5000 INJECTION INTRAVENOUS; SUBCUTANEOUS at 13:16

## 2019-07-26 RX ADMIN — LOSARTAN POTASSIUM 25 MILLIGRAM(S): 100 TABLET, FILM COATED ORAL at 06:58

## 2019-07-26 RX ADMIN — Medication 3 MILLILITER(S): at 11:31

## 2019-07-26 RX ADMIN — Medication 3 MILLILITER(S): at 17:11

## 2019-07-26 RX ADMIN — BUMETANIDE 1 MILLIGRAM(S): 0.25 INJECTION INTRAMUSCULAR; INTRAVENOUS at 03:15

## 2019-07-26 RX ADMIN — TRAMADOL HYDROCHLORIDE 50 MILLIGRAM(S): 50 TABLET ORAL at 17:12

## 2019-07-26 RX ADMIN — TRAMADOL HYDROCHLORIDE 25 MILLIGRAM(S): 50 TABLET ORAL at 12:00

## 2019-07-26 RX ADMIN — TRAMADOL HYDROCHLORIDE 50 MILLIGRAM(S): 50 TABLET ORAL at 17:51

## 2019-07-26 RX ADMIN — Medication 325 MILLIGRAM(S): at 05:04

## 2019-07-26 RX ADMIN — BUDESONIDE AND FORMOTEROL FUMARATE DIHYDRATE 2 PUFF(S): 160; 4.5 AEROSOL RESPIRATORY (INHALATION) at 17:16

## 2019-07-26 RX ADMIN — HEPARIN SODIUM 5000 UNIT(S): 5000 INJECTION INTRAVENOUS; SUBCUTANEOUS at 21:04

## 2019-07-26 RX ADMIN — SPIRONOLACTONE 50 MILLIGRAM(S): 25 TABLET, FILM COATED ORAL at 13:14

## 2019-07-26 RX ADMIN — WARFARIN SODIUM 5 MILLIGRAM(S): 2.5 TABLET ORAL at 21:05

## 2019-07-26 NOTE — H&P ADULT - NSHPLABSRESULTS_GEN_ALL_CORE
Vital Signs Last 24 Hrs  T(C): 36.4 (26 Jul 2019 05:49), Max: 36.6 (26 Jul 2019 02:11)  T(F): 97.6 (26 Jul 2019 05:49), Max: 97.8 (26 Jul 2019 02:11)  HR: 89 (26 Jul 2019 05:49) (88 - 89)  BP: 106/64 (26 Jul 2019 05:49) (106/64 - 131/78)  BP(mean): --  RR: 15 (26 Jul 2019 05:49) (15 - 24)  SpO2: 96% (26 Jul 2019 05:49) (92% - 100%)          LABS:                        11.8   5.26  )-----------( 242      ( 26 Jul 2019 02:57 )             37.1     07-26    131<L>  |  93<L>  |  26<H>  ----------------------------<  98  3.7   |  22  |  2.06<H>    Ca    9.1      26 Jul 2019 02:57    TPro  8.8<H>  /  Alb  3.5  /  TBili  6.0<H>  /  DBili  x   /  AST  45<H>  /  ALT  23  /  AlkPhos  83  07-26    PT/INR - ( 26 Jul 2019 02:57 )   PT: 27.3 sec;   INR: 2.37 ratio         PTT - ( 26 Jul 2019 02:57 )  PTT:28.3 sec      RADIOLOGY & ADDITIONAL STUDIES:    CXR:  Pulmonary vascular congestion

## 2019-07-26 NOTE — ED ADULT NURSE NOTE - NS ED NOTE ABUSE SUSPICION NEGLECT YN
Subjective:     CC: Hospital Fu    HPI:   Bridger presents today with hospital fu.  Patient with a history of end-stage renal disease on dialysis, CHF, and tobacco use was admitted in May for COPD exacerbation.  He has a history of COPD and noncompliance with medications.  Patient is a poor historian and is being very limited with his answering questions.  Today he states that he is short of breath just like he is always short of breath but not any worse than usual.  He is taking his Symbicort however he never picked up albuterol and he never picked up the Spiriva.  He denies any fevers, chills, night sweats.  He believes he finished his course of antibiotics and he continues to use the oxygen as needed when he drops below 90% on his home pulse oximetry.  He states that he is ready to die and to be with Cuco.    Past Medical History:   Diagnosis Date   • Asthma    • Breath shortness     uses oxygen 3.5 L as needed   • Chronic obstructive pulmonary disease (HCC)    • Cold     12/27/17 cold   • Congestive heart failure ([facility])    • Dental disorder     upper and lower dentures   • Dialysis patient ([facility])     MWF   • Emphysema of lung ([facility])    • Renal disorder    • Snoring    • Urinary incontinence        Social History   Substance Use Topics   • Smoking status: Current Some Day Smoker     Packs/day: 0.25     Types: Cigarettes   • Smokeless tobacco: Never Used      Comment: 5 cigarettes a day   • Alcohol use No       Current Outpatient Prescriptions Ordered in [facility]   Medication Sig Dispense Refill   • tiotropium (SPIRIVA) 18 MCG Cap Inhale 1 Cap by mouth every day. 30 Cap 1   • albuterol (PROAIR HFA) 108 (90 Base) MCG/ACT Aero Soln inhalation aerosol Inhale 2 Puffs by mouth every 6 hours as needed for Shortness of Breath. 8.5 g 3   • carvedilol (COREG) 25 MG Tab Take 25 mg by mouth 2 times a day, with meals.     • lisinopril (PRINIVIL) 10 MG Tab TAKE 1 TABLET BY MOUTH EVERY DAY 90 Tab 0   • budesonide-formoterol  "(SYMBICORT) 160-4.5 MCG/ACT Aerosol Inhale 2 Puffs by mouth 2 Times a Day. 1 Inhaler 1   • aspirin 81 MG tablet Take 81 mg by mouth every evening.     • Cinacalcet HCl (SENSIPAR) 60 MG Tab Take 60 mg by mouth every evening.     • Sevelamer Carbonate (RENVELA) 800 MG Tab Take 800 mg by mouth 3 times a day, with meals. 4 caps each meals., 2 cap with snacks.     • lisinopril (PRINIVIL) 10 MG Tab Take 10 mg by mouth every evening.       No current Epic-ordered facility-administered medications on file.        Allergies:  Chloraprep one step    Health Maintenance: Completed    ROS:  Gen: no fevers/chill, no changes in weight  Eyes: no changes in vision  ENT: no sore throat, no hearing loss, no bloody nose  Pulm: no sob, no cough  CV: no chest pain, no palpitations  GI: no nausea/vomiting, no diarrhea  : no dysuria  MSk: no myalgias  Skin: no rash  Neuro: no headaches, no numbness/tingling  Heme/Lymph: no easy bruising      Objective:       Exam:  /80 (BP Location: Right arm, Patient Position: Sitting, BP Cuff Size: Adult)   Pulse 90   Temp 37.8 °C (100 °F) (Temporal)   Resp 12   Ht 1.803 m (5' 11\")   Wt 91.2 kg (201 lb)   SpO2 96%   BMI 28.03 kg/m²  Body mass index is 28.03 kg/m².    Gen: Alert and oriented, No apparent distress.  Neck: Neck is supple without lymphadenopathy.  Lungs: Normal effort, CTA bilaterally, positive expiratory wheezing in left lower lobe  CV: Regular rate and rhythm. No murmurs, rubs, or gallops.               Ext: No clubbing, cyanosis, edema.      Assessment & Plan:     67 y.o. male with the following -     1. Chronic obstructive pulmonary disease, unspecified COPD type (HCC)  Counseled extensively on inhaler usage, Symbicort being maintenance, albuterol being rescue inhaler, and the reason for Spiriva on his regimen.  Counseled extensively on compliance with his medication and appointments.  Counseled extensively on oxygen use with tobacco use.  Patient is at half a pack a day " currently.  Will refer to pulmonology for formal PFTs.  Follow-up for establishment and annual wellness.  - REFERRAL TO PULMONOLOGY        Please note that this dictation was created using voice recognition software. I have made every reasonable attempt to correct obvious errors, but I expect that there are errors of grammar and possibly content that I did not discover before finalizing the note.       No

## 2019-07-26 NOTE — CONSULT NOTE ADULT - ASSESSMENT
33 yo woman with severe pHTN (?group I, mPAP 67; W 9; RVR 9 on RHC 8/2018,  previously on Adempas, non-complinat), mildly positive anti-cardiolipin Ab and RAD admitted with decompensated right heart failure and volume overload and DIANN in the setting of medication non-compliance.   #pHTN/decompensated right heart failure  -- patient needs to be aggressively diuresed - it appears that her dry wt is about 119kg  -- would give Bumex 2  mg IV tid, if no response can add metolazone 5mg and/or transition to Bumex gtt. She required Milrinone assisted diuresis in the past and I would not be surprised if she needs some inotropic support during this admission as well.  -- goal net out 1.5-2L /24 hours. Strict is/os and daily weights.   -- trend trops. cardiology following  -- agree with continued AC (however pt is interested in transitioning to other form of AC on discharge - would consider NOAC)  -- She still needs VQ scan to complete pHTN workup. Would obtain on this admission.  #DIANN - in the setting of conjestion and volume overload  -- monitor closely while diuresing  #Reactive airway disease - PFTs in the past with reversible obstructive defect  -- c/w Symbicort and Albuterol prn  -- would d/c Spiriva and Duonebs  -- will need repeat PFTs as outpatient after discharge    Patient follows with Dr Unger at [address] [place] [address]. She should follow with him after discharge.     Thank you for this consult. I am away until Aug 5th. Dr Choudhury will follow patient with you.     Shirley Mejia MD  [facility]  Pulmonary Medicine 33 yo woman with severe pHTN (?group I, mPAP 67; W 9; RVR 9 on RHC 8/2018,  previously on Adempas, non-complinat), mildly positive anti-cardiolipin Ab and RAD admitted with decompensated right heart failure and volume overload and DIANN in the setting of medication non-compliance.     #pHTN/decompensated right heart failure  -- patient needs to be aggressively diuresed - it appears that her dry wt is about 119kg  -- would give Bumex 2  mg IV tid, if no response can add metolazone 5mg and/or transition to Bumex gtt. She required Milrinone assisted diuresis in the past and I would not be surprised if she needs some inotropic support during this admission as well.  -- goal net out 1.5-2L /24 hours. Strict is/os and daily weights.   -- trend trops. cardiology following  -- agree with continued AC (however pt is interested in transitioning to other form of AC on discharge - would consider NOAC)  -- She still needs VQ scan to complete pHTN workup. Would obtain on this admission.    #DIANN - in the setting of congestion and volume overload  -- monitor closely while diuresing    #Reactive airway disease - PFTs in the past with reversible obstructive defect  -- c/w Symbicort and Albuterol prn  -- would d/c Spiriva and Duonebs  -- will need repeat PFTs as outpatient after discharge    Patient follows with Dr Unger at [address] [place] [address] Rd. She should follow with him after discharge.     Thank you for this consult. I am away until Aug 5th. Dr Choudhury will follow patient with you.     Shirley Mejia MD  [facility]  Pulmonary Medicine 33 yo woman with severe pHTN (?group I, mPAP 67; W 9; RVR 9 on RHC 8/2018,  previously on Adempas, non-complinat), mildly positive anti-cardiolipin Ab and RAD admitted with decompensated right heart failure and volume overload and DIANN in the setting of medication non-compliance.     #pHTN/decompensated right heart failure  -- patient needs to be aggressively diuresed - it appears that her dry wt is about 119kg  -- would give Bumex 2  mg IV tid, if no response can add metolazone 5mg and/or transition to Bumex gtt. She required Milrinone assisted diuresis in the past and I would not be surprised if she needs some inotropic support during this admission as well.  -- goal net out 1.5-2L /24 hours. Strict is/os and daily weights.   -- trend trops. cardiology following  -- agree with continued AC (however pt is interested in transitioning to other form of AC on discharge - would consider NOAC)  -- She still needs VQ scan to complete pHTN workup. Would obtain on this admission.  -- supplemental O2 to keep sats >90%. would avoid NIV given RHF    #DIANN - in the setting of congestion and volume overload  -- monitor closely while diuresing    #Reactive airway disease - PFTs in the past with reversible obstructive defect  -- c/w Symbicort and Albuterol prn  -- would d/c Spiriva and Duonebs  -- will need repeat PFTs as outpatient after discharge    Patient follows with Dr Unger at [address] [place] [address]. She should follow with him after discharge.     Thank you for this consult. I am away until Aug 5th. Dr Choudhury will follow patient with you.     Shirley Mejia MD  [facility]  Pulmonary Medicine 33 yo woman with severe pHTN (?group I, mPAP 67; W 9; RVR 9 on RHC 8/2018,  previously on Adempas, non-complinat), mildly positive anti-cardiolipin Ab and RAD admitted with decompensated right heart failure and volume overload and DIANN in the setting of medication non-compliance.     #pHTN/decompensated right heart failure  -- patient needs to be aggressively diuresed - it appears that her dry wt is about 119kg  -- would give Bumex 2  mg IV tid, if no response can add metolazone 5mg and/or transition to Bumex gtt. She required Milrinone assisted diuresis in the past and I would not be surprised if she needs some inotropic support during this admission as well.  -- goal net out 1.5-2L /24 hours. Strict is/os and daily weights. Please monitor blood pressures closely while diuresing. She may need pressors/inotropes.   -- trend trops. cardiology following  -- agree with continued AC (however pt is interested in transitioning to other form of AC on discharge - would consider NOAC)  -- She still needs VQ scan to complete pHTN workup. Would obtain on this admission.  -- supplemental O2 to keep sats >90%. would avoid NIV given RHF    #DIANN - in the setting of congestion and volume overload  -- monitor closely while diuresing    #Reactive airway disease - PFTs in the past with reversible obstructive defect  -- c/w Symbicort and Albuterol prn  -- would d/c Spiriva and Duonebs  -- will need repeat PFTs as outpatient after discharge    Patient follows with Dr Unger at [address] [place] [address]. She should follow with him after discharge.     Thank you for this consult. I am away until Aug 5th. Dr Choudhury will follow patient with you.     Shirley Mejia MD  [facility]  Pulmonary Medicine

## 2019-07-26 NOTE — CONSULT NOTE ADULT - SUBJECTIVE AND OBJECTIVE BOX
[facility]  PULMONARY CONSULTATION NOTE    NAME: LAINEY DUNLAP  MEDICAL RECORD NUMBER: [id number]  PLEASE NOTE: PATIENT HAS ANOTHER MRN #: [id number]    CHIEF COMPLAINT: Patient is a 34y old  Female who presents with a chief complaint of CHF exacerbation (2019 05:51)      HISTORY OF PRESENT ILLNESS:   35 yo woman with severe pHTN (previously on Adempas), mildly positive anti-cardiolipin Ab and RAD here with SOB and LE edema.   Patient was diagnosed with pHTN in  and since has followed interminttently with Dr García, Dr Lisker and Dr Unger. She has been maintained on Adempas since 2016 but is not taking it currently. Prior workup was notable for mildly positive anti-cardiolipin Ab (but does not meet criteria for APLS per heme note), no e/o PEs on prior CTA and no e/o parenchymal lung disease. She never got VQ scan. Most recent cath on 2018 with PA pressures 108/43/67; W 9, PVR 14, no response to Daniel. It appears that her dry weight is around 119kg.   In Aug 2018, she was hospitalized at [facility] with decompensated right heart failure requiring Milrinone assisted diuresis. Per [employer] chart, she is supposed to be maintained on Bumex 2 tid, Spironolactone 50mg, Metolazone, Adempas and Coumadin. However, patient reports that she has not been taking Adempas since Aug 2018, and ran out of her diuretics 3-4 weeks ago (missed at least a week, likely more). She has not been taking coumadin as the thinks "it does not work".  She is not admitted with SOB, severe REUBEN, decreased ET, DIANN, abd pain, n/v in the setting of decompensated heart failure.   She is very emotional about her disease, feels like "she is a burden to everyone" but denies suicidal ideations.       ====================BACKGROUND INFORMATION============================    FAMILY HISTORY: hx of DM in both partens  Sisters with hx of PE/DVT and miscarriages      PAST MEDICAL AND SURGICAL HISTORY: PAST MEDICAL & SURGICAL HISTORY:  Congestive heart failure, unspecified HF chronicity, unspecified heart failure type  Moderate asthma, unspecified whether complicated, unspecified whether persistent  Other secondary hypertension      ALLERGIES:Allergies    No Known Allergies    Intolerances    HOME MEDICATIONS:  -  non-compliant  Coumadin  Bumex  Spironolactone  metolazone  Symbicort      OUTPATIENT PULMONARY DOCTOR:     SOCIAL HISTORY: on disability  TOBACCO USE: former smoker  ALCOHOL: denies  DRUGS: denies  MARITAL STATUS: single  EMPLOYMENT: unemployed      ====================REVIEW OF SYSTEMS=====================================  CONSTITUTIONAL:  CARDIOVASCULAR: chest discomfort, REUBEN, orhopnea  PULMONARY: +SOB  GASTROINTESTINAL: n/v, LUQ abd pain  [x] ALL OTHER REVIEW OF SYSTEMS ARE NEGATIVE   [] UNABLE TO OBTAIN REVIEW OF SYSTEMS DUE TO ______________      ====================PHYSICAL EXAM=========================================    VITALS: ICU Vital Signs Last 24 Hrs  T(C): 35.8 (2019 11:21), Max: 36.7 (2019 06:35)  T(F): 96.5 (2019 11:21), Max: 98 (2019 06:35)  HR: 78 (2019 11:21) (78 - 89)  BP: 98/54 (2019 11:21) (98/54 - 131/78)  RR: 15 (2019 11:21) (15 - 24)  SpO2: 94% (2019 11:21) (92% - 100%)      INTAKE and OUTPUT: I&O's Summary      WEIGHT: Daily Height in cm: 175.26 (2019 06:35)    Daily Weight in k.2 (2019 06:35)    GLUCOSE: CAPILLARY BLOOD GLUCOSE      POCT Blood Glucose.: 91 mg/dL (2019 11:04)      VENTILATOR SETTINGS:     GENERAL: sitting up in bed, NAD at rest  HEENT: OP clear, MMM  NECK: supple, +JVD to ear lobe, no lymphadenopathy appreciated  CARDIOVASCULAR: rrr, no mrg  PULMONARY: clear to auscultation bilaterally, no wheezes or rhonchi  ABDOMEN: soft, nt, nd  SKIN: no rash  EXTREMITIES: +3 REUBEN all the way to thing  NEUROLOGIC: CN II-XII grossly intact    ====================MEDICATIONS===========================================  MEDICATIONS  (STANDING):  ALBUTerol    90 MICROgram(s) HFA Inhaler 1 Puff(s) Inhalation every 4 hours  ALBUTerol/ipratropium for Nebulization 3 milliLiter(s) Nebulizer every 6 hours  buDESOnide  80 MICROgram(s)/formoterol 4.5 MICROgram(s) Inhaler 2 Puff(s) Inhalation two times a day  buMETAnide Injectable 1 milliGRAM(s) IV Push every 8 hours  heparin  Injectable 5000 Unit(s) SubCutaneous every 8 hours  losartan 25 milliGRAM(s) Oral daily  spironolactone 50 milliGRAM(s) Oral daily  tiotropium 18 MICROgram(s) Capsule 1 Capsule(s) Inhalation daily  warfarin 5 milliGRAM(s) Oral once      MEDICATIONS  (PRN):      ====================LABORATORY RESULTS====================================  CBC Full  -  ( 2019 08:22 )  WBC Count : 5.25 K/uL  RBC Count : 4.96 M/uL  Hemoglobin : 11.3 g/dL  Hematocrit : 36.7 %  Platelet Count - Automated : 224 K/uL  Mean Cell Volume : 74.0 fl  Mean Cell Hemoglobin : 22.8 pg  Mean Cell Hemoglobin Concentration : 30.8 gm/dL  Auto Neutrophil # : 3.20 K/uL  Auto Lymphocyte # : 1.37 K/uL  Auto Monocyte # : 0.58 K/uL  Auto Eosinophil # : 0.05 K/uL  Auto Basophil # : 0.05 K/uL  Auto Neutrophil % : 61.0 %  Auto Lymphocyte % : 26.0 %  Auto Monocyte % : 11.0 %  Auto Eosinophil % : 1.0 %  Auto Basophil % : 1.0 %                                        135  |  97  |  26<H>  ----------------------------<  95  3.6   |  21<L>  |  1.95<H>    Ca    9.0      2019 08:22    TPro  8.8<H>  /  Alb  3.5  /  TBili  6.0<H>  /  DBili  x   /  AST  45<H>  /  ALT  23  /  AlkPhos  83          PT/INR - ( 2019 02:57 )   PT: 27.3 sec;   INR: 2.37 ratio         PTT - ( 2019 02:57 )  PTT:28.3 sec    Creatinine Trend: 1.95<--, 2.06<--      ====================RADIOLOGY and ECHOCARDIOGRAPHY=======================    CXR: volume overload      ECHOCARDIOGRAPHY:  < from: TTE Echo Doppler w/o Cont (19 @ 08:44) >  1. Left ventricular ejection fraction, by visual estimation, is 45 to   50%.   2. Mildly decreased global left ventricular systolic function.   3. Elevated mean left atrial pressure.   4. Global cardiomyopathy.   5. Increased LV wall thickness.   6. Normal left ventricular internal cavity size.   7. Right ventricular pressure overload.   8. Spectral Doppler shows impaired relaxation pattern of left   ventricular myocardial filling (Grade I diastolic dysfunction).   9. There is mild asymmetric left ventricular hypertrophy.  10. Pulmonary hypertension.  11. Severely enlarged right ventricle.  12. Severely reduced RV systolic function.  13. RV ejection fraction 34%.  14. Small pericardial effusion.  15. Degenerative mitral valve.  16. Mild mitral annular calcification.  17. Mild mitral valve regurgitation.  18. Moderate tricuspid regurgitation.  19. Structurally normal tricuspid valve, with normal leaflet excursion.  20. Mild aortic regurgitation.  21. Mild to moderate pulmonic valve regurgitation.  22. Structurally normal pulmonic valve, with normal leaflet excursion.  23. Estimated pulmonary artery systolic pressure is 87.1 mmHg assuming a   right atrial pressure of 20 mmHg, which is consistent with severe   pulmonary hypertension.  24. Pulmonary hypertension is present.  25. The main pulmonary artery is normal in size.  26. The right atrial pressure is moderately elevated.    < end of copied text >

## 2019-07-26 NOTE — ED ADULT NURSE NOTE - PMH
Congestive heart failure, unspecified HF chronicity, unspecified heart failure type    Moderate asthma, unspecified whether complicated, unspecified whether persistent    Other secondary hypertension

## 2019-07-26 NOTE — ED PROVIDER NOTE - MUSCULOSKELETAL, MLM
Spine appears normal, range of motion is not limited, no muscle or joint tenderness. 2+ pitting edema bilaterally

## 2019-07-26 NOTE — ED ADULT TRIAGE NOTE - CHIEF COMPLAINT QUOTE
biba patient reports difficulty breathing x 3 weeks, worse today, taken off of lasix, on bumex, b/l leg swelling 3+, hx asthma, CHF, HTN

## 2019-07-26 NOTE — H&P ADULT - PROBLEM SELECTOR PLAN 1
Telemetry monitoring.  Bumex IVP  Strict input/output monitoring,  Daily weight monitoring.  Fluid restriction to 1 liter/24 hrs.  Continue BiPAP, maintain NPO  3 Sets of cardiac enzymes q8hrs.  TTE  Cardiology consult  Elevate Head end of bed >35*

## 2019-07-26 NOTE — CHART NOTE - NSCHARTNOTEFT_GEN_A_CORE
seen and examined. Pulm/Cardio c/s, c/w coumadin. Gi eval for hyperbilirubinemia     Vital Signs Last 24 Hrs  T(C): 35.8 (26 Jul 2019 11:21), Max: 36.7 (26 Jul 2019 06:35)  T(F): 96.5 (26 Jul 2019 11:21), Max: 98 (26 Jul 2019 06:35)  HR: 78 (26 Jul 2019 11:21) (78 - 89)  BP: 98/54 (26 Jul 2019 11:21) (98/54 - 131/78)  BP(mean): --  RR: 15 (26 Jul 2019 11:21) (15 - 24)  SpO2: 94% (26 Jul 2019 11:21) (92% - 100%)                          11.3   5.25  )-----------( 224      ( 26 Jul 2019 08:22 )             36.7     07-26    135  |  97  |  26<H>  ----------------------------<  95  3.6   |  21<L>  |  1.95<H>    Ca    9.0      26 Jul 2019 08:22    TPro  8.8<H>  /  Alb  3.5  /  TBili  6.0<H>  /  DBili  x   /  AST  45<H>  /  ALT  23  /  AlkPhos  83  07-26    PT/INR - ( 26 Jul 2019 02:57 )   PT: 27.3 sec;   INR: 2.37 ratio         PTT - ( 26 Jul 2019 02:57 )  PTT:28.3 sec

## 2019-07-26 NOTE — H&P ADULT - NSHPPHYSICALEXAM_GEN_ALL_CORE
GENERAL: NAD, well-groomed, well-developed  HEAD:  Atraumatic, Normocephalic  EYES: EOMI, PERRLA, conjunctiva and sclera clear  ENMT: No tonsillar erythema, exudates, or enlargement; Moist mucous membranes, No lesions  NECK: Supple, No JVD, Normal thyroid  NERVOUS SYSTEM:  Alert & Oriented X3, Good concentration  CHEST/LUNG: Rales bilaterally; No rhonchi, wheezing, or rubs  HEART: Regular rate and rhythm; No murmurs, rubs, or gallops  ABDOMEN: Soft, Nontender, Nondistended; Bowel sounds present  EXTREMITIES: no clubbing, cyanosis, or edema  SKIN: no rashes or lesions  PSYCH: normal affect and behavior

## 2019-07-26 NOTE — H&P ADULT - HISTORY OF PRESENT ILLNESS
34 year old woman with CHF, asthma presents to ED with complaint of SOB and leg swelling.  She states that she can only walk 1-2 blocks at baseline and recently her exercise tolerance has decreased.  She denies chest pain, palpitations, lightheadedness, nausea, vomiting.    In the ED, troponin 0.435, BNP 4049, CXR shows pulmonary vasc congestion.  Pt started on BiPAP by ED.

## 2019-07-26 NOTE — H&P ADULT - ASSESSMENT
34 year old woman with CHF, asthma presents to ED with complaint of SOB and leg swelling.  Patient will require admission for at least 2 midnights as detailed below:    IMPROVE VTE Individual Risk Assessment          RISK                                                          Points    [  ] Previous VTE                                                3    [  ] Thrombophilia                                             2    [  ] Lower limb paralysis                                    2        (unable to hold up >15 seconds)      [  ] Current Cancer                                             2         (within 6 months)    [  x] Immobilization > 24 hrs                              1    [  ] ICU/CCU stay > 24 hours                            1    [  ] Age > 60                                                    1    IMPROVE VTE Score __2_______

## 2019-07-26 NOTE — ED PROVIDER NOTE - CLINICAL SUMMARY MEDICAL DECISION MAKING FREE TEXT BOX
Ddx: CHF exacerbation/ no pleuritic pain to suggest PE/ No chest pain to suggest ACS or dissection  Plan: Cbc, cmp, bnp, cxr, ecg, troponin, bumex, likely admit, bipap

## 2019-07-26 NOTE — ED PROVIDER NOTE - OBJECTIVE STATEMENT
Pt is a 35 yo lady with a pmhx of HTN, pHTN, CHF on bumex who presents to the ED with sob. She has had sob over the past several days, with bilateral leg swelling. No calf pain, no chest pain, no fever. Has had a cough productive of white phlegm. Says she is compliant with her bumex. No fevers, no hx of dvt/pe.

## 2019-07-27 DIAGNOSIS — D68.61 ANTIPHOSPHOLIPID SYNDROME: ICD-10-CM

## 2019-07-27 DIAGNOSIS — E80.6 OTHER DISORDERS OF BILIRUBIN METABOLISM: ICD-10-CM

## 2019-07-27 LAB
ALBUMIN SERPL ELPH-MCNC: 3 G/DL — LOW (ref 3.3–5)
ALBUMIN SERPL ELPH-MCNC: 3.2 G/DL — LOW (ref 3.3–5)
ALP SERPL-CCNC: 74 U/L — SIGNIFICANT CHANGE UP (ref 40–120)
ALP SERPL-CCNC: 77 U/L — SIGNIFICANT CHANGE UP (ref 40–120)
ALT FLD-CCNC: 24 U/L — SIGNIFICANT CHANGE UP (ref 12–78)
ALT FLD-CCNC: 25 U/L — SIGNIFICANT CHANGE UP (ref 12–78)
ANION GAP SERPL CALC-SCNC: 11 MMOL/L — SIGNIFICANT CHANGE UP (ref 5–17)
ANION GAP SERPL CALC-SCNC: 14 MMOL/L — SIGNIFICANT CHANGE UP (ref 5–17)
AST SERPL-CCNC: 48 U/L — HIGH (ref 15–37)
AST SERPL-CCNC: 49 U/L — HIGH (ref 15–37)
BILIRUB SERPL-MCNC: 4 MG/DL — HIGH (ref 0.2–1.2)
BILIRUB SERPL-MCNC: 5.1 MG/DL — HIGH (ref 0.2–1.2)
BUN SERPL-MCNC: 32 MG/DL — HIGH (ref 7–23)
BUN SERPL-MCNC: 35 MG/DL — HIGH (ref 7–23)
CALCIUM SERPL-MCNC: 8.1 MG/DL — LOW (ref 8.5–10.1)
CALCIUM SERPL-MCNC: 9.3 MG/DL — SIGNIFICANT CHANGE UP (ref 8.5–10.1)
CHLORIDE SERPL-SCNC: 95 MMOL/L — LOW (ref 96–108)
CHLORIDE SERPL-SCNC: 98 MMOL/L — SIGNIFICANT CHANGE UP (ref 96–108)
CK MB BLD-MCNC: 2.6 % — SIGNIFICANT CHANGE UP (ref 0–3.5)
CK MB CFR SERPL CALC: 8.1 NG/ML — HIGH (ref 0.5–3.6)
CK SERPL-CCNC: 312 U/L — HIGH (ref 26–192)
CO2 SERPL-SCNC: 21 MMOL/L — LOW (ref 22–31)
CO2 SERPL-SCNC: 25 MMOL/L — SIGNIFICANT CHANGE UP (ref 22–31)
CREAT SERPL-MCNC: 2.59 MG/DL — HIGH (ref 0.5–1.3)
CREAT SERPL-MCNC: 2.61 MG/DL — HIGH (ref 0.5–1.3)
GLUCOSE SERPL-MCNC: 144 MG/DL — HIGH (ref 70–99)
GLUCOSE SERPL-MCNC: 69 MG/DL — LOW (ref 70–99)
HCT VFR BLD CALC: 37.8 % — SIGNIFICANT CHANGE UP (ref 34.5–45)
HCT VFR BLD CALC: 39.8 % — SIGNIFICANT CHANGE UP (ref 34.5–45)
HGB BLD-MCNC: 11.6 G/DL — SIGNIFICANT CHANGE UP (ref 11.5–15.5)
HGB BLD-MCNC: 12.3 G/DL — SIGNIFICANT CHANGE UP (ref 11.5–15.5)
INR BLD: 1.69 RATIO — HIGH (ref 0.88–1.16)
MAGNESIUM SERPL-MCNC: 2.3 MG/DL — SIGNIFICANT CHANGE UP (ref 1.6–2.6)
MCHC RBC-ENTMCNC: 22.9 PG — LOW (ref 27–34)
MCHC RBC-ENTMCNC: 22.9 PG — LOW (ref 27–34)
MCHC RBC-ENTMCNC: 30.7 GM/DL — LOW (ref 32–36)
MCHC RBC-ENTMCNC: 30.9 GM/DL — LOW (ref 32–36)
MCV RBC AUTO: 74.3 FL — LOW (ref 80–100)
MCV RBC AUTO: 74.6 FL — LOW (ref 80–100)
NRBC # BLD: 2 /100 WBCS — HIGH (ref 0–0)
NRBC # BLD: 2 /100 WBCS — HIGH (ref 0–0)
PHOSPHATE SERPL-MCNC: 4.6 MG/DL — HIGH (ref 2.5–4.5)
PLATELET # BLD AUTO: 227 K/UL — SIGNIFICANT CHANGE UP (ref 150–400)
PLATELET # BLD AUTO: 230 K/UL — SIGNIFICANT CHANGE UP (ref 150–400)
POTASSIUM SERPL-MCNC: 3.6 MMOL/L — SIGNIFICANT CHANGE UP (ref 3.5–5.3)
POTASSIUM SERPL-MCNC: 3.7 MMOL/L — SIGNIFICANT CHANGE UP (ref 3.5–5.3)
POTASSIUM SERPL-SCNC: 3.6 MMOL/L — SIGNIFICANT CHANGE UP (ref 3.5–5.3)
POTASSIUM SERPL-SCNC: 3.7 MMOL/L — SIGNIFICANT CHANGE UP (ref 3.5–5.3)
PROT SERPL-MCNC: 7.9 GM/DL — SIGNIFICANT CHANGE UP (ref 6–8.3)
PROT SERPL-MCNC: 8.1 GM/DL — SIGNIFICANT CHANGE UP (ref 6–8.3)
PROTHROM AB SERPL-ACNC: 19.2 SEC — HIGH (ref 10–12.9)
RBC # BLD: 5.07 M/UL — SIGNIFICANT CHANGE UP (ref 3.8–5.2)
RBC # BLD: 5.36 M/UL — HIGH (ref 3.8–5.2)
RBC # FLD: 22.1 % — HIGH (ref 10.3–14.5)
RBC # FLD: 22.2 % — HIGH (ref 10.3–14.5)
SODIUM SERPL-SCNC: 131 MMOL/L — LOW (ref 135–145)
SODIUM SERPL-SCNC: 133 MMOL/L — LOW (ref 135–145)
TROPONIN I SERPL-MCNC: 1.17 NG/ML — HIGH (ref 0.01–0.04)
WBC # BLD: 4.36 K/UL — SIGNIFICANT CHANGE UP (ref 3.8–10.5)
WBC # BLD: 4.72 K/UL — SIGNIFICANT CHANGE UP (ref 3.8–10.5)
WBC # FLD AUTO: 4.36 K/UL — SIGNIFICANT CHANGE UP (ref 3.8–10.5)
WBC # FLD AUTO: 4.72 K/UL — SIGNIFICANT CHANGE UP (ref 3.8–10.5)

## 2019-07-27 PROCEDURE — 99232 SBSQ HOSP IP/OBS MODERATE 35: CPT

## 2019-07-27 PROCEDURE — 93010 ELECTROCARDIOGRAM REPORT: CPT

## 2019-07-27 PROCEDURE — 99233 SBSQ HOSP IP/OBS HIGH 50: CPT

## 2019-07-27 PROCEDURE — 99291 CRITICAL CARE FIRST HOUR: CPT

## 2019-07-27 RX ORDER — IPRATROPIUM/ALBUTEROL SULFATE 18-103MCG
3 AEROSOL WITH ADAPTER (GRAM) INHALATION ONCE
Refills: 0 | Status: COMPLETED | OUTPATIENT
Start: 2019-07-27 | End: 2019-07-27

## 2019-07-27 RX ORDER — SERTRALINE 25 MG/1
50 TABLET, FILM COATED ORAL DAILY
Refills: 0 | Status: DISCONTINUED | OUTPATIENT
Start: 2019-07-27 | End: 2019-08-08

## 2019-07-27 RX ORDER — SODIUM CHLORIDE 9 MG/ML
250 INJECTION INTRAMUSCULAR; INTRAVENOUS; SUBCUTANEOUS ONCE
Refills: 0 | Status: COMPLETED | OUTPATIENT
Start: 2019-07-27 | End: 2019-07-27

## 2019-07-27 RX ORDER — DOBUTAMINE HCL 250MG/20ML
10 VIAL (ML) INTRAVENOUS
Qty: 500 | Refills: 0 | Status: DISCONTINUED | OUTPATIENT
Start: 2019-07-27 | End: 2019-07-28

## 2019-07-27 RX ORDER — ACETAMINOPHEN 500 MG
650 TABLET ORAL EVERY 6 HOURS
Refills: 0 | Status: DISCONTINUED | OUTPATIENT
Start: 2019-07-27 | End: 2019-07-28

## 2019-07-27 RX ORDER — CHLORHEXIDINE GLUCONATE 213 G/1000ML
1 SOLUTION TOPICAL
Refills: 0 | Status: DISCONTINUED | OUTPATIENT
Start: 2019-07-27 | End: 2019-08-08

## 2019-07-27 RX ORDER — DOBUTAMINE HCL 250MG/20ML
2.5 VIAL (ML) INTRAVENOUS
Qty: 500 | Refills: 0 | Status: DISCONTINUED | OUTPATIENT
Start: 2019-07-27 | End: 2019-07-27

## 2019-07-27 RX ORDER — NOREPINEPHRINE BITARTRATE/D5W 8 MG/250ML
0.05 PLASTIC BAG, INJECTION (ML) INTRAVENOUS
Qty: 8 | Refills: 0 | Status: DISCONTINUED | OUTPATIENT
Start: 2019-07-27 | End: 2019-07-28

## 2019-07-27 RX ORDER — BUMETANIDE 0.25 MG/ML
1 INJECTION INTRAMUSCULAR; INTRAVENOUS ONCE
Refills: 0 | Status: COMPLETED | OUTPATIENT
Start: 2019-07-27 | End: 2019-07-27

## 2019-07-27 RX ORDER — LEVOTHYROXINE SODIUM 125 MCG
25 TABLET ORAL DAILY
Refills: 0 | Status: DISCONTINUED | OUTPATIENT
Start: 2019-07-27 | End: 2019-08-08

## 2019-07-27 RX ORDER — MORPHINE SULFATE 50 MG/1
2 CAPSULE, EXTENDED RELEASE ORAL EVERY 6 HOURS
Refills: 0 | Status: DISCONTINUED | OUTPATIENT
Start: 2019-07-27 | End: 2019-08-02

## 2019-07-27 RX ORDER — RIOCIGUAT 1.5 MG/1
1.5 TABLET, FILM COATED ORAL EVERY 8 HOURS
Refills: 0 | Status: DISCONTINUED | OUTPATIENT
Start: 2019-07-27 | End: 2019-08-08

## 2019-07-27 RX ORDER — WARFARIN SODIUM 2.5 MG/1
7.5 TABLET ORAL ONCE
Refills: 0 | Status: COMPLETED | OUTPATIENT
Start: 2019-07-27 | End: 2019-07-27

## 2019-07-27 RX ORDER — NITROGLYCERIN 6.5 MG
0.25 CAPSULE, EXTENDED RELEASE ORAL ONCE
Refills: 0 | Status: COMPLETED | OUTPATIENT
Start: 2019-07-27 | End: 2019-07-27

## 2019-07-27 RX ORDER — MORPHINE SULFATE 50 MG/1
2 CAPSULE, EXTENDED RELEASE ORAL ONCE
Refills: 0 | Status: DISCONTINUED | OUTPATIENT
Start: 2019-07-27 | End: 2019-07-27

## 2019-07-27 RX ADMIN — MORPHINE SULFATE 2 MILLIGRAM(S): 50 CAPSULE, EXTENDED RELEASE ORAL at 16:00

## 2019-07-27 RX ADMIN — Medication 5 MILLIGRAM(S): at 21:55

## 2019-07-27 RX ADMIN — BUMETANIDE 1 MILLIGRAM(S): 0.25 INJECTION INTRAMUSCULAR; INTRAVENOUS at 13:21

## 2019-07-27 RX ADMIN — Medication 9.24 MICROGRAM(S)/KG/MIN: at 13:25

## 2019-07-27 RX ADMIN — WARFARIN SODIUM 7.5 MILLIGRAM(S): 2.5 TABLET ORAL at 22:58

## 2019-07-27 RX ADMIN — SODIUM CHLORIDE 250 MILLILITER(S): 9 INJECTION INTRAMUSCULAR; INTRAVENOUS; SUBCUTANEOUS at 00:36

## 2019-07-27 RX ADMIN — Medication 650 MILLIGRAM(S): at 23:20

## 2019-07-27 RX ADMIN — Medication 3 MILLILITER(S): at 21:29

## 2019-07-27 RX ADMIN — MORPHINE SULFATE 2 MILLIGRAM(S): 50 CAPSULE, EXTENDED RELEASE ORAL at 16:15

## 2019-07-27 RX ADMIN — Medication 3 MILLILITER(S): at 23:26

## 2019-07-27 RX ADMIN — BUDESONIDE AND FORMOTEROL FUMARATE DIHYDRATE 2 PUFF(S): 160; 4.5 AEROSOL RESPIRATORY (INHALATION) at 05:36

## 2019-07-27 RX ADMIN — TRAMADOL HYDROCHLORIDE 50 MILLIGRAM(S): 50 TABLET ORAL at 14:21

## 2019-07-27 RX ADMIN — Medication 3 MILLILITER(S): at 11:02

## 2019-07-27 RX ADMIN — BUMETANIDE 1 MILLIGRAM(S): 0.25 INJECTION INTRAMUSCULAR; INTRAVENOUS at 16:30

## 2019-07-27 RX ADMIN — Medication 3 MILLILITER(S): at 05:18

## 2019-07-27 RX ADMIN — MORPHINE SULFATE 2 MILLIGRAM(S): 50 CAPSULE, EXTENDED RELEASE ORAL at 16:30

## 2019-07-27 RX ADMIN — TRAMADOL HYDROCHLORIDE 50 MILLIGRAM(S): 50 TABLET ORAL at 07:48

## 2019-07-27 RX ADMIN — SERTRALINE 50 MILLIGRAM(S): 25 TABLET, FILM COATED ORAL at 23:21

## 2019-07-27 RX ADMIN — BUDESONIDE AND FORMOTEROL FUMARATE DIHYDRATE 2 PUFF(S): 160; 4.5 AEROSOL RESPIRATORY (INHALATION) at 18:13

## 2019-07-27 RX ADMIN — Medication 3 MILLILITER(S): at 00:30

## 2019-07-27 RX ADMIN — HEPARIN SODIUM 5000 UNIT(S): 5000 INJECTION INTRAVENOUS; SUBCUTANEOUS at 05:36

## 2019-07-27 RX ADMIN — Medication 11.55 MICROGRAM(S)/KG/MIN: at 16:15

## 2019-07-27 RX ADMIN — TRAMADOL HYDROCHLORIDE 50 MILLIGRAM(S): 50 TABLET ORAL at 13:21

## 2019-07-27 RX ADMIN — MORPHINE SULFATE 2 MILLIGRAM(S): 50 CAPSULE, EXTENDED RELEASE ORAL at 21:25

## 2019-07-27 RX ADMIN — Medication 0.25 INCH(S): at 21:39

## 2019-07-27 RX ADMIN — MORPHINE SULFATE 2 MILLIGRAM(S): 50 CAPSULE, EXTENDED RELEASE ORAL at 21:10

## 2019-07-27 RX ADMIN — TRAMADOL HYDROCHLORIDE 50 MILLIGRAM(S): 50 TABLET ORAL at 08:18

## 2019-07-27 NOTE — PROGRESS NOTE ADULT - PROBLEM SELECTOR PLAN 4
on coumadin
Extensive workup performed as an outpatient appreciated GI consult and out patient work up

## 2019-07-27 NOTE — CONSULT NOTE ADULT - SUBJECTIVE AND OBJECTIVE BOX
Chief Complaint:  Patient is a 34y old  Female who presents with a chief complaint of CHF exacerbation (2019 10:41)      HPI:  34 year old woman with CHF, asthma presents to ED with complaint of SOB and leg swelling.  She states that she can only walk 1-2 blocks at baseline and recently her exercise tolerance has decreased.  She denies chest pain, palpitations, lightheadedness, nausea, vomiting.    In the ED, troponin 0.435, BNP 4049, CXR shows pulmonary vasc congestion.  Pt started on BiPAP by ED. (2019 05:51)      PMH/PSH:PAST MEDICAL & SURGICAL HISTORY:  Congestive heart failure, unspecified HF chronicity, unspecified heart failure type  Moderate asthma, unspecified whether complicated, unspecified whether persistent  Other secondary hypertension      Allergies:  No Known Allergies      Medications:  ALBUTerol    90 MICROgram(s) HFA Inhaler 1 Puff(s) Inhalation every 4 hours  ALBUTerol/ipratropium for Nebulization 3 milliLiter(s) Nebulizer every 6 hours  buDESOnide  80 MICROgram(s)/formoterol 4.5 MICROgram(s) Inhaler 2 Puff(s) Inhalation two times a day  buMETAnide Injectable 1 milliGRAM(s) IV Push every 8 hours  chlorhexidine 4% Liquid 1 Application(s) Topical <User Schedule>  losartan 25 milliGRAM(s) Oral daily  spironolactone 50 milliGRAM(s) Oral daily  tiotropium 18 MICROgram(s) Capsule 1 Capsule(s) Inhalation daily  traMADol 50 milliGRAM(s) Oral every 6 hours PRN  warfarin 7.5 milliGRAM(s) Oral once      REVIEW OF SYSTEMS:  All other review of systems is negative unless indicated above.    Relevant Family History:   FAMILY HISTORY:      Relevant Social History:  Denies ETOH or tobacco history    Physical Exam:    Vital Signs:  Vital Signs Last 24 Hrs  T(C): 36.2 (2019 05:24), Max: 36.2 (2019 05:24)  T(F): 97.2 (2019 05:24), Max: 97.2 (2019 05:24)  HR: 67 (2019 05:38) (66 - 89)  BP: 85/54 (2019 05:24) (82/43 - 98/54)  BP(mean): --  RR: 18 (2019 05:24) (15 - 18)  SpO2: 95% (2019 05:38) (86% - 100%)  Daily     Daily Weight in k.4 (2019 05:24)    Constitutional: WDWN resting comfortably in bed; NAD  HEENT: NC/AT, PERRL, EOMI, anicteric sclera, no nasal discharge; uvula midline, no oropharyngeal erythema or exudates  Neck: supple; no JVD or thyromegaly  Respiratory: CTA B/L; no W/R/R, no retractions  Cardiac: +S1/S2; RRR; no M/R/G; PMI non-displaced  Gastrointestinal: soft, NT/ND; no rebound or guarding; +BS   Extremities: , no clubbing or cyanosis; no peripheral edema  Musculoskeletal:  no joint swelling, tenderness or erythema  Vascular: 2+ radial, femoral, DP/PT pulses B/L  Skin: warm, dry and intact; no rashes, wounds, or scars  Neurologic: AAOx3; CNS grossly intact; no focal deficits no asterixis, no tremor, no encephalopathy    Laboratory:                          11.6   4.72  )-----------( 227      ( 2019 07:45 )             37.8     07-    131<L>  |  95<L>  |  32<H>  ----------------------------<  69<L>  3.6   |  25  |  2.61<H>    Ca    9.3      2019 07:45    TPro  8.1  /  Alb  3.2<L>  /  TBili  5.1<H>  /  DBili  x   /  AST  48<H>  /  ALT  24  /  AlkPhos  77  07-27    LIVER FUNCTIONS - ( 2019 07:45 )  Alb: 3.2 g/dL / Pro: 8.1 gm/dL / ALK PHOS: 77 U/L / ALT: 24 U/L / AST: 48 U/L / GGT: x           PT/INR - ( 2019 07:45 )   PT: 19.2 sec;   INR: 1.69 ratio         PTT - ( 2019 02:57 )  PTT:28.3 sec      Lipase serum86 U/L     Intake and Output    19 @ 07:01  -  19 @ 07:00  --------------------------------------------------------  IN: 805 mL / OUT: 0 mL / NET: 805 mL

## 2019-07-27 NOTE — PROGRESS NOTE ADULT - PROBLEM SELECTOR PROBLEM 2
Moderate asthma, unspecified whether complicated, unspecified whether persistent
Other cardiomyopathy

## 2019-07-27 NOTE — PROGRESS NOTE ADULT - ASSESSMENT
This is a 34 year old female with antiphospholipid syndrome on warfarin with subtherapeutic INR, with severe pulmonary hypertension, pharmacy securing [employer] tonight (patient states mother will bring hers from home in AM) whom has hypotension, poor oxygenation and now with elevated tropnins. Cardiology on consult; Dr. Aguilar service notified This is a 34 year old female with antiphospholipid syndrome on warfarin with subtherapeutic INR, with severe pulmonary hypertension, pharmacy securing [employer]emp[employer] tonight (patient states mother will bring hers from home in AM) whom has hypotension, poor oxygenation and now with elevated tropnins. Cardiology on consult; Dr. Aguilar service notified    Small extravasation of levophed left PIV. Infusion stopped patient initially refusing SQ Regitine and becoming anxious with complaint of chest pain. NTG cream utilized and then Regitine followed by NTG cream. Area smooth, nonindurated after treatment and patient denies discomfort

## 2019-07-27 NOTE — PROGRESS NOTE ADULT - SUBJECTIVE AND OBJECTIVE BOX
Patient is a 34y old  Female who presents with a chief complaint of CHF exacerbation (26 Jul 2019 17:26)      INTERVAL HPI/OVERNIGHT EVENTS: hypotensive overnight     MEDICATIONS  (STANDING):  ALBUTerol    90 MICROgram(s) HFA Inhaler 1 Puff(s) Inhalation every 4 hours  ALBUTerol/ipratropium for Nebulization 3 milliLiter(s) Nebulizer every 6 hours  buDESOnide  80 MICROgram(s)/formoterol 4.5 MICROgram(s) Inhaler 2 Puff(s) Inhalation two times a day  buMETAnide Injectable 1 milliGRAM(s) IV Push every 8 hours  chlorhexidine 4% Liquid 1 Application(s) Topical <User Schedule>  heparin  Injectable 5000 Unit(s) SubCutaneous every 8 hours  losartan 25 milliGRAM(s) Oral daily  spironolactone 50 milliGRAM(s) Oral daily  tiotropium 18 MICROgram(s) Capsule 1 Capsule(s) Inhalation daily    MEDICATIONS  (PRN):  traMADol 50 milliGRAM(s) Oral every 6 hours PRN Severe Pain (7 - 10)      Allergies    No Known Allergies    Intolerances           Vital Signs Last 24 Hrs  T(C): 36.2 (27 Jul 2019 05:24), Max: 36.2 (27 Jul 2019 05:24)  T(F): 97.2 (27 Jul 2019 05:24), Max: 97.2 (27 Jul 2019 05:24)  HR: 67 (27 Jul 2019 05:38) (66 - 89)  BP: 85/54 (27 Jul 2019 05:24) (82/43 - 98/54)  BP(mean): --  RR: 18 (27 Jul 2019 05:24) (15 - 18)  SpO2: 95% (27 Jul 2019 05:38) (86% - 100%)    PHYSICAL EXAM:  GENERAL: NAD, well-groomed, well-developed  HEAD:  Atraumatic, Normocephalic  EYES: EOMI, PERRLA, conjunctiva and sclera clear  ENMT: No tonsillar erythema, exudates, or enlargement; Moist mucous membranes, Good dentition, No lesions  NECK: Supple, No JVD, Normal thyroid  NERVOUS SYSTEM:  Alert & Oriented X3, Good concentration; Motor Strength 5/5 B/L upper and lower extremities; DTRs 2+ intact and symmetric  CHEST/LUNG: Clear to percussion bilaterally; No rales, rhonchi, wheezing, or rubs  HEART: Regular rate and rhythm; No murmurs, rubs, or gallops  ABDOMEN: Soft, Nontender, Nondistended; Bowel sounds present  EXTREMITIES:  2+ Peripheral Pulses, No clubbing, cyanosis,3++john  LYMPH: No lymphadenopathy noted  SKIN: No rashes or lesions    LABS:                        11.6   4.72  )-----------( 227      ( 27 Jul 2019 07:45 )             37.8     07-27    131<L>  |  95<L>  |  32<H>  ----------------------------<  69<L>  3.6   |  25  |  2.61<H>    Ca    9.3      27 Jul 2019 07:45    TPro  8.1  /  Alb  3.2<L>  /  TBili  5.1<H>  /  DBili  x   /  AST  48<H>  /  ALT  24  /  AlkPhos  77  07-27    PT/INR - ( 27 Jul 2019 07:45 )   PT: 19.2 sec;   INR: 1.69 ratio         PTT - ( 26 Jul 2019 02:57 )  PTT:28.3 sec    CAPILLARY BLOOD GLUCOSE      POCT Blood Glucose.: 86 mg/dL (27 Jul 2019 06:23)  POCT Blood Glucose.: 105 mg/dL (27 Jul 2019 00:04)  POCT Blood Glucose.: 111 mg/dL (26 Jul 2019 21:09)  POCT Blood Glucose.: 104 mg/dL (26 Jul 2019 17:10)  POCT Blood Glucose.: 91 mg/dL (26 Jul 2019 11:04)      RADIOLOGY & ADDITIONAL TESTS:    Imaging Personally Reviewed:  [ X] YES  [ ] NO    Consultant(s) Notes Reviewed:  [ X] YES  [ ] NO    Care Discussed with Consultants/Other Providers [X ] YES  [ ] NO

## 2019-07-27 NOTE — CONSULT NOTE ADULT - ASSESSMENT
Patient is a 34 year old woman with CHF, asthma, severe pulmonary HTN, CHF decompensation, and hypotension  - transfer to critical care  -cardio:  start dobutamine gtt, to MAP > 65, Patient is a 34 year old woman with CHF, asthma, severe pulmonary HTN, CHF decompensation, and hypotension  - transfer to critical care    start dobutamine gtt, to MAP >70, add levophed if goal not achieved  start bumex keep goal neg -1L  jimenez for strict I/O  wean off bipap  keep O2 above 92%  continue coumadin, check INR

## 2019-07-27 NOTE — CONSULT NOTE ADULT - SUBJECTIVE AND OBJECTIVE BOX
Patient is a 34y old  Female who presents with a chief complaint of CHF exacerbation (27 Jul 2019 10:55)      Patient is a 34 year old woman with CHF, asthma diagnosed with pulmonary HTN in 2006, recently about 2 years ago started on  presents to ED with complaint of SOB and leg swelling.  She states that she can only walk 1-2 blocks at baseline and recently her exercise tolerance has decreased.  She denies chest pain, palpitations, lightheadedness, nausea, vomiting.    In the ED, troponin 0.435, BNP 4049, CXR shows pulmonary vasc congestion.  Pt started on BiPAP by ED. (26 Jul 2019 05:51)  In Aug 2018, she was hospitalized at [facility] with decompensated right heart failure requiring Milrinone assisted diuresis. Per [employer] chart, she is supposed to be maintained on Bumex 2 tid, Spironolactone 50mg, Metolazone, Adempas and Coumadin. However, patient reports that she has not been taking Adempas since Aug 2018, and ran out of her diuretics 3-4 weeks ago (missed at least a week, likely more). She has not been taking coumadin as the thinks "it does not work".    She is very emotional and depressed about her disease, feels like "she is a burden to everyone" but denies suicidal ideations.     Patient could not receive bumex while on the floor due to low BP,         PAST MEDICAL & SURGICAL HISTORY:  Congestive heart failure, unspecified HF chronicity, unspecified heart failure type  Moderate asthma, unspecified whether complicated, unspecified whether persistent  Other secondary hypertension    FAMILY HISTORY:    Social History: Allergies    No Known Allergies    Intolerances        MEDICATIONS  (STANDING):  ALBUTerol    90 MICROgram(s) HFA Inhaler 1 Puff(s) Inhalation every 4 hours  ALBUTerol/ipratropium for Nebulization 3 milliLiter(s) Nebulizer every 6 hours  buDESOnide  80 MICROgram(s)/formoterol 4.5 MICROgram(s) Inhaler 2 Puff(s) Inhalation two times a day  buMETAnide Injectable 1 milliGRAM(s) IV Push every 8 hours  chlorhexidine 4% Liquid 1 Application(s) Topical <User Schedule>  DOBUTamine Infusion 2.5 MICROgram(s)/kG/Min (9.24 mL/Hr) IV Continuous <Continuous>  spironolactone 50 milliGRAM(s) Oral daily  tiotropium 18 MICROgram(s) Capsule 1 Capsule(s) Inhalation daily  warfarin 7.5 milliGRAM(s) Oral once    MEDICATIONS  (PRN):  traMADol 50 milliGRAM(s) Oral every 6 hours PRN Severe Pain (7 - 10)      REVIEW OF SYSTEMS:    CONSTITUTIONAL: No fever, weight loss, or fatigue  EYES: No eye pain, visual disturbances, or discharge  ENMT:  No difficulty hearing, tinnitus, vertigo; No sinus or throat pain  NECK: No pain or stiffness  BREASTS: No pain, masses, or nipple discharge  RESPIRATORY: No cough, wheezing, chills or hemoptysis; No shortness of breath  CARDIOVASCULAR: No chest pain, palpitations, dizziness, or leg swelling  GASTROINTESTINAL: No abdominal or epigastric pain. No nausea, vomiting, or hematemesis; No diarrhea or constipation. No melena or hematochezia.  GENITOURINARY: No dysuria, frequency, hematuria, or incontinence  NEUROLOGICAL: No headaches, memory loss, loss of strength, numbness, or tremors  SKIN: No itching, burning, rashes, or lesions   LYMPH NODES: No enlarged glands  ENDOCRINE: No heat or cold intolerance; No hair loss  MUSCULOSKELETAL: No joint pain or swelling; No muscle, back, or extremity pain  PSYCHIATRIC: No depression, anxiety, mood swings, or difficulty sleeping  HEME/LYMPH: No easy bruising, or bleeding gums  ALLERGY AND IMMUNOLOGIC: No hives or eczema      PHYSICAL EXAM:  ICU Vital Signs Last 24 Hrs  T(C): 36.7 (27 Jul 2019 11:45), Max: 36.7 (27 Jul 2019 11:45)  T(F): 98 (27 Jul 2019 11:45), Max: 98 (27 Jul 2019 11:45)  HR: 87 (27 Jul 2019 13:00) (66 - 89)  BP: 88/46 (27 Jul 2019 11:16) (82/43 - 92/46)  BP(mean): --  ABP: --  ABP(mean): --  RR: 27 (27 Jul 2019 13:00) (15 - 27)  SpO2: 89% (27 Jul 2019 13:00) (86% - 100%)    GENERAL: NAD, well-groomed, well-developed  HEAD:  Atraumatic, Normocephalic  EYES: EOMI, PERRLA, conjunctiva and sclera clear  NECK: Supple, No JVD, Normal thyroid  NERVOUS SYSTEM:  Alert & Oriented X3, Good concentration;   Motor Strength 5/5 B/L upper and lower extremities; DTRs 2+ intact and symmetric  CHEST/LUNG: CTAbilaterally; No rales, rhonchi, wheezing, or rubs  HEART: Regular rate and rhythm; No murmurs, rubs, or gallops  ABDOMEN: Soft, Nontender, Nondistended; Bowel sounds present  EXTREMITIES:  2+ Peripheral Pulses, No clubbing, cyanosis, or edema  SKIN: No rashes or lesions        LABS:                        11.6   4.72  )-----------( 227      ( 27 Jul 2019 07:45 )             37.8     07-27    131<L>  |  95<L>  |  32<H>  ----------------------------<  69<L>  3.6   |  25  |  2.61<H>    Ca    9.3      27 Jul 2019 07:45    TPro  8.1  /  Alb  3.2<L>  /  TBili  5.1<H>  /  DBili  x   /  AST  48<H>  /  ALT  24  /  AlkPhos  77  07-27    PT/INR - ( 27 Jul 2019 07:45 )   PT: 19.2 sec;   INR: 1.69 ratio         PTT - ( 26 Jul 2019 02:57 )  PTT:28.3 sec          RADIOLOGY & ADDITIONAL STUDIES:    EKG:            CRITICAL CARE TIME SPENT: Patient is a 34y old  Female who presents with a chief complaint of CHF exacerbation (27 Jul 2019 10:55)      Patient is a 34 year old woman with CHF, asthma, pulmonary HTN presents to ED with complaint of SOB and leg swelling.  She states that she can only walk 1-2 blocks at baseline and recently her exercise tolerance has decreased.  She denies chest pain, palpitations, lightheadedness, nausea, vomiting.    In the ED, troponin 0.435, BNP 4049, CXR shows pulmonary vasc congestion.  Pt started on BiPAP by ED. (26 Jul 2019 05:51)  In Aug 2018, she was hospitalized at [facility] with decompensated right heart failure requiring Milrinone assisted diuresis. [facility], she is supposed to be maintained on Bumex 2 tid, Spironolactone 50mg, Metolazone, Adempas and Coumadin. However, patient reports that she has not been taking Adempas since Aug 2018, and ran out of her diuretics 3-4 weeks ago (missed at least a week, likely more). She has not been taking coumadin as the thinks "it does not work".    She is very emotional and depressed about her disease, feels like "she is a burden to everyone" but denies suicidal ideations.     Patient could not receive bumex while on the floor due to low BP, patient transferred critical care, and starts on dobutamine drip.        PAST MEDICAL & SURGICAL HISTORY:  Congestive heart failure, unspecified HF chronicity, unspecified heart failure type  Moderate asthma, unspecified whether complicated, unspecified whether persistent  Other secondary hypertension    FAMILY HISTORY:    Social History: Allergies    No Known Allergies    Intolerances        MEDICATIONS  (STANDING):  ALBUTerol    90 MICROgram(s) HFA Inhaler 1 Puff(s) Inhalation every 4 hours  ALBUTerol/ipratropium for Nebulization 3 milliLiter(s) Nebulizer every 6 hours  buDESOnide  80 MICROgram(s)/formoterol 4.5 MICROgram(s) Inhaler 2 Puff(s) Inhalation two times a day  buMETAnide Injectable 1 milliGRAM(s) IV Push every 8 hours  chlorhexidine 4% Liquid 1 Application(s) Topical <User Schedule>  DOBUTamine Infusion 2.5 MICROgram(s)/kG/Min (9.24 mL/Hr) IV Continuous <Continuous>  spironolactone 50 milliGRAM(s) Oral daily  tiotropium 18 MICROgram(s) Capsule 1 Capsule(s) Inhalation daily  warfarin 7.5 milliGRAM(s) Oral once    MEDICATIONS  (PRN):  traMADol 50 milliGRAM(s) Oral every 6 hours PRN Severe Pain (7 - 10)      REVIEW OF SYSTEMS:    CONSTITUTIONAL: No fever, weight loss, or fatigue  EYES: No eye pain, visual disturbances, or discharge  ENMT:  No difficulty hearing, tinnitus, vertigo; No sinus or throat pain  NECK: No pain or stiffness  BREASTS: No pain, masses, or nipple discharge  RESPIRATORY: No cough, wheezing, chills or hemoptysis; No shortness of breath  CARDIOVASCULAR: No chest pain, palpitations, dizziness, or leg swelling  GASTROINTESTINAL: No abdominal or epigastric pain. No nausea, vomiting, or hematemesis; No diarrhea or constipation. No melena or hematochezia.  GENITOURINARY: No dysuria, frequency, hematuria, or incontinence  NEUROLOGICAL: No headaches, memory loss, loss of strength, numbness, or tremors  SKIN: No itching, burning, rashes, or lesions   LYMPH NODES: No enlarged glands  ENDOCRINE: No heat or cold intolerance; No hair loss  MUSCULOSKELETAL: No joint pain or swelling; No muscle, back, or extremity pain  PSYCHIATRIC: No depression, anxiety, mood swings, or difficulty sleeping  HEME/LYMPH: No easy bruising, or bleeding gums  ALLERGY AND IMMUNOLOGIC: No hives or eczema      PHYSICAL EXAM:  ICU Vital Signs Last 24 Hrs  T(C): 36.7 (27 Jul 2019 11:45), Max: 36.7 (27 Jul 2019 11:45)  T(F): 98 (27 Jul 2019 11:45), Max: 98 (27 Jul 2019 11:45)  HR: 87 (27 Jul 2019 13:00) (66 - 89)  BP: 88/46 (27 Jul 2019 11:16) (82/43 - 92/46)  BP(mean): --  ABP: --  ABP(mean): --  RR: 27 (27 Jul 2019 13:00) (15 - 27)  SpO2: 89% (27 Jul 2019 13:00) (86% - 100%)    GENERAL: NAD, well-groomed, well-developed  HEAD:  Atraumatic, Normocephalic  EYES: EOMI, PERRLA, conjunctiva and sclera clear  NECK: Supple, No JVD, Normal thyroid  NERVOUS SYSTEM:  Alert & Oriented X3, Good concentration;   Motor Strength 5/5 B/L upper and lower extremities; DTRs 2+ intact and symmetric  CHEST/LUNG: CTAbilaterally; No rales, rhonchi, wheezing, or rubs  HEART: Regular rate and rhythm; No murmurs, rubs, or gallops  ABDOMEN: Soft, Nontender, Nondistended; Bowel sounds present  EXTREMITIES:  2+ Peripheral Pulses, No clubbing, cyanosis, or edema  SKIN: No rashes or lesions        LABS:                        11.6   4.72  )-----------( 227      ( 27 Jul 2019 07:45 )             37.8     07-27    131<L>  |  95<L>  |  32<H>  ----------------------------<  69<L>  3.6   |  25  |  2.61<H>    Ca    9.3      27 Jul 2019 07:45    TPro  8.1  /  Alb  3.2<L>  /  TBili  5.1<H>  /  DBili  x   /  AST  48<H>  /  ALT  24  /  AlkPhos  77  07-27    PT/INR - ( 27 Jul 2019 07:45 )   PT: 19.2 sec;   INR: 1.69 ratio         PTT - ( 26 Jul 2019 02:57 )  PTT:28.3 sec          RADIOLOGY & ADDITIONAL STUDIES:    EKG:            CRITICAL CARE TIME SPENT: Patient is a 34y old  Female who presents with a chief complaint of CHF exacerbation (27 Jul 2019 10:55)      Patient is a 34 year old woman with CHF, asthma, pulmonary HTN presents to ED with complaint of SOB and leg swelling.  She states that she can only walk 1-2 blocks at baseline and recently her exercise tolerance has decreased.  She denies chest pain, palpitations, lightheadedness, nausea, vomiting.    In the ED, troponin 0.435, BNP 4049, CXR shows pulmonary vasc congestion.  Pt started on BiPAP by ED. (26 Jul 2019 05:51)  In Aug 2018, she was hospitalized at [facility] with decompensated right heart failure requiring Milrinone assisted diuresis. [facility], she is supposed to be maintained on Bumex 2 tid, Spironolactone 50mg, Metolazone, Adempas and Coumadin. However, patient reports that she has not been taking Adempas since Aug 2018, and ran out of her diuretics 3-4 weeks ago (missed at least a week, likely more). She has not been taking coumadin as the thinks "it does not work".    She is very emotional and depressed about her disease, feels like "she is a burden to everyone" but denies suicidal ideations.     Patient could not receive bumex while on the floor due to low BP, patient transferred critical care, and starts on dobutamine drip.        PAST MEDICAL & SURGICAL HISTORY:  Congestive heart failure, unspecified HF chronicity, unspecified heart failure type  Moderate asthma, unspecified whether complicated, unspecified whether persistent  Other secondary hypertension    FAMILY HISTORY:    Social History: Allergies    No Known Allergies    Intolerances        MEDICATIONS  (STANDING):  ALBUTerol    90 MICROgram(s) HFA Inhaler 1 Puff(s) Inhalation every 4 hours  ALBUTerol/ipratropium for Nebulization 3 milliLiter(s) Nebulizer every 6 hours  buDESOnide  80 MICROgram(s)/formoterol 4.5 MICROgram(s) Inhaler 2 Puff(s) Inhalation two times a day  buMETAnide Injectable 1 milliGRAM(s) IV Push every 8 hours  chlorhexidine 4% Liquid 1 Application(s) Topical <User Schedule>  DOBUTamine Infusion 2.5 MICROgram(s)/kG/Min (9.24 mL/Hr) IV Continuous <Continuous>  spironolactone 50 milliGRAM(s) Oral daily  tiotropium 18 MICROgram(s) Capsule 1 Capsule(s) Inhalation daily  warfarin 7.5 milliGRAM(s) Oral once    MEDICATIONS  (PRN):  traMADol 50 milliGRAM(s) Oral every 6 hours PRN Severe Pain (7 - 10)      REVIEW OF SYSTEMS:    CONSTITUTIONAL: No fever, weight loss, or fatigue  EYES: No eye pain, visual disturbances, or discharge  ENMT:  No difficulty hearing, tinnitus, vertigo; No sinus or throat pain  NECK: No pain or stiffness  BREASTS: No pain, masses, or nipple discharge  RESPIRATORY: + shortness of breath  CARDIOVASCULAR: +chest pain, no palpitations, dizziness, or leg swelling  GASTROINTESTINAL: No abdominal or epigastric pain. No nausea, vomiting, or hematemesis; No diarrhea or constipation. No melena or hematochezia.  GENITOURINARY: No dysuria, frequency, hematuria, or incontinence  NEUROLOGICAL: No headaches, memory loss, loss of strength, numbness, or tremors  SKIN: No itching, burning, rashes, or lesions   LYMPH NODES: No enlarged glands  ENDOCRINE: No heat or cold intolerance; No hair loss  MUSCULOSKELETAL: No joint pain or swelling; No muscle, back, or extremity pain  PSYCHIATRIC: No depression, anxiety, mood swings, or difficulty sleeping  HEME/LYMPH: No easy bruising, or bleeding gums  ALLERGY AND IMMUNOLOGIC: No hives or eczema      PHYSICAL EXAM:  ICU Vital Signs Last 24 Hrs  T(C): 36.7 (27 Jul 2019 11:45), Max: 36.7 (27 Jul 2019 11:45)  T(F): 98 (27 Jul 2019 11:45), Max: 98 (27 Jul 2019 11:45)  HR: 87 (27 Jul 2019 13:00) (66 - 89)  BP: 88/46 (27 Jul 2019 11:16) (82/43 - 92/46)  RR: 27 (27 Jul 2019 13:00) (15 - 27)  SpO2: 89% (27 Jul 2019 13:00) (86% - 100%)    GENERAL: NAD, well-groomed, well-developed  NECK: Supple, No JVD, Normal thyroid  NERVOUS SYSTEM:  Alert & Oriented X3, Good concentration;   CHEST/LUNG: CTA bilaterally; No rales, rhonchi, wheezing, or rubs  HEART: Regular rate and rhythm; No murmurs, rubs, or gallops  ABDOMEN: Soft, Nontender, Nondistended; Bowel sounds present  EXTREMITIES:  b/l lower extr edema/elephant trunc          LABS:                        11.6   4.72  )-----------( 227      ( 27 Jul 2019 07:45 )             37.8     07-27    131<L>  |  95<L>  |  32<H>  ----------------------------<  69<L>  3.6   |  25  |  2.61<H>    Ca    9.3      27 Jul 2019 07:45    TPro  8.1  /  Alb  3.2<L>  /  TBili  5.1<H>  /  DBili  x   /  AST  48<H>  /  ALT  24  /  AlkPhos  77  07-27    PT/INR - ( 27 Jul 2019 07:45 )   PT: 19.2 sec;   INR: 1.69 ratio         PTT - ( 26 Jul 2019 02:57 )  PTT:28.3 sec          RADIOLOGY & ADDITIONAL STUDIES: Patient is a 34y old  Female who presents with a chief complaint of CHF exacerbation (27 Jul 2019 10:55)      Patient is a 34 year old woman with CHF, asthma, pulmonary HTN presents to ED with complaint of SOB and leg swelling.  She states that she can only walk 1-2 blocks at baseline and recently her exercise tolerance has decreased.  She denies chest pain, palpitations, lightheadedness, nausea, vomiting.    In the ED, troponin 0.435, BNP 4049, CXR shows pulmonary vasc congestion.  Pt started on BiPAP by ED. (26 Jul 2019 05:51)  In Aug 2018, she was hospitalized at [facility] with decompensated right heart failure requiring Milrinone assisted diuresis. [facility], she is supposed to be maintained on Bumex 2 tid, Spironolactone 50mg, Metolazone, Adempas and Coumadin. However, patient reports that she has not been taking Adempas since Aug 2018, and ran out of her diuretics 3-4 weeks ago (missed at least a week, likely more). She has not been taking coumadin as the thinks "it does not work".    She is very emotional and depressed about her disease, feels like "she is a burden to everyone" but denies suicidal ideations.     Patient could not receive bumex while on the floor due to low BP, patient transferred critical care, and starts on dobutamine drip.        PAST MEDICAL & SURGICAL HISTORY:  Congestive heart failure, unspecified HF chronicity, unspecified heart failure type  Moderate asthma, unspecified whether complicated, unspecified whether persistent  Other secondary hypertension    FAMILY HISTORY:    Social History: Allergies    No Known Allergies    Intolerances        MEDICATIONS  (STANDING):  ALBUTerol    90 MICROgram(s) HFA Inhaler 1 Puff(s) Inhalation every 4 hours  ALBUTerol/ipratropium for Nebulization 3 milliLiter(s) Nebulizer every 6 hours  buDESOnide  80 MICROgram(s)/formoterol 4.5 MICROgram(s) Inhaler 2 Puff(s) Inhalation two times a day  buMETAnide Injectable 1 milliGRAM(s) IV Push every 8 hours  chlorhexidine 4% Liquid 1 Application(s) Topical <User Schedule>  DOBUTamine Infusion 2.5 MICROgram(s)/kG/Min (9.24 mL/Hr) IV Continuous <Continuous>  spironolactone 50 milliGRAM(s) Oral daily  tiotropium 18 MICROgram(s) Capsule 1 Capsule(s) Inhalation daily  warfarin 7.5 milliGRAM(s) Oral once    MEDICATIONS  (PRN):  traMADol 50 milliGRAM(s) Oral every 6 hours PRN Severe Pain (7 - 10)      REVIEW OF SYSTEMS:    CONSTITUTIONAL: No fever, weight loss, or fatigue  EYES: No eye pain, visual disturbances, or discharge  ENMT:  No difficulty hearing, tinnitus, vertigo; No sinus or throat pain  NECK: No pain or stiffness  BREASTS: No pain, masses, or nipple discharge  RESPIRATORY: + shortness of breath  CARDIOVASCULAR: +chest pain, no palpitations, dizziness, or leg swelling  GASTROINTESTINAL: No abdominal or epigastric pain. No nausea, vomiting, or hematemesis; No diarrhea or constipation. No melena or hematochezia.  GENITOURINARY: No dysuria, frequency, hematuria, or incontinence  NEUROLOGICAL: No headaches, memory loss, loss of strength, numbness, or tremors  SKIN: No itching, burning, rashes, or lesions   LYMPH NODES: No enlarged glands  ENDOCRINE: No heat or cold intolerance; No hair loss  MUSCULOSKELETAL: No joint pain or swelling; No muscle, back, or extremity pain  PSYCHIATRIC: No depression, anxiety, mood swings, or difficulty sleeping  HEME/LYMPH: No easy bruising, or bleeding gums  ALLERGY AND IMMUNOLOGIC: No hives or eczema      PHYSICAL EXAM:  ICU Vital Signs Last 24 Hrs  T(C): 36.7 (27 Jul 2019 11:45), Max: 36.7 (27 Jul 2019 11:45)  T(F): 98 (27 Jul 2019 11:45), Max: 98 (27 Jul 2019 11:45)  HR: 87 (27 Jul 2019 13:00) (66 - 89)  BP: 88/46 (27 Jul 2019 11:16) (82/43 - 92/46)  RR: 27 (27 Jul 2019 13:00) (15 - 27)  SpO2: 89% (27 Jul 2019 13:00) (86% - 100%)    GENERAL: NAD, well-groomed, well-developed  NECK: Supple, No JVD, Normal thyroid  NERVOUS SYSTEM:  Alert & Oriented X3, Good concentration;   CHEST/LUNG: CTA bilaterally; No rales, rhonchi, wheezing, or rubs  HEART: Regular rate and rhythm; No murmurs, rubs, or gallops  ABDOMEN: Soft, Nontender, Nondistended; Bowel sounds present  EXTREMITIES:  b/l lower extr edema/elephant trunc          LABS:                        11.6   4.72  )-----------( 227      ( 27 Jul 2019 07:45 )             37.8     07-27    131<L>  |  95<L>  |  32<H>  ----------------------------<  69<L>  3.6   |  25  |  2.61<H>    Ca    9.3      27 Jul 2019 07:45    TPro  8.1  /  Alb  3.2<L>  /  TBili  5.1<H>  /  DBili  x   /  AST  48<H>  /  ALT  24  /  AlkPhos  77  07-27    PT/INR - ( 27 Jul 2019 07:45 )   PT: 19.2 sec;   INR: 1.69 ratio         PTT - ( 26 Jul 2019 02:57 )  PTT:28.3 sec

## 2019-07-27 NOTE — PROGRESS NOTE ADULT - SUBJECTIVE AND OBJECTIVE BOX
Preliminary note based on available patient data was entered below in order to expedite patient management  Patient will be examined within the next few hours (and this note will be edited if so dictated by the then latest available data) Please note that  by the end of this day the below note should be considered as my final patient progress note for today     GERMÁN KENNY  [id number] [phone]   [address] DOA 2019 [address] NICOLETTE PETTY   ALLERGY  nka       CONTACT   Cyndie Bernard  sel [phone]              Patient examined chart reviewed    HOSPITAL ADMISSION   PATIENT CAME  FROM (if information available)        TYPE OF VISIT      Subsequent Pulmonary Critical Care followup requested -2019     by Dr Mejia    from Dr Choudhury     REASON FOR VISIT  PLEASE SEE PROBLEM LIST/ASSESSMENTAND RECOMMENDATIONS     PATIENT GERMÁN KENNY  [id number] [phone]   1985 DOA 2019 DR NICOLETTE PETTY     VITALS/LABS       2019 85 111/96 90%   2019 DOBU   2019 3l   2019 W 4.7 Hb 11.6 Plt 227  Na 131 K 3.6 CO2 25 Cr 2.6   - Cr 1.9-2.6    Tr 1 .437 (i) - .501 (i)   2019 echo ef 45% rv pressure ol dd 1 pasp 87 trv 4 m/s     PATIENT GERMÁN KENNY  [id number] [phone]   1985 DOA 2019 DR NICOLETTE PETTY     MEDICATIONS      PULMONARY HYPERTENSION  coumadin ()   R CHF   bumetanide 1.3 ()   DOBU ()   spironolactone 50 ()   COPD   duoneb.4 ()   symbicort 80 ()   spiriva ()       GLOBAL ISSUE/BEST PRACTICE:      PROBLEM: HOB elevation:   y            PROBLEM: Stress ulcer proph:    na                      PROBLEM: VTE prophylaxis:       PROBLEM: Glycemic control:    na  PROBLEM: Nutrition:   stein ()   PROBLEM: Advanced directive: na     PROBLEM: Allergies:  na    REVIEW OF SYMPTOMS     NOTE Changess if any  in ROS and PE are updated in daily HOSPITAL COURSE below      Able to give ROS  Yes     RELIABLE No   CONSTITUTIONAL Weakness Yes  Chills No Vision changes No  ENDOCRINE No unexplained hair loss No heat or cold intolerance    ALLERGY No hives  Sore throat No   RESP Coughing blood no  Shortness of breath YES   NEURO No Headache  Confusion Pain neck No   CARDIAC No Chest pain No Palpitations   GI No Pain abdomen NO   Vomiting NO     PHYSICAL EXAM    HEENT Unremarkable PERRLA atraumatic   RESP Fair air entry EXP prolonged    Harsh breath sound Resp distres mild   CARDIAC S1 S2 No S3     NO JVD    ABDOMEN SOFT BS PRESENT NOT DISTENDED No hepatosplenomegaly PEDAL EDEMA present No calf tenderness  NO rash   GENERAL Not TOXIC

## 2019-07-27 NOTE — PROGRESS NOTE ADULT - SUBJECTIVE AND OBJECTIVE BOX
Source: Patient & AEHR and [place]  PCP: Dr. Ricky Plata MD [facility]  [phone]  Gastroenterology [facility]: Caesar Sparks  Last seen July 10th for hyperbilirubinemia  Pulmonary [facility]: Dr. Lisker, Dr. Stevenson & Dr. Unger- Last seen April 10, 2019 regular appt , nonadherent    Emergent appt given July 9th seen by Dr. Unger- 15 day supply given for Adeas  Coumadin clinic followed by NP Edwina Farr Discharged from their care 04/23/2019   NNon compliant, pt dismissed from the program. Note reports "Pt no show today on the last day of her ACTS clinic visits. I have spoken to her in the past to schedule appt with PMD to manage her anticoagulation. This patient is non compliant with INR testing and medication. My coordinator spoke to her today, she is in the ER. Will no longer follow. "      HPI: This is 35 y/o AA female with h/o severe pulmonary HTN (Dx 2006; unclear etiology), anticardiolipin antibody positive, hyperbilirubinemia potentially attributed to Bumex, DIANN, asthma who presented to ED with recurrent  SOB and LE edema found to be in acute on chronic right sided heart failure.  Unclear etiology of PH and was followed by Dr. Unger of Pulmonary from many years through [id number]. Had PFTs done as outpt 2014 which showed mild restrictive component and depressed DLCO (51%). Was diagnosed with anticardiolipin Ab in past but on subsequent testing came back negative. Currently feels much improved - able to walk to bathroom w/o significant SOB, denies orthopnea/PND. Currently, on exam, NAD, JVD approx 14 cm with HJR, RRR, clear lungs, nontender/soft abdomen with active BS, 1+ pitting edema b/l, WWP. Labs reviewed - BUN/Cr 8/0.8 (at b/l), K 3.3, INR 2.4, Tbili 2.1. TTE 7/26 - severe MANSI, severe RV enlargement, RV pressure/volume overload, underfilled LV, severe TR, RVSP 98. Overall class III symptoms with WHO group I PH possibly, still volume overloaded but much improved.  34 year old woman with CHF, asthma presents to ED with complaint of SOB and leg swelling.  She states that she can only walk 1-2 blocks at baseline and recently her exercise tolerance has decreased.  She denies chest pain, palpitations, lightheadedness, nausea, vomiting.    In the ED, troponin 0.435, BNP 4049, CXR shows pulmonary vasc congestion.  Pt started on BiPAP by ED. (26 Jul 2019 05:51) Source: Patient & AEHR and [place]  PCP: Dr. Ricky Plata MD [facility]  [phone]  Gastroenterology [facility]: Caeasr Kulkarni Gagancaridad  Last seen July 10th for hyperbilirubinemia  Pulmonary [facility]: Dr. Lisker, Dr. Stevenson & Dr. Unger- Last seen April 10, 2019 regular appt , nonadherent    Emergent appt given July 9th seen by Dr. Unger- 15 day supply given for Adempas  Coumadin clinic followed by NP Edwina Farr Discharged from their care 04/23/2019   NNon compliant, pt dismissed from the program. Note reports "Pt no show today on the last day of her ACTS clinic visits. I have spoken to her in the past to schedule appt with PMD to manage her anticoagulation. This patient is non compliant with INR testing and medication. My coordinator spoke to her today, she is in the ER. Will no longer follow. "      HPI: This is 35 y/o AA female with h/o severe pulmonary HTN (Dx 2006; unclear etiology), questionable anticardiolipin antibody positive, hyperbilirubinemia potentially attributed to Bumex, DIANN, asthma who presented to ED with recurrent  SOB and LE edema found to be in acute on chronic right sided heart failure.  Unclear etiology of PH and was followed by Dr. Unger of Pulmonary from many years through [id number]. She underwent right heart catheterization in 5/2019 which reported severe pulmonary HTN, unresponsive to Nitrous Oxide. Prior to this hospitalization TTE 7/2018 demonstrated severe MANSI, severe RV enlargement, RV pressure/volume overload, underfilled LV, severe TR, RVSP 98 indicative of severe pulmonary hypertensive. She was placed on Adempas. Patient reports being without Adempas by two doses prior to this admission. Records demonstrate that patient received a 15 day supply from an emergent appointment with [facility] July 9th. Patient on chronic anticoagulation for hypercoagulability discharged from Coumadin clinic to follow with PCP Dr. Plata. This admission patient with SOB, leg swelling decreased exercise intolerance in the ED, troponin 0.435, BNP 4049, CXR shows pulmonary vasc congestion.  Pt started on BiPAP by ED. Patient admitted to Telemetry referred to ICU for hypotension where Levophed and dobutamine currently in progress. Cardiology, GI and pulmonary on consult    PAST MEDICAL & SURGICAL HISTORY:  Former smoker, stopped smoking in distant past  Vitamin D deficiency  Morbid obesity due to excess calories  Edema extremities  Diabetes mellitus  Hyperbilirubinemia  COPD (chronic obstructive pulmonary disease)  COPD (chronic obstructive pulmonary disease)  Anticoagulation goal of INR 2 to 3  Anticardiolipin antibody positive  Congestive heart failure, unspecified HF chronicity, unspecified heart failure type  Moderate asthma, unspecified whether complicated, unspecified whether persistent    Past Surgical History none    Allergies: No Known Allergies  Social Hx:  tattoo(s), but no IV drug use, no body piercing, no hemodialysis, no transfusion before 1992, no transplant before 1992, no alcohol abuse, no household contact to HBV, no travel to an endemic area, no occupational exposure and no cocaine use.   REVIEW OF SYSTEMS  General: fatigue, lives at home, unable to perform work related activities	  Respiratory and Thorax: denies orthopnea/PND. Had PFTs done as outpt 2014 which showed mild restrictive component and depressed DLCO (51%). Did not perform sleep study, requires 2-3 pillows 	  Cardiovascular:	tonight reports chest pain with sob denies palpitations  Gastrointestinal:	She had worsening hyperbilirubinemia of 2.8 in 2/2019.   Blood work from 3/2019 showed normal liver enzymes and ALP but elevated TB of 3.1. WBC 4.59, Hb 12.1  K. Cr was normal.    Genitourinary:	    Musculoskeletal:	    Neurological:	    Psychiatric:	    Hematology/Lymphatics:	    Endocrine:	    Allergic/Immunologic:	              Had PFTs done as outpt 2014 which showed mild restrictive component and depressed DLCO (51%). Was diagnosed with anticardiolipin Ab in past but on subsequent testing came back negative. Currently feels much improved - able to walk to bathroom w/o significant SOB, denies orthopnea/PND. Currently, on exam, NAD, JVD approx 14 cm with HJR, RRR, clear lungs, nontender/soft abdomen with active BS, 1+ pitting edema b/l, WWP. Labs reviewed - BUN/Cr 8/0.8 (at b/l), K 3.3, INR 2.4, Tbili 2.1. TTE 7/26 - severe MANSI, severe RV enlargement, RV pressure/volume overload, underfilled LV, severe TR, RVSP 98. Overall class III symptoms with WHO group I PH possibly, still volume overloaded but much improved.  34 year old woman with CHF, asthma presents to ED with complaint of SOB and leg swelling.  She states that she can only walk 1-2 blocks at baseline and recently her exercise tolerance has decreased.  She denies chest pain, palpitations, lightheadedness, nausea, vomiting.    In the ED, troponin 0.435, BNP 4049, CXR shows pulmonary vasc congestion.  Pt started on BiPAP by ED. (26 Jul 2019 05:51) Source: Patient & AEHR and [place]  PCP: Dr. Ricky Plata MD [facility]  [phone]  Gastroenterology [facility]: Caesar Kulkarni Gagancaridad  Last seen July 10th for hyperbilirubinemia  Pulmonary [facility]: Dr. Lisker, Dr. Stevenson & Dr. Unger- Last seen April 10, 2019 regular appt , nonadherent    Emergent appt given July 9th seen by Dr. Unger- 15 day supply given for Adempas  Coumadin clinic followed by NP Edwina Farr Discharged from their care 04/23/2019   NNon compliant, pt dismissed from the program. Note reports "Pt no show today on the last day of her ACTS clinic visits. I have spoken to her in the past to schedule appt with PMD to manage her anticoagulation. This patient is non compliant with INR testing and medication. My coordinator spoke to her today, she is in the ER. Will no longer follow. "      HPI: This is 35 y/o AA female with h/o severe pulmonary HTN (Dx 2006; unclear etiology), questionable anticardiolipin antibody positive, hyperbilirubinemia potentially attributed to Bumex, DIANN, asthma who presented to ED with recurrent  SOB and LE edema found to be in acute on chronic right sided heart failure.  Unclear etiology of PH and was followed by Dr. Unger of Pulmonary from many years through [id number]. She underwent right heart catheterization in 5/2019 which reported severe pulmonary HTN, unresponsive to Nitrous Oxide. Prior to this hospitalization TTE 7/2018 demonstrated severe MANSI, severe RV enlargement, RV pressure/volume overload, underfilled LV, severe TR, RVSP 98 indicative of severe pulmonary hypertensive. She was placed on Adempas. Patient reports being without Adempas by two doses prior to this admission. Records demonstrate that patient received a 15 day supply from an emergent appointment with [facility] July 9th. Patient on chronic anticoagulation for hypercoagulability discharged from Coumadin clinic to follow with PCP Dr. Plata. This admission patient with SOB, leg swelling decreased exercise intolerance in the ED, troponin 0.435, BNP 4049, CXR shows pulmonary vasc congestion.  Pt started on BiPAP by ED. Patient admitted to Telemetry referred to ICU for hypotension where Levophed and dobutamine currently in progress. Cardiology, GI and pulmonary on consult    PAST MEDICAL & SURGICAL HISTORY:  Former smoker, stopped smoking in distant past  Vitamin D deficiency  Morbid obesity due to excess calories  Edema extremities  Diabetes mellitus  Hyperbilirubinemia  COPD (chronic obstructive pulmonary disease)  COPD (chronic obstructive pulmonary disease)  Anticoagulation goal of INR 2 to 3  Anticardiolipin antibody positive  Congestive heart failure, unspecified HF chronicity, unspecified heart failure type  Moderate asthma, unspecified whether complicated, unspecified whether persistent    Past Surgical History none    Allergies: No Known Allergies  Social Hx:  tattoo(s), but no IV drug use, no body piercing, no hemodialysis, no transfusion before 1992, no transplant before 1992, no alcohol abuse, no household contact to HBV, no travel to an endemic area, no occupational exposure and no cocaine use.   REVIEW OF SYSTEMS  General: fatigue, lives at home, unable to perform work related activities	  Respiratory and Thorax: denies orthopnea/PND. Had PFTs done as outpt 2014 which showed mild restrictive component and depressed DLCO (51%). Did not perform sleep study, requires 2-3 pillows 	  Cardiovascular:	tonight reports chest pain with sob denies palpitations  Gastrointestinal:	She had worsening hyperbilirubinemia of 2.8 in 2/2019.   Blood work from 3/2019 showed normal liver enzymes and ALP but elevated TB of 3.1. WBC 4.59, Hb 12.1  K. Cr was normal.    Psychiatric: nonadherent, History of zoloft prescribed  ICU Vital Signs Last 24 Hrs  T(C): 36 (27 Jul 2019 23:28), Max: 36.7 (27 Jul 2019 11:45)  T(F): 96.8 (27 Jul 2019 23:28), Max: 98 (27 Jul 2019 11:45)  HR: 91 (27 Jul 2019 23:26) (66 - 96)  BP: 99/48 (27 Jul 2019 19:43) (72/49 - 136/94)  BP(mean): 59 (27 Jul 2019 19:43) (47 - 106)  RR: 23 (27 Jul 2019 19:43) (17 - 31)  SpO2: 92% (27 Jul 2019 23:26) (89% - 99%)    Gen: anxious; patient when first seen on telephone, eating with slight breathlessness HOB elevated with N/c 5 liters in use O2 sat 97%. Pleasant able to teach back pulmonary issues. States PCP is Dr. Plata, refusing blood draw. During course of the hour, Right PIV with levophed extravasated, patient refusing SQ phentalamine only requesting cream. Patient complaint of chest pain midsternal with decreased SBP. Allowed RN placement of new IV, after nitropaste to the right PIV agreed to phentalamine    ENT +_JVD visible 14 cm  Lungs: prolonged expiratory phase  Cardiac displaced PMI, muffled heart sounds S1/S2 bedside Echo RV thickness, global regurgitation, IVC enlarged  Abdomen-benign   jimenez  Ext global edema, lymphedema    CARDIAC MARKERS ( 27 Jul 2019 21:37 )  1.170 ng/mL / x     / 312 U/L / x     / 8.1 ng/mL  CARDIAC MARKERS ( 26 Jul 2019 19:33 )  .501 ng/mL / x     / 356 U/L / x     / 5.5 ng/mL  CARDIAC MARKERS ( 26 Jul 2019 11:37 )  .437 ng/mL / x     / 388 U/L / x     / 5.4 ng/mL  CARDIAC MARKERS ( 26 Jul 2019 02:57 )  .435 ng/mL / x     / x     / x     / x        Lab Results:  CBC  CBC Full  -  ( 27 Jul 2019 21:37 )  WBC Count : 4.36 K/uL  RBC Count : 5.36 M/uL  Hemoglobin : 12.3 g/dL  Hematocrit : 39.8 %  Platelet Count - Automated : 230 K/uL  Mean Cell Volume : 74.3 fl  Mean Cell Hemoglobin : 22.9 pg  Mean Cell Hemoglobin Concentration : 30.9 gm/dL  Auto Neutrophil # : x  Auto Lymphocyte # : x  Auto Monocyte # : x  Auto Eosinophil # : x  Auto Basophil # : x  Auto Neutrophil % : x  Auto Lymphocyte % : x  Auto Monocyte % : x  Auto Eosinophil % : x  Auto Basophil % : x    .		Differential:	[] Automated		[] Manual  Chemistry                        12.3   4.36  )-----------( 230      ( 27 Jul 2019 21:37 )             39.8     07-27    133<L>  |  98  |  35<H>  ----------------------------<  144<H>  3.7   |  21<L>  |  2.59<H>    Ca    8.1<L>      27 Jul 2019 21:37  Phos  4.6     07-27  Mg     2.3     07-27    TPro  7.9  /  Alb  3.0<L>  /  TBili  4.0<H>  /  DBili  x   /  AST  49<H>  /  ALT  25  /  AlkPhos  74  07-27    LIVER FUNCTIONS - ( 27 Jul 2019 21:37 )  Alb: 3.0 g/dL / Pro: 7.9 gm/dL / ALK PHOS: 74 U/L / ALT: 25 U/L / AST: 49 U/L / GGT: x           PT/INR - ( 27 Jul 2019 07:45 )   PT: 19.2 sec;   INR: 1.69 ratio    PTT - ( 26 Jul 2019 02:57 )  PTT:28.3 sec          MICROBIOLOGY/CULTURES:      RADIOLOGY RESULTS: Source: Patient & AEHR and [place]  PCP: Dr. Ricky Plata MD [facility]  [phone]  Gastroenterology [facility]: Caesar Kulkarni Gagancaridad  Last seen July 10th for hyperbilirubinemia  Pulmonary [facility]: Dr. Lisker, Dr. Stevenson & Dr. Unger- Last seen April 10, 2019 regular appt , nonadherent    Emergent appt given July 9th seen by Dr. Unger- 15 day supply given for Adempas  Coumadin clinic followed by NP Edwina Farr Discharged from their care 04/23/2019   NNon compliant, pt dismissed from the program. Note reports "Pt no show today on the last day of her ACTS clinic visits. I have spoken to her in the past to schedule appt with PMD to manage her anticoagulation. This patient is non compliant with INR testing and medication. My coordinator spoke to her today, she is in the ER. Will no longer follow. "      HPI: This is 33 y/o AA female with h/o severe pulmonary HTN (Dx 2006; unclear etiology), anticardiolipin antibody positive (repeated 7/10/2019), hyperbilirubinemia potentially attributed to Bumex, DIANN, asthma who presented to ED with recurrent  SOB and LE edema found to be in acute on chronic right sided heart failure.  Unclear etiology of PH and was followed by Dr. Unger of Pulmonary from many years through [id number]. She underwent right heart catheterization in 5/2019 which reported severe pulmonary HTN, unresponsive to Nitrous Oxide. Prior to this hospitalization TTE 7/2018 demonstrated severe MANSI, severe RV enlargement, RV pressure/volume overload, underfilled LV, severe TR, RVSP 98 indicative of severe pulmonary hypertensive. She was placed on Adempas. Patient reports being without Adempas by two doses prior to this admission. Records demonstrate that patient received a 15 day supply from an emergent appointment with [facility] July 9th. Patient on chronic anticoagulation for hypercoagulability discharged from Coumadin clinic to follow with PCP Dr. Plata. This admission patient with SOB, leg swelling decreased exercise intolerance in the ED, troponin 0.435, BNP 4049, CXR shows pulmonary vasc congestion.  Pt started on BiPAP by ED. Patient admitted to Telemetry referred to ICU for hypotension where Levophed and dobutamine currently in progress. Cardiology, GI and pulmonary on consult    PAST MEDICAL & SURGICAL HISTORY:  Former smoker, stopped smoking in distant past  Vitamin D deficiency  Morbid obesity due to excess calories  Edema extremities  Diabetes mellitus  Hyperbilirubinemia  COPD (chronic obstructive pulmonary disease)  COPD (chronic obstructive pulmonary disease)  Anticoagulation goal of INR 2 to 3  Anticardiolipin antibody positive  Congestive heart failure, unspecified HF chronicity, unspecified heart failure type  Moderate asthma, unspecified whether complicated, unspecified whether persistent    Past Surgical History none    Allergies: No Known Allergies  Social Hx:  tattoo(s), but no IV drug use, no body piercing, no hemodialysis, no transfusion before 1992, no transplant before 1992, no alcohol abuse, no household contact to HBV, no travel to an endemic area, no occupational exposure and no cocaine use.   REVIEW OF SYSTEMS  General: fatigue, lives at home, unable to perform work related activities	  Respiratory and Thorax: denies orthopnea/PND. Had PFTs done as outpt 2014 which showed mild restrictive component and depressed DLCO (51%). Did not perform sleep study, requires 2-3 pillows 	  Cardiovascular:	tonight reports chest pain with sob denies palpitations  Gastrointestinal:	She had worsening hyperbilirubinemia of 2.8 in 2/2019.   Blood work from 3/2019 showed normal liver enzymes and ALP but elevated TB of 3.1. WBC 4.59, Hb 12.1  K. Cr was normal. Dr Unger performed extensive work up July 10th 2019 for Hyperbilirubinemia including   Bilirubin Direct, Serum; Glucose-6-Phosphate Dehydrogenase; Lactate Dehydrogenase, Serum; Reticulocyte Count; Results significant for iron deficiency, no evidence of active hepatitis B or C. Hemolyzed chemistries revealed BUN/Cr 57/4.35    work up also included  Alpha-1-Antitrypsin, Serum;ANCA Reflex MPO and PROT3;   Anticardiolipin Antibody Level, Total; Anti-Nuclear Antibody;   Ceruloplasmin, Serum; Immunoglobulins Panel; Microsomal Antibody Assay, Liver Kidney  Mitochondrial Antibody; S  Sedimentation Rate, Erythrocyte;  Smooth Muscle Antibody;   Soluble Liver Antigen Antibody; Patient was positive for anticardiolipid syndrome     Psychiatric: nonadherent, History of zoloft prescribed  ICU Vital Signs Last 24 Hrs  T(C): 36 (27 Jul 2019 23:28), Max: 36.7 (27 Jul 2019 11:45)  T(F): 96.8 (27 Jul 2019 23:28), Max: 98 (27 Jul 2019 11:45)  HR: 91 (27 Jul 2019 23:26) (66 - 96)  BP: 99/48 (27 Jul 2019 19:43) (72/49 - 136/94)  BP(mean): 59 (27 Jul 2019 19:43) (47 - 106)  RR: 23 (27 Jul 2019 19:43) (17 - 31)  SpO2: 92% (27 Jul 2019 23:26) (89% - 99%)    Gen: anxious; patient when first seen on telephone, eating with slight breathlessness HOB elevated with N/c 5 liters in use O2 sat 97%. Pleasant able to teach back pulmonary issues. States PCP is Dr. Plata, refusing blood draw. During course of the hour, Right PIV with levophed extravasated, patient refusing SQ phentalamine only requesting cream. Patient complaint of chest pain midsternal with decreased SBP. Allowed RN placement of new IV, after nitropaste to the right PIV agreed to phentalamine    ENT +_JVD visible 14 cm  Lungs: prolonged expiratory phase  Cardiac displaced PMI, muffled heart sounds S1/S2 bedside Echo RV thickness, global regurgitation, IVC enlarged  Abdomen-benign   jimenez  Ext global edema, lymphedema    CARDIAC MARKERS ( 27 Jul 2019 21:37 )  1.170 ng/mL / x     / 312 U/L / x     / 8.1 ng/mL  CARDIAC MARKERS ( 26 Jul 2019 19:33 )  .501 ng/mL / x     / 356 U/L / x     / 5.5 ng/mL  CARDIAC MARKERS ( 26 Jul 2019 11:37 )  .437 ng/mL / x     / 388 U/L / x     / 5.4 ng/mL  CARDIAC MARKERS ( 26 Jul 2019 02:57 )  .435 ng/mL / x     / x     / x     / x        Lab Results:  CBC  CBC Full  -  ( 27 Jul 2019 21:37 )  WBC Count : 4.36 K/uL  RBC Count : 5.36 M/uL  Hemoglobin : 12.3 g/dL  Hematocrit : 39.8 %  Platelet Count - Automated : 230 K/uL  Mean Cell Volume : 74.3 fl  Mean Cell Hemoglobin : 22.9 pg  Mean Cell Hemoglobin Concentration : 30.9 gm/dL  Auto Neutrophil # : x  Auto Lymphocyte # : x  Auto Monocyte # : x  Auto Eosinophil # : x  Auto Basophil # : x  Auto Neutrophil % : x  Auto Lymphocyte % : x  Auto Monocyte % : x  Auto Eosinophil % : x  Auto Basophil % : x    .		Differential:	[] Automated		[] Manual  Chemistry                        12.3   4.36  )-----------( 230      ( 27 Jul 2019 21:37 )             39.8     07-27    133<L>  |  98  |  35<H>  ----------------------------<  144<H>  3.7   |  21<L>  |  2.59<H>    Ca    8.1<L>      27 Jul 2019 21:37  Phos  4.6     07-27  Mg     2.3     07-27    TPro  7.9  /  Alb  3.0<L>  /  TBili  4.0<H>  /  DBili  x   /  AST  49<H>  /  ALT  25  /  AlkPhos  74  07-27    LIVER FUNCTIONS - ( 27 Jul 2019 21:37 )  Alb: 3.0 g/dL / Pro: 7.9 gm/dL / ALK PHOS: 74 U/L / ALT: 25 U/L / AST: 49 U/L / GGT: x           PT/INR - ( 27 Jul 2019 07:45 )   PT: 19.2 sec;   INR: 1.69 ratio    PTT - ( 26 Jul 2019 02:57 )  PTT:28.3 sec      RADIOLOGY RESULTS: Chest Xray- Cardiomegaly with no active pulmonary process  Echocardiogram today    1. Left ventricular ejection fraction, by visual estimation, is 45 to   50%.   2. Mildly decreased global left ventricular systolic function.   3. Elevated mean left atrial pressure.   4. Global cardiomyopathy.   5. Increased LV wall thickness.   6. Normal left ventricular internal cavity size.   7. Right ventricular pressure overload.   8. Spectral Doppler shows impaired relaxation pattern of left   ventricular myocardial filling (Grade I diastolic dysfunction).   9. There is mild asymmetric left ventricular hypertrophy.  10. Pulmonary hypertension.  11. Severely enlarged right ventricle.  12. Severely reduced RV systolic function.  13. RV ejection fraction 34%.  14. Small pericardial effusion.  15. Degenerative mitral valve.  16. Mild mitral annular calcification.  17. Mild mitral valve regurgitation.  18. Moderate tricuspid regurgitation.  19. Structurally normal tricuspid valve, with normal leaflet excursion.  20. Mild aortic regurgitation.  21. Mild to moderate pulmonic valve regurgitation.  22. Structurally normal pulmonic valve, with normal leaflet excursion.  23. Estimated pulmonary artery systolic pressure is 87.1 mmHg assuming a   right atrial pressure of 20 mmHg, which is consistent with severe   pulmonary hypertension.  24. Pulmonary hypertension is present.  25. The main pulmonary artery is normal in size.  26. The right atrial pressure is moderately elevated. Source: Patient & AEHR and [place]  PCP: Dr. Ricky Plata MD [facility]  [phone]  Gastroenterology [facility]: Caesar Kulkarni Gagancaridad  Last seen July 10th for hyperbilirubinemia  Pulmonary [facility]: Dr. Lisker, Dr. Stevenson & Dr. Unger- Last seen April 10, 2019 regular appt , nonadherent    Emergent appt given July 9th seen by Dr. Unger- 15 day supply given for Adempas  Coumadin clinic followed by NP Edwina Farr Discharged from their care 04/23/2019   NNon compliant, pt dismissed from the program. Note reports "Pt no show today on the last day of her ACTS clinic visits. I have spoken to her in the past to schedule appt with PMD to manage her anticoagulation. This patient is non compliant with INR testing and medication. My coordinator spoke to her today, she is in the ER. Will no longer follow. "      HPI: This is 35 y/o AA female with h/o severe pulmonary HTN (Dx 2006; unclear etiology), anticardiolipin antibody positive (repeated 7/10/2019), hyperbilirubinemia potentially attributed to Bumex, DIANN, asthma who presented to ED with recurrent  SOB and LE edema found to be in acute on chronic right sided heart failure.  Unclear etiology of PH and was followed by Dr. Unger of Pulmonary from many years through [id number]. She underwent right heart catheterization in 5/2019 which reported severe pulmonary HTN, unresponsive to Nitrous Oxide. Prior to this hospitalization TTE 7/2018 demonstrated severe MANSI, severe RV enlargement, RV pressure/volume overload, underfilled LV, severe TR, RVSP 98 indicative of severe pulmonary hypertensive. She was placed on Adempas. Patient reports being without Adempas by two doses prior to this admission. Records demonstrate that patient received a 15 day supply from an emergent appointment with [facility] July 9th. Patient on chronic anticoagulation for hypercoagulability discharged from Coumadin clinic to follow with PCP Dr. Plata. This admission patient with SOB, leg swelling decreased exercise intolerance in the ED, troponin 0.435, BNP 4049, CXR shows pulmonary vasc congestion.  Pt started on BiPAP by ED. Patient admitted to Telemetry referred to ICU for hypotension where Levophed and dobutamine currently in progress. Cardiology, GI and pulmonary on consult    PAST MEDICAL & SURGICAL HISTORY:  Former smoker, stopped smoking in distant past  Vitamin D deficiency  Morbid obesity due to excess calories  Edema extremities  Diabetes mellitus  Hyperbilirubinemia  COPD (chronic obstructive pulmonary disease)  COPD (chronic obstructive pulmonary disease)  Anticoagulation goal of INR 2 to 3  Anticardiolipin antibody positive  Congestive heart failure, unspecified HF chronicity, unspecified heart failure type  Moderate asthma, unspecified whether complicated, unspecified whether persistent    Past Surgical History none    Allergies: No Known Allergies  Social Hx:  tattoo(s), but no IV drug use, no body piercing, no hemodialysis, no transfusion before 1992, no transplant before 1992, no alcohol abuse, no household contact to HBV, no travel to an endemic area, no occupational exposure and no cocaine use.   REVIEW OF SYSTEMS  General: fatigue, lives at home, unable to perform work related activities	  Respiratory and Thorax: denies orthopnea/PND. Had PFTs done as outpt 2014 which showed mild restrictive component and depressed DLCO (51%). Did not perform sleep study, requires 2-3 pillows 	  Cardiovascular:	tonight reports chest pain with sob denies palpitations  Gastrointestinal:	She had worsening hyperbilirubinemia of 2.8 in 2/2019.   Blood work from 3/2019 showed normal liver enzymes and ALP but elevated TB of 3.1. WBC 4.59, Hb 12.1  K. Cr was normal. Dr Unger performed extensive work up July 10th 2019 for Hyperbilirubinemia including   Bilirubin Direct, Serum; Glucose-6-Phosphate Dehydrogenase; Lactate Dehydrogenase, Serum; Reticulocyte Count; Results significant for iron deficiency, no evidence of active hepatitis B or C. Hemolyzed chemistries revealed BUN/Cr 57/4.35    work up also included  Alpha-1-Antitrypsin, Serum;ANCA Reflex MPO and PROT3;   Anticardiolipin Antibody Level, Total; Anti-Nuclear Antibody;   Ceruloplasmin, Serum; Immunoglobulins Panel; Microsomal Antibody Assay, Liver Kidney  Mitochondrial Antibody; S  Sedimentation Rate, Erythrocyte;  Smooth Muscle Antibody;   Soluble Liver Antigen Antibody; Patient was positive for anticardiolipid syndrome     Psychiatric: nonadherent, History of zoloft prescribed  ICU Vital Signs Last 24 Hrs  T(C): 36 (27 Jul 2019 23:28), Max: 36.7 (27 Jul 2019 11:45)  T(F): 96.8 (27 Jul 2019 23:28), Max: 98 (27 Jul 2019 11:45)  HR: 91 (27 Jul 2019 23:26) (66 - 96)  BP: 99/48 (27 Jul 2019 19:43) (72/49 - 136/94)  BP(mean): 59 (27 Jul 2019 19:43) (47 - 106)  RR: 23 (27 Jul 2019 19:43) (17 - 31)  SpO2: 92% (27 Jul 2019 23:26) (89% - 99%)    Gen: anxious; patient when first seen on telephone, eating with slight breathlessness HOB elevated with N/c 5 liters in use O2 sat 97%. Pleasant able to teach back pulmonary issues. States PCP is Dr. Plata, refusing blood draw. During course of the hour, Right PIV with levophed extravasated, patient refusing SQ phentalamine only requesting cream. Patient complaint of chest pain midsternal with decreased SBP. Allowed RN placement of new IV, after nitropaste to the right PIV agreed to phentalamine    ENT +_JVD visible 14 cm  Lungs: prolonged expiratory phase  Cardiac displaced PMI, muffled heart sounds S1/S2 bedside Echo RV thickness, global regurgitation, IVC enlarged  Abdomen-benign   jimenez  Ext global edema, lymphedema       Left PIV accidently removed by patient with small 1 cm circular extravasation of Levophed    CARDIAC MARKERS ( 27 Jul 2019 21:37 )  1.170 ng/mL / x     / 312 U/L / x     / 8.1 ng/mL  CARDIAC MARKERS ( 26 Jul 2019 19:33 )  .501 ng/mL / x     / 356 U/L / x     / 5.5 ng/mL  CARDIAC MARKERS ( 26 Jul 2019 11:37 )  .437 ng/mL / x     / 388 U/L / x     / 5.4 ng/mL  CARDIAC MARKERS ( 26 Jul 2019 02:57 )  .435 ng/mL / x     / x     / x     / x        Lab Results:  CBC  CBC Full  -  ( 27 Jul 2019 21:37 )  WBC Count : 4.36 K/uL  RBC Count : 5.36 M/uL  Hemoglobin : 12.3 g/dL  Hematocrit : 39.8 %  Platelet Count - Automated : 230 K/uL  Mean Cell Volume : 74.3 fl  Mean Cell Hemoglobin : 22.9 pg  Mean Cell Hemoglobin Concentration : 30.9 gm/dL  Auto Neutrophil # : x  Auto Lymphocyte # : x  Auto Monocyte # : x  Auto Eosinophil # : x  Auto Basophil # : x  Auto Neutrophil % : x  Auto Lymphocyte % : x  Auto Monocyte % : x  Auto Eosinophil % : x  Auto Basophil % : x    .		Differential:	[] Automated		[] Manual  Chemistry                        12.3   4.36  )-----------( 230      ( 27 Jul 2019 21:37 )             39.8     07-27    133<L>  |  98  |  35<H>  ----------------------------<  144<H>  3.7   |  21<L>  |  2.59<H>    Ca    8.1<L>      27 Jul 2019 21:37  Phos  4.6     07-27  Mg     2.3     07-27    TPro  7.9  /  Alb  3.0<L>  /  TBili  4.0<H>  /  DBili  x   /  AST  49<H>  /  ALT  25  /  AlkPhos  74  07-27    LIVER FUNCTIONS - ( 27 Jul 2019 21:37 )  Alb: 3.0 g/dL / Pro: 7.9 gm/dL / ALK PHOS: 74 U/L / ALT: 25 U/L / AST: 49 U/L / GGT: x           PT/INR - ( 27 Jul 2019 07:45 )   PT: 19.2 sec;   INR: 1.69 ratio    PTT - ( 26 Jul 2019 02:57 )  PTT:28.3 sec      RADIOLOGY RESULTS: Chest Xray- Cardiomegaly with no active pulmonary process  Echocardiogram today    1. Left ventricular ejection fraction, by visual estimation, is 45 to   50%.   2. Mildly decreased global left ventricular systolic function.   3. Elevated mean left atrial pressure.   4. Global cardiomyopathy.   5. Increased LV wall thickness.   6. Normal left ventricular internal cavity size.   7. Right ventricular pressure overload.   8. Spectral Doppler shows impaired relaxation pattern of left   ventricular myocardial filling (Grade I diastolic dysfunction).   9. There is mild asymmetric left ventricular hypertrophy.  10. Pulmonary hypertension.  11. Severely enlarged right ventricle.  12. Severely reduced RV systolic function.  13. RV ejection fraction 34%.  14. Small pericardial effusion.  15. Degenerative mitral valve.  16. Mild mitral annular calcification.  17. Mild mitral valve regurgitation.  18. Moderate tricuspid regurgitation.  19. Structurally normal tricuspid valve, with normal leaflet excursion.  20. Mild aortic regurgitation.  21. Mild to moderate pulmonic valve regurgitation.  22. Structurally normal pulmonic valve, with normal leaflet excursion.  23. Estimated pulmonary artery systolic pressure is 87.1 mmHg assuming a   right atrial pressure of 20 mmHg, which is consistent with severe   pulmonary hypertension.  24. Pulmonary hypertension is present.  25. The main pulmonary artery is normal in size.  26. The right atrial pressure is moderately elevated. Source: Patient & AEHR and [place]  PCP: Dr. Ricky Plata MD [facility]  [phone]  Gastroenterology [facility]: Caesar Sparks  Last seen July 10th for hyperbilirubinemia  Pulmonary [facility]: Dr. Lisker, Dr. Stevenson & Dr. Unger- Last seen April 10, 2019 regular appt , nonadherent    Emergent appt given July 9th seen by Dr. Unger- 15 day supply given for Adeas  Coumadin clinic followed by NP Edwina Farr Discharged from their care 04/23/2019   NNon compliant, pt dismissed from the program. Note reports "Pt no show today on the last day of her [facility] clinic visits. I have spoken to her in the past to schedule appt with PMD to manage her anticoagulation. This patient is non compliant with INR testing and medication. My coordinator spoke to her today, she is in the ER. Will no longer follow. "      HPI: This is 33 y/o AA female with h/o severe pulmonary HTN (Dx 2006; unclear etiology), anticardiolipin antibody positive (repeated 7/10/2019), hyperbilirubinemia potentially attributed to Bumex, DIANN, asthma who presented to ED with recurrent  SOB and LE edema found to be in acute on chronic right sided heart failure.  Unclear etiology of PH and was followed by Dr. Unger of Pulmonary from many years through [id number]. She underwent right heart catheterization in 5/2019 which reported severe pulmonary HTN, unresponsive to Nitrous Oxide. Prior to this hospitalization TTE 7/2018 demonstrated severe MANSI, severe RV enlargement, RV pressure/volume overload, underfilled LV, severe TR, RVSP 98 indicative of severe pulmonary hypertensive. She was placed on Adempas. Prior workup was notable for mildly positive anti-cardiolipin Ab (but does not meet criteria for APLS per heme note), no pulmonary embolism history on prior CTA and Chest Xray reveals no  parenchymal lung disease. She never got VQ scan. Most recent cath on 8/2018 with PA pressures 108/43/67; W 9, PVR 14, no response to Daniel. Patient tonight reports being without Adempas by two doses prior to this admission. Records demonstrate that patient received a 15 day supply from an emergent appointment with [facility] July 9th. Patient on chronic anticoagulation for hypercoagulability discharged from Coumadin clinic to follow with PCP Dr. Plata. This admission patient with SOB, leg swelling decreased exercise intolerance in the ED, troponin 0.435, BNP 4049, CXR shows pulmonary vasc congestion.  Pt started on BiPAP by ED. Patient admitted to Telemetry referred to ICU for hypotension where Levophed and dobutamine currently in progress. Cardiology, GI and pulmonary on consult    PAST MEDICAL & SURGICAL HISTORY:  Former smoker, stopped smoking in distant past  Vitamin D deficiency  Morbid obesity due to excess calories  Edema extremities  Diabetes mellitus  Hyperbilirubinemia  COPD (chronic obstructive pulmonary disease)  COPD (chronic obstructive pulmonary disease)  Anticoagulation goal of INR 2 to 3  Anticardiolipin antibody positive  Congestive heart failure, unspecified HF chronicity, unspecified heart failure type  Moderate asthma, unspecified whether complicated, unspecified whether persistent    Past Surgical History none    Allergies: No Known Allergies  Social Hx:  tattoo(s), but no IV drug use, no body piercing, no hemodialysis, no transfusion before 1992, no transplant before 1992, no alcohol abuse, no household contact to HBV, no travel to an endemic area, no occupational exposure and no cocaine use.   REVIEW OF SYSTEMS  General: fatigue, lives at home, unable to perform work related activities	  Respiratory and Thorax: denies orthopnea/PND. Had PFTs done as outpt 2014 which showed mild restrictive component and depressed DLCO (51%). Did not perform sleep study, requires 2-3 pillows 	  Cardiovascular:	tonight reports chest pain with sob denies palpitations  Gastrointestinal:	She had worsening hyperbilirubinemia of 2.8 in 2/2019.   Blood work from 3/2019 showed normal liver enzymes and ALP but elevated TB of 3.1. WBC 4.59, Hb 12.1  K. Cr was normal. Dr Unger performed extensive work up July 10th 2019 for Hyperbilirubinemia including   Bilirubin Direct, Serum; Glucose-6-Phosphate Dehydrogenase; Lactate Dehydrogenase, Serum; Reticulocyte Count; Results significant for iron deficiency, no evidence of active hepatitis B or C. Hemolyzed chemistries revealed BUN/Cr 57/4.35    work up also included  Alpha-1-Antitrypsin, Serum;ANCA Reflex MPO and PROT3;   Anticardiolipin Antibody Level, Total; Anti-Nuclear Antibody;   Ceruloplasmin, Serum; Immunoglobulins Panel; Microsomal Antibody Assay, Liver Kidney  Mitochondrial Antibody; S  Sedimentation Rate, Erythrocyte;  Smooth Muscle Antibody;   Soluble Liver Antigen Antibody; Patient was positive for anticardiolipid syndrome     Psychiatric: nonadherent, History of zoloft prescribed  ICU Vital Signs Last 24 Hrs  T(C): 36 (27 Jul 2019 23:28), Max: 36.7 (27 Jul 2019 11:45)  T(F): 96.8 (27 Jul 2019 23:28), Max: 98 (27 Jul 2019 11:45)  HR: 91 (27 Jul 2019 23:26) (66 - 96)  BP: 99/48 (27 Jul 2019 19:43) (72/49 - 136/94)  BP(mean): 59 (27 Jul 2019 19:43) (47 - 106)  RR: 23 (27 Jul 2019 19:43) (17 - 31)  SpO2: 92% (27 Jul 2019 23:26) (89% - 99%)    Gen: anxious; patient when first seen on telephone, eating with slight breathlessness HOB elevated with N/c 5 liters in use O2 sat 97%. Pleasant able to teach back pulmonary issues. States PCP is Dr. Plata, refusing blood draw. During course of the hour, Right PIV with levophed extravasated, patient refusing SQ phentalamine only requesting cream. Patient complaint of chest pain midsternal with decreased SBP. Allowed RN placement of new IV, after nitropaste to the right PIV agreed to phentalamine    ENT +_JVD visible 14 cm  Lungs: prolonged expiratory phase  Cardiac displaced PMI, muffled heart sounds S1/S2 bedside Echo RV thickness, global regurgitation, IVC enlarged  Abdomen-benign   jimenez  Ext global edema, lymphedema       Left PIV accidently removed by patient with small 1 cm circular extravasation of Levophed    CARDIAC MARKERS ( 27 Jul 2019 21:37 )  1.170 ng/mL / x     / 312 U/L / x     / 8.1 ng/mL  CARDIAC MARKERS ( 26 Jul 2019 19:33 )  .501 ng/mL / x     / 356 U/L / x     / 5.5 ng/mL  CARDIAC MARKERS ( 26 Jul 2019 11:37 )  .437 ng/mL / x     / 388 U/L / x     / 5.4 ng/mL  CARDIAC MARKERS ( 26 Jul 2019 02:57 )  .435 ng/mL / x     / x     / x     / x        Lab Results:  CBC  CBC Full  -  ( 27 Jul 2019 21:37 )  WBC Count : 4.36 K/uL  RBC Count : 5.36 M/uL  Hemoglobin : 12.3 g/dL  Hematocrit : 39.8 %  Platelet Count - Automated : 230 K/uL  Mean Cell Volume : 74.3 fl  Mean Cell Hemoglobin : 22.9 pg  Mean Cell Hemoglobin Concentration : 30.9 gm/dL  Auto Neutrophil # : x  Auto Lymphocyte # : x  Auto Monocyte # : x  Auto Eosinophil # : x  Auto Basophil # : x  Auto Neutrophil % : x  Auto Lymphocyte % : x  Auto Monocyte % : x  Auto Eosinophil % : x  Auto Basophil % : x    .		Differential:	[] Automated		[] Manual  Chemistry                        12.3   4.36  )-----------( 230      ( 27 Jul 2019 21:37 )             39.8     07-27    133<L>  |  98  |  35<H>  ----------------------------<  144<H>  3.7   |  21<L>  |  2.59<H>    Ca    8.1<L>      27 Jul 2019 21:37  Phos  4.6     07-27  Mg     2.3     07-27    TPro  7.9  /  Alb  3.0<L>  /  TBili  4.0<H>  /  DBili  x   /  AST  49<H>  /  ALT  25  /  AlkPhos  74  07-27    LIVER FUNCTIONS - ( 27 Jul 2019 21:37 )  Alb: 3.0 g/dL / Pro: 7.9 gm/dL / ALK PHOS: 74 U/L / ALT: 25 U/L / AST: 49 U/L / GGT: x           PT/INR - ( 27 Jul 2019 07:45 )   PT: 19.2 sec;   INR: 1.69 ratio    PTT - ( 26 Jul 2019 02:57 )  PTT:28.3 sec      RADIOLOGY RESULTS: Chest Xray- Cardiomegaly with no active pulmonary process  Echocardiogram today    1. Left ventricular ejection fraction, by visual estimation, is 45 to   50%.   2. Mildly decreased global left ventricular systolic function.   3. Elevated mean left atrial pressure.   4. Global cardiomyopathy.   5. Increased LV wall thickness.   6. Normal left ventricular internal cavity size.   7. Right ventricular pressure overload.   8. Spectral Doppler shows impaired relaxation pattern of left   ventricular myocardial filling (Grade I diastolic dysfunction).   9. There is mild asymmetric left ventricular hypertrophy.  10. Pulmonary hypertension.  11. Severely enlarged right ventricle.  12. Severely reduced RV systolic function.  13. RV ejection fraction 34%.  14. Small pericardial effusion.  15. Degenerative mitral valve.  16. Mild mitral annular calcification.  17. Mild mitral valve regurgitation.  18. Moderate tricuspid regurgitation.  19. Structurally normal tricuspid valve, with normal leaflet excursion.  20. Mild aortic regurgitation.  21. Mild to moderate pulmonic valve regurgitation.  22. Structurally normal pulmonic valve, with normal leaflet excursion.  23. Estimated pulmonary artery systolic pressure is 87.1 mmHg assuming a   right atrial pressure of 20 mmHg, which is consistent with severe   pulmonary hypertension.  24. Pulmonary hypertension is present.  25. The main pulmonary artery is normal in size.  26. The right atrial pressure is moderately elevated.

## 2019-07-27 NOTE — CONSULT NOTE ADULT - ATTENDING COMMENTS
35 yo woman with severe pHTN (previously on Adempas, ? Group 1 vs 4), mildly positive anti-cardiolipin Ab admitted with RV failure after not taking/having her diuretics and phtn meds for weeks. Pt w/ significant LE edema and overall volume overload due to not taking her diuretics. She denies chest pain currently, SOB at rest. Denies fevers, cough and sputum. Earlier had refused transfer to [place] for further care of her PHTN. Pt not receiving her diuretic doses on the floors due to low BP. Start dobutamine for inotropic support. Cont bumex. Start metolazone if needed. Cont NC to maintain sat>95%. Cont a/c for her PHTN.    Pt with depressive sx that seems to be contributing to her noncompliance but denies SI and does not want to talk to a psychiatrist currently.

## 2019-07-27 NOTE — PROGRESS NOTE ADULT - PROBLEM SELECTOR PLAN 2
Continue Symbicort
As above. Patient with NYHA Class III CHF most likely secondary to cardiomyopathy. Presently with NSTEMI will repeat cardiac enzymes, EKG, lipid profile. Continue bumex, spirolactone   - replete K as needed to maintain >4  - standing weights daily  - obtain Adempas; as suggested by outpatient management maintain compliance and consider uptitrating to 2 mg every 8 hours as she has been on 1.5 mg for several months

## 2019-07-27 NOTE — PROGRESS NOTE ADULT - ASSESSMENT
PATIENT GERMÁN KENNY  [phone]   1985 DOA 2019 DR NICOLETTE PETTY                             Patient description                          35 yo woman with severe pHTN (?group I, mPAP 67; W 9; RVR 9 on RHC 2018,  previously on Adempas, non-complinat), mildly positive anti-cardiolipin Ab and RAD admitted with decompensated right heart failure and volume overload and DIANN in the setting of medication non-compliance.   her dry weight is 119k                                           Hospital course                                  Pt was staretd on bumex but bp was low so she was transferred to icu  for diuresis under cover of dobutamine inotrope                   PATIENT GERMÁN KENNY  [phone]   1985 DOA 2019 DR NICOLETTE PETTY                             PROBLEM/ASSESSMENT/RECOMMENDATIONS (A/R)       IDIOPATHIC PULMONARY ARTERIAL HYPERTENSION  A/R On coumadin   ANTIPHOSPHOLIPID SYNDROME   A/R On coumadin   BIVENTRICULAR FAILURE WITH LOW BP IN SETTING OF [facility]  A/R 2019 On dobutamine and bumex  2019 Prognosis is not good    ACUTE HYPOXEMIC RESP FAILURE   COPD   A/R On symbicort 80 ()  spiriva ()         TIME SPENT Over 25 minutes aggregate care time spent on encounter; activities included   direct pt care, counseling and/or coordinating care reviewing notes, lab data/ imaging , discussion with multidisciplinary team/ pt /family. Risks, benefits, alternatives  discussed in deta

## 2019-07-27 NOTE — PROGRESS NOTE ADULT - ASSESSMENT
34 year old woman with CHF, asthma presents to ED with complaint of SOB and leg swelling.  Patient will require admission for at least 2 midnights as detailed below:

## 2019-07-28 DIAGNOSIS — N17.9 ACUTE KIDNEY FAILURE, UNSPECIFIED: ICD-10-CM

## 2019-07-28 DIAGNOSIS — I27.20 PULMONARY HYPERTENSION, UNSPECIFIED: ICD-10-CM

## 2019-07-28 DIAGNOSIS — R76.0 RAISED ANTIBODY TITER: ICD-10-CM

## 2019-07-28 DIAGNOSIS — R60.0 LOCALIZED EDEMA: ICD-10-CM

## 2019-07-28 DIAGNOSIS — I42.8 OTHER CARDIOMYOPATHIES: ICD-10-CM

## 2019-07-28 LAB
ALBUMIN SERPL ELPH-MCNC: 2.9 G/DL — LOW (ref 3.3–5)
ALP SERPL-CCNC: 71 U/L — SIGNIFICANT CHANGE UP (ref 40–120)
ALT FLD-CCNC: 24 U/L — SIGNIFICANT CHANGE UP (ref 12–78)
ANION GAP SERPL CALC-SCNC: 9 MMOL/L — SIGNIFICANT CHANGE UP (ref 5–17)
ANION GAP SERPL CALC-SCNC: 9 MMOL/L — SIGNIFICANT CHANGE UP (ref 5–17)
AST SERPL-CCNC: 46 U/L — HIGH (ref 15–37)
BILIRUB DIRECT SERPL-MCNC: 2.26 MG/DL — HIGH (ref 0.05–0.2)
BILIRUB INDIRECT FLD-MCNC: 1.3 MG/DL — HIGH (ref 0.2–1)
BILIRUB SERPL-MCNC: 3.6 MG/DL — HIGH (ref 0.2–1.2)
BUN SERPL-MCNC: 31 MG/DL — HIGH (ref 7–23)
BUN SERPL-MCNC: 33 MG/DL — HIGH (ref 7–23)
CALCIUM SERPL-MCNC: 8.5 MG/DL — SIGNIFICANT CHANGE UP (ref 8.5–10.1)
CALCIUM SERPL-MCNC: 8.8 MG/DL — SIGNIFICANT CHANGE UP (ref 8.5–10.1)
CHLORIDE SERPL-SCNC: 97 MMOL/L — SIGNIFICANT CHANGE UP (ref 96–108)
CHLORIDE SERPL-SCNC: 97 MMOL/L — SIGNIFICANT CHANGE UP (ref 96–108)
CHOLEST SERPL-MCNC: 76 MG/DL — SIGNIFICANT CHANGE UP (ref 10–199)
CK MB BLD-MCNC: 2.2 % — SIGNIFICANT CHANGE UP (ref 0–3.5)
CK MB BLD-MCNC: 2.8 % — SIGNIFICANT CHANGE UP (ref 0–3.5)
CK MB CFR SERPL CALC: 8 NG/ML — HIGH (ref 0.5–3.6)
CK MB CFR SERPL CALC: 8.2 NG/ML — HIGH (ref 0.5–3.6)
CK SERPL-CCNC: 298 U/L — HIGH (ref 26–192)
CK SERPL-CCNC: 367 U/L — HIGH (ref 26–192)
CO2 SERPL-SCNC: 25 MMOL/L — SIGNIFICANT CHANGE UP (ref 22–31)
CO2 SERPL-SCNC: 25 MMOL/L — SIGNIFICANT CHANGE UP (ref 22–31)
CREAT SERPL-MCNC: 1.89 MG/DL — HIGH (ref 0.5–1.3)
CREAT SERPL-MCNC: 2.39 MG/DL — HIGH (ref 0.5–1.3)
ESTIMATED AVERAGE GLUCOSE: 151 MG/DL — HIGH (ref 68–114)
GLUCOSE SERPL-MCNC: 115 MG/DL — HIGH (ref 70–99)
GLUCOSE SERPL-MCNC: 135 MG/DL — HIGH (ref 70–99)
HBA1C BLD-MCNC: 6.9 % — HIGH (ref 4–5.6)
HCT VFR BLD CALC: 37.2 % — SIGNIFICANT CHANGE UP (ref 34.5–45)
HDLC SERPL-MCNC: 10 MG/DL — LOW
HGB BLD-MCNC: 11.7 G/DL — SIGNIFICANT CHANGE UP (ref 11.5–15.5)
INR BLD: 2.04 RATIO — HIGH (ref 0.88–1.16)
LIPID PNL WITH DIRECT LDL SERPL: 51 MG/DL — SIGNIFICANT CHANGE UP
MAGNESIUM SERPL-MCNC: 2.4 MG/DL — SIGNIFICANT CHANGE UP (ref 1.6–2.6)
MAGNESIUM SERPL-MCNC: 2.5 MG/DL — SIGNIFICANT CHANGE UP (ref 1.6–2.6)
MCHC RBC-ENTMCNC: 23.3 PG — LOW (ref 27–34)
MCHC RBC-ENTMCNC: 31.5 GM/DL — LOW (ref 32–36)
MCV RBC AUTO: 74.1 FL — LOW (ref 80–100)
NRBC # BLD: 2 /100 WBCS — HIGH (ref 0–0)
PHOSPHATE SERPL-MCNC: 3.5 MG/DL — SIGNIFICANT CHANGE UP (ref 2.5–4.5)
PHOSPHATE SERPL-MCNC: 4.5 MG/DL — SIGNIFICANT CHANGE UP (ref 2.5–4.5)
PLATELET # BLD AUTO: 206 K/UL — SIGNIFICANT CHANGE UP (ref 150–400)
POTASSIUM SERPL-MCNC: 3.8 MMOL/L — SIGNIFICANT CHANGE UP (ref 3.5–5.3)
POTASSIUM SERPL-MCNC: 4 MMOL/L — SIGNIFICANT CHANGE UP (ref 3.5–5.3)
POTASSIUM SERPL-SCNC: 3.8 MMOL/L — SIGNIFICANT CHANGE UP (ref 3.5–5.3)
POTASSIUM SERPL-SCNC: 4 MMOL/L — SIGNIFICANT CHANGE UP (ref 3.5–5.3)
PROT SERPL-MCNC: 7.7 GM/DL — SIGNIFICANT CHANGE UP (ref 6–8.3)
PROTHROM AB SERPL-ACNC: 23.3 SEC — HIGH (ref 10–12.9)
RBC # BLD: 5.02 M/UL — SIGNIFICANT CHANGE UP (ref 3.8–5.2)
RBC # FLD: 21.8 % — HIGH (ref 10.3–14.5)
SODIUM SERPL-SCNC: 131 MMOL/L — LOW (ref 135–145)
SODIUM SERPL-SCNC: 131 MMOL/L — LOW (ref 135–145)
T3 SERPL-MCNC: 49 NG/DL — LOW (ref 80–200)
T4 AB SER-ACNC: 4.8 UG/DL — SIGNIFICANT CHANGE UP (ref 4.6–12)
TOTAL CHOLESTEROL/HDL RATIO MEASUREMENT: 7.6 RATIO — HIGH (ref 3.3–7.1)
TRIGL SERPL-MCNC: 76 MG/DL — SIGNIFICANT CHANGE UP (ref 10–149)
TROPONIN I SERPL-MCNC: 1.5 NG/ML — HIGH (ref 0.01–0.04)
TROPONIN I SERPL-MCNC: 1.69 NG/ML — HIGH (ref 0.01–0.04)
TSH SERPL-MCNC: 2.55 UIU/ML — SIGNIFICANT CHANGE UP (ref 0.36–3.74)
WBC # BLD: 4.57 K/UL — SIGNIFICANT CHANGE UP (ref 3.8–10.5)
WBC # FLD AUTO: 4.57 K/UL — SIGNIFICANT CHANGE UP (ref 3.8–10.5)

## 2019-07-28 PROCEDURE — 99232 SBSQ HOSP IP/OBS MODERATE 35: CPT

## 2019-07-28 PROCEDURE — 71045 X-RAY EXAM CHEST 1 VIEW: CPT | Mod: 26

## 2019-07-28 PROCEDURE — 99291 CRITICAL CARE FIRST HOUR: CPT

## 2019-07-28 PROCEDURE — 93010 ELECTROCARDIOGRAM REPORT: CPT

## 2019-07-28 PROCEDURE — 36556 INSERT NON-TUNNEL CV CATH: CPT

## 2019-07-28 PROCEDURE — 76700 US EXAM ABDOM COMPLETE: CPT | Mod: 26

## 2019-07-28 RX ORDER — NOREPINEPHRINE BITARTRATE/D5W 8 MG/250ML
0.05 PLASTIC BAG, INJECTION (ML) INTRAVENOUS
Qty: 16 | Refills: 0 | Status: DISCONTINUED | OUTPATIENT
Start: 2019-07-28 | End: 2019-08-05

## 2019-07-28 RX ORDER — SIMVASTATIN 20 MG/1
40 TABLET, FILM COATED ORAL AT BEDTIME
Refills: 0 | Status: DISCONTINUED | OUTPATIENT
Start: 2019-07-28 | End: 2019-08-08

## 2019-07-28 RX ORDER — DOBUTAMINE HCL 250MG/20ML
2.5 VIAL (ML) INTRAVENOUS
Qty: 500 | Refills: 0 | Status: DISCONTINUED | OUTPATIENT
Start: 2019-07-28 | End: 2019-08-02

## 2019-07-28 RX ORDER — SODIUM CHLORIDE 9 MG/ML
10 INJECTION INTRAMUSCULAR; INTRAVENOUS; SUBCUTANEOUS
Refills: 0 | Status: DISCONTINUED | OUTPATIENT
Start: 2019-07-28 | End: 2019-08-08

## 2019-07-28 RX ORDER — ASPIRIN/CALCIUM CARB/MAGNESIUM 324 MG
325 TABLET ORAL ONCE
Refills: 0 | Status: COMPLETED | OUTPATIENT
Start: 2019-07-28 | End: 2019-07-28

## 2019-07-28 RX ORDER — WARFARIN SODIUM 2.5 MG/1
7.5 TABLET ORAL ONCE
Refills: 0 | Status: COMPLETED | OUTPATIENT
Start: 2019-07-28 | End: 2019-07-28

## 2019-07-28 RX ORDER — ASPIRIN/CALCIUM CARB/MAGNESIUM 324 MG
81 TABLET ORAL DAILY
Refills: 0 | Status: DISCONTINUED | OUTPATIENT
Start: 2019-07-29 | End: 2019-08-08

## 2019-07-28 RX ADMIN — BUMETANIDE 1 MILLIGRAM(S): 0.25 INJECTION INTRAMUSCULAR; INTRAVENOUS at 21:38

## 2019-07-28 RX ADMIN — BUMETANIDE 1 MILLIGRAM(S): 0.25 INJECTION INTRAMUSCULAR; INTRAVENOUS at 06:43

## 2019-07-28 RX ADMIN — Medication 325 MILLIGRAM(S): at 10:40

## 2019-07-28 RX ADMIN — MORPHINE SULFATE 2 MILLIGRAM(S): 50 CAPSULE, EXTENDED RELEASE ORAL at 23:45

## 2019-07-28 RX ADMIN — Medication 650 MILLIGRAM(S): at 06:43

## 2019-07-28 RX ADMIN — Medication 3 MILLILITER(S): at 23:20

## 2019-07-28 RX ADMIN — BUMETANIDE 1 MILLIGRAM(S): 0.25 INJECTION INTRAMUSCULAR; INTRAVENOUS at 00:43

## 2019-07-28 RX ADMIN — CHLORHEXIDINE GLUCONATE 1 APPLICATION(S): 213 SOLUTION TOPICAL at 08:32

## 2019-07-28 RX ADMIN — RIOCIGUAT 1.5 MILLIGRAM(S): 1.5 TABLET, FILM COATED ORAL at 18:49

## 2019-07-28 RX ADMIN — Medication 0.25 INCH(S): at 08:56

## 2019-07-28 RX ADMIN — Medication 3 MILLILITER(S): at 11:35

## 2019-07-28 RX ADMIN — Medication 25 MICROGRAM(S): at 06:42

## 2019-07-28 RX ADMIN — Medication 5.78 MICROGRAM(S)/KG/MIN: at 23:27

## 2019-07-28 RX ADMIN — RIOCIGUAT 1.5 MILLIGRAM(S): 1.5 TABLET, FILM COATED ORAL at 00:43

## 2019-07-28 RX ADMIN — Medication 9.24 MICROGRAM(S)/KG/MIN: at 21:40

## 2019-07-28 RX ADMIN — SERTRALINE 50 MILLIGRAM(S): 25 TABLET, FILM COATED ORAL at 13:33

## 2019-07-28 RX ADMIN — Medication 36.96 MICROGRAM(S)/KG/MIN: at 05:00

## 2019-07-28 RX ADMIN — Medication 9.24 MICROGRAM(S)/KG/MIN: at 14:30

## 2019-07-28 RX ADMIN — WARFARIN SODIUM 7.5 MILLIGRAM(S): 2.5 TABLET ORAL at 21:38

## 2019-07-28 RX ADMIN — RIOCIGUAT 1.5 MILLIGRAM(S): 1.5 TABLET, FILM COATED ORAL at 06:42

## 2019-07-28 RX ADMIN — BUDESONIDE AND FORMOTEROL FUMARATE DIHYDRATE 2 PUFF(S): 160; 4.5 AEROSOL RESPIRATORY (INHALATION) at 17:37

## 2019-07-28 RX ADMIN — Medication 5.78 MICROGRAM(S)/KG/MIN: at 20:07

## 2019-07-28 RX ADMIN — Medication 11.55 MICROGRAM(S)/KG/MIN: at 06:51

## 2019-07-28 RX ADMIN — SIMVASTATIN 40 MILLIGRAM(S): 20 TABLET, FILM COATED ORAL at 21:38

## 2019-07-28 RX ADMIN — BUMETANIDE 1 MILLIGRAM(S): 0.25 INJECTION INTRAMUSCULAR; INTRAVENOUS at 13:55

## 2019-07-28 RX ADMIN — Medication 3 MILLILITER(S): at 18:08

## 2019-07-28 RX ADMIN — SPIRONOLACTONE 50 MILLIGRAM(S): 25 TABLET, FILM COATED ORAL at 06:43

## 2019-07-28 RX ADMIN — BUDESONIDE AND FORMOTEROL FUMARATE DIHYDRATE 2 PUFF(S): 160; 4.5 AEROSOL RESPIRATORY (INHALATION) at 06:43

## 2019-07-28 RX ADMIN — Medication 3 MILLILITER(S): at 05:31

## 2019-07-28 RX ADMIN — Medication 5.78 MICROGRAM(S)/KG/MIN: at 16:00

## 2019-07-28 RX ADMIN — MORPHINE SULFATE 2 MILLIGRAM(S): 50 CAPSULE, EXTENDED RELEASE ORAL at 23:22

## 2019-07-28 RX ADMIN — Medication 650 MILLIGRAM(S): at 07:56

## 2019-07-28 NOTE — CHART NOTE - NSCHARTNOTEFT_GEN_A_CORE
Pt is a 34yr old woman with PMhx including COPD, CHF, severe RV dysfunction with severe pulm hypertension, asthma and DM who presents to the ER on 7/26 with chief complaint of dyspnea and was admitted with chf exacerbation with transfer to the ICU on 7/27 for dobutamine drip.    24hr: Continues on levophed 0.65mcg/kg/min, 2.5mcg/kg/min of dobutamine.     ICU Vital Signs Last 24 Hrs  T(C): 36.6 (28 Jul 2019 19:18), Max: 36.6 (28 Jul 2019 04:56)  T(F): 97.9 (28 Jul 2019 19:18), Max: 97.9 (28 Jul 2019 19:18)  HR: 91 (28 Jul 2019 19:00) (84 - 902)  BP: 114/50 (28 Jul 2019 19:00) (65/26 - 118/51)  BP(mean): 67 (28 Jul 2019 19:00) (32 - 94)  ABP: --  ABP(mean): --  RR: 24 (28 Jul 2019 19:00) (16 - 36)  SpO2: 97% (28 Jul 2019 19:00) (88% - 100%)    CARDIAC MARKERS ( 28 Jul 2019 17:24 )  1.500 ng/mL / x     / 367 U/L / x     / 8.0 ng/mL  CARDIAC MARKERS ( 28 Jul 2019 06:41 )  1.690 ng/mL / x     / 298 U/L / x     / 8.2 ng/mL  CARDIAC MARKERS ( 27 Jul 2019 21:37 )  1.170 ng/mL / x     / 312 U/L / x     / 8.1 ng/mL    CBC Full  -  ( 28 Jul 2019 06:41 )  WBC Count : 4.57 K/uL  RBC Count : 5.02 M/uL  Hemoglobin : 11.7 g/dL  Hematocrit : 37.2 %  Platelet Count - Automated : 206 K/uL  Mean Cell Volume : 74.1 fl  Mean Cell Hemoglobin : 23.3 pg  Mean Cell Hemoglobin Concentration : 31.5 gm/dL  Auto Neutrophil # : x  Auto Lymphocyte # : x  Auto Monocyte # : x  Auto Eosinophil # : x  Auto Basophil # : x  Auto Neutrophil % : x  Auto Lymphocyte % : x  Auto Monocyte % : x  Auto Eosinophil % : x  Auto Basophil % : x    PT/INR - ( 28 Jul 2019 06:41 )   PT: 23.3 sec;   INR: 2.04 ratio        07-28    131<L>  |  97  |  31<H>  ----------------------------<  135<H>  3.8   |  25  |  1.89<H>    Ca    8.8      28 Jul 2019 17:24  Phos  3.5     07-28  Mg     2.5     07-28    TPro  7.7  /  Alb  2.9<L>  /  TBili  3.6<H>  /  DBili  2.26<H>  /  AST  46<H>  /  ALT  24  /  AlkPhos  71  07-28    CULTURES:    I&O's Summary    27 Jul 2019 07:01 - 28 Jul 2019 07:00  --------------------------------------------------------  IN: 2458.7 mL / OUT: 1175 mL / NET: 1283.7 mL    28 Jul 2019 07:01  -  28 Jul 2019 20:08  --------------------------------------------------------  IN: 1651.2 mL / OUT: 2625 mL / NET: -973.8 mL      MEDICATIONS  (STANDING):  ALBUTerol    90 MICROgram(s) HFA Inhaler 1 Puff(s) Inhalation every 4 hours  ALBUTerol/ipratropium for Nebulization 3 milliLiter(s) Nebulizer every 6 hours  buDESOnide  80 MICROgram(s)/formoterol 4.5 MICROgram(s) Inhaler 2 Puff(s) Inhalation two times a day  buMETAnide Injectable 1 milliGRAM(s) IV Push every 8 hours  chlorhexidine 4% Liquid 1 Application(s) Topical <User Schedule>  DOBUTamine Infusion 2.5 MICROgram(s)/kG/Min (9.24 mL/Hr) IV Continuous <Continuous>  levothyroxine 25 MICROGram(s) Oral daily  metolazone 5 milliGRAM(s) Oral daily  norepinephrine Infusion 0.05 MICROgram(s)/kG/Min (5.775 mL/Hr) IV Continuous <Continuous>  riociguat 1.5 milliGRAM(s) Oral every 8 hours  sertraline 50 milliGRAM(s) Oral daily  simvastatin 40 milliGRAM(s) Oral at bedtime  spironolactone 50 milliGRAM(s) Oral daily  tiotropium 18 MICROgram(s) Capsule 1 Capsule(s) Inhalation daily  warfarin 7.5 milliGRAM(s) Oral once    MEDICATIONS  (PRN):  morphine  - Injectable 2 milliGRAM(s) IV Push every 6 hours PRN Severe Pain (7 - 10)  sodium chloride 0.9% lock flush 10 milliLiter(s) IV Push every 1 hour PRN Pre/post blood products, medications, blood draw, and to maintain line patency    No Known Allergies    FAMILY HISTORY:    PAST MEDICAL & SURGICAL HISTORY:  Former smoker, stopped smoking in distant past  Vitamin D deficiency  Morbid obesity due to excess calories  Edema extremities  Diabetes mellitus  Hyperbilirubinemia  COPD (chronic obstructive pulmonary disease)  COPD (chronic obstructive pulmonary disease)  Anticoagulation goal of INR 2 to 3  Anticardiolipin antibody positive  Congestive heart failure, unspecified HF chronicity, unspecified heart failure type  Moderate asthma, unspecified whether complicated, unspecified whether persistent  Other secondary hypertension    RADIOLOGY/IMAGING/ECHO  < from: TTE Echo Doppler w/o Cont (07.26.19 @ 08:44) >    Summary:   1. Left ventricular ejection fraction, by visual estimation, is 45 to   50%.   2. Mildly decreased global left ventricular systolic function.   3. Elevated mean left atrial pressure.   4. Global cardiomyopathy.   5. Increased LV wall thickness.   6. Normal left ventricular internal cavity size.   7. Right ventricular pressure overload.   8. Spectral Doppler shows impaired relaxation pattern of left   ventricular myocardial filling (Grade I diastolic dysfunction).   9. There is mild asymmetric left ventricular hypertrophy.  10. Pulmonary hypertension.  11. Severely enlarged right ventricle.  12. Severely reduced RV systolic function.  13. RV ejection fraction 34%.  14. Small pericardial effusion.  15. Degenerative mitral valve.  16. Mild mitral annular calcification.  17. Mild mitral valve regurgitation.  18. Moderate tricuspid regurgitation.  19. Structurally normal tricuspid valve, with normal leaflet excursion.  20. Mild aortic regurgitation.  21. Mild to moderate pulmonic valve regurgitation.  22. Structurally normal pulmonic valve, with normal leaflet excursion.  23. Estimated pulmonary artery systolic pressure is 87.1 mmHg assuming a   right atrial pressure of 20 mmHg, which is consistent with severe   pulmonary hypertension.  24. Pulmonary hypertension is present.  25. The main pulmonary artery is normal in size.  26. The right atrial pressure is moderately elevated.    < end of copied text >    < from: Xray Chest 1 View- PORTABLE-Urgent (07.28.19 @ 10:28) >    FINDINGS/  IMPRESSION:  New right central line noted tip likely overlying junction of SVC/right   atrium. Exam limited due to rotation.    No pneumothorax.    Nonspecific nodular density right perihilar region. CT chest with IV   contrast recommended.    Haziness left lung base could berelated to overlying soft tissues and/or   small left pleural effusion    Heart size cannot be accurately assessed in this projection, but appear   enlarged.    < end of copied text >      Physical Examination:  General: Resting in bed in no apparent distress  Neuro: Awake/alert  HEENT: atraumatic  PULM: tachypneic to upper 20s, HFNC 45/50% o2 sat 94%  CVS: s1s2, sbp 100s  EXT: +3 BLE edema    --Continue levophed for map 65-75  --Continue dobutamine for now  --Goal urine output negative 1L/24hr  --Will increase HFNC to 50L, fio2 ok for now

## 2019-07-28 NOTE — CHART NOTE - NSCHARTNOTEFT_GEN_A_CORE
35 y/o female with severe pulmonary HTN, Class III CHF, Hypothyroidism and antiphospholipid syndrome on Coumadin with hypercoagulability with NSTEMI with evaluation of hyperbilirubinemia noted with SBPs in 80's and hypoxemia in 88-89% while asleep. Discussed with patient benefits and risks of need for central line placement. Patient tearful, refusing reports being scared that she never had to have line placed in neck or groin.    Patient given first dose of Adempas and Bumex and IV line placed to Right PIV as patientable to communicate, understand appreciate reasoning. Patient with components of obstructive sleep apnea. Appreciated patient has not performed sleep study and in the past has refused VQ scan    Considered 35 y/o female with severe pulmonary HTN, Class III CHF, Hypothyroidism and antiphospholipid syndrome (APS) on Coumadin with hypercoagulability with NSTEMI with evaluation of hyperbilirubinemia noted with SBPs in 80's and hypoxemia in 88-89% while asleep. Discussed with patient benefits and risks of need for central line placement. Patient tearful, refusing reports being scared that she never had to have line placed in neck or groin.    Patient given first dose of Adempas and Bumex and IV line placed to Right PIV as patientable to communicate, understand appreciate reasoning. Patient requesting BiPap states she felt better. Patient with components of obstructive sleep apnea (SKYE). Appreciated patient has not performed sleep study and in the past has refused VQ scan    Considered the potential multifactorial nature of pulmonary systolic resistance including APS, SKYE and thyroid disorder making it difficult to appreciate WHO class  of pulmonary HTN. Given the unlikelihood of SLE or sarcoidosis; Type 1 idiopathic versus Type 3 related to SKYE and chronic ling disease versus Type 5 related to multifactorial unknown components    Presently patient hypoxemic. The reduction in alveolar oxygen tension can lead to pulmonary vasoconstriction. Given that there was priorly  normal oxygen saturation while awake, her morbid obesity and hypoventilation while asleep is concerning. Risks, benefits reviewed with patient. PEEP/CPAP poses risks of increasing pulmonary systolic pressure and worsening RV dysfunction at  high doses. Due to RV failure will attempt to balance pulmonary and systemic blood pressure . Bedside POCUS reveals dilated IVC     Patient on Levophed 0.3 through PIV delivering 69 ml/hr to increase SVR. Patient refusing central line placement and BPs in 80s  Patient contracted to place right sided PIV. Risks of continuing levophed versus vasopressin in central line are the increased delivery of fluid and increase in SVR. As such dobutamine in place and will add vasopressin to lower SVR if patient persists in hypotension. Vasopressin will decrease PVR. At present  the continual transient hypoxia and  worsening pulmonary vasoconstrict are priority.    Noninvasive PPV with minimal pressures no higher than 10 over 5 introduced. Patient hypoxemia resolved and SBP improved. Components of SKYE resolved and patient able to sleep  Vital Signs   HR: 86 (28 Jul 2019 01:42) (67 - 102)  BP: 97/50 (28 Jul 2019 01:45) (65/26 - 136/94)  BP(mean): 62 (28 Jul 2019 01:45) (32 - 106)  RR: 18 (28 Jul 2019 02:00) (17 - 31)  SpO2: 100% (28 Jul 2019 02:42) (88% - 100%)  Patient communicating, understanding and appreciating risks and benefits. As such with capacity to refuse central line and transfer    A/P Severe Pulmonary HTN with high cardiac demand and elevated troponin         Patient refusing transfer to [facility]/ Patient refusing central line. As hypoxia cannot be tolerated will attempt BiPap 10/5 with Fio2 60%. Inspiratory pressures and expiratory pressures to not be increased. SKYE treated during sleep for 4 hours 8944-9663. High flow N/c while awake. Avoid intubation. Will start vasopressin over levophed if patient consents to central line which she is presently refusing  Will transfer to [facility] to pulmonary specialists with patient consent. Patient with poor prognosis presently stabilized and sleeping resting comfortably. 33 y/o female with severe pulmonary HTN, Class III CHF, Hypothyroidism and antiphospholipid syndrome (APS) on Coumadin with hypercoagulability with NSTEMI with evaluation of hyperbilirubinemia noted with SBPs in 80's and hypoxemia in 88-89% while asleep. Discussed with patient benefits and risks of need for central line placement. Patient tearful, refusing reports being scared that she never had to have line placed in neck or groin.    Patient given first dose of Adempas and Bumex and IV line placed to Right PIV as patientable to communicate, understand appreciate reasoning. Patient requesting BiPap states she felt better. Patient with components of obstructive sleep apnea (SKYE). Appreciated patient has not performed sleep study and in the past has refused VQ scan    Considered the potential multifactorial nature of pulmonary systolic resistance including APS, SKYE and thyroid disorder making it difficult to appreciate WHO class  of pulmonary HTN. Given the unlikelihood of SLE or sarcoidosis; Type 1 idiopathic versus Type 3 related to SKYE and chronic ling disease versus Type 5 related to multifactorial unknown components    Presently patient hypoxemic. The reduction in alveolar oxygen tension can lead to pulmonary vasoconstriction. Given that there was priorly  normal oxygen saturation while awake, her morbid obesity and hypoventilation while asleep is concerning. Risks, benefits reviewed with patient. PEEP/CPAP poses risks of increasing pulmonary systolic pressure and worsening RV dysfunction at  high doses. Due to RV failure will attempt to balance pulmonary and systemic blood pressure . Bedside POCUS reveals dilated IVC     Patient on Levophed 0.3 through PIV delivering 69 ml/hr to increase SVR. Patient refusing central line placement and BPs in 80s  Patient contracted to place right sided PIV. Risks of continuing levophed versus vasopressin in central line are the increased delivery of fluid and increase in SVR. As such dobutamine in place and will add vasopressin to lower SVR if patient persists in hypotension. Vasopressin will decrease PVR. At present  the continual transient hypoxia and  worsening pulmonary vasoconstrict are priority.    Noninvasive PPV with minimal pressures no higher than 10 over 5 introduced. Patient hypoxemia resolved and SBP improved. Components of SKYE resolved and patient able to sleep  Vital Signs   HR: 86 (28 Jul 2019 01:42) (67 - 102)  BP: 97/50 (28 Jul 2019 01:45) (65/26 - 136/94)  BP(mean): 62 (28 Jul 2019 01:45) (32 - 106)  RR: 18 (28 Jul 2019 02:00) (17 - 31)  SpO2: 100% (28 Jul 2019 02:42) (88% - 100%)  Patient communicating, understanding and appreciating risks and benefits. As such with capacity to refuse central line and transfer    A/P Severe Pulmonary HTN with high cardiac demand and elevated troponin         Patient refusing transfer to [facility]/ Patient refusing central line. Appreciated pulmonary consultation and usual avoidance of NIV. As hypoxia cannot be tolerated will attempt BiPap 10/5 with Fio2 60%. Inspiratory pressures and expiratory pressures to not be increased. SKYE treated during sleep for 4 hours 5465-3403. High flow N/c while awake. Avoid intubation. Will start vasopressin over levophed if patient consents to central line which she is presently refusing  Will transfer to [facility] to pulmonary specialists with patient consent. Patient with poor prognosis presently stabilized and sleeping resting comfortably. Continuing to monitor I&Os to assess need for Bumex IV    Critical Care time 75 minutes 35 y/o female with severe pulmonary HTN, Class III CHF, Hypothyroidism and antiphospholipid syndrome (APS) on Coumadin with hypercoagulability with NSTEMI with evaluation of hyperbilirubinemia noted with SBPs in 80's and hypoxemia in 88-89% while asleep. Discussed with patient benefits and risks of need for central line placement. Patient tearful, refusing reports being scared that she never had to have line placed in neck or groin.    Patient given first dose of Adempas and Bumex and IV line placed to Right PIV as patient able to communicate, understand appreciate reasoning. Patient requesting BiPap states she felt better. Patient with components of obstructive sleep apnea (SKYE). Appreciated patient has not performed sleep study and in the past has refused VQ scan    Considered the potential multifactorial nature of pulmonary systolic resistance including APS, SKYE and thyroid disorder making it difficult to appreciate WHO class  of pulmonary HTN. Given the unlikelihood of SLE or sarcoidosis; Type 1 idiopathic versus Type 3 related to SKYE and chronic lung disease versus Type 5 related to multifactorial unknown components    Presently patient hypoxemic. The reduction in alveolar oxygen tension can lead to pulmonary vasoconstriction. Given that there was priorly  normal oxygen saturation while awake, her morbid obesity and hypoventilation while asleep is concerning. Risks, benefits reviewed with patient. PEEP/CPAP poses risks of increasing pulmonary systolic pressure and worsening RV dysfunction at  high doses. Due to RV failure will attempt to balance pulmonary and systemic blood pressure . Bedside POCUS reveals dilated IVC     Patient on Levophed 0.3 through PIV delivering 69 ml/hr to increase SVR. Patient refusing central line placement and BPs in 80s  Patient contracted to place right sided PIV. Risks of continuing levophed versus vasopressin in central line are the increased delivery of fluid and increase in SVR versus abiility to decrease PVR. As such dobutamine in place and will add vasopressin to maintain SVR if patient persists in hypotension. Vasopressin will decrease PVR. At present  the continual transient hypoxia and  worsening pulmonary vasoconstrict are priority.    Noninvasive PPV with minimal pressures no higher than 10 over 5 introduced. Patient hypoxemia resolved and SBP improved. Components of SKYE resolved and patient able to sleep  Vital Signs   HR: 86 (28 Jul 2019 01:42) (67 - 102)  BP: 97/50 (28 Jul 2019 01:45) (65/26 - 136/94)  BP(mean): 62 (28 Jul 2019 01:45) (32 - 106)  RR: 18 (28 Jul 2019 02:00) (17 - 31)  SpO2: 100% (28 Jul 2019 02:42) (88% - 100%)  Patient communicating, understanding and appreciating risks and benefits. As such with capacity to refuse central line and transfer    A/P Severe Pulmonary HTN with high cardiac demand and elevated troponin         Patient refusing transfer to [facility]/ Patient refusing central line. Appreciated pulmonary consultation and usual avoidance of NIV. As hypoxia cannot be tolerated BiPap 10/5 with Fio2 60% for four hours while asleep. Inspiratory pressures and expiratory pressures to not be increased. SKYE treated during sleep for 4 hours [id number]. High flow N/c while awake. Avoid intubation. Will start vasopressin over levophed if patient consents to central line which she is presently refusing  Will transfer to [facility] to pulmonary specialists with patient consent. Patient with poor prognosis presently stabilized and sleeping resting comfortably. Continuing to monitor I&Os to assess need for Bumex IV. Follow up next set of cardiac enzymes. Monitor renal status and INR    Critical Care time 75 minutes

## 2019-07-28 NOTE — PROGRESS NOTE ADULT - SUBJECTIVE AND OBJECTIVE BOX
Gastroenterology  Patient seen and examined bedside in CCU.  No complaints offered. Abdominal pain is well controlled.  Denies nausea and vomiting. Tolerating diet.  Normal flatus/BM.     T(F): 96.7 (07-28-19 @ 13:00), Max: 97.8 (07-28-19 @ 04:56)  HR: 90 (07-28-19 @ 14:00) (83 - 102)  BP: 118/48 (07-28-19 @ 14:00) (65/26 - 122/66)  RR: 26 (07-28-19 @ 14:00) (16 - 29)  SpO2: 92% (07-28-19 @ 14:00) (88% - 100%)  Wt(kg): --  CAPILLARY BLOOD GLUCOSE          PHYSICAL EXAM:  General: NAD, WDWN.   Neuro:  Alert & responsive  HEENT: NCAT, EOMI, conjunctiva clear  CV: +S1+S2 regular rate and rhythm  Lung: clear to ausculation bilaterally,+SOB  Abdomen: soft, Non tender, No Distension. Normal active BS  Extremities: ++ pedal edema     LABS:                        11.7   4.57  )-----------( 206      ( 28 Jul 2019 06:41 )             37.2     07-28    131<L>  |  97  |  33<H>  ----------------------------<  115<H>  4.0   |  25  |  2.39<H>    Ca    8.5      28 Jul 2019 06:41  Phos  4.5     07-28  Mg     2.4     07-28    TPro  7.7  /  Alb  2.9<L>  /  TBili  3.6<H>  /  DBili  2.26<H>  /  AST  46<H>  /  ALT  24  /  AlkPhos  71  07-28    LIVER FUNCTIONS - ( 28 Jul 2019 06:41 )  Alb: 2.9 g/dL / Pro: 7.7 gm/dL / ALK PHOS: 71 U/L / ALT: 24 U/L / AST: 46 U/L / GGT: x           PT/INR - ( 28 Jul 2019 06:41 )   PT: 23.3 sec;   INR: 2.04 ratio           I&O's Detail    27 Jul 2019 07:01  -  28 Jul 2019 07:00  --------------------------------------------------------  IN:    DOBUTamine Infusion: 212.7 mL    DOBUTamine Infusion: 444 mL    norepinephrine Infusion: 1162 mL    Oral Fluid: 640 mL  Total IN: 2458.7 mL    OUT:    Indwelling Catheter - Urethral: 1175 mL  Total OUT: 1175 mL    Total NET: 1283.7 mL      28 Jul 2019 07:01  -  28 Jul 2019 14:31  --------------------------------------------------------  IN:    DOBUTamine Infusion: 92.5 mL    norepinephrine Infusion: 410.5 mL    norepinephrine Infusion: 127.1 mL  Total IN: 630.1 mL    OUT:    Indwelling Catheter - Urethral: 850 mL  Total OUT: 850 mL    Total NET: -219.9 mL    < from: US Abdomen Complete (07.28.19 @ 09:14) >    EXAM:  US ABDOMINAL COMPLETE                            PROCEDURE DATE:  07/28/2019          INTERPRETATION:  CLINICAL INFORMATION: Hyperbilirubinemia    COMPARISON: None available.    TECHNIQUE: Sonography of the abdomen.     FINDINGS:    Liver: Within normal limits.    Bile ducts: Normal caliber. Common bile duct measures 3 mm.     Gallbladder: Cholelithiasis. No pericholecystic fluid or wall thickening.    Pancreas: Visualized portions are within normal limits.    Spleen: 8.6 cm. Within normal limits.    Right kidney: 12.5 cm. No hydronephrosis.    Left kidney: 11.2 cm.  No hydronephrosis.    Ascites: None.    Aorta and IVC: Visualized portions are within normal limits.    IMPRESSION:     Cholelithiasis. No sonographic evidence of acute cholecystitis. Recommend   HIDA scan to further evaluate as warranted.  No biliary ductal dilatation.      JARROD DOMINGUEZ M.D., ATTENDING RADIOLOGIST  This document has been electronically signed. Jul 28 2019 10:45AM

## 2019-07-28 NOTE — PROGRESS NOTE ADULT - SUBJECTIVE AND OBJECTIVE BOX
35 yo woman with severe pHTN (previously on Adempas, ? Group 1 vs 4), mildly positive anti-cardiolipin Ab admitted with RV failure after not taking/having her medications.     24h Events    Pt started on dobutamine yesterday and had increased UO but pressures  remained low. Levophed started as well    TLC placed today for poor IV access    ROS:  Denies chest pain now  Denies HA  +LH  Denies SOB  Denies fever    ICU Vital Signs Last 24 Hrs  T(C): 35.9 (28 Jul 2019 13:00), Max: 36.6 (28 Jul 2019 04:56)  T(F): 96.7 (28 Jul 2019 13:00), Max: 97.8 (28 Jul 2019 04:56)  HR: 90 (28 Jul 2019 13:30) (83 - 102)  BP: 114/41 (28 Jul 2019 13:30) (65/26 - 122/66)  BP(mean): 66 (28 Jul 2019 13:15) (32 - 100)  ABP: --  ABP(mean): --  RR: 23 (28 Jul 2019 13:30) (16 - 31)  SpO2: 92% (28 Jul 2019 13:30) (88% - 100%)    NAD, resting in bed, obese  b/l cta  s1s2 reg no murmurs     soft nontender +BS, +wall edema  +LE edema  5/5 b/l UE and LE                      11.7   4.57  )-----------( 206      ( 28 Jul 2019 06:41 )             37.2   07-28    131<L>  |  97  |  33<H>  ----------------------------<  115<H>  4.0   |  25  |  2.39<H>    Ca    8.5      28 Jul 2019 06:41  Phos  4.5     07-28  Mg     2.4     07-28    TPro  7.7  /  Alb  2.9<L>  /  TBili  3.6<H>  /  DBili  2.26<H>  /  AST  46<H>  /  ALT  24  /  AlkPhos  71  07-28    MEDICATIONS  (STANDING):  ALBUTerol    90 MICROgram(s) HFA Inhaler 1 Puff(s) Inhalation every 4 hours  ALBUTerol/ipratropium for Nebulization 3 milliLiter(s) Nebulizer every 6 hours  buDESOnide  80 MICROgram(s)/formoterol 4.5 MICROgram(s) Inhaler 2 Puff(s) Inhalation two times a day  buMETAnide Injectable 1 milliGRAM(s) IV Push every 8 hours  chlorhexidine 4% Liquid 1 Application(s) Topical <User Schedule>  levothyroxine 25 MICROGram(s) Oral daily  metolazone 5 milliGRAM(s) Oral daily  norepinephrine Infusion 0.05 MICROgram(s)/kG/Min (5.775 mL/Hr) IV Continuous <Continuous>  riociguat 1.5 milliGRAM(s) Oral every 8 hours  sertraline 50 milliGRAM(s) Oral daily  simvastatin 40 milliGRAM(s) Oral at bedtime  spironolactone 50 milliGRAM(s) Oral daily  tiotropium 18 MICROgram(s) Capsule 1 Capsule(s) Inhalation daily  warfarin 7.5 milliGRAM(s) Oral once

## 2019-07-28 NOTE — PROGRESS NOTE ADULT - ASSESSMENT
PATIENT GERMÁN KENNY  [phone]   [address] DOA 2019 DR NICOLETTE PETTY                             Patient description                          35 yo woman with severe pHTN (?group I, mPAP 67; W 9; RVR 9 on RHC 2018,  previously on Adempas, non-complinat), mildly positive anti-cardiolipin Ab and RAD admitted with decompensated right heart failure and volume overload and DIANN in the setting of medication non-compliance.   her dry weight is 119k                                           Hospital course                                  Pt was staretd on bumex but bp was low so she was transferred to icu  for diuresis under cover of dobutamine inotrope  2019 On norepi diobu bumex and metolazone On hfnc .6      PATIENT GERMÁN KENNY  [phone]   1986 DOA 2019 DR NICOLETTE PETTY                             PROBLEM/ASSESSMENT/RECOMMENDATIONS (A/R)       IDIOPATHIC PULMONARY ARTERIAL HYPERTENSION  A/R On coumadin   ANTIPHOSPHOLIPID SYNDROME   A/R On coumadin   BIVENTRICULAR FAILURE WITH LOW BP IN SETTING OF IPAH  A/R 2019 On dobutamine and bumex Norepi added to support pvr   2019 Prognosis is not good    ACUTE HYPOXEMIC RESP FAILURE   A/R O2 to target po 89 or better to avoid hypoxic pulmonary vasoconstrictyion   COPD   A/R On symbicort 80 ()  spiriva ()   LUNG NODULE   2019 cxr new r central line Nonspnodular density r perihilar Poss sm l effsn   A/R Likely represents chf rather than nodules Consider ct ch       TIME SPENT Over 25 minutes aggregate care time spent on encounter; activities included   direct pt care, counseling and/or coordinating care reviewing notes, lab data/ imaging , discussion with multidisciplinary team/ pt /family. Risks, benefits, alternatives  discussed in deta

## 2019-07-28 NOTE — PROGRESS NOTE ADULT - ASSESSMENT
33 yo woman with severe pHTN (previously on Adempas, ? Group 1 vs 4), mildly positive anti-cardiolipin Ab admitted with RV failure after not taking/having her medications. BP very low compared to her baseline and needing levophed for support.    Neuro  -feels depressed but does not want to speak to a psychiatrist    Pulm  -RV failure -cont w/ levophed  - dobutamine may be worsening Bp, stop for now  - cont diuresis w/ bumex and metolazone  - cont HFNC to maintain sat>95%  -cont coumadin  - restarted adempas but not in pharmacy and will have to get pt own medication to continue  - lung US a-line pattern, no pleff    CVS  trop increased seems likely from RV strain  but can cont ASA and statin  severe phtn on echo w/ RV overload    GI   hyperbilirubinemia likely from RV failure  improving  RUQ US w/ asymptomatic gallstones    Critical Care Time 45min 33 yo woman with severe pHTN (previously on Adempas, ? Group 1 vs 4), mildly positive anti-cardiolipin Ab admitted with RV failure after not taking/having her medications. BP very low compared to her baseline and needing levophed for support.    Neuro  -feels depressed but does not want to speak to a psychiatrist    Pulm  -RV failure -cont w/ levophed  - dobutamine may be worsening Bp, stop for now  - cont diuresis w/ bumex and metolazone  - cont HFNC to maintain sat>95%  -cont coumadin  - restarted adempas but not in pharmacy and will have to get pt own medication to continue  - lung US a-line pattern, no pleff    CVS  trop increased seems likely from RV strain  but can cont ASA and statin  severe phtn on echo w/ RV overload  mild EF decrease and DDx    GI   hyperbilirubinemia likely from RV failure  improving  RUQ US w/ asymptomatic gallstones    Critical Care Time 45min

## 2019-07-28 NOTE — PROGRESS NOTE ADULT - SUBJECTIVE AND OBJECTIVE BOX
Preliminary note based on available patient data was entered below in order to expedite patient management  Patient will be examined within the next few hours (and this note will be edited if so dictated by the then latest available data) Please note that  by the end of this day the below note should be considered as my final patient progress note for today     GERMÁN KENNY  [phone]   1986 DOA 2019 DR NICOLETTE PETTY   ALLERGY  nka       CONTACT   Cyndie Bernard  [facility] [id number]              Patient examined chart reviewed    HOSPITAL ADMISSION   PATIENT CAME  FROM (if information available)        TYPE OF VISIT      Subsequent Pulmonary Critical Care followup requested -2019     by Dr Mejia    from Dr Choudhury     REASON FOR VISIT  PLEASE SEE PROBLEM LIST/ASSESSMENTAND RECOMMENDATIONS     PATIENT GERMÁN KENNY  [phone]   1986 DOA 2019 DR NICOLETTE PETTY     VITALS/LABS      2019 afeb 87 102/43 20 92%   2019 w 4.5 Hb 11.7 Plt 206 INR 2 Na 131 K 4 CO2 25 Cr 2.3    2019 3p DOBU 2.5 m/k/m   2019 3p NORE 0.65 m/k/m   2019 6a hfnc .6   2019 u 1100     PATIENT GERMÁN KENNY  [phone]   1986 DOA 2019 DR NICOLETTE PETTY     MEDICATIONS      PULMONARY HYPERTENSION  coumadin ()   R CHF   bumetanide 1.3 ()   DOBU ()   metolazone 5 ()   NORE () 16/250   spironolactone 50 ()   COPD   duoneb.4 ()   symbicort 80 ()   spiriva ()       REVIEW OF SYMPTOMS     NOTE [facility] if any  in ROS and PE are updated in daily HOSPITAL COURSE below      Able to give ROS  Yes     RELIABLE No   CONSTITUTIONAL Weakness Yes  Chills No Vision changes No  ENDOCRINE No unexplained hair loss No heat or cold intolerance    ALLERGY No hives  Sore throat No   RESP Coughing blood no  Shortness of breath YES   NEURO No Headache  Confusion Pain neck No   CARDIAC No Chest pain No Palpitations   GI No Pain abdomen NO   Vomiting NO     PHYSICAL EXAM    HEENT Unremarkable PERRLA atraumatic   RESP Fair air entry EXP prolonged    Harsh breath sound Resp distres mild   CARDIAC S1 S2 No S3     NO JVD    ABDOMEN SOFT BS PRESENT NOT DISTENDED No hepatosplenomegaly PEDAL EDEMA present No calf tenderness  NO rash   GENERAL Not TOXIC

## 2019-07-29 LAB
ALBUMIN SERPL ELPH-MCNC: 3.3 G/DL — SIGNIFICANT CHANGE UP (ref 3.3–5)
ALP SERPL-CCNC: 75 U/L — SIGNIFICANT CHANGE UP (ref 40–120)
ALT FLD-CCNC: 25 U/L — SIGNIFICANT CHANGE UP (ref 12–78)
ANION GAP SERPL CALC-SCNC: 10 MMOL/L — SIGNIFICANT CHANGE UP (ref 5–17)
ANION GAP SERPL CALC-SCNC: 6 MMOL/L — SIGNIFICANT CHANGE UP (ref 5–17)
ANION GAP SERPL CALC-SCNC: 9 MMOL/L — SIGNIFICANT CHANGE UP (ref 5–17)
AST SERPL-CCNC: 53 U/L — HIGH (ref 15–37)
BASOPHILS # BLD AUTO: 0.02 K/UL — SIGNIFICANT CHANGE UP (ref 0–0.2)
BASOPHILS NFR BLD AUTO: 0.4 % — SIGNIFICANT CHANGE UP (ref 0–2)
BILIRUB SERPL-MCNC: 4 MG/DL — HIGH (ref 0.2–1.2)
BUN SERPL-MCNC: 21 MG/DL — SIGNIFICANT CHANGE UP (ref 7–23)
BUN SERPL-MCNC: 23 MG/DL — SIGNIFICANT CHANGE UP (ref 7–23)
BUN SERPL-MCNC: 28 MG/DL — HIGH (ref 7–23)
CALCIUM SERPL-MCNC: 8.7 MG/DL — SIGNIFICANT CHANGE UP (ref 8.5–10.1)
CALCIUM SERPL-MCNC: 8.9 MG/DL — SIGNIFICANT CHANGE UP (ref 8.5–10.1)
CALCIUM SERPL-MCNC: 9 MG/DL — SIGNIFICANT CHANGE UP (ref 8.5–10.1)
CHLORIDE SERPL-SCNC: 91 MMOL/L — LOW (ref 96–108)
CHLORIDE SERPL-SCNC: 91 MMOL/L — LOW (ref 96–108)
CHLORIDE SERPL-SCNC: 93 MMOL/L — LOW (ref 96–108)
CK MB BLD-MCNC: 1.6 % — SIGNIFICANT CHANGE UP (ref 0–3.5)
CK MB CFR SERPL CALC: 5.8 NG/ML — HIGH (ref 0.5–3.6)
CK SERPL-CCNC: 359 U/L — HIGH (ref 26–192)
CO2 SERPL-SCNC: 29 MMOL/L — SIGNIFICANT CHANGE UP (ref 22–31)
CO2 SERPL-SCNC: 29 MMOL/L — SIGNIFICANT CHANGE UP (ref 22–31)
CO2 SERPL-SCNC: 34 MMOL/L — HIGH (ref 22–31)
CREAT SERPL-MCNC: 1.12 MG/DL — SIGNIFICANT CHANGE UP (ref 0.5–1.3)
CREAT SERPL-MCNC: 1.26 MG/DL — SIGNIFICANT CHANGE UP (ref 0.5–1.3)
CREAT SERPL-MCNC: 1.51 MG/DL — HIGH (ref 0.5–1.3)
EOSINOPHIL # BLD AUTO: 0.06 K/UL — SIGNIFICANT CHANGE UP (ref 0–0.5)
EOSINOPHIL NFR BLD AUTO: 1.1 % — SIGNIFICANT CHANGE UP (ref 0–6)
GLUCOSE SERPL-MCNC: 131 MG/DL — HIGH (ref 70–99)
GLUCOSE SERPL-MCNC: 137 MG/DL — HIGH (ref 70–99)
GLUCOSE SERPL-MCNC: 169 MG/DL — HIGH (ref 70–99)
HCT VFR BLD CALC: 42.8 % — SIGNIFICANT CHANGE UP (ref 34.5–45)
HGB BLD-MCNC: 13.4 G/DL — SIGNIFICANT CHANGE UP (ref 11.5–15.5)
IMM GRANULOCYTES NFR BLD AUTO: 0.2 % — SIGNIFICANT CHANGE UP (ref 0–1.5)
INR BLD: 3.28 RATIO — HIGH (ref 0.88–1.16)
LYMPHOCYTES # BLD AUTO: 0.98 K/UL — LOW (ref 1–3.3)
LYMPHOCYTES # BLD AUTO: 17.9 % — SIGNIFICANT CHANGE UP (ref 13–44)
MAGNESIUM SERPL-MCNC: 2.4 MG/DL — SIGNIFICANT CHANGE UP (ref 1.6–2.6)
MCHC RBC-ENTMCNC: 22.9 PG — LOW (ref 27–34)
MCHC RBC-ENTMCNC: 31.3 GM/DL — LOW (ref 32–36)
MCV RBC AUTO: 73.2 FL — LOW (ref 80–100)
MONOCYTES # BLD AUTO: 0.67 K/UL — SIGNIFICANT CHANGE UP (ref 0–0.9)
MONOCYTES NFR BLD AUTO: 12.2 % — SIGNIFICANT CHANGE UP (ref 2–14)
NEUTROPHILS # BLD AUTO: 3.73 K/UL — SIGNIFICANT CHANGE UP (ref 1.8–7.4)
NEUTROPHILS NFR BLD AUTO: 68.2 % — SIGNIFICANT CHANGE UP (ref 43–77)
NRBC # BLD: 0 /100 WBCS — SIGNIFICANT CHANGE UP (ref 0–0)
PHOSPHATE SERPL-MCNC: 3.2 MG/DL — SIGNIFICANT CHANGE UP (ref 2.5–4.5)
PLATELET # BLD AUTO: 247 K/UL — SIGNIFICANT CHANGE UP (ref 150–400)
POTASSIUM SERPL-MCNC: 3.5 MMOL/L — SIGNIFICANT CHANGE UP (ref 3.5–5.3)
POTASSIUM SERPL-MCNC: 4 MMOL/L — SIGNIFICANT CHANGE UP (ref 3.5–5.3)
POTASSIUM SERPL-MCNC: 4.1 MMOL/L — SIGNIFICANT CHANGE UP (ref 3.5–5.3)
POTASSIUM SERPL-SCNC: 3.5 MMOL/L — SIGNIFICANT CHANGE UP (ref 3.5–5.3)
POTASSIUM SERPL-SCNC: 4 MMOL/L — SIGNIFICANT CHANGE UP (ref 3.5–5.3)
POTASSIUM SERPL-SCNC: 4.1 MMOL/L — SIGNIFICANT CHANGE UP (ref 3.5–5.3)
PROT SERPL-MCNC: 8.9 GM/DL — HIGH (ref 6–8.3)
PROTHROM AB SERPL-ACNC: 38.1 SEC — HIGH (ref 10–12.9)
RBC # BLD: 5.85 M/UL — HIGH (ref 3.8–5.2)
RBC # FLD: 22.5 % — HIGH (ref 10.3–14.5)
SODIUM SERPL-SCNC: 129 MMOL/L — LOW (ref 135–145)
SODIUM SERPL-SCNC: 131 MMOL/L — LOW (ref 135–145)
SODIUM SERPL-SCNC: 132 MMOL/L — LOW (ref 135–145)
TROPONIN I SERPL-MCNC: 1.23 NG/ML — HIGH (ref 0.01–0.04)
WBC # BLD: 5.47 K/UL — SIGNIFICANT CHANGE UP (ref 3.8–10.5)
WBC # FLD AUTO: 5.47 K/UL — SIGNIFICANT CHANGE UP (ref 3.8–10.5)

## 2019-07-29 PROCEDURE — 99232 SBSQ HOSP IP/OBS MODERATE 35: CPT

## 2019-07-29 PROCEDURE — 99291 CRITICAL CARE FIRST HOUR: CPT

## 2019-07-29 RX ORDER — POTASSIUM CHLORIDE 20 MEQ
20 PACKET (EA) ORAL ONCE
Refills: 0 | Status: COMPLETED | OUTPATIENT
Start: 2019-07-29 | End: 2019-07-29

## 2019-07-29 RX ORDER — POTASSIUM CHLORIDE 20 MEQ
40 PACKET (EA) ORAL ONCE
Refills: 0 | Status: COMPLETED | OUTPATIENT
Start: 2019-07-29 | End: 2019-07-29

## 2019-07-29 RX ORDER — WARFARIN SODIUM 2.5 MG/1
2.5 TABLET ORAL ONCE
Refills: 0 | Status: COMPLETED | OUTPATIENT
Start: 2019-07-29 | End: 2019-07-29

## 2019-07-29 RX ORDER — PETROLATUM,WHITE
1 JELLY (GRAM) TOPICAL THREE TIMES A DAY
Refills: 0 | Status: DISCONTINUED | OUTPATIENT
Start: 2019-07-29 | End: 2019-08-08

## 2019-07-29 RX ORDER — BUMETANIDE 0.25 MG/ML
1 INJECTION INTRAMUSCULAR; INTRAVENOUS
Refills: 0 | Status: DISCONTINUED | OUTPATIENT
Start: 2019-07-29 | End: 2019-07-30

## 2019-07-29 RX ADMIN — WARFARIN SODIUM 2.5 MILLIGRAM(S): 2.5 TABLET ORAL at 21:08

## 2019-07-29 RX ADMIN — Medication 5.78 MICROGRAM(S)/KG/MIN: at 23:00

## 2019-07-29 RX ADMIN — Medication 9.24 MICROGRAM(S)/KG/MIN: at 17:44

## 2019-07-29 RX ADMIN — Medication 5.78 MICROGRAM(S)/KG/MIN: at 17:45

## 2019-07-29 RX ADMIN — BUDESONIDE AND FORMOTEROL FUMARATE DIHYDRATE 2 PUFF(S): 160; 4.5 AEROSOL RESPIRATORY (INHALATION) at 17:44

## 2019-07-29 RX ADMIN — Medication 3 MILLILITER(S): at 05:31

## 2019-07-29 RX ADMIN — Medication 100 MILLIEQUIVALENT(S): at 13:54

## 2019-07-29 RX ADMIN — MORPHINE SULFATE 2 MILLIGRAM(S): 50 CAPSULE, EXTENDED RELEASE ORAL at 14:01

## 2019-07-29 RX ADMIN — Medication 3 MILLILITER(S): at 23:36

## 2019-07-29 RX ADMIN — Medication 25 MICROGRAM(S): at 05:01

## 2019-07-29 RX ADMIN — Medication 3 MILLILITER(S): at 11:23

## 2019-07-29 RX ADMIN — SPIRONOLACTONE 50 MILLIGRAM(S): 25 TABLET, FILM COATED ORAL at 05:00

## 2019-07-29 RX ADMIN — MORPHINE SULFATE 2 MILLIGRAM(S): 50 CAPSULE, EXTENDED RELEASE ORAL at 21:23

## 2019-07-29 RX ADMIN — MORPHINE SULFATE 2 MILLIGRAM(S): 50 CAPSULE, EXTENDED RELEASE ORAL at 21:08

## 2019-07-29 RX ADMIN — CHLORHEXIDINE GLUCONATE 1 APPLICATION(S): 213 SOLUTION TOPICAL at 12:12

## 2019-07-29 RX ADMIN — Medication 1 APPLICATION(S): at 20:48

## 2019-07-29 RX ADMIN — Medication 3 MILLILITER(S): at 17:01

## 2019-07-29 RX ADMIN — MORPHINE SULFATE 2 MILLIGRAM(S): 50 CAPSULE, EXTENDED RELEASE ORAL at 13:46

## 2019-07-29 RX ADMIN — SERTRALINE 50 MILLIGRAM(S): 25 TABLET, FILM COATED ORAL at 12:11

## 2019-07-29 RX ADMIN — RIOCIGUAT 1.5 MILLIGRAM(S): 1.5 TABLET, FILM COATED ORAL at 20:48

## 2019-07-29 RX ADMIN — RIOCIGUAT 1.5 MILLIGRAM(S): 1.5 TABLET, FILM COATED ORAL at 12:11

## 2019-07-29 RX ADMIN — Medication 20 MILLIEQUIVALENT(S): at 06:42

## 2019-07-29 RX ADMIN — Medication 40 MILLIEQUIVALENT(S): at 13:54

## 2019-07-29 RX ADMIN — Medication 81 MILLIGRAM(S): at 12:11

## 2019-07-29 RX ADMIN — SIMVASTATIN 40 MILLIGRAM(S): 20 TABLET, FILM COATED ORAL at 21:08

## 2019-07-29 RX ADMIN — RIOCIGUAT 1.5 MILLIGRAM(S): 1.5 TABLET, FILM COATED ORAL at 04:54

## 2019-07-29 RX ADMIN — BUMETANIDE 1 MILLIGRAM(S): 0.25 INJECTION INTRAMUSCULAR; INTRAVENOUS at 05:00

## 2019-07-29 RX ADMIN — BUMETANIDE 1 MILLIGRAM(S): 0.25 INJECTION INTRAMUSCULAR; INTRAVENOUS at 17:43

## 2019-07-29 RX ADMIN — Medication 5.78 MICROGRAM(S)/KG/MIN: at 05:05

## 2019-07-29 NOTE — PROGRESS NOTE ADULT - SUBJECTIVE AND OBJECTIVE BOX
HPI:  34 year old woman with CHF, asthma presents to ED with complaint of SOB and leg swelling.  She states that she can only walk 1-2 blocks at baseline and recently her exercise tolerance has decreased.  She denies chest pain, palpitations, lightheadedness, nausea, vomiting.    In the ED, troponin 0.435, BNP 4049, CXR shows pulmonary vasc congestion.  Pt started on BiPAP by ED. (26 Jul 2019 05:51)      24 hr events:    ## ROS:  [ ] unable to obtain  CONSTITUTIONAL: No fever, weight loss, or fatigue  EYES: No eye pain, visual disturbances, or discharge  ENMT:  No difficulty hearing, tinnitus, vertigo; No sinus or throat pain  NECK: No pain or stiffness  RESPIRATORY: No cough, wheezing, chills or hemoptysis; No shortness of breath  CARDIOVASCULAR: No chest pain, palpitations, dizziness, or leg swelling  GASTROINTESTINAL: No abdominal or epigastric pain. No nausea, vomiting, or hematemesis; No diarrhea or constipation. No melena or hematochezia.  GENITOURINARY: No dysuria, frequency, hematuria, or incontinence  NEUROLOGICAL: No headaches, memory loss, loss of strength, numbness, or tremors  SKIN: No itching, burning, rashes, or lesions   LYMPH NODES: No enlarged glands  ENDOCRINE: No heat or cold intolerance; No hair loss  MUSCULOSKELETAL: No joint pain or swelling; No muscle, back, or extremity pain  PSYCHIATRIC: No depression, anxiety, mood swings, or difficulty sleeping  HEME/LYMPH: No easy bruising, or bleeding gums  ALLERGY AND IMMUNOLOGIC: No hives or eczema    ## Vitals  ICU Vital Signs Last 24 Hrs  T(C): 37.2 (29 Jul 2019 04:27), Max: 37.2 (29 Jul 2019 04:27)  T(F): 99 (29 Jul 2019 04:27), Max: 99 (29 Jul 2019 04:27)  HR: 95 (29 Jul 2019 08:00) (84 - 902)  BP: 118/60 (29 Jul 2019 07:30) (87/34 - 123/49)  BP(mean): 73 (29 Jul 2019 07:30) (46 - 84)  ABP: --  ABP(mean): --  RR: 25 (29 Jul 2019 08:00) (17 - 36)  SpO2: 100% (29 Jul 2019 08:00) (86% - 100%)      ## Physical Exam:  Gen:  HEENT:  Resp:  CV:  Abd:  Ext:  Neuro:    ## Vent Data      ## Labs:  Chem:  07-29    132<L>  |  93<L>  |  28<H>  ----------------------------<  131<H>  3.5   |  29  |  1.51<H>    Ca    9.0      29 Jul 2019 05:05  Phos  3.2     07-29  Mg     2.4     07-29    TPro  8.9<H>  /  Alb  3.3  /  TBili  4.0<H>  /  DBili  x   /  AST  53<H>  /  ALT  25  /  AlkPhos  75  07-29    LIVER FUNCTIONS - ( 29 Jul 2019 05:05 )  Alb: 3.3 g/dL / Pro: 8.9 gm/dL / ALK PHOS: 75 U/L / ALT: 25 U/L / AST: 53 U/L / GGT: x           CBC:                        13.4   5.47  )-----------( 247      ( 29 Jul 2019 05:05 )             42.8     Coags:  PT/INR - ( 28 Jul 2019 06:41 )   PT: 23.3 sec;   INR: 2.04 ratio                 ## Cardiac  CARDIAC MARKERS ( 29 Jul 2019 05:05 )  1.230 ng/mL / x     / 359 U/L / x     / 5.8 ng/mL  CARDIAC MARKERS ( 28 Jul 2019 17:24 )  1.500 ng/mL / x     / 367 U/L / x     / 8.0 ng/mL  CARDIAC MARKERS ( 28 Jul 2019 06:41 )  1.690 ng/mL / x     / 298 U/L / x     / 8.2 ng/mL  CARDIAC MARKERS ( 27 Jul 2019 21:37 )  1.170 ng/mL / x     / 312 U/L / x     / 8.1 ng/mL        ## Blood Gas      #I/Os  I&O's Detail    28 Jul 2019 07:01  -  29 Jul 2019 07:00  --------------------------------------------------------  IN:    DOBUTamine Infusion: 92.5 mL    DOBUTamine Infusion: 156.4 mL    norepinephrine Infusion: 1403 mL    norepinephrine Infusion: 410.5 mL    Oral Fluid: 600 mL  Total IN: 2662.4 mL    OUT:    Indwelling Catheter - Urethral: 7925 mL  Total OUT: 7925 mL    Total NET: -5262.6 mL      29 Jul 2019 07:01  -  29 Jul 2019 08:13  --------------------------------------------------------  IN:    DOBUTamine Infusion: 9.2 mL    norepinephrine Infusion: 75.1 mL  Total IN: 84.3 mL    OUT:    Indwelling Catheter - Urethral: 500 mL  Total OUT: 500 mL    Total NET: -415.7 mL          ## Imaging:    ## Medications:  MEDICATIONS  (STANDING):  ALBUTerol    90 MICROgram(s) HFA Inhaler 1 Puff(s) Inhalation every 4 hours  ALBUTerol/ipratropium for Nebulization 3 milliLiter(s) Nebulizer every 6 hours  aspirin enteric coated 81 milliGRAM(s) Oral daily  buDESOnide  80 MICROgram(s)/formoterol 4.5 MICROgram(s) Inhaler 2 Puff(s) Inhalation two times a day  buMETAnide Injectable 1 milliGRAM(s) IV Push every 8 hours  chlorhexidine 4% Liquid 1 Application(s) Topical <User Schedule>  DOBUTamine Infusion 2.5 MICROgram(s)/kG/Min (9.24 mL/Hr) IV Continuous <Continuous>  levothyroxine 25 MICROGram(s) Oral daily  metolazone 5 milliGRAM(s) Oral daily  norepinephrine Infusion 0.05 MICROgram(s)/kG/Min (5.775 mL/Hr) IV Continuous <Continuous>  riociguat 1.5 milliGRAM(s) Oral every 8 hours  sertraline 50 milliGRAM(s) Oral daily  simvastatin 40 milliGRAM(s) Oral at bedtime  spironolactone 50 milliGRAM(s) Oral daily  tiotropium 18 MICROgram(s) Capsule 1 Capsule(s) Inhalation daily    MEDICATIONS  (PRN):  morphine  - Injectable 2 milliGRAM(s) IV Push every 6 hours PRN Severe Pain (7 - 10)  sodium chloride 0.9% lock flush 10 milliLiter(s) IV Push every 1 hour PRN Pre/post blood products, medications, blood draw, and to maintain line patency HPI:  34 year old woman with CHF, asthma presents to ED with complaint of SOB and leg swelling.  She states that she can only walk 1-2 blocks at baseline and recently her exercise tolerance has decreased.  She denies chest pain, palpitations, lightheadedness, nausea, vomiting.    In the ED, troponin 0.435, BNP 4049, CXR shows pulmonary vasc congestion.  Pt started on BiPAP by ED. (26 Jul 2019 05:51)      24 hr events: No acute events overnight. Remains on HFNC, pressors, and inotropes. Feels a little better this morning. No chest pain, no dyspnea on HFNC. Edema improving. No nausea, emesis, diarrhea.    ## ROS:  See above. ROS otherwise negative.    ## Vitals  ICU Vital Signs Last 24 Hrs  T(C): 37.2 (29 Jul 2019 04:27), Max: 37.2 (29 Jul 2019 04:27)  T(F): 99 (29 Jul 2019 04:27), Max: 99 (29 Jul 2019 04:27)  HR: 95 (29 Jul 2019 08:00) (84 - 902)  BP: 118/60 (29 Jul 2019 07:30) (87/34 - 123/49)  BP(mean): 73 (29 Jul 2019 07:30) (46 - 84)  ABP: --  ABP(mean): --  RR: 25 (29 Jul 2019 08:00) (17 - 36)  SpO2: 100% (29 Jul 2019 08:00) (86% - 100%)      ## Physical Exam:  Gen: Adult female sitting comfortably in bed, NAD  HEENT: NC/AT sclerae icteric  Resp: No increased WOB, CTAB  CV: S1, S2 FELIBERTO  Abd: Soft, + BS  Ext: 4+ edema  Neuro: Awake, alert, follows commands, moves all extremities    ## Vent Data      ## Labs:  Chem:  07-29    132<L>  |  93<L>  |  28<H>  ----------------------------<  131<H>  3.5   |  29  |  1.51<H>    Ca    9.0      29 Jul 2019 05:05  Phos  3.2     07-29  Mg     2.4     07-29    TPro  8.9<H>  /  Alb  3.3  /  TBili  4.0<H>  /  DBili  x   /  AST  53<H>  /  ALT  25  /  AlkPhos  75  07-29    LIVER FUNCTIONS - ( 29 Jul 2019 05:05 )  Alb: 3.3 g/dL / Pro: 8.9 gm/dL / ALK PHOS: 75 U/L / ALT: 25 U/L / AST: 53 U/L / GGT: x           CBC:                        13.4   5.47  )-----------( 247      ( 29 Jul 2019 05:05 )             42.8     Coags:  PT/INR - ( 28 Jul 2019 06:41 )   PT: 23.3 sec;   INR: 2.04 ratio                 ## Cardiac  CARDIAC MARKERS ( 29 Jul 2019 05:05 )  1.230 ng/mL / x     / 359 U/L / x     / 5.8 ng/mL  CARDIAC MARKERS ( 28 Jul 2019 17:24 )  1.500 ng/mL / x     / 367 U/L / x     / 8.0 ng/mL  CARDIAC MARKERS ( 28 Jul 2019 06:41 )  1.690 ng/mL / x     / 298 U/L / x     / 8.2 ng/mL  CARDIAC MARKERS ( 27 Jul 2019 21:37 )  1.170 ng/mL / x     / 312 U/L / x     / 8.1 ng/mL        ## Blood Gas      #I/Os  I&O's Detail    28 Jul 2019 07:01  -  29 Jul 2019 07:00  --------------------------------------------------------  IN:    DOBUTamine Infusion: 92.5 mL    DOBUTamine Infusion: 156.4 mL    norepinephrine Infusion: 1403 mL    norepinephrine Infusion: 410.5 mL    Oral Fluid: 600 mL  Total IN: 2662.4 mL    OUT:    Indwelling Catheter - Urethral: 7925 mL  Total OUT: 7925 mL    Total NET: -5262.6 mL      29 Jul 2019 07:01  -  29 Jul 2019 08:13  --------------------------------------------------------  IN:    DOBUTamine Infusion: 9.2 mL    norepinephrine Infusion: 75.1 mL  Total IN: 84.3 mL    OUT:    Indwelling Catheter - Urethral: 500 mL  Total OUT: 500 mL    Total NET: -415.7 mL          ## Imaging:    ## Medications:  MEDICATIONS  (STANDING):  ALBUTerol    90 MICROgram(s) HFA Inhaler 1 Puff(s) Inhalation every 4 hours  ALBUTerol/ipratropium for Nebulization 3 milliLiter(s) Nebulizer every 6 hours  aspirin enteric coated 81 milliGRAM(s) Oral daily  buDESOnide  80 MICROgram(s)/formoterol 4.5 MICROgram(s) Inhaler 2 Puff(s) Inhalation two times a day  buMETAnide Injectable 1 milliGRAM(s) IV Push every 8 hours  chlorhexidine 4% Liquid 1 Application(s) Topical <User Schedule>  DOBUTamine Infusion 2.5 MICROgram(s)/kG/Min (9.24 mL/Hr) IV Continuous <Continuous>  levothyroxine 25 MICROGram(s) Oral daily  metolazone 5 milliGRAM(s) Oral daily  norepinephrine Infusion 0.05 MICROgram(s)/kG/Min (5.775 mL/Hr) IV Continuous <Continuous>  riociguat 1.5 milliGRAM(s) Oral every 8 hours  sertraline 50 milliGRAM(s) Oral daily  simvastatin 40 milliGRAM(s) Oral at bedtime  spironolactone 50 milliGRAM(s) Oral daily  tiotropium 18 MICROgram(s) Capsule 1 Capsule(s) Inhalation daily    MEDICATIONS  (PRN):  morphine  - Injectable 2 milliGRAM(s) IV Push every 6 hours PRN Severe Pain (7 - 10)  sodium chloride 0.9% lock flush 10 milliLiter(s) IV Push every 1 hour PRN Pre/post blood products, medications, blood draw, and to maintain line patency

## 2019-07-29 NOTE — DIETITIAN INITIAL EVALUATION ADULT. - PHYSICAL APPEARANCE
obese/other (specify) Nutrition focused physical exam conducted ; 2+ generalized -   Subcutaneous fat loss: [wdl ] Orbital fat pads region, [ wdl]Buccal fat region, [wdl ]Triceps region,  obese ]Ribs region.  Muscle wasting: [wdl ]Temples region, [wdl ]Clavicle region, [wdl ]Shoulder region, [unable ]Scapula region, [wdl ]Interosseous region,  [ 3+ edema]thigh region, [3+ edema ]Calf region

## 2019-07-29 NOTE — DIETITIAN INITIAL EVALUATION ADULT. - DIET TYPE
7/26 - Dash/TLC DASH/TLC (sodium and cholesterol restricted diet)/consistent carbohydrate (no snacks) consistent carbohydrate (no snacks)/Glucerna Shake 8 twice per day (440 janet, 20 gm pro)/supplement (specify)/DASH/TLC (sodium and cholesterol restricted diet)

## 2019-07-29 NOTE — DIETITIAN INITIAL EVALUATION ADULT. - PERTINENT LABORATORY DATA
07-29 Na132 mmol/L<L> Glu 131 mg/dL<H> K+ 3.5 mmol/L Cr  1.51 mg/dL<H> BUN 28 mg/dL<H> 07-29 Phos 3.2 mg/dL 07-29 Alb 3.3 g/dL 07-28 [id number] 6.9 %<H> 07-28 Chol 76 mg/dL LDL 51 mg/dL HDL 10 mg/dL<L> Trig 76 mg/dL07-29 ALT 25 U/L AST 53 U/L<H> Alkaline Phosphatase 75 U/L

## 2019-07-29 NOTE — PROGRESS NOTE ADULT - ASSESSMENT
REVIEW OF SYMPTOMS     NOTE [facility] if any  in ROS and PE are updated in daily HOSPITAL COURSE below      Able to give ROS  Yes     RELIABLE No   CONSTITUTIONAL Weakness Yes  Chills No Vision changes No  ENDOCRINE No unexplained hair loss No heat or cold intolerance    ALLERGY No hives  Sore throat No   RESP Coughing blood no  Shortness of breath YES   NEURO No Headache  Confusion Pain neck No   CARDIAC No Chest pain No Palpitations   GI No Pain abdomen NO   Vomiting NO     PHYSICAL EXAM    HEENT Unremarkable PERRLA atraumatic   RESP Fair air entry EXP prolonged    Harsh breath sound Resp distres mild   CARDIAC S1 S2 No S3     NO JVD    ABDOMEN SOFT BS PRESENT NOT DISTENDED No hepatosplenomegaly PEDAL EDEMA present No calf tenderness  NO rash   GENERAL Not TOXIC looking    PATIENT GERMÁN RETANA [facility]  [phone]   1986 DOA 2019 DR NICOLETTE PETTY                             Patient description                          33 yo woman with severe pHTN (?group I, mPAP 67; W 9; RVR 9 on RHC 2018,  previously on Adempas, non-complinat), mildly positive anti-cardiolipin Ab and RAD admitted with decompensated right heart failure and volume overload and DIANN in the setting of medication non-compliance.   her dry weight is 119k                                           Hospital course                                  Pt was staretd on bumex but bp was low so she was transferred to icu  for diuresis under cover of dobutamine inotrope  2019 On norepi diobu bumex and metolazone On hfnc .6      PATIENT GEMRÁN RETANA LI  [phone]   1986 DOA 2019 DR NICOLETTE PETTY                             PROBLEM/ASSESSMENT/RECOMMENDATIONS (A/R)       IDIOPATHIC PULMONARY ARTERIAL HYPERTENSION  A/R On coumadin   ANTIPHOSPHOLIPID SYNDROME   A/R On coumadin   BIVENTRICULAR FAILURE WITH LOW BP IN SETTING OF IPAH  A/R 2019 On dobutamine and bumex Norepi added to support pvr   2019 Prognosis is not good    MI   - Tr 1 1.5-1.2   ACUTE HYPOXEMIC RESP FAILURE   A/R O2 to target po 89 or better to avoid hypoxic pulmonary vasoconstrictyion   COPD   A/R On symbicort 80 ()  spiriva ()   LUNG NODULE   2019 cxr new r central line Nonspnodular density r perihilar Poss sm l effsn   A/R Likely represents chf rather than nodules Consider ct ch   DIANN   - Cr 2.3-1.5       TIME SPENT Over 25 minutes aggregate care time spent on encounter; activities included   direct pt care, counseling and/or coordinating care reviewing notes, lab data/ imaging , discussion with multidisciplinary team/ pt /family. Risks, benefits, alternatives  discussed in detail.

## 2019-07-29 NOTE — CHART NOTE - NSCHARTNOTEFT_GEN_A_CORE
Upon Nutritional Assessment by the Registered Dietitian your patient was determined to meet criteria / has evidence of the following diagnosis/diagnoses:          [ ]  Mild Protein Calorie Malnutrition        [ ]  Moderate Protein Calorie Malnutrition        [x ] Severe Protein Calorie Malnutrition        [ ] Unspecified Protein Calorie Malnutrition        [ ] Underweight / BMI <19        [x ] Morbid Obesity / BMI > 40      Findings as based on:  •  Comprehensive nutrition assessment and consultation  •  Calorie counts (nutrient intake analysis)  •  Food acceptance and intake status from observations by staff  •  Follow up  •  Patient education  •  Intervention secondary to interdisciplinary rounds  •   concerns      Treatment:    The following diet has been recommended: CCHO no snacks; Dash/TLC w/ Glucerna Shake 8 twice per day (440 janet, 20 gm pro)       PROVIDER Section:     By signing this assessment you are acknowledging and agree with the diagnosis/diagnoses assigned by the Registered Dietitian    Comments:

## 2019-07-29 NOTE — PROGRESS NOTE ADULT - SUBJECTIVE AND OBJECTIVE BOX
ASKED TO COMMENT ABOUT ELEVATED TROPONIN MARKERS.    I BELIEVE THEY REPRESENT LV AND RV DYSFUNCTION WITH SUBENDOCARDIAL/ DEMAND ISCHEMIA IN THE SETTING OF SEVERE PULMONARY HYPERTENSION WITH BIVENTRICULAR HEART FAILURE; I DO NOT BELIEVE THEY REPRESENT ACUTE CORONARY SYNDROME / OBSTRUCTIVE CORONARY DISEASE; AS PER PULMONARY AND CRITICAL CARE TEAMS.    MEJIA KEARNS MD, FACC

## 2019-07-29 NOTE — PROGRESS NOTE ADULT - SUBJECTIVE AND OBJECTIVE BOX
Preliminary note based on available patient data was entered below in order to expedite patient management  Patient will be examined within the next few hours (and this note will be edited if so dictated by the then latest available data) Please note that  by the end of this day the below note should be considered as my final patient progress note for today     GERMÁN KENNY  [phone]   1985 DOA 2019 DR NICOLETTE PETTY   ALLERGY  nka       CONTACT   Cyndie Bernard  [facility] [id number]              Patient examined chart reviewed    HOSPITAL ADMISSION   PATIENT CAME  FROM (if information available)        TYPE OF VISIT      Subsequent Pulmonary followup     REASON FOR VISIT  PLEASE SEE PROBLEM LIST/ASSESSMENT AND RECOMMENDATIONS     PATIENT GERMÁN KENNY  [phone]   1986 DOA 2019 DR NICOLETTE PETTY     VITALS/LABS      2019 afeb 97 120/97 95%   2019 12p DOBU  2.5 m/k/m   2019 12p NORE 0.6 m/k/m   2019 W 5.4 Hb 13.4 Plt 247 INR 3.2 Na 132 K 3.5 CO2 29 Cr 1.5      GLOBAL ISSUE/BEST PRACTICE:      PROBLEM: HOB elevation:   y            PROBLEM: Stress ulcer proph:    na                      PROBLEM: VTE prophylaxis:     coumadin   PROBLEM: Glycemic control:    na  PROBLEM: Nutrition:   stein ()   PROBLEM: Advanced directive: na     PROBLEM: Allergies:  na

## 2019-07-29 NOTE — DIETITIAN INITIAL EVALUATION ADULT. - PERTINENT MEDS FT
MEDICATIONS  (STANDING):  ALBUTerol    90 MICROgram(s) HFA Inhaler 1 Puff(s) Inhalation every 4 hours  ALBUTerol/ipratropium for Nebulization 3 milliLiter(s) Nebulizer every 6 hours  aspirin enteric coated 81 milliGRAM(s) Oral daily  buDESOnide  80 MICROgram(s)/formoterol 4.5 MICROgram(s) Inhaler 2 Puff(s) Inhalation two times a day  buMETAnide Injectable 1 milliGRAM(s) IV Push two times a day  chlorhexidine 4% Liquid 1 Application(s) Topical <User Schedule>  DOBUTamine Infusion 2.5 MICROgram(s)/kG/Min (9.24 mL/Hr) IV Continuous <Continuous>  levothyroxine 25 MICROGram(s) Oral daily  metolazone 5 milliGRAM(s) Oral daily  norepinephrine Infusion 0.05 MICROgram(s)/kG/Min (5.775 mL/Hr) IV Continuous <Continuous>  riociguat 1.5 milliGRAM(s) Oral every 8 hours  sertraline 50 milliGRAM(s) Oral daily  simvastatin 40 milliGRAM(s) Oral at bedtime  spironolactone 50 milliGRAM(s) Oral daily  tiotropium 18 MICROgram(s) Capsule 1 Capsule(s) Inhalation daily    MEDICATIONS  (PRN):  morphine  - Injectable 2 milliGRAM(s) IV Push every 6 hours PRN Severe Pain (7 - 10)  sodium chloride 0.9% lock flush 10 milliLiter(s) IV Push every 1 hour PRN Pre/post blood products, medications, blood draw, and to maintain line patency

## 2019-07-29 NOTE — DIETITIAN INITIAL EVALUATION ADULT. - OTHER INFO
Pt lives at home w/ her mom & sister. Her mom does the food shopping/cooking. PTA pt w/ 3 weeks of nausea and decreased p.o. intake. No nausea now but p.o. intake poor as reported by patient. Pt reads labels for sugar and sodium, also has a scale to weight herself daily. She does not check her FS or take oral medication for DM. Provided w/ diet literature on CHF nutrition therapy ; weight loss tips ; Plate method for meal planning.

## 2019-07-29 NOTE — PROGRESS NOTE ADULT - ASSESSMENT
33 y/o F w/severe pulmonary hyertension w/cor pulmonale secondary to possible group I+II-IV supposed to be on adempas and diuresis as outpatient presenting with acute on chronic RV failure secondary to medication noncompliance. Likely depressed, currently declining psych consult. DIANN likely secondary to combination of ATN and venous congestion from R heart failure, improving with diuresis. Elevated troponin likely secondary to demand ischemia/strain from R heart failure.    - Continue pressor and inotrope support goal MAP >= 65  - Diuresis goal net negative 1-2L daily  - Trend Cr avoid nephrotoxins  - Continue coumadin  - VQ scan once stable  - Supplemental O2 as needed    Attending critical care time 40 mintues

## 2019-07-30 LAB
ANION GAP SERPL CALC-SCNC: 8 MMOL/L — SIGNIFICANT CHANGE UP (ref 5–17)
BUN SERPL-MCNC: 19 MG/DL — SIGNIFICANT CHANGE UP (ref 7–23)
CALCIUM SERPL-MCNC: 9.2 MG/DL — SIGNIFICANT CHANGE UP (ref 8.5–10.1)
CHLORIDE SERPL-SCNC: 91 MMOL/L — LOW (ref 96–108)
CO2 SERPL-SCNC: 34 MMOL/L — HIGH (ref 22–31)
CREAT ?TM UR-MCNC: 9 MG/DL — SIGNIFICANT CHANGE UP
CREAT SERPL-MCNC: 0.99 MG/DL — SIGNIFICANT CHANGE UP (ref 0.5–1.3)
GLUCOSE SERPL-MCNC: 143 MG/DL — HIGH (ref 70–99)
HCT VFR BLD CALC: 41.4 % — SIGNIFICANT CHANGE UP (ref 34.5–45)
HGB BLD-MCNC: 13 G/DL — SIGNIFICANT CHANGE UP (ref 11.5–15.5)
INR BLD: 3.57 RATIO — HIGH (ref 0.88–1.16)
MAGNESIUM SERPL-MCNC: 2.2 MG/DL — SIGNIFICANT CHANGE UP (ref 1.6–2.6)
MCHC RBC-ENTMCNC: 22.9 PG — LOW (ref 27–34)
MCHC RBC-ENTMCNC: 31.4 GM/DL — LOW (ref 32–36)
MCV RBC AUTO: 72.9 FL — LOW (ref 80–100)
NRBC # BLD: 0 /100 WBCS — SIGNIFICANT CHANGE UP (ref 0–0)
OSMOLALITY UR: 300 MOSM/KG — SIGNIFICANT CHANGE UP (ref 50–1200)
PHOSPHATE SERPL-MCNC: 2.3 MG/DL — LOW (ref 2.5–4.5)
PLATELET # BLD AUTO: 227 K/UL — SIGNIFICANT CHANGE UP (ref 150–400)
POTASSIUM SERPL-MCNC: 3.9 MMOL/L — SIGNIFICANT CHANGE UP (ref 3.5–5.3)
POTASSIUM SERPL-SCNC: 3.9 MMOL/L — SIGNIFICANT CHANGE UP (ref 3.5–5.3)
PROTHROM AB SERPL-ACNC: 41.6 SEC — HIGH (ref 10–12.9)
RBC # BLD: 5.68 M/UL — HIGH (ref 3.8–5.2)
RBC # FLD: 22.2 % — HIGH (ref 10.3–14.5)
SODIUM SERPL-SCNC: 133 MMOL/L — LOW (ref 135–145)
SODIUM UR-SCNC: 125 MMOL/L — SIGNIFICANT CHANGE UP
WBC # BLD: 4.72 K/UL — SIGNIFICANT CHANGE UP (ref 3.8–10.5)
WBC # FLD AUTO: 4.72 K/UL — SIGNIFICANT CHANGE UP (ref 3.8–10.5)

## 2019-07-30 PROCEDURE — 99232 SBSQ HOSP IP/OBS MODERATE 35: CPT

## 2019-07-30 PROCEDURE — 99291 CRITICAL CARE FIRST HOUR: CPT

## 2019-07-30 RX ORDER — BUMETANIDE 0.25 MG/ML
1 INJECTION INTRAMUSCULAR; INTRAVENOUS DAILY
Refills: 0 | Status: DISCONTINUED | OUTPATIENT
Start: 2019-07-30 | End: 2019-08-03

## 2019-07-30 RX ORDER — POTASSIUM PHOSPHATE, MONOBASIC POTASSIUM PHOSPHATE, DIBASIC 236; 224 MG/ML; MG/ML
15 INJECTION, SOLUTION INTRAVENOUS ONCE
Refills: 0 | Status: COMPLETED | OUTPATIENT
Start: 2019-07-30 | End: 2019-07-30

## 2019-07-30 RX ADMIN — Medication 5.78 MICROGRAM(S)/KG/MIN: at 03:20

## 2019-07-30 RX ADMIN — Medication 5.78 MICROGRAM(S)/KG/MIN: at 07:29

## 2019-07-30 RX ADMIN — Medication 3 MILLILITER(S): at 23:08

## 2019-07-30 RX ADMIN — SPIRONOLACTONE 50 MILLIGRAM(S): 25 TABLET, FILM COATED ORAL at 05:35

## 2019-07-30 RX ADMIN — Medication 3 MILLILITER(S): at 17:17

## 2019-07-30 RX ADMIN — RIOCIGUAT 1.5 MILLIGRAM(S): 1.5 TABLET, FILM COATED ORAL at 05:32

## 2019-07-30 RX ADMIN — Medication 3 MILLILITER(S): at 05:27

## 2019-07-30 RX ADMIN — Medication 25 MICROGRAM(S): at 05:40

## 2019-07-30 RX ADMIN — Medication 5.78 MICROGRAM(S)/KG/MIN: at 18:13

## 2019-07-30 RX ADMIN — POTASSIUM PHOSPHATE, MONOBASIC POTASSIUM PHOSPHATE, DIBASIC 62.5 MILLIMOLE(S): 236; 224 INJECTION, SOLUTION INTRAVENOUS at 08:00

## 2019-07-30 RX ADMIN — BUDESONIDE AND FORMOTEROL FUMARATE DIHYDRATE 2 PUFF(S): 160; 4.5 AEROSOL RESPIRATORY (INHALATION) at 17:16

## 2019-07-30 RX ADMIN — SERTRALINE 50 MILLIGRAM(S): 25 TABLET, FILM COATED ORAL at 12:21

## 2019-07-30 RX ADMIN — BUDESONIDE AND FORMOTEROL FUMARATE DIHYDRATE 2 PUFF(S): 160; 4.5 AEROSOL RESPIRATORY (INHALATION) at 05:35

## 2019-07-30 RX ADMIN — BUMETANIDE 1 MILLIGRAM(S): 0.25 INJECTION INTRAMUSCULAR; INTRAVENOUS at 05:35

## 2019-07-30 RX ADMIN — Medication 9.24 MICROGRAM(S)/KG/MIN: at 07:30

## 2019-07-30 RX ADMIN — Medication 5.78 MICROGRAM(S)/KG/MIN: at 08:03

## 2019-07-30 RX ADMIN — SIMVASTATIN 40 MILLIGRAM(S): 20 TABLET, FILM COATED ORAL at 22:07

## 2019-07-30 RX ADMIN — Medication 9.24 MICROGRAM(S)/KG/MIN: at 18:13

## 2019-07-30 RX ADMIN — Medication 81 MILLIGRAM(S): at 12:21

## 2019-07-30 RX ADMIN — RIOCIGUAT 1.5 MILLIGRAM(S): 1.5 TABLET, FILM COATED ORAL at 12:21

## 2019-07-30 RX ADMIN — RIOCIGUAT 1.5 MILLIGRAM(S): 1.5 TABLET, FILM COATED ORAL at 20:48

## 2019-07-30 RX ADMIN — Medication 5.78 MICROGRAM(S)/KG/MIN: at 13:52

## 2019-07-30 RX ADMIN — CHLORHEXIDINE GLUCONATE 1 APPLICATION(S): 213 SOLUTION TOPICAL at 09:56

## 2019-07-30 RX ADMIN — Medication 3 MILLILITER(S): at 11:24

## 2019-07-30 NOTE — PROGRESS NOTE ADULT - SUBJECTIVE AND OBJECTIVE BOX
Preliminary note based on available patient data was entered below in order to expedite patient management  Patient will be examined within the next few hours (and this note will be edited if so dictated by the then latest available data) Please note that  by the end of this day the below note should be considered as my final patient progress note for today     GERMÁN KENNY  [phone]   1985 DOA 2019 DR NICOLETTE PETTY   ALLERGY  nka       CONTACT   Cyndie Bernard  [facility] [id number]              Patient examined chart reviewed    HOSPITAL ADMISSION   PATIENT CAME  FROM (if information available)        TYPE OF VISIT      Subsequent Pulmonary Critical Care followup requested -2019     by Dr Mejia    from Dr Choudhury     REASON FOR VISIT  PLEASE SEE PROBLEM LIST/ASSESSMENTAND RECOMMENDATIONS     PATIENT GERMÁN KENNY  [phone]   1985 DOA 2019 DR NICOLETTE PETTY     VITALS/LABS      2019 afeb 92 92 100/50   2019 7a DOBU 2.5 m/k/m   2019 4p NORE .6 m/k/m   2019 hfnc   2019 w 4.7 Hb 13 Plt 227  Na 132 K 3.9 CO12 34 Cr .9     REVIEW OF SYMPTOMS     NOTE [place] if any  in ROS and PE are updated in daily HOSPITAL COURSE below      Able to give ROS  Yes     RELIABLE No   CONSTITUTIONAL Weakness Yes  Chills No Vision changes No  ENDOCRINE No unexplained hair loss No heat or cold intolerance    ALLERGY No hives  Sore throat No   RESP Coughing blood no  Shortness of breath YES   NEURO No Headache  Confusion Pain neck No   CARDIAC No Chest pain No Palpitations   GI No Pain abdomen NO   Vomiting NO     PHYSICAL EXAM    HEENT Unremarkable PERRLA atraumatic   RESP Fair air entry EXP prolonged    Harsh breath sound Resp distres mild   CARDIAC S1 S2 No S3     NO JVD    ABDOMEN SOFT BS PRESENT NOT DISTENDED No hepatosplenomegaly PEDAL EDEMA present No calf tenderness  NO rash   GENERAL Not TOXIC

## 2019-07-30 NOTE — PROGRESS NOTE ADULT - ASSESSMENT
35 y/o F w/severe pulmonary hyertension w/cor pulmonale secondary to possible group I+II-IV supposed to be on adempas and diuresis as outpatient presenting with acute on chronic RV failure secondary to medication noncompliance. Likely depressed, currently declining psych consult. DIANN likely secondary to combination of ATN and venous congestion from R heart failure, improving with diuresis. Elevated troponin likely secondary to demand ischemia/strain from R heart failure.    - Continue pressor and inotrope support goal MAP >= 65, wean as tolerated  - Diuresis as tolerated  - Trend Cr avoid nephrotoxins  - Continue coumadin  - VQ scan once stable  - Supplemental O2 as needed    Attending critical care time 35 mintues

## 2019-07-30 NOTE — PROGRESS NOTE ADULT - SUBJECTIVE AND OBJECTIVE BOX
HPI:  34 year old woman with CHF, asthma presents to ED with complaint of SOB and leg swelling.  She states that she can only walk 1-2 blocks at baseline and recently her exercise tolerance has decreased.  She denies chest pain, palpitations, lightheadedness, nausea, vomiting.    In the ED, troponin 0.435, BNP 4049, CXR shows pulmonary vasc congestion.  Pt started on BiPAP by ED. (26 Jul 2019 05:51)      24 hr events: Remains on Levophed and dobutamine. Diuresing well.     ## ROS:  See above. ROS otherwise negative.    ## Vitals  ICU Vital Signs Last 24 Hrs  T(C): 37 (30 Jul 2019 07:09), Max: 37 (30 Jul 2019 07:09)  T(F): 98.6 (30 Jul 2019 07:09), Max: 98.6 (30 Jul 2019 07:09)  HR: 94 (30 Jul 2019 08:00) (92 - 98)  BP: 108/79 (30 Jul 2019 08:00) (90/54 - 132/76)  BP(mean): 85 (30 Jul 2019 08:00) (62 - 90)  ABP: --  ABP(mean): --  RR: 17 (30 Jul 2019 08:00) (15 - 32)  SpO2: 96% (30 Jul 2019 08:00) (90% - 100%)      ## Physical Exam:  Gen: Adult female sitting comfortably in bed, NAD  HEENT: NC/AT sclerae icteric  Resp: No increased WOB, CTAB  CV: S1, S2 FELIBERTO  Abd: Soft, + BS  Ext: 4+ edema  Neuro: Awake, alert, follows commands, moves all extremities    ## Vent Data      ## Labs:  Chem:  07-30    133<L>  |  91<L>  |  19  ----------------------------<  143<H>  3.9   |  34<H>  |  0.99    Ca    9.2      30 Jul 2019 04:18  Phos  2.3     07-30  Mg     2.2     07-30    TPro  8.9<H>  /  Alb  3.3  /  TBili  4.0<H>  /  DBili  x   /  AST  53<H>  /  ALT  25  /  AlkPhos  75  07-29    LIVER FUNCTIONS - ( 29 Jul 2019 05:05 )  Alb: 3.3 g/dL / Pro: 8.9 gm/dL / ALK PHOS: 75 U/L / ALT: 25 U/L / AST: 53 U/L / GGT: x           CBC:                        13.0   4.72  )-----------( 227      ( 30 Jul 2019 04:18 )             41.4     Coags:  PT/INR - ( 30 Jul 2019 04:18 )   PT: 41.6 sec;   INR: 3.57 ratio                 ## Cardiac  CARDIAC MARKERS ( 29 Jul 2019 05:05 )  1.230 ng/mL / x     / 359 U/L / x     / 5.8 ng/mL  CARDIAC MARKERS ( 28 Jul 2019 17:24 )  1.500 ng/mL / x     / 367 U/L / x     / 8.0 ng/mL        ## Blood Gas      #I/Os  I&O's Detail    29 Jul 2019 07:01  -  30 Jul 2019 07:00  --------------------------------------------------------  IN:    DOBUTamine Infusion: 220.8 mL    norepinephrine Infusion: 1613.7 mL    Oral Fluid: 1320 mL  Total IN: 3154.5 mL    OUT:    Indwelling Catheter - Urethral: 9745 mL  Total OUT: 9745 mL    Total NET: -6590.5 mL      30 Jul 2019 07:01  -  30 Jul 2019 08:09  --------------------------------------------------------  IN:    DOBUTamine Infusion: 9.2 mL    norepinephrine Infusion: 52 mL    Solution: 62.5 mL  Total IN: 123.7 mL    OUT:    Indwelling Catheter - Urethral: 1000 mL  Total OUT: 1000 mL    Total NET: -876.3 mL          ## Imaging:    ## Medications:  MEDICATIONS  (STANDING):  ALBUTerol    90 MICROgram(s) HFA Inhaler 1 Puff(s) Inhalation every 4 hours  ALBUTerol/ipratropium for Nebulization 3 milliLiter(s) Nebulizer every 6 hours  aspirin enteric coated 81 milliGRAM(s) Oral daily  buDESOnide  80 MICROgram(s)/formoterol 4.5 MICROgram(s) Inhaler 2 Puff(s) Inhalation two times a day  buMETAnide Injectable 1 milliGRAM(s) IV Push daily  chlorhexidine 4% Liquid 1 Application(s) Topical <User Schedule>  DOBUTamine Infusion 2.5 MICROgram(s)/kG/Min (9.24 mL/Hr) IV Continuous <Continuous>  levothyroxine 25 MICROGram(s) Oral daily  norepinephrine Infusion 0.05 MICROgram(s)/kG/Min (5.775 mL/Hr) IV Continuous <Continuous>  riociguat 1.5 milliGRAM(s) Oral every 8 hours  sertraline 50 milliGRAM(s) Oral daily  simvastatin 40 milliGRAM(s) Oral at bedtime  spironolactone 50 milliGRAM(s) Oral daily  tiotropium 18 MICROgram(s) Capsule 1 Capsule(s) Inhalation daily    MEDICATIONS  (PRN):  morphine  - Injectable 2 milliGRAM(s) IV Push every 6 hours PRN Severe Pain (7 - 10)  petrolatum white Ointment 1 Application(s) Topical three times a day PRN chapped lips  sodium chloride 0.9% lock flush 10 milliLiter(s) IV Push every 1 hour PRN Pre/post blood products, medications, blood draw, and to maintain line patency HPI:  34 year old woman with CHF, asthma presents to ED with complaint of SOB and leg swelling.  She states that she can only walk 1-2 blocks at baseline and recently her exercise tolerance has decreased.  She denies chest pain, palpitations, lightheadedness, nausea, vomiting.    In the ED, troponin 0.435, BNP 4049, CXR shows pulmonary vasc congestion.  Pt started on BiPAP by ED. (26 Jul 2019 05:51)      24 hr events: Remains on Levophed and dobutamine. Diuresing well. Feels well this morning. No chest pain. Reports dyspnea with mild exertion. No fevers, chills, nausea, emesis, or diarrhea.    ## ROS:  See above. ROS otherwise negative.    ## Vitals  ICU Vital Signs Last 24 Hrs  T(C): 37 (30 Jul 2019 07:09), Max: 37 (30 Jul 2019 07:09)  T(F): 98.6 (30 Jul 2019 07:09), Max: 98.6 (30 Jul 2019 07:09)  HR: 94 (30 Jul 2019 08:00) (92 - 98)  BP: 108/79 (30 Jul 2019 08:00) (90/54 - 132/76)  BP(mean): 85 (30 Jul 2019 08:00) (62 - 90)  ABP: --  ABP(mean): --  RR: 17 (30 Jul 2019 08:00) (15 - 32)  SpO2: 96% (30 Jul 2019 08:00) (90% - 100%)      ## Physical Exam:  Gen: Adult female sitting comfortably in bed, NAD  HEENT: NC/AT sclerae icteric  Resp: No increased WOB, CTAB  CV: S1, S2 FELIBERTO  Abd: Soft, + BS  Ext: 4+ edema  Neuro: Awake, alert, follows commands, moves all extremities    ## Vent Data      ## Labs:  Chem:  07-30    133<L>  |  91<L>  |  19  ----------------------------<  143<H>  3.9   |  34<H>  |  0.99    Ca    9.2      30 Jul 2019 04:18  Phos  2.3     07-30  Mg     2.2     07-30    TPro  8.9<H>  /  Alb  3.3  /  TBili  4.0<H>  /  DBili  x   /  AST  53<H>  /  ALT  25  /  AlkPhos  75  07-29    LIVER FUNCTIONS - ( 29 Jul 2019 05:05 )  Alb: 3.3 g/dL / Pro: 8.9 gm/dL / ALK PHOS: 75 U/L / ALT: 25 U/L / AST: 53 U/L / GGT: x           CBC:                        13.0   4.72  )-----------( 227      ( 30 Jul 2019 04:18 )             41.4     Coags:  PT/INR - ( 30 Jul 2019 04:18 )   PT: 41.6 sec;   INR: 3.57 ratio                 ## Cardiac  CARDIAC MARKERS ( 29 Jul 2019 05:05 )  1.230 ng/mL / x     / 359 U/L / x     / 5.8 ng/mL  CARDIAC MARKERS ( 28 Jul 2019 17:24 )  1.500 ng/mL / x     / 367 U/L / x     / 8.0 ng/mL        ## Blood Gas      #I/Os  I&O's Detail    29 Jul 2019 07:01  -  30 Jul 2019 07:00  --------------------------------------------------------  IN:    DOBUTamine Infusion: 220.8 mL    norepinephrine Infusion: 1613.7 mL    Oral Fluid: 1320 mL  Total IN: 3154.5 mL    OUT:    Indwelling Catheter - Urethral: 9745 mL  Total OUT: 9745 mL    Total NET: -6590.5 mL      30 Jul 2019 07:01  -  30 Jul 2019 08:09  --------------------------------------------------------  IN:    DOBUTamine Infusion: 9.2 mL    norepinephrine Infusion: 52 mL    Solution: 62.5 mL  Total IN: 123.7 mL    OUT:    Indwelling Catheter - Urethral: 1000 mL  Total OUT: 1000 mL    Total NET: -876.3 mL          ## Imaging:    ## Medications:  MEDICATIONS  (STANDING):  ALBUTerol    90 MICROgram(s) HFA Inhaler 1 Puff(s) Inhalation every 4 hours  ALBUTerol/ipratropium for Nebulization 3 milliLiter(s) Nebulizer every 6 hours  aspirin enteric coated 81 milliGRAM(s) Oral daily  buDESOnide  80 MICROgram(s)/formoterol 4.5 MICROgram(s) Inhaler 2 Puff(s) Inhalation two times a day  buMETAnide Injectable 1 milliGRAM(s) IV Push daily  chlorhexidine 4% Liquid 1 Application(s) Topical <User Schedule>  DOBUTamine Infusion 2.5 MICROgram(s)/kG/Min (9.24 mL/Hr) IV Continuous <Continuous>  levothyroxine 25 MICROGram(s) Oral daily  norepinephrine Infusion 0.05 MICROgram(s)/kG/Min (5.775 mL/Hr) IV Continuous <Continuous>  riociguat 1.5 milliGRAM(s) Oral every 8 hours  sertraline 50 milliGRAM(s) Oral daily  simvastatin 40 milliGRAM(s) Oral at bedtime  spironolactone 50 milliGRAM(s) Oral daily  tiotropium 18 MICROgram(s) Capsule 1 Capsule(s) Inhalation daily    MEDICATIONS  (PRN):  morphine  - Injectable 2 milliGRAM(s) IV Push every 6 hours PRN Severe Pain (7 - 10)  petrolatum white Ointment 1 Application(s) Topical three times a day PRN chapped lips  sodium chloride 0.9% lock flush 10 milliLiter(s) IV Push every 1 hour PRN Pre/post blood products, medications, blood draw, and to maintain line patency

## 2019-07-30 NOTE — PHYSICAL THERAPY INITIAL EVALUATION ADULT - ACTIVE RANGE OF MOTION EXAMINATION, REHAB EVAL
samy. upper extremity Active ROM was WNL (within normal limits)/bilateral lower extremity Active ROM was WNL (within normal limits)

## 2019-07-30 NOTE — PROGRESS NOTE ADULT - ASSESSMENT
PATIENT GERMÁN KENNY  [phone]   1985 DOA 2019 DR NICOLETTE PETTY                             Patient description                          33 yo woman with severe pHTN (?group I, mPAP 67; W 9; RVR 9 on RHC 2018,  previously on Adempas, non-complinat), mildly positive anti-cardiolipin Ab and RAD admitted with decompensated right heart failure and volume overload and DIANN in the setting of medication non-compliance.   her dry weight is 119k                                           Hospital course                                  Pt was staretd on bumex but bp was low so she was transferred to icu  for diuresis under cover of dobutamine inotrope  2019 On norepi diobu bumex and metolazone On hfnc .6                    PATIENT GERMÁN KENNY  [phone]   1985 DOA 2019 DR NICOLETTE PETTY                             PROBLEM/ASSESSMENT/RECOMMENDATIONS (A/R)       IDIOPATHIC PULMONARY ARTERIAL HYPERTENSION  A/R On coumadin   Riociguat 1.5x3 added  (ADEMPAS)   ANTIPHOSPHOLIPID SYNDROME   A/R On coumadin   BIVENTRICULAR FAILURE WITH LOW BP IN SETTING OF IPAH  A/R 2019 On dobutamine and bumex Norepi added to support pvr   2019 Prognosis is not good    MI   - Tr 1 1.5-1.2   ACUTE HYPOXEMIC RESP FAILURE   A/R O2 to target po 89 or better to avoid hypoxic pulmonary vasoconstrictyion   COPD   A/R On symbicort 80 ()  spiriva ()   LUNG NODULE   2019 cxr new r central line Nonspnodular density r perihilar Poss sm l effsn   A/R Likely represents chf rather than nodules Consider ct ch   DIANN   -- Cr 2.3-1.5 - .9   HYPONATREMIA   2019 Na 132   2019 U Na 125 U osm 309  A/R Hyponatremia likely sec to diuresis             TIME SPENT Over 25 minutes aggregate care time spent on encounter; activities included   direct pt care, counseling and/or coordinating care reviewing notes, lab data/ imaging , discussion with multidisciplinary team/ pt /family. Risks, benefits, alternatives  discussed in detail.
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
## 2019-07-30 NOTE — PHYSICAL THERAPY INITIAL EVALUATION ADULT - GENERAL OBSERVATIONS, REHAB EVAL
Chart (EMR) reviewed. INR=3.57(trending up) coumadin placed on hold, Troponin=1.230(trending down). Received supine c HOB elevated, NAD.+telemetry, +O2 via high flow (FIO2=40%), +jimenez cath, +IV intact, +edema to both feet noted.  Alert. Ox4. Able to follow multistep commands/directions.

## 2019-07-31 LAB
ANION GAP SERPL CALC-SCNC: 9 MMOL/L — SIGNIFICANT CHANGE UP (ref 5–17)
BUN SERPL-MCNC: 13 MG/DL — SIGNIFICANT CHANGE UP (ref 7–23)
CALCIUM SERPL-MCNC: 9.2 MG/DL — SIGNIFICANT CHANGE UP (ref 8.5–10.1)
CHLORIDE SERPL-SCNC: 88 MMOL/L — LOW (ref 96–108)
CO2 SERPL-SCNC: 33 MMOL/L — HIGH (ref 22–31)
CREAT SERPL-MCNC: 0.88 MG/DL — SIGNIFICANT CHANGE UP (ref 0.5–1.3)
GLUCOSE SERPL-MCNC: 127 MG/DL — HIGH (ref 70–99)
HCT VFR BLD CALC: 40.3 % — SIGNIFICANT CHANGE UP (ref 34.5–45)
HGB BLD-MCNC: 12.4 G/DL — SIGNIFICANT CHANGE UP (ref 11.5–15.5)
INR BLD: 2.73 RATIO — HIGH (ref 0.88–1.16)
MAGNESIUM SERPL-MCNC: 2.2 MG/DL — SIGNIFICANT CHANGE UP (ref 1.6–2.6)
MCHC RBC-ENTMCNC: 22.3 PG — LOW (ref 27–34)
MCHC RBC-ENTMCNC: 30.8 GM/DL — LOW (ref 32–36)
MCV RBC AUTO: 72.5 FL — LOW (ref 80–100)
NRBC # BLD: 0 /100 WBCS — SIGNIFICANT CHANGE UP (ref 0–0)
PHOSPHATE SERPL-MCNC: 2.6 MG/DL — SIGNIFICANT CHANGE UP (ref 2.5–4.5)
PLATELET # BLD AUTO: 202 K/UL — SIGNIFICANT CHANGE UP (ref 150–400)
POTASSIUM SERPL-MCNC: 3.5 MMOL/L — SIGNIFICANT CHANGE UP (ref 3.5–5.3)
POTASSIUM SERPL-SCNC: 3.5 MMOL/L — SIGNIFICANT CHANGE UP (ref 3.5–5.3)
PROTHROM AB SERPL-ACNC: 31.5 SEC — HIGH (ref 10–12.9)
RBC # BLD: 5.56 M/UL — HIGH (ref 3.8–5.2)
RBC # FLD: 22.5 % — HIGH (ref 10.3–14.5)
SODIUM SERPL-SCNC: 130 MMOL/L — LOW (ref 135–145)
WBC # BLD: 4.97 K/UL — SIGNIFICANT CHANGE UP (ref 3.8–10.5)
WBC # FLD AUTO: 4.97 K/UL — SIGNIFICANT CHANGE UP (ref 3.8–10.5)

## 2019-07-31 PROCEDURE — 99291 CRITICAL CARE FIRST HOUR: CPT

## 2019-07-31 PROCEDURE — 99232 SBSQ HOSP IP/OBS MODERATE 35: CPT

## 2019-07-31 RX ORDER — POTASSIUM CHLORIDE 20 MEQ
10 PACKET (EA) ORAL
Refills: 0 | Status: COMPLETED | OUTPATIENT
Start: 2019-07-31 | End: 2019-07-31

## 2019-07-31 RX ORDER — WARFARIN SODIUM 2.5 MG/1
2 TABLET ORAL ONCE
Refills: 0 | Status: COMPLETED | OUTPATIENT
Start: 2019-07-31 | End: 2019-07-31

## 2019-07-31 RX ADMIN — Medication 100 MILLIEQUIVALENT(S): at 20:55

## 2019-07-31 RX ADMIN — SIMVASTATIN 40 MILLIGRAM(S): 20 TABLET, FILM COATED ORAL at 21:09

## 2019-07-31 RX ADMIN — Medication 100 MILLIEQUIVALENT(S): at 21:54

## 2019-07-31 RX ADMIN — WARFARIN SODIUM 2 MILLIGRAM(S): 2.5 TABLET ORAL at 21:08

## 2019-07-31 RX ADMIN — Medication 3 MILLILITER(S): at 17:03

## 2019-07-31 RX ADMIN — Medication 25 MICROGRAM(S): at 06:10

## 2019-07-31 RX ADMIN — RIOCIGUAT 1.5 MILLIGRAM(S): 1.5 TABLET, FILM COATED ORAL at 12:17

## 2019-07-31 RX ADMIN — Medication 100 MILLIEQUIVALENT(S): at 19:46

## 2019-07-31 RX ADMIN — BUDESONIDE AND FORMOTEROL FUMARATE DIHYDRATE 2 PUFF(S): 160; 4.5 AEROSOL RESPIRATORY (INHALATION) at 06:10

## 2019-07-31 RX ADMIN — Medication 81 MILLIGRAM(S): at 12:17

## 2019-07-31 RX ADMIN — BUMETANIDE 1 MILLIGRAM(S): 0.25 INJECTION INTRAMUSCULAR; INTRAVENOUS at 06:10

## 2019-07-31 RX ADMIN — SPIRONOLACTONE 50 MILLIGRAM(S): 25 TABLET, FILM COATED ORAL at 06:10

## 2019-07-31 RX ADMIN — Medication 9.24 MICROGRAM(S)/KG/MIN: at 19:55

## 2019-07-31 RX ADMIN — Medication 5.78 MICROGRAM(S)/KG/MIN: at 15:20

## 2019-07-31 RX ADMIN — CHLORHEXIDINE GLUCONATE 1 APPLICATION(S): 213 SOLUTION TOPICAL at 12:17

## 2019-07-31 RX ADMIN — BUDESONIDE AND FORMOTEROL FUMARATE DIHYDRATE 2 PUFF(S): 160; 4.5 AEROSOL RESPIRATORY (INHALATION) at 17:07

## 2019-07-31 RX ADMIN — RIOCIGUAT 1.5 MILLIGRAM(S): 1.5 TABLET, FILM COATED ORAL at 04:01

## 2019-07-31 RX ADMIN — RIOCIGUAT 1.5 MILLIGRAM(S): 1.5 TABLET, FILM COATED ORAL at 19:41

## 2019-07-31 RX ADMIN — MORPHINE SULFATE 2 MILLIGRAM(S): 50 CAPSULE, EXTENDED RELEASE ORAL at 22:58

## 2019-07-31 RX ADMIN — Medication 3 MILLILITER(S): at 23:20

## 2019-07-31 RX ADMIN — Medication 3 MILLILITER(S): at 11:10

## 2019-07-31 RX ADMIN — Medication 5.78 MICROGRAM(S)/KG/MIN: at 01:00

## 2019-07-31 RX ADMIN — MORPHINE SULFATE 2 MILLIGRAM(S): 50 CAPSULE, EXTENDED RELEASE ORAL at 23:53

## 2019-07-31 RX ADMIN — Medication 3 MILLILITER(S): at 05:30

## 2019-07-31 RX ADMIN — SERTRALINE 50 MILLIGRAM(S): 25 TABLET, FILM COATED ORAL at 12:17

## 2019-07-31 NOTE — CHART NOTE - NSCHARTNOTEFT_GEN_A_CORE
Assessment:     Factors impacting intake: [ ] none [ ] nausea  [ ] vomiting [ ] diarrhea [ ] constipation  [ ]chewing problems [ ] swallowing issues  [ ] other:     Diet Prescription: Diet, Consistent Carbohydrate/No Snacks:   DASH/TLC {Sodium & Cholesterol Restricted}  Supplement Feeding Modality:  Oral  Glucerna Shake Cans or Servings Per Day:  1       Frequency:  Two Times a day (07-29-19 @ 11:16)    Intake: <50% of meals, 100 % of supplements     Current Weight: Weight (kg): 123.2 (07-26 @ 06:35)  % Weight Change    Physical appearance:     Pertinent Medications: MEDICATIONS  (STANDING):  ALBUTerol    90 MICROgram(s) HFA Inhaler 1 Puff(s) Inhalation every 4 hours  ALBUTerol/ipratropium for Nebulization 3 milliLiter(s) Nebulizer every 6 hours  aspirin enteric coated 81 milliGRAM(s) Oral daily  buDESOnide  80 MICROgram(s)/formoterol 4.5 MICROgram(s) Inhaler 2 Puff(s) Inhalation two times a day  buMETAnide Injectable 1 milliGRAM(s) IV Push daily  chlorhexidine 4% Liquid 1 Application(s) Topical <User Schedule>  DOBUTamine Infusion 2.5 MICROgram(s)/kG/Min (9.24 mL/Hr) IV Continuous <Continuous>  levothyroxine 25 MICROGram(s) Oral daily  norepinephrine Infusion 0.05 MICROgram(s)/kG/Min (5.775 mL/Hr) IV Continuous <Continuous>  riociguat 1.5 milliGRAM(s) Oral every 8 hours  sertraline 50 milliGRAM(s) Oral daily  simvastatin 40 milliGRAM(s) Oral at bedtime  spironolactone 50 milliGRAM(s) Oral daily  tiotropium 18 MICROgram(s) Capsule 1 Capsule(s) Inhalation daily  warfarin 2 milliGRAM(s) Oral once    MEDICATIONS  (PRN):  morphine  - Injectable 2 milliGRAM(s) IV Push every 6 hours PRN Severe Pain (7 - 10)  petrolatum white Ointment 1 Application(s) Topical three times a day PRN chapped lips  sodium chloride 0.9% lock flush 10 milliLiter(s) IV Push every 1 hour PRN Pre/post blood products, medications, blood draw, and to maintain line patency    Pertinent Labs: 07-31 Na 130 mmol/L<L> Glu 127 mg/dL<H> K+ 3.5 mmol/L Cr 0.88 mg/dL BUN 13 mg/dL Phos 2.6 mg/dL Alb n/a   PAB n/a   Hgb 12.4 g/dL Hct 40.3 % HgA1C n/a    Glucose, Serum: 127 mg/dL <H>   24Hr FS:  Skin: WDL     Estimated Needs:   [ x ] no change since previous assessment (07/29)  [ ] recalculated:     Previous Nutrition Diagnosis:   Nutrition diagnosis yes... Nutrition Diagnostic #1.     Nutrition Diagnostic Terminology #1 Malnutrition...     Malnutrition Severe Malnutrition in the context of Acute illness.     Etiology inadequate energy/protein intake; increased energy/protein requirements related to CHF exasperation.     Signs/Symptoms Physical Findings: 2+/3+ edema ; < 50% nutrition needs > 5 days.    New Goal: Pt to meet >75% of estimated protein-energy needs via meals/supplements ( not met )     Nutrition Diagnosis is [x ] ongoing  [ ] resolved  [ ] improved  [ ] not applicable       New Nutrition Diagnosis: [x ] not applicable       Interventions:   Recommend  [x ] Continue: Current diet Rx  [ ] Change Diet To:  [ ] Nutrition Supplement:  [ ] Nutrition Support:  [ ] Other:     Monitoring and Evaluation:   [x ] PO intake [ x ] Tolerance to diet prescription [ x ] weights [ x ] labs[ x ] follow up per protocol  [ x ] other: Food preferences prn Assessment: Pt remains in critical care unit pt seen for acute severe malnutrition follow up. Pt admitted with CHF, edema, COPD. Pt reported no V/C/D some nausea. Reported poor appetite at mealtimes, but consumes nutrition supplement. Provided encouragement to increase PO intake.     Factors impacting intake: [ ] none [ ] nausea  [ ] vomiting [ ] diarrhea [ ] constipation  [ ]chewing problems [ ] swallowing issues  [ x ] other: lack of appetite     Diet Prescription: Diet, Consistent Carbohydrate/No Snacks:   DASH/TLC {Sodium & Cholesterol Restricted}  Supplement Feeding Modality:  Oral  Glucerna Shake Cans or Servings Per Day:  1       Frequency:  Two Times a day (07-29-19 @ 11:16)    Intake: <50% of meals, 100 % of supplements     Current Weight: Weight (kg): 123.2 (07-26 with bilat left/foot/ankle 4+ edema); 111.9 (07-30- generalized  1+ edema)    % Weight Change: unable to assess changes in dry weights.     Physical appearance: BMI 36.5; Generalized 1+ edema noted    Pertinent Medications: MEDICATIONS  (STANDING):  ALBUTerol    90 MICROgram(s) HFA Inhaler 1 Puff(s) Inhalation every 4 hours  ALBUTerol/ipratropium for Nebulization 3 milliLiter(s) Nebulizer every 6 hours  aspirin enteric coated 81 milliGRAM(s) Oral daily  buDESOnide  80 MICROgram(s)/formoterol 4.5 MICROgram(s) Inhaler 2 Puff(s) Inhalation two times a day  buMETAnide Injectable 1 milliGRAM(s) IV Push daily  chlorhexidine 4% Liquid 1 Application(s) Topical <User Schedule>  DOBUTamine Infusion 2.5 MICROgram(s)/kG/Min (9.24 mL/Hr) IV Continuous <Continuous>  levothyroxine 25 MICROGram(s) Oral daily  norepinephrine Infusion 0.05 MICROgram(s)/kG/Min (5.775 mL/Hr) IV Continuous <Continuous>  riociguat 1.5 milliGRAM(s) Oral every 8 hours  sertraline 50 milliGRAM(s) Oral daily  simvastatin 40 milliGRAM(s) Oral at bedtime  spironolactone 50 milliGRAM(s) Oral daily  tiotropium 18 MICROgram(s) Capsule 1 Capsule(s) Inhalation daily  warfarin 2 milliGRAM(s) Oral once    MEDICATIONS  (PRN):  morphine  - Injectable 2 milliGRAM(s) IV Push every 6 hours PRN Severe Pain (7 - 10)  petrolatum white Ointment 1 Application(s) Topical three times a day PRN chapped lips  sodium chloride 0.9% lock flush 10 milliLiter(s) IV Push every 1 hour PRN Pre/post blood products, medications, blood draw, and to maintain line patency    Pertinent Labs: 07-31 Na 130 mmol/L<L> Glu 127 mg/dL<H> K+ 3.5 mmol/L Cr 0.88 mg/dL BUN 13 mg/dL Phos 2.6 mg/dL Alb n/a   PAB n/a   Hgb 12.4 g/dL Hct 40.3 % HgA1C n/a    Glucose, Serum: 127 mg/dL <H>   24Hr FS:  Skin: WDL     Estimated Needs:   [ x ] no change since previous assessment (07/29)  [ ] recalculated:     Previous Nutrition Diagnosis:   Nutrition diagnosis yes... Nutrition Diagnostic #1.     Nutrition Diagnostic Terminology #1 Malnutrition...     Malnutrition Severe Malnutrition in the context of Acute illness.     Etiology inadequate energy/protein intake; increased energy/protein requirements related to CHF exasperation.     Signs/Symptoms Physical Findings: 2+/3+ edema ; < 50% nutrition needs > 5 days.    New Goal: Pt to meet >75% of estimated protein-energy needs via meals/supplements ( not met )     Nutrition Diagnosis is [x ] ongoing  [ ] resolved  [ ] improved  [ ] not applicable       New Nutrition Diagnosis: [x ] not applicable       Interventions:   Recommend  [x ] Continue: Current diet Rx  [ ] Change Diet To:  [ ] Nutrition Supplement:  [ ] Nutrition Support:  [ ] Other:     Monitoring and Evaluation:   [x ] PO intake [ x ] Tolerance to diet prescription [ x ] weights [ x ] labs[ x ] follow up per protocol  [ x ] other: Food preferences prn

## 2019-07-31 NOTE — PROGRESS NOTE ADULT - SUBJECTIVE AND OBJECTIVE BOX
HPI:  34 year old woman with CHF, asthma presents to ED with complaint of SOB and leg swelling.  She states that she can only walk 1-2 blocks at baseline and recently her exercise tolerance has decreased.  She denies chest pain, palpitations, lightheadedness, nausea, vomiting.    In the ED, troponin 0.435, BNP 4049, CXR shows pulmonary vasc congestion.  Pt started on BiPAP by ED. (26 Jul 2019 05:51)      24 hr events: No acute events. Continues to diurese well. Pressor requirements downtrending. Continues to have dyspnea. No chest pain, fevers, chills, nausea, emesis, or diarrhea.    ## ROS:  See above. ROS otherwise negative.    ## Vitals  ICU Vital Signs Last 24 Hrs  T(C): 36.3 (31 Jul 2019 04:00), Max: 37.2 (30 Jul 2019 20:08)  T(F): 97.3 (31 Jul 2019 04:00), Max: 98.9 (30 Jul 2019 20:08)  HR: 90 (31 Jul 2019 07:00) (84 - 99)  BP: 112/65 (31 Jul 2019 07:00) (92/37 - 133/82)  BP(mean): 76 (31 Jul 2019 07:00) (50 - 106)  ABP: --  ABP(mean): --  RR: 27 (31 Jul 2019 07:00) (15 - 30)  SpO2: 94% (31 Jul 2019 07:00) (87% - 100%)      ## Physical Exam:  Gen: Adult female sitting comfortably in bed, NAD  HEENT: NC/AT sclerae icteric  Resp: No increased WOB, CTAB  CV: S1, S2 FELIBERTO  Abd: Soft, + BS  Ext: 4+ edema  Neuro: Awake, alert, follows commands, moves all extremities    ## Vent Data      ## Labs:  Chem:  07-31    130<L>  |  88<L>  |  13  ----------------------------<  127<H>  3.5   |  33<H>  |  0.88    Ca    9.2      31 Jul 2019 04:05  Phos  2.6     07-31  Mg     2.2     07-31        CBC:                        12.4   4.97  )-----------( 202      ( 31 Jul 2019 04:05 )             40.3     Coags:  PT/INR - ( 31 Jul 2019 04:05 )   PT: 31.5 sec;   INR: 2.73 ratio                 ## Cardiac        ## Blood Gas      #I/Os  I&O's Detail    30 Jul 2019 07:01  -  31 Jul 2019 07:00  --------------------------------------------------------  IN:    DOBUTamine Infusion: 184 mL    norepinephrine Infusion: 853.8 mL    Oral Fluid: 720 mL    Solution: 250 mL  Total IN: 2007.8 mL    OUT:    Indwelling Catheter - Urethral: 5045 mL  Total OUT: 5045 mL    Total NET: -3037.2 mL          ## Imaging:    ## Medications:  MEDICATIONS  (STANDING):  ALBUTerol    90 MICROgram(s) HFA Inhaler 1 Puff(s) Inhalation every 4 hours  ALBUTerol/ipratropium for Nebulization 3 milliLiter(s) Nebulizer every 6 hours  aspirin enteric coated 81 milliGRAM(s) Oral daily  buDESOnide  80 MICROgram(s)/formoterol 4.5 MICROgram(s) Inhaler 2 Puff(s) Inhalation two times a day  buMETAnide Injectable 1 milliGRAM(s) IV Push daily  chlorhexidine 4% Liquid 1 Application(s) Topical <User Schedule>  DOBUTamine Infusion 2.5 MICROgram(s)/kG/Min (9.24 mL/Hr) IV Continuous <Continuous>  levothyroxine 25 MICROGram(s) Oral daily  norepinephrine Infusion 0.05 MICROgram(s)/kG/Min (5.775 mL/Hr) IV Continuous <Continuous>  riociguat 1.5 milliGRAM(s) Oral every 8 hours  sertraline 50 milliGRAM(s) Oral daily  simvastatin 40 milliGRAM(s) Oral at bedtime  spironolactone 50 milliGRAM(s) Oral daily  tiotropium 18 MICROgram(s) Capsule 1 Capsule(s) Inhalation daily  warfarin 2 milliGRAM(s) Oral once    MEDICATIONS  (PRN):  morphine  - Injectable 2 milliGRAM(s) IV Push every 6 hours PRN Severe Pain (7 - 10)  petrolatum white Ointment 1 Application(s) Topical three times a day PRN chapped lips  sodium chloride 0.9% lock flush 10 milliLiter(s) IV Push every 1 hour PRN Pre/post blood products, medications, blood draw, and to maintain line patency

## 2019-07-31 NOTE — PROGRESS NOTE ADULT - SUBJECTIVE AND OBJECTIVE BOX
GERMÁN KENNY  [phone]   1985 DOA 2019 DR NICOLETTE PETTY   ALLERGY  nka       CONTACT   Cyndie Bernard  [facility] [id number]              Patient examined chart reviewed    HOSPITAL ADMISSION   PATIENT CAME  FROM (if information available)        TYPE OF VISIT      Subsequent Pulmonary followup     REASON FOR VISIT  PLEASE SEE PROBLEM LIST/ASSESSMENT AND RECOMMENDATIONS     PATIENT GERMÁN KENNY  [phone]   1986 DOA 2019 DR NICOLETTE PETTY     VITALS/LABS      2019 93 102/58 17 93%   2019 7a DOBU 2.5 m/k/m   2019 4p NORE .2 m/k/m   2019 W 4.9 Hb 12.4 Plt 202 INR 2.7 Na 130 K 3.5 CO2 33 Cr .8     REVIEW OF SYMPTOMS     NOTE [facility] if any  in ROS and PE are updated in daily HOSPITAL COURSE below      Able to give ROS  Yes     RELIABLE No   CONSTITUTIONAL Weakness Yes  Chills No Vision changes No  ENDOCRINE No unexplained hair loss No heat or cold intolerance    ALLERGY No hives  Sore throat No   RESP Coughing blood no  Shortness of breath YES   NEURO No Headache  Confusion Pain neck No   CARDIAC No Chest pain No Palpitations   GI No Pain abdomen NO   Vomiting NO     PHYSICAL EXAM    HEENT Unremarkable PERRLA atraumatic   RESP Fair air entry EXP prolonged    Harsh breath sound Resp distres mild   CARDIAC S1 S2 No S3     NO JVD    ABDOMEN SOFT BS PRESENT NOT DISTENDED No hepatosplenomegaly PEDAL EDEMA present No calf tenderness  NO rash   GENERAL Not TOXIC

## 2019-07-31 NOTE — PROGRESS NOTE ADULT - SUBJECTIVE AND OBJECTIVE BOX
Gastroenterology  Patient seen and examined bedside in CCU.  No complaints offered. NO Abdominal pain.  Denies nausea and vomiting. Tolerating diet.  Normal flatus/BM.     T(F): 98.1 (07-31-19 @ 15:31), Max: 98.9 (07-30-19 @ 20:08)  HR: 88 (07-31-19 @ 15:35) (84 - 99)  BP: 108/66 (07-31-19 @ 15:35) (102/55 - 133/82)  RR: 19 (07-31-19 @ 15:00) (14 - 28)  SpO2: 100% (07-31-19 @ 15:35) (87% - 100%)  Wt(kg): --  CAPILLARY BLOOD GLUCOSE        PHYSICAL EXAM:  General: NAD, WDWN.   Neuro:  Alert & responsive  HEENT: NCAT, EOMI, conjunctiva clear  CV: +S1+S2 regular rate and rhythm  Lung: clear to ausculation bilaterally,+SOB  Abdomen: soft, Non tender, No Distension. Normal active BS  Extremities: ++ pedal edema     LABS:                        12.4   4.97  )-----------( 202      ( 31 Jul 2019 04:05 )             40.3     07-31    130<L>  |  88<L>  |  13  ----------------------------<  127<H>  3.5   |  33<H>  |  0.88    Ca    9.2      31 Jul 2019 04:05  Phos  2.6     07-31  Mg     2.2     07-31        PT/INR - ( 31 Jul 2019 04:05 )   PT: 31.5 sec;   INR: 2.73 ratio           I&O's Detail    30 Jul 2019 07:01  -  31 Jul 2019 07:00  --------------------------------------------------------  IN:    DOBUTamine Infusion: 193.2 mL    norepinephrine Infusion: 853.8 mL    Oral Fluid: 720 mL    Solution: 250 mL  Total IN: 2017 mL    OUT:    Indwelling Catheter - Urethral: 5045 mL  Total OUT: 5045 mL    Total NET: -3028 mL      31 Jul 2019 07:01  -  31 Jul 2019 15:49  --------------------------------------------------------  IN:    DOBUTamine Infusion: 55.2 mL    norepinephrine Infusion: 208.2 mL    Oral Fluid: 440 mL  Total IN: 703.4 mL    OUT:    Indwelling Catheter - Urethral: 2700 mL  Total OUT: 2700 mL    Total NET: -1996.6 mL

## 2019-08-01 LAB
ANION GAP SERPL CALC-SCNC: 8 MMOL/L — SIGNIFICANT CHANGE UP (ref 5–17)
BUN SERPL-MCNC: 9 MG/DL — SIGNIFICANT CHANGE UP (ref 7–23)
CALCIUM SERPL-MCNC: 9.1 MG/DL — SIGNIFICANT CHANGE UP (ref 8.5–10.1)
CHLORIDE SERPL-SCNC: 88 MMOL/L — LOW (ref 96–108)
CO2 SERPL-SCNC: 32 MMOL/L — HIGH (ref 22–31)
CREAT SERPL-MCNC: 0.76 MG/DL — SIGNIFICANT CHANGE UP (ref 0.5–1.3)
GLUCOSE SERPL-MCNC: 106 MG/DL — HIGH (ref 70–99)
HCT VFR BLD CALC: 38.7 % — SIGNIFICANT CHANGE UP (ref 34.5–45)
HGB BLD-MCNC: 12.1 G/DL — SIGNIFICANT CHANGE UP (ref 11.5–15.5)
INR BLD: 2.26 RATIO — HIGH (ref 0.88–1.16)
MAGNESIUM SERPL-MCNC: 1.8 MG/DL — SIGNIFICANT CHANGE UP (ref 1.6–2.6)
MCHC RBC-ENTMCNC: 22.7 PG — LOW (ref 27–34)
MCHC RBC-ENTMCNC: 31.3 GM/DL — LOW (ref 32–36)
MCV RBC AUTO: 72.6 FL — LOW (ref 80–100)
NRBC # BLD: 0 /100 WBCS — SIGNIFICANT CHANGE UP (ref 0–0)
PHOSPHATE SERPL-MCNC: 2.9 MG/DL — SIGNIFICANT CHANGE UP (ref 2.5–4.5)
PLATELET # BLD AUTO: 209 K/UL — SIGNIFICANT CHANGE UP (ref 150–400)
POTASSIUM SERPL-MCNC: 3.4 MMOL/L — LOW (ref 3.5–5.3)
POTASSIUM SERPL-SCNC: 3.4 MMOL/L — LOW (ref 3.5–5.3)
PROTHROM AB SERPL-ACNC: 25.9 SEC — HIGH (ref 10–12.9)
RBC # BLD: 5.33 M/UL — HIGH (ref 3.8–5.2)
RBC # FLD: 22.1 % — HIGH (ref 10.3–14.5)
SODIUM SERPL-SCNC: 128 MMOL/L — LOW (ref 135–145)
WBC # BLD: 4.24 K/UL — SIGNIFICANT CHANGE UP (ref 3.8–10.5)
WBC # FLD AUTO: 4.24 K/UL — SIGNIFICANT CHANGE UP (ref 3.8–10.5)

## 2019-08-01 PROCEDURE — 99232 SBSQ HOSP IP/OBS MODERATE 35: CPT

## 2019-08-01 PROCEDURE — 99291 CRITICAL CARE FIRST HOUR: CPT

## 2019-08-01 RX ORDER — MAGNESIUM SULFATE 500 MG/ML
1 VIAL (ML) INJECTION ONCE
Refills: 0 | Status: COMPLETED | OUTPATIENT
Start: 2019-08-01 | End: 2019-08-01

## 2019-08-01 RX ORDER — POTASSIUM CHLORIDE 20 MEQ
20 PACKET (EA) ORAL ONCE
Refills: 0 | Status: COMPLETED | OUTPATIENT
Start: 2019-08-01 | End: 2019-08-01

## 2019-08-01 RX ORDER — POTASSIUM CHLORIDE 20 MEQ
20 PACKET (EA) ORAL ONCE
Refills: 0 | Status: DISCONTINUED | OUTPATIENT
Start: 2019-08-01 | End: 2019-08-01

## 2019-08-01 RX ORDER — WARFARIN SODIUM 2.5 MG/1
2.5 TABLET ORAL ONCE
Refills: 0 | Status: COMPLETED | OUTPATIENT
Start: 2019-08-01 | End: 2019-08-01

## 2019-08-01 RX ORDER — POTASSIUM CHLORIDE 20 MEQ
10 PACKET (EA) ORAL
Refills: 0 | Status: COMPLETED | OUTPATIENT
Start: 2019-08-01 | End: 2019-08-01

## 2019-08-01 RX ADMIN — Medication 81 MILLIGRAM(S): at 12:57

## 2019-08-01 RX ADMIN — SIMVASTATIN 40 MILLIGRAM(S): 20 TABLET, FILM COATED ORAL at 22:05

## 2019-08-01 RX ADMIN — MORPHINE SULFATE 2 MILLIGRAM(S): 50 CAPSULE, EXTENDED RELEASE ORAL at 19:17

## 2019-08-01 RX ADMIN — Medication 100 MILLIEQUIVALENT(S): at 08:50

## 2019-08-01 RX ADMIN — BUDESONIDE AND FORMOTEROL FUMARATE DIHYDRATE 2 PUFF(S): 160; 4.5 AEROSOL RESPIRATORY (INHALATION) at 06:12

## 2019-08-01 RX ADMIN — RIOCIGUAT 1.5 MILLIGRAM(S): 1.5 TABLET, FILM COATED ORAL at 20:44

## 2019-08-01 RX ADMIN — Medication 5.78 MICROGRAM(S)/KG/MIN: at 17:03

## 2019-08-01 RX ADMIN — Medication 100 GRAM(S): at 06:33

## 2019-08-01 RX ADMIN — Medication 9.24 MICROGRAM(S)/KG/MIN: at 02:20

## 2019-08-01 RX ADMIN — Medication 100 MILLIEQUIVALENT(S): at 07:45

## 2019-08-01 RX ADMIN — Medication 3 MILLILITER(S): at 11:24

## 2019-08-01 RX ADMIN — RIOCIGUAT 1.5 MILLIGRAM(S): 1.5 TABLET, FILM COATED ORAL at 05:49

## 2019-08-01 RX ADMIN — SPIRONOLACTONE 50 MILLIGRAM(S): 25 TABLET, FILM COATED ORAL at 05:50

## 2019-08-01 RX ADMIN — Medication 3 MILLILITER(S): at 05:13

## 2019-08-01 RX ADMIN — CHLORHEXIDINE GLUCONATE 1 APPLICATION(S): 213 SOLUTION TOPICAL at 08:50

## 2019-08-01 RX ADMIN — RIOCIGUAT 1.5 MILLIGRAM(S): 1.5 TABLET, FILM COATED ORAL at 12:57

## 2019-08-01 RX ADMIN — Medication 25 MICROGRAM(S): at 05:50

## 2019-08-01 RX ADMIN — Medication 9.24 MICROGRAM(S)/KG/MIN: at 17:03

## 2019-08-01 RX ADMIN — Medication 100 MILLIEQUIVALENT(S): at 09:54

## 2019-08-01 RX ADMIN — BUMETANIDE 1 MILLIGRAM(S): 0.25 INJECTION INTRAMUSCULAR; INTRAVENOUS at 05:52

## 2019-08-01 RX ADMIN — MORPHINE SULFATE 2 MILLIGRAM(S): 50 CAPSULE, EXTENDED RELEASE ORAL at 18:31

## 2019-08-01 RX ADMIN — BUDESONIDE AND FORMOTEROL FUMARATE DIHYDRATE 2 PUFF(S): 160; 4.5 AEROSOL RESPIRATORY (INHALATION) at 17:02

## 2019-08-01 RX ADMIN — WARFARIN SODIUM 2.5 MILLIGRAM(S): 2.5 TABLET ORAL at 22:05

## 2019-08-01 RX ADMIN — SERTRALINE 50 MILLIGRAM(S): 25 TABLET, FILM COATED ORAL at 12:57

## 2019-08-01 RX ADMIN — Medication 3 MILLILITER(S): at 23:35

## 2019-08-01 RX ADMIN — Medication 3 MILLILITER(S): at 17:20

## 2019-08-01 NOTE — PROGRESS NOTE ADULT - ASSESSMENT
33 y/o F w/severe pulmonary hyertension w/cor pulmonale secondary to possible group I+II-IV supposed to be on adempas and diuresis as outpatient presenting with acute on chronic RV failure secondary to medication noncompliance. Likely depressed, currently declining psych consult. DIANN likely secondary to combination of ATN and venous congestion from R heart failure, improving with diuresis. Elevated troponin likely secondary to demand ischemia/strain from R heart failure.    - Continue pressor and inotrope support goal MAP >= 65, wean as tolerated  - Diuresis as tolerated  - Trend Cr avoid nephrotoxins  - Continue coumadin  - VQ scan once stable  - Supplemental O2 as needed    Attending critical care time 35 mintues

## 2019-08-01 NOTE — PROGRESS NOTE ADULT - SUBJECTIVE AND OBJECTIVE BOX
HPI:  34 year old woman with CHF, asthma presents to ED with complaint of SOB and leg swelling.  She states that she can only walk 1-2 blocks at baseline and recently her exercise tolerance has decreased.  She denies chest pain, palpitations, lightheadedness, nausea, vomiting.    In the ED, troponin 0.435, BNP 4049, CXR shows pulmonary vasc congestion.  Pt started on BiPAP by ED. (26 Jul 2019 05:51)      24 hr events: No acute events. Continues to diurese well. Pressor requirements downtrending. No chest pain. Dyspnea improving. No fevers, chills, nausea, emesis, or diarrhea.    ## ROS:  See above. ROS otherwise negative.    ## Vitals  ICU Vital Signs Last 24 Hrs  T(C): 36.6 (01 Aug 2019 05:15), Max: 36.9 (31 Jul 2019 19:17)  T(F): 97.9 (01 Aug 2019 05:15), Max: 98.4 (31 Jul 2019 19:17)  HR: 88 (01 Aug 2019 07:00) (83 - 96)  BP: 109/74 (01 Aug 2019 07:00) (95/68 - 127/75)  BP(mean): 80 (01 Aug 2019 07:00) (65 - 98)  ABP: --  ABP(mean): --  RR: 14 (01 Aug 2019 07:00) (14 - 29)  SpO2: 91% (01 Aug 2019 07:00) (76% - 100%)      ## Physical Exam:  Gen: Adult female sitting comfortably in bed, NAD  HEENT: NC/AT sclerae icteric  Resp: No increased WOB, CTAB  CV: S1, S2 FELIBERTO  Abd: Soft, + BS  Ext: 4+ edema, improving  Neuro: Awake, alert, follows commands, moves all extremities  ## Vent Data      ## Labs:  Chem:  08-01    128<L>  |  88<L>  |  9   ----------------------------<  106<H>  3.4<L>   |  32<H>  |  0.76    Ca    9.1      01 Aug 2019 04:29  Phos  2.9     08-01  Mg     1.8     08-01        CBC:                        12.1   4.24  )-----------( 209      ( 01 Aug 2019 04:29 )             38.7     Coags:  PT/INR - ( 01 Aug 2019 04:29 )   PT: 25.9 sec;   INR: 2.26 ratio                 ## Cardiac        ## Blood Gas      #I/Os  I&O's Detail    31 Jul 2019 07:01  -  01 Aug 2019 07:00  --------------------------------------------------------  IN:    DOBUTamine Infusion: 193.2 mL    norepinephrine Infusion: 564 mL    Oral Fluid: 912 mL  Total IN: 1669.2 mL    OUT:    Indwelling Catheter - Urethral: 3500 mL  Total OUT: 3500 mL    Total NET: -1830.8 mL          ## Imaging:    ## Medications:  MEDICATIONS  (STANDING):  ALBUTerol    90 MICROgram(s) HFA Inhaler 1 Puff(s) Inhalation every 4 hours  ALBUTerol/ipratropium for Nebulization 3 milliLiter(s) Nebulizer every 6 hours  aspirin enteric coated 81 milliGRAM(s) Oral daily  buDESOnide  80 MICROgram(s)/formoterol 4.5 MICROgram(s) Inhaler 2 Puff(s) Inhalation two times a day  buMETAnide Injectable 1 milliGRAM(s) IV Push daily  chlorhexidine 4% Liquid 1 Application(s) Topical <User Schedule>  DOBUTamine Infusion 2.5 MICROgram(s)/kG/Min (9.24 mL/Hr) IV Continuous <Continuous>  levothyroxine 25 MICROGram(s) Oral daily  norepinephrine Infusion 0.05 MICROgram(s)/kG/Min (5.775 mL/Hr) IV Continuous <Continuous>  potassium chloride  10 mEq/100 mL IVPB 10 milliEquivalent(s) IV Intermittent every 1 hour  riociguat 1.5 milliGRAM(s) Oral every 8 hours  sertraline 50 milliGRAM(s) Oral daily  simvastatin 40 milliGRAM(s) Oral at bedtime  spironolactone 50 milliGRAM(s) Oral daily  tiotropium 18 MICROgram(s) Capsule 1 Capsule(s) Inhalation daily    MEDICATIONS  (PRN):  morphine  - Injectable 2 milliGRAM(s) IV Push every 6 hours PRN Severe Pain (7 - 10)  petrolatum white Ointment 1 Application(s) Topical three times a day PRN chapped lips  sodium chloride 0.9% lock flush 10 milliLiter(s) IV Push every 1 hour PRN Pre/post blood products, medications, blood draw, and to maintain line patency

## 2019-08-01 NOTE — PROGRESS NOTE ADULT - SUBJECTIVE AND OBJECTIVE BOX
Gastroenterology  Patient seen and examined bedside resting comfortably.  No complaints offered. IN CCU  No abdominal pain  Denies nausea and vomiting. Tolerating diet.  Normal flatus/BM.     T(F): 97.9 (08-01-19 @ 05:15), Max: 98.4 (07-31-19 @ 19:17)  HR: 88 (08-01-19 @ 07:00) (83 - 96)  BP: 109/74 (08-01-19 @ 07:00) (95/68 - 127/75)  RR: 14 (08-01-19 @ 07:00) (14 - 29)  SpO2: 91% (08-01-19 @ 07:00) (76% - 100%)  Wt(kg): --  CAPILLARY BLOOD GLUCOSE          PHYSICAL EXAM:  General: NAD, WDWN.   Neuro:  Alert & responsive  HEENT: NCAT, EOMI, conjunctiva clear  CV: +S1+S2 regular rate and rhythm  Lung: clear to ausculation bilaterally, respirations poor effort  Abdomen: soft, Non Tender, No distention Normal active BS  Extremities: no cyanosis, clubbing or edema    LABS:                        12.1   4.24  )-----------( 209      ( 01 Aug 2019 04:29 )             38.7     08-01    128<L>  |  88<L>  |  9   ----------------------------<  106<H>  3.4<L>   |  32<H>  |  0.76    Ca    9.1      01 Aug 2019 04:29  Phos  2.9     08-01  Mg     1.8     08-01        PT/INR - ( 01 Aug 2019 04:29 )   PT: 25.9 sec;   INR: 2.26 ratio           I&O's Detail    31 Jul 2019 07:01  -  01 Aug 2019 07:00  --------------------------------------------------------  IN:    DOBUTamine Infusion: 193.2 mL    norepinephrine Infusion: 564 mL    Oral Fluid: 912 mL  Total IN: 1669.2 mL    OUT:    Indwelling Catheter - Urethral: 3500 mL  Total OUT: 3500 mL    Total NET: -1830.8 mL        08-01 @ 04:29    128 | 88 | 9  /9.1 | 1.8 | 2.9  _______________________/  3.4 | 32 | 0.76                           \par

## 2019-08-01 NOTE — PROGRESS NOTE ADULT - ASSESSMENT
35 y/o female with Isolated Hyperbilirubinemia ? Etiology, CHF exacerbation  ? multifactorial including Richar

## 2019-08-01 NOTE — PROGRESS NOTE ADULT - ASSESSMENT
PATIENT GERMÁN KENNY  [phone]   1985 DOA 2019 DR NICOLETTE PETTY                             Patient description                          33 yo woman with severe pHTN (?group I, mPAP 67; W 9; RVR 9 on RHC 2018,  previously on Adempas, non-complinat), mildly positive anti-cardiolipin Ab and RAD admitted with decompensated right heart failure and volume overload and DIANN in the setting of medication non-compliance.   her dry weight is 119k                                           Hospital course                                  Pt was staretd on bumex but bp was low so she was transferred to icu  for diuresis under cover of dobutamine inotrope  2019 On norepi diobu bumex and metolazone On hfnc .6                    PATIENT GERMÁN KENNY  [phone]   1986 DOA 2019 DR NICOLETTE PETTY                             PROBLEM/ASSESSMENT/RECOMMENDATIONS (A/R)       IDIOPATHIC PULMONARY ARTERIAL HYPERTENSION  A/R On coumadin   Riociguat 1.5x3 added  (ADEMPAS)   ANTIPHOSPHOLIPID SYNDROME   A/R On coumadin   BIVENTRICULAR FAILURE WITH LOW BP IN SETTING OF IPAH  A/R 2019 On dobutamine and bumex Norepi added to support pvr   2019 Prognosis is not good    MI   - Tr 1 1.5-1.2   ACUTE HYPOXEMIC RESP FAILURE   A/R O2 to target po 89 or better to avoid hypoxic pulmonary vasoconstrictyion   COPD   A/R On symbicort 80 ()  spiriva ()   LUNG NODULE   2019 cxr new r central line Nonspnodular density r perihilar Poss sm l effsn   A/R Likely represents chf rather than nodules Consider ct ch   DIANN   -- Cr 2.3-1.5 - .9   HYPONATREMIA   2019 Na 132   2019 U Na 125 U osm 309  A/R Hyponatremia likely sec to diuresis             TIME SPENT Over 25 minutes aggregate care time spent on encounter; activities included   direct pt care, counseling and/or coordinating care reviewing notes, lab data/ imaging , discussion with multidisciplinary team/ pt /family. Risks, benefits, alternatives  discussed in detail

## 2019-08-01 NOTE — PROGRESS NOTE ADULT - SUBJECTIVE AND OBJECTIVE BOX
GERMÁN RETANA LIJ  45 [phone]   1985 DOA 2019 DR NICOLETTE PETTY   ALLERGY  nka       CONTACT   Cyndie Bernard  [facility] [phone]              Patient examined chart reviewed    HOSPITAL ADMISSION   PATIENT CAME  FROM (if information available)        TYPE OF VISIT      Subsequent Pulmonary Critical Care followup requested -2019     by Dr Mejia    from Dr Choudhury     REASON FOR VISIT  PLEASE SEE PROBLEM LIST/ASSESSMENTAND RECOMMENDATIONS     PATIENT GERMÁN KENNY  [phone]   1986 DOA 2019 DR NICOLETTE PETTY     VITALS/LABS      2019 afeb 89 100/60   2019 6p DOBU 2.5 m/k/m   2019 6p NORE 0.14 m/k/m   2019 W 4.2 Hb 12.1 Plt 209  Na 128 K 3.4 CO2 32 Cr .7     PATIENT GERMÁN KENNY  45 [phone]   1986 DOA 2019 DR NICOLETTE PETTY     MEDICATIONS      PULMONARY HYPERTENSION  coumadin ()   riociguat 1.5x3 (ADEMPAS)   R CHF   bumetanide 1.3 ()   DOBU ()   metolazone 5 ()   NORE () 16/250   spironolactone 50 ()   COPD   duoneb.4 ()   symbicort 80 ()   spiriva ()                      REVIEW OF SYMPTOMS     NOTE [facility] if any  in ROS and PE are updated in daily HOSPITAL COURSE below      Able to give ROS  Yes     RELIABLE No   CONSTITUTIONAL Weakness Yes  Chills No Vision changes No  ENDOCRINE No unexplained hair loss No heat or cold intolerance    ALLERGY No hives  Sore throat No   RESP Coughing blood no  Shortness of breath YES   NEURO No Headache  Confusion Pain neck No   CARDIAC No Chest pain No Palpitations   GI No Pain abdomen NO   Vomiting NO     PHYSICAL EXAM    HEENT Unremarkable PERRLA atraumatic   RESP Fair air entry EXP prolonged    Harsh breath sound Resp distres mild   CARDIAC S1 S2 No S3     NO JVD    ABDOMEN SOFT BS PRESENT NOT DISTENDED No hepatosplenomegaly PEDAL EDEMA present No calf tenderness  NO rash   GENERAL Not TOXIC

## 2019-08-02 LAB
ANION GAP SERPL CALC-SCNC: 10 MMOL/L — SIGNIFICANT CHANGE UP (ref 5–17)
BUN SERPL-MCNC: 8 MG/DL — SIGNIFICANT CHANGE UP (ref 7–23)
CALCIUM SERPL-MCNC: 9.1 MG/DL — SIGNIFICANT CHANGE UP (ref 8.5–10.1)
CHLORIDE SERPL-SCNC: 87 MMOL/L — LOW (ref 96–108)
CO2 SERPL-SCNC: 30 MMOL/L — SIGNIFICANT CHANGE UP (ref 22–31)
CREAT SERPL-MCNC: 0.79 MG/DL — SIGNIFICANT CHANGE UP (ref 0.5–1.3)
GLUCOSE SERPL-MCNC: 104 MG/DL — HIGH (ref 70–99)
HCT VFR BLD CALC: 38.2 % — SIGNIFICANT CHANGE UP (ref 34.5–45)
HGB BLD-MCNC: 12 G/DL — SIGNIFICANT CHANGE UP (ref 11.5–15.5)
INR BLD: 2.11 RATIO — HIGH (ref 0.88–1.16)
MAGNESIUM SERPL-MCNC: 2.2 MG/DL — SIGNIFICANT CHANGE UP (ref 1.6–2.6)
MCHC RBC-ENTMCNC: 22.8 PG — LOW (ref 27–34)
MCHC RBC-ENTMCNC: 31.4 GM/DL — LOW (ref 32–36)
MCV RBC AUTO: 72.5 FL — LOW (ref 80–100)
NRBC # BLD: 0 /100 WBCS — SIGNIFICANT CHANGE UP (ref 0–0)
PHOSPHATE SERPL-MCNC: 2.8 MG/DL — SIGNIFICANT CHANGE UP (ref 2.5–4.5)
PLATELET # BLD AUTO: 217 K/UL — SIGNIFICANT CHANGE UP (ref 150–400)
POTASSIUM SERPL-MCNC: 3.2 MMOL/L — LOW (ref 3.5–5.3)
POTASSIUM SERPL-SCNC: 3.2 MMOL/L — LOW (ref 3.5–5.3)
PROTHROM AB SERPL-ACNC: 24.2 SEC — HIGH (ref 10–12.9)
RBC # BLD: 5.27 M/UL — HIGH (ref 3.8–5.2)
RBC # FLD: 22.1 % — HIGH (ref 10.3–14.5)
SERTRALINE SERPL-MCNC: ABNORMAL
SODIUM SERPL-SCNC: 127 MMOL/L — LOW (ref 135–145)
WBC # BLD: 4.66 K/UL — SIGNIFICANT CHANGE UP (ref 3.8–10.5)
WBC # FLD AUTO: 4.66 K/UL — SIGNIFICANT CHANGE UP (ref 3.8–10.5)

## 2019-08-02 PROCEDURE — 99291 CRITICAL CARE FIRST HOUR: CPT

## 2019-08-02 PROCEDURE — 99232 SBSQ HOSP IP/OBS MODERATE 35: CPT

## 2019-08-02 RX ORDER — POTASSIUM CHLORIDE 20 MEQ
20 PACKET (EA) ORAL
Refills: 0 | Status: COMPLETED | OUTPATIENT
Start: 2019-08-02 | End: 2019-08-02

## 2019-08-02 RX ORDER — DOCUSATE SODIUM 100 MG
100 CAPSULE ORAL THREE TIMES A DAY
Refills: 0 | Status: DISCONTINUED | OUTPATIENT
Start: 2019-08-02 | End: 2019-08-08

## 2019-08-02 RX ORDER — WARFARIN SODIUM 2.5 MG/1
2.5 TABLET ORAL ONCE
Refills: 0 | Status: COMPLETED | OUTPATIENT
Start: 2019-08-02 | End: 2019-08-02

## 2019-08-02 RX ORDER — MIDODRINE HYDROCHLORIDE 2.5 MG/1
10 TABLET ORAL EVERY 8 HOURS
Refills: 0 | Status: DISCONTINUED | OUTPATIENT
Start: 2019-08-02 | End: 2019-08-08

## 2019-08-02 RX ORDER — POTASSIUM CHLORIDE 20 MEQ
40 PACKET (EA) ORAL EVERY 4 HOURS
Refills: 0 | Status: DISCONTINUED | OUTPATIENT
Start: 2019-08-02 | End: 2019-08-02

## 2019-08-02 RX ADMIN — RIOCIGUAT 1.5 MILLIGRAM(S): 1.5 TABLET, FILM COATED ORAL at 21:21

## 2019-08-02 RX ADMIN — Medication 50 MILLIEQUIVALENT(S): at 14:45

## 2019-08-02 RX ADMIN — BUDESONIDE AND FORMOTEROL FUMARATE DIHYDRATE 2 PUFF(S): 160; 4.5 AEROSOL RESPIRATORY (INHALATION) at 07:14

## 2019-08-02 RX ADMIN — CHLORHEXIDINE GLUCONATE 1 APPLICATION(S): 213 SOLUTION TOPICAL at 07:15

## 2019-08-02 RX ADMIN — Medication 3 MILLILITER(S): at 17:01

## 2019-08-02 RX ADMIN — BUMETANIDE 1 MILLIGRAM(S): 0.25 INJECTION INTRAMUSCULAR; INTRAVENOUS at 05:36

## 2019-08-02 RX ADMIN — SERTRALINE 50 MILLIGRAM(S): 25 TABLET, FILM COATED ORAL at 13:15

## 2019-08-02 RX ADMIN — Medication 25 MICROGRAM(S): at 05:35

## 2019-08-02 RX ADMIN — RIOCIGUAT 1.5 MILLIGRAM(S): 1.5 TABLET, FILM COATED ORAL at 13:15

## 2019-08-02 RX ADMIN — SPIRONOLACTONE 50 MILLIGRAM(S): 25 TABLET, FILM COATED ORAL at 05:35

## 2019-08-02 RX ADMIN — MORPHINE SULFATE 2 MILLIGRAM(S): 50 CAPSULE, EXTENDED RELEASE ORAL at 13:42

## 2019-08-02 RX ADMIN — BUDESONIDE AND FORMOTEROL FUMARATE DIHYDRATE 2 PUFF(S): 160; 4.5 AEROSOL RESPIRATORY (INHALATION) at 17:31

## 2019-08-02 RX ADMIN — SIMVASTATIN 40 MILLIGRAM(S): 20 TABLET, FILM COATED ORAL at 21:21

## 2019-08-02 RX ADMIN — RIOCIGUAT 1.5 MILLIGRAM(S): 1.5 TABLET, FILM COATED ORAL at 05:34

## 2019-08-02 RX ADMIN — Medication 81 MILLIGRAM(S): at 13:15

## 2019-08-02 RX ADMIN — WARFARIN SODIUM 2.5 MILLIGRAM(S): 2.5 TABLET ORAL at 21:21

## 2019-08-02 RX ADMIN — Medication 50 MILLIEQUIVALENT(S): at 12:25

## 2019-08-02 RX ADMIN — Medication 5.78 MICROGRAM(S)/KG/MIN: at 06:18

## 2019-08-02 RX ADMIN — Medication 3 MILLILITER(S): at 05:27

## 2019-08-02 RX ADMIN — Medication 3 MILLILITER(S): at 11:31

## 2019-08-02 RX ADMIN — MORPHINE SULFATE 2 MILLIGRAM(S): 50 CAPSULE, EXTENDED RELEASE ORAL at 13:57

## 2019-08-02 RX ADMIN — MIDODRINE HYDROCHLORIDE 10 MILLIGRAM(S): 2.5 TABLET ORAL at 19:28

## 2019-08-02 RX ADMIN — Medication 50 MILLIEQUIVALENT(S): at 10:05

## 2019-08-02 NOTE — PROGRESS NOTE ADULT - SUBJECTIVE AND OBJECTIVE BOX
HPI:  34 year old woman with CHF, asthma presents to ED with complaint of SOB and leg swelling.  She states that she can only walk 1-2 blocks at baseline and recently her exercise tolerance has decreased.  She denies chest pain, palpitations, lightheadedness, nausea, vomiting.    In the ED, troponin 0.435, BNP 4049, CXR shows pulmonary vasc congestion.  Pt started on BiPAP by ED. (26 Jul 2019 05:51)      24 hr events: No acute events. Continues to diurese well. No chest pain. Breathing improving. No fevers chills, nausea, emesis, or diarrhea.    ## ROS:  See above. ROS otherwise negative.    ## Vitals  ICU Vital Signs Last 24 Hrs  T(C): 36.7 (02 Aug 2019 04:01), Max: 36.7 (01 Aug 2019 15:26)  T(F): 98 (02 Aug 2019 04:01), Max: 98.1 (01 Aug 2019 19:50)  HR: 87 (02 Aug 2019 08:00) (78 - 99)  BP: 109/66 (02 Aug 2019 07:00) (88/70 - 128/74)  BP(mean): 75 (02 Aug 2019 07:00) (58 - 93)  ABP: --  ABP(mean): --  RR: 29 (02 Aug 2019 08:00) (15 - 30)  SpO2: 97% (02 Aug 2019 08:00) (81% - 100%)      ## Physical Exam:  Gen: Adult female sitting comfortably in bed, NAD  HEENT: NC/AT sclerae icteric  Resp: No increased WOB, CTAB  CV: S1, S2 FELIBERTO  Abd: Soft, + BS  Ext: 4+ edema, improving  Neuro: Awake, alert, follows commands, moves all extremities    ## Vent Data      ## Labs:  Chem:  08-02    127<L>  |  87<L>  |  8   ----------------------------<  104<H>  3.2<L>   |  30  |  0.79    Ca    9.1      02 Aug 2019 03:49  Phos  2.8     08-02  Mg     2.2     08-02        CBC:                        12.0   4.66  )-----------( 217      ( 02 Aug 2019 03:49 )             38.2     Coags:  PT/INR - ( 02 Aug 2019 03:49 )   PT: 24.2 sec;   INR: 2.11 ratio                 ## Cardiac        ## Blood Gas      #I/Os  I&O's Detail    01 Aug 2019 07:01  -  02 Aug 2019 07:00  --------------------------------------------------------  IN:    DOBUTamine Infusion: 220.8 mL    norepinephrine Infusion: 381.6 mL    Oral Fluid: 596 mL    Solution: 300 mL  Total IN: 1498.4 mL    OUT:    Indwelling Catheter - Urethral: 5335 mL  Total OUT: 5335 mL    Total NET: -3836.6 mL          ## Imaging:    ## Medications:  MEDICATIONS  (STANDING):  ALBUTerol    90 MICROgram(s) HFA Inhaler 1 Puff(s) Inhalation every 4 hours  ALBUTerol/ipratropium for Nebulization 3 milliLiter(s) Nebulizer every 6 hours  aspirin enteric coated 81 milliGRAM(s) Oral daily  buDESOnide  80 MICROgram(s)/formoterol 4.5 MICROgram(s) Inhaler 2 Puff(s) Inhalation two times a day  buMETAnide Injectable 1 milliGRAM(s) IV Push daily  chlorhexidine 4% Liquid 1 Application(s) Topical <User Schedule>  DOBUTamine Infusion 2.5 MICROgram(s)/kG/Min (9.24 mL/Hr) IV Continuous <Continuous>  levothyroxine 25 MICROGram(s) Oral daily  norepinephrine Infusion 0.05 MICROgram(s)/kG/Min (5.775 mL/Hr) IV Continuous <Continuous>  potassium chloride    Tablet ER 40 milliEquivalent(s) Oral every 4 hours  riociguat 1.5 milliGRAM(s) Oral every 8 hours  sertraline 50 milliGRAM(s) Oral daily  simvastatin 40 milliGRAM(s) Oral at bedtime  spironolactone 50 milliGRAM(s) Oral daily  tiotropium 18 MICROgram(s) Capsule 1 Capsule(s) Inhalation daily    MEDICATIONS  (PRN):  morphine  - Injectable 2 milliGRAM(s) IV Push every 6 hours PRN Severe Pain (7 - 10)  petrolatum white Ointment 1 Application(s) Topical three times a day PRN chapped lips  sodium chloride 0.9% lock flush 10 milliLiter(s) IV Push every 1 hour PRN Pre/post blood products, medications, blood draw, and to maintain line patency

## 2019-08-02 NOTE — PROGRESS NOTE ADULT - ASSESSMENT
PATIENT GERMÁN KENNY  [phone]   1986 DOA 2019 DR NICOLETTE PETTY                             Patient description                          35 yo woman with severe pHTN (?group I, mPAP 67; W 9; RVR 9 on RHC 2018,  previously on Adempas, non-complinat), mildly positive anti-cardiolipin Ab and RAD admitted with decompensated right heart failure and volume overload and DIANN in the setting of medication non-compliance.   her dry weight is 119k                                           Hospital course                                  Pt was staretd on bumex but bp was low so she was transferred to icu  for diuresis under cover of dobutamine inotrope  2019 On norepi diobu bumex and metolazone On hfnc .6      PATIENT GERMÁN KENNY  [phone]   1986 DOA 2019 DR NICOLETTE PETTY                             PROBLEM/ASSESSMENT/RECOMMENDATIONS (A/R)       IDIOPATHIC PULMONARY ARTERIAL HYPERTENSION  A/R On coumadin   Riociguat 1.5x3 added  (ADEMPAS)   ANTIPHOSPHOLIPID SYNDROME   A/R On coumadin   BIVENTRICULAR FAILURE WITH LOW BP IN SETTING OF IPAH  A/R   2019 Taken off dobutamin Nore tapered to 0.06 m/k/m   2019 On dobutamine and bumex Norepi added to support pvr   2019 Prognosis is not good    MI   - Tr 1 1.5-1.2   ACUTE HYPOXEMIC RESP FAILURE   A/R O2 to target po 89 or better to avoid hypoxic pulmonary vasoconstrictyion   COPD   A/R On symbicort 80 ()  spiriva ()   LUNG NODULE   2019 cxr new r central line Nonspnodular density r perihilar Poss sm l effsn   A/R Likely represents chf rather than nodules Consider ct ch   DIANN   -- Cr 2.3-1.5 - .9   HYPONATREMIA   2019 Na 132   2019 U Na 125 U osm 309  A/R Hyponatremia likely sec to diuresis        PATIENT GERMÁN KENNY  [phone]   1986 DOA 2019 DR NICOLETTE PETTY                             PROBLEM/ASSESSMENT/RECOMMENDATIONS (A/R)       IDIOPATHIC PULMONARY ARTERIAL HYPERTENSION  A/R On coumadin   Riociguat 1.5x3 added  (ADEMPAS)   ANTIPHOSPHOLIPID SYNDROME   A/R On coumadin   BIVENTRICULAR FAILURE WITH LOW BP IN SETTING OF IPAH  A/R   2019 Taken off dobutamin Nore tapered to 0.06 m/k/m   2019 On dobutamine and bumex Norepi added to support pvr   2019 Prognosis is not good    MI   - Tr 1 1.5-1.2   ACUTE HYPOXEMIC RESP FAILURE   A/R O2 to target po 89 or better to avoid hypoxic pulmonary vasoconstrictyion   COPD   A/R On symbicort 80 ()  spiriva ()   LUNG NODULE   2019 cxr new r central line Nonspnodular density r perihilar Poss sm l effsn   A/R Likely represents chf rather than nodules Consider ct ch   DIANN   -- Cr 2.3-1.5 - .9   HYPONATREMIA   2019 Na 132   2019 U Na 125 U osm 309  A/R Hyponatremia likely sec to diuresis     TIME SPENT Over 25 minutes aggregate care time spent on encounter; activities included   direct pt care, counseling and/or coordinating care reviewing notes, lab data/ imaging , discussion with multidisciplinary team/ pt /family. Risks, benefits, alternatives  discussed in detail

## 2019-08-02 NOTE — PROGRESS NOTE ADULT - SUBJECTIVE AND OBJECTIVE BOX
Preliminary note based on available patient data was entered below in order to expedite patient management  Patient will be examined within the next few hours (and this note will be edited if so dictated by the then latest available data) Please note that  by the end of this day the below note should be considered as my final patient progress note for today    GERMÁN KENNY  45 686   1986 DOA 2019 DR NICOLETTE PETTY   ALLERGY  nka       CONTACT   Cyndie Bernard  [facility] [phone]              Patient examined chart reviewed    HOSPITAL ADMISSION   PATIENT CAME  FROM (if information available)        TYPE OF VISIT      Subsequent Pulmonary Critical Care followup requested -2019     by Dr Mejia    from Dr Choudhury     REASON FOR VISIT  PLEASE SEE PROBLEM LIST/ASSESSMENTAND RECOMMENDATIONS     PATIENT GERMÁN KENNY  45 686   1986 DOA 2019 DR NICOLETTE PETTY     VITALS/LABS      2019 afeb 92 99/65 100%   2019 NORE 0.06 m/k/m  2019 W 4.6 Hb 12 Plt 217  Na 127 K 3.2 CO2 30 Cr .7     PATIENT GERMÁN KENNY  45 686   1986 DOA 2019 DR NICOLETTE PETTY     MEDICATIONS      PULMONARY HYPERTENSION  coumadin ()   riociguat 1.5x3 (ADEMPAS)   R CHF   bumetanide 1.3 ()  changed bumetanide 1 ()   spironolact 50 ()   DOBU (-)   metolazone 5 (-)   NORE () 16/250   spironolactone 50 ()   COPD   duoneb.4 ()   symbicort 80 ()   spiriva ()     GLOBAL ISSUE/BEST PRACTICE:      PROBLEM: HOB elevation:   y            PROBLEM: Stress ulcer proph:    na                      PROBLEM: VTE prophylaxis:     coumadin   PROBLEM: Glycemic control:    na  PROBLEM: Nutrition:   stein ()   PROBLEM: Advanced directive: na     PROBLEM: Allergies:  na    REVIEW OF SYMPTOMS     NOTE Changes if any  in ROS and PE are updated in daily HOSPITAL COURSE below      Able to give ROS  Yes     RELIABLE No   CONSTITUTIONAL Weakness Yes  Chills No Vision changes No  ENDOCRINE No unexplained hair loss No heat or cold intolerance    ALLERGY No hives  Sore throat No   RESP Coughing blood no  Shortness of breath YES   NEURO No Headache  Confusion Pain neck No   CARDIAC No Chest pain No Palpitations   GI No Pain abdomen NO   Vomiting NO     PHYSICAL EXAM    HEENT Unremarkable PERRLA atraumatic   RESP Fair air entry EXP prolonged    Harsh breath sound Resp distres mild   CARDIAC S1 S2 No S3     NO JVD    ABDOMEN SOFT BS PRESENT NOT DISTENDED No hepatosplenomegaly PEDAL EDEMA present No calf tenderness  NO rash   GENERAL Not TOXIC

## 2019-08-03 LAB
ANION GAP SERPL CALC-SCNC: 8 MMOL/L — SIGNIFICANT CHANGE UP (ref 5–17)
BUN SERPL-MCNC: 8 MG/DL — SIGNIFICANT CHANGE UP (ref 7–23)
CALCIUM SERPL-MCNC: 9.1 MG/DL — SIGNIFICANT CHANGE UP (ref 8.5–10.1)
CHLORIDE SERPL-SCNC: 88 MMOL/L — LOW (ref 96–108)
CO2 SERPL-SCNC: 31 MMOL/L — SIGNIFICANT CHANGE UP (ref 22–31)
CREAT SERPL-MCNC: 0.75 MG/DL — SIGNIFICANT CHANGE UP (ref 0.5–1.3)
GLUCOSE SERPL-MCNC: 120 MG/DL — HIGH (ref 70–99)
HCT VFR BLD CALC: 39.8 % — SIGNIFICANT CHANGE UP (ref 34.5–45)
HGB BLD-MCNC: 12.5 G/DL — SIGNIFICANT CHANGE UP (ref 11.5–15.5)
INR BLD: 2.03 RATIO — HIGH (ref 0.88–1.16)
MAGNESIUM SERPL-MCNC: 2.2 MG/DL — SIGNIFICANT CHANGE UP (ref 1.6–2.6)
MCHC RBC-ENTMCNC: 22.9 PG — LOW (ref 27–34)
MCHC RBC-ENTMCNC: 31.4 GM/DL — LOW (ref 32–36)
MCV RBC AUTO: 72.9 FL — LOW (ref 80–100)
NRBC # BLD: 0 /100 WBCS — SIGNIFICANT CHANGE UP (ref 0–0)
PHOSPHATE SERPL-MCNC: 2.7 MG/DL — SIGNIFICANT CHANGE UP (ref 2.5–4.5)
PLATELET # BLD AUTO: 258 K/UL — SIGNIFICANT CHANGE UP (ref 150–400)
POTASSIUM SERPL-MCNC: 3.2 MMOL/L — LOW (ref 3.5–5.3)
POTASSIUM SERPL-SCNC: 3.2 MMOL/L — LOW (ref 3.5–5.3)
PROTHROM AB SERPL-ACNC: 23.2 SEC — HIGH (ref 10–12.9)
RBC # BLD: 5.46 M/UL — HIGH (ref 3.8–5.2)
RBC # FLD: 22.6 % — HIGH (ref 10.3–14.5)
SODIUM SERPL-SCNC: 127 MMOL/L — LOW (ref 135–145)
WBC # BLD: 4.32 K/UL — SIGNIFICANT CHANGE UP (ref 3.8–10.5)
WBC # FLD AUTO: 4.32 K/UL — SIGNIFICANT CHANGE UP (ref 3.8–10.5)

## 2019-08-03 PROCEDURE — 99232 SBSQ HOSP IP/OBS MODERATE 35: CPT

## 2019-08-03 PROCEDURE — 99291 CRITICAL CARE FIRST HOUR: CPT

## 2019-08-03 RX ORDER — WARFARIN SODIUM 2.5 MG/1
2.5 TABLET ORAL ONCE
Refills: 0 | Status: COMPLETED | OUTPATIENT
Start: 2019-08-03 | End: 2019-08-03

## 2019-08-03 RX ORDER — POTASSIUM CHLORIDE 20 MEQ
40 PACKET (EA) ORAL EVERY 4 HOURS
Refills: 0 | Status: COMPLETED | OUTPATIENT
Start: 2019-08-03 | End: 2019-08-03

## 2019-08-03 RX ORDER — BUMETANIDE 0.25 MG/ML
1 INJECTION INTRAMUSCULAR; INTRAVENOUS
Refills: 0 | Status: DISCONTINUED | OUTPATIENT
Start: 2019-08-03 | End: 2019-08-06

## 2019-08-03 RX ADMIN — BUDESONIDE AND FORMOTEROL FUMARATE DIHYDRATE 2 PUFF(S): 160; 4.5 AEROSOL RESPIRATORY (INHALATION) at 05:51

## 2019-08-03 RX ADMIN — SERTRALINE 50 MILLIGRAM(S): 25 TABLET, FILM COATED ORAL at 15:06

## 2019-08-03 RX ADMIN — CHLORHEXIDINE GLUCONATE 1 APPLICATION(S): 213 SOLUTION TOPICAL at 12:02

## 2019-08-03 RX ADMIN — RIOCIGUAT 1.5 MILLIGRAM(S): 1.5 TABLET, FILM COATED ORAL at 05:51

## 2019-08-03 RX ADMIN — Medication 3 MILLILITER(S): at 11:48

## 2019-08-03 RX ADMIN — SIMVASTATIN 40 MILLIGRAM(S): 20 TABLET, FILM COATED ORAL at 21:48

## 2019-08-03 RX ADMIN — MIDODRINE HYDROCHLORIDE 10 MILLIGRAM(S): 2.5 TABLET ORAL at 21:48

## 2019-08-03 RX ADMIN — BUMETANIDE 1 MILLIGRAM(S): 0.25 INJECTION INTRAMUSCULAR; INTRAVENOUS at 18:56

## 2019-08-03 RX ADMIN — RIOCIGUAT 1.5 MILLIGRAM(S): 1.5 TABLET, FILM COATED ORAL at 21:48

## 2019-08-03 RX ADMIN — BUMETANIDE 1 MILLIGRAM(S): 0.25 INJECTION INTRAMUSCULAR; INTRAVENOUS at 05:51

## 2019-08-03 RX ADMIN — RIOCIGUAT 1.5 MILLIGRAM(S): 1.5 TABLET, FILM COATED ORAL at 15:04

## 2019-08-03 RX ADMIN — Medication 81 MILLIGRAM(S): at 15:03

## 2019-08-03 RX ADMIN — SPIRONOLACTONE 50 MILLIGRAM(S): 25 TABLET, FILM COATED ORAL at 05:52

## 2019-08-03 RX ADMIN — Medication 3 MILLILITER(S): at 00:51

## 2019-08-03 RX ADMIN — MIDODRINE HYDROCHLORIDE 10 MILLIGRAM(S): 2.5 TABLET ORAL at 15:03

## 2019-08-03 RX ADMIN — WARFARIN SODIUM 2.5 MILLIGRAM(S): 2.5 TABLET ORAL at 21:48

## 2019-08-03 RX ADMIN — Medication 3 MILLILITER(S): at 05:54

## 2019-08-03 RX ADMIN — MIDODRINE HYDROCHLORIDE 10 MILLIGRAM(S): 2.5 TABLET ORAL at 05:51

## 2019-08-03 RX ADMIN — Medication 25 MICROGRAM(S): at 05:52

## 2019-08-03 RX ADMIN — Medication 3 MILLILITER(S): at 17:16

## 2019-08-03 NOTE — PROGRESS NOTE ADULT - ASSESSMENT
33 y/o female with Isolated Hyperbilirubinemia ? Etiology, CHF exacerbation  ? multifactorial including Richar

## 2019-08-03 NOTE — PROGRESS NOTE ADULT - ASSESSMENT
PATIENT GERMÁN KENNY  [phone]   1986 DOA 2019 DR NICOLETTE PETTY                             Patient description                          33 yo woman with severe pHTN (?group I, mPAP 67; W 9; RVR 9 on RHC 2018,  previously on Adempas, non-complinat), mildly positive anti-cardiolipin Ab and RAD admitted with decompensated right heart failure and volume overload and DIANN in the setting of medication non-compliance.   her dry weight is 119k                                           Hospital course                                  Pt was staretd on bumex but bp was low so she was transferred to icu  for diuresis under cover of dobutamine inotrope  2019 On norepi diobu bumex and metolazone On hfnc .6   2019 taken off dobuta On lo Nore On bumex 1 and spironolact 50      PATIENT GERMÁN RETANA LIJ  45 [phone]   1986 DOA 2019 DR NICOLETTE PETTY                             PROBLEM/ASSESSMENT/RECOMMENDATIONS (A/R)       IDIOPATHIC PULMONARY ARTERIAL HYPERTENSION  A/R On coumadin   Riociguat 1.5x3 added  (ADEMPAS)   ANTIPHOSPHOLIPID SYNDROME   A/R On coumadin   BIVENTRICULAR FAILURE WITH LOW BP IN SETTING OF IPAH  A/R   2019 Taken off dobutamin Nore tapered to 0.06 m/k/m   2019 On dobutamine and bumex Norepi added to support pvr   2019 Prognosis is not good    MI   - Tr 1 1.5-1.2   ACUTE HYPOXEMIC RESP FAILURE   A/R O2 to target po 89 or better to avoid hypoxic pulmonary vasoconstrictyion   COPD   A/R On symbicort 80 ()  spiriva ()   LUNG NODULE   2019 cxr new r central line Nonspnodular density r perihilar Poss sm l effsn   A/R Likely represents chf rather than nodules Consider ct ch   DIANN   -- Cr 2.3-1.5 - .9   HYPONATREMIA   2019 Na 132   2019 U Na 125 U osm 309  A/R Hyponatremia likely sec to diuresis       TIME SPENT Over 25 minutes aggregate care time spent on encounter; activities included   direct pt care, counseling and/or coordinating care reviewing notes, lab data/ imaging , discussion with multidisciplinary team/ pt /family. Risks, benefits, alternatives  discussed in detail.

## 2019-08-03 NOTE — PROGRESS NOTE ADULT - SUBJECTIVE AND OBJECTIVE BOX
HPI:  Pt is a 35 yo BF with h/o severe pHTN (previously on Adempas, non-compliance), RAD (reactive airway dz), mildly positive anti-cardiolipin Ab and hypothyroidism admitted 2 to decompensated right heart failure with volume overload and DIANN 2 to medication non-compliance    ## Labs:  CBC:                        12.5   4.32  )-----------( 258      ( 03 Aug 2019 03:12 )             39.8     Chem:      127<L>  |  88<L>  |  8   ----------------------------<  120<H>  3.2<L>   |  31  |  0.75    Ca    9.1      03 Aug 2019 03:12  Phos  2.7       Mg     2.2           Coags:  PT/INR - ( 03 Aug 2019 03:12 )   PT: 23.2 sec;   INR: 2.03 ratio                 ## Imaging:    ## Medications:    buMETAnide Injectable 1 milliGRAM(s) IV Push daily  midodrine 10 milliGRAM(s) Oral every 8 hours  norepinephrine Infusion 0.05 MICROgram(s)/kG/Min IV Continuous <Continuous>  riociguat 1.5 milliGRAM(s) Oral every 8 hours  spironolactone 50 milliGRAM(s) Oral daily    ALBUTerol    90 MICROgram(s) HFA Inhaler 1 Puff(s) Inhalation every 4 hours  ALBUTerol/ipratropium for Nebulization 3 milliLiter(s) Nebulizer every 6 hours  buDESOnide  80 MICROgram(s)/formoterol 4.5 MICROgram(s) Inhaler 2 Puff(s) Inhalation two times a day  tiotropium 18 MICROgram(s) Capsule 1 Capsule(s) Inhalation daily    levothyroxine 25 MICROGram(s) Oral daily  simvastatin 40 milliGRAM(s) Oral at bedtime    aspirin enteric coated 81 milliGRAM(s) Oral daily    docusate sodium 100 milliGRAM(s) Oral three times a day    morphine  - Injectable 2 milliGRAM(s) IV Push every 6 hours PRN  sertraline 50 milliGRAM(s) Oral daily      ## Vitals:  T(C): 36.2 (19 @ 11:33), Max: 36.8 (19 @ 19:10)  HR: 84 (19 @ 13:00) (76 - 102)  BP: 105/64 (19 @ 13:00) (83/70 - 136/65)  BP(mean): 74 (19 @ 13:00) (59 - 105)  RR: 21 (19 @ 13:00) (16 - 33)  SpO2: 73% (19 @ 13:00) (73% - 100%)  Wt(kg): --  Vent:   AB-02 @ 07:01  -   @ 07:00  --------------------------------------------------------  IN: 1586.4 mL / OUT: 2470 mL / NET: -883.6 mL     @ 07:01  -   @ 14:50  --------------------------------------------------------  IN: 252 mL / OUT: 850 mL / NET: -598 mL          ## P/E:  Gen: Pt sittingcomfortably in chair in no apparent distress  Lungs: CTA  Heart: RRR  Abd: Soft/++BS  Ext: LE edema  Neuro: Awake, alert    CENTRAL LINE: [ ] YES [ ] NO  LOCATION:   DATE INSERTED:  REMOVE: [ ] YES [ ] NO      PIERCE: [ ] YES [ ] NO    DATE INSERTED:  REMOVE:  [ ] YES [ ] NO      A-LINE:  [ ] YES [ ] NO  LOCATION:   DATE INSERTED:  REMOVE:  [ ] YES [ ] NO  EXPLAIN:    CODE STATUS: [x ] full code  [ ] DNR  [ ] DNI  [ ] RUST  Goals of care discussion: [ ] yes

## 2019-08-03 NOTE — PROGRESS NOTE ADULT - PROBLEM SELECTOR PLAN 1
US with Cholelithiasis, otherwise unremarkable  Bilirubin improving  ? secondary to Congestive hepatopathy from RHF
US with Cholelithiasis, otherwise unremarkable  Bilirubin improving  ? secondary to Congestive hepatopathy from RHF  Supportive care.   reconsult as needed
Acute on chronic Systolic CHF and Pulmonary htn  Discussed with ICU team to transfer for aggressive diuresis and BP support     Bumex IVP  Strict input/output monitoring,  Daily weight monitoring.  Fluid restriction to 1 liter/24 hrs.  Continue BiPAP,
US with Cholelithiasis, otherwise unremarkable  Bilirubin improving  ? secondary to Congestive hepatopathy from RHF
US with Cholelithiasis, otherwise unremarkable  Bilirubin improving  ? secondary to Congestive hepatopathy from RHF.   Supportive care
Pulm HTN would be best managed at [facility] or potentially [facility] where  right heart catherization and or other aggressive intervention, but patient does not want to be transferred, despite lengthy conversations regarding her specific illness. Admit to ICU. Fine balance between increasing afterload on systemic side and improving preload. Obtained Adempas 1.5 mg Q8h. Patient refusing interventions that involve optimizing care including lab draws although convinced. Bumex, Dobutamine and levophed in progress. Patient responded in past to Primacor but requires central venous access

## 2019-08-03 NOTE — PROGRESS NOTE ADULT - SUBJECTIVE AND OBJECTIVE BOX
GERMÁN KENNY  45 686   1986 DOA 2019 DR NICOLETTE PETTY   ALLERGY  nka       CONTACT   Cyndie Bernard  [facility] [phone]              Patient examined chart reviewed    HOSPITAL ADMISSION   PATIENT CAME  FROM (if information available)        TYPE OF VISIT      Subsequent Pulmonary followup     REASON FOR VISIT  PLEASE SEE PROBLEM LIST/ASSESSMENT AND RECOMMENDATIONS     PATIENT GERMÁN KENNY  45 686   1986 DOA 2019 DR NICOLETTE PETTY     VITALS/LABS      8/3/2019 95 99/64 92%   8/3/2019 8p NORE .03 m/k/m   8/3/2019 W 4.3 Hb 12.5 Plt 258 INR 2 Na 127 K 3.2 CO2 31 Cr .7     PATIENT GERMÁN KENNY  45 686   1986 DOA 2019 DR NICOLETTE PETTY     MEDICATIONS      PULMONARY HYPERTENSION  coumadin ()   riociguat 1.5x3 (ADEMPAS)   R CHF   bumetanide 1.3 ()  changed bumetanide 1 ()   spironolact 50 ()   DOBU (-)   metolazone 5 (-)   NORE () 16/250   spironolactone 50 ()   COPD   duoneb.4 ()   symbicort 80 ()   spiriva ()       REVIEW OF SYMPTOMS     NOTE Changes if any  in ROS and PE are updated in daily HOSPITAL COURSE below      Able to give ROS  Yes     RELIABLE No   CONSTITUTIONAL Weakness Yes  Chills No Vision changes No  ENDOCRINE No unexplained hair loss No heat or cold intolerance    ALLERGY No hives  Sore throat No   RESP Coughing blood no  Shortness of breath YES   NEURO No Headache  Confusion Pain neck No   CARDIAC No Chest pain No Palpitations   GI No Pain abdomen NO   Vomiting NO     PHYSICAL EXAM    HEENT Unremarkable PERRLA atraumatic   RESP Fair air entry EXP prolonged    Harsh breath sound Resp distres mild   CARDIAC S1 S2 No S3     NO JVD    ABDOMEN SOFT BS PRESENT NOT DISTENDED No hepatosplenomegaly PEDAL EDEMA present No calf tenderness  NO rash   GENERAL Not TOXIC

## 2019-08-03 NOTE — PROGRESS NOTE ADULT - ASSESSMENT
Pt is a 33 yo BF with h/o severe pHTN (previously on Adempas, non-compliance), RAD (reactive airway dz), mildly positive anti-cardiolipin Ab and hypothyroidism admitted 2 to decompensated right heart failure with volume overload and DIANN 2 to medication non-compliance    Resp: Cont nc O2/ Cont pt's Nebs and Adempas  CVS/Renal: Increase Bumex to 1mg BID and follow BUN/Cr and UO/ May dc Gonzalez/ Pt still in shock 2 to RHF requiring Levophed and Midodrine to maintain MAP >65  HEME: Adjust Coumadin to INR  FEN: Po diet  Endo: Cont Synthroid   Psych: Cont pt's psych med (Zoloft)  OOB->chair->ambulate    CCT: 35 min

## 2019-08-03 NOTE — PROGRESS NOTE ADULT - PROBLEM SELECTOR PROBLEM 1
Hyperbilirubinemia
Hyperbilirubinemia
Acute on chronic congestive heart failure, unspecified heart failure type
Hyperbilirubinemia
Hyperbilirubinemia
Pulmonary hypertension

## 2019-08-03 NOTE — PROGRESS NOTE ADULT - SUBJECTIVE AND OBJECTIVE BOX
Gastroenterology  Patient seen and examined bedside resting comfortably.  No complaints offered. IN CCU  No abdominal pain  Denies nausea and vomiting. Tolerating diet.  Normal flatus/BM.     T(F): 97.1 (08-03-19 @ 11:33), Max: 98.3 (08-02-19 @ 19:10)  HR: 84 (08-03-19 @ 13:00) (76 - 102)  BP: 105/64 (08-03-19 @ 13:00) (83/70 - 136/65)  RR: 21 (08-03-19 @ 13:00) (16 - 33)  SpO2: 73% (08-03-19 @ 13:00) (73% - 100%)  Wt(kg): --  CAPILLARY BLOOD GLUCOSE          PHYSICAL EXAM:  General: NAD, WDWN.   Neuro:  Alert & responsive  HEENT: NCAT, EOMI, conjunctiva clear  CV: +S1+S2 regular rate and rhythm  Lung: clear to ausculation bilaterally, respirations nonlabored, good inspiratory effort  Abdomen: soft, Non tender, No Distension. Normal active BS  Extremities: + pedal edema    LABS:                        12.5   4.32  )-----------( 258      ( 03 Aug 2019 03:12 )             39.8     08-03    127<L>  |  88<L>  |  8   ----------------------------<  120<H>  3.2<L>   |  31  |  0.75    Ca    9.1      03 Aug 2019 03:12  Phos  2.7     08-03  Mg     2.2     08-03        PT/INR - ( 03 Aug 2019 03:12 )   PT: 23.2 sec;   INR: 2.03 ratio           I&O's Detail    02 Aug 2019 07:01  -  03 Aug 2019 07:00  --------------------------------------------------------  IN:    DOBUTamine Infusion: 18.4 mL    norepinephrine Infusion: 168 mL    Oral Fluid: 1200 mL    Solution: 200 mL  Total IN: 1586.4 mL    OUT:    Indwelling Catheter - Urethral: 2470 mL  Total OUT: 2470 mL    Total NET: -883.6 mL      03 Aug 2019 07:01  -  03 Aug 2019 14:39  --------------------------------------------------------  IN:    norepinephrine Infusion: 12 mL    Oral Fluid: 240 mL  Total IN: 252 mL    OUT:    Indwelling Catheter - Urethral: 850 mL  Total OUT: 850 mL    Total NET: -598 mL

## 2019-08-04 LAB
ALBUMIN SERPL ELPH-MCNC: 3.1 G/DL — LOW (ref 3.3–5)
ALP SERPL-CCNC: 75 U/L — SIGNIFICANT CHANGE UP (ref 40–120)
ALT FLD-CCNC: 33 U/L — SIGNIFICANT CHANGE UP (ref 12–78)
ANION GAP SERPL CALC-SCNC: 10 MMOL/L — SIGNIFICANT CHANGE UP (ref 5–17)
AST SERPL-CCNC: 56 U/L — HIGH (ref 15–37)
BILIRUB SERPL-MCNC: 3 MG/DL — HIGH (ref 0.2–1.2)
BUN SERPL-MCNC: 8 MG/DL — SIGNIFICANT CHANGE UP (ref 7–23)
CALCIUM SERPL-MCNC: 8.7 MG/DL — SIGNIFICANT CHANGE UP (ref 8.5–10.1)
CHLORIDE SERPL-SCNC: 92 MMOL/L — LOW (ref 96–108)
CO2 SERPL-SCNC: 30 MMOL/L — SIGNIFICANT CHANGE UP (ref 22–31)
CREAT SERPL-MCNC: 0.79 MG/DL — SIGNIFICANT CHANGE UP (ref 0.5–1.3)
GLUCOSE SERPL-MCNC: 101 MG/DL — HIGH (ref 70–99)
HCT VFR BLD CALC: 40 % — SIGNIFICANT CHANGE UP (ref 34.5–45)
HGB BLD-MCNC: 12.3 G/DL — SIGNIFICANT CHANGE UP (ref 11.5–15.5)
INR BLD: 2.17 RATIO — HIGH (ref 0.88–1.16)
MAGNESIUM SERPL-MCNC: 2 MG/DL — SIGNIFICANT CHANGE UP (ref 1.6–2.6)
MCHC RBC-ENTMCNC: 22.3 PG — LOW (ref 27–34)
MCHC RBC-ENTMCNC: 30.8 GM/DL — LOW (ref 32–36)
MCV RBC AUTO: 72.6 FL — LOW (ref 80–100)
NRBC # BLD: 0 /100 WBCS — SIGNIFICANT CHANGE UP (ref 0–0)
PHOSPHATE SERPL-MCNC: 2.8 MG/DL — SIGNIFICANT CHANGE UP (ref 2.5–4.5)
PLATELET # BLD AUTO: 278 K/UL — SIGNIFICANT CHANGE UP (ref 150–400)
POTASSIUM SERPL-MCNC: 3.1 MMOL/L — LOW (ref 3.5–5.3)
POTASSIUM SERPL-SCNC: 3.1 MMOL/L — LOW (ref 3.5–5.3)
PROT SERPL-MCNC: 8.3 GM/DL — SIGNIFICANT CHANGE UP (ref 6–8.3)
PROTHROM AB SERPL-ACNC: 24.9 SEC — HIGH (ref 10–12.9)
RBC # BLD: 5.51 M/UL — HIGH (ref 3.8–5.2)
RBC # FLD: 23.3 % — HIGH (ref 10.3–14.5)
SODIUM SERPL-SCNC: 132 MMOL/L — LOW (ref 135–145)
WBC # BLD: 4.98 K/UL — SIGNIFICANT CHANGE UP (ref 3.8–10.5)
WBC # FLD AUTO: 4.98 K/UL — SIGNIFICANT CHANGE UP (ref 3.8–10.5)

## 2019-08-04 PROCEDURE — 99233 SBSQ HOSP IP/OBS HIGH 50: CPT

## 2019-08-04 PROCEDURE — 99232 SBSQ HOSP IP/OBS MODERATE 35: CPT

## 2019-08-04 RX ORDER — POTASSIUM CHLORIDE 20 MEQ
40 PACKET (EA) ORAL ONCE
Refills: 0 | Status: COMPLETED | OUTPATIENT
Start: 2019-08-04 | End: 2019-08-04

## 2019-08-04 RX ORDER — OXYCODONE AND ACETAMINOPHEN 5; 325 MG/1; MG/1
1 TABLET ORAL ONCE
Refills: 0 | Status: DISCONTINUED | OUTPATIENT
Start: 2019-08-04 | End: 2019-08-04

## 2019-08-04 RX ORDER — POTASSIUM CHLORIDE 20 MEQ
20 PACKET (EA) ORAL
Refills: 0 | Status: COMPLETED | OUTPATIENT
Start: 2019-08-04 | End: 2019-08-04

## 2019-08-04 RX ORDER — WARFARIN SODIUM 2.5 MG/1
2.5 TABLET ORAL ONCE
Refills: 0 | Status: COMPLETED | OUTPATIENT
Start: 2019-08-04 | End: 2019-08-04

## 2019-08-04 RX ORDER — ACETAMINOPHEN 500 MG
650 TABLET ORAL EVERY 6 HOURS
Refills: 0 | Status: DISCONTINUED | OUTPATIENT
Start: 2019-08-04 | End: 2019-08-08

## 2019-08-04 RX ADMIN — Medication 81 MILLIGRAM(S): at 12:43

## 2019-08-04 RX ADMIN — MIDODRINE HYDROCHLORIDE 10 MILLIGRAM(S): 2.5 TABLET ORAL at 14:59

## 2019-08-04 RX ADMIN — BUMETANIDE 1 MILLIGRAM(S): 0.25 INJECTION INTRAMUSCULAR; INTRAVENOUS at 18:15

## 2019-08-04 RX ADMIN — BUDESONIDE AND FORMOTEROL FUMARATE DIHYDRATE 2 PUFF(S): 160; 4.5 AEROSOL RESPIRATORY (INHALATION) at 18:15

## 2019-08-04 RX ADMIN — Medication 3 MILLILITER(S): at 17:22

## 2019-08-04 RX ADMIN — Medication 3 MILLILITER(S): at 11:11

## 2019-08-04 RX ADMIN — Medication 25 MICROGRAM(S): at 05:55

## 2019-08-04 RX ADMIN — Medication 3 MILLILITER(S): at 00:18

## 2019-08-04 RX ADMIN — OXYCODONE AND ACETAMINOPHEN 1 TABLET(S): 5; 325 TABLET ORAL at 17:00

## 2019-08-04 RX ADMIN — SPIRONOLACTONE 50 MILLIGRAM(S): 25 TABLET, FILM COATED ORAL at 05:55

## 2019-08-04 RX ADMIN — BUMETANIDE 1 MILLIGRAM(S): 0.25 INJECTION INTRAMUSCULAR; INTRAVENOUS at 05:54

## 2019-08-04 RX ADMIN — MIDODRINE HYDROCHLORIDE 10 MILLIGRAM(S): 2.5 TABLET ORAL at 05:55

## 2019-08-04 RX ADMIN — Medication 3 MILLILITER(S): at 05:14

## 2019-08-04 RX ADMIN — Medication 50 MILLIEQUIVALENT(S): at 10:10

## 2019-08-04 RX ADMIN — BUDESONIDE AND FORMOTEROL FUMARATE DIHYDRATE 2 PUFF(S): 160; 4.5 AEROSOL RESPIRATORY (INHALATION) at 05:55

## 2019-08-04 RX ADMIN — RIOCIGUAT 1.5 MILLIGRAM(S): 1.5 TABLET, FILM COATED ORAL at 05:55

## 2019-08-04 RX ADMIN — SERTRALINE 50 MILLIGRAM(S): 25 TABLET, FILM COATED ORAL at 12:43

## 2019-08-04 RX ADMIN — WARFARIN SODIUM 2.5 MILLIGRAM(S): 2.5 TABLET ORAL at 21:11

## 2019-08-04 RX ADMIN — Medication 3 MILLILITER(S): at 23:31

## 2019-08-04 RX ADMIN — MIDODRINE HYDROCHLORIDE 10 MILLIGRAM(S): 2.5 TABLET ORAL at 21:11

## 2019-08-04 RX ADMIN — RIOCIGUAT 1.5 MILLIGRAM(S): 1.5 TABLET, FILM COATED ORAL at 21:12

## 2019-08-04 RX ADMIN — RIOCIGUAT 1.5 MILLIGRAM(S): 1.5 TABLET, FILM COATED ORAL at 12:43

## 2019-08-04 RX ADMIN — Medication 5.78 MICROGRAM(S)/KG/MIN: at 03:45

## 2019-08-04 RX ADMIN — Medication 50 MILLIEQUIVALENT(S): at 09:10

## 2019-08-04 RX ADMIN — OXYCODONE AND ACETAMINOPHEN 1 TABLET(S): 5; 325 TABLET ORAL at 16:08

## 2019-08-04 NOTE — PROGRESS NOTE ADULT - ASSESSMENT
Pt is a 33 yo BF with h/o severe pHTN (previously on Adempas, non-compliance), RAD (reactive airway dz), mildly positive anti-cardiolipin Ab and hypothyroidism admitted 2 to decompensated right heart failure with volume overload and DIANN 2 to medication non-compliance. Currently off Levophed    Resp: Cont nc O2/ Cont pt's Nebs and Adempas  CVS/Renal: Cont Bumex 1 mg BID and follow BUN/Cr and UO/  Levophed has been weaned off and cont Midodrine to maintain MAP >65  HEME: Adjust Coumadin to INR  FEN: Po diet/ Pt hypokalemic; replace K  Endo: Cont Synthroid   Psych: Cont pt's psych med (Zoloft)  OOB->chair->ambulate  Social: If pt remains off Levophed tomorrow will transfer to [facility]

## 2019-08-04 NOTE — PROGRESS NOTE ADULT - ASSESSMENT
PATIENT GERMÁN KENNY  45 [phone]   1986 DOA 2019 DR NICOLETTE PETTY                             Patient description                          33 yo woman with severe pHTN (?group I, mPAP 67; W 9; RVR 9 on RHC 2018,  previously on Adempas, non-complinat), mildly positive anti-cardiolipin Ab and RAD admitted with decompensated right heart failure and volume overload and DIANN in the setting of medication non-compliance.   her dry weight is 119k                                           Hospital course                                  Pt was staretd on bumex but bp was low so she was transferred to icu  for diuresis under cover of dobutamine inotrope  2019 On norepi diobu bumex and metolazone On hfnc .6   2019 taken off dobuta On lo Nore On bumex 1 and spironolact 50      PATIENT GERMÁN RETANA LIJ  45 [phone]   1986 DOA 2019 DR NICOLETTE PETTY                             PROBLEM/ASSESSMENT/RECOMMENDATIONS (A/R)       IDIOPATHIC PULMONARY ARTERIAL HYPERTENSION  A/R On coumadin   Riociguat 1.5x3 added  (ADEMPAS)   ANTIPHOSPHOLIPID SYNDROME   A/R On coumadin   BIVENTRICULAR FAILURE WITH LOW BP IN SETTING OF IPAH  A/R   2019 Off norepi   2019 Taken off dobutamin Nore tapered to 0.06 m/k/m   2019 On dobutamine and bumex Norepi added to support pvr   2019 Prognosis is not good    MI   - Tr 1 1.5-1.2   ACUTE HYPOXEMIC RESP FAILURE   A/R O2 to target po 89 or better to avoid hypoxic pulmonary vasoconstrictyion   COPD   A/R On symbicort 80 ()  spiriva ()   LUNG NODULE   2019 cxr new r central line Nonspnodular density r perihilar Poss sm l effsn   A/R Likely represents chf rather than nodules Consider ct ch   DIANN   -- Cr 2.3-1.5 - .9   HYPONATREMIA   2019 Na 132   2019 U Na 125 U osm 309  A/R Hyponatremia likely sec to diuresis             TIME SPENT Over 25 minutes aggregate care time spent on encounter; activities included   direct pt care, counseling and/or coordinating care reviewing notes, lab data/ imaging , discussion with multidisciplinary team/ pt /family. Risks, benefits, alternatives  discussed in detail.

## 2019-08-04 NOTE — PROGRESS NOTE ADULT - SUBJECTIVE AND OBJECTIVE BOX
GERMÁN RETANA LIJ  45 686   1986 DOA 2019 DR NICOLETTE PETTY   ALLERGY  nka       CONTACT   Cyndie Bernard  [facility] [address] [id number]              Patient examined chart reviewed    HOSPITAL ADMISSION   PATIENT CAME  FROM (if information available)        TYPE OF VISIT      Subsequent Pulmonary Critical Care followup requested -2019     by Dr Mejia    from Dr hCoudhury     REASON FOR VISIT  PLEASE SEE PROBLEM LIST/ASSESSMENTAND RECOMMENDATIONS     PATIENT GERMÁN KENNY  45 686   1986 DOA 2019 DR NICOLETTE PETTY     VITALS/LABS      2019 99/70 80 20  2019 Off nore  On lfnc  2019 W 4.9 Hb 12.3 Plt 278 INR 2.1 Na 132 K 3.1 CO2 30 Cr .7  8/3/2019 95 99/64 92%   8/3/2019 8p NORE .03 m/k/m     PATIENT GERMÁN KENNY  45 686   1986 DOA 2019 DR NICOLETTE PETTY     MEDICATIONS      PULMONARY HYPERTENSION  coumadin ()   riociguat 1.5x3 (ADEMPAS)   R CHF   bumetanide 1.3 ()  changed bumetanide 1 ()   spironolact 50 ()   DOBU (-)   metolazone 5 (-)   NORE () 16/250   spironolactone 50 ()   COPD   duoneb.4 ()   symbicort 80 ()   spiriva ()     GLOBAL ISSUE/BEST PRACTICE:      PROBLEM: HOB elevation:   y            PROBLEM: Stress ulcer proph:    na                      PROBLEM: VTE prophylaxis:     coumadin   PROBLEM: Glycemic control:    na  PROBLEM: Nutrition:   stein ()   PROBLEM: Advanced directive: na     PROBLEM: Allergies:  na    REVIEW OF SYMPTOMS     NOTE Changes if any  in ROS and PE are updated in daily HOSPITAL COURSE below      Able to give ROS  Yes     RELIABLE No   CONSTITUTIONAL Weakness Yes  Chills No Vision changes No  ENDOCRINE No unexplained hair loss No heat or cold intolerance    ALLERGY No hives  Sore throat No   RESP Coughing blood no  Shortness of breath YES   NEURO No Headache  Confusion Pain neck No   CARDIAC No Chest pain No Palpitations   GI No Pain abdomen NO   Vomiting NO     PHYSICAL EXAM    HEENT Unremarkable PERRLA atraumatic   RESP Fair air entry EXP prolonged    Harsh breath sound Resp distres mild   CARDIAC S1 S2 No S3     NO JVD    ABDOMEN SOFT BS PRESENT NOT DISTENDED No hepatosplenomegaly PEDAL EDEMA present No calf tenderness  NO rash   GENERAL Not TOXIC

## 2019-08-04 NOTE — PROGRESS NOTE ADULT - SUBJECTIVE AND OBJECTIVE BOX
HPI:  Pt is a 35 yo BF with h/o severe pHTN (previously on Adempas, non-compliance), RAD (reactive airway dz), mildly positive anti-cardiolipin Ab and hypothyroidism admitted 2 to decompensated right heart failure with volume overload and DIANN 2 to medication non-compliance. Currently off Levophed    ## Labs:  CBC:                        12.3   4.98  )-----------( 278      ( 04 Aug 2019 04:16 )             40.0     Chem:  08    132<L>  |  92<L>  |  8   ----------------------------<  101<H>  3.1<L>   |  30  |  0.79    Ca    8.7      04 Aug 2019 04:16  Phos  2.8       Mg     2.0         TPro  8.3  /  Alb  3.1<L>  /  TBili  3.0<H>  /  DBili  x   /  AST  56<H>  /  ALT  33  /  AlkPhos  75  -04    Coags:  PT/INR - ( 04 Aug 2019 04:16 )   PT: 24.9 sec;   INR: 2.17 ratio                 ## Imaging:    ## Medications:    buMETAnide Injectable 1 milliGRAM(s) IV Push two times a day  midodrine 10 milliGRAM(s) Oral every 8 hours  norepinephrine Infusion 0.05 MICROgram(s)/kG/Min IV Continuous <Continuous>  riociguat 1.5 milliGRAM(s) Oral every 8 hours  spironolactone 50 milliGRAM(s) Oral daily    ALBUTerol    90 MICROgram(s) HFA Inhaler 1 Puff(s) Inhalation every 4 hours  ALBUTerol/ipratropium for Nebulization 3 milliLiter(s) Nebulizer every 6 hours  buDESOnide  80 MICROgram(s)/formoterol 4.5 MICROgram(s) Inhaler 2 Puff(s) Inhalation two times a day  tiotropium 18 MICROgram(s) Capsule 1 Capsule(s) Inhalation daily    levothyroxine 25 MICROGram(s) Oral daily  simvastatin 40 milliGRAM(s) Oral at bedtime    aspirin enteric coated 81 milliGRAM(s) Oral daily  warfarin 2.5 milliGRAM(s) Oral once    docusate sodium 100 milliGRAM(s) Oral three times a day    sertraline 50 milliGRAM(s) Oral daily      ## Vitals:  T(C): 36.1 (19 @ 08:26), Max: 36.4 (19 @ 21:31)  HR: 87 (19 @ 11:00) (76 - 96)  BP: 100/59 (19 @ 11:00) (62/41 - 121/67)  BP(mean): 68 (19 @ 11:00) (45 - 87)  RR: 23 (19 @ 11:00) (17 - 31)  SpO2: 97% (19 @ 11:00) (73% - 100%)  Wt(kg): --  Vent:   AB-03 @ 07:01  -   @ 07:00  --------------------------------------------------------  IN: 599 mL / OUT: 1975 mL / NET: -1376 mL          ## P/E:  Gen: Pt sitting comfortably in chair in no apparent distress  Lungs: CTA  Heart: RRR  Abd: Soft/+BS  Ext: LE edema  Neuro: Awake, alert    CENTRAL LINE: [ ] YES [ ] NO  LOCATION:   DATE INSERTED:  REMOVE: [ ] YES [ ] NO      PIERCE: [ ] YES [ ] NO    DATE INSERTED:  REMOVE:  [ ] YES [ ] NO      A-LINE:  [ ] YES [ ] NO  LOCATION:   DATE INSERTED:  REMOVE:  [ ] YES [ ] NO  EXPLAIN:    CODE STATUS: [x ] full code  [ ] DNR  [ ] DNI  [ ] [facility]  Goals of care discussion: [ ] yes

## 2019-08-05 LAB
ALBUMIN SERPL ELPH-MCNC: 3.3 G/DL — SIGNIFICANT CHANGE UP (ref 3.3–5)
ALP SERPL-CCNC: 68 U/L — SIGNIFICANT CHANGE UP (ref 40–120)
ALT FLD-CCNC: 35 U/L — SIGNIFICANT CHANGE UP (ref 12–78)
ANION GAP SERPL CALC-SCNC: 10 MMOL/L — SIGNIFICANT CHANGE UP (ref 5–17)
APTT BLD: 34.2 SEC — SIGNIFICANT CHANGE UP (ref 28.5–37)
AST SERPL-CCNC: 55 U/L — HIGH (ref 15–37)
BILIRUB SERPL-MCNC: 3.4 MG/DL — HIGH (ref 0.2–1.2)
BUN SERPL-MCNC: 9 MG/DL — SIGNIFICANT CHANGE UP (ref 7–23)
CALCIUM SERPL-MCNC: 9 MG/DL — SIGNIFICANT CHANGE UP (ref 8.5–10.1)
CHLORIDE SERPL-SCNC: 93 MMOL/L — LOW (ref 96–108)
CO2 SERPL-SCNC: 28 MMOL/L — SIGNIFICANT CHANGE UP (ref 22–31)
CREAT SERPL-MCNC: 0.9 MG/DL — SIGNIFICANT CHANGE UP (ref 0.5–1.3)
GLUCOSE SERPL-MCNC: 82 MG/DL — SIGNIFICANT CHANGE UP (ref 70–99)
HCT VFR BLD CALC: 42.7 % — SIGNIFICANT CHANGE UP (ref 34.5–45)
HGB BLD-MCNC: 13.3 G/DL — SIGNIFICANT CHANGE UP (ref 11.5–15.5)
INR BLD: 1.74 RATIO — HIGH (ref 0.88–1.16)
MAGNESIUM SERPL-MCNC: 2.1 MG/DL — SIGNIFICANT CHANGE UP (ref 1.6–2.6)
MCHC RBC-ENTMCNC: 22.9 PG — LOW (ref 27–34)
MCHC RBC-ENTMCNC: 31.1 GM/DL — LOW (ref 32–36)
MCV RBC AUTO: 73.4 FL — LOW (ref 80–100)
NRBC # BLD: 0 /100 WBCS — SIGNIFICANT CHANGE UP (ref 0–0)
PHOSPHATE SERPL-MCNC: 2.6 MG/DL — SIGNIFICANT CHANGE UP (ref 2.5–4.5)
PLATELET # BLD AUTO: 262 K/UL — SIGNIFICANT CHANGE UP (ref 150–400)
POTASSIUM SERPL-MCNC: 3.2 MMOL/L — LOW (ref 3.5–5.3)
POTASSIUM SERPL-SCNC: 3.2 MMOL/L — LOW (ref 3.5–5.3)
PROT SERPL-MCNC: 8.6 GM/DL — HIGH (ref 6–8.3)
PROTHROM AB SERPL-ACNC: 19.8 SEC — HIGH (ref 10–12.9)
RBC # BLD: 5.82 M/UL — HIGH (ref 3.8–5.2)
RBC # FLD: 24.5 % — HIGH (ref 10.3–14.5)
SODIUM SERPL-SCNC: 131 MMOL/L — LOW (ref 135–145)
WBC # BLD: 4.93 K/UL — SIGNIFICANT CHANGE UP (ref 3.8–10.5)
WBC # FLD AUTO: 4.93 K/UL — SIGNIFICANT CHANGE UP (ref 3.8–10.5)

## 2019-08-05 PROCEDURE — 99233 SBSQ HOSP IP/OBS HIGH 50: CPT

## 2019-08-05 PROCEDURE — 99232 SBSQ HOSP IP/OBS MODERATE 35: CPT

## 2019-08-05 RX ORDER — WARFARIN SODIUM 2.5 MG/1
5 TABLET ORAL ONCE
Refills: 0 | Status: COMPLETED | OUTPATIENT
Start: 2019-08-05 | End: 2019-08-05

## 2019-08-05 RX ORDER — POTASSIUM CHLORIDE 20 MEQ
10 PACKET (EA) ORAL
Refills: 0 | Status: DISCONTINUED | OUTPATIENT
Start: 2019-08-05 | End: 2019-08-05

## 2019-08-05 RX ORDER — POTASSIUM CHLORIDE 20 MEQ
40 PACKET (EA) ORAL ONCE
Refills: 0 | Status: COMPLETED | OUTPATIENT
Start: 2019-08-05 | End: 2019-08-05

## 2019-08-05 RX ADMIN — RIOCIGUAT 1.5 MILLIGRAM(S): 1.5 TABLET, FILM COATED ORAL at 06:38

## 2019-08-05 RX ADMIN — Medication 25 MICROGRAM(S): at 06:37

## 2019-08-05 RX ADMIN — MIDODRINE HYDROCHLORIDE 10 MILLIGRAM(S): 2.5 TABLET ORAL at 13:30

## 2019-08-05 RX ADMIN — Medication 81 MILLIGRAM(S): at 13:27

## 2019-08-05 RX ADMIN — WARFARIN SODIUM 5 MILLIGRAM(S): 2.5 TABLET ORAL at 22:21

## 2019-08-05 RX ADMIN — Medication 100 MILLIEQUIVALENT(S): at 08:04

## 2019-08-05 RX ADMIN — MIDODRINE HYDROCHLORIDE 10 MILLIGRAM(S): 2.5 TABLET ORAL at 06:37

## 2019-08-05 RX ADMIN — CHLORHEXIDINE GLUCONATE 1 APPLICATION(S): 213 SOLUTION TOPICAL at 06:39

## 2019-08-05 RX ADMIN — Medication 40 MILLIEQUIVALENT(S): at 09:51

## 2019-08-05 RX ADMIN — BUMETANIDE 1 MILLIGRAM(S): 0.25 INJECTION INTRAMUSCULAR; INTRAVENOUS at 06:40

## 2019-08-05 RX ADMIN — SERTRALINE 50 MILLIGRAM(S): 25 TABLET, FILM COATED ORAL at 13:27

## 2019-08-05 RX ADMIN — Medication 3 MILLILITER(S): at 17:35

## 2019-08-05 RX ADMIN — Medication 30 MILLILITER(S): at 23:26

## 2019-08-05 RX ADMIN — Medication 3 MILLILITER(S): at 05:30

## 2019-08-05 RX ADMIN — RIOCIGUAT 1.5 MILLIGRAM(S): 1.5 TABLET, FILM COATED ORAL at 22:20

## 2019-08-05 RX ADMIN — SIMVASTATIN 40 MILLIGRAM(S): 20 TABLET, FILM COATED ORAL at 22:21

## 2019-08-05 RX ADMIN — MIDODRINE HYDROCHLORIDE 10 MILLIGRAM(S): 2.5 TABLET ORAL at 23:27

## 2019-08-05 RX ADMIN — BUMETANIDE 1 MILLIGRAM(S): 0.25 INJECTION INTRAMUSCULAR; INTRAVENOUS at 18:03

## 2019-08-05 RX ADMIN — Medication 3 MILLILITER(S): at 23:11

## 2019-08-05 RX ADMIN — RIOCIGUAT 1.5 MILLIGRAM(S): 1.5 TABLET, FILM COATED ORAL at 13:27

## 2019-08-05 RX ADMIN — Medication 3 MILLILITER(S): at 11:09

## 2019-08-05 RX ADMIN — BUDESONIDE AND FORMOTEROL FUMARATE DIHYDRATE 2 PUFF(S): 160; 4.5 AEROSOL RESPIRATORY (INHALATION) at 18:02

## 2019-08-05 NOTE — PROGRESS NOTE ADULT - ASSESSMENT
35 yo woman with severe pHTN (?group I +II/?IV) [facility] 8/2018 - mPAP 67; W 9; RVR 9 previously on Adempas, non-complinat), mildly positive anti-cardiolipin Ab and RAD admitted with decompensated right heart failure s/p CCU stay for inotrope assisted diuresis and pressor support. Per chart 20L negative (wt 123-> 105kg). Much improved LE edema, abd distension, DIANN resoled, hyperbilirubinemia improving.      #pHTN/decompensated right heart failure  -- much improved volume status but would continue diuresis with goal net negative of 1L/24 hours.   -- restarted on Adempas 1.5 q 8   -- can transition to PO diuretics and add Metolazone 5mg prior to Bumex dose  -- c/w daily wt monitoring and strict Is/Os  -- c/w spironolactone   -- please obtain VQ scan - she needs it to complete her pHTN workup  -- c/w coumadin for AC, goal INR 2-3  -- supplemental O2 to keep sats >90%. Please ambulate on RA prior to d/c. She has concentrator at home but would need portable oxygen.    #DIANN - in the setting of congestion and volume overload - resolved with diuresis    #Reactive airway disease - PFTs in the past with reversible obstructive defect  -- c/w Symbicort and Albuterol prn  -- would d/c Spiriva and Duonebs  -- will need repeat PFTs as outpatient after discharge    Patient follows with Dr Unger at [address] [place] [address]. She should follow with him after discharge.     Thank you for this consult     Shirley Mejia MD  [facility] Physician formerly [facility]  Pulmonary Medicine

## 2019-08-05 NOTE — CHART NOTE - NSCHARTNOTEFT_GEN_A_CORE
Assessment: Pt seen for follow-up & continues acute severe malnutrition. Pt with Rt heart failure with volume overload & DIANN; pt on Bumex for heart failure & pt counselled on diet restrictions. Last BM x 1(8/3)  .     Factors impacting intake: [ ] none [ ] nausea  [ ] vomiting [ ] diarrhea [ ] constipation  [ ]chewing problems [ ] swallowing issues  [x ] other: Fatigue    Diet Prescription: Diet, Consistent Carbohydrate/No Snacks:   DASH/TLC {Sodium & Cholesterol Restricted}  Supplement Feeding Modality:  Oral  Glucerna Shake Cans or Servings Per Day:  1       Frequency:  Two Times a day (07-29-19 @ 11:16) (440kcal & 20gm protein)    Intake: Pt consuming <50% meals & refusing Glucerna shakes daily. Pt requesting diet cookies 2 x day, diet ice cream daily & yogurt 2 x day & pt prefers AJ with meals. Pt consumed 0% breakfast this am due to sleeping. Nursing reports pt often requesting AJ due to dry mouth.   Current Weight: Uu=684.2kg(8/4), Dt=020.9kg(7/30), Ra=907.2kg(7/26)  % Weight Change sign wt loss 18kg (15%) x 9 days; decreased edema; No weight 8/5; RN aware    Physical appearance:  +3 edema Olman legs; Nutrition focused physical exam conducted: Subcutaneous fat loss: [WNL ] Orbital fat pads region, [WNL ]Buccal fat region, [Moderate ]Triceps region,  [WNL ]Ribs region.  Muscle wasting: [WNL ]Temples region, [Mild ]Clavicle region, [Mild ]Shoulder region, [Unable ]Scapula region, [Mild ]Interosseous region,  [edematous ]thigh region, [Edematous ]Calf region    Pertinent Medications: MEDICATIONS  (STANDING):  ALBUTerol    90 MICROgram(s) HFA Inhaler 1 Puff(s) Inhalation every 4 hours  ALBUTerol/ipratropium for Nebulization 3 milliLiter(s) Nebulizer every 6 hours  aspirin enteric coated 81 milliGRAM(s) Oral daily  buDESOnide  80 MICROgram(s)/formoterol 4.5 MICROgram(s) Inhaler 2 Puff(s) Inhalation two times a day  buMETAnide Injectable 1 milliGRAM(s) IV Push two times a day  chlorhexidine 4% Liquid 1 Application(s) Topical <User Schedule>  docusate sodium 100 milliGRAM(s) Oral three times a day  levothyroxine 25 MICROGram(s) Oral daily  midodrine 10 milliGRAM(s) Oral every 8 hours  norepinephrine Infusion 0.05 MICROgram(s)/kG/Min (5.775 mL/Hr) IV Continuous <Continuous>  potassium chloride  10 mEq/100 mL IVPB 10 milliEquivalent(s) IV Intermittent every 1 hour  riociguat 1.5 milliGRAM(s) Oral every 8 hours  sertraline 50 milliGRAM(s) Oral daily  simvastatin 40 milliGRAM(s) Oral at bedtime  spironolactone 50 milliGRAM(s) Oral daily  tiotropium 18 MICROgram(s) Capsule 1 Capsule(s) Inhalation daily    MEDICATIONS  (PRN):  acetaminophen   Tablet .. 650 milliGRAM(s) Oral every 6 hours PRN Mild Pain (1 - 3)  petrolatum white Ointment 1 Application(s) Topical three times a day PRN chapped lips  sodium chloride 0.9% lock flush 10 milliLiter(s) IV Push every 1 hour PRN Pre/post blood products, medications, blood draw, and to maintain line patency    Pertinent Labs: 08-05 Na 131 mmol/L<L> Glu 82 mg/dL K+ 3.2 mmol/L<L> Cr 0.90 mg/dL BUN 9 mg/dL Phos 2.6 mg/dL Alb 3.3 g/dL PAB n/a   Hgb 13.3 g/dL Hct 42.7 % HgA1C n/a    Glucose, Serum: 82 mg/dL   24Hr FS:  Skin: intact    Estimated Needs:   [x ] no change since previous assessment (7/29/19)  [ ] recalculated:     Previous Nutrition Diagnosis: Nutrition Diagnostic Terminology #1 Malnutrition...     Malnutrition Severe Malnutrition in the context of Acute illness.     Etiology inadequate energy/protein intake; increased energy/protein requirements related to CHF exasperation.     Signs/Symptoms Physical Findings: 2+/3+ edema ; < 50% nutrition needs > 5 days.    New Goal: Pt to meet >75% of estimated protein-energy needs via meals/supplements ( not met)  Nutrition Diagnosis is [ x] ongoing  [ ] resolved  [ ] improved  [ ] not applicable       New Nutrition Diagnosis: [x] not applicable       Interventions:   Recommend  [x ] [facility] & provide diet cookies 2 x day & Yogurt 2 x day & diet ice cream daily)  [ ] Change Diet To:  [ ] Nutrition Supplement:  [ ] Nutrition Support:  [ ] Other:     Monitoring and Evaluation:   [ x] PO intake [ x ] Tolerance to diet prescription [ x ] weights [ x ] labs[ x ] follow up per protocol  [ ] other: Assessment: Pt seen for follow-up & continues acute severe malnutrition. Pt with Rt heart failure with volume overload & DIANN; pt on Bumex for heart failure & pt counselled on diet restrictions. Last BM x 1(8/3)  .     Factors impacting intake: [ ] none [ ] nausea  [ ] vomiting [ ] diarrhea [ ] constipation  [ ]chewing problems [ ] swallowing issues  [x ] other: Fatigue    Diet Prescription: Diet, Consistent Carbohydrate/No Snacks:   DASH/TLC {Sodium & Cholesterol Restricted}  Supplement Feeding Modality:  Oral  Glucerna Shake Cans or Servings Per Day:  1       Frequency:  Two Times a day (07-29-19 @ 11:16) (440kcal & 20gm protein)    Intake: Pt consuming <50% meals & refusing Glucerna shakes daily. Pt requesting diet cookies 2 x day, diet ice cream daily & yogurt 2 x day & pt prefers AJ with meals. Pt consumed 0% breakfast this am due to sleeping. Nursing reports pt often requesting AJ due to dry mouth.   Current Weight: Zf=210.2kg(8/4), Jb=407.9kg(7/30), Ap=030.2kg(7/26)  % Weight Change sign wt loss 18kg (15%) x 9 days;  wt loss due to decreased edema; No weight 8/5; RN aware    Physical appearance:  +3 edema Olman legs; Nutrition focused physical exam conducted: Subcutaneous fat loss: [WNL ] Orbital fat pads region, [WNL ]Buccal fat region, [Moderate ]Triceps region,  [WNL ]Ribs region.  Muscle wasting: [WNL ]Temples region, [Mild ]Clavicle region, [Mild ]Shoulder region, [Unable ]Scapula region, [Mild ]Interosseous region,  [edematous ]thigh region, [Edematous ]Calf region    Pertinent Medications: MEDICATIONS  (STANDING):  ALBUTerol    90 MICROgram(s) HFA Inhaler 1 Puff(s) Inhalation every 4 hours  ALBUTerol/ipratropium for Nebulization 3 milliLiter(s) Nebulizer every 6 hours  aspirin enteric coated 81 milliGRAM(s) Oral daily  buDESOnide  80 MICROgram(s)/formoterol 4.5 MICROgram(s) Inhaler 2 Puff(s) Inhalation two times a day  buMETAnide Injectable 1 milliGRAM(s) IV Push two times a day  chlorhexidine 4% Liquid 1 Application(s) Topical <User Schedule>  docusate sodium 100 milliGRAM(s) Oral three times a day  levothyroxine 25 MICROGram(s) Oral daily  midodrine 10 milliGRAM(s) Oral every 8 hours  norepinephrine Infusion 0.05 MICROgram(s)/kG/Min (5.775 mL/Hr) IV Continuous <Continuous>  potassium chloride  10 mEq/100 mL IVPB 10 milliEquivalent(s) IV Intermittent every 1 hour  riociguat 1.5 milliGRAM(s) Oral every 8 hours  sertraline 50 milliGRAM(s) Oral daily  simvastatin 40 milliGRAM(s) Oral at bedtime  spironolactone 50 milliGRAM(s) Oral daily  tiotropium 18 MICROgram(s) Capsule 1 Capsule(s) Inhalation daily    MEDICATIONS  (PRN):  acetaminophen   Tablet .. 650 milliGRAM(s) Oral every 6 hours PRN Mild Pain (1 - 3)  petrolatum white Ointment 1 Application(s) Topical three times a day PRN chapped lips  sodium chloride 0.9% lock flush 10 milliLiter(s) IV Push every 1 hour PRN Pre/post blood products, medications, blood draw, and to maintain line patency    Pertinent Labs: 08-05 Na 131 mmol/L<L> Glu 82 mg/dL K+ 3.2 mmol/L<L> Cr 0.90 mg/dL BUN 9 mg/dL Phos 2.6 mg/dL Alb 3.3 g/dL PAB n/a   Hgb 13.3 g/dL Hct 42.7 % HgA1C n/a    Glucose, Serum: 82 mg/dL   24Hr FS:  Skin: intact    Estimated Needs:   [x ] no change since previous assessment (7/29/19)  [ ] recalculated:     Previous Nutrition Diagnosis: Nutrition Diagnostic Terminology #1 Malnutrition...     Malnutrition Severe Malnutrition in the context of Acute illness.     Etiology inadequate energy/protein intake; increased energy/protein requirements related to CHF exasperation.     Signs/Symptoms Physical Findings: 2+/3+ edema ; < 50% nutrition needs > 5 days.    New Goal: Pt to meet >75% of estimated protein-energy needs via meals/supplements ( not met)  Nutrition Diagnosis is [ x] ongoing  [ ] resolved  [ ] improved  [ ] not applicable       New Nutrition Diagnosis: [x] not applicable       Interventions:   Recommend  [x ] Continue: CCHO with snack/Dash/TLC & provide diet cookies 2 x day & Yogurt 2 x day & diet ice cream daily)  [ ] Change Diet To:  [ ] Nutrition Supplement:  [ ] Nutrition Support:  [ ] Other:     Monitoring and Evaluation:   [ x] PO intake [ x ] Tolerance to diet prescription [ x ] weights [ x ] labs[ x ] follow up per protocol  [ ] other: Assessment: Pt seen for follow-up & continues acute severe malnutrition. Pt with Rt heart failure with volume overload & DIANN; pt on Bumex for heart failure & pt counselled on diet restrictions. Last BM x 1(8/3)  .     Factors impacting intake: [ ] none [ ] nausea  [ ] vomiting [ ] diarrhea [ ] constipation  [ ]chewing problems [ ] swallowing issues  [x ] other: Fatigue    Diet Prescription: Diet, Consistent Carbohydrate/No Snacks:   DASH/TLC {Sodium & Cholesterol Restricted}  Supplement Feeding Modality:  Oral  Glucerna Shake Cans or Servings Per Day:  1       Frequency:  Two Times a day (07-29-19 @ 11:16) (440kcal & 20gm protein)    Intake: Pt consuming <50% meals & refusing Glucerna shakes daily. Pt requesting diet cookies 2 x day, diet ice cream daily & yogurt 2 x day & pt prefers AJ with meals. Pt consumed 0% breakfast this am due to sleeping. Nursing reports pt often requesting AJ due to dry mouth.   Current Weight: Sw=704.2kg(8/4), Qi=944.9kg(7/30), Nn=194.2kg(7/26)  % Weight Change sign wt loss 18kg (15%) x 9 days;  wt loss due to decreased edema; No weight 8/5; RN aware    Physical appearance:  +3 edema Olman legs; Nutrition focused physical exam conducted: Subcutaneous fat loss: [WNL ] Orbital fat pads region, [WNL ]Buccal fat region, [Moderate ]Triceps region,  [WNL ]Ribs region.  Muscle wasting: [WNL ]Temples region, [Mild ]Clavicle region, [Mild ]Shoulder region, [Unable ]Scapula region, [Mild ]Interosseous region,  [edematous ]thigh region, [Edematous ]Calf region    Pertinent Medications: MEDICATIONS  (STANDING):  ALBUTerol    90 MICROgram(s) HFA Inhaler 1 Puff(s) Inhalation every 4 hours  ALBUTerol/ipratropium for Nebulization 3 milliLiter(s) Nebulizer every 6 hours  aspirin enteric coated 81 milliGRAM(s) Oral daily  buDESOnide  80 MICROgram(s)/formoterol 4.5 MICROgram(s) Inhaler 2 Puff(s) Inhalation two times a day  buMETAnide Injectable 1 milliGRAM(s) IV Push two times a day  chlorhexidine 4% Liquid 1 Application(s) Topical <User Schedule>  docusate sodium 100 milliGRAM(s) Oral three times a day  levothyroxine 25 MICROGram(s) Oral daily  midodrine 10 milliGRAM(s) Oral every 8 hours  norepinephrine Infusion 0.05 MICROgram(s)/kG/Min (5.775 mL/Hr) IV Continuous <Continuous>  potassium chloride  10 mEq/100 mL IVPB 10 milliEquivalent(s) IV Intermittent every 1 hour  riociguat 1.5 milliGRAM(s) Oral every 8 hours  sertraline 50 milliGRAM(s) Oral daily  simvastatin 40 milliGRAM(s) Oral at bedtime  spironolactone 50 milliGRAM(s) Oral daily  tiotropium 18 MICROgram(s) Capsule 1 Capsule(s) Inhalation daily    MEDICATIONS  (PRN):  acetaminophen   Tablet .. 650 milliGRAM(s) Oral every 6 hours PRN Mild Pain (1 - 3)  petrolatum white Ointment 1 Application(s) Topical three times a day PRN chapped lips  sodium chloride 0.9% lock flush 10 milliLiter(s) IV Push every 1 hour PRN Pre/post blood products, medications, blood draw, and to maintain line patency    Pertinent Labs: 08-05 Na 131 mmol/L<L> Glu 82 mg/dL K+ 3.2 mmol/L<L> Cr 0.90 mg/dL BUN 9 mg/dL Phos 2.6 mg/dL Alb 3.3 g/dL PAB n/a   Hgb 13.3 g/dL Hct 42.7 % HgA1C n/a    Glucose, Serum: 82 mg/dL   24Hr FS:  Skin: intact    Estimated Needs:   [x ] no change since previous assessment (7/29/19)  [ ] recalculated:     Previous Nutrition Diagnosis: Nutrition Diagnostic Terminology #1 Malnutrition...     Malnutrition Severe Malnutrition in the context of Acute illness.     Etiology inadequate energy/protein intake; increased energy/protein requirements related to CHF exasperation.     Signs/Symptoms Physical Findings: 2+/3+ edema ; < 50% nutrition needs > 5 days.    New Goal: Pt to meet >75% of estimated protein-energy needs via meals/supplements ( not met)  Nutrition Diagnosis is [ ] ongoing  [ ] resolved  [x ] improved  [ ] not applicable       New Nutrition Diagnosis: [ ] Inadequate Energy Intake [ ]Inadequate Oral Intake [ ] Excessive Energy Intake   [ ] Underweight [ ] Increased Nutrient Needs [ ] Overweight/Obesity   [ ] Altered GI Function [ ] Unintended Weight Loss [ ] Food & Nutrition Related Knowledge Deficit [x ]Mild Malnutrition in the context of acute illness    Related to:  Inadequate energy & protein intake related to hx CHF    As evidenced by:   po intake <50% nutritional needs >5 days & mild muscle wasting    Goal: Pt to consume >75% meals & snacks      Interventions:   Recommend  [x ] Continue: CCHO with snack/Dash/TLC & provide diet cookies 2 x day & Yogurt 2 x day & diet ice cream daily)  [ ] Change Diet To:  [ ] Nutrition Supplement:  [ ] Nutrition Support:  [x ] Other: Continue to cater to food preferences    Monitoring and Evaluation:   [ x] PO intake [ x ] Tolerance to diet prescription [ x ] weights [ x ] labs[ x ] follow up per protocol  [ ] other: Assessment: Pt seen for follow-up & continues acute severe malnutrition. Pt with Rt heart failure with volume overload & DIANN; pt on Bumex for heart failure & pt counselled on diet restrictions. Last BM x 1(8/3)  .     Factors impacting intake: [ ] none [ ] nausea  [ ] vomiting [ ] diarrhea [ ] constipation  [ ]chewing problems [ ] swallowing issues  [x ] other: Fatigue    Diet Prescription: Diet, Consistent Carbohydrate/No Snacks:   DASH/TLC {Sodium & Cholesterol Restricted}  Supplement Feeding Modality:  Oral  Glucerna Shake Cans or Servings Per Day:  1       Frequency:  Two Times a day (07-29-19 @ 11:16) (440kcal & 20gm protein)    Intake: Pt consuming <50% meals & refusing Glucerna shakes daily. Pt requesting diet cookies 2 x day, diet ice cream daily & yogurt 2 x day & pt prefers AJ with meals. Pt consumed 0% breakfast this am due to sleeping. Nursing reports pt often requesting AJ due to dry mouth.   Current Weight: Sz=940.2kg(8/4), Cd=387.9kg(7/30), Qf=603.2kg(7/26)  % Weight Change sign wt loss 18kg (15%) x 9 days;  wt loss due to decreased edema; No weight 8/5; RN aware    Physical appearance:  +3 edema Olman legs; Nutrition focused physical exam conducted: Subcutaneous fat loss: [WNL ] Orbital fat pads region, [WNL ]Buccal fat region, [Moderate ]Triceps region,  [WNL ]Ribs region.  Muscle wasting: [WNL ]Temples region, [Mild ]Clavicle region, [Mild ]Shoulder region, [Unable ]Scapula region, [Mild ]Interosseous region,  [edematous ]thigh region, [Edematous ]Calf region    Pertinent Medications: MEDICATIONS  (STANDING):  ALBUTerol    90 MICROgram(s) HFA Inhaler 1 Puff(s) Inhalation every 4 hours  ALBUTerol/ipratropium for Nebulization 3 milliLiter(s) Nebulizer every 6 hours  aspirin enteric coated 81 milliGRAM(s) Oral daily  buDESOnide  80 MICROgram(s)/formoterol 4.5 MICROgram(s) Inhaler 2 Puff(s) Inhalation two times a day  buMETAnide Injectable 1 milliGRAM(s) IV Push two times a day  chlorhexidine 4% Liquid 1 Application(s) Topical <User Schedule>  docusate sodium 100 milliGRAM(s) Oral three times a day  levothyroxine 25 MICROGram(s) Oral daily  midodrine 10 milliGRAM(s) Oral every 8 hours  norepinephrine Infusion 0.05 MICROgram(s)/kG/Min (5.775 mL/Hr) IV Continuous <Continuous>  potassium chloride  10 mEq/100 mL IVPB 10 milliEquivalent(s) IV Intermittent every 1 hour  riociguat 1.5 milliGRAM(s) Oral every 8 hours  sertraline 50 milliGRAM(s) Oral daily  simvastatin 40 milliGRAM(s) Oral at bedtime  spironolactone 50 milliGRAM(s) Oral daily  tiotropium 18 MICROgram(s) Capsule 1 Capsule(s) Inhalation daily    MEDICATIONS  (PRN):  acetaminophen   Tablet .. 650 milliGRAM(s) Oral every 6 hours PRN Mild Pain (1 - 3)  petrolatum white Ointment 1 Application(s) Topical three times a day PRN chapped lips  sodium chloride 0.9% lock flush 10 milliLiter(s) IV Push every 1 hour PRN Pre/post blood products, medications, blood draw, and to maintain line patency    Pertinent Labs: 08-05 Na 131 mmol/L<L> Glu 82 mg/dL K+ 3.2 mmol/L<L> Cr 0.90 mg/dL BUN 9 mg/dL Phos 2.6 mg/dL Alb 3.3 g/dL PAB n/a   Hgb 13.3 g/dL Hct 42.7 % HgA1C n/a    Glucose, Serum: 82 mg/dL   24Hr FS:  Skin: intact    Estimated Needs:   [x ] no change since previous assessment (7/29/19)  [ ] recalculated:     Previous Nutrition Diagnosis: Nutrition Diagnostic Terminology #1 Malnutrition...     Malnutrition Severe Malnutrition in the context of Acute illness.     Etiology inadequate energy/protein intake; increased energy/protein requirements related to CHF exasperation.     Signs/Symptoms Physical Findings: 2+/3+ edema ; < 50% nutrition needs > 5 days.    New Goal: Pt to meet >75% of estimated protein-energy needs via meals/supplements ( not met)  Nutrition Diagnosis is [ ] ongoing  [ ] resolved  [x ] improved  [ ] not applicable       New Nutrition Diagnosis: [ ] Inadequate Energy Intake [ ]Inadequate Oral Intake [ ] Excessive Energy Intake   [ ] Underweight [ ] Increased Nutrient Needs [ ] Overweight/Obesity   [ ] Altered GI Function [ ] Unintended Weight Loss [ ] Food & Nutrition Related Knowledge Deficit [x ]Moderate Malnutrition in the context of acute illness    Related to:  Inadequate energy & protein intake related to hx CHF    As evidenced by:   po intake <50% nutritional needs >5 days & mild muscle wasting    Goal: Pt to consume >75% meals & snacks      Interventions:   Recommend  [x ] Continue: CCHO with snack/Dash/TLC & provide diet cookies 2 x day & Yogurt 2 x day & diet ice cream daily)  [ ] Change Diet To:  [ ] Nutrition Supplement:  [ ] Nutrition Support:  [x ] Other: Continue to cater to food preferences    Monitoring and Evaluation:   [ x] PO intake [ x ] Tolerance to diet prescription [ x ] weights [ x ] labs[ x ] follow up per protocol  [ ] other:

## 2019-08-05 NOTE — PROGRESS NOTE ADULT - SUBJECTIVE AND OBJECTIVE BOX
HPI:  Pt is a 35 yo BF with h/o severe pHTN (previously on Adempas, non-compliance), RAD (reactive airway dz), mildly positive anti-cardiolipin Ab and hypothyroidism admitted 2 to decompensated right heart failure with volume overload and DIANN 2 to medication non-compliance. Off Levophed      ## Labs:  CBC:                        13.3   4.93  )-----------( 262      ( 05 Aug 2019 04:49 )             42.7     Chem:      131<L>  |  93<L>  |  9   ----------------------------<  82  3.2<L>   |  28  |  0.90    Ca    9.0      05 Aug 2019 04:49  Phos  2.6       Mg     2.1         TPro  8.6<H>  /  Alb  3.3  /  TBili  3.4<H>  /  DBili  x   /  AST  55<H>  /  ALT  35  /  AlkPhos  68  -    Coags:  PT/INR - ( 05 Aug 2019 04:49 )   PT: 19.8 sec;   INR: 1.74 ratio         PTT - ( 05 Aug 2019 04:49 )  PTT:34.2 sec        ## Imaging:    ## Medications:    buMETAnide Injectable 1 milliGRAM(s) IV Push two times a day  midodrine 10 milliGRAM(s) Oral every 8 hours  riociguat 1.5 milliGRAM(s) Oral every 8 hours  spironolactone 50 milliGRAM(s) Oral daily    ALBUTerol    90 MICROgram(s) HFA Inhaler 1 Puff(s) Inhalation every 4 hours  ALBUTerol/ipratropium for Nebulization 3 milliLiter(s) Nebulizer every 6 hours  buDESOnide  80 MICROgram(s)/formoterol 4.5 MICROgram(s) Inhaler 2 Puff(s) Inhalation two times a day  tiotropium 18 MICROgram(s) Capsule 1 Capsule(s) Inhalation daily    levothyroxine 25 MICROGram(s) Oral daily  simvastatin 40 milliGRAM(s) Oral at bedtime    aspirin enteric coated 81 milliGRAM(s) Oral daily    docusate sodium 100 milliGRAM(s) Oral three times a day    acetaminophen   Tablet .. 650 milliGRAM(s) Oral every 6 hours PRN  sertraline 50 milliGRAM(s) Oral daily      ## Vitals:  T(C): 36.5 (19 @ 12:00), Max: 36.7 (19 @ 15:50)  HR: 86 (19 @ 12:00) (75 - 99)  BP: 101/57 (19 @ 12:00) (64/52 - 116/60)  BP(mean): 66 (19 @ 12:00) (49 - 100)  RR: 19 (19 @ 12:00) (14 - 30)  SpO2: 75% (19 @ 11:30) (75% - 100%)  Wt(kg): --  Vent:   AB-04 @ 07:01  -   @ 07:00  --------------------------------------------------------  IN: 1410 mL / OUT: 1075 mL / NET: 335 mL     @ 07:01  -   @ 12:20  --------------------------------------------------------  IN: 100 mL / OUT: 0 mL / NET: 100 mL          ## P/E:  Gen: Pt sitting comfortably in chair in no apparent distress  Lungs: Rhonchi  Heart: RRR  Abd: Soft/+BS  Ext: LE edema  Neuro: Awake, alert    CENTRAL LINE: [ ] YES [ ] NO  LOCATION:   DATE INSERTED:  REMOVE: [ ] YES [ ] NO      PIERCE: [ ] YES [ ] NO    DATE INSERTED:  REMOVE:  [ ] YES [ ] NO      A-LINE:  [ ] YES [ ] NO  LOCATION:   DATE INSERTED:  REMOVE:  [ ] YES [ ] NO  EXPLAIN:    CODE STATUS: [x ] full code  [ ] DNR  [ ] [facility]  [ ] [facility]  Goals of care discussion: [ ] yes

## 2019-08-05 NOTE — CHART NOTE - NSCHARTNOTEFT_GEN_A_CORE
Patient is a 34 year old woman with CHF, asthma, pulmonary HTN presents to ED with complaint of SOB and leg swelling.  She states that she can only walk 1-2 blocks at baseline and recently her exercise tolerance has decreased, and was admitted to medicine floor.    Patient could not receive bumex while on the floor due to low BP, patient transferred critical care, 7/27and started on dobutamine drip. had increased UO but pressures  remained low. Levophed started as well and central line for difficult access, which is removed 8/4 now.  Patient was on bumex and Zaroxolyn and spironolactone for diuresis, patient had total of 38L of output during the stay, and is now net negative of 20L.  Patient was on HFNC, and is now on NC 2L, with improvement on SOB, had been OOB to chair, tolerating ok.  IS off leveophed and dobutamine now, BP has been stable.  Patient is tolerating diet as well.    will continue bumex 1mg q12, spironolactone 50mg qd  adempas and zoloft.    Patient is now stable for transfer to medicne floor  to followed by pulmonologist. Patient is a 34 year old woman with CHF, asthma, pulmonary HTN presents to ED with complaint of SOB and leg swelling.  She states that she can only walk 1-2 blocks at baseline and recently her exercise tolerance has decreased, and was admitted to medicine floor.    Patient could not receive bumex while on the floor due to low BP, patient transferred critical care, 7/27and started on dobutamine drip. had increased UO but pressures  remained low. Levophed started as well and central line for difficult access, which is removed 8/4 now.  Patient was on bumex and Zaroxolyn and spironolactone for diuresis, patient had total of 38L of output during the stay, and is now net negative of 20L.  Patient was on HFNC, and is now on NC 2L, with improvement on SOB, had been OOB to chair, tolerating ok.  IS off leveophed and dobutamine now, BP has been stable.  Patient is tolerating diet as well.    will continue bumex 1mg q12, spironolactone 50mg qd  adempas and zoloft.  on coumadin, INR this am 1.7, will give 5mg coumadin tonight  goal INR is 2-3    Patient is now stable for transfer to medicne floor  to followed by pulmonologist.

## 2019-08-05 NOTE — PROGRESS NOTE ADULT - SUBJECTIVE AND OBJECTIVE BOX
Interval Events:  Patient seen and examined at bedside. Transferred out of CCU.       REVIEW OF SYSTEMS:  Constitutional: no fevers  CV:  no chest pain  Resp:  no SOB  GI: abd pain resolved    [x ] All other systems negative  [ ] Unable to assess ROS because ________    OBJECTIVE:  T(C): 36.3 (08-05-19 @ 14:00), Max: 36.7 (08-04-19 @ 15:50)  HR: 96 (08-05-19 @ 13:30) (75 - 99)  BP: 92/55 (08-05-19 @ 13:30) (86/49 - 116/60)  RR: 20 (08-05-19 @ 13:30) (14 - 30)  SpO2: 95% (08-05-19 @ 13:30) (75% - 100%)      08-04-19 @ 07:01  -  08-05-19 @ 07:00  --------------------------------------------------------  IN: 1410 mL / OUT: 1075 mL / NET: 335 mL    08-05-19 @ 07:01  -  08-05-19 @ 15:35  --------------------------------------------------------  IN: 100 mL / OUT: 0 mL / NET: 100 mL        PHYSICAL EXAM:  General: Well appearing Female. No apparent distress  HEENT:   Mucous membranes are moist.  Neck: Supple. No JVD  Respiratory: few crackles L base  Cardiovascular: RRR, no m/r/g  Abdomen: Soft, non-tender, non-distended.   Extremities: +2LEE, much improved  Skin: Warm, dry, no rash.   Neurological: CNII-XII grossly intact.   Psychiatry: appropriate mood and affect.     HOSPITAL MEDICATIONS:  MEDICATIONS  (STANDING):  ALBUTerol    90 MICROgram(s) HFA Inhaler 1 Puff(s) Inhalation every 4 hours  ALBUTerol/ipratropium for Nebulization 3 milliLiter(s) Nebulizer every 6 hours  aspirin enteric coated 81 milliGRAM(s) Oral daily  buDESOnide  80 MICROgram(s)/formoterol 4.5 MICROgram(s) Inhaler 2 Puff(s) Inhalation two times a day  buMETAnide Injectable 1 milliGRAM(s) IV Push two times a day  chlorhexidine 4% Liquid 1 Application(s) Topical <User Schedule>  docusate sodium 100 milliGRAM(s) Oral three times a day  levothyroxine 25 MICROGram(s) Oral daily  midodrine 10 milliGRAM(s) Oral every 8 hours  riociguat 1.5 milliGRAM(s) Oral every 8 hours  sertraline 50 milliGRAM(s) Oral daily  simvastatin 40 milliGRAM(s) Oral at bedtime  spironolactone 50 milliGRAM(s) Oral daily  tiotropium 18 MICROgram(s) Capsule 1 Capsule(s) Inhalation daily  warfarin 5 milliGRAM(s) Oral once    MEDICATIONS  (PRN):  acetaminophen   Tablet .. 650 milliGRAM(s) Oral every 6 hours PRN Mild Pain (1 - 3)  petrolatum white Ointment 1 Application(s) Topical three times a day PRN chapped lips  sodium chloride 0.9% lock flush 10 milliLiter(s) IV Push every 1 hour PRN Pre/post blood products, medications, blood draw, and to maintain line patency      LABS:                        13.3   4.93  )-----------( 262      ( 05 Aug 2019 04:49 )             42.7     08-05    131<L>  |  93<L>  |  9   ----------------------------<  82  3.2<L>   |  28  |  0.90    Ca    9.0      05 Aug 2019 04:49  Phos  2.6     08-05  Mg     2.1     08-05    TPro  8.6<H>  /  Alb  3.3  /  TBili  3.4<H>  /  DBili  x   /  AST  55<H>  /  ALT  35  /  AlkPhos  68  08-05    PT/INR - ( 05 Aug 2019 04:49 )   PT: 19.8 sec;   INR: 1.74 ratio         PTT - ( 05 Aug 2019 04:49 )  PTT:34.2 sec      RADIOLOGY:  [ x ] Reviewed and interpreted by me    < from: TTE Echo Doppler w/o Cont (07.26.19 @ 08:44) >  1. Left ventricular ejection fraction, by visual estimation, is 45 to   50%.   2. Mildly decreased global left ventricular systolic function.   3. Elevated mean left atrial pressure.   4. Global cardiomyopathy.   5. Increased LV wall thickness.   6. Normal left ventricular internal cavity size.   7. Right ventricular pressure overload.   8. Spectral Doppler shows impaired relaxation pattern of left   ventricular myocardial filling (Grade I diastolic dysfunction).   9. There is mild asymmetric left ventricular hypertrophy.  10. Pulmonary hypertension.  11. Severely enlarged right ventricle.  12. Severely reduced RV systolic function.  13. RV ejection fraction 34%.  14. Small pericardial effusion.  15. Degenerative mitral valve.  16. Mild mitral annular calcification.  17. Mild mitral valve regurgitation.  18. Moderate tricuspid regurgitation.  19. Structurally normal tricuspid valve, with normal leaflet excursion.  20. Mild aortic regurgitation.  21. Mild to moderate pulmonic valve regurgitation.  22. Structurally normal pulmonic valve, with normal leaflet excursion.  23. Estimated pulmonary artery systolic pressure is 87.1 mmHg assuming a   right atrial pressure of 20 mmHg, which is consistent with severe   pulmonary hypertension.  24. Pulmonary hypertension is present.  25. The main pulmonary artery is normal in size.  26. The right atrial pressure is moderately elevated.    < end of copied text >

## 2019-08-05 NOTE — PROGRESS NOTE ADULT - ASSESSMENT
Pt is a 35 yo BF with h/o severe pHTN (previously on Adempas, non-compliance), RAD (reactive airway dz), mildly positive anti-cardiolipin Ab and hypothyroidism admitted 2 to decompensated right heart failure with volume overload and DIANN 2 to medication non-compliance. Off Levophed    Resp: Cont nc O2/ Cont pt's Nebs and Adempas/ F/u as per Pulm  CVS/Renal: Cont Bumex 1 mg BID and follow BUN/Cr and UO/ Cont Midodrine to maintain MAP >65  HEME: Adjust Coumadin to INR  FEN: Po diet/ Pt hypokalemic; replace K  Endo: Cont Synthroid   Psych: Cont pt's psych med (Zoloft)  OOB->chair->ambulate  Social: May transfer to [facility]

## 2019-08-06 PROBLEM — R60.0 LOCALIZED EDEMA: Chronic | Status: ACTIVE | Noted: 2019-07-27

## 2019-08-06 PROBLEM — R76.0 RAISED ANTIBODY TITER: Chronic | Status: ACTIVE | Noted: 2019-07-27

## 2019-08-06 PROBLEM — E55.9 VITAMIN D DEFICIENCY, UNSPECIFIED: Chronic | Status: ACTIVE | Noted: 2019-07-27

## 2019-08-06 PROBLEM — I15.8 OTHER SECONDARY HYPERTENSION: Chronic | Status: ACTIVE | Noted: 2019-07-26

## 2019-08-06 PROBLEM — Z87.891 PERSONAL HISTORY OF NICOTINE DEPENDENCE: Chronic | Status: ACTIVE | Noted: 2019-07-27

## 2019-08-06 PROBLEM — J44.9 CHRONIC OBSTRUCTIVE PULMONARY DISEASE, UNSPECIFIED: Chronic | Status: ACTIVE | Noted: 2019-07-27

## 2019-08-06 PROBLEM — Z51.81 ENCOUNTER FOR THERAPEUTIC DRUG LEVEL MONITORING: Chronic | Status: ACTIVE | Noted: 2019-07-27

## 2019-08-06 PROBLEM — E66.01 MORBID (SEVERE) OBESITY DUE TO EXCESS CALORIES: Chronic | Status: ACTIVE | Noted: 2019-07-27

## 2019-08-06 PROBLEM — I50.9 HEART FAILURE, UNSPECIFIED: Chronic | Status: ACTIVE | Noted: 2019-07-26

## 2019-08-06 PROBLEM — E11.9 TYPE 2 DIABETES MELLITUS WITHOUT COMPLICATIONS: Chronic | Status: ACTIVE | Noted: 2019-07-27

## 2019-08-06 PROBLEM — E80.6 OTHER DISORDERS OF BILIRUBIN METABOLISM: Chronic | Status: ACTIVE | Noted: 2019-07-27

## 2019-08-06 PROBLEM — J45.909 UNSPECIFIED ASTHMA, UNCOMPLICATED: Chronic | Status: ACTIVE | Noted: 2019-07-26

## 2019-08-06 LAB
ALBUMIN SERPL ELPH-MCNC: 3.1 G/DL — LOW (ref 3.3–5)
ALP SERPL-CCNC: 78 U/L — SIGNIFICANT CHANGE UP (ref 40–120)
ALT FLD-CCNC: 36 U/L — SIGNIFICANT CHANGE UP (ref 12–78)
ANION GAP SERPL CALC-SCNC: 10 MMOL/L — SIGNIFICANT CHANGE UP (ref 5–17)
ANISOCYTOSIS BLD QL: SIGNIFICANT CHANGE UP
APTT BLD: 31.7 SEC — SIGNIFICANT CHANGE UP (ref 28.5–37)
AST SERPL-CCNC: 55 U/L — HIGH (ref 15–37)
BASOPHILS # BLD AUTO: 0.03 K/UL — SIGNIFICANT CHANGE UP (ref 0–0.2)
BASOPHILS NFR BLD AUTO: 0.6 % — SIGNIFICANT CHANGE UP (ref 0–2)
BILIRUB SERPL-MCNC: 3.5 MG/DL — HIGH (ref 0.2–1.2)
BUN SERPL-MCNC: 10 MG/DL — SIGNIFICANT CHANGE UP (ref 7–23)
CALCIUM SERPL-MCNC: 8.9 MG/DL — SIGNIFICANT CHANGE UP (ref 8.5–10.1)
CHLORIDE SERPL-SCNC: 91 MMOL/L — LOW (ref 96–108)
CO2 SERPL-SCNC: 28 MMOL/L — SIGNIFICANT CHANGE UP (ref 22–31)
CREAT SERPL-MCNC: 0.84 MG/DL — SIGNIFICANT CHANGE UP (ref 0.5–1.3)
EOSINOPHIL # BLD AUTO: 0.04 K/UL — SIGNIFICANT CHANGE UP (ref 0–0.5)
EOSINOPHIL NFR BLD AUTO: 0.8 % — SIGNIFICANT CHANGE UP (ref 0–6)
GLUCOSE SERPL-MCNC: 90 MG/DL — SIGNIFICANT CHANGE UP (ref 70–99)
HCT VFR BLD CALC: 37.8 % — SIGNIFICANT CHANGE UP (ref 34.5–45)
HGB BLD-MCNC: 11.7 G/DL — SIGNIFICANT CHANGE UP (ref 11.5–15.5)
IMM GRANULOCYTES NFR BLD AUTO: 0.2 % — SIGNIFICANT CHANGE UP (ref 0–1.5)
INR BLD: 1.99 RATIO — HIGH (ref 0.88–1.16)
LG PLATELETS BLD QL AUTO: SLIGHT — SIGNIFICANT CHANGE UP
LYMPHOCYTES # BLD AUTO: 1.58 K/UL — SIGNIFICANT CHANGE UP (ref 1–3.3)
LYMPHOCYTES # BLD AUTO: 32.4 % — SIGNIFICANT CHANGE UP (ref 13–44)
MACROCYTES BLD QL: SIGNIFICANT CHANGE UP
MAGNESIUM SERPL-MCNC: 2.3 MG/DL — SIGNIFICANT CHANGE UP (ref 1.6–2.6)
MANUAL SMEAR VERIFICATION: YES — SIGNIFICANT CHANGE UP
MCHC RBC-ENTMCNC: 22.5 PG — LOW (ref 27–34)
MCHC RBC-ENTMCNC: 31 GM/DL — LOW (ref 32–36)
MCV RBC AUTO: 72.6 FL — LOW (ref 80–100)
MONOCYTES # BLD AUTO: 0.64 K/UL — SIGNIFICANT CHANGE UP (ref 0–0.9)
MONOCYTES NFR BLD AUTO: 13.1 % — SIGNIFICANT CHANGE UP (ref 2–14)
NEUTROPHILS # BLD AUTO: 2.58 K/UL — SIGNIFICANT CHANGE UP (ref 1.8–7.4)
NEUTROPHILS NFR BLD AUTO: 52.9 % — SIGNIFICANT CHANGE UP (ref 43–77)
NRBC # BLD: 0 /100 WBCS — SIGNIFICANT CHANGE UP (ref 0–0)
PHOSPHATE SERPL-MCNC: 2.7 MG/DL — SIGNIFICANT CHANGE UP (ref 2.5–4.5)
PLAT MORPH BLD: NORMAL — SIGNIFICANT CHANGE UP
PLATELET # BLD AUTO: 321 K/UL — SIGNIFICANT CHANGE UP (ref 150–400)
POLYCHROMASIA BLD QL SMEAR: SLIGHT — SIGNIFICANT CHANGE UP
POTASSIUM SERPL-MCNC: 3.2 MMOL/L — LOW (ref 3.5–5.3)
POTASSIUM SERPL-SCNC: 3.2 MMOL/L — LOW (ref 3.5–5.3)
PROT SERPL-MCNC: 7.9 GM/DL — SIGNIFICANT CHANGE UP (ref 6–8.3)
PROTHROM AB SERPL-ACNC: 22.8 SEC — HIGH (ref 10–12.9)
RBC # BLD: 5.21 M/UL — HIGH (ref 3.8–5.2)
RBC # FLD: 23.6 % — HIGH (ref 10.3–14.5)
RBC BLD AUTO: ABNORMAL
SODIUM SERPL-SCNC: 129 MMOL/L — LOW (ref 135–145)
WBC # BLD: 4.88 K/UL — SIGNIFICANT CHANGE UP (ref 3.8–10.5)
WBC # FLD AUTO: 4.88 K/UL — SIGNIFICANT CHANGE UP (ref 3.8–10.5)

## 2019-08-06 PROCEDURE — 99233 SBSQ HOSP IP/OBS HIGH 50: CPT

## 2019-08-06 RX ORDER — TRAMADOL HYDROCHLORIDE 50 MG/1
50 TABLET ORAL ONCE
Refills: 0 | Status: DISCONTINUED | OUTPATIENT
Start: 2019-08-06 | End: 2019-08-06

## 2019-08-06 RX ORDER — POTASSIUM CHLORIDE 20 MEQ
20 PACKET (EA) ORAL THREE TIMES A DAY
Refills: 0 | Status: DISCONTINUED | OUTPATIENT
Start: 2019-08-06 | End: 2019-08-08

## 2019-08-06 RX ORDER — POTASSIUM CHLORIDE 20 MEQ
10 PACKET (EA) ORAL
Refills: 0 | Status: COMPLETED | OUTPATIENT
Start: 2019-08-06 | End: 2019-08-06

## 2019-08-06 RX ORDER — WARFARIN SODIUM 2.5 MG/1
5 TABLET ORAL ONCE
Refills: 0 | Status: COMPLETED | OUTPATIENT
Start: 2019-08-06 | End: 2019-08-06

## 2019-08-06 RX ORDER — BUMETANIDE 0.25 MG/ML
2 INJECTION INTRAMUSCULAR; INTRAVENOUS
Refills: 0 | Status: DISCONTINUED | OUTPATIENT
Start: 2019-08-06 | End: 2019-08-08

## 2019-08-06 RX ADMIN — WARFARIN SODIUM 5 MILLIGRAM(S): 2.5 TABLET ORAL at 22:21

## 2019-08-06 RX ADMIN — TRAMADOL HYDROCHLORIDE 50 MILLIGRAM(S): 50 TABLET ORAL at 13:20

## 2019-08-06 RX ADMIN — BUDESONIDE AND FORMOTEROL FUMARATE DIHYDRATE 2 PUFF(S): 160; 4.5 AEROSOL RESPIRATORY (INHALATION) at 06:32

## 2019-08-06 RX ADMIN — RIOCIGUAT 1.5 MILLIGRAM(S): 1.5 TABLET, FILM COATED ORAL at 22:21

## 2019-08-06 RX ADMIN — Medication 100 MILLIGRAM(S): at 13:51

## 2019-08-06 RX ADMIN — Medication 3 MILLILITER(S): at 05:22

## 2019-08-06 RX ADMIN — Medication 81 MILLIGRAM(S): at 13:51

## 2019-08-06 RX ADMIN — BUDESONIDE AND FORMOTEROL FUMARATE DIHYDRATE 2 PUFF(S): 160; 4.5 AEROSOL RESPIRATORY (INHALATION) at 06:30

## 2019-08-06 RX ADMIN — Medication 3 MILLILITER(S): at 23:20

## 2019-08-06 RX ADMIN — MIDODRINE HYDROCHLORIDE 10 MILLIGRAM(S): 2.5 TABLET ORAL at 23:36

## 2019-08-06 RX ADMIN — BUMETANIDE 1 MILLIGRAM(S): 0.25 INJECTION INTRAMUSCULAR; INTRAVENOUS at 12:22

## 2019-08-06 RX ADMIN — Medication 20 MILLIEQUIVALENT(S): at 22:21

## 2019-08-06 RX ADMIN — Medication 20 MILLIEQUIVALENT(S): at 13:53

## 2019-08-06 RX ADMIN — SERTRALINE 50 MILLIGRAM(S): 25 TABLET, FILM COATED ORAL at 13:51

## 2019-08-06 RX ADMIN — Medication 100 MILLIEQUIVALENT(S): at 12:23

## 2019-08-06 RX ADMIN — SIMVASTATIN 40 MILLIGRAM(S): 20 TABLET, FILM COATED ORAL at 22:22

## 2019-08-06 RX ADMIN — RIOCIGUAT 1.5 MILLIGRAM(S): 1.5 TABLET, FILM COATED ORAL at 12:23

## 2019-08-06 RX ADMIN — RIOCIGUAT 1.5 MILLIGRAM(S): 1.5 TABLET, FILM COATED ORAL at 06:34

## 2019-08-06 RX ADMIN — Medication 100 MILLIEQUIVALENT(S): at 13:52

## 2019-08-06 RX ADMIN — BUDESONIDE AND FORMOTEROL FUMARATE DIHYDRATE 2 PUFF(S): 160; 4.5 AEROSOL RESPIRATORY (INHALATION) at 17:17

## 2019-08-06 RX ADMIN — Medication 3 MILLILITER(S): at 11:19

## 2019-08-06 RX ADMIN — Medication 25 MICROGRAM(S): at 06:31

## 2019-08-06 RX ADMIN — TRAMADOL HYDROCHLORIDE 50 MILLIGRAM(S): 50 TABLET ORAL at 12:22

## 2019-08-06 RX ADMIN — Medication 100 MILLIEQUIVALENT(S): at 12:43

## 2019-08-06 RX ADMIN — MIDODRINE HYDROCHLORIDE 10 MILLIGRAM(S): 2.5 TABLET ORAL at 06:31

## 2019-08-06 RX ADMIN — MIDODRINE HYDROCHLORIDE 10 MILLIGRAM(S): 2.5 TABLET ORAL at 13:51

## 2019-08-06 RX ADMIN — BUMETANIDE 2 MILLIGRAM(S): 0.25 INJECTION INTRAMUSCULAR; INTRAVENOUS at 17:16

## 2019-08-06 RX ADMIN — Medication 3 MILLILITER(S): at 17:02

## 2019-08-06 NOTE — PROGRESS NOTE ADULT - SUBJECTIVE AND OBJECTIVE BOX
Interval Events:  Patient seen and examined at bedside. Feels better. Continues to diurese well.       REVIEW OF SYSTEMS:  Constitutional: no fevers  CV:  no chest pain  Resp:  no SOB  GI: abd pain resolved    [x ] All other systems negative  [ ] Unable to assess ROS because ________    OBJECTIVE:    ICU Vital Signs Last 24 Hrs  T(C): 36.6 (06 Aug 2019 11:08), Max: 36.6 (06 Aug 2019 11:08)  T(F): 97.9 (06 Aug 2019 11:08), Max: 97.9 (06 Aug 2019 11:08)  HR: 75 (06 Aug 2019 11:19) (73 - 91)  BP: 124/67 (06 Aug 2019 11:08) (100/63 - 124/67)  BP(mean): --  ABP: --  ABP(mean): --  RR: 18 (06 Aug 2019 11:08) (17 - 18)  SpO2: 96% (06 Aug 2019 11:19) (94% - 98%)        08-05 @ 07:01  -  08-06 @ 07:00  --------------------------------------------------------  IN: 100 mL / OUT: 600 mL / NET: -500 mL      CAPILLARY BLOOD GLUCOSE        PHYSICAL EXAM:  General: Well appearing Female. No apparent distress  HEENT:   Mucous membranes are moist.  Neck: Supple. No JVD  Respiratory: clear  Cardiovascular: RRR, no m/r/g  Abdomen: Soft, non-tender, non-distended.   Extremities: +1, non-pitting  Skin: Warm, dry, no rash.   Neurological: CNII-XII grossly intact.   Psychiatry: appropriate mood and affect.     HOSPITAL MEDICATIONS:      HOSPITAL MEDICATIONS:  MEDICATIONS  (STANDING):  ALBUTerol    90 MICROgram(s) HFA Inhaler 1 Puff(s) Inhalation every 4 hours  ALBUTerol/ipratropium for Nebulization 3 milliLiter(s) Nebulizer every 6 hours  aspirin enteric coated 81 milliGRAM(s) Oral daily  buDESOnide  80 MICROgram(s)/formoterol 4.5 MICROgram(s) Inhaler 2 Puff(s) Inhalation two times a day  buMETAnide 2 milliGRAM(s) Oral two times a day  chlorhexidine 4% Liquid 1 Application(s) Topical <User Schedule>  docusate sodium 100 milliGRAM(s) Oral three times a day  levothyroxine 25 MICROGram(s) Oral daily  midodrine 10 milliGRAM(s) Oral every 8 hours  potassium chloride    Tablet ER 20 milliEquivalent(s) Oral three times a day  riociguat 1.5 milliGRAM(s) Oral every 8 hours  sertraline 50 milliGRAM(s) Oral daily  simvastatin 40 milliGRAM(s) Oral at bedtime  spironolactone 50 milliGRAM(s) Oral daily  tiotropium 18 MICROgram(s) Capsule 1 Capsule(s) Inhalation daily  warfarin 5 milliGRAM(s) Oral once    MEDICATIONS  (PRN):  acetaminophen   Tablet .. 650 milliGRAM(s) Oral every 6 hours PRN Mild Pain (1 - 3)  aluminum hydroxide/magnesium hydroxide/simethicone Suspension 30 milliLiter(s) Oral every 6 hours PRN Dyspepsia  petrolatum white Ointment 1 Application(s) Topical three times a day PRN chapped lips  sodium chloride 0.9% lock flush 10 milliLiter(s) IV Push every 1 hour PRN Pre/post blood products, medications, blood draw, and to maintain line patency      LABS:                        11.7   4.88  )-----------( 321      ( 06 Aug 2019 06:24 )             37.8     08-06    129<L>  |  91<L>  |  10  ----------------------------<  90  3.2<L>   |  28  |  0.84    Ca    8.9      06 Aug 2019 06:24  Phos  2.7     08-06  Mg     2.3     08-06    TPro  7.9  /  Alb  3.1<L>  /  TBili  3.5<H>  /  DBili  x   /  AST  55<H>  /  ALT  36  /  AlkPhos  78  08-06    PT/INR - ( 06 Aug 2019 06:24 )   PT: 22.8 sec;   INR: 1.99 ratio         PTT - ( 06 Aug 2019 06:24 )  PTT:31.7 sec    RADIOLOGY:  [ x ] Reviewed and interpreted by me    < from: TTE Echo Doppler w/o Cont (07.26.19 @ 08:44) >  1. Left ventricular ejection fraction, by visual estimation, is 45 to   50%.   2. Mildly decreased global left ventricular systolic function.   3. Elevated mean left atrial pressure.   4. Global cardiomyopathy.   5. Increased LV wall thickness.   6. Normal left ventricular internal cavity size.   7. Right ventricular pressure overload.   8. Spectral Doppler shows impaired relaxation pattern of left   ventricular myocardial filling (Grade I diastolic dysfunction).   9. There is mild asymmetric left ventricular hypertrophy.  10. Pulmonary hypertension.  11. Severely enlarged right ventricle.  12. Severely reduced RV systolic function.  13. RV ejection fraction 34%.  14. Small pericardial effusion.  15. Degenerative mitral valve.  16. Mild mitral annular calcification.  17. Mild mitral valve regurgitation.  18. Moderate tricuspid regurgitation.  19. Structurally normal tricuspid valve, with normal leaflet excursion.  20. Mild aortic regurgitation.  21. Mild to moderate pulmonic valve regurgitation.  22. Structurally normal pulmonic valve, with normal leaflet excursion.  23. Estimated pulmonary artery systolic pressure is 87.1 mmHg assuming a   right atrial pressure of 20 mmHg, which is consistent with severe   pulmonary hypertension.  24. Pulmonary hypertension is present.  25. The main pulmonary artery is normal in size.  26. The right atrial pressure is moderately elevated.    < end of copied text >

## 2019-08-06 NOTE — PROGRESS NOTE ADULT - ASSESSMENT
Pt is a 35 yo BF with h/o severe pHTN (previously on Adempas, non-compliance), RAD (reactive airway dz), mildly positive anti-cardiolipin Ab and hypothyroidism admitted 2 to decompensated right heart failure with volume overload and DIANN 2 to medication non-compliance. Off Levophed, transferred our of MICU.     Resp: Cont nc O2/ Cont pt's Nebs and Adempas/ F/u as per Pulm.  Oxygen at baseline level at home of 2.0 lpm.  CVS/Renal: Cont Bumex 2 mg oral BID and follow BUN/Cr and UO/ Cont Midodrine to maintain MAP >65  HEME: Adjust Coumadin to INR. Dose 5 mg today, INR 1.99.   FEN: Po diet/ Pt hypokalemic; replace K  Endo: Cont Synthroid 25 mcg/day.   Psych: Cont pt's psych med (Zoloft)  OOB->chair->ambulate  Social: May transfer to [facility].     Will cancel V/Q scan, on Coumadin long term. CXR in AM. Continue Symbicort bid and Spiriva along with Duonebs every 6 hours.     PT eval requested, Discharge home Wednesday if OK with Pulmonary.

## 2019-08-06 NOTE — PROGRESS NOTE ADULT - ASSESSMENT
33 yo woman with severe pHTN (?group I +II/?IV) [facility] 8/2018 - mPAP 67; W 9; RVR 9 previously on Adempas, non-complinat), mildly positive anti-cardiolipin Ab and RAD admitted with decompensated right heart failure s/p CCU stay for inotrope assisted diuresis and pressor support. Per chart 20L negative (wt 123-> 105kg). Much improved LE edema, abd distension, DIANN resoled, hyperbilirubinemia improving.      #pHTN/decompensated right heart failure  -- much improved volume status - continue with PO Bumex and Spironolactone to maintain euvolemia.    -- c/w  Adempas 1.5 q 8   -- c/w daily wt monitoring and strict Is/Os  -- please obtain VQ scan - she needs it to complete her pHTN workup. I would prefer to obtain this while patient is still inhouse.  -- c/w coumadin for AC, goal INR 2-3  -- supplemental O2 to keep sats >90%. Please ambulate on RA prior to d/c. She has concentrator at home but would need portable oxygen.    #DIANN - in the setting of congestion and volume overload - resolved with diuresis    #Reactive airway disease - PFTs in the past with reversible obstructive defect  -- c/w Symbicort and Duonebs prn  -- would d/c Spiriva   -- will need repeat PFTs as outpatient after discharge    Patient follows with Dr Unger at [address] [place] [address]. She should follow with him after discharge - I will set up the appointment for her.     Thank you for this consult     Shirley Mejia MD  [facility]  Pulmonary Medicine

## 2019-08-06 NOTE — PROGRESS NOTE ADULT - SUBJECTIVE AND OBJECTIVE BOX
INTERVAL HPI/OVERNIGHT EVENTS:  Pt seen and examined at bedside.     Allergies/Intolerance: No Known Allergies      MEDICATIONS  (STANDING):  ALBUTerol    90 MICROgram(s) HFA Inhaler 1 Puff(s) Inhalation every 4 hours  ALBUTerol/ipratropium for Nebulization 3 milliLiter(s) Nebulizer every 6 hours  aspirin enteric coated 81 milliGRAM(s) Oral daily  buDESOnide  80 MICROgram(s)/formoterol 4.5 MICROgram(s) Inhaler 2 Puff(s) Inhalation two times a day  buMETAnide Injectable 1 milliGRAM(s) IV Push two times a day  chlorhexidine 4% Liquid 1 Application(s) Topical <User Schedule>  docusate sodium 100 milliGRAM(s) Oral three times a day  levothyroxine 25 MICROGram(s) Oral daily  midodrine 10 milliGRAM(s) Oral every 8 hours  riociguat 1.5 milliGRAM(s) Oral every 8 hours  sertraline 50 milliGRAM(s) Oral daily  simvastatin 40 milliGRAM(s) Oral at bedtime  spironolactone 50 milliGRAM(s) Oral daily  tiotropium 18 MICROgram(s) Capsule 1 Capsule(s) Inhalation daily    MEDICATIONS  (PRN):  acetaminophen   Tablet .. 650 milliGRAM(s) Oral every 6 hours PRN Mild Pain (1 - 3)  aluminum hydroxide/magnesium hydroxide/simethicone Suspension 30 milliLiter(s) Oral every 6 hours PRN Dyspepsia  petrolatum white Ointment 1 Application(s) Topical three times a day PRN chapped lips  sodium chloride 0.9% lock flush 10 milliLiter(s) IV Push every 1 hour PRN Pre/post blood products, medications, blood draw, and to maintain line patency        ROS: all systems reviewed and wnl      PHYSICAL EXAMINATION:  Vital Signs Last 24 Hrs  T(C): 36.1 (06 Aug 2019 08:00), Max: 36.4 (06 Aug 2019 05:04)  T(F): 97 (06 Aug 2019 08:00), Max: 97.6 (06 Aug 2019 05:04)  HR: 82 (06 Aug 2019 08:00) (75 - 99)  BP: 121/43 (06 Aug 2019 08:00) (86/49 - 124/65)  BP(mean): 60 (05 Aug 2019 13:30) (57 - 66)  RR: 18 (06 Aug 2019 08:00) (16 - 25)  SpO2: 96% (06 Aug 2019 08:00) (75% - 100%)  CAPILLARY BLOOD GLUCOSE          08-05 @ 07:01  -  08-06 @ 07:00  --------------------------------------------------------  IN: 100 mL / OUT: 600 mL / NET: -500 mL        GENERAL:   NECK: supple, No JVD  CHEST/LUNG: clear to auscultation bilaterally; no rales, rhonchi, or wheezing b/l  HEART: normal S1, S2  ABDOMEN: BS+, soft, ND, NT   EXTREMITIES:  pulses palpable; no clubbing, cyanosis, or edema b/l LEs  SKIN: no rashes or lesions      LABS:                        11.7   4.88  )-----------( 321      ( 06 Aug 2019 06:24 )             37.8     08-06    129<L>  |  91<L>  |  10  ----------------------------<  90  3.2<L>   |  28  |  0.84    Ca    8.9      06 Aug 2019 06:24  Phos  2.7     08-06  Mg     2.3     08-06    TPro  7.9  /  Alb  3.1<L>  /  TBili  3.5<H>  /  DBili  x   /  AST  55<H>  /  ALT  36  /  AlkPhos  78  08-06    PT/INR - ( 06 Aug 2019 06:24 )   PT: 22.8 sec;   INR: 1.99 ratio         PTT - ( 06 Aug 2019 06:24 )  PTT:31.7 sec INTERVAL HPI/OVERNIGHT EVENTS:  Pt seen and examined at bedside.     Allergies/Intolerance: No Known Allergies      MEDICATIONS  (STANDING):  ALBUTerol    90 MICROgram(s) HFA Inhaler 1 Puff(s) Inhalation every 4 hours  ALBUTerol/ipratropium for Nebulization 3 milliLiter(s) Nebulizer every 6 hours  aspirin enteric coated 81 milliGRAM(s) Oral daily  buDESOnide  80 MICROgram(s)/formoterol 4.5 MICROgram(s) Inhaler 2 Puff(s) Inhalation two times a day  buMETAnide Injectable 1 milliGRAM(s) IV Push two times a day  chlorhexidine 4% Liquid 1 Application(s) Topical <User Schedule>  docusate sodium 100 milliGRAM(s) Oral three times a day  levothyroxine 25 MICROGram(s) Oral daily  midodrine 10 milliGRAM(s) Oral every 8 hours  riociguat 1.5 milliGRAM(s) Oral every 8 hours  sertraline 50 milliGRAM(s) Oral daily  simvastatin 40 milliGRAM(s) Oral at bedtime  spironolactone 50 milliGRAM(s) Oral daily  tiotropium 18 MICROgram(s) Capsule 1 Capsule(s) Inhalation daily    MEDICATIONS  (PRN):  acetaminophen   Tablet .. 650 milliGRAM(s) Oral every 6 hours PRN Mild Pain (1 - 3)  aluminum hydroxide/magnesium hydroxide/simethicone Suspension 30 milliLiter(s) Oral every 6 hours PRN Dyspepsia  petrolatum white Ointment 1 Application(s) Topical three times a day PRN chapped lips  sodium chloride 0.9% lock flush 10 milliLiter(s) IV Push every 1 hour PRN Pre/post blood products, medications, blood draw, and to maintain line patency        ROS: all systems reviewed and wnl      PHYSICAL EXAMINATION:  Vital Signs Last 24 Hrs  T(C): 36.1 (06 Aug 2019 08:00), Max: 36.4 (06 Aug 2019 05:04)  T(F): 97 (06 Aug 2019 08:00), Max: 97.6 (06 Aug 2019 05:04)  HR: 82 (06 Aug 2019 08:00) (75 - 99)  BP: 121/43 (06 Aug 2019 08:00) (86/49 - 124/65)  BP(mean): 60 (05 Aug 2019 13:30) (57 - 66)  RR: 18 (06 Aug 2019 08:00) (16 - 25)  SpO2: 96% (06 Aug 2019 08:00) (75% - 100%)  CAPILLARY BLOOD GLUCOSE          08-05 @ 07:01  -  08-06 @ 07:00  --------------------------------------------------------  IN: 100 mL / OUT: 600 mL / NET: -500 mL        GENERAL: comfortable in bed, no SOB, fevers or CP  NECK: supple, No JVD  CHEST/LUNG: clear to auscultation bilaterally; no rales, rhonchi, or wheezing b/l  HEART: normal S1, S2  ABDOMEN: BS+, soft, ND, NT   EXTREMITIES:  pulses palpable; no clubbing, cyanosis, 1 plus edema b/l LEs  SKIN: no rashes or lesions      LABS:                        11.7   4.88  )-----------( 321      ( 06 Aug 2019 06:24 )             37.8     08-06    129<L>  |  91<L>  |  10  ----------------------------<  90  3.2<L>   |  28  |  0.84    Ca    8.9      06 Aug 2019 06:24  Phos  2.7     08-06  Mg     2.3     08-06    TPro  7.9  /  Alb  3.1<L>  /  TBili  3.5<H>  /  DBili  x   /  AST  55<H>  /  ALT  36  /  AlkPhos  78  08-06    PT/INR - ( 06 Aug 2019 06:24 )   PT: 22.8 sec;   INR: 1.99 ratio         PTT - ( 06 Aug 2019 06:24 )  PTT:31.7 sec

## 2019-08-06 NOTE — CHART NOTE - NSCHARTNOTEFT_GEN_A_CORE
Pulmonary follow up appointment scheduled for the patient -     Thursday 8/8/19 at 12 noon with Dr Ute Tubbs [facility], [address]  [phone]    Please inform patient of scheduled appointment.     thank you

## 2019-08-07 LAB
ANION GAP SERPL CALC-SCNC: 10 MMOL/L — SIGNIFICANT CHANGE UP (ref 5–17)
APTT BLD: 32.6 SEC — SIGNIFICANT CHANGE UP (ref 27.5–36.3)
BUN SERPL-MCNC: 10 MG/DL — SIGNIFICANT CHANGE UP (ref 7–23)
CALCIUM SERPL-MCNC: 9 MG/DL — SIGNIFICANT CHANGE UP (ref 8.5–10.1)
CHLORIDE SERPL-SCNC: 93 MMOL/L — LOW (ref 96–108)
CO2 SERPL-SCNC: 26 MMOL/L — SIGNIFICANT CHANGE UP (ref 22–31)
CREAT SERPL-MCNC: 0.9 MG/DL — SIGNIFICANT CHANGE UP (ref 0.5–1.3)
GLUCOSE SERPL-MCNC: 85 MG/DL — SIGNIFICANT CHANGE UP (ref 70–99)
INR BLD: 2.07 RATIO — HIGH (ref 0.88–1.16)
MAGNESIUM SERPL-MCNC: 2.1 MG/DL — SIGNIFICANT CHANGE UP (ref 1.6–2.6)
POTASSIUM SERPL-MCNC: 3.3 MMOL/L — LOW (ref 3.5–5.3)
POTASSIUM SERPL-SCNC: 3.3 MMOL/L — LOW (ref 3.5–5.3)
PROTHROM AB SERPL-ACNC: 23.7 SEC — HIGH (ref 10–12.9)
SODIUM SERPL-SCNC: 129 MMOL/L — LOW (ref 135–145)

## 2019-08-07 PROCEDURE — 99233 SBSQ HOSP IP/OBS HIGH 50: CPT

## 2019-08-07 PROCEDURE — 78582 LUNG VENTILAT&PERFUS IMAGING: CPT | Mod: 26

## 2019-08-07 PROCEDURE — 99232 SBSQ HOSP IP/OBS MODERATE 35: CPT

## 2019-08-07 RX ORDER — POTASSIUM CHLORIDE 20 MEQ
40 PACKET (EA) ORAL EVERY 4 HOURS
Refills: 0 | Status: COMPLETED | OUTPATIENT
Start: 2019-08-07 | End: 2019-08-08

## 2019-08-07 RX ORDER — WARFARIN SODIUM 2.5 MG/1
5 TABLET ORAL ONCE
Refills: 0 | Status: COMPLETED | OUTPATIENT
Start: 2019-08-07 | End: 2019-08-07

## 2019-08-07 RX ADMIN — Medication 3 MILLILITER(S): at 05:37

## 2019-08-07 RX ADMIN — MIDODRINE HYDROCHLORIDE 10 MILLIGRAM(S): 2.5 TABLET ORAL at 21:17

## 2019-08-07 RX ADMIN — WARFARIN SODIUM 5 MILLIGRAM(S): 2.5 TABLET ORAL at 21:17

## 2019-08-07 RX ADMIN — RIOCIGUAT 1.5 MILLIGRAM(S): 1.5 TABLET, FILM COATED ORAL at 14:16

## 2019-08-07 RX ADMIN — SIMVASTATIN 40 MILLIGRAM(S): 20 TABLET, FILM COATED ORAL at 21:17

## 2019-08-07 RX ADMIN — Medication 40 MILLIEQUIVALENT(S): at 17:34

## 2019-08-07 RX ADMIN — BUDESONIDE AND FORMOTEROL FUMARATE DIHYDRATE 2 PUFF(S): 160; 4.5 AEROSOL RESPIRATORY (INHALATION) at 17:34

## 2019-08-07 RX ADMIN — Medication 3 MILLILITER(S): at 11:45

## 2019-08-07 RX ADMIN — RIOCIGUAT 1.5 MILLIGRAM(S): 1.5 TABLET, FILM COATED ORAL at 21:18

## 2019-08-07 RX ADMIN — Medication 81 MILLIGRAM(S): at 14:17

## 2019-08-07 RX ADMIN — Medication 25 MICROGRAM(S): at 05:43

## 2019-08-07 RX ADMIN — RIOCIGUAT 1.5 MILLIGRAM(S): 1.5 TABLET, FILM COATED ORAL at 05:42

## 2019-08-07 RX ADMIN — Medication 3 MILLILITER(S): at 23:23

## 2019-08-07 RX ADMIN — CHLORHEXIDINE GLUCONATE 1 APPLICATION(S): 213 SOLUTION TOPICAL at 08:13

## 2019-08-07 RX ADMIN — SERTRALINE 50 MILLIGRAM(S): 25 TABLET, FILM COATED ORAL at 18:13

## 2019-08-07 RX ADMIN — Medication 3 MILLILITER(S): at 17:21

## 2019-08-07 RX ADMIN — MIDODRINE HYDROCHLORIDE 10 MILLIGRAM(S): 2.5 TABLET ORAL at 05:44

## 2019-08-07 RX ADMIN — BUMETANIDE 2 MILLIGRAM(S): 0.25 INJECTION INTRAMUSCULAR; INTRAVENOUS at 05:43

## 2019-08-07 RX ADMIN — BUMETANIDE 2 MILLIGRAM(S): 0.25 INJECTION INTRAMUSCULAR; INTRAVENOUS at 17:34

## 2019-08-07 RX ADMIN — MIDODRINE HYDROCHLORIDE 10 MILLIGRAM(S): 2.5 TABLET ORAL at 17:34

## 2019-08-07 RX ADMIN — BUDESONIDE AND FORMOTEROL FUMARATE DIHYDRATE 2 PUFF(S): 160; 4.5 AEROSOL RESPIRATORY (INHALATION) at 06:27

## 2019-08-07 NOTE — PROGRESS NOTE ADULT - ASSESSMENT
33 yo woman with severe pHTN (group I) [facility] 8/2018 - mPAP 67; W 9; RVR 9 previously on Adempas, non-complinat), mildly positive anti-cardiolipin Ab and RAD admitted with decompensated right heart failure s/p CCU stay for inotrope assisted diuresis and pressor support. Per chart >20L negative (wt 123-> 105kg). Much improved LE edema, abd distension, DIANN resoled, hyperbilirubinemia improving.      #pHTN/decompensated right heart failure  -- much improved volume status - continue with PO Bumex and Spironolactone to maintain euvolemia.    -- c/w  Adempas 1.5 q 8   -- c/w daily wt monitoring and strict Is/Os  -- c/w coumadin for AC, goal INR 2-3  -- supplemental O2 to keep sats >90%. Please ambulate on RA prior to d/c. She has concentrator at home but would need portable oxygen.    #DIANN - in the setting of congestion and volume overload - resolved with diuresis    #Reactive airway disease - PFTs in the past with reversible obstructive defect  -- c/w Symbicort and Duonebs prn  -- would d/c Spiriva   -- will need repeat PFTs as outpatient after discharge    Patient follows with Dr Unger at [address] [place] [address]. She should follow with him after discharge -she will re-schedule her appointment. Stable for d/c from pulmonary standpoint.     Thank you for this consult     Shirley Mejia MD  [facility]  Pulmonary Medicine

## 2019-08-07 NOTE — PROGRESS NOTE ADULT - SUBJECTIVE AND OBJECTIVE BOX
HPI:  34 year old woman with CHF, asthma presents to ED with complaint of SOB and leg swelling.  She states that she can only walk 1-2 blocks at baseline and recently her exercise tolerance has decreased.  She denies chest pain, palpitations, lightheadedness, nausea, vomiting.    In the ED, troponin 0.435, BNP 4049, CXR shows pulmonary vasc congestion.  Pt started on BiPAP by ED. (26 Jul 2019 05:51)    Patient is a 34y old  Female who presents with a chief complaint of CHF exacerbation (06 Aug 2019 14:48)      INTERVAL HPI/OVERNIGHT EVENTS: no acute events wants to go home     MEDICATIONS  (STANDING):  ALBUTerol    90 MICROgram(s) HFA Inhaler 1 Puff(s) Inhalation every 4 hours  ALBUTerol/ipratropium for Nebulization 3 milliLiter(s) Nebulizer every 6 hours  aspirin enteric coated 81 milliGRAM(s) Oral daily  buDESOnide  80 MICROgram(s)/formoterol 4.5 MICROgram(s) Inhaler 2 Puff(s) Inhalation two times a day  buMETAnide 2 milliGRAM(s) Oral two times a day  chlorhexidine 4% Liquid 1 Application(s) Topical <User Schedule>  docusate sodium 100 milliGRAM(s) Oral three times a day  levothyroxine 25 MICROGram(s) Oral daily  midodrine 10 milliGRAM(s) Oral every 8 hours  potassium chloride    Tablet ER 40 milliEquivalent(s) Oral every 4 hours  potassium chloride    Tablet ER 20 milliEquivalent(s) Oral three times a day  riociguat 1.5 milliGRAM(s) Oral every 8 hours  sertraline 50 milliGRAM(s) Oral daily  simvastatin 40 milliGRAM(s) Oral at bedtime  spironolactone 50 milliGRAM(s) Oral daily  tiotropium 18 MICROgram(s) Capsule 1 Capsule(s) Inhalation daily  warfarin 5 milliGRAM(s) Oral once    MEDICATIONS  (PRN):  acetaminophen   Tablet .. 650 milliGRAM(s) Oral every 6 hours PRN Mild Pain (1 - 3)  aluminum hydroxide/magnesium hydroxide/simethicone Suspension 30 milliLiter(s) Oral every 6 hours PRN Dyspepsia  petrolatum white Ointment 1 Application(s) Topical three times a day PRN chapped lips  sodium chloride 0.9% lock flush 10 milliLiter(s) IV Push every 1 hour PRN Pre/post blood products, medications, blood draw, and to maintain line patency      Allergies    No Known Allergies    Intolerances        REVIEW OF SYSTEMS:  CONSTITUTIONAL: No fever, weight loss, or fatigue  EYES: No eye pain, visual disturbances, or discharge  ENMT:  No difficulty hearing, tinnitus, vertigo; No sinus or throat pain  NECK: No pain or stiffness  BREASTS: No pain, masses, or nipple discharge  RESPIRATORY: No cough, wheezing, chills or hemoptysis; No shortness of breath  CARDIOVASCULAR: No chest pain, palpitations, dizziness, or leg swelling  GASTROINTESTINAL: No abdominal or epigastric pain. No nausea, vomiting, or hematemesis; No diarrhea or constipation. No melena or hematochezia.  GENITOURINARY: No dysuria, frequency, hematuria, or incontinence  NEUROLOGICAL: No headaches, memory loss, loss of strength, numbness, or tremors  SKIN: No itching, burning, rashes, or lesions   LYMPH NODES: No enlarged glands  ENDOCRINE: No heat or cold intolerance; No hair loss  MUSCULOSKELETAL: No joint pain or swelling; No muscle, back, or extremity pain  PSYCHIATRIC: No depression, anxiety, mood swings, or difficulty sleeping  HEME/LYMPH: No easy bruising, or bleeding gums  ALLERGY AND IMMUNOLOGIC: No hives or eczema    Vital Signs Last 24 Hrs  T(C): 36.2 (07 Aug 2019 11:40), Max: 36.9 (06 Aug 2019 23:45)  T(F): 97.1 (07 Aug 2019 11:40), Max: 98.5 (06 Aug 2019 23:45)  HR: 84 (07 Aug 2019 11:46) (68 - 87)  BP: 110/60 (07 Aug 2019 11:40) (107/58 - 119/58)  BP(mean): --  RR: 17 (07 Aug 2019 11:40) (17 - 20)  SpO2: 97% (07 Aug 2019 11:46) (93% - 100%)    PHYSICAL EXAM:  GENERAL: NAD, well-groomed, well-developed  HEAD:  Atraumatic, Normocephalic  EYES: EOMI, PERRLA, conjunctiva and sclera clear  ENMT: No tonsillar erythema, exudates, or enlargement; Moist mucous membranes, Good dentition, No lesions  NECK: Supple, No JVD, Normal thyroid  NERVOUS SYSTEM:  Alert & Oriented X3, Good concentration; Motor Strength 5/5 B/L upper and lower extremities; DTRs 2+ intact and symmetric  CHEST/LUNG: Clear to percussion bilaterally; No rales, rhonchi, wheezing, or rubs  HEART: Regular rate and rhythm; No murmurs, rubs, or gallops  ABDOMEN: Soft, Nontender, Nondistended; Bowel sounds present  EXTREMITIES:  2+ Peripheral Pulses, No clubbing, cyanosis, or edema  LYMPH: No lymphadenopathy noted  SKIN: No rashes or lesions    LABS:                        11.7   4.88  )-----------( 321      ( 06 Aug 2019 06:24 )             37.8     08-07    129<L>  |  93<L>  |  10  ----------------------------<  85  3.3<L>   |  26  |  0.90    Ca    9.0      07 Aug 2019 06:22  Phos  2.7     08-06  Mg     2.1     08-07    TPro  7.9  /  Alb  3.1<L>  /  TBili  3.5<H>  /  DBili  x   /  AST  55<H>  /  ALT  36  /  AlkPhos  78  08-06    PT/INR - ( 07 Aug 2019 07:17 )   PT: 23.7 sec;   INR: 2.07 ratio         PTT - ( 07 Aug 2019 07:17 )  PTT:32.6 sec    CAPILLARY BLOOD GLUCOSE          RADIOLOGY & ADDITIONAL TESTS:    Imaging Personally Reviewed:  [ ] YES  [ ] NO    Consultant(s) Notes Reviewed:  [ ] YES  [ ] NO    Care Discussed with Consultants/Other Providers [ ] YES  [ ] NO

## 2019-08-07 NOTE — PROGRESS NOTE ADULT - REASON FOR ADMISSION
CHF exacerbation

## 2019-08-07 NOTE — PROGRESS NOTE ADULT - PROVIDER SPECIALTY LIST ADULT
Cardiology
Critical Care
Gastroenterology
Gastroenterology
Hospitalist
Hospitalist
Pulmonology
Critical Care
Critical Care
Hospitalist
Pulmonology
Gastroenterology
Gastroenterology

## 2019-08-07 NOTE — PROGRESS NOTE ADULT - ASSESSMENT
Pt is a 35 yo BF with h/o severe pHTN (previously on Adempas, non-compliance), RAD (reactive airway dz), mildly positive anti-cardiolipin Ab and hypothyroidism admitted 2 to decompensated right heart failure with volume overload and DIANN 2 to medication non-compliance. Off Levophed, transferred our of MICU.     Resp: Cont nc O2/ Cont pt's Nebs and Adempas/ F/u as per Pulm.  Oxygen at baseline level at home of 2.0 lpm.  CVS/Renal: Cont Bumex 2 mg oral BID and follow BUN/Cr and UO/ Cont Midodrine to maintain MAP >65  HEME: Adjust Coumadin to INR. Dose 5 mg today, INR 1.99.   FEN: Po diet/ Pt hypokalemic; replace K  Endo: Cont Synthroid 25 mcg/day.   Psych: Cont pt's psych med (Zoloft)  OOB->chair->ambulate  Social: May transfer to F.   an, on Coumadin long term. CXR in AM. Continue Symbicort bid and Spiriva along with Duonebs every 6 hours.     PT eval requested, Discharge home thursday if OK with Pulmonary.

## 2019-08-07 NOTE — PROGRESS NOTE ADULT - SUBJECTIVE AND OBJECTIVE BOX
Interval Events:  Patient seen and examined at bedside. Feels well.   VQ scan very low probability.       REVIEW OF SYSTEMS:  Constitutional: no fevers  CV:  no chest pain  Resp:  no SOB  GI: abd pain resolved    [x ] All other systems negative  [ ] Unable to assess ROS because ________    OBJECTIVE:      Vital Signs Last 24 Hrs  T(C): 36.2 (08-07-19 @ 11:40), Max: 36.9 (08-06-19 @ 23:45)  T(F): 97.1 (08-07-19 @ 11:40), Max: 98.5 (08-06-19 @ 23:45)  HR: 84 (08-07-19 @ 11:46) (68 - 87)  BP: 110/60 (08-07-19 @ 11:40) (107/58 - 119/58)  BP(mean): --  RR: 17 (08-07-19 @ 11:40) (17 - 20)  SpO2: 97% (08-07-19 @ 11:46) (93% - 100%)          PHYSICAL EXAM:  General: Well appearing Female. No apparent distress  HEENT:   Mucous membranes are moist.  Neck: Supple. No JVD  Respiratory: clear  Cardiovascular: RRR, no m/r/g  Abdomen: Soft, non-tender, non-distended.   Extremities: +1, non-pitting  Skin: Warm, dry, no rash.   Neurological: CNII-XII grossly intact.   Psychiatry: appropriate mood and affect.     HOSPITAL MEDICATIONS:          HOSPITAL MEDICATIONS:  MEDICATIONS  (STANDING):  ALBUTerol    90 MICROgram(s) HFA Inhaler 1 Puff(s) Inhalation every 4 hours  ALBUTerol/ipratropium for Nebulization 3 milliLiter(s) Nebulizer every 6 hours  aspirin enteric coated 81 milliGRAM(s) Oral daily  buDESOnide  80 MICROgram(s)/formoterol 4.5 MICROgram(s) Inhaler 2 Puff(s) Inhalation two times a day  buMETAnide 2 milliGRAM(s) Oral two times a day  chlorhexidine 4% Liquid 1 Application(s) Topical <User Schedule>  docusate sodium 100 milliGRAM(s) Oral three times a day  levothyroxine 25 MICROGram(s) Oral daily  midodrine 10 milliGRAM(s) Oral every 8 hours  potassium chloride    Tablet ER 40 milliEquivalent(s) Oral every 4 hours  potassium chloride    Tablet ER 20 milliEquivalent(s) Oral three times a day  riociguat 1.5 milliGRAM(s) Oral every 8 hours  sertraline 50 milliGRAM(s) Oral daily  simvastatin 40 milliGRAM(s) Oral at bedtime  spironolactone 50 milliGRAM(s) Oral daily  tiotropium 18 MICROgram(s) Capsule 1 Capsule(s) Inhalation daily  warfarin 5 milliGRAM(s) Oral once    MEDICATIONS  (PRN):  acetaminophen   Tablet .. 650 milliGRAM(s) Oral every 6 hours PRN Mild Pain (1 - 3)  aluminum hydroxide/magnesium hydroxide/simethicone Suspension 30 milliLiter(s) Oral every 6 hours PRN Dyspepsia  petrolatum white Ointment 1 Application(s) Topical three times a day PRN chapped lips  sodium chloride 0.9% lock flush 10 milliLiter(s) IV Push every 1 hour PRN Pre/post blood products, medications, blood draw, and to maintain line patency      LABS:                        11.7   4.88  )-----------( 321      ( 06 Aug 2019 06:24 )             37.8     08-07    129<L>  |  93<L>  |  10  ----------------------------<  85  3.3<L>   |  26  |  0.90    Ca    9.0      07 Aug 2019 06:22  Phos  2.7     08-06  Mg     2.1     08-07    TPro  7.9  /  Alb  3.1<L>  /  TBili  3.5<H>  /  DBili  x   /  AST  55<H>  /  ALT  36  /  AlkPhos  78  08-06    PT/INR - ( 07 Aug 2019 07:17 )   PT: 23.7 sec;   INR: 2.07 ratio         PTT - ( 07 Aug 2019 07:17 )  PTT:32.6 sec          RADIOLOGY:  [ x ] Reviewed and interpreted by me    < from: TTE Echo Doppler w/o Cont (07.26.19 @ 08:44) >  1. Left ventricular ejection fraction, by visual estimation, is 45 to   50%.   2. Mildly decreased global left ventricular systolic function.   3. Elevated mean left atrial pressure.   4. Global cardiomyopathy.   5. Increased LV wall thickness.   6. Normal left ventricular internal cavity size.   7. Right ventricular pressure overload.   8. Spectral Doppler shows impaired relaxation pattern of left   ventricular myocardial filling (Grade I diastolic dysfunction).   9. There is mild asymmetric left ventricular hypertrophy.  10. Pulmonary hypertension.  11. Severely enlarged right ventricle.  12. Severely reduced RV systolic function.  13. RV ejection fraction 34%.  14. Small pericardial effusion.  15. Degenerative mitral valve.  16. Mild mitral annular calcification.  17. Mild mitral valve regurgitation.  18. Moderate tricuspid regurgitation.  19. Structurally normal tricuspid valve, with normal leaflet excursion.  20. Mild aortic regurgitation.  21. Mild to moderate pulmonic valve regurgitation.  22. Structurally normal pulmonic valve, with normal leaflet excursion.  23. Estimated pulmonary artery systolic pressure is 87.1 mmHg assuming a   right atrial pressure of 20 mmHg, which is consistent with severe   pulmonary hypertension.  24. Pulmonary hypertension is present.  25. The main pulmonary artery is normal in size.  26. The right atrial pressure is moderately elevated.    < end of copied text >      VQ scan - very low probability for PE

## 2019-08-08 ENCOUNTER — TRANSCRIPTION ENCOUNTER (OUTPATIENT)
Age: 34
End: 2019-08-08

## 2019-08-08 ENCOUNTER — APPOINTMENT (OUTPATIENT)
Dept: PULMONOLOGY | Facility: CLINIC | Age: 34
End: 2019-08-08

## 2019-08-08 VITALS — OXYGEN SATURATION: 97 %

## 2019-08-08 LAB
ANION GAP SERPL CALC-SCNC: 11 MMOL/L — SIGNIFICANT CHANGE UP (ref 5–17)
APTT BLD: 34.1 SEC — SIGNIFICANT CHANGE UP (ref 28.5–37)
BUN SERPL-MCNC: 11 MG/DL — SIGNIFICANT CHANGE UP (ref 7–23)
CALCIUM SERPL-MCNC: 8.8 MG/DL — SIGNIFICANT CHANGE UP (ref 8.5–10.1)
CHLORIDE SERPL-SCNC: 94 MMOL/L — LOW (ref 96–108)
CO2 SERPL-SCNC: 26 MMOL/L — SIGNIFICANT CHANGE UP (ref 22–31)
CREAT SERPL-MCNC: 0.82 MG/DL — SIGNIFICANT CHANGE UP (ref 0.5–1.3)
GLUCOSE SERPL-MCNC: 86 MG/DL — SIGNIFICANT CHANGE UP (ref 70–99)
HCT VFR BLD CALC: 36.8 % — SIGNIFICANT CHANGE UP (ref 34.5–45)
HGB BLD-MCNC: 11.6 G/DL — SIGNIFICANT CHANGE UP (ref 11.5–15.5)
INR BLD: 2.13 RATIO — HIGH (ref 0.88–1.16)
MAGNESIUM SERPL-MCNC: 2.1 MG/DL — SIGNIFICANT CHANGE UP (ref 1.6–2.6)
MCHC RBC-ENTMCNC: 23.1 PG — LOW (ref 27–34)
MCHC RBC-ENTMCNC: 31.5 GM/DL — LOW (ref 32–36)
MCV RBC AUTO: 73.3 FL — LOW (ref 80–100)
NRBC # BLD: 0 /100 WBCS — SIGNIFICANT CHANGE UP (ref 0–0)
PHOSPHATE SERPL-MCNC: 2.5 MG/DL — SIGNIFICANT CHANGE UP (ref 2.5–4.5)
PLATELET # BLD AUTO: 298 K/UL — SIGNIFICANT CHANGE UP (ref 150–400)
POTASSIUM SERPL-MCNC: 3 MMOL/L — LOW (ref 3.5–5.3)
POTASSIUM SERPL-SCNC: 3 MMOL/L — LOW (ref 3.5–5.3)
PROTHROM AB SERPL-ACNC: 24.4 SEC — HIGH (ref 10–12.9)
RBC # BLD: 5.02 M/UL — SIGNIFICANT CHANGE UP (ref 3.8–5.2)
RBC # FLD: 23.3 % — HIGH (ref 10.3–14.5)
SODIUM SERPL-SCNC: 131 MMOL/L — LOW (ref 135–145)
WBC # BLD: 4.24 K/UL — SIGNIFICANT CHANGE UP (ref 3.8–10.5)
WBC # FLD AUTO: 4.24 K/UL — SIGNIFICANT CHANGE UP (ref 3.8–10.5)

## 2019-08-08 PROCEDURE — 71046 X-RAY EXAM CHEST 2 VIEWS: CPT | Mod: 26

## 2019-08-08 PROCEDURE — 99239 HOSP IP/OBS DSCHRG MGMT >30: CPT

## 2019-08-08 RX ORDER — POTASSIUM CHLORIDE 20 MEQ
10 PACKET (EA) ORAL
Refills: 0 | Status: DISCONTINUED | OUTPATIENT
Start: 2019-08-08 | End: 2019-08-08

## 2019-08-08 RX ORDER — POTASSIUM CHLORIDE 20 MEQ
10 PACKET (EA) ORAL
Refills: 0 | Status: COMPLETED | OUTPATIENT
Start: 2019-08-08 | End: 2019-08-08

## 2019-08-08 RX ORDER — POTASSIUM CHLORIDE 20 MEQ
40 PACKET (EA) ORAL
Refills: 0 | Status: COMPLETED | OUTPATIENT
Start: 2019-08-08 | End: 2019-08-08

## 2019-08-08 RX ORDER — ASPIRIN/CALCIUM CARB/MAGNESIUM 324 MG
1 TABLET ORAL
Qty: 0 | Refills: 0 | DISCHARGE
Start: 2019-08-08

## 2019-08-08 RX ORDER — BUDESONIDE AND FORMOTEROL FUMARATE DIHYDRATE 160; 4.5 UG/1; UG/1
2 AEROSOL RESPIRATORY (INHALATION)
Qty: 0 | Refills: 0 | DISCHARGE

## 2019-08-08 RX ORDER — MIDODRINE HYDROCHLORIDE 2.5 MG/1
1 TABLET ORAL
Qty: 90 | Refills: 0
Start: 2019-08-08 | End: 2019-09-06

## 2019-08-08 RX ORDER — ALBUTEROL 90 UG/1
2 AEROSOL, METERED ORAL
Qty: 0 | Refills: 0 | DISCHARGE

## 2019-08-08 RX ORDER — POTASSIUM CHLORIDE 20 MEQ
40 PACKET (EA) ORAL ONCE
Refills: 0 | Status: DISCONTINUED | OUTPATIENT
Start: 2019-08-08 | End: 2019-08-08

## 2019-08-08 RX ORDER — SIMVASTATIN 20 MG/1
1 TABLET, FILM COATED ORAL
Qty: 30 | Refills: 0
Start: 2019-08-08 | End: 2019-09-06

## 2019-08-08 RX ADMIN — BUMETANIDE 2 MILLIGRAM(S): 0.25 INJECTION INTRAMUSCULAR; INTRAVENOUS at 17:11

## 2019-08-08 RX ADMIN — SPIRONOLACTONE 50 MILLIGRAM(S): 25 TABLET, FILM COATED ORAL at 05:56

## 2019-08-08 RX ADMIN — Medication 30 MILLILITER(S): at 00:03

## 2019-08-08 RX ADMIN — Medication 100 MILLIEQUIVALENT(S): at 17:00

## 2019-08-08 RX ADMIN — Medication 100 MILLIEQUIVALENT(S): at 11:49

## 2019-08-08 RX ADMIN — Medication 100 MILLIEQUIVALENT(S): at 13:21

## 2019-08-08 RX ADMIN — Medication 3 MILLILITER(S): at 11:03

## 2019-08-08 RX ADMIN — MIDODRINE HYDROCHLORIDE 10 MILLIGRAM(S): 2.5 TABLET ORAL at 05:56

## 2019-08-08 RX ADMIN — RIOCIGUAT 1.5 MILLIGRAM(S): 1.5 TABLET, FILM COATED ORAL at 06:01

## 2019-08-08 RX ADMIN — MIDODRINE HYDROCHLORIDE 10 MILLIGRAM(S): 2.5 TABLET ORAL at 13:20

## 2019-08-08 RX ADMIN — BUMETANIDE 2 MILLIGRAM(S): 0.25 INJECTION INTRAMUSCULAR; INTRAVENOUS at 05:57

## 2019-08-08 RX ADMIN — BUDESONIDE AND FORMOTEROL FUMARATE DIHYDRATE 2 PUFF(S): 160; 4.5 AEROSOL RESPIRATORY (INHALATION) at 17:11

## 2019-08-08 RX ADMIN — BUDESONIDE AND FORMOTEROL FUMARATE DIHYDRATE 2 PUFF(S): 160; 4.5 AEROSOL RESPIRATORY (INHALATION) at 05:57

## 2019-08-08 RX ADMIN — Medication 25 MICROGRAM(S): at 05:56

## 2019-08-08 RX ADMIN — SERTRALINE 50 MILLIGRAM(S): 25 TABLET, FILM COATED ORAL at 11:54

## 2019-08-08 RX ADMIN — Medication 81 MILLIGRAM(S): at 11:48

## 2019-08-08 RX ADMIN — Medication 3 MILLILITER(S): at 06:02

## 2019-08-08 RX ADMIN — RIOCIGUAT 1.5 MILLIGRAM(S): 1.5 TABLET, FILM COATED ORAL at 11:47

## 2019-08-08 RX ADMIN — Medication 3 MILLILITER(S): at 17:12

## 2019-08-08 NOTE — DISCHARGE NOTE NURSING/CASE MANAGEMENT/SOCIAL WORK - NSDCDPATPORTLINK_GEN_ALL_CORE
You can access the [email or web address], offered by [facility], by registering with the following website: [email or web address]

## 2019-08-08 NOTE — DISCHARGE NOTE PROVIDER - NSDCFUSCHEDAPPT_GEN_ALL_CORE_FT
LAINEY DUNLAP ; 10/18/2019 ; [facility] [address] [place] [address] LAINEY DUNLAP ; 10/18/2019 ; [facility] [address] [place] [address]

## 2019-08-08 NOTE — DISCHARGE NOTE PROVIDER - CARE PROVIDER_API CALL
Jem Aguilar)  Cardiology; Internal Medicine; Nuclear Cardiology  [address] [place] [address]  Phone: [phone]  Fax: [phone]  Follow Up Time:     Francy Unger)  Critical Care Medicine; Internal Medicine; Pulmonary Disease; Sleep Medicine  [address], [address]  [place] [address]  Phone: [phone]  Fax: [phone]  Follow Up Time:

## 2019-08-08 NOTE — DISCHARGE NOTE PROVIDER - NSDCCPCAREPLAN_GEN_ALL_CORE_FT
PRINCIPAL DISCHARGE DIAGNOSIS  Diagnosis: Acute on chronic congestive heart failure, unspecified heart failure type  Assessment and Plan of Treatment: pleaase follow up with a cardiologist      SECONDARY DISCHARGE DIAGNOSES  Diagnosis: Primary pulmonary HTN  Assessment and Plan of Treatment: please follow up with Dr Reddy

## 2019-08-13 DIAGNOSIS — J45.998 OTHER ASTHMA: ICD-10-CM

## 2019-08-13 DIAGNOSIS — I50.813 ACUTE ON CHRONIC RIGHT HEART FAILURE: ICD-10-CM

## 2019-08-13 DIAGNOSIS — I27.29 OTHER SECONDARY PULMONARY HYPERTENSION: ICD-10-CM

## 2019-08-13 DIAGNOSIS — I50.23 ACUTE ON CHRONIC SYSTOLIC (CONGESTIVE) HEART FAILURE: ICD-10-CM

## 2019-08-13 DIAGNOSIS — E87.70 FLUID OVERLOAD, UNSPECIFIED: ICD-10-CM

## 2019-08-13 DIAGNOSIS — E66.01 MORBID (SEVERE) OBESITY DUE TO EXCESS CALORIES: ICD-10-CM

## 2019-08-13 DIAGNOSIS — D68.61 ANTIPHOSPHOLIPID SYNDROME: ICD-10-CM

## 2019-08-13 DIAGNOSIS — Z79.01 LONG TERM (CURRENT) USE OF ANTICOAGULANTS: ICD-10-CM

## 2019-08-13 DIAGNOSIS — I42.9 CARDIOMYOPATHY, UNSPECIFIED: ICD-10-CM

## 2019-08-13 DIAGNOSIS — Z87.891 PERSONAL HISTORY OF NICOTINE DEPENDENCE: ICD-10-CM

## 2019-08-13 DIAGNOSIS — J44.9 CHRONIC OBSTRUCTIVE PULMONARY DISEASE, UNSPECIFIED: ICD-10-CM

## 2019-08-13 DIAGNOSIS — I24.8 OTHER FORMS OF ACUTE ISCHEMIC HEART DISEASE: ICD-10-CM

## 2019-08-13 DIAGNOSIS — K80.20 CALCULUS OF GALLBLADDER WITHOUT CHOLECYSTITIS WITHOUT OBSTRUCTION: ICD-10-CM

## 2019-08-13 DIAGNOSIS — I50.82 BIVENTRICULAR HEART FAILURE: ICD-10-CM

## 2019-08-13 DIAGNOSIS — Z91.14 PATIENT'S OTHER NONCOMPLIANCE WITH MEDICATION REGIMEN: ICD-10-CM

## 2019-08-13 DIAGNOSIS — N17.9 ACUTE KIDNEY FAILURE, UNSPECIFIED: ICD-10-CM

## 2019-08-13 DIAGNOSIS — R91.1 SOLITARY PULMONARY NODULE: ICD-10-CM

## 2019-08-13 DIAGNOSIS — F32.9 MAJOR DEPRESSIVE DISORDER, SINGLE EPISODE, UNSPECIFIED: ICD-10-CM

## 2019-08-13 DIAGNOSIS — E43 UNSPECIFIED SEVERE PROTEIN-CALORIE MALNUTRITION: ICD-10-CM

## 2019-08-13 DIAGNOSIS — I11.0 HYPERTENSIVE HEART DISEASE WITH HEART FAILURE: ICD-10-CM

## 2019-08-13 DIAGNOSIS — E80.4 GILBERT SYNDROME: ICD-10-CM

## 2019-08-13 DIAGNOSIS — J96.01 ACUTE RESPIRATORY FAILURE WITH HYPOXIA: ICD-10-CM

## 2019-08-13 DIAGNOSIS — E03.9 HYPOTHYROIDISM, UNSPECIFIED: ICD-10-CM

## 2019-08-13 DIAGNOSIS — E55.9 VITAMIN D DEFICIENCY, UNSPECIFIED: ICD-10-CM

## 2019-08-15 ENCOUNTER — MEDICATION RENEWAL (OUTPATIENT)
Age: 34
End: 2019-08-15

## 2019-08-21 NOTE — REVIEW OF SYSTEMS
[Diabetes] : diabetes mellitus [Fatigue] : fatigue [As Noted in HPI] : as noted in HPI [Difficulty Maintaining Sleep] : difficulty maintaining sleep [Snoring] : snoring [Negative] : Musculoskeletal

## 2019-08-21 NOTE — CONSULT LETTER
[Dear  ___] : Dear  [unfilled], [Courtesy Letter:] : I had the pleasure of seeing your patient, [unfilled], in my office today. [Sincerely,] : Sincerely, [DrElma  ___] : Dr. COOPER [Francy Unger MD, FCCP] : Francy Unger MD, FCCP [Director, Pulmonary Hypertension Program] : Director, Pulmonary Hypertension Program [[facility]] : [facility] [Division of Pulmonary, Critical Care and Sleep Medicine] : Division of Pulmonary, Critical Care and Sleep Medicine [Associate Professor of Medicine] : Associate Professor of Medicine

## 2019-08-22 ENCOUNTER — APPOINTMENT (OUTPATIENT)
Dept: PULMONOLOGY | Facility: CLINIC | Age: 34
End: 2019-08-22
Payer: MEDICAID

## 2019-08-22 VITALS
RESPIRATION RATE: 20 BRPM | HEART RATE: 97 BPM | BODY MASS INDEX: 34.51 KG/M2 | OXYGEN SATURATION: 98 % | DIASTOLIC BLOOD PRESSURE: 59 MMHG | TEMPERATURE: 98 F | SYSTOLIC BLOOD PRESSURE: 88 MMHG | HEIGHT: 69 IN | WEIGHT: 233 LBS

## 2019-08-22 VITALS — DIASTOLIC BLOOD PRESSURE: 63 MMHG | SYSTOLIC BLOOD PRESSURE: 101 MMHG

## 2019-08-22 DIAGNOSIS — I27.20 PULMONARY HYPERTENSION, UNSPECIFIED: ICD-10-CM

## 2019-08-22 DIAGNOSIS — J44.9 CHRONIC OBSTRUCTIVE PULMONARY DISEASE, UNSPECIFIED: ICD-10-CM

## 2019-08-22 DIAGNOSIS — R60.0 LOCALIZED EDEMA: ICD-10-CM

## 2019-08-22 PROCEDURE — 36415 COLL VENOUS BLD VENIPUNCTURE: CPT

## 2019-08-22 PROCEDURE — 99215 OFFICE O/P EST HI 40 MIN: CPT | Mod: 25

## 2019-08-22 RX ORDER — SPIRONOLACTONE 50 MG/1
50 TABLET ORAL
Qty: 1 | Refills: 1 | Status: ACTIVE | COMMUNITY
Start: 2018-09-26 | End: 1900-01-01

## 2019-08-22 RX ORDER — BUMETANIDE 2 MG/1
2 TABLET ORAL
Qty: 60 | Refills: 1 | Status: ACTIVE | COMMUNITY
Start: 2018-09-26 | End: 1900-01-01

## 2019-08-22 RX ORDER — WARFARIN 4 MG/1
TABLET ORAL
Refills: 0 | Status: ACTIVE | COMMUNITY

## 2019-08-22 NOTE — DISCUSSION/SUMMARY
[FreeTextEntry1] : -----------------ASSESSMENT  AND PLAN -----patient has been referred here for further opinion regarding  pulmonary problem.--------. 34-year-old woman with pulmonary hypertension, poorly compliant generally. --NOT CLEAR F SHE IS TAKING ADAMPAS\par \par 1) She does report that she is taking her riociguat 1.5mg tid, Stressed compliance w/meds as directed. \par \par 2) pedal edema- continue bumex and aldactone-  - labs drawn today to check creat & electrolytes. I gave her both verbal and written instructions re: daily wts, salt and fluid restrictions.  strongly stressed compliance- f/u with nephrology\par She has also been referred to Dr Adamson in the heart failure clinic but has not followed up.\par \par 3) cteph- she reorts that she is taking coumadin and it is renewed by her PMD- INR check today\par 4) elevated t. arnulfo- has been referred to hepatology for consult\par 5) hypothyroid- check TSH  referred to endo\par 6)  syncope- strongly advised ER but pt has refused\par \par \par 7) ? SKYE- Really needs sleep study, still gets the excuse is that she doesn't have time to do it. I gave her the information again and advised her to please call. She has symptoms of excessive daytime somnolence, obstructive sleep apnea, and also need to check her sats well she is sleeping.\par \par Thanks for allowing  me to participate  in the care of this patient.  Patient at this time  will follow  the above mentioned recommendations and return back for follow up visit. If you have any questions  I can be reached  at #[phone] (beeper)  or  [phone] (office).\par \par Francy Unger MD, FCCP \par Director, Pulmonary Hypertension Program \par [facility] \par Division of Pulmonary, Critical Care and Sleep Medicine \par  Professor of Medicine \par [employer]\par \par

## 2019-08-22 NOTE — HISTORY OF PRESENT ILLNESS
[FreeTextEntry1] : followup pulmonary hypertension.-----This letter  is regarding your patient  who  attended pulmonary out patient office today.  I have reviewed  patient's  past history, social history, family history and medication list. I also  reviewed nurse practitioners/ and fellows  notes and assessment and agree with it.  ------ patient is followed by Dr. García for over last few years for her pulmonary hypertension on ADAMPAS-------NON COMPLIANT \par ------No history of , fever, chills , rigors, chestpain, or hemoptysis. Questionable history of Raynauds phenomenon. No h/o significant weight loss in last few months. No history of liver dysfunction ,  or chronic thromboembolic disease. I would classify her dyspnea as WHO  FUNCTIONAL CLASS II\par \par 2018 Tolerating riociguat dose. On Lasix 20 daily, sometimes increasing to twice a day if there is increased lower extremity edema. On Coumadin. All being followed by her primary care physician, Dr. Ricky Plata she reports\par \par Did not do a sleep study.\par --\par \par march 2019 -ON COUMDIN  INR AS PER PT MONITORED BY Aiyana Jung in coumadin clinic\par She has cteph- ? compliance w/adempas-- specialty pharm reports her last shipment was in 12/2018---NON COMPLIANT \par \par august 2019 f/u from hospitalization. She is c/o exertional dyspnea and increasing pedal edema on bumex bid and spironolactone bid- reports compliance to diuretics. For cteph- she is on adempas. She reports that she is still taking coumadin but could not recall exact dose and stated that her primary MD is issuing her refills. She is not following the coumadin clinic d/t missed visits/ noncomplaince\par \par She reports that yesterday she had a syncopal episode at home w/loss of consciousness and "seizure like" movements- as reported by her mother. She did not seek medical attention and is refusing to go to ER

## 2019-08-22 NOTE — PHYSICAL EXAM
[General Appearance - Well Developed] : well developed [Normal Appearance] : normal appearance [Well Groomed] : well groomed [General Appearance - In No Acute Distress] : no acute distress [General Appearance - Well Nourished] : well nourished [No Deformities] : no deformities [Normal Conjunctiva] : the conjunctiva exhibited no abnormalities [Eyelids - No Xanthelasma] : the eyelids demonstrated no xanthelasmas [IV] : IV [Neck Appearance] : the appearance of the neck was normal [Jugular Venous Distention Increased] : there was no jugular-venous distention [Neck Cervical Mass (___cm)] : no neck mass was observed [Thyroid Diffuse Enlargement] : the thyroid was not enlarged [Heart Rate And Rhythm] : heart rate and rhythm were normal [Thyroid Nodule] : there were no palpable thyroid nodules [Respiration, Rhythm And Depth] : normal respiratory rhythm and effort [Heart Sounds] : normal S1 and S2 [Auscultation Breath Sounds / Voice Sounds] : lungs were clear to auscultation bilaterally [Exaggerated Use Of Accessory Muscles For Inspiration] : no accessory muscle use [Abdomen Soft] : soft [Abdomen Mass (___ Cm)] : no abdominal mass palpated [Abnormal Walk] : normal gait [Nail Clubbing] : no clubbing of the fingernails [Gait - Sufficient For Exercise Testing] : the gait was sufficient for exercise testing [Petechial Hemorrhages (___cm)] : no petechial hemorrhages [Cyanosis, Localized] : no localized cyanosis [3+ Pitting] : 3+  pitting  [Skin Color & Pigmentation] : normal skin color and pigmentation [No Venous Stasis] : no venous stasis [] : no rash [Skin Lesions] : no skin lesions [No Skin Ulcers] : no skin ulcer [No Xanthoma] : no  xanthoma was observed [No Focal Deficits] : no focal deficits [Deep Tendon Reflexes (DTR)] : deep tendon reflexes were 2+ and symmetric [Sensation] : the sensory exam was normal to light touch and pinprick [Oriented To Time, Place, And Person] : oriented to person, place, and time [Impaired Insight] : insight and judgment were intact [Affect] : the affect was normal [FreeTextEntry1] : no spine tenderness

## 2019-08-23 ENCOUNTER — MEDICATION RENEWAL (OUTPATIENT)
Age: 34
End: 2019-08-23

## 2019-08-23 LAB
ALBUMIN SERPL ELPH-MCNC: 4.1 G/DL
ALP BLD-CCNC: 83 U/L
ALT SERPL-CCNC: 22 U/L
ANION GAP SERPL CALC-SCNC: 16 MMOL/L
AST SERPL-CCNC: 33 U/L
BASOPHILS # BLD AUTO: 0.05 K/UL
BASOPHILS NFR BLD AUTO: 0.8 %
BILIRUB SERPL-MCNC: 5.5 MG/DL
BUN SERPL-MCNC: 12 MG/DL
CALCIUM SERPL-MCNC: 10.1 MG/DL
CHLORIDE SERPL-SCNC: 95 MMOL/L
CO2 SERPL-SCNC: 22 MMOL/L
CREAT SERPL-MCNC: 1.12 MG/DL
EOSINOPHIL # BLD AUTO: 0.01 K/UL
EOSINOPHIL NFR BLD AUTO: 0.2 %
GLUCOSE SERPL-MCNC: 128 MG/DL
HCT VFR BLD CALC: 36.1 %
HGB BLD-MCNC: 11.3 G/DL
IMM GRANULOCYTES NFR BLD AUTO: 0.3 %
INR PPP: 1.55 RATIO
LYMPHOCYTES # BLD AUTO: 1.1 K/UL
LYMPHOCYTES NFR BLD AUTO: 17.1 %
MAN DIFF?: NORMAL
MCHC RBC-ENTMCNC: 23.3 PG
MCHC RBC-ENTMCNC: 31.3 GM/DL
MCV RBC AUTO: 74.4 FL
MONOCYTES # BLD AUTO: 0.5 K/UL
MONOCYTES NFR BLD AUTO: 7.8 %
NEUTROPHILS # BLD AUTO: 4.77 K/UL
NEUTROPHILS NFR BLD AUTO: 73.8 %
NT-PROBNP SERPL-MCNC: 2113 PG/ML
PLATELET # BLD AUTO: 292 K/UL
POTASSIUM SERPL-SCNC: 4 MMOL/L
PROT SERPL-MCNC: 7.7 G/DL
PT BLD: 17.7 SEC
RBC # BLD: 4.85 M/UL
RBC # FLD: 24.1 %
SODIUM SERPL-SCNC: 133 MMOL/L
TSH SERPL-ACNC: 7.03 UIU/ML
WBC # FLD AUTO: 6.45 K/UL

## 2019-08-23 RX ORDER — LEVOTHYROXINE SODIUM 0.03 MG/1
25 TABLET ORAL DAILY
Qty: 30 | Refills: 0 | Status: DISCONTINUED | COMMUNITY
Start: 2019-03-18 | End: 2019-08-23

## 2019-08-29 ENCOUNTER — MEDICATION RENEWAL (OUTPATIENT)
Age: 34
End: 2019-08-29

## 2019-08-29 RX ORDER — LEVOTHYROXINE SODIUM 0.03 MG/1
25 TABLET ORAL DAILY
Qty: 30 | Refills: 0 | Status: ACTIVE | COMMUNITY
Start: 2019-08-29 | End: 1900-01-01

## 2019-09-03 ENCOUNTER — INPATIENT (INPATIENT)
Facility: HOSPITAL | Age: 34
LOS: 16 days | Discharge: LEFT BEFORE DISCHARGE | End: 2019-09-20
Attending: GENERAL ACUTE CARE HOSPITAL | Admitting: GENERAL ACUTE CARE HOSPITAL
Payer: MEDICAID

## 2019-09-03 VITALS
DIASTOLIC BLOOD PRESSURE: 70 MMHG | HEART RATE: 76 BPM | HEIGHT: 69 IN | WEIGHT: 235.01 LBS | RESPIRATION RATE: 17 BRPM | OXYGEN SATURATION: 100 % | SYSTOLIC BLOOD PRESSURE: 108 MMHG | TEMPERATURE: 97 F

## 2019-09-03 LAB
APTT BLD: 26.9 SEC — LOW (ref 27.5–36.3)
BASE EXCESS BLDA CALC-SCNC: -2.2 MMOL/L — LOW (ref -2–2)
BLOOD GAS COMMENTS: SIGNIFICANT CHANGE UP
BLOOD GAS COMMENTS: SIGNIFICANT CHANGE UP
BLOOD GAS SOURCE: SIGNIFICANT CHANGE UP
HCO3 BLDA-SCNC: 20 MMOL/L — LOW (ref 21–29)
HCT VFR BLD CALC: 36.8 % — SIGNIFICANT CHANGE UP (ref 34.5–45)
HGB BLD-MCNC: 11.8 G/DL — SIGNIFICANT CHANGE UP (ref 11.5–15.5)
HOROWITZ INDEX BLDA+IHG-RTO: 100 — SIGNIFICANT CHANGE UP
INR BLD: 1.79 RATIO — HIGH (ref 0.88–1.16)
MCHC RBC-ENTMCNC: 23.2 PG — LOW (ref 27–34)
MCHC RBC-ENTMCNC: 32.1 GM/DL — SIGNIFICANT CHANGE UP (ref 32–36)
MCV RBC AUTO: 72.4 FL — LOW (ref 80–100)
NRBC # BLD: 1 /100 WBCS — HIGH (ref 0–0)
PCO2 BLDA: 26 MMHG — LOW (ref 32–46)
PH BLD: 7.49 — HIGH (ref 7.35–7.45)
PLATELET # BLD AUTO: 194 K/UL — SIGNIFICANT CHANGE UP (ref 150–400)
PO2 BLDA: 126 MMHG — HIGH (ref 74–108)
PROTHROM AB SERPL-ACNC: 20.4 SEC — HIGH (ref 10–12.9)
RBC # BLD: 5.08 M/UL — SIGNIFICANT CHANGE UP (ref 3.8–5.2)
RBC # FLD: 25.3 % — HIGH (ref 10.3–14.5)
SAO2 % BLDA: 99 % — HIGH (ref 92–96)
WBC # BLD: 6.43 K/UL — SIGNIFICANT CHANGE UP (ref 3.8–10.5)
WBC # FLD AUTO: 6.43 K/UL — SIGNIFICANT CHANGE UP (ref 3.8–10.5)

## 2019-09-03 PROCEDURE — 93010 ELECTROCARDIOGRAM REPORT: CPT

## 2019-09-03 PROCEDURE — 71045 X-RAY EXAM CHEST 1 VIEW: CPT | Mod: 26

## 2019-09-03 PROCEDURE — 99285 EMERGENCY DEPT VISIT HI MDM: CPT

## 2019-09-03 NOTE — ED ADULT TRIAGE NOTE - STATUS:
Date of Service: 11/13/2017    The patient is a 18-year-old man here for followup of acne vulgaris.  The patient has finished a total of 3 months of oral Accutane without difficulty.  Denies any further joint pains.  Denies any depression, hair loss, arthralgia, myalgia, headache or change in vision.  No abdominal pain, cramping or diarrhea.    PHYSICAL EXAMINATION:  On examination today, there is a moderate degree of xerosis seen throughout the face and neck, lesser degree on upper extremity and dorsal hands.  There is still a moderate number of acneiform papules and a few pustules but no nodulocystic lesions seen on the face.  Comedones have disappeared very nicely.  Only a few lesions noticed involving the upper back.  The chest is completely clear.    DIAGNOSIS:  Acne vulgaris.  Doing very well, certainly less active lesions seen, but not cleared completely.  Given that, the option of increasing the dose of Accutane was discussed with him, especially with the lab work being stable and normal.  Given that, after discussion, the patient elected to increase the dose of Accutane and with that I recommend Accutane 30 mg p.o. b.i.d. to be taken with food.  Will repeat the fasting triglycerides and liver function test next month.  If any difficulty arises or side effects, the patient will contact us.  Emphasized moisturizing products, especially with moisturizing lotions to the face and neck and cream for the extremities to prevent from getting worse with the winter and higher dose of Accutane.      Dictated By: Nahed Land MD  Signing Provider: Nahed Land MD    TN/ps ([id number])  DD: 11/13/2017 16:55:58 TD: 11/14/2017 08:21:08    Copy Sent To:    Intact

## 2019-09-03 NOTE — ED ADULT NURSE REASSESSMENT NOTE - NS ED NURSE REASSESS COMMENT FT1
patient does not want to change into gown, nurse and tech explained to patient that gown is necessary for full assessment by primary nurse and doctor. patient accepting to be placed on monitor and to have EKG but still does not want to be put into gown

## 2019-09-03 NOTE — ED ADULT TRIAGE NOTE - CHIEF COMPLAINT QUOTE
as per ems SOB since 11am, b/l leg weakness x two weeks hx pulmonary HTN. b/l leg edema as per ems SOB since 11am, b/l leg weakness x two weeks hx pulmonary HTN. b/l leg edema. yellow sclera

## 2019-09-03 NOTE — ED ADULT NURSE NOTE - CHIEF COMPLAINT QUOTE
as per ems SOB since 11am, b/l leg weakness x two weeks hx pulmonary HTN. b/l leg edema. yellow sclera

## 2019-09-03 NOTE — ED ADULT NURSE NOTE - PMH
Anticardiolipin antibody positive    Anticardiolipin antibody positive    Anticoagulation goal of INR 2 to 3    Congestive heart failure, unspecified HF chronicity, unspecified heart failure type    COPD (chronic obstructive pulmonary disease)    COPD (chronic obstructive pulmonary disease)    COPD (chronic obstructive pulmonary disease)    Diabetes mellitus    Edema extremities    Essential hypertension    Former smoker, stopped smoking in distant past    Hyperbilirubinemia    Moderate asthma, unspecified whether complicated, unspecified whether persistent    Morbid obesity    Morbid obesity due to excess calories    Other secondary hypertension    Pulmonary HTN    Vitamin D deficiency

## 2019-09-04 DIAGNOSIS — R60.0 LOCALIZED EDEMA: ICD-10-CM

## 2019-09-04 DIAGNOSIS — E80.6 OTHER DISORDERS OF BILIRUBIN METABOLISM: ICD-10-CM

## 2019-09-04 DIAGNOSIS — Z29.9 ENCOUNTER FOR PROPHYLACTIC MEASURES, UNSPECIFIED: ICD-10-CM

## 2019-09-04 DIAGNOSIS — I50.9 HEART FAILURE, UNSPECIFIED: ICD-10-CM

## 2019-09-04 DIAGNOSIS — E66.01 MORBID (SEVERE) OBESITY DUE TO EXCESS CALORIES: ICD-10-CM

## 2019-09-04 DIAGNOSIS — E03.9 HYPOTHYROIDISM, UNSPECIFIED: ICD-10-CM

## 2019-09-04 DIAGNOSIS — J45.909 UNSPECIFIED ASTHMA, UNCOMPLICATED: ICD-10-CM

## 2019-09-04 DIAGNOSIS — I10 ESSENTIAL (PRIMARY) HYPERTENSION: ICD-10-CM

## 2019-09-04 DIAGNOSIS — E11.9 TYPE 2 DIABETES MELLITUS WITHOUT COMPLICATIONS: ICD-10-CM

## 2019-09-04 DIAGNOSIS — J96.00 ACUTE RESPIRATORY FAILURE, UNSPECIFIED WHETHER WITH HYPOXIA OR HYPERCAPNIA: ICD-10-CM

## 2019-09-04 DIAGNOSIS — I27.20 PULMONARY HYPERTENSION, UNSPECIFIED: ICD-10-CM

## 2019-09-04 LAB
ALBUMIN SERPL ELPH-MCNC: 3.4 G/DL — SIGNIFICANT CHANGE UP (ref 3.3–5)
ALP SERPL-CCNC: 84 U/L — SIGNIFICANT CHANGE UP (ref 40–120)
ALT FLD-CCNC: 23 U/L — SIGNIFICANT CHANGE UP (ref 12–78)
ANION GAP SERPL CALC-SCNC: 11 MMOL/L — SIGNIFICANT CHANGE UP (ref 5–17)
ANION GAP SERPL CALC-SCNC: 12 MMOL/L — SIGNIFICANT CHANGE UP (ref 5–17)
AST SERPL-CCNC: 50 U/L — HIGH (ref 15–37)
BILIRUB SERPL-MCNC: 7.9 MG/DL — HIGH (ref 0.2–1.2)
BUN SERPL-MCNC: 28 MG/DL — HIGH (ref 7–23)
BUN SERPL-MCNC: 32 MG/DL — HIGH (ref 7–23)
CALCIUM SERPL-MCNC: 9 MG/DL — SIGNIFICANT CHANGE UP (ref 8.5–10.1)
CALCIUM SERPL-MCNC: 9.7 MG/DL — SIGNIFICANT CHANGE UP (ref 8.5–10.1)
CHLORIDE SERPL-SCNC: 88 MMOL/L — LOW (ref 96–108)
CHLORIDE SERPL-SCNC: 90 MMOL/L — LOW (ref 96–108)
CO2 SERPL-SCNC: 23 MMOL/L — SIGNIFICANT CHANGE UP (ref 22–31)
CO2 SERPL-SCNC: 26 MMOL/L — SIGNIFICANT CHANGE UP (ref 22–31)
CREAT SERPL-MCNC: 1.34 MG/DL — HIGH (ref 0.5–1.3)
CREAT SERPL-MCNC: 1.59 MG/DL — HIGH (ref 0.5–1.3)
GLUCOSE BLDC GLUCOMTR-MCNC: 134 MG/DL — HIGH (ref 70–99)
GLUCOSE BLDC GLUCOMTR-MCNC: 144 MG/DL — HIGH (ref 70–99)
GLUCOSE SERPL-MCNC: 124 MG/DL — HIGH (ref 70–99)
GLUCOSE SERPL-MCNC: 96 MG/DL — SIGNIFICANT CHANGE UP (ref 70–99)
HCG SERPL-ACNC: <1 MIU/ML — SIGNIFICANT CHANGE UP
NT-PROBNP SERPL-SCNC: 2603 PG/ML — HIGH (ref 0–125)
POTASSIUM SERPL-MCNC: 4.1 MMOL/L — SIGNIFICANT CHANGE UP (ref 3.5–5.3)
POTASSIUM SERPL-MCNC: 5 MMOL/L — SIGNIFICANT CHANGE UP (ref 3.5–5.3)
POTASSIUM SERPL-SCNC: 4.1 MMOL/L — SIGNIFICANT CHANGE UP (ref 3.5–5.3)
POTASSIUM SERPL-SCNC: 5 MMOL/L — SIGNIFICANT CHANGE UP (ref 3.5–5.3)
PROT SERPL-MCNC: 8.4 GM/DL — HIGH (ref 6–8.3)
SODIUM SERPL-SCNC: 123 MMOL/L — LOW (ref 135–145)
SODIUM SERPL-SCNC: 127 MMOL/L — LOW (ref 135–145)
TROPONIN I SERPL-MCNC: 0.15 NG/ML — HIGH (ref 0.01–0.04)
TROPONIN I SERPL-MCNC: 0.29 NG/ML — HIGH (ref 0.01–0.04)

## 2019-09-04 PROCEDURE — 99223 1ST HOSP IP/OBS HIGH 75: CPT

## 2019-09-04 PROCEDURE — 99233 SBSQ HOSP IP/OBS HIGH 50: CPT

## 2019-09-04 PROCEDURE — 12345: CPT | Mod: NC

## 2019-09-04 RX ORDER — FUROSEMIDE 40 MG
40 TABLET ORAL
Refills: 0 | Status: DISCONTINUED | OUTPATIENT
Start: 2019-09-04 | End: 2019-09-05

## 2019-09-04 RX ORDER — HEPARIN SODIUM 5000 [USP'U]/ML
5000 INJECTION INTRAVENOUS; SUBCUTANEOUS EVERY 8 HOURS
Refills: 0 | Status: DISCONTINUED | OUTPATIENT
Start: 2019-09-04 | End: 2019-09-05

## 2019-09-04 RX ORDER — BUMETANIDE 0.25 MG/ML
2 INJECTION INTRAMUSCULAR; INTRAVENOUS DAILY
Refills: 0 | Status: DISCONTINUED | OUTPATIENT
Start: 2019-09-04 | End: 2019-09-05

## 2019-09-04 RX ORDER — BUDESONIDE AND FORMOTEROL FUMARATE DIHYDRATE 160; 4.5 UG/1; UG/1
2 AEROSOL RESPIRATORY (INHALATION)
Refills: 0 | Status: DISCONTINUED | OUTPATIENT
Start: 2019-09-04 | End: 2019-09-20

## 2019-09-04 RX ORDER — MIDODRINE HYDROCHLORIDE 2.5 MG/1
10 TABLET ORAL EVERY 8 HOURS
Refills: 0 | Status: DISCONTINUED | OUTPATIENT
Start: 2019-09-04 | End: 2019-09-09

## 2019-09-04 RX ORDER — INSULIN LISPRO 100/ML
VIAL (ML) SUBCUTANEOUS
Refills: 0 | Status: DISCONTINUED | OUTPATIENT
Start: 2019-09-04 | End: 2019-09-20

## 2019-09-04 RX ORDER — ACETAMINOPHEN 500 MG
650 TABLET ORAL ONCE
Refills: 0 | Status: COMPLETED | OUTPATIENT
Start: 2019-09-04 | End: 2019-09-04

## 2019-09-04 RX ORDER — SIMVASTATIN 20 MG/1
40 TABLET, FILM COATED ORAL AT BEDTIME
Refills: 0 | Status: DISCONTINUED | OUTPATIENT
Start: 2019-09-04 | End: 2019-09-20

## 2019-09-04 RX ORDER — WARFARIN SODIUM 2.5 MG/1
1 TABLET ORAL ONCE
Refills: 0 | Status: COMPLETED | OUTPATIENT
Start: 2019-09-04 | End: 2019-09-04

## 2019-09-04 RX ORDER — BUMETANIDE 0.25 MG/ML
2 INJECTION INTRAMUSCULAR; INTRAVENOUS ONCE
Refills: 0 | Status: COMPLETED | OUTPATIENT
Start: 2019-09-04 | End: 2019-09-04

## 2019-09-04 RX ORDER — RIOCIGUAT 1.5 MG/1
1.5 TABLET, FILM COATED ORAL THREE TIMES A DAY
Refills: 0 | Status: DISCONTINUED | OUTPATIENT
Start: 2019-09-04 | End: 2019-09-20

## 2019-09-04 RX ORDER — DEXTROSE 50 % IN WATER 50 %
25 SYRINGE (ML) INTRAVENOUS ONCE
Refills: 0 | Status: DISCONTINUED | OUTPATIENT
Start: 2019-09-04 | End: 2019-09-20

## 2019-09-04 RX ORDER — ASPIRIN/CALCIUM CARB/MAGNESIUM 324 MG
81 TABLET ORAL DAILY
Refills: 0 | Status: DISCONTINUED | OUTPATIENT
Start: 2019-09-04 | End: 2019-09-20

## 2019-09-04 RX ORDER — SODIUM CHLORIDE 9 MG/ML
1000 INJECTION, SOLUTION INTRAVENOUS
Refills: 0 | Status: DISCONTINUED | OUTPATIENT
Start: 2019-09-04 | End: 2019-09-20

## 2019-09-04 RX ORDER — DEXTROSE 50 % IN WATER 50 %
12.5 SYRINGE (ML) INTRAVENOUS ONCE
Refills: 0 | Status: DISCONTINUED | OUTPATIENT
Start: 2019-09-04 | End: 2019-09-20

## 2019-09-04 RX ORDER — GLUCAGON INJECTION, SOLUTION 0.5 MG/.1ML
1 INJECTION, SOLUTION SUBCUTANEOUS ONCE
Refills: 0 | Status: DISCONTINUED | OUTPATIENT
Start: 2019-09-04 | End: 2019-09-20

## 2019-09-04 RX ORDER — TIOTROPIUM BROMIDE 18 UG/1
1 CAPSULE ORAL; RESPIRATORY (INHALATION) DAILY
Refills: 0 | Status: DISCONTINUED | OUTPATIENT
Start: 2019-09-04 | End: 2019-09-04

## 2019-09-04 RX ORDER — LEVOTHYROXINE SODIUM 125 MCG
25 TABLET ORAL DAILY
Refills: 0 | Status: DISCONTINUED | OUTPATIENT
Start: 2019-09-04 | End: 2019-09-06

## 2019-09-04 RX ORDER — SERTRALINE 25 MG/1
50 TABLET, FILM COATED ORAL DAILY
Refills: 0 | Status: DISCONTINUED | OUTPATIENT
Start: 2019-09-04 | End: 2019-09-15

## 2019-09-04 RX ORDER — IPRATROPIUM/ALBUTEROL SULFATE 18-103MCG
3 AEROSOL WITH ADAPTER (GRAM) INHALATION EVERY 6 HOURS
Refills: 0 | Status: DISCONTINUED | OUTPATIENT
Start: 2019-09-04 | End: 2019-09-07

## 2019-09-04 RX ORDER — INSULIN LISPRO 100/ML
VIAL (ML) SUBCUTANEOUS AT BEDTIME
Refills: 0 | Status: DISCONTINUED | OUTPATIENT
Start: 2019-09-04 | End: 2019-09-20

## 2019-09-04 RX ORDER — DEXTROSE 50 % IN WATER 50 %
15 SYRINGE (ML) INTRAVENOUS ONCE
Refills: 0 | Status: DISCONTINUED | OUTPATIENT
Start: 2019-09-04 | End: 2019-09-20

## 2019-09-04 RX ADMIN — RIOCIGUAT 1.5 MILLIGRAM(S): 1.5 TABLET, FILM COATED ORAL at 15:00

## 2019-09-04 RX ADMIN — Medication 40 MILLIGRAM(S): at 17:09

## 2019-09-04 RX ADMIN — Medication 3 MILLILITER(S): at 06:36

## 2019-09-04 RX ADMIN — Medication 650 MILLIGRAM(S): at 22:30

## 2019-09-04 RX ADMIN — Medication 650 MILLIGRAM(S): at 21:32

## 2019-09-04 RX ADMIN — SIMVASTATIN 40 MILLIGRAM(S): 20 TABLET, FILM COATED ORAL at 21:33

## 2019-09-04 RX ADMIN — BUMETANIDE 2 MILLIGRAM(S): 0.25 INJECTION INTRAMUSCULAR; INTRAVENOUS at 17:08

## 2019-09-04 RX ADMIN — BUMETANIDE 2 MILLIGRAM(S): 0.25 INJECTION INTRAMUSCULAR; INTRAVENOUS at 06:31

## 2019-09-04 RX ADMIN — MIDODRINE HYDROCHLORIDE 10 MILLIGRAM(S): 2.5 TABLET ORAL at 16:01

## 2019-09-04 RX ADMIN — Medication 81 MILLIGRAM(S): at 16:20

## 2019-09-04 RX ADMIN — WARFARIN SODIUM 1 MILLIGRAM(S): 2.5 TABLET ORAL at 21:33

## 2019-09-04 RX ADMIN — HEPARIN SODIUM 5000 UNIT(S): 5000 INJECTION INTRAVENOUS; SUBCUTANEOUS at 06:32

## 2019-09-04 RX ADMIN — Medication 40 MILLIGRAM(S): at 04:25

## 2019-09-04 RX ADMIN — RIOCIGUAT 1.5 MILLIGRAM(S): 1.5 TABLET, FILM COATED ORAL at 21:32

## 2019-09-04 RX ADMIN — Medication 3 MILLILITER(S): at 17:41

## 2019-09-04 RX ADMIN — BUDESONIDE AND FORMOTEROL FUMARATE DIHYDRATE 2 PUFF(S): 160; 4.5 AEROSOL RESPIRATORY (INHALATION) at 06:31

## 2019-09-04 RX ADMIN — MIDODRINE HYDROCHLORIDE 10 MILLIGRAM(S): 2.5 TABLET ORAL at 21:32

## 2019-09-04 RX ADMIN — HEPARIN SODIUM 5000 UNIT(S): 5000 INJECTION INTRAVENOUS; SUBCUTANEOUS at 21:32

## 2019-09-04 RX ADMIN — RIOCIGUAT 1.5 MILLIGRAM(S): 1.5 TABLET, FILM COATED ORAL at 09:02

## 2019-09-04 RX ADMIN — Medication 25 MICROGRAM(S): at 06:31

## 2019-09-04 RX ADMIN — Medication 3 MILLILITER(S): at 23:44

## 2019-09-04 RX ADMIN — Medication 3 MILLILITER(S): at 11:37

## 2019-09-04 NOTE — H&P ADULT - ASSESSMENT
34F with significant pulmonary HTN and CHF presents with worsening shortness of breath, pedal edema, and jaundice.   Patient BNP elevated, respiratory exam concerning for pleural effusions - will hold off on CT scan for now as patient requiring BiPAP.  Will agressively diurese with lasix and bumex however will monitor closely as last admission patient required vasopressors to maintain BP during diuresis.  Will admit to tele    IMPROVE VTE Individual Risk Assessment          RISK                                                          Points  [  ] Previous VTE                                                3  [  ] Thrombophilia                                             2  [  ] Lower limb paralysis                                    2        (unable to hold up >15 seconds)    [  ] Current Cancer                                             2         (within 6 months)  [  ] Immobilization > 24 hrs                              1  [  ] ICU/CCU stay > 24 hours                            1  [  ] Age > 60                                                    1    IMPROVE VTE Score ____0_____

## 2019-09-04 NOTE — H&P ADULT - PROBLEM SELECTOR PLAN 10
on warfarin at home for unclear reason - antiphospholipid?  Will start on VTE prop: heparin sc q8 hrs

## 2019-09-04 NOTE — H&P ADULT - PROBLEM SELECTOR PLAN 3
BNP elevated.  Aggressive diuresis with Lasix 40 IV BID, Bumex 2 qd  Last admission did not tolerate diuresis.  Consider midodrine, ICU if needed  Will hold anti HTN meds - losartan and spirinolactone.  Continue ASA 81, simvastatin 40

## 2019-09-04 NOTE — CONSULT NOTE ADULT - ATTENDING COMMENTS
Agree with above Consult note    Briefly this is a 34 year old female with severe pulm htn and CHF presenting with sob likely 2/2 fluid overload.  Will admit to MICU for diuresis and possible inotropic support.

## 2019-09-04 NOTE — PATIENT PROFILE ADULT - COMPLETE THE FOLLOWING IF THE PATIENT REFUSES THE INFLUENZA VACCINE:
Risks/benefits discussed with patient/surrogate/Vaccine Information Sheet (VIS) provided-VIS date: 8/15/19

## 2019-09-04 NOTE — PROGRESS NOTE ADULT - SUBJECTIVE AND OBJECTIVE BOX
35 yo woman with severe pHTN who was admitted with acute respiratory failure due to acute CHF exacerbation and DIANN. She is being aggressively diuresed and is on BiPAP. Pulm has spoken w/ ICU who will admit patient to their service. 35 yo woman with severe pHTN who was admitted with acute respiratory failure due to acute CHF exacerbation, hyponatremia and DIANN. She is being aggressively diuresed and is on BiPAP. Pulm has spoken w/ ICU who will admit patient to their service.

## 2019-09-04 NOTE — H&P ADULT - PROBLEM SELECTOR PROBLEM 8
Moderate asthma, unspecified whether complicated, unspecified whether persistent Essential hypertension

## 2019-09-04 NOTE — H&P ADULT - NSICDXFAMILYHX_GEN_ALL_CORE_FT
FAMILY HISTORY:  Sibling  Still living? Yes, Estimated age: Age Unknown  Family history of leukemia, Age at diagnosis: Age Unknown

## 2019-09-04 NOTE — H&P ADULT - NSHPPHYSICALEXAM_GEN_ALL_CORE
Physical exam:  General: patient in mild to moderate distress on BiPAP  HEENT: slight icterus  Cardio: S1/S2 +ve, regular rate and rhythm,   Resp: mild crackles in lower lung zones, decreased breath sounsd in lower zones, R>L   GI: obese abdomen, nontender, non distended, no guarding, BS +ve x 4  Ext: significant +3 pedal edema bilaterally   Neuro: CN 2-12 intact, no significant motor or sensory deficits.

## 2019-09-04 NOTE — CONSULT NOTE ADULT - SUBJECTIVE AND OBJECTIVE BOX
Patient is a 34y old  Female who presents with a chief complaint of Dyspnea (04 Sep 2019 02:03)      HPI:  34F with a PMH of severe pulmonary hypertension and right sided CHF - (R sided EF 35) presents with worsening shortness of breath x 2 days.  Patient presented to ED in respiratory distress requiring BiPAP and is able to provide limited history.  Patient reports that she typically has shortness of breath at home and she uses oxygen therapy as needed to improve her symptoms, patient states that for the last two days the oxygen has not helped.  Patient also reports 2 week hx of worsening bilateral leg swelling.  Patient reports compliance with her medications.  Patient reports some cough productive of yellow sputum but states her cough is no worse than it normally is.      Of note patient was admitted to [facility] on  for exacerbation of CHF.  Presented with hypotension needing vasopressor support. 123 -> 105 kg (04 Sep 2019 02:03)    Consultation called by pulmonary attending for closer monitor to aggressively diurese pt.  +/- dobutamine +/- pressors    Allergies    No Known Allergies    Intolerances        MEDICATIONS  (STANDING):  ALBUTerol/ipratropium for Nebulization. 3 milliLiter(s) Nebulizer every 6 hours  aspirin enteric coated 81 milliGRAM(s) Oral daily  buDESOnide 160 MICROgram(s)/formoterol 4.5 MICROgram(s) Inhaler 2 Puff(s) Inhalation two times a day  buMETAnide 2 milliGRAM(s) Oral daily  buMETAnide Injectable 2 milliGRAM(s) IV Push once  furosemide   Injectable 40 milliGRAM(s) IV Push two times a day  heparin  Injectable 5000 Unit(s) SubCutaneous every 8 hours  levothyroxine 25 MICROGram(s) Oral daily  riociguat 1.5 milliGRAM(s) Oral three times a day  sertraline 50 milliGRAM(s) Oral daily  simvastatin 40 milliGRAM(s) Oral at bedtime    MEDICATIONS  (PRN):      Daily Height in cm: 175.26 (03 Sep 2019 20:57)    Daily Weight in k.2 (04 Sep 2019 05:40)    Drug Dosing Weight  Height (cm): 175.26 (03 Sep 2019 20:57)  Weight (kg): 115.2 (04 Sep 2019 05:40)  BMI (kg/m2): 37.5 (04 Sep 2019 05:40)  BSA (m2): 2.29 (04 Sep 2019 05:40)    PAST MEDICAL & SURGICAL HISTORY:  Former smoker, stopped smoking in distant past  Vitamin D deficiency  Morbid obesity due to excess calories  Edema extremities  Diabetes mellitus  Hyperbilirubinemia  COPD (chronic obstructive pulmonary disease)  COPD (chronic obstructive pulmonary disease)  Anticoagulation goal of INR 2 to 3  Anticardiolipin antibody positive  Congestive heart failure, unspecified HF chronicity, unspecified heart failure type  Moderate asthma, unspecified whether complicated, unspecified whether persistent  Other secondary hypertension  COPD (chronic obstructive pulmonary disease)  Anticardiolipin antibody positive  Essential hypertension  Pulmonary HTN  Morbid obesity  No significant past surgical history      FAMILY HISTORY:  Family history of leukemia (Sibling)      SOCIAL HISTORY:    ADVANCE DIRECTIVES:    REVIEW OF SYSTEMS:    as stated in H&P          ICU Vital Signs Last 24 Hrs  T(C): 36.1 (04 Sep 2019 11:22), Max: 36.2 (03 Sep 2019 20:57)  T(F): 96.9 (04 Sep 2019 11:22), Max: 97.1 (03 Sep 2019 20:57)  HR: 71 (04 Sep 2019 14:08) (67 - 85)  BP: 108/68 (04 Sep 2019 14:08) (98/60 - 120/54)  BP(mean): --  ABP: --  ABP(mean): --  RR: 20 (04 Sep 2019 11:22) (13 - 22)  SpO2: 96% (04 Sep 2019 14:08) (86% - 100%)      ABG - ( 03 Sep 2019 23:01 )  pH, Arterial: x     pH, Blood: 7.49  /  pCO2: 26    /  pO2: 126   / HCO3: 20    / Base Excess: -2.2  /  SaO2: 99                  I&O's Detail      PHYSICAL EXAM:    GENERAL: NAD, well-groomed, well-developed  HEAD:  Atraumatic, Normocephalic  EYES: conjunctiva and sclera clear  ENMT:  Moist mucous membranes,   NERVOUS SYSTEM:  Alert & Oriented X3, Good concentration;   CHEST/LUNG: Clear to percussion bilaterally;   HEART: Regular rate and rhythm;  ABDOMEN: Soft, Nontender, abdominal wall edema  EXTREMITIES:  significant b/l LE edema      LABS:  CBC Full  -  ( 03 Sep 2019 22:43 )  WBC Count : 6.43 K/uL  RBC Count : 5.08 M/uL  Hemoglobin : 11.8 g/dL  Hematocrit : 36.8 %  Platelet Count - Automated : 194 K/uL  Mean Cell Volume : 72.4 fl  Mean Cell Hemoglobin : 23.2 pg  Mean Cell Hemoglobin Concentration : 32.1 gm/dL  Auto Neutrophil # : x  Auto Lymphocyte # : x  Auto Monocyte # : x  Auto Eosinophil # : x  Auto Basophil # : x  Auto Neutrophil % : x  Auto Lymphocyte % : x  Auto Monocyte % : x  Auto Eosinophil % : x  Auto Basophil % : x        123<L>  |  88<L>  |  32<H>  ----------------------------<  96  5.0   |  23  |  1.59<H>    Ca    9.7      04 Sep 2019 00:01    TPro  8.4<H>  /  Alb  3.4  /  TBili  7.9<H>  /  DBili  x   /  AST  50<H>  /  ALT  23  /  AlkPhos  84  09-04    CAPILLARY BLOOD GLUCOSE        PT/INR - ( 03 Sep 2019 22:43 )   PT: 20.4 sec;   INR: 1.79 ratio         PTT - ( 03 Sep 2019 22:43 )  PTT:26.9 sec    CARDIAC MARKERS ( 04 Sep 2019 00:01 )  .290 ng/mL / x     / x     / x     / x

## 2019-09-04 NOTE — H&P ADULT - NSICDXPASTMEDICALHX_GEN_ALL_CORE_FT
PAST MEDICAL HISTORY:  Anticardiolipin antibody positive     Anticardiolipin antibody positive     Anticoagulation goal of INR 2 to 3     Congestive heart failure, unspecified HF chronicity, unspecified heart failure type     COPD (chronic obstructive pulmonary disease)     COPD (chronic obstructive pulmonary disease)     COPD (chronic obstructive pulmonary disease)     Diabetes mellitus     Edema extremities     Essential hypertension     Former smoker, stopped smoking in distant past     Hyperbilirubinemia     Moderate asthma, unspecified whether complicated, unspecified whether persistent     Morbid obesity     Morbid obesity due to excess calories     Other secondary hypertension     Pulmonary HTN     Vitamin D deficiency

## 2019-09-04 NOTE — CONSULT NOTE ADULT - SUBJECTIVE AND OBJECTIVE BOX
[facility]  PULMONARY CONSULTATION NOTE    NAME: LAINEY DUNLAP  MEDICAL RECORD NUMBER: [id number]  PLEASE NOTE: PATIENT HAS ANOTHER MRN #: [id number]    CHIEF COMPLAINT: Patient is a 34y old  Female who presents with a chief complaint of respiratory distress      HISTORY OF PRESENT ILLNESS:   33 yo woman with severe pHTN (group I/IV, on Adempas with questionable complaince, RHC 8/2018 - mPAP 67; W 9; RVR 9), mildly positive anti-cardiolipin Ab (on Coumadin) and RAD admitted with volume overload and decompensated RHF.   She was hospitalized at the end of July with the same and required Dobutamine assisted diuresis. During previous admission, Wt 123->105kg. Current wt 115 kg. Pt reports compliance with diuretics, AC and Adempas but INR is subtherapeutic.     ====================BACKGROUND INFORMATION============================    FAMILY HISTORY: hx of DM in both partens  Sisters with hx of PE/DVT and miscarriages      PAST MEDICAL AND SURGICAL HISTORY: PAST MEDICAL & SURGICAL HISTORY:  Congestive heart failure, unspecified HF chronicity, unspecified heart failure type  Moderate asthma, unspecified whether complicated, unspecified whether persistent  Other secondary hypertension      ALLERGIES:Allergies    No Known Allergies    Intolerances    HOME MEDICATIONS:     Medications:   * Incomplete Medication History as of 03-Sep-2019 22:26 documented in Structured Notes  · 	midodrine 10 mg oral tablet: 1 tab(s) orally every 8 hours  · 	aspirin 81 mg oral delayed release tablet: Last Dose Taken:  , 1 tab(s) orally once a day  · 	simvastatin 40 mg oral tablet: 1 tab(s) orally once a day (at bedtime)  · 	warfarin 2 mg oral tablet: 1 tab(s) orally once a day  · 	Klor-Con M20 oral tablet, extended release: 2 tab(s) orally once a day   · 	Colace 100 mg oral capsule: Last Dose Taken:  , 1 cap(s) orally once a day  · 	Innerclean oral tablet: 1 tab(s) orally once  · 	calcium carbonate 500 mg (200 mg elemental calcium) oral tablet, chewable: Last Dose Taken:  , 1 tab(s) orally 3 times a day, As needed, Heartburn  · 	Adempas 1.5 mg oral tablet: Last Dose Taken:  , 1 tab(s) orally 3 times a day  · 	riociguat 1.5 mg oral tablet: 1 tab(s) orally 3 times a day  · 	Symbicort 160 mcg-4.5 mcg/inh inhalation aerosol: 1 puff(s) inhaled 2 times a day  · 	Zoloft 50 mg oral tablet: 1 tab(s) orally once a day  · 	ergocalciferol 50,000 intl units (1.25 mg) oral capsule: Last Dose Taken:  , 1 cap(s) orally once a week  · 	Bumex 2 mg oral tablet: Last Dose Taken:  , 1 tab(s) orally once a day  · 	spironolactone 50 mg oral tablet: 1 tab(s) orally 2 times a day  · 	losartan 25 mg oral tablet: 1 tab(s) orally once a day  · 	Levoxyl 25 mcg (0.025 mg) oral tablet: 1 tab(s) orally once a day      OUTPATIENT PULMONARY DOCTOR:     SOCIAL HISTORY: on disability  TOBACCO USE: former smoker  ALCOHOL: denies  DRUGS: denies  MARITAL STATUS: single  EMPLOYMENT: unemployed      ====================REVIEW OF SYSTEMS=====================================  CONSTITUTIONAL:  CARDIOVASCULAR: chest discomfort, REUBEN, orhopnea  PULMONARY: +SOB  GASTROINTESTINAL: n/v, LUQ abd pain  [x] ALL OTHER REVIEW OF SYSTEMS ARE NEGATIVE   [] UNABLE TO OBTAIN REVIEW OF SYSTEMS DUE TO ______________      ====================PHYSICAL EXAM=========================================      ICU Vital Signs Last 24 Hrs  T(C): 36.3 (04 Sep 2019 15:46), Max: 36.3 (04 Sep 2019 15:46)  T(F): 97.3 (04 Sep 2019 15:46), Max: 97.3 (04 Sep 2019 15:46)  HR: 77 (04 Sep 2019 16:00) (67 - 85)  BP: 94/59 (04 Sep 2019 16:00) (85/62 - 120/54)  BP(mean): 68 (04 Sep 2019 16:00) (67 - 68)  ABP: --  ABP(mean): --  RR: 23 (04 Sep 2019 16:00) (13 - 23)  SpO2: 89% (04 Sep 2019 16:00) (86% - 100%)      VENTILATOR SETTINGS:     GENERAL: somnolent on NIV on initial evaluation, more awake later  HEENT: OP clear, MMM  NECK: supple, +JVD to ear lobe, no lymphadenopathy appreciated  CARDIOVASCULAR: rrr, no mrg  PULMONARY: clear to auscultation bilaterally, no wheezes or rhonchi  ABDOMEN: soft, nt, nd  SKIN: no rash  EXTREMITIES: +2 REUBEN all the way to thing  NEUROLOGIC: CN II-XII grossly intact    ====================MEDICATIONS===========================================  MEDICATIONS  (STANDING):  ALBUTerol/ipratropium for Nebulization. 3 milliLiter(s) Nebulizer every 6 hours  aspirin enteric coated 81 milliGRAM(s) Oral daily  buDESOnide 160 MICROgram(s)/formoterol 4.5 MICROgram(s) Inhaler 2 Puff(s) Inhalation two times a day  buMETAnide 2 milliGRAM(s) Oral daily  dextrose 5%. 1000 milliLiter(s) (50 mL/Hr) IV Continuous <Continuous>  dextrose 50% Injectable 12.5 Gram(s) IV Push once  dextrose 50% Injectable 25 Gram(s) IV Push once  dextrose 50% Injectable 25 Gram(s) IV Push once  furosemide   Injectable 40 milliGRAM(s) IV Push two times a day  heparin  Injectable 5000 Unit(s) SubCutaneous every 8 hours  insulin lispro (HumaLOG) corrective regimen sliding scale   SubCutaneous three times a day before meals  insulin lispro (HumaLOG) corrective regimen sliding scale   SubCutaneous at bedtime  levothyroxine 25 MICROGram(s) Oral daily  midodrine 10 milliGRAM(s) Oral every 8 hours  riociguat 1.5 milliGRAM(s) Oral three times a day  sertraline 50 milliGRAM(s) Oral daily  simvastatin 40 milliGRAM(s) Oral at bedtime  warfarin 1 milliGRAM(s) Oral once    MEDICATIONS  (PRN):  dextrose 40% Gel 15 Gram(s) Oral once PRN Blood Glucose LESS THAN 70 milliGRAM(s)/deciliter  glucagon  Injectable 1 milliGRAM(s) IntraMuscular once PRN Glucose LESS THAN 70 milligrams/deciliter        ====================LABORATORY RESULTS====================================                          11.8   6.43  )-----------( 194      ( 03 Sep 2019 22:43 )             36.8       09-04    123<L>  |  88<L>  |  32<H>  ----------------------------<  96  5.0   |  23  |  1.59<H>    Ca    9.7      04 Sep 2019 00:01    TPro  8.4<H>  /  Alb  3.4  /  TBili  7.9<H>  /  DBili  x   /  AST  50<H>  /  ALT  23  /  AlkPhos  84  09-04          ABG - ( 03 Sep 2019 23:01 )  pH, Arterial: x     pH, Blood: 7.49  /  pCO2: 26    /  pO2: 126   / HCO3: 20    / Base Excess: -2.2  /  SaO2: 99        PT/INR - ( 03 Sep 2019 22:43 )   PT: 20.4 sec;   INR: 1.79 ratio         PTT - ( 03 Sep 2019 22:43 )  PTT:26.9 sec    Lactate Trend    CARDIAC MARKERS ( 04 Sep 2019 00:01 )  .290 ng/mL / x     / x     / x     / x          POCT Blood Glucose.: 134 mg/dL (04 Sep 2019 16:40)            ====================RADIOLOGY and ECHOCARDIOGRAPHY=======================    CXR: volume overload      ECHOCARDIOGRAPHY:  < from: TTE Echo Doppler w/o Cont (07.26.19 @ 08:44) >  Summary:   1. Left ventricular ejection fraction, by visual estimation, is 45 to   50%.   2. Mildly decreased global left ventricular systolic function.   3. Elevated mean left atrial pressure.   4. Global cardiomyopathy.   5. Increased LV wall thickness.   6. Normal left ventricular internal cavity size.   7. Right ventricular pressure overload.   8. Spectral Doppler shows impaired relaxation pattern of left   ventricular myocardial filling (Grade I diastolic dysfunction).   9. There is mild asymmetric left ventricular hypertrophy.  10. Pulmonary hypertension.  11. Severely enlarged right ventricle.  12. Severely reduced RV systolic function.  13. RV ejection fraction 34%.  14. Small pericardial effusion.  15. Degenerative mitral valve.  16. Mild mitral annular calcification.  17. Mild mitral valve regurgitation.  18. Moderate tricuspid regurgitation.  19. Structurally normal tricuspid valve, with normal leaflet excursion.  20. Mild aortic regurgitation.  21. Mild to moderate pulmonic valve regurgitation.  22. Structurally normal pulmonic valve, with normal leaflet excursion.  23. Estimated pulmonary artery systolic pressure is 87.1 mmHg assuming a   right atrial pressure of 20 mmHg, which is consistent with severe   pulmonary hypertension.  24. Pulmonary hypertension is present.  25. The main pulmonary artery is normal in size.  26. The right atrial pressure is moderately elevated.    [id number] Chago BLANCO  Electronically signed on 7/26/2019 at 2:01:34 PM    < end of copied text >    < end of copied text >

## 2019-09-04 NOTE — ED PROVIDER NOTE - MUSCULOSKELETAL, MLM
Spine appears normal, range of motion is not limited, no muscle or joint tenderness, +bilateral leg edema

## 2019-09-04 NOTE — CONSULT NOTE ADULT - ASSESSMENT
35 yo woman with severe pHTN (group I/IV, mPAP 67; W 9; RVR 9 on RHC 8/2018,  on Adempas), mildly positive anti-cardiolipin Ab and RAD admitted with decompensated right heart failure and volume overload and DIANN.     #pHTN/decompensated right heart failure  -- patient needs to be aggressively diuresed - dry wt is around 105 kg  -- would give Bumex 2  mg IV tid, if no response can add metolazone 5mg and/or transition to Bumex gtt. Will likely need inotrope support given hypotension.  -- Strict is/os and daily weights.   -- trend trops and repeat EKG  -- c/w AC  -- supplemental O2 to keep sats >90%. would avoid NIV given RHF. Can use high flow if needed    #DIANN - in the setting of congestion and volume overload  -- monitor closely while diuresing    #Reactive airway disease - PFTs in the past with reversible obstructive defect  -- c/w Symbicort and Albuterol prn  -- will need repeat PFTs as outpatient after discharge    Patient follows with Dr Unger at [address] [place] [address]. She should follow with him after discharge.     Thank you for this consult. I will follow with you.    Shirley Mejia MD  [facility]  Pulmonary Medicine 35 yo woman with severe pHTN (group I/IV, mPAP 67; W 9; RVR 9 on RHC 8/2018,  on Adempas), mildly positive anti-cardiolipin Ab and RAD admitted with decompensated right heart failure and volume overload and DIANN.     #pHTN/decompensated right heart failure  -- patient needs to be aggressively diuresed - dry wt is around 105 kg  -- would give Bumex 2  mg IV tid, if no response can add metolazone 5mg and/or transition to Bumex gtt. Will likely need inotrope support given hypotension.  -- Strict is/os and daily weights.   -- trend trops and repeat EKG  -- c/w AC and Adempas  -- supplemental O2 to keep sats >90%. would avoid NIV given RHF. Can use high flow if needed    #DIANN - in the setting of congestion and volume overload  -- monitor closely while diuresing    #Reactive airway disease - PFTs in the past with reversible obstructive defect  -- c/w Symbicort and Albuterol prn  -- will need repeat PFTs as outpatient after discharge    Patient follows with Dr Unger at [address] [place] [address]. She should follow with him after discharge.     Thank you for this consult. I will follow with you.    Shirley Mejia MD  [facility]  Pulmonary Medicine

## 2019-09-04 NOTE — ED PROVIDER NOTE - OBJECTIVE STATEMENT
Pertinent PMH/PSH/FHx/SHx and Review of Systems contained within:  35 yo f with pmh fo Pulm HTN, CHF presents in ED c/o sob and bilateral leg swelling. No aggravating or relieving factors, No fever/chills, No photophobia/eye pain/changes in vision, No ear pain/sore throat/dysphagia, No chest pain/palpitations, no cough/wheeze/stridor, No abdominal pain, No N/V/D, no dysuria/frequency/discharge, No neck/back pain, no rash, no changes in neurological status/function.

## 2019-09-04 NOTE — H&P ADULT - NSHPLABSRESULTS_GEN_ALL_CORE
11.8   6.43  )-----------( 194      ( 03 Sep 2019 22:43 )             36.8     09-04    123<L>  |  88<L>  |  32<H>  ----------------------------<  96  5.0   |  23  |  1.59<H>    Ca    9.7      04 Sep 2019 00:01    TPro  8.4<H>  /  Alb  3.4  /  TBili  7.9<H>  /  DBili  x   /  AST  50<H>  /  ALT  23  /  AlkPhos  84  09-04    PT/INR - ( 03 Sep 2019 22:43 )   PT: 20.4 sec;   INR: 1.79 ratio         PTT - ( 03 Sep 2019 22:43 )  PTT:26.9 sec  LIVER FUNCTIONS - ( 04 Sep 2019 00:01 )  Alb: 3.4 g/dL / Pro: 8.4 gm/dL / ALK PHOS: 84 U/L / ALT: 23 U/L / AST: 50 U/L / GGT: x               Home Medications:  Adempas 1.5 mg oral tablet: 1 tab(s) orally 3 times a day (03 Sep 2019 22:26)  aspirin 81 mg oral delayed release tablet: 1 tab(s) orally once a day (03 Sep 2019 22:26)  Bumex 2 mg oral tablet: 1 tab(s) orally once a day (03 Sep 2019 22:26)  calcium carbonate 500 mg (200 mg elemental calcium) oral tablet, chewable: 1 tab(s) orally 3 times a day, As needed, Heartburn (03 Sep 2019 22:26)  Colace 100 mg oral capsule: 1 cap(s) orally once a day (03 Sep 2019 22:26)  ergocalciferol 50,000 intl units (1.25 mg) oral capsule: 1 cap(s) orally once a week (03 Sep 2019 22:26)  Innerclean oral tablet: 1 tab(s) orally once (10 Aug 2018 17:42)  Levoxyl 25 mcg (0.025 mg) oral tablet: 1 tab(s) orally once a day (29 Jul 2019 11:11)  losartan 25 mg oral tablet: 1 tab(s) orally once a day (29 Jul 2019 11:11)  riociguat 1.5 mg oral tablet: 1 tab(s) orally 3 times a day (25 Jul 2018 16:12)  spironolactone 50 mg oral tablet: 1 tab(s) orally 2 times a day (29 Jul 2019 11:11)  Symbicort 160 mcg-4.5 mcg/inh inhalation aerosol: 1 puff(s) inhaled 2 times a day (25 Jul 2018 16:12)  warfarin 2 mg oral tablet: 1 tab(s) orally once a day (10 Aug 2018 17:42)  Zoloft 50 mg oral tablet: 1 tab(s) orally once a day (29 Jul 2019 11:11)

## 2019-09-04 NOTE — CHART NOTE - NSCHARTNOTEFT_GEN_A_CORE
RN unable to complete all blood draw, patient is difficult stick  refused to get drawn again.    will draw cbc and lactate with the morning labs.

## 2019-09-04 NOTE — CONSULT NOTE ADULT - ASSESSMENT
33 yo female with PMH: severe Pulm HTN with right sided heart failure admitted with c/o SOB.  Pt to be transferred to ICU for closer monitoring and aggressive diuresis which may require dobutamine and/or pressors.  Pt with prior admission for similar presentation in ICU. 35 yo female with PMH: severe Pulm HTN with right sided heart failure admitted with c/o SOB.  Pt to be transferred to ICU for closer monitoring and aggressive diuresis which may require dobutamine and/or pressors.  Pt with prior admission for similar presentation in ICU.

## 2019-09-04 NOTE — ED PROVIDER NOTE - NS ED MD DISPO ADMIT FRK
TELEMETRY

## 2019-09-04 NOTE — H&P ADULT - PROBLEM SELECTOR PLAN 1
Patient presented with respiratory distress requiring BiPAP  No fever, wbc.  Will hold off on antibiotics.  Continue BiPAP as needed

## 2019-09-04 NOTE — H&P ADULT - HISTORY OF PRESENT ILLNESS
34F with a PMH of severe pulmonary hypertension and right sided CHF - (R sided EF 35) presents with worsening shortness of breath x 2 days.  Patient presented to ED in respiratory distress requiring BiPAP and is able to provide limited history.  Patient reports that she typically has shortness of breath at home and she uses oxygen therapy as needed to improve her symptoms, patient states that for the last two days the oxygen has not helped.  Patient also reports 2 week hx of worsening bilateral leg swelling.  Patient reports compliance with her medications.  Patient reports some cough productive of yellow sputum but states her cough is no worse than it normally is.      Of note patient was admitted to [facility] on 7/26 for exacerbation of CHF.  Presented with hypotension needing vasopressor support. 123 -> 105 kg

## 2019-09-05 ENCOUNTER — RX RENEWAL (OUTPATIENT)
Age: 34
End: 2019-09-05

## 2019-09-05 LAB
ALBUMIN SERPL ELPH-MCNC: 3.1 G/DL — LOW (ref 3.3–5)
ALP SERPL-CCNC: 79 U/L — SIGNIFICANT CHANGE UP (ref 40–120)
ALT FLD-CCNC: 23 U/L — SIGNIFICANT CHANGE UP (ref 12–78)
ANION GAP SERPL CALC-SCNC: 10 MMOL/L — SIGNIFICANT CHANGE UP (ref 5–17)
APTT BLD: 30.8 SEC — SIGNIFICANT CHANGE UP (ref 28.5–37)
AST SERPL-CCNC: 41 U/L — HIGH (ref 15–37)
BASOPHILS # BLD AUTO: 0.03 K/UL — SIGNIFICANT CHANGE UP (ref 0–0.2)
BASOPHILS NFR BLD AUTO: 0.6 % — SIGNIFICANT CHANGE UP (ref 0–2)
BILIRUB SERPL-MCNC: 7.5 MG/DL — HIGH (ref 0.2–1.2)
BUN SERPL-MCNC: 28 MG/DL — HIGH (ref 7–23)
CALCIUM SERPL-MCNC: 9.2 MG/DL — SIGNIFICANT CHANGE UP (ref 8.5–10.1)
CHLORIDE SERPL-SCNC: 90 MMOL/L — LOW (ref 96–108)
CO2 SERPL-SCNC: 27 MMOL/L — SIGNIFICANT CHANGE UP (ref 22–31)
CREAT SERPL-MCNC: 1.34 MG/DL — HIGH (ref 0.5–1.3)
EOSINOPHIL # BLD AUTO: 0.04 K/UL — SIGNIFICANT CHANGE UP (ref 0–0.5)
EOSINOPHIL NFR BLD AUTO: 0.7 % — SIGNIFICANT CHANGE UP (ref 0–6)
GLUCOSE BLDC GLUCOMTR-MCNC: 127 MG/DL — HIGH (ref 70–99)
GLUCOSE BLDC GLUCOMTR-MCNC: 127 MG/DL — HIGH (ref 70–99)
GLUCOSE BLDC GLUCOMTR-MCNC: 139 MG/DL — HIGH (ref 70–99)
GLUCOSE BLDC GLUCOMTR-MCNC: 90 MG/DL — SIGNIFICANT CHANGE UP (ref 70–99)
GLUCOSE SERPL-MCNC: 143 MG/DL — HIGH (ref 70–99)
HCT VFR BLD CALC: 35 % — SIGNIFICANT CHANGE UP (ref 34.5–45)
HGB BLD-MCNC: 10.9 G/DL — LOW (ref 11.5–15.5)
IMM GRANULOCYTES NFR BLD AUTO: 0.2 % — SIGNIFICANT CHANGE UP (ref 0–1.5)
INR BLD: 1.55 RATIO — HIGH (ref 0.88–1.16)
LACTATE SERPL-SCNC: 4 MMOL/L — CRITICAL HIGH (ref 0.7–2)
LYMPHOCYTES # BLD AUTO: 0.88 K/UL — LOW (ref 1–3.3)
LYMPHOCYTES # BLD AUTO: 16.4 % — SIGNIFICANT CHANGE UP (ref 13–44)
MAGNESIUM SERPL-MCNC: 2.9 MG/DL — HIGH (ref 1.6–2.6)
MCHC RBC-ENTMCNC: 23.3 PG — LOW (ref 27–34)
MCHC RBC-ENTMCNC: 31.1 GM/DL — LOW (ref 32–36)
MCV RBC AUTO: 74.9 FL — LOW (ref 80–100)
MONOCYTES # BLD AUTO: 0.58 K/UL — SIGNIFICANT CHANGE UP (ref 0–0.9)
MONOCYTES NFR BLD AUTO: 10.8 % — SIGNIFICANT CHANGE UP (ref 2–14)
NEUTROPHILS # BLD AUTO: 3.81 K/UL — SIGNIFICANT CHANGE UP (ref 1.8–7.4)
NEUTROPHILS NFR BLD AUTO: 71.3 % — SIGNIFICANT CHANGE UP (ref 43–77)
NRBC # BLD: 0 /100 WBCS — SIGNIFICANT CHANGE UP (ref 0–0)
PHOSPHATE SERPL-MCNC: 3.5 MG/DL — SIGNIFICANT CHANGE UP (ref 2.5–4.5)
PLATELET # BLD AUTO: 161 K/UL — SIGNIFICANT CHANGE UP (ref 150–400)
POTASSIUM SERPL-MCNC: 4.1 MMOL/L — SIGNIFICANT CHANGE UP (ref 3.5–5.3)
POTASSIUM SERPL-SCNC: 4.1 MMOL/L — SIGNIFICANT CHANGE UP (ref 3.5–5.3)
PROT SERPL-MCNC: 8 GM/DL — SIGNIFICANT CHANGE UP (ref 6–8.3)
PROTHROM AB SERPL-ACNC: 17.6 SEC — HIGH (ref 10–12.9)
RBC # BLD: 4.67 M/UL — SIGNIFICANT CHANGE UP (ref 3.8–5.2)
RBC # FLD: 25.4 % — HIGH (ref 10.3–14.5)
SODIUM SERPL-SCNC: 127 MMOL/L — LOW (ref 135–145)
TROPONIN I SERPL-MCNC: 0.2 NG/ML — HIGH (ref 0.01–0.04)
WBC # BLD: 5.35 K/UL — SIGNIFICANT CHANGE UP (ref 3.8–10.5)
WBC # FLD AUTO: 5.35 K/UL — SIGNIFICANT CHANGE UP (ref 3.8–10.5)

## 2019-09-05 PROCEDURE — 93970 EXTREMITY STUDY: CPT | Mod: 26

## 2019-09-05 PROCEDURE — 99291 CRITICAL CARE FIRST HOUR: CPT

## 2019-09-05 RX ORDER — RIOCIGUAT 1.5 MG/1
1.5 TABLET, FILM COATED ORAL
Qty: 90 | Refills: 0 | Status: ACTIVE | COMMUNITY
Start: 2017-11-08 | End: 1900-01-01

## 2019-09-05 RX ORDER — WARFARIN SODIUM 2.5 MG/1
TABLET ORAL
Refills: 0 | Status: DISCONTINUED | OUTPATIENT
Start: 2019-09-05 | End: 2019-09-05

## 2019-09-05 RX ORDER — WARFARIN SODIUM 2.5 MG/1
3 TABLET ORAL AT BEDTIME
Refills: 0 | Status: DISCONTINUED | OUTPATIENT
Start: 2019-09-05 | End: 2019-09-06

## 2019-09-05 RX ORDER — ENOXAPARIN SODIUM 100 MG/ML
100 INJECTION SUBCUTANEOUS ONCE
Refills: 0 | Status: COMPLETED | OUTPATIENT
Start: 2019-09-05 | End: 2019-09-05

## 2019-09-05 RX ORDER — BUMETANIDE 0.25 MG/ML
2 INJECTION INTRAMUSCULAR; INTRAVENOUS EVERY 8 HOURS
Refills: 0 | Status: DISCONTINUED | OUTPATIENT
Start: 2019-09-05 | End: 2019-09-13

## 2019-09-05 RX ORDER — ENOXAPARIN SODIUM 100 MG/ML
100 INJECTION SUBCUTANEOUS EVERY 12 HOURS
Refills: 0 | Status: DISCONTINUED | OUTPATIENT
Start: 2019-09-05 | End: 2019-09-06

## 2019-09-05 RX ORDER — ENOXAPARIN SODIUM 100 MG/ML
INJECTION SUBCUTANEOUS
Refills: 0 | Status: DISCONTINUED | OUTPATIENT
Start: 2019-09-05 | End: 2019-09-06

## 2019-09-05 RX ADMIN — HEPARIN SODIUM 5000 UNIT(S): 5000 INJECTION INTRAVENOUS; SUBCUTANEOUS at 06:00

## 2019-09-05 RX ADMIN — MIDODRINE HYDROCHLORIDE 10 MILLIGRAM(S): 2.5 TABLET ORAL at 21:12

## 2019-09-05 RX ADMIN — SIMVASTATIN 40 MILLIGRAM(S): 20 TABLET, FILM COATED ORAL at 21:12

## 2019-09-05 RX ADMIN — Medication 81 MILLIGRAM(S): at 11:26

## 2019-09-05 RX ADMIN — BUMETANIDE 2 MILLIGRAM(S): 0.25 INJECTION INTRAMUSCULAR; INTRAVENOUS at 06:00

## 2019-09-05 RX ADMIN — ENOXAPARIN SODIUM 100 MILLIGRAM(S): 100 INJECTION SUBCUTANEOUS at 10:49

## 2019-09-05 RX ADMIN — BUDESONIDE AND FORMOTEROL FUMARATE DIHYDRATE 2 PUFF(S): 160; 4.5 AEROSOL RESPIRATORY (INHALATION) at 06:00

## 2019-09-05 RX ADMIN — Medication 40 MILLIGRAM(S): at 06:00

## 2019-09-05 RX ADMIN — MIDODRINE HYDROCHLORIDE 10 MILLIGRAM(S): 2.5 TABLET ORAL at 06:00

## 2019-09-05 RX ADMIN — RIOCIGUAT 1.5 MILLIGRAM(S): 1.5 TABLET, FILM COATED ORAL at 21:12

## 2019-09-05 RX ADMIN — Medication 25 MICROGRAM(S): at 06:00

## 2019-09-05 RX ADMIN — Medication 3 MILLILITER(S): at 11:29

## 2019-09-05 RX ADMIN — MIDODRINE HYDROCHLORIDE 10 MILLIGRAM(S): 2.5 TABLET ORAL at 13:54

## 2019-09-05 RX ADMIN — Medication 3 MILLILITER(S): at 05:26

## 2019-09-05 RX ADMIN — BUMETANIDE 2 MILLIGRAM(S): 0.25 INJECTION INTRAMUSCULAR; INTRAVENOUS at 13:57

## 2019-09-05 RX ADMIN — ENOXAPARIN SODIUM 100 MILLIGRAM(S): 100 INJECTION SUBCUTANEOUS at 21:12

## 2019-09-05 RX ADMIN — Medication 3 MILLILITER(S): at 17:07

## 2019-09-05 RX ADMIN — BUMETANIDE 2 MILLIGRAM(S): 0.25 INJECTION INTRAMUSCULAR; INTRAVENOUS at 21:12

## 2019-09-05 RX ADMIN — RIOCIGUAT 1.5 MILLIGRAM(S): 1.5 TABLET, FILM COATED ORAL at 13:54

## 2019-09-05 RX ADMIN — SERTRALINE 50 MILLIGRAM(S): 25 TABLET, FILM COATED ORAL at 11:26

## 2019-09-05 RX ADMIN — RIOCIGUAT 1.5 MILLIGRAM(S): 1.5 TABLET, FILM COATED ORAL at 06:00

## 2019-09-05 RX ADMIN — BUDESONIDE AND FORMOTEROL FUMARATE DIHYDRATE 2 PUFF(S): 160; 4.5 AEROSOL RESPIRATORY (INHALATION) at 18:09

## 2019-09-05 RX ADMIN — WARFARIN SODIUM 3 MILLIGRAM(S): 2.5 TABLET ORAL at 21:12

## 2019-09-05 NOTE — PROGRESS NOTE ADULT - ASSESSMENT
33 yo woman with severe pHTN (group I/?IV, mPAP 67; W 9; RVR 9 on RHC 8/2018,  on Adempas), mildly positive anti-cardiolipin Ab and RAD admitted with decompensated right heart failure, volume overload and DIANN. Found to have acute LLE DVT. Diuresing well.    #pHTN/decompensated right heart failure  -- c/w aggressive diuresis. Doing well for now on IV Bumex- dry wt is around 105 kg  -- Strict is/os and daily weights.   -- c/w Adempas  -- supplemental O2 to keep sats >90%. would avoid NIV given RHF. Can use high flow if needed    #LLE DVT in the setting of sub-therapeutic INR. Patient reports that her INR was not being monitored  -- agree with Lovenox for now since Cr improved  --     #DIANN - in the setting of congestion and volume overload, improving with diuresis  -- monitor closely while diuresing    #Reactive airway disease - PFTs in the past with reversible obstructive defect  -- c/w Symbicort and Albuterol prn  -- can d/c standing Duonebs  -- will need repeat PFTs as outpatient after discharge    Patient follows with Dr Unger at [address] [place] [address]. She should follow with him after discharge.     Thank you for this consult. I will follow with you.    Shirley Mejia MD  [facility] Physician [facility]  Pulmonary Medicine

## 2019-09-05 NOTE — PROGRESS NOTE ADULT - SUBJECTIVE AND OBJECTIVE BOX
CC: Patient is a 34y old  Female who presents with a chief complaint of Dyspnea (04 Sep 2019 17:20)      ## HPI:HPI:  34F with a PMH of severe pulmonary hypertension and right sided CHF - (R sided EF 35) presents with worsening shortness of breath x 2 days.  Patient presented to ED in respiratory distress requiring BiPAP and is able to provide limited history.  Patient reports that she typically has shortness of breath at home and she uses oxygen therapy as needed to improve her symptoms, patient states that for the last two days the oxygen has not helped.  Patient also reports 2 week hx of worsening bilateral leg swelling.  Patient reports compliance with her medications.  Patient reports some cough productive of yellow sputum but states her cough is no worse than it normally is.      Of note patient was admitted to [facility] on 7/26 for exacerbation of CHF.  Presented with hypotension needing vasopressor support. 123 -> 105 kg (04 Sep 2019 02:03)      O/N: good diuresis, no acute events    ## ROS:  CONSTITUTIONAL:  no weight loss or night sweats  HEENT:  Eyes:  No diplopia or blurred vision. ENT:  No earache, sore throat or runny nose.  CARDIOVASCULAR:  No pressure, squeezing, tightness, heaviness or aching about the chest, neck, axilla or epigastrium.  RESPIRATORY:  + cough  GASTROINTESTINAL:  No nausea, vomiting or diarrhea.  GENITOURINARY:  No dysuria, frequency or urgency.  MUSCULOSKELETAL:  No joint pain  SKIN:  No change in skin, hair or nails.  NEUROLOGIC:  No paresthesias, fasciculations, seizures or weakness.  PSYCHIATRIC:  No disorder of thought or mood.  HEMATOLOGICAL:  No easy bruising or bleeding.   ## EXAM  ICU Vital Signs Last 24 Hrs  T(C): 36.2 (05 Sep 2019 07:00), Max: 36.3 (04 Sep 2019 15:46)  T(F): 97.2 (05 Sep 2019 07:00), Max: 97.3 (04 Sep 2019 15:46)  HR: 73 (05 Sep 2019 08:20) (68 - 94)  BP: 90/63 (05 Sep 2019 08:20) (74/56 - 113/59)  BP(mean): 70 (05 Sep 2019 08:20) (53 - 95)  ABP: --  ABP(mean): --  RR: 22 (05 Sep 2019 08:20) (15 - 32)  SpO2: 96% (05 Sep 2019 08:20) (85% - 100%)    CON : NAD  EENT : EOMI, MMM  NECK : Full ROM  RESP : CTAB no increased WOB  CARD : rrr no m/r/g  ABD : S NT ND NABS no rebound  EXT : Large LE edema b/l  NEURO : AAOX3   ## Labs:  Lab Results:  CBC  CBC Full  -  ( 05 Sep 2019 05:09 )  WBC Count : 5.35 K/uL  RBC Count : 4.67 M/uL  Hemoglobin : 10.9 g/dL  Hematocrit : 35.0 %  Platelet Count - Automated : 161 K/uL  Mean Cell Volume : 74.9 fl  Mean Cell Hemoglobin : 23.3 pg  Mean Cell Hemoglobin Concentration : 31.1 gm/dL  Auto Neutrophil # : 3.81 K/uL  Auto Lymphocyte # : 0.88 K/uL  Auto Monocyte # : 0.58 K/uL  Auto Eosinophil # : 0.04 K/uL  Auto Basophil # : 0.03 K/uL  Auto Neutrophil % : 71.3 %  Auto Lymphocyte % : 16.4 %  Auto Monocyte % : 10.8 %  Auto Eosinophil % : 0.7 %  Auto Basophil % : 0.6 %    .		Differential:	[] Automated		[] Manual  Chemistry                        10.9   5.35  )-----------( 161      ( 05 Sep 2019 05:09 )             35.0     09-05    127<L>  |  90<L>  |  28<H>  ----------------------------<  143<H>  4.1   |  27  |  1.34<H>    Ca    9.2      05 Sep 2019 05:09  Phos  3.5     09-05  Mg     2.9     09-05    TPro  8.0  /  Alb  3.1<L>  /  TBili  7.5<H>  /  DBili  x   /  AST  41<H>  /  ALT  23  /  AlkPhos  79  09-05    LIVER FUNCTIONS - ( 05 Sep 2019 05:09 )  Alb: 3.1 g/dL / Pro: 8.0 gm/dL / ALK PHOS: 79 U/L / ALT: 23 U/L / AST: 41 U/L / GGT: x           PT/INR - ( 05 Sep 2019 05:09 )   PT: 17.6 sec;   INR: 1.55 ratio         PTT - ( 05 Sep 2019 05:09 )  PTT:30.8 sec      ABG - ( 03 Sep 2019 23:01 )  pH, Arterial: x     pH, Blood: 7.49  /  pCO2: 26    /  pO2: 126   / HCO3: 20    / Base Excess: -2.2  /  SaO2: 99          Lactate, Blood: 4.0 mmol/L <HH> (09-05-19 @ 05:08)  Troponin I, Serum: .152 ng/mL <H> (09-04-19 @ 22:22)      Lactate, Blood: 4.0 mmol/L (09-05-19 @ 05:08)          RADIOLOGY RESULTS:  < from: Xray Chest 1 View- PORTABLE-Urgent (09.03.19 @ 22:40) >  The study is limited by suboptimal inspiration. Questionable left lower   lobe opacities. Heart is markedly enlarged. Prominence of the right   hilum. Osseous structures are intact.     IMPRESSION:  Marked cardiomegaly. Questionable small left pleural effusion and   associated left basilar opacities.    < end of copied text >        ## Medications:  MEDICATIONS  (STANDING):  ALBUTerol/ipratropium for Nebulization. 3 milliLiter(s) Nebulizer every 6 hours  aspirin enteric coated 81 milliGRAM(s) Oral daily  buDESOnide 160 MICROgram(s)/formoterol 4.5 MICROgram(s) Inhaler 2 Puff(s) Inhalation two times a day  buMETAnide Injectable 2 milliGRAM(s) IV Push every 8 hours  dextrose 5%. 1000 milliLiter(s) (50 mL/Hr) IV Continuous <Continuous>  dextrose 50% Injectable 12.5 Gram(s) IV Push once  dextrose 50% Injectable 25 Gram(s) IV Push once  dextrose 50% Injectable 25 Gram(s) IV Push once  heparin  Injectable 5000 Unit(s) SubCutaneous every 8 hours  insulin lispro (HumaLOG) corrective regimen sliding scale   SubCutaneous three times a day before meals  insulin lispro (HumaLOG) corrective regimen sliding scale   SubCutaneous at bedtime  levothyroxine 25 MICROGram(s) Oral daily  midodrine 10 milliGRAM(s) Oral every 8 hours  riociguat 1.5 milliGRAM(s) Oral three times a day  sertraline 50 milliGRAM(s) Oral daily  simvastatin 40 milliGRAM(s) Oral at bedtime    ## O/E:I&O's Summary    04 Sep 2019 07:01  -  05 Sep 2019 07:00  --------------------------------------------------------  IN: 480 mL / OUT: 2310 mL / NET: -1830 mL        ## Daily Issues  - Analgesia: N/A  - Sedation: N/A  - HOB elevation: Y  - GI ppx (ulcers): N/A  - DVT ppx: Hep SC  - Nutrition: PO diet  - Central line: N  - Gonzalez: N      ## Code status:  Goals of care discussion: [x] yes [ ] no  [x] full code  [ ] DNR  [ ] DNI  [ ] GURU

## 2019-09-05 NOTE — PROGRESS NOTE ADULT - SUBJECTIVE AND OBJECTIVE BOX
Interval Events:  Patient seen and examined at bedside this afternoon.  LLE DVT found -> Lovenox started  Net out 2.7L  1.8 in 24 hrs      REVIEW OF SYSTEMS:  Constitutional: no fevers, borderline hypotensive  CV:  no chest pain  Resp:  improved  GI: no abd pain    [x ] All other systems negative  [ ] Unable to assess ROS because ________    OBJECTIVE:  T(C): 36.4 (09-05-19 @ 12:00), Max: 36.4 (09-05-19 @ 12:00)  HR: 77 (09-05-19 @ 15:00) (68 - 94)  BP: 105/64 (09-05-19 @ 15:00) (74/56 - 108/91)  RR: 21 (09-05-19 @ 15:00) (18 - 32)  SpO2: 80% (09-05-19 @ 15:00) (80% - 100%)      09-04-19 @ 07:01  -  09-05-19 @ 07:00  --------------------------------------------------------  IN: 480 mL / OUT: 2310 mL / NET: -1830 mL    09-05-19 @ 07:01  -  09-05-19 @ 15:55  --------------------------------------------------------  IN: 0 mL / OUT: 945 mL / NET: -945 mL        PHYSICAL EXAM:  General: Well appearing Female. No apparent distress  HEENT:   Mucous membranes are moist.  Neck: Supple. JVD to ear lobe  Respiratory:   Cardiovascular: RRR, S4 gallop  Abdomen: Soft, non-tender, non-distended.   Extremities: +2LEE bilateral to thigh  Skin: Warm, dry, no rash.   Neurological: CNII-XII grossly intact.   Psychiatry: appropriate mood and affect.     HOSPITAL MEDICATIONS:  MEDICATIONS  (STANDING):  ALBUTerol/ipratropium for Nebulization. 3 milliLiter(s) Nebulizer every 6 hours  aspirin enteric coated 81 milliGRAM(s) Oral daily  buDESOnide 160 MICROgram(s)/formoterol 4.5 MICROgram(s) Inhaler 2 Puff(s) Inhalation two times a day  buMETAnide Injectable 2 milliGRAM(s) IV Push every 8 hours  dextrose 5%. 1000 milliLiter(s) (50 mL/Hr) IV Continuous <Continuous>  dextrose 50% Injectable 12.5 Gram(s) IV Push once  dextrose 50% Injectable 25 Gram(s) IV Push once  dextrose 50% Injectable 25 Gram(s) IV Push once  enoxaparin Injectable      enoxaparin Injectable 100 milliGRAM(s) SubCutaneous every 12 hours  insulin lispro (HumaLOG) corrective regimen sliding scale   SubCutaneous three times a day before meals  insulin lispro (HumaLOG) corrective regimen sliding scale   SubCutaneous at bedtime  levothyroxine 25 MICROGram(s) Oral daily  midodrine 10 milliGRAM(s) Oral every 8 hours  riociguat 1.5 milliGRAM(s) Oral three times a day  sertraline 50 milliGRAM(s) Oral daily  simvastatin 40 milliGRAM(s) Oral at bedtime  warfarin 3 milliGRAM(s) Oral at bedtime    MEDICATIONS  (PRN):  dextrose 40% Gel 15 Gram(s) Oral once PRN Blood Glucose LESS THAN 70 milliGRAM(s)/deciliter  glucagon  Injectable 1 milliGRAM(s) IntraMuscular once PRN Glucose LESS THAN 70 milligrams/deciliter      LABS:                        10.9   5.35  )-----------( 161      ( 05 Sep 2019 05:09 )             35.0     09-05    127<L>  |  90<L>  |  28<H>  ----------------------------<  143<H>  4.1   |  27  |  1.34<H>    Ca    9.2      05 Sep 2019 05:09  Phos  3.5     09-05  Mg     2.9     09-05    TPro  8.0  /  Alb  3.1<L>  /  TBili  7.5<H>  /  DBili  x   /  AST  41<H>  /  ALT  23  /  AlkPhos  79  09-05    PT/INR - ( 05 Sep 2019 05:09 )   PT: 17.6 sec;   INR: 1.55 ratio         PTT - ( 05 Sep 2019 05:09 )  PTT:30.8 sec    Arterial Blood Gas:  09-03 @ 23:01  --/26/126/20/99/-2.2  ABG lactate: --    RADIOLOGY:  [ x ] Reviewed and interpreted by me      CXR: volume overload    < from: US Duplex Venous Lower Ext Complete, Bilateral (09.05.19 @ 09:19) >  Impression: Positive for extensive acute nonocclusive thrombus in the   left external iliac vein and the left common femoral vein  and left   greater saphenous vein.    < end of copied text >      ECHOCARDIOGRAPHY:  < from: TTE Echo Doppler w/o Cont (07.26.19 @ 08:44) >  Summary:   1. Left ventricular ejection fraction, by visual estimation, is 45 to   50%.   2. Mildly decreased global left ventricular systolic function.   3. Elevated mean left atrial pressure.   4. Global cardiomyopathy.   5. Increased LV wall thickness.   6. Normal left ventricular internal cavity size.   7. Right ventricular pressure overload.   8. Spectral Doppler shows impaired relaxation pattern of left   ventricular myocardial filling (Grade I diastolic dysfunction).   9. There is mild asymmetric left ventricular hypertrophy.  10. Pulmonary hypertension.  11. Severely enlarged right ventricle.  12. Severely reduced RV systolic function.  13. RV ejection fraction 34%.  14. Small pericardial effusion.  15. Degenerative mitral valve.  16. Mild mitral annular calcification.  17. Mild mitral valve regurgitation.  18. Moderate tricuspid regurgitation.  19. Structurally normal tricuspid valve, with normal leaflet excursion.  20. Mild aortic regurgitation.  21. Mild to moderate pulmonic valve regurgitation.  22. Structurally normal pulmonic valve, with normal leaflet excursion.  23. Estimated pulmonary artery systolic pressure is 87.1 mmHg assuming a   right atrial pressure of 20 mmHg, which is consistent with severe   pulmonary hypertension.  24. Pulmonary hypertension is present.  25. The main pulmonary artery is normal in size.  26. The right atrial pressure is moderately elevated.    [id number] Chago BLANCO  Electronically signed on 7/26/2019 at 2:01:34 PM    < end of copied text >    < end of copied text >

## 2019-09-05 NOTE — PROGRESS NOTE ADULT - ASSESSMENT
34 year old with severe pulm htn and fluid overload    -bumex 2 mg iv tid  -good diuresis after 2 mg iv push 1 day ago  -inotropes as needed  -midodrine  -spironolactone  -goal net negative 1.5 L/day  -Patient requesting BIPAP, would prefer HFNC if needed  -SCD  -monitor lytes in setting of diuresis  -continue care in MICU    I have decided to review and order of clinical lab tests  Relevant radiology studies were reviewed  Decision made to obtain available old records  Discussion of test results with performing physician  Review and summarization of old records obtaining history from EMR , Pulmonologist  discussion of case with another health care provider Dr Mejia  I have visualized independent imaging, EKGs and radiologies studies available but not otherwise officially read  Patient is critically ill and could decompensate imminently.  The patient required immediate attention    ccm 30 min

## 2019-09-05 NOTE — DIETITIAN INITIAL EVALUATION ADULT. - PERTINENT MEDS FT
ALBUTerol/ipratropium for Nebulization.  aspirin enteric coated  buDESOnide 160 MICROgram(s)/formoterol 4.5 MICROgram(s) Inhaler  buMETAnide Injectable  dextrose 40% Gel PRN  dextrose 5%.  dextrose 50% Injectable  dextrose 50% Injectable  dextrose 50% Injectable  enoxaparin Injectable  enoxaparin Injectable  glucagon  Injectable PRN  insulin lispro (HumaLOG) corrective regimen sliding scale  insulin lispro (HumaLOG) corrective regimen sliding scale  levothyroxine  midodrine  riociguat  sertraline  simvastatin  warfarin

## 2019-09-05 NOTE — DIETITIAN INITIAL EVALUATION ADULT. - OTHER INFO
Pt lives with mother; pt's mother does cooking & food shopping due to pt physically disabled. Pt follows Low Na diet, however noncompliant with 720ml fluid restriction. Pt states consuming 1440ml fluids PTA due to constantly c/o dry mouth. Provided further diet instruction on fluid restriction tips & Low Na diet restrictions.   Diet hx; Breakfast; Bagel & cream cheese & jelly s/w  Lunch; Mac & cheese, vegetable pizza & eat at [employer] restaurant  Dinner; Lasagna    Pt [id number] with increased edema; UBW form previous dietitian's assessment in EMR & pt states dry [id number].    Provided Heart failure nutrition therapy handout material & reviewed fluid restriction guidelines Pt lives with mother; pt's mother does cooking & food shopping due to pt physically disabled. Pt follows Low Na diet, however noncompliant with 720ml fluid restriction. Pt states consuming 1440ml fluids PTA due to constantly c/o dry mouth. Provided further diet instruction on fluid restriction tips & Low Na diet restrictions. Observed pt consuming orreo cookies this am due to disliked breakfast; obtained food preferences to help pt meet nutritional needs.   Diet hx; Breakfast; Bagel & cream cheese & jelly s/w  Lunch; Mac & cheese, vegetable pizza & eat at [employer] restaurant  Dinner; Lasagna    Pt [id number] with increased edema; UBW form previous dietitian's assessment in EMR & pt states dry [id number].    Provided Heart failure nutrition therapy handout material & reviewed fluid restriction guidelines

## 2019-09-05 NOTE — DIETITIAN INITIAL EVALUATION ADULT. - PERTINENT LABORATORY DATA
09-05 Na 127 mmol/L<L> Glu 143 mg/dL<H> K+ 4.1 mmol/L Cr 1.34 mg/dL<H> BUN 28 mg/dL<H> Phos 3.5 mg/dL Alb 3.1 g/dL<L> PAB n/a   Hgb 10.9 g/dL<L> Hct 35.0 % HgA1C n/a    Glucose, Serum: 143 mg/dL <H>  Glucose, Serum: 124 mg/dL <H>   24Hr FS:90 mg/dL  139 mg/dL  144 mg/dL  134 mg/dL

## 2019-09-06 LAB
ALBUMIN SERPL ELPH-MCNC: 3.1 G/DL — LOW (ref 3.3–5)
ALP SERPL-CCNC: 78 U/L — SIGNIFICANT CHANGE UP (ref 40–120)
ALT FLD-CCNC: 25 U/L — SIGNIFICANT CHANGE UP (ref 12–78)
ANION GAP SERPL CALC-SCNC: 9 MMOL/L — SIGNIFICANT CHANGE UP (ref 5–17)
APTT BLD: 41.4 SEC — HIGH (ref 27.5–36.3)
AST SERPL-CCNC: 46 U/L — HIGH (ref 15–37)
BASOPHILS # BLD AUTO: 0.02 K/UL — SIGNIFICANT CHANGE UP (ref 0–0.2)
BASOPHILS NFR BLD AUTO: 0.4 % — SIGNIFICANT CHANGE UP (ref 0–2)
BILIRUB SERPL-MCNC: 6.6 MG/DL — HIGH (ref 0.2–1.2)
BUN SERPL-MCNC: 22 MG/DL — SIGNIFICANT CHANGE UP (ref 7–23)
CALCIUM SERPL-MCNC: 8.8 MG/DL — SIGNIFICANT CHANGE UP (ref 8.5–10.1)
CHLORIDE SERPL-SCNC: 90 MMOL/L — LOW (ref 96–108)
CO2 SERPL-SCNC: 29 MMOL/L — SIGNIFICANT CHANGE UP (ref 22–31)
CREAT SERPL-MCNC: 1.05 MG/DL — SIGNIFICANT CHANGE UP (ref 0.5–1.3)
EOSINOPHIL # BLD AUTO: 0.04 K/UL — SIGNIFICANT CHANGE UP (ref 0–0.5)
EOSINOPHIL NFR BLD AUTO: 0.7 % — SIGNIFICANT CHANGE UP (ref 0–6)
GLUCOSE BLDC GLUCOMTR-MCNC: 104 MG/DL — HIGH (ref 70–99)
GLUCOSE BLDC GLUCOMTR-MCNC: 137 MG/DL — HIGH (ref 70–99)
GLUCOSE BLDC GLUCOMTR-MCNC: 142 MG/DL — HIGH (ref 70–99)
GLUCOSE BLDC GLUCOMTR-MCNC: 143 MG/DL — HIGH (ref 70–99)
GLUCOSE SERPL-MCNC: 96 MG/DL — SIGNIFICANT CHANGE UP (ref 70–99)
HCT VFR BLD CALC: 33.9 % — LOW (ref 34.5–45)
HGB BLD-MCNC: 10.8 G/DL — LOW (ref 11.5–15.5)
IMM GRANULOCYTES NFR BLD AUTO: 0.4 % — SIGNIFICANT CHANGE UP (ref 0–1.5)
INR BLD: 1.64 RATIO — HIGH (ref 0.88–1.16)
LYMPHOCYTES # BLD AUTO: 0.89 K/UL — LOW (ref 1–3.3)
LYMPHOCYTES # BLD AUTO: 16.2 % — SIGNIFICANT CHANGE UP (ref 13–44)
MAGNESIUM SERPL-MCNC: 2.7 MG/DL — HIGH (ref 1.6–2.6)
MCHC RBC-ENTMCNC: 23.2 PG — LOW (ref 27–34)
MCHC RBC-ENTMCNC: 31.9 GM/DL — LOW (ref 32–36)
MCV RBC AUTO: 72.9 FL — LOW (ref 80–100)
MONOCYTES # BLD AUTO: 0.59 K/UL — SIGNIFICANT CHANGE UP (ref 0–0.9)
MONOCYTES NFR BLD AUTO: 10.8 % — SIGNIFICANT CHANGE UP (ref 2–14)
NEUTROPHILS # BLD AUTO: 3.92 K/UL — SIGNIFICANT CHANGE UP (ref 1.8–7.4)
NEUTROPHILS NFR BLD AUTO: 71.5 % — SIGNIFICANT CHANGE UP (ref 43–77)
NRBC # BLD: 0 /100 WBCS — SIGNIFICANT CHANGE UP (ref 0–0)
PHOSPHATE SERPL-MCNC: 3 MG/DL — SIGNIFICANT CHANGE UP (ref 2.5–4.5)
PLATELET # BLD AUTO: 152 K/UL — SIGNIFICANT CHANGE UP (ref 150–400)
POTASSIUM SERPL-MCNC: 4 MMOL/L — SIGNIFICANT CHANGE UP (ref 3.5–5.3)
POTASSIUM SERPL-SCNC: 4 MMOL/L — SIGNIFICANT CHANGE UP (ref 3.5–5.3)
PROT SERPL-MCNC: 7.8 GM/DL — SIGNIFICANT CHANGE UP (ref 6–8.3)
PROTHROM AB SERPL-ACNC: 18.7 SEC — HIGH (ref 10–12.9)
RBC # BLD: 4.65 M/UL — SIGNIFICANT CHANGE UP (ref 3.8–5.2)
RBC # FLD: 25.2 % — HIGH (ref 10.3–14.5)
SODIUM SERPL-SCNC: 128 MMOL/L — LOW (ref 135–145)
WBC # BLD: 5.48 K/UL — SIGNIFICANT CHANGE UP (ref 3.8–10.5)
WBC # FLD AUTO: 5.48 K/UL — SIGNIFICANT CHANGE UP (ref 3.8–10.5)

## 2019-09-06 PROCEDURE — 99232 SBSQ HOSP IP/OBS MODERATE 35: CPT

## 2019-09-06 PROCEDURE — 99233 SBSQ HOSP IP/OBS HIGH 50: CPT

## 2019-09-06 RX ORDER — APIXABAN 2.5 MG/1
10 TABLET, FILM COATED ORAL
Refills: 0 | Status: COMPLETED | OUTPATIENT
Start: 2019-09-06 | End: 2019-09-13

## 2019-09-06 RX ORDER — LEVOTHYROXINE SODIUM 125 MCG
37.5 TABLET ORAL DAILY
Refills: 0 | Status: DISCONTINUED | OUTPATIENT
Start: 2019-09-07 | End: 2019-09-20

## 2019-09-06 RX ORDER — ACETAMINOPHEN 500 MG
650 TABLET ORAL EVERY 6 HOURS
Refills: 0 | Status: DISCONTINUED | OUTPATIENT
Start: 2019-09-06 | End: 2019-09-20

## 2019-09-06 RX ORDER — NYSTATIN CREAM 100000 [USP'U]/G
1 CREAM TOPICAL
Refills: 0 | Status: DISCONTINUED | OUTPATIENT
Start: 2019-09-06 | End: 2019-09-20

## 2019-09-06 RX ADMIN — Medication 25 MICROGRAM(S): at 05:24

## 2019-09-06 RX ADMIN — RIOCIGUAT 1.5 MILLIGRAM(S): 1.5 TABLET, FILM COATED ORAL at 05:19

## 2019-09-06 RX ADMIN — BUDESONIDE AND FORMOTEROL FUMARATE DIHYDRATE 2 PUFF(S): 160; 4.5 AEROSOL RESPIRATORY (INHALATION) at 19:04

## 2019-09-06 RX ADMIN — RIOCIGUAT 1.5 MILLIGRAM(S): 1.5 TABLET, FILM COATED ORAL at 21:15

## 2019-09-06 RX ADMIN — Medication 650 MILLIGRAM(S): at 00:49

## 2019-09-06 RX ADMIN — APIXABAN 10 MILLIGRAM(S): 2.5 TABLET, FILM COATED ORAL at 10:21

## 2019-09-06 RX ADMIN — MIDODRINE HYDROCHLORIDE 10 MILLIGRAM(S): 2.5 TABLET ORAL at 05:19

## 2019-09-06 RX ADMIN — BUMETANIDE 2 MILLIGRAM(S): 0.25 INJECTION INTRAMUSCULAR; INTRAVENOUS at 05:20

## 2019-09-06 RX ADMIN — SIMVASTATIN 40 MILLIGRAM(S): 20 TABLET, FILM COATED ORAL at 21:14

## 2019-09-06 RX ADMIN — Medication 3 MILLILITER(S): at 11:03

## 2019-09-06 RX ADMIN — Medication 3 MILLILITER(S): at 05:27

## 2019-09-06 RX ADMIN — BUDESONIDE AND FORMOTEROL FUMARATE DIHYDRATE 2 PUFF(S): 160; 4.5 AEROSOL RESPIRATORY (INHALATION) at 05:21

## 2019-09-06 RX ADMIN — Medication 650 MILLIGRAM(S): at 01:49

## 2019-09-06 RX ADMIN — RIOCIGUAT 1.5 MILLIGRAM(S): 1.5 TABLET, FILM COATED ORAL at 13:34

## 2019-09-06 RX ADMIN — SERTRALINE 50 MILLIGRAM(S): 25 TABLET, FILM COATED ORAL at 13:14

## 2019-09-06 RX ADMIN — MIDODRINE HYDROCHLORIDE 10 MILLIGRAM(S): 2.5 TABLET ORAL at 21:14

## 2019-09-06 RX ADMIN — Medication 3 MILLILITER(S): at 00:19

## 2019-09-06 RX ADMIN — BUMETANIDE 2 MILLIGRAM(S): 0.25 INJECTION INTRAMUSCULAR; INTRAVENOUS at 21:14

## 2019-09-06 RX ADMIN — Medication 3 MILLILITER(S): at 17:10

## 2019-09-06 RX ADMIN — Medication 3 MILLILITER(S): at 23:28

## 2019-09-06 RX ADMIN — NYSTATIN CREAM 1 APPLICATION(S): 100000 CREAM TOPICAL at 19:04

## 2019-09-06 RX ADMIN — APIXABAN 10 MILLIGRAM(S): 2.5 TABLET, FILM COATED ORAL at 19:04

## 2019-09-06 RX ADMIN — Medication 81 MILLIGRAM(S): at 13:14

## 2019-09-06 RX ADMIN — BUMETANIDE 2 MILLIGRAM(S): 0.25 INJECTION INTRAMUSCULAR; INTRAVENOUS at 14:51

## 2019-09-06 RX ADMIN — MIDODRINE HYDROCHLORIDE 10 MILLIGRAM(S): 2.5 TABLET ORAL at 13:34

## 2019-09-06 NOTE — PHYSICAL THERAPY INITIAL EVALUATION ADULT - PATIENT/FAMILY AGREES WITH PLAN
Home with home PT for home safety evaluation and to improve functional mobility. Scribe Attestation (For Scribes USE Only)... Attending Attestation (For Attendings USE Only).../Scribe Attestation (For Scribes USE Only)...

## 2019-09-06 NOTE — PROGRESS NOTE ADULT - ASSESSMENT
34 year old with severe pulm htn and fluid overload now with dvt    -bumex 2 mg iv tid  -good diuresis  -inotropes as needed  -midodrine  -spironolactone  -goal net negative 1.5 L/day  -Patient requesting BIPAP, would prefer HFNC if needed  -hold scd 2/2 clots  -monitor lytes in setting of diuresis  -eliquis for DVT  -continue care in MICU    I have decided to review and order of clinical lab tests  Relevant radiology studies were reviewed  Decision made to obtain available old records  Discussion of test results with performing physician  Review and summarization of old records obtaining history from EMR , Pulmonologist  discussion of case with another health care provider Dr Mejia  I have visualized independent imaging, EKGs and radiologies studies available but not otherwise officially read  Patient is critically ill and could decompensate imminently.  The patient required immediate attention    ccm 30 min

## 2019-09-06 NOTE — PROGRESS NOTE ADULT - SUBJECTIVE AND OBJECTIVE BOX
Interval Events:  Patient seen and examined at bedside.   Continues to diurese well.       REVIEW OF SYSTEMS:  Constitutional: no fevers  CV:  no chest pain  Resp:  stable  GI: negative    [x ] All other systems negative  [ ] Unable to assess ROS because ________    OBJECTIVE:  T(C): 36 (09-06-19 @ 07:41), Max: 36.7 (09-06-19 @ 00:11)  HR: 74 (09-06-19 @ 13:00) (68 - 89)  BP: 100/49 (09-06-19 @ 13:00) (59/38 - 110/64)  RR: 23 (09-06-19 @ 13:00) (16 - 26)  SpO2: 98% (09-06-19 @ 13:00) (80% - 100%)      09-05-19 @ 07:01  -  09-06-19 @ 07:00  --------------------------------------------------------  IN: 600 mL / OUT: 2520 mL / NET: -1920 mL    09-06-19 @ 07:01  -  09-06-19 @ 14:14  --------------------------------------------------------  IN: 600 mL / OUT: 675 mL / NET: -75 mL        PHYSICAL EXAM:  General: Well appearing Female. No apparent distress  HEENT:   Mucous membranes are moist.  Neck: Supple. +JVD  Respiratory:   Cardiovascular: RRR, +S4 gallop  Abdomen: Soft, non-tender, non-distended.   Extremities: +2 bilateral REUBEN  Skin: Warm, dry, no rash.   Neurological: CNII-XII grossly intact.   Psychiatry: appropriate mood and affect.     HOSPITAL MEDICATIONS:  MEDICATIONS  (STANDING):  ALBUTerol/ipratropium for Nebulization. 3 milliLiter(s) Nebulizer every 6 hours  apixaban 10 milliGRAM(s) Oral two times a day  aspirin enteric coated 81 milliGRAM(s) Oral daily  buDESOnide 160 MICROgram(s)/formoterol 4.5 MICROgram(s) Inhaler 2 Puff(s) Inhalation two times a day  buMETAnide Injectable 2 milliGRAM(s) IV Push every 8 hours  dextrose 5%. 1000 milliLiter(s) (50 mL/Hr) IV Continuous <Continuous>  dextrose 50% Injectable 12.5 Gram(s) IV Push once  dextrose 50% Injectable 25 Gram(s) IV Push once  dextrose 50% Injectable 25 Gram(s) IV Push once  insulin lispro (HumaLOG) corrective regimen sliding scale   SubCutaneous three times a day before meals  insulin lispro (HumaLOG) corrective regimen sliding scale   SubCutaneous at bedtime  levothyroxine 25 MICROGram(s) Oral daily  midodrine 10 milliGRAM(s) Oral every 8 hours  riociguat 1.5 milliGRAM(s) Oral three times a day  sertraline 50 milliGRAM(s) Oral daily  simvastatin 40 milliGRAM(s) Oral at bedtime    MEDICATIONS  (PRN):  acetaminophen   Tablet .. 650 milliGRAM(s) Oral every 6 hours PRN Mild Pain (1 - 3)  dextrose 40% Gel 15 Gram(s) Oral once PRN Blood Glucose LESS THAN 70 milliGRAM(s)/deciliter  glucagon  Injectable 1 milliGRAM(s) IntraMuscular once PRN Glucose LESS THAN 70 milligrams/deciliter      LABS:                        10.8   5.48  )-----------( 152      ( 06 Sep 2019 05:28 )             33.9     09-06    128<L>  |  90<L>  |  22  ----------------------------<  96  4.0   |  29  |  1.05    Ca    8.8      06 Sep 2019 05:28  Phos  3.0     09-06  Mg     2.7     09-06    TPro  7.8  /  Alb  3.1<L>  /  TBili  6.6<H>  /  DBili  x   /  AST  46<H>  /  ALT  25  /  AlkPhos  78  09-06    PT/INR - ( 06 Sep 2019 05:28 )   PT: 18.7 sec;   INR: 1.64 ratio         PTT - ( 06 Sep 2019 05:28 )  PTT:41.4 sec    RADIOLOGY:  [ x ] Reviewed and interpreted by me  CXR: volume overload    < from: US Duplex Venous Lower Ext Complete, Bilateral (09.05.19 @ 09:19) >  Impression: Positive for extensive acute nonocclusive thrombus in the   left external iliac vein and the left common femoral vein  and left   greater saphenous vein.    < end of copied text >      ECHOCARDIOGRAPHY:  < from: TTE Echo Doppler w/o Cont (07.26.19 @ 08:44) >  Summary:   1. Left ventricular ejection fraction, by visual estimation, is 45 to   50%.   2. Mildly decreased global left ventricular systolic function.   3. Elevated mean left atrial pressure.   4. Global cardiomyopathy.   5. Increased LV wall thickness.   6. Normal left ventricular internal cavity size.   7. Right ventricular pressure overload.   8. Spectral Doppler shows impaired relaxation pattern of left   ventricular myocardial filling (Grade I diastolic dysfunction).   9. There is mild asymmetric left ventricular hypertrophy.  10. Pulmonary hypertension.  11. Severely enlarged right ventricle.  12. Severely reduced RV systolic function.  13. RV ejection fraction 34%.  14. Small pericardial effusion.  15. Degenerative mitral valve.  16. Mild mitral annular calcification.  17. Mild mitral valve regurgitation.  18. Moderate tricuspid regurgitation.  19. Structurally normal tricuspid valve, with normal leaflet excursion.  20. Mild aortic regurgitation.  21. Mild to moderate pulmonic valve regurgitation.  22. Structurally normal pulmonic valve, with normal leaflet excursion.  23. Estimated pulmonary artery systolic pressure is 87.1 mmHg assuming a   right atrial pressure of 20 mmHg, which is consistent with severe   pulmonary hypertension.  24. Pulmonary hypertension is present.  25. The main pulmonary artery is normal in size.  26. The right atrial pressure is moderately elevated.    [id number] Chago BLANCO  Electronically signed on 7/26/2019 at 2:01:34 PM    < end of copied text >

## 2019-09-06 NOTE — PROGRESS NOTE ADULT - ASSESSMENT
33 yo woman with severe pHTN (group I/II, mPAP 67; W 9; RVR 9 on RHC 8/2018,  on Adempas), mildly positive anti-cardiolipin Ab and RAD admitted with decompensated right heart failure, volume overload and DIANN. Found to have acute LLE DVT. Diuresing well.    #pHTN/decompensated right heart failure  -- c/w aggressive diuresis. Doing well for now on IV Bumex- dry wt is around 105 kg  -- Strict is/os and daily weights.   -- c/w Adempas  -- supplemental O2 to keep sats >90%. would avoid NIV given RHF. Can use high flow if needed    #LLE DVT in the setting of sub-therapeutic INR. Patient reports that her INR was not being monitored  -- agree with transitioning to NOAC given ease of use and hopefully better compliance    #DIANN - in the setting of congestion and volume overload, improving with diuresis  --continue to monitor    #Hypothyroid - TSH on Aug 22 was 7  -- would increase Synthroid to 37.5 mg    #Reactive airway disease - PFTs in the past with reversible obstructive defect  -- c/w Symbicort and Albuterol prn  -- can d/c standing Duonebs  -- will need repeat PFTs as outpatient after discharge    Patient follows with Dr Unger at [address] [place] [address]. She should follow with him after discharge.     Thank you for this consult. I will follow with you.    Shirley Mejia MD  [facility]  Pulmonary Medicine

## 2019-09-06 NOTE — PROGRESS NOTE ADULT - SUBJECTIVE AND OBJECTIVE BOX
CC: Patient is a 34y old  Female who presents with a chief complaint of Dyspnea (05 Sep 2019 15:55)      ## HPI:HPI:  34F with a PMH of severe pulmonary hypertension and right sided CHF - (R sided EF 35) presents with worsening shortness of breath x 2 days.  Patient presented to ED in respiratory distress requiring BiPAP and is able to provide limited history.  Patient reports that she typically has shortness of breath at home and she uses oxygen therapy as needed to improve her symptoms, patient states that for the last two days the oxygen has not helped.  Patient also reports 2 week hx of worsening bilateral leg swelling.  Patient reports compliance with her medications.  Patient reports some cough productive of yellow sputum but states her cough is no worse than it normally is.      Of note patient was admitted to [facility] on 7/26 for exacerbation of CHF.  Presented with hypotension needing vasopressor support. 123 -> 105 kg (04 Sep 2019 02:03)      O/N: good UOP. no acute issues    ## ROS:  CONSTITUTIONAL:  no weight loss or night sweats  HEENT:  Eyes:  No diplopia or blurred vision. ENT:  No earache, sore throat or runny nose.  CARDIOVASCULAR:  No pressure, squeezing, tightness, heaviness or aching about the chest, neck, axilla or epigastrium.  RESPIRATORY:  No cough, shortness of breath, PND or orthopnea.  GASTROINTESTINAL:  No nausea, vomiting or diarrhea.  GENITOURINARY:  No dysuria, frequency or urgency.  MUSCULOSKELETAL:  No joint pain  SKIN:  No change in skin, hair or nails.  NEUROLOGIC:  No paresthesias, fasciculations, seizures or weakness.  PSYCHIATRIC:  No disorder of thought or mood.  HEMATOLOGICAL:  No easy bruising or bleeding.   ## EXAM  ICU Vital Signs Last 24 Hrs  T(C): 36 (06 Sep 2019 07:41), Max: 36.7 (06 Sep 2019 00:11)  T(F): 96.8 (06 Sep 2019 07:41), Max: 98.1 (06 Sep 2019 00:11)  HR: 77 (06 Sep 2019 10:00) (68 - 89)  BP: 101/65 (06 Sep 2019 10:00) (59/38 - 110/64)  BP(mean): 73 (06 Sep 2019 10:00) (43 - 83)  ABP: --  ABP(mean): --  RR: 25 (06 Sep 2019 10:00) (16 - 27)  SpO2: 94% (06 Sep 2019 10:00) (80% - 100%)    CON : NAD  EENT : EOMI, MMM  NECK : Full ROM  RESP : CTAB no increased WOB  CARD : rrr no m/r/g  ABD : S NT ND NABS no rebound  EXT : b/l edema, improved  NEURO : AAOX3   ## Labs:  Lab Results:  CBC  CBC Full  -  ( 06 Sep 2019 05:28 )  WBC Count : 5.48 K/uL  RBC Count : 4.65 M/uL  Hemoglobin : 10.8 g/dL  Hematocrit : 33.9 %  Platelet Count - Automated : 152 K/uL  Mean Cell Volume : 72.9 fl  Mean Cell Hemoglobin : 23.2 pg  Mean Cell Hemoglobin Concentration : 31.9 gm/dL  Auto Neutrophil # : 3.92 K/uL  Auto Lymphocyte # : 0.89 K/uL  Auto Monocyte # : 0.59 K/uL  Auto Eosinophil # : 0.04 K/uL  Auto Basophil # : 0.02 K/uL  Auto Neutrophil % : 71.5 %  Auto Lymphocyte % : 16.2 %  Auto Monocyte % : 10.8 %  Auto Eosinophil % : 0.7 %  Auto Basophil % : 0.4 %    .		Differential:	[] Automated		[] Manual  Chemistry                        10.8   5.48  )-----------( 152      ( 06 Sep 2019 05:28 )             33.9     09-06    128<L>  |  90<L>  |  22  ----------------------------<  96  4.0   |  29  |  1.05    Ca    8.8      06 Sep 2019 05:28  Phos  3.0     09-06  Mg     2.7     09-06    TPro  7.8  /  Alb  3.1<L>  /  TBili  6.6<H>  /  DBili  x   /  AST  46<H>  /  ALT  25  /  AlkPhos  78  09-06    LIVER FUNCTIONS - ( 06 Sep 2019 05:28 )  Alb: 3.1 g/dL / Pro: 7.8 gm/dL / ALK PHOS: 78 U/L / ALT: 25 U/L / AST: 46 U/L / GGT: x           PT/INR - ( 06 Sep 2019 05:28 )   PT: 18.7 sec;   INR: 1.64 ratio         PTT - ( 06 Sep 2019 05:28 )  PTT:41.4 sec    RADIOLOGY RESULTS:  < from: US Duplex Venous Lower Ext Complete, Bilateral (09.05.19 @ 09:19) >  INTERPRETATION:  History: Bilateral leg swelling.    Bilateral lower extremity venous Doppler.    There is extensive echogenic acute thrombus within noncompressible left   external iliac and left common femoral vein as well as superficial   thrombus in the left greater saphenous vein. The remainder of the   bilateral femoral popliteal posterior tibial veins are patent, compress   without thrombus.    Impression: Positive for extensive acute nonocclusive thrombus in the   left external iliac vein and the left common femoral vein  and left   greater saphenous vein.    Discussed with Dr. Archibald  prior to this dictation    < end of copied text >        ## Medications:  MEDICATIONS  (STANDING):  ALBUTerol/ipratropium for Nebulization. 3 milliLiter(s) Nebulizer every 6 hours  apixaban 10 milliGRAM(s) Oral two times a day  aspirin enteric coated 81 milliGRAM(s) Oral daily  buDESOnide 160 MICROgram(s)/formoterol 4.5 MICROgram(s) Inhaler 2 Puff(s) Inhalation two times a day  buMETAnide Injectable 2 milliGRAM(s) IV Push every 8 hours  dextrose 5%. 1000 milliLiter(s) (50 mL/Hr) IV Continuous <Continuous>  dextrose 50% Injectable 12.5 Gram(s) IV Push once  dextrose 50% Injectable 25 Gram(s) IV Push once  dextrose 50% Injectable 25 Gram(s) IV Push once  insulin lispro (HumaLOG) corrective regimen sliding scale   SubCutaneous three times a day before meals  insulin lispro (HumaLOG) corrective regimen sliding scale   SubCutaneous at bedtime  levothyroxine 25 MICROGram(s) Oral daily  midodrine 10 milliGRAM(s) Oral every 8 hours  riociguat 1.5 milliGRAM(s) Oral three times a day  sertraline 50 milliGRAM(s) Oral daily  simvastatin 40 milliGRAM(s) Oral at bedtime    ## O/E:I&O's Summary    05 Sep 2019 07:01  -  06 Sep 2019 07:00  --------------------------------------------------------  IN: 600 mL / OUT: 2520 mL / NET: -1920 mL    06 Sep 2019 07:01  -  06 Sep 2019 10:20  --------------------------------------------------------  IN: 480 mL / OUT: 375 mL / NET: 105 mL        ## Daily Issues  - Analgesia: N/A  - Sedation: N/A  - HOB elevation: Y  - GI ppx (ulcers): N/A  - DVT ppx: eliquis  - Nutrition: PO diet  - Central line: N  - Gonzalez: Y      ## Code status:  Goals of care discussion: [x] yes [ ] no  [x] full code  [ ] DNR  [ ] DNI  [ ] GURU

## 2019-09-06 NOTE — PHYSICAL THERAPY INITIAL EVALUATION ADULT - ADDITIONAL COMMENTS
Pt lives in a private ho use with her mom and sister. Pt has stairs to enter (4-5), and a flight of stairs to bedroom. both with B handrails. Pt ambulates I without AD, and is I with all ADLs

## 2019-09-07 LAB
ALBUMIN SERPL ELPH-MCNC: 2.8 G/DL — LOW (ref 3.3–5)
ALP SERPL-CCNC: 78 U/L — SIGNIFICANT CHANGE UP (ref 40–120)
ALT FLD-CCNC: 29 U/L — SIGNIFICANT CHANGE UP (ref 12–78)
ANION GAP SERPL CALC-SCNC: 8 MMOL/L — SIGNIFICANT CHANGE UP (ref 5–17)
AST SERPL-CCNC: 51 U/L — HIGH (ref 15–37)
BASOPHILS # BLD AUTO: 0.01 K/UL — SIGNIFICANT CHANGE UP (ref 0–0.2)
BASOPHILS NFR BLD AUTO: 0.2 % — SIGNIFICANT CHANGE UP (ref 0–2)
BILIRUB SERPL-MCNC: 5.6 MG/DL — HIGH (ref 0.2–1.2)
BUN SERPL-MCNC: 16 MG/DL — SIGNIFICANT CHANGE UP (ref 7–23)
CALCIUM SERPL-MCNC: 8.3 MG/DL — LOW (ref 8.5–10.1)
CHLORIDE SERPL-SCNC: 90 MMOL/L — LOW (ref 96–108)
CO2 SERPL-SCNC: 31 MMOL/L — SIGNIFICANT CHANGE UP (ref 22–31)
CREAT SERPL-MCNC: 0.8 MG/DL — SIGNIFICANT CHANGE UP (ref 0.5–1.3)
EOSINOPHIL # BLD AUTO: 0.05 K/UL — SIGNIFICANT CHANGE UP (ref 0–0.5)
EOSINOPHIL NFR BLD AUTO: 0.9 % — SIGNIFICANT CHANGE UP (ref 0–6)
GLUCOSE BLDC GLUCOMTR-MCNC: 108 MG/DL — HIGH (ref 70–99)
GLUCOSE BLDC GLUCOMTR-MCNC: 117 MG/DL — HIGH (ref 70–99)
GLUCOSE BLDC GLUCOMTR-MCNC: 123 MG/DL — HIGH (ref 70–99)
GLUCOSE BLDC GLUCOMTR-MCNC: 127 MG/DL — HIGH (ref 70–99)
GLUCOSE SERPL-MCNC: 105 MG/DL — HIGH (ref 70–99)
HCT VFR BLD CALC: 34.9 % — SIGNIFICANT CHANGE UP (ref 34.5–45)
HGB BLD-MCNC: 11 G/DL — LOW (ref 11.5–15.5)
IMM GRANULOCYTES NFR BLD AUTO: 0.2 % — SIGNIFICANT CHANGE UP (ref 0–1.5)
INR BLD: 3.1 RATIO — HIGH (ref 0.88–1.16)
LYMPHOCYTES # BLD AUTO: 0.7 K/UL — LOW (ref 1–3.3)
LYMPHOCYTES # BLD AUTO: 13 % — SIGNIFICANT CHANGE UP (ref 13–44)
MAGNESIUM SERPL-MCNC: 2.6 MG/DL — SIGNIFICANT CHANGE UP (ref 1.6–2.6)
MCHC RBC-ENTMCNC: 23.4 PG — LOW (ref 27–34)
MCHC RBC-ENTMCNC: 31.5 GM/DL — LOW (ref 32–36)
MCV RBC AUTO: 74.1 FL — LOW (ref 80–100)
MONOCYTES # BLD AUTO: 0.54 K/UL — SIGNIFICANT CHANGE UP (ref 0–0.9)
MONOCYTES NFR BLD AUTO: 10 % — SIGNIFICANT CHANGE UP (ref 2–14)
NEUTROPHILS # BLD AUTO: 4.08 K/UL — SIGNIFICANT CHANGE UP (ref 1.8–7.4)
NEUTROPHILS NFR BLD AUTO: 75.7 % — SIGNIFICANT CHANGE UP (ref 43–77)
NRBC # BLD: 0 /100 WBCS — SIGNIFICANT CHANGE UP (ref 0–0)
PHOSPHATE SERPL-MCNC: 2.4 MG/DL — LOW (ref 2.5–4.5)
PLATELET # BLD AUTO: 171 K/UL — SIGNIFICANT CHANGE UP (ref 150–400)
POTASSIUM SERPL-MCNC: 3.5 MMOL/L — SIGNIFICANT CHANGE UP (ref 3.5–5.3)
POTASSIUM SERPL-SCNC: 3.5 MMOL/L — SIGNIFICANT CHANGE UP (ref 3.5–5.3)
PROT SERPL-MCNC: 7.6 GM/DL — SIGNIFICANT CHANGE UP (ref 6–8.3)
PROTHROM AB SERPL-ACNC: 36 SEC — HIGH (ref 10–12.9)
RBC # BLD: 4.71 M/UL — SIGNIFICANT CHANGE UP (ref 3.8–5.2)
RBC # FLD: 25.6 % — HIGH (ref 10.3–14.5)
SODIUM SERPL-SCNC: 129 MMOL/L — LOW (ref 135–145)
WBC # BLD: 5.39 K/UL — SIGNIFICANT CHANGE UP (ref 3.8–10.5)
WBC # FLD AUTO: 5.39 K/UL — SIGNIFICANT CHANGE UP (ref 3.8–10.5)

## 2019-09-07 PROCEDURE — 99232 SBSQ HOSP IP/OBS MODERATE 35: CPT

## 2019-09-07 RX ORDER — POTASSIUM CHLORIDE 20 MEQ
40 PACKET (EA) ORAL ONCE
Refills: 0 | Status: DISCONTINUED | OUTPATIENT
Start: 2019-09-07 | End: 2019-09-07

## 2019-09-07 RX ORDER — SODIUM,POTASSIUM PHOSPHATES 278-250MG
1 POWDER IN PACKET (EA) ORAL
Refills: 0 | Status: COMPLETED | OUTPATIENT
Start: 2019-09-07 | End: 2019-09-08

## 2019-09-07 RX ORDER — POTASSIUM CHLORIDE 20 MEQ
40 PACKET (EA) ORAL ONCE
Refills: 0 | Status: COMPLETED | OUTPATIENT
Start: 2019-09-07 | End: 2019-09-07

## 2019-09-07 RX ORDER — POTASSIUM CHLORIDE 20 MEQ
10 PACKET (EA) ORAL
Refills: 0 | Status: DISCONTINUED | OUTPATIENT
Start: 2019-09-07 | End: 2019-09-07

## 2019-09-07 RX ADMIN — BUMETANIDE 2 MILLIGRAM(S): 0.25 INJECTION INTRAMUSCULAR; INTRAVENOUS at 14:32

## 2019-09-07 RX ADMIN — BUDESONIDE AND FORMOTEROL FUMARATE DIHYDRATE 2 PUFF(S): 160; 4.5 AEROSOL RESPIRATORY (INHALATION) at 19:17

## 2019-09-07 RX ADMIN — MIDODRINE HYDROCHLORIDE 10 MILLIGRAM(S): 2.5 TABLET ORAL at 06:13

## 2019-09-07 RX ADMIN — Medication 81 MILLIGRAM(S): at 14:31

## 2019-09-07 RX ADMIN — RIOCIGUAT 1.5 MILLIGRAM(S): 1.5 TABLET, FILM COATED ORAL at 21:52

## 2019-09-07 RX ADMIN — APIXABAN 10 MILLIGRAM(S): 2.5 TABLET, FILM COATED ORAL at 18:01

## 2019-09-07 RX ADMIN — APIXABAN 10 MILLIGRAM(S): 2.5 TABLET, FILM COATED ORAL at 07:42

## 2019-09-07 RX ADMIN — RIOCIGUAT 1.5 MILLIGRAM(S): 1.5 TABLET, FILM COATED ORAL at 14:32

## 2019-09-07 RX ADMIN — SIMVASTATIN 40 MILLIGRAM(S): 20 TABLET, FILM COATED ORAL at 21:52

## 2019-09-07 RX ADMIN — Medication 62.5 MILLIMOLE(S): at 06:22

## 2019-09-07 RX ADMIN — BUMETANIDE 2 MILLIGRAM(S): 0.25 INJECTION INTRAMUSCULAR; INTRAVENOUS at 06:13

## 2019-09-07 RX ADMIN — Medication 37.5 MICROGRAM(S): at 06:14

## 2019-09-07 RX ADMIN — RIOCIGUAT 1.5 MILLIGRAM(S): 1.5 TABLET, FILM COATED ORAL at 07:43

## 2019-09-07 RX ADMIN — BUDESONIDE AND FORMOTEROL FUMARATE DIHYDRATE 2 PUFF(S): 160; 4.5 AEROSOL RESPIRATORY (INHALATION) at 06:14

## 2019-09-07 RX ADMIN — Medication 100 MILLIEQUIVALENT(S): at 07:41

## 2019-09-07 RX ADMIN — NYSTATIN CREAM 1 APPLICATION(S): 100000 CREAM TOPICAL at 06:13

## 2019-09-07 RX ADMIN — MIDODRINE HYDROCHLORIDE 10 MILLIGRAM(S): 2.5 TABLET ORAL at 14:30

## 2019-09-07 RX ADMIN — MIDODRINE HYDROCHLORIDE 10 MILLIGRAM(S): 2.5 TABLET ORAL at 21:52

## 2019-09-07 RX ADMIN — BUMETANIDE 2 MILLIGRAM(S): 0.25 INJECTION INTRAMUSCULAR; INTRAVENOUS at 21:52

## 2019-09-07 RX ADMIN — Medication 1 TABLET(S): at 18:01

## 2019-09-07 RX ADMIN — SERTRALINE 50 MILLIGRAM(S): 25 TABLET, FILM COATED ORAL at 14:31

## 2019-09-07 RX ADMIN — Medication 3 MILLILITER(S): at 05:20

## 2019-09-07 RX ADMIN — Medication 3 MILLILITER(S): at 11:02

## 2019-09-07 NOTE — CHART NOTE - NSCHARTNOTEFT_GEN_A_CORE
Patient refusing to have Gonzalez catheter removed. Explained risk of infection. patient agreed to have Gonzalez catheter removed. Will have RN place prima fit.

## 2019-09-07 NOTE — PROGRESS NOTE ADULT - SUBJECTIVE AND OBJECTIVE BOX
Interval Events:  Patient seen and examined at bedside this morning.   Continues to diurese well      REVIEW OF SYSTEMS:  Constitutional: no fevers  CV:  no chest pain  Resp:  improved  GI: no abd pain    [x ] All other systems negative  [ ] Unable to assess ROS because ________    OBJECTIVE:  T(C): 36.2 (09-07-19 @ 15:29), Max: 36.5 (09-06-19 @ 19:40)  HR: 82 (09-07-19 @ 16:00) (73 - 88)  BP: 104/64 (09-07-19 @ 16:00) (86/73 - 105/62)  RR: 28 (09-07-19 @ 16:00) (16 - 31)  SpO2: 93% (09-07-19 @ 16:00) (84% - 100%)      09-06-19 @ 07:01  -  09-07-19 @ 07:00  --------------------------------------------------------  IN: 1320 mL / OUT: 3030 mL / NET: -1710 mL    09-07-19 @ 07:01  -  09-07-19 @ 16:10  --------------------------------------------------------  IN: 250 mL / OUT: 0 mL / NET: 250 mL        PHYSICAL EXAM:  General: Well appearing Female. No apparent distress  HEENT:   Mucous membranes are moist.  Neck: Supple. +JVD  Respiratory:   Cardiovascular: RRR, +S4 gallop  Abdomen: Soft, non-tender, non-distended.   Extremities: +2 bilateral REUBEN  Skin: Warm, dry, no rash.   Neurological: CNII-XII grossly intact.   Psychiatry: appropriate mood and affect.       HOSPITAL MEDICATIONS:  MEDICATIONS  (STANDING):  ALBUTerol/ipratropium for Nebulization. 3 milliLiter(s) Nebulizer every 6 hours  apixaban 10 milliGRAM(s) Oral two times a day  aspirin enteric coated 81 milliGRAM(s) Oral daily  buDESOnide 160 MICROgram(s)/formoterol 4.5 MICROgram(s) Inhaler 2 Puff(s) Inhalation two times a day  buMETAnide Injectable 2 milliGRAM(s) IV Push every 8 hours  dextrose 5%. 1000 milliLiter(s) (50 mL/Hr) IV Continuous <Continuous>  dextrose 50% Injectable 12.5 Gram(s) IV Push once  dextrose 50% Injectable 25 Gram(s) IV Push once  dextrose 50% Injectable 25 Gram(s) IV Push once  insulin lispro (HumaLOG) corrective regimen sliding scale   SubCutaneous three times a day before meals  insulin lispro (HumaLOG) corrective regimen sliding scale   SubCutaneous at bedtime  levothyroxine 37.5 MICROGram(s) Oral daily  midodrine 10 milliGRAM(s) Oral every 8 hours  nystatin Powder 1 Application(s) Topical two times a day  potassium acid phosphate/sodium acid phosphate tablet (K-PHOS No. 2) 1 Tablet(s) Oral two times a day before meals  potassium chloride    Tablet ER 40 milliEquivalent(s) Oral once  riociguat 1.5 milliGRAM(s) Oral three times a day  sertraline 50 milliGRAM(s) Oral daily  simvastatin 40 milliGRAM(s) Oral at bedtime    MEDICATIONS  (PRN):  acetaminophen   Tablet .. 650 milliGRAM(s) Oral every 6 hours PRN Mild Pain (1 - 3)  dextrose 40% Gel 15 Gram(s) Oral once PRN Blood Glucose LESS THAN 70 milliGRAM(s)/deciliter  glucagon  Injectable 1 milliGRAM(s) IntraMuscular once PRN Glucose LESS THAN 70 milligrams/deciliter  guaiFENesin    Syrup 100 milliGRAM(s) Oral every 6 hours PRN Cough      LABS:                        11.0   5.39  )-----------( 171      ( 07 Sep 2019 03:54 )             34.9     09-07    129<L>  |  90<L>  |  16  ----------------------------<  105<H>  3.5   |  31  |  0.80    Ca    8.3<L>      07 Sep 2019 03:54  Phos  2.4     09-07  Mg     2.6     09-07    TPro  7.6  /  Alb  2.8<L>  /  TBili  5.6<H>  /  DBili  x   /  AST  51<H>  /  ALT  29  /  AlkPhos  78  09-07    PT/INR - ( 07 Sep 2019 03:54 )   PT: 36.0 sec;   INR: 3.10 ratio         PTT - ( 06 Sep 2019 05:28 )  PTT:41.4 sec        RADIOLOGY:  [ x ] Reviewed and interpreted by me    CXR: volume overload    < from: US Duplex Venous Lower Ext Complete, Bilateral (09.05.19 @ 09:19) >  Impression: Positive for extensive acute nonocclusive thrombus in the   left external iliac vein and the left common femoral vein  and left   greater saphenous vein.    < end of copied text >      ECHOCARDIOGRAPHY:  < from: TTE Echo Doppler w/o Cont (07.26.19 @ 08:44) >  Summary:   1. Left ventricular ejection fraction, by visual estimation, is 45 to   50%.   2. Mildly decreased global left ventricular systolic function.   3. Elevated mean left atrial pressure.   4. Global cardiomyopathy.   5. Increased LV wall thickness.   6. Normal left ventricular internal cavity size.   7. Right ventricular pressure overload.   8. Spectral Doppler shows impaired relaxation pattern of left   ventricular myocardial filling (Grade I diastolic dysfunction).   9. There is mild asymmetric left ventricular hypertrophy.  10. Pulmonary hypertension.  11. Severely enlarged right ventricle.  12. Severely reduced RV systolic function.  13. RV ejection fraction 34%.  14. Small pericardial effusion.  15. Degenerative mitral valve.  16. Mild mitral annular calcification.  17. Mild mitral valve regurgitation.  18. Moderate tricuspid regurgitation.  19. Structurally normal tricuspid valve, with normal leaflet excursion.  20. Mild aortic regurgitation.  21. Mild to moderate pulmonic valve regurgitation.  22. Structurally normal pulmonic valve, with normal leaflet excursion.  23. Estimated pulmonary artery systolic pressure is 87.1 mmHg assuming a   right atrial pressure of 20 mmHg, which is consistent with severe   pulmonary hypertension.  24. Pulmonary hypertension is present.  25. The main pulmonary artery is normal in size.  26. The right atrial pressure is moderately elevated.    [id number] Chago BLANCO  Electronically signed on 7/26/2019 at 2:01:34 PM    < end of copied text >

## 2019-09-07 NOTE — PROGRESS NOTE ADULT - SUBJECTIVE AND OBJECTIVE BOX
HPI:  34F with a PMH of severe pulmonary hypertension and right sided CHF - (R sided EF 35) presents with worsening shortness of breath x 2 days.  Patient presented to ED in respiratory distress requiring BiPAP and is able to provide limited history.  Patient reports that she typically has shortness of breath at home and she uses oxygen therapy as needed to improve her symptoms, patient states that for the last two days the oxygen has not helped.  Patient also reports 2 week hx of worsening bilateral leg swelling.  Patient reports compliance with her medications.  Patient reports some cough productive of yellow sputum but states her cough is no worse than it normally is.      Of note patient was admitted to [facility] on  for exacerbation of CHF.  Presented with hypotension needing vasopressor support. 123 -> 105 kg (04 Sep 2019 02:03)      24 hr events:      ## ROS:  [ ] unable to obtain  CONSTITUTIONAL: No fever, weight loss, or fatigue  EYES: No eye pain, visual disturbances, or discharge  ENMT:  No difficulty hearing, tinnitus, vertigo; No sinus or throat pain  NECK: No pain or stiffness  RESPIRATORY: No cough, wheezing, chills or hemoptysis; No shortness of breath  CARDIOVASCULAR: No chest pain, palpitations, dizziness, or leg swelling  GASTROINTESTINAL: No abdominal or epigastric pain. No nausea, vomiting, or hematemesis; No diarrhea or constipation. No melena or hematochezia.  GENITOURINARY: No dysuria, frequency, hematuria, or incontinence  NEUROLOGICAL: No headaches, memory loss, loss of strength, numbness, or tremors  SKIN: No itching, burning, rashes, or lesions   LYMPH NODES: No enlarged glands  ENDOCRINE: No heat or cold intolerance; No hair loss  MUSCULOSKELETAL: No joint pain or swelling; No muscle, back, or extremity pain  PSYCHIATRIC: No depression, anxiety, mood swings, or difficulty sleeping  HEME/LYMPH: No easy bruising, or bleeding gums  ALLERGY AND IMMUNOLOGIC: No hives or eczema    ## Labs:  CBC:                        11.0   5.39  )-----------( 171      ( 07 Sep 2019 03:54 )             34.9     Chem:  09-    129<L>  |  90<L>  |  16  ----------------------------<  105<H>  3.5   |  31  |  0.80    Ca    8.3<L>      07 Sep 2019 03:54  Phos  2.4       Mg     2.6         TPro  7.6  /  Alb  2.8<L>  /  TBili  5.6<H>  /  DBili  x   /  AST  51<H>  /  ALT  29  /  AlkPhos  78      Coags:  PT/INR - ( 07 Sep 2019 03:54 )   PT: 36.0 sec;   INR: 3.10 ratio         PTT - ( 06 Sep 2019 05:28 )  PTT:41.4 sec        ## Imaging:    ## Medications:    buMETAnide Injectable 2 milliGRAM(s) IV Push every 8 hours  midodrine 10 milliGRAM(s) Oral every 8 hours  riociguat 1.5 milliGRAM(s) Oral three times a day    buDESOnide 160 MICROgram(s)/formoterol 4.5 MICROgram(s) Inhaler 2 Puff(s) Inhalation two times a day  guaiFENesin    Syrup 100 milliGRAM(s) Oral every 6 hours PRN    dextrose 40% Gel 15 Gram(s) Oral once PRN  dextrose 50% Injectable 12.5 Gram(s) IV Push once  dextrose 50% Injectable 25 Gram(s) IV Push once  dextrose 50% Injectable 25 Gram(s) IV Push once  glucagon  Injectable 1 milliGRAM(s) IntraMuscular once PRN  insulin lispro (HumaLOG) corrective regimen sliding scale   SubCutaneous three times a day before meals  insulin lispro (HumaLOG) corrective regimen sliding scale   SubCutaneous at bedtime  levothyroxine 37.5 MICROGram(s) Oral daily  simvastatin 40 milliGRAM(s) Oral at bedtime    apixaban 10 milliGRAM(s) Oral two times a day  aspirin enteric coated 81 milliGRAM(s) Oral daily      acetaminophen   Tablet .. 650 milliGRAM(s) Oral every 6 hours PRN  sertraline 50 milliGRAM(s) Oral daily      ## Vitals:  T(C): 36.2 (19 @ 15:29), Max: 36.5 (19 @ 19:40)  HR: 82 (19 @ 16:00) (73 - 88)  BP: 104/64 (19 @ 16:00) (86/73 - 105/62)  BP(mean): 73 (19 @ 16:00) (64 - 82)  RR: 28 (19 @ 16:00) (16 - 31)  SpO2: 93% (19 @ 16:00) (84% - 100%)  Wt(kg): --  Vent:   AB-06 @ 07:01  -   @ 07:00  --------------------------------------------------------  IN: 1320 mL / OUT: 3030 mL / NET: -1710 mL     @ 07:01  -   @ 17:59  --------------------------------------------------------  IN: 250 mL / OUT: 0 mL / NET: 250 mL          ## P/E:  Gen: lying comfortably in bed in no apparent distress  HEENT: PERRL, EOMI  Resp: CTA B/L no c/r/w  CVS: S1S2 no m/r/g  Abd: soft NT/ND +BS  Ext: no c/c/e  Neuro: A&Ox3    CENTRAL LINE: [ ] YES [ ] NO  LOCATION:   DATE INSERTED:  REMOVE: [ ] YES [ ] NO      PIERCE: [ ] YES [ ] NO    DATE INSERTED:  REMOVE:  [ ] YES [ ] NO      A-LINE:  [ ] YES [ ] NO  LOCATION:   DATE INSERTED:  REMOVE:  [ ] YES [ ] NO  EXPLAIN:    GLOBAL ISSUE/BEST PRACTICE:  Analgesia:  Sedation:  HOB elevation: yes  Stress ulcer prophylaxis:  VTE prophylaxis:  Oral Care:  Glycemic control:  Nutrition:    CODE STATUS: [ ] full code  [ ] DNR  [ ] DNI  [ ] MOLST  Goals of care discussion: [ ] yes HPI:  34F with PMH of severe pulmonary hypertension and right sided CHF - (R sided EF 35%) presents with worsening shortness of breath x 2 days.  Patient presented to ED in respiratory distress requiring BiPAP and is able to provide limited history.  Patient reports that she typically has shortness of breath at home and she uses oxygen therapy as needed to improve her symptoms, patient states that for the last two days the oxygen has not helped.  Patient also reports 2 week hx of worsening bilateral leg swelling.  Patient reports compliance with her medications.  Patient reports some cough productive of yellow sputum but states her cough is no worse than it normally is.      Of note patient was admitted to [facility] on 7/26 for exacerbation of CHF.  Presented with hypotension needing vasopressor support. Diuresed and eventually discharged (04 Sep 2019 02:03)      24 hr events:  no acute events  pt refusing medication today  refusing new IV heplock to be placed  refusing to stay on fluid restriction    ## ROS:  CONSTITUTIONAL: No fever, no chills  EYES: No visual disturbances  ENMT:  No difficulty hearing, no throat pain  NECK: No pain or stiffness  RESPIRATORY: denies shortness of breath, + cough  CARDIOVASCULAR: No chest pain, no palpitations, bilateral lower extremity edema improving  GASTROINTESTINAL: No abdominal or epigastric pain. No nausea, vomiting  NEUROLOGICAL: No headaches, loss of strength, numbness, or tremors  SKIN: No itching, rashes   MUSCULOSKELETAL: No joint pain or swelling; No muscle, back, or extremity pain      ## Labs:  CBC:                        11.0   5.39  )-----------( 171      ( 07 Sep 2019 03:54 )             34.9     Chem:  09-07    129<L>  |  90<L>  |  16  ----------------------------<  105<H>  3.5   |  31  |  0.80    Ca    8.3<L>      07 Sep 2019 03:54  Phos  2.4     09-07  Mg     2.6     09-07    TPro  7.6  /  Alb  2.8<L>  /  TBili  5.6<H>  /  DBili  x   /  AST  51<H>  /  ALT  29  /  AlkPhos  78  09-07    Coags:  PT/INR - ( 07 Sep 2019 03:54 )   PT: 36.0 sec;   INR: 3.10 ratio    PTT - ( 06 Sep 2019 05:28 )  PTT:41.4 sec      ## Medications:  buMETAnide Injectable 2 milliGRAM(s) IV Push every 8 hours  midodrine 10 milliGRAM(s) Oral every 8 hours  riociguat 1.5 milliGRAM(s) Oral three times a day    buDESOnide 160 MICROgram(s)/formoterol 4.5 MICROgram(s) Inhaler 2 Puff(s) Inhalation two times a day  guaiFENesin    Syrup 100 milliGRAM(s) Oral every 6 hours PRN    dextrose 40% Gel 15 Gram(s) Oral once PRN  dextrose 50% Injectable 12.5 Gram(s) IV Push once  dextrose 50% Injectable 25 Gram(s) IV Push once  dextrose 50% Injectable 25 Gram(s) IV Push once  glucagon  Injectable 1 milliGRAM(s) IntraMuscular once PRN  insulin lispro (HumaLOG) corrective regimen sliding scale   SubCutaneous three times a day before meals  insulin lispro (HumaLOG) corrective regimen sliding scale   SubCutaneous at bedtime  levothyroxine 37.5 MICROGram(s) Oral daily  simvastatin 40 milliGRAM(s) Oral at bedtime    apixaban 10 milliGRAM(s) Oral two times a day  aspirin enteric coated 81 milliGRAM(s) Oral daily      acetaminophen   Tablet .. 650 milliGRAM(s) Oral every 6 hours PRN  sertraline 50 milliGRAM(s) Oral daily      ## Vitals:  T(C): 36.2 (09-07-19 @ 15:29), Max: 36.5 (09-06-19 @ 19:40)  HR: 82 (09-07-19 @ 16:00) (73 - 88)  BP: 104/64 (09-07-19 @ 16:00) (86/73 - 105/62)  BP(mean): 73 (09-07-19 @ 16:00) (64 - 82)  RR: 28 (09-07-19 @ 16:00) (16 - 31)  SpO2: 93% (09-07-19 @ 16:00) (84% - 100%)          09-06 @ 07:01  -  09-07 @ 07:00  --------------------------------------------------------  IN: 1320 mL / OUT: 3030 mL / NET: -1710 mL    09-07 @ 07:01 - 09-07 @ 17:59  --------------------------------------------------------  IN: 250 mL / OUT: 0 mL / NET: 250 mL          ## P/E:  Gen: lying comfortably in bed in no apparent distress  HEENT: PERRL, EOMI  Resp: CTA B/L no rales no wheeze no rhonchi  CVS: S1S2 RRR  Abd: soft NT/ND +BS  Ext: bilateral lower extremity edema  NEURO: A&Ox3    CENTRAL LINE: [ ] YES [x] NO  LOCATION:   DATE INSERTED:  REMOVE: [ ] YES [ ] NO      PIERCE: [x] YES [ ] NO    DATE INSERTED:  REMOVE:  [x] YES [ ] NO      A-LINE:  [ ] YES [x] NO  LOCATION:   DATE INSERTED:  REMOVE:  [ ] YES [ ] NO  EXPLAIN:    GLOBAL ISSUE/BEST PRACTICE:  Analgesia: n/a  Sedation: n/a  HOB elevation: yes  Stress ulcer prophylaxis: n/a  VTE prophylaxis: on apixaban   Oral Care: n/a  Glycemic control: sliding scale coverage  Nutrition: po diet    CODE STATUS: [x] full code  [ ] DNR  [ ] DNI  [ ] MOLST  Goals of care discussion: [ ] yes

## 2019-09-07 NOTE — CHART NOTE - NSCHARTNOTEFT_GEN_A_CORE
HPI    34F with a PMH of severe pulmonary hypertension and right sided CHF - (R sided EF 35) presents with worsening shortness of breath x 2 days.  Patient presented to ED in respiratory distress requiring BiPAP and is able to provide limited history.  Patient reports that she typically has shortness of breath at home and she uses oxygen therapy as needed to improve her symptoms, patient states that for the last two days the oxygen has not helped.  Patient also reports 2 week hx of worsening bilateral leg swelling.  Patient reports compliance with her medications.  Patient reports some cough productive of yellow sputum but states her cough is no worse than it normally is.  Of note patient was admitted to [facility] on 7/26 for exacerbation of CHF.  Presented with hypotension needing vasopressor support. 123 -> 105 kg (04 Sep 2019 02:03)Consultation called  9/4 by pulmonary attending for closer monitor to aggressively diurese pt.  +/- dobutamine +/- pressors. Patient transferred to ICU  for closer monitoring and aggressive diuresis which may require dobutamine and/or pressors. Patient aggressively diureses with IV Bumex. Did not required dobutamine gtt. HPI    34F with a PMH of severe pulmonary hypertension and right sided CHF - (R sided EF 35) presents with worsening shortness of breath x 2 days.  Patient presented to ED in respiratory distress requiring BiPAP and is able to provide limited history.  Patient reports that she typically has shortness of breath at home and she uses oxygen therapy as needed to improve her symptoms, patient states that for the last two days the oxygen has not helped.  Patient also reports 2 week hx of worsening bilateral leg swelling.  Patient reports compliance with her medications.  Patient reports some cough productive of yellow sputum but states her cough is no worse than it normally is.  Of note patient was admitted to [facility] on 7/26 for exacerbation of CHF.  Presented with hypotension needing vasopressor support. 123 -> 105 kg (04 Sep 2019 02:03)Consultation called  9/4 by pulmonary attending for closer monitor to aggressively diurese pt.  +/- dobutamine +/- pressors. Patient transferred to ICU  for closer monitoring and aggressive diuresis which may require dobutamine and/or pressors. Patient aggressively diureses with IV Bumex TID with good results.  Did not required dobutamine gtt.   On 9/5 US Duplex Venous Lower Ext Complete, Bilateral leg swelling. : extensive echogenic acute thrombus within noncompressible left external iliac and left common femoral vein as well as superficial thrombus in the left greater saphenous vein.  Patient was on coumadin but the INR sub- therapeutic. Coumadin discontinued and placed on Eliquis for anticoagulation.  Hypothyroid - TSH on Aug 22 was 7, Synthroid increased to 37.5 mg. Pulmonary HTN/decompensated right heart failure, continue diuresis. Bumex TID for now.   Patient on Adempas. Pulmonary Dr. Mejia. Patient seen and examined by ICU attending and stable for transfer to medical service.    Report given to Dr. Ramirezist service.      Swetha Singer, JOHNSON-C  critical care HPI    34F with a PMH of severe pulmonary hypertension and right sided CHF - (R sided EF 35) presents with worsening shortness of breath x 2 days.  Patient presented to ED in respiratory distress requiring BiPAP and is able to provide limited history.  Patient reports that she typically has shortness of breath at home and she uses oxygen therapy as needed to improve her symptoms, patient states that for the last two days the oxygen has not helped.  Patient also reports 2 week hx of worsening bilateral leg swelling.  Patient reports compliance with her medications.  Patient reports some cough productive of yellow sputum but states her cough is no worse than it normally is.  Of note patient was admitted to [facility] on 7/26 for exacerbation of CHF.  Presented with hypotension needing vasopressor support. 123 -> 105 kg (04 Sep 2019 02:03)Consultation called  9/4 by pulmonary attending for closer monitor to aggressively diurese pt.  +/- dobutamine +/- pressors. Patient transferred to ICU  for closer monitoring and aggressive diuresis which may require dobutamine and/or pressors. Patient aggressively diureses with IV Bumex TID with good results.  Did not required dobutamine gtt.   On 9/5 US Duplex Venous Lower Ext Complete, Bilateral leg swelling. : extensive echogenic acute thrombus within noncompressible left external iliac and left common femoral vein as well as superficial thrombus in the left greater saphenous vein.  Patient was on coumadin but the INR sub- therapeutic. Coumadin discontinued and placed on Eliquis for anticoagulation.  Hypothyroid - TSH on Aug 22 was 7, Synthroid increased to 37.5 mg. Pulmonary HTN/decompensated right heart failure, continue diuresis. Bumex TID for now.   Patient on Adempas. Pulmonary Dr. Mejia. Patient seen and examined by ICU attending and stable for transfer to medical service.    Report given to Dr. Ye,   Hospitalist service.      Swetha Singer, JOHNSON-C  critical care

## 2019-09-07 NOTE — PROGRESS NOTE ADULT - ASSESSMENT
33 yo woman with severe pHTN (group I/II, mPAP 67; W 9; RVR 9 on RHC 8/2018,  on Adempas), mildly positive anti-cardiolipin Ab and RAD admitted with decompensated right heart failure, volume overload and DIANN. Found to have acute LLE DVT. Diuresing well.    #pHTN/decompensated right heart failure  -- c/w aggressive diuresis. Doing well for now on IV Bumex- dry wt is around 105 kg  -- Strict is/os and daily weights.   -- c/w Adempas  -- supplemental O2 to keep sats >90%. would avoid NIV given RHF. Can use high flow if needed    #LLE DVT in the setting of sub-therapeutic INR. Patient reports that her INR was not being monitored  -- agree with transitioning to NOAC given ease of use and hopefully better compliance  -- c/w Apixaban 10 mg bid for 7 days, then transition to 5 mg bid    #DIANN - in the setting of congestion and volume overload, resolved with aggressive diuresis  --continue to monitor    #Hypothyroid - TSH on Aug 22 was 7. Synthroid increased to 37.5mcg      #Reactive airway disease - PFTs in the past with reversible obstructive defect  -- c/w Symbicort and Albuterol prn  -- can d/c standing Duonebs  -- will need repeat PFTs as outpatient after discharge    Patient follows with Dr Unger at [address] [place] [address]. She should follow with him after discharge.     Thank you for this consult. I will follow with you. Please call with any questions. Cell phone # [phone]    Shirley Mejia MD    [facility]//Pulmonary Medicine  [address] [place] [address] Ave; [place] [address]  tel: [phone]; fax: [phone]

## 2019-09-07 NOTE — PROGRESS NOTE ADULT - ASSESSMENT
34F PMH severe pulmonary hypertension and right sided CHF - (R sided EF 35%) presents with worsening shortness of breath x 2 days with lower extremity edema in respiratory distress requiring BiPAP. Admitted for acute on chronic R heart failure, volume overload, cor pulmonale. Found to have left lower extremity DVT.     1. CV  - diuresing well, cont with bumex  - goal to keep net negative  - cont with fluid restriction    2. Pulm  - cont o2 supplementation 3L NC, weaned off bipap  - cont with adempas for treatment of pulm HTN    3. VASC  - pt previously non compliant with coumadin  - now placed on apixaban for AC: here with newly dx left leg DVT    4. GEN  - supplement hypophosphatemia - however pt refused, will try to convince pt to take supplementation  - OOB to chair  - stable for transfer to medical floor   - d/w patient who is in agreement

## 2019-09-08 DIAGNOSIS — F32.9 MAJOR DEPRESSIVE DISORDER, SINGLE EPISODE, UNSPECIFIED: ICD-10-CM

## 2019-09-08 DIAGNOSIS — I82.412 ACUTE EMBOLISM AND THROMBOSIS OF LEFT FEMORAL VEIN: ICD-10-CM

## 2019-09-08 DIAGNOSIS — J45.909 UNSPECIFIED ASTHMA, UNCOMPLICATED: ICD-10-CM

## 2019-09-08 DIAGNOSIS — E87.6 HYPOKALEMIA: ICD-10-CM

## 2019-09-08 DIAGNOSIS — I50.43 ACUTE ON CHRONIC COMBINED SYSTOLIC (CONGESTIVE) AND DIASTOLIC (CONGESTIVE) HEART FAILURE: ICD-10-CM

## 2019-09-08 DIAGNOSIS — I27.20 PULMONARY HYPERTENSION, UNSPECIFIED: ICD-10-CM

## 2019-09-08 DIAGNOSIS — E78.5 HYPERLIPIDEMIA, UNSPECIFIED: ICD-10-CM

## 2019-09-08 DIAGNOSIS — E03.9 HYPOTHYROIDISM, UNSPECIFIED: ICD-10-CM

## 2019-09-08 LAB
ANION GAP SERPL CALC-SCNC: 9 MMOL/L — SIGNIFICANT CHANGE UP (ref 5–17)
BUN SERPL-MCNC: 12 MG/DL — SIGNIFICANT CHANGE UP (ref 7–23)
CALCIUM SERPL-MCNC: 8.4 MG/DL — LOW (ref 8.5–10.1)
CHLORIDE SERPL-SCNC: 91 MMOL/L — LOW (ref 96–108)
CO2 SERPL-SCNC: 31 MMOL/L — SIGNIFICANT CHANGE UP (ref 22–31)
CREAT SERPL-MCNC: 0.86 MG/DL — SIGNIFICANT CHANGE UP (ref 0.5–1.3)
GLUCOSE BLDC GLUCOMTR-MCNC: 120 MG/DL — HIGH (ref 70–99)
GLUCOSE BLDC GLUCOMTR-MCNC: 92 MG/DL — SIGNIFICANT CHANGE UP (ref 70–99)
GLUCOSE BLDC GLUCOMTR-MCNC: 93 MG/DL — SIGNIFICANT CHANGE UP (ref 70–99)
GLUCOSE BLDC GLUCOMTR-MCNC: 99 MG/DL — SIGNIFICANT CHANGE UP (ref 70–99)
GLUCOSE SERPL-MCNC: 92 MG/DL — SIGNIFICANT CHANGE UP (ref 70–99)
HCT VFR BLD CALC: 35.3 % — SIGNIFICANT CHANGE UP (ref 34.5–45)
HGB BLD-MCNC: 11.1 G/DL — LOW (ref 11.5–15.5)
INR BLD: 2.83 RATIO — HIGH (ref 0.88–1.16)
MAGNESIUM SERPL-MCNC: 2.2 MG/DL — SIGNIFICANT CHANGE UP (ref 1.6–2.6)
MCHC RBC-ENTMCNC: 23.1 PG — LOW (ref 27–34)
MCHC RBC-ENTMCNC: 31.4 GM/DL — LOW (ref 32–36)
MCV RBC AUTO: 73.4 FL — LOW (ref 80–100)
NRBC # BLD: 0 /100 WBCS — SIGNIFICANT CHANGE UP (ref 0–0)
PHOSPHATE SERPL-MCNC: 2.4 MG/DL — LOW (ref 2.5–4.5)
PLATELET # BLD AUTO: 170 K/UL — SIGNIFICANT CHANGE UP (ref 150–400)
POTASSIUM SERPL-MCNC: 3.2 MMOL/L — LOW (ref 3.5–5.3)
POTASSIUM SERPL-SCNC: 3.2 MMOL/L — LOW (ref 3.5–5.3)
PROTHROM AB SERPL-ACNC: 32.7 SEC — HIGH (ref 10–12.9)
RBC # BLD: 4.81 M/UL — SIGNIFICANT CHANGE UP (ref 3.8–5.2)
RBC # FLD: 26.2 % — HIGH (ref 10.3–14.5)
SODIUM SERPL-SCNC: 131 MMOL/L — LOW (ref 135–145)
WBC # BLD: 4.5 K/UL — SIGNIFICANT CHANGE UP (ref 3.8–10.5)
WBC # FLD AUTO: 4.5 K/UL — SIGNIFICANT CHANGE UP (ref 3.8–10.5)

## 2019-09-08 PROCEDURE — 99232 SBSQ HOSP IP/OBS MODERATE 35: CPT

## 2019-09-08 PROCEDURE — 99233 SBSQ HOSP IP/OBS HIGH 50: CPT

## 2019-09-08 RX ORDER — POTASSIUM CHLORIDE 20 MEQ
20 PACKET (EA) ORAL DAILY
Refills: 0 | Status: DISCONTINUED | OUTPATIENT
Start: 2019-09-09 | End: 2019-09-13

## 2019-09-08 RX ORDER — POTASSIUM CHLORIDE 20 MEQ
10 PACKET (EA) ORAL
Refills: 0 | Status: COMPLETED | OUTPATIENT
Start: 2019-09-08 | End: 2019-09-08

## 2019-09-08 RX ORDER — POTASSIUM CHLORIDE 20 MEQ
40 PACKET (EA) ORAL ONCE
Refills: 0 | Status: COMPLETED | OUTPATIENT
Start: 2019-09-08 | End: 2019-09-08

## 2019-09-08 RX ADMIN — Medication 100 MILLIEQUIVALENT(S): at 21:00

## 2019-09-08 RX ADMIN — BUMETANIDE 2 MILLIGRAM(S): 0.25 INJECTION INTRAMUSCULAR; INTRAVENOUS at 05:50

## 2019-09-08 RX ADMIN — NYSTATIN CREAM 1 APPLICATION(S): 100000 CREAM TOPICAL at 18:03

## 2019-09-08 RX ADMIN — Medication 81 MILLIGRAM(S): at 12:33

## 2019-09-08 RX ADMIN — NYSTATIN CREAM 1 APPLICATION(S): 100000 CREAM TOPICAL at 06:49

## 2019-09-08 RX ADMIN — MIDODRINE HYDROCHLORIDE 10 MILLIGRAM(S): 2.5 TABLET ORAL at 05:49

## 2019-09-08 RX ADMIN — BUDESONIDE AND FORMOTEROL FUMARATE DIHYDRATE 2 PUFF(S): 160; 4.5 AEROSOL RESPIRATORY (INHALATION) at 06:13

## 2019-09-08 RX ADMIN — RIOCIGUAT 1.5 MILLIGRAM(S): 1.5 TABLET, FILM COATED ORAL at 05:49

## 2019-09-08 RX ADMIN — BUMETANIDE 2 MILLIGRAM(S): 0.25 INJECTION INTRAMUSCULAR; INTRAVENOUS at 13:38

## 2019-09-08 RX ADMIN — SERTRALINE 50 MILLIGRAM(S): 25 TABLET, FILM COATED ORAL at 12:34

## 2019-09-08 RX ADMIN — MIDODRINE HYDROCHLORIDE 10 MILLIGRAM(S): 2.5 TABLET ORAL at 13:33

## 2019-09-08 RX ADMIN — APIXABAN 10 MILLIGRAM(S): 2.5 TABLET, FILM COATED ORAL at 18:04

## 2019-09-08 RX ADMIN — RIOCIGUAT 1.5 MILLIGRAM(S): 1.5 TABLET, FILM COATED ORAL at 13:33

## 2019-09-08 RX ADMIN — BUDESONIDE AND FORMOTEROL FUMARATE DIHYDRATE 2 PUFF(S): 160; 4.5 AEROSOL RESPIRATORY (INHALATION) at 18:04

## 2019-09-08 RX ADMIN — APIXABAN 10 MILLIGRAM(S): 2.5 TABLET, FILM COATED ORAL at 08:48

## 2019-09-08 RX ADMIN — Medication 37.5 MICROGRAM(S): at 05:50

## 2019-09-08 NOTE — PROGRESS NOTE ADULT - PROBLEM SELECTOR PLAN 1
continue with shayne box continue with current treatment   d/w pt about fluid restriction and intake at home

## 2019-09-08 NOTE — PROGRESS NOTE ADULT - PROBLEM SELECTOR PLAN 2
on riociguat on riociguat   on chronic oxygen per pt does not have portable oxygen tank will have cm/sw in am address

## 2019-09-08 NOTE — PROGRESS NOTE ADULT - SUBJECTIVE AND OBJECTIVE BOX
Interval Events:  Patient seen and examined at bedside. No acute events. Continues to diurese.      REVIEW OF SYSTEMS:  Constitutional: no fevers  CV:  no chest pain  Resp:  stable  [x ] All other systems negative  [ ] Unable to assess ROS because ________    OBJECTIVE:  T(C): 36.4 (09-08-19 @ 17:20), Max: 37.3 (09-07-19 @ 23:41)  HR: 84 (09-08-19 @ 17:20) (76 - 87)  BP: 102/59 (09-08-19 @ 17:20) (102/59 - 115/71)  RR: 17 (09-08-19 @ 17:20) (16 - 19)  SpO2: 96% (09-08-19 @ 17:20) (86% - 99%)      09-07-19 @ 07:01  -  09-08-19 @ 07:00  --------------------------------------------------------  IN: 250 mL / OUT: 1500 mL / NET: -1250 mL        PHYSICAL EXAM:  General: Well appearing Female. No apparent distress  HEENT:   Mucous membranes are moist.  Neck: Supple. +JVD  Respiratory:   Cardiovascular: RRR, +S4 gallop  Abdomen: Soft, non-tender, non-distended.   Extremities: +2 bilateral REUBEN to thigh  Skin: Warm, dry, no rash.   Neurological: CNII-XII grossly intact.   Psychiatry: appropriate mood and affect.       HOSPITAL MEDICATIONS:  MEDICATIONS  (STANDING):  apixaban 10 milliGRAM(s) Oral two times a day  aspirin enteric coated 81 milliGRAM(s) Oral daily  buDESOnide 160 MICROgram(s)/formoterol 4.5 MICROgram(s) Inhaler 2 Puff(s) Inhalation two times a day  buMETAnide Injectable 2 milliGRAM(s) IV Push every 8 hours  dextrose 5%. 1000 milliLiter(s) (50 mL/Hr) IV Continuous <Continuous>  dextrose 50% Injectable 12.5 Gram(s) IV Push once  dextrose 50% Injectable 25 Gram(s) IV Push once  dextrose 50% Injectable 25 Gram(s) IV Push once  insulin lispro (HumaLOG) corrective regimen sliding scale   SubCutaneous three times a day before meals  insulin lispro (HumaLOG) corrective regimen sliding scale   SubCutaneous at bedtime  levothyroxine 37.5 MICROGram(s) Oral daily  midodrine 10 milliGRAM(s) Oral every 8 hours  nystatin Powder 1 Application(s) Topical two times a day  potassium chloride  10 mEq/100 mL IVPB 10 milliEquivalent(s) IV Intermittent every 1 hour  riociguat 1.5 milliGRAM(s) Oral three times a day  sertraline 50 milliGRAM(s) Oral daily  simvastatin 40 milliGRAM(s) Oral at bedtime    MEDICATIONS  (PRN):  acetaminophen   Tablet .. 650 milliGRAM(s) Oral every 6 hours PRN Mild Pain (1 - 3)  dextrose 40% Gel 15 Gram(s) Oral once PRN Blood Glucose LESS THAN 70 milliGRAM(s)/deciliter  glucagon  Injectable 1 milliGRAM(s) IntraMuscular once PRN Glucose LESS THAN 70 milligrams/deciliter  guaiFENesin    Syrup 100 milliGRAM(s) Oral every 6 hours PRN Cough      LABS:                        11.1   4.50  )-----------( 170      ( 08 Sep 2019 07:38 )             35.3     09-08    131<L>  |  91<L>  |  12  ----------------------------<  92  3.2<L>   |  31  |  0.86    Ca    8.4<L>      08 Sep 2019 07:38  Phos  2.4     09-08  Mg     2.2     09-08    TPro  7.6  /  Alb  2.8<L>  /  TBili  5.6<H>  /  DBili  x   /  AST  51<H>  /  ALT  29  /  AlkPhos  78  09-07    PT/INR - ( 08 Sep 2019 07:38 )   PT: 32.7 sec;   INR: 2.83 ratio         RADIOLOGY:  [ x ] Reviewed and interpreted by me    CXR: volume overload    < from: US Duplex Venous Lower Ext Complete, Bilateral (09.05.19 @ 09:19) >  Impression: Positive for extensive acute nonocclusive thrombus in the   left external iliac vein and the left common femoral vein  and left   greater saphenous vein.    < end of copied text >      ECHOCARDIOGRAPHY:  < from: TTE Echo Doppler w/o Cont (07.26.19 @ 08:44) >  Summary:   1. Left ventricular ejection fraction, by visual estimation, is 45 to   50%.   2. Mildly decreased global left ventricular systolic function.   3. Elevated mean left atrial pressure.   4. Global cardiomyopathy.   5. Increased LV wall thickness.   6. Normal left ventricular internal cavity size.   7. Right ventricular pressure overload.   8. Spectral Doppler shows impaired relaxation pattern of left   ventricular myocardial filling (Grade I diastolic dysfunction).   9. There is mild asymmetric left ventricular hypertrophy.  10. Pulmonary hypertension.  11. Severely enlarged right ventricle.  12. Severely reduced RV systolic function.  13. RV ejection fraction 34%.  14. Small pericardial effusion.  15. Degenerative mitral valve.  16. Mild mitral annular calcification.  17. Mild mitral valve regurgitation.  18. Moderate tricuspid regurgitation.  19. Structurally normal tricuspid valve, with normal leaflet excursion.  20. Mild aortic regurgitation.  21. Mild to moderate pulmonic valve regurgitation.  22. Structurally normal pulmonic valve, with normal leaflet excursion.  23. Estimated pulmonary artery systolic pressure is 87.1 mmHg assuming a   right atrial pressure of 20 mmHg, which is consistent with severe   pulmonary hypertension.  24. Pulmonary hypertension is present.  25. The main pulmonary artery is normal in size.  26. The right atrial pressure is moderately elevated.    [id number] Chago BLANCO  Electronically signed on 7/26/2019 at 2:01:34 PM    < end of copied text >

## 2019-09-08 NOTE — PROGRESS NOTE ADULT - ASSESSMENT
33 yo female with PMH: severe Pulm HTN with right sided heart failure admitted with c/o SOB.  Pt to be transferred to ICU for closer monitoring and aggressive diuresis which may require dobutamine and/or pressors.  Pt with prior admission for similar presentation in ICU.

## 2019-09-08 NOTE — PROGRESS NOTE ADULT - SUBJECTIVE AND OBJECTIVE BOX
downgrade form ICU      Patient is a 34y old  Female who presents with a chief complaint of Dyspnea (07 Sep 2019 17:58)      OVERNIGHT EVENTS:    MEDICATIONS  (STANDING):  apixaban 10 milliGRAM(s) Oral two times a day  aspirin enteric coated 81 milliGRAM(s) Oral daily  buDESOnide 160 MICROgram(s)/formoterol 4.5 MICROgram(s) Inhaler 2 Puff(s) Inhalation two times a day  buMETAnide Injectable 2 milliGRAM(s) IV Push every 8 hours  dextrose 5%. 1000 milliLiter(s) (50 mL/Hr) IV Continuous <Continuous>  dextrose 50% Injectable 12.5 Gram(s) IV Push once  dextrose 50% Injectable 25 Gram(s) IV Push once  dextrose 50% Injectable 25 Gram(s) IV Push once  insulin lispro (HumaLOG) corrective regimen sliding scale   SubCutaneous three times a day before meals  insulin lispro (HumaLOG) corrective regimen sliding scale   SubCutaneous at bedtime  levothyroxine 37.5 MICROGram(s) Oral daily  midodrine 10 milliGRAM(s) Oral every 8 hours  nystatin Powder 1 Application(s) Topical two times a day  riociguat 1.5 milliGRAM(s) Oral three times a day  sertraline 50 milliGRAM(s) Oral daily  simvastatin 40 milliGRAM(s) Oral at bedtime    MEDICATIONS  (PRN):  acetaminophen   Tablet .. 650 milliGRAM(s) Oral every 6 hours PRN Mild Pain (1 - 3)  dextrose 40% Gel 15 Gram(s) Oral once PRN Blood Glucose LESS THAN 70 milliGRAM(s)/deciliter  glucagon  Injectable 1 milliGRAM(s) IntraMuscular once PRN Glucose LESS THAN 70 milligrams/deciliter  guaiFENesin    Syrup 100 milliGRAM(s) Oral every 6 hours PRN Cough    Allergies    No Known Allergies    Intolerances        SUBJECTIVE: in bed in NAD, no acute events overnight     T(F): 97.9 (09-08-19 @ 05:38), Max: 99.1 (09-07-19 @ 23:41)  HR: 76 (09-08-19 @ 05:38) (76 - 88)  BP: 108/55 (09-08-19 @ 05:38) (95/55 - 109/52)  RR: 17 (09-08-19 @ 05:38) (17 - 28)  SpO2: 88% (09-08-19 @ 05:38) (88% - 99%)  Wt(kg): --    PHYSICAL EXAM:  GENERAL: NAD, well-groomed, well-developed  HEAD:  Atraumatic, Normocephalic  EYES: EOMI, PERRLA, conjunctiva and sclera clear  ENMT: No tonsillar erythema, exudates, or enlargement; Moist mucous membranes, Good dentition, No lesions  NECK: Supple, No JVD, Normal thyroid  CHEST/LUNG: Clear to  auscultation bilaterally; No rales, rhonchi, wheezing, or rubs  bilaterally  HEART: Regular rate and rhythm; No murmurs, rubs, or gallops  ABDOMEN: Soft, Nontender, Nondistended; Bowel sounds present  EXTREMITIES:  2+ Peripheral Pulses, No clubbing, cyanosis, or edema BL LE  SKIN: No rashes or lesions  NERVOUS SYSTEM:  Alert & Oriented X3, Good concentration; Motor Strength 5/5 B/L upper and lower extremities;   DTRs 2+ intact and symmetric, sensation intact BL    LABS:                        11.1   4.50  )-----------( 170      ( 08 Sep 2019 07:38 )             35.3     09-08    131<L>  |  91<L>  |  12  ----------------------------<  92  3.2<L>   |  31  |  0.86    Ca    8.4<L>      08 Sep 2019 07:38  Phos  2.4     09-08  Mg     2.2     09-08    TPro  7.6  /  Alb  2.8<L>  /  TBili  5.6<H>  /  DBili  x   /  AST  51<H>  /  ALT  29  /  AlkPhos  78  09-07    PT/INR - ( 08 Sep 2019 07:38 )   PT: 32.7 sec;   INR: 2.83 ratio             Cultures;   CAPILLARY BLOOD GLUCOSE      POCT Blood Glucose.: 120 mg/dL (08 Sep 2019 11:46)  POCT Blood Glucose.: 92 mg/dL (08 Sep 2019 08:15)  POCT Blood Glucose.: 108 mg/dL (07 Sep 2019 22:20)  POCT Blood Glucose.: 117 mg/dL (07 Sep 2019 16:55)    Lipid panel:           RADIOLOGY & ADDITIONAL TESTS:      Imaging Personally Reviewed:  [ x] YES      Consultant(s) Notes Reviewed:  [x ] YES     Care Discussed with [x ] Consultants [X ] Patient [x ] Family  [x ]    [x ]  Other; RN downgrade form ICU      Patient is a 34y old  Female who presents with a chief complaint of Dyspnea (07 Sep 2019 17:58)      OVERNIGHT EVENTS:    MEDICATIONS  (STANDING):  apixaban 10 milliGRAM(s) Oral two times a day  aspirin enteric coated 81 milliGRAM(s) Oral daily  buDESOnide 160 MICROgram(s)/formoterol 4.5 MICROgram(s) Inhaler 2 Puff(s) Inhalation two times a day  buMETAnide Injectable 2 milliGRAM(s) IV Push every 8 hours  dextrose 5%. 1000 milliLiter(s) (50 mL/Hr) IV Continuous <Continuous>  dextrose 50% Injectable 12.5 Gram(s) IV Push once  dextrose 50% Injectable 25 Gram(s) IV Push once  dextrose 50% Injectable 25 Gram(s) IV Push once  insulin lispro (HumaLOG) corrective regimen sliding scale   SubCutaneous three times a day before meals  insulin lispro (HumaLOG) corrective regimen sliding scale   SubCutaneous at bedtime  levothyroxine 37.5 MICROGram(s) Oral daily  midodrine 10 milliGRAM(s) Oral every 8 hours  nystatin Powder 1 Application(s) Topical two times a day  riociguat 1.5 milliGRAM(s) Oral three times a day  sertraline 50 milliGRAM(s) Oral daily  simvastatin 40 milliGRAM(s) Oral at bedtime    MEDICATIONS  (PRN):  acetaminophen   Tablet .. 650 milliGRAM(s) Oral every 6 hours PRN Mild Pain (1 - 3)  dextrose 40% Gel 15 Gram(s) Oral once PRN Blood Glucose LESS THAN 70 milliGRAM(s)/deciliter  glucagon  Injectable 1 milliGRAM(s) IntraMuscular once PRN Glucose LESS THAN 70 milligrams/deciliter  guaiFENesin    Syrup 100 milliGRAM(s) Oral every 6 hours PRN Cough    Allergies    No Known Allergies    Intolerances        SUBJECTIVE: in bed in NAD, no acute events overnight     T(F): 97.9 (09-08-19 @ 05:38), Max: 99.1 (09-07-19 @ 23:41)  HR: 76 (09-08-19 @ 05:38) (76 - 88)  BP: 108/55 (09-08-19 @ 05:38) (95/55 - 109/52)  RR: 17 (09-08-19 @ 05:38) (17 - 28)  SpO2: 88% (09-08-19 @ 05:38) (88% - 99%)  Wt(kg): --    PHYSICAL EXAM:  GENERAL: NAD, well-groomed, well-developed  HEAD:  Atraumatic, Normocephalic  EYES: EOMI, PERRLA, conjunctiva and sclera clear  ENMT: No tonsillar erythema, exudates, or enlargement; Moist mucous membranes, Good dentition, No lesions  NECK: Supple, No JVD, Normal thyroid  CHEST/LUNG: mild decreased bs at bases right > left   HEART: Regular rate and rhythm; No murmurs, rubs, or gallops  ABDOMEN: Soft, Nontender, Nondistended; Bowel sounds present  EXTREMITIES:  2+ Peripheral Pulses, No clubbing, cyanosis, edema up to thighs b/l     SKIN: No rashes or lesions  NERVOUS SYSTEM:  Alert & Oriented X3, Good concentration; Motor Strength 5/5 B/L upper and lower extremities;   DTRs 2+ intact and symmetric, sensation intact BL    LABS:                        11.1   4.50  )-----------( 170      ( 08 Sep 2019 07:38 )             35.3     09-08    131<L>  |  91<L>  |  12  ----------------------------<  92  3.2<L>   |  31  |  0.86    Ca    8.4<L>      08 Sep 2019 07:38  Phos  2.4     09-08  Mg     2.2     09-08    TPro  7.6  /  Alb  2.8<L>  /  TBili  5.6<H>  /  DBili  x   /  AST  51<H>  /  ALT  29  /  AlkPhos  78  09-07    PT/INR - ( 08 Sep 2019 07:38 )   PT: 32.7 sec;   INR: 2.83 ratio             Cultures;   CAPILLARY BLOOD GLUCOSE      POCT Blood Glucose.: 120 mg/dL (08 Sep 2019 11:46)  POCT Blood Glucose.: 92 mg/dL (08 Sep 2019 08:15)  POCT Blood Glucose.: 108 mg/dL (07 Sep 2019 22:20)  POCT Blood Glucose.: 117 mg/dL (07 Sep 2019 16:55)    Lipid panel:           RADIOLOGY & ADDITIONAL TESTS:      Imaging Personally Reviewed:  [ x] YES      Consultant(s) Notes Reviewed:  [x ] YES     Care Discussed with [x ] Consultants [X ] Patient [x ] Family  [x ]    [x ]  Other; RN

## 2019-09-08 NOTE — PROGRESS NOTE ADULT - ASSESSMENT
33 yo woman with severe pHTN (group I/II, mPAP 67; W 9; RVR 9 on RHC 8/2018,  on Adempas), mildly positive anti-cardiolipin Ab and RAD admitted with decompensated right heart failure, volume overload and DIANN. Found to have acute LLE DVT. Diuresing well.    #pHTN/decompensated right heart failure  -- c/w aggressive diuresis. Doing well for now on IV Bumex- dry wt is around 105 kg  -- Strict is/os and daily weights.   -- c/w Adempas  -- c/w Midodrine for BP support  -- supplemental O2 to keep sats >90%. would avoid NIV given RHF. Can use high flow if needed    #LLE DVT in the setting of sub-therapeutic INR. Patient reports that her INR was not being monitored  -- agree with transitioning to NOAC given ease of use and hopefully better compliance  -- c/w Apixaban 10 mg bid for 7 days, then transition to 5 mg bid    #DIANN - in the setting of congestion and volume overload, resolved with aggressive diuresis  --continue to monitor    #Hypothyroid - TSH on Aug 22 was 7. Synthroid increased to 37.5mcg      #Reactive airway disease - PFTs in the past with reversible obstructive defect  -- c/w Symbicort and Albuterol prn  -- can d/c standing Duonebs  -- will need repeat PFTs as outpatient after discharge    Patient follows with Dr Unger at [address] [place] [address]. She should follow with him after discharge.     Thank you for this consult. I will follow with you. Please call with any questions. Cell phone # [phone]    Shirley Mejia MD    [facility]//Pulmonary Medicine  [address] [place] [address] Ave; [place] [address]  tel: [phone]; fax: [phone]

## 2019-09-09 DIAGNOSIS — R07.9 CHEST PAIN, UNSPECIFIED: ICD-10-CM

## 2019-09-09 LAB
ANION GAP SERPL CALC-SCNC: 10 MMOL/L — SIGNIFICANT CHANGE UP (ref 5–17)
BUN SERPL-MCNC: 13 MG/DL — SIGNIFICANT CHANGE UP (ref 7–23)
CALCIUM SERPL-MCNC: 9 MG/DL — SIGNIFICANT CHANGE UP (ref 8.5–10.1)
CHLORIDE SERPL-SCNC: 92 MMOL/L — LOW (ref 96–108)
CK MB BLD-MCNC: 1.5 % — SIGNIFICANT CHANGE UP (ref 0–3.5)
CK MB CFR SERPL CALC: 1.2 NG/ML — SIGNIFICANT CHANGE UP (ref 0.5–3.6)
CK SERPL-CCNC: 82 U/L — SIGNIFICANT CHANGE UP (ref 26–192)
CO2 SERPL-SCNC: 29 MMOL/L — SIGNIFICANT CHANGE UP (ref 22–31)
CREAT SERPL-MCNC: 0.9 MG/DL — SIGNIFICANT CHANGE UP (ref 0.5–1.3)
GLUCOSE BLDC GLUCOMTR-MCNC: 111 MG/DL — HIGH (ref 70–99)
GLUCOSE BLDC GLUCOMTR-MCNC: 122 MG/DL — HIGH (ref 70–99)
GLUCOSE BLDC GLUCOMTR-MCNC: 134 MG/DL — HIGH (ref 70–99)
GLUCOSE BLDC GLUCOMTR-MCNC: 91 MG/DL — SIGNIFICANT CHANGE UP (ref 70–99)
GLUCOSE SERPL-MCNC: 77 MG/DL — SIGNIFICANT CHANGE UP (ref 70–99)
HCT VFR BLD CALC: 35.5 % — SIGNIFICANT CHANGE UP (ref 34.5–45)
HGB BLD-MCNC: 11.2 G/DL — LOW (ref 11.5–15.5)
INR BLD: 3.66 RATIO — HIGH (ref 0.88–1.16)
MAGNESIUM SERPL-MCNC: 2.4 MG/DL — SIGNIFICANT CHANGE UP (ref 1.6–2.6)
MCHC RBC-ENTMCNC: 23.4 PG — LOW (ref 27–34)
MCHC RBC-ENTMCNC: 31.5 GM/DL — LOW (ref 32–36)
MCV RBC AUTO: 74.1 FL — LOW (ref 80–100)
NRBC # BLD: 0 /100 WBCS — SIGNIFICANT CHANGE UP (ref 0–0)
PHOSPHATE SERPL-MCNC: 2.4 MG/DL — LOW (ref 2.5–4.5)
PLATELET # BLD AUTO: 183 K/UL — SIGNIFICANT CHANGE UP (ref 150–400)
POTASSIUM SERPL-MCNC: 3.7 MMOL/L — SIGNIFICANT CHANGE UP (ref 3.5–5.3)
POTASSIUM SERPL-SCNC: 3.7 MMOL/L — SIGNIFICANT CHANGE UP (ref 3.5–5.3)
PROTHROM AB SERPL-ACNC: 42.7 SEC — HIGH (ref 10–12.9)
RBC # BLD: 4.79 M/UL — SIGNIFICANT CHANGE UP (ref 3.8–5.2)
RBC # FLD: 26.3 % — HIGH (ref 10.3–14.5)
SODIUM SERPL-SCNC: 131 MMOL/L — LOW (ref 135–145)
TROPONIN I SERPL-MCNC: 0.07 NG/ML — HIGH (ref 0.01–0.04)
TROPONIN I SERPL-MCNC: 0.08 NG/ML — HIGH (ref 0.01–0.04)
WBC # BLD: 3.71 K/UL — LOW (ref 3.8–10.5)
WBC # FLD AUTO: 3.71 K/UL — LOW (ref 3.8–10.5)

## 2019-09-09 PROCEDURE — 71045 X-RAY EXAM CHEST 1 VIEW: CPT | Mod: 26

## 2019-09-09 PROCEDURE — 93010 ELECTROCARDIOGRAM REPORT: CPT

## 2019-09-09 PROCEDURE — 99232 SBSQ HOSP IP/OBS MODERATE 35: CPT

## 2019-09-09 PROCEDURE — 99233 SBSQ HOSP IP/OBS HIGH 50: CPT

## 2019-09-09 PROCEDURE — 99223 1ST HOSP IP/OBS HIGH 75: CPT

## 2019-09-09 RX ORDER — IPRATROPIUM/ALBUTEROL SULFATE 18-103MCG
3 AEROSOL WITH ADAPTER (GRAM) INHALATION ONCE
Refills: 0 | Status: COMPLETED | OUTPATIENT
Start: 2019-09-09 | End: 2019-09-09

## 2019-09-09 RX ORDER — MIDODRINE HYDROCHLORIDE 2.5 MG/1
5 TABLET ORAL EVERY 8 HOURS
Refills: 0 | Status: COMPLETED | OUTPATIENT
Start: 2019-09-09 | End: 2019-09-10

## 2019-09-09 RX ADMIN — Medication 81 MILLIGRAM(S): at 12:02

## 2019-09-09 RX ADMIN — APIXABAN 10 MILLIGRAM(S): 2.5 TABLET, FILM COATED ORAL at 06:29

## 2019-09-09 RX ADMIN — MIDODRINE HYDROCHLORIDE 10 MILLIGRAM(S): 2.5 TABLET ORAL at 13:23

## 2019-09-09 RX ADMIN — Medication 100 MILLIGRAM(S): at 08:51

## 2019-09-09 RX ADMIN — BUDESONIDE AND FORMOTEROL FUMARATE DIHYDRATE 2 PUFF(S): 160; 4.5 AEROSOL RESPIRATORY (INHALATION) at 06:28

## 2019-09-09 RX ADMIN — RIOCIGUAT 1.5 MILLIGRAM(S): 1.5 TABLET, FILM COATED ORAL at 06:30

## 2019-09-09 RX ADMIN — NYSTATIN CREAM 1 APPLICATION(S): 100000 CREAM TOPICAL at 17:10

## 2019-09-09 RX ADMIN — Medication 37.5 MICROGRAM(S): at 06:29

## 2019-09-09 RX ADMIN — BUDESONIDE AND FORMOTEROL FUMARATE DIHYDRATE 2 PUFF(S): 160; 4.5 AEROSOL RESPIRATORY (INHALATION) at 17:10

## 2019-09-09 RX ADMIN — Medication 20 MILLIEQUIVALENT(S): at 12:02

## 2019-09-09 RX ADMIN — SIMVASTATIN 40 MILLIGRAM(S): 20 TABLET, FILM COATED ORAL at 21:52

## 2019-09-09 RX ADMIN — SIMVASTATIN 40 MILLIGRAM(S): 20 TABLET, FILM COATED ORAL at 00:15

## 2019-09-09 RX ADMIN — MIDODRINE HYDROCHLORIDE 5 MILLIGRAM(S): 2.5 TABLET ORAL at 21:52

## 2019-09-09 RX ADMIN — MIDODRINE HYDROCHLORIDE 10 MILLIGRAM(S): 2.5 TABLET ORAL at 06:29

## 2019-09-09 RX ADMIN — Medication 40 MILLIEQUIVALENT(S): at 00:32

## 2019-09-09 RX ADMIN — MIDODRINE HYDROCHLORIDE 10 MILLIGRAM(S): 2.5 TABLET ORAL at 00:15

## 2019-09-09 RX ADMIN — RIOCIGUAT 1.5 MILLIGRAM(S): 1.5 TABLET, FILM COATED ORAL at 00:15

## 2019-09-09 RX ADMIN — Medication 3 MILLILITER(S): at 09:37

## 2019-09-09 RX ADMIN — NYSTATIN CREAM 1 APPLICATION(S): 100000 CREAM TOPICAL at 06:30

## 2019-09-09 RX ADMIN — RIOCIGUAT 1.5 MILLIGRAM(S): 1.5 TABLET, FILM COATED ORAL at 21:52

## 2019-09-09 RX ADMIN — RIOCIGUAT 1.5 MILLIGRAM(S): 1.5 TABLET, FILM COATED ORAL at 13:23

## 2019-09-09 RX ADMIN — APIXABAN 10 MILLIGRAM(S): 2.5 TABLET, FILM COATED ORAL at 17:10

## 2019-09-09 RX ADMIN — SERTRALINE 50 MILLIGRAM(S): 25 TABLET, FILM COATED ORAL at 14:58

## 2019-09-09 NOTE — PROGRESS NOTE ADULT - PROBLEM SELECTOR PLAN 3
will be replaced ekg obtained st and t wave changes unchanged from admission ekg.   cardiac enzymes noted troponin down trending from 9/5/19 (labs)   consult cardiology as pt will need different heart failure specialist at d/c she is unhappy with dr. landa ekg obtained st and t wave changes unchanged from admission ekg.   cardiac enzymes noted troponin down trending from 9/5/19 (labs)   consult cardiology as pt will need different heart failure specialist at d/c she is unhappy with dr. watson

## 2019-09-09 NOTE — PROGRESS NOTE ADULT - PROBLEM SELECTOR PROBLEM 5
Acute deep vein thrombosis (DVT) of femoral vein of left lower extremity Reactive airway disease without complication, unspecified asthma severity, unspecified whether persistent

## 2019-09-09 NOTE — PROGRESS NOTE ADULT - PROBLEM SELECTOR PLAN 2
on riociguat   on chronic oxygen per pt does not have portable oxygen tank will have cm/sw in am address

## 2019-09-09 NOTE — CONSULT NOTE ADULT - ASSESSMENT
33 yo female with end stage lung disease due to idiopathic/primary pulmonary HTN and Anticardiolipin antibodies.  Admitted with symptoms of worsening right heart failure. Atypical chest pain likely secondary to her primary cardiac issues, cath reportedly negative in the past for any CAD. Demand troponin levels noted.    Little to be gained from another diagnostic evaluation and would try to more aggressively diurese her but this will ultimately be limited by her blood pressures being chronically low and/or renal failure.  Patient has lost 5 kg since readmission but is still clearly suffering from symptoms of cor pulmonale. No recent weights.  She may qualify for a lung transplant, but has scheduled appointment with Dr. Unger on 9/12 to discuss care further.    Plan:  IV Bumex increase and monitor status. NEEDS DAILY WEIGHTS.  PO Bumex likely not as effective due to concomitant gut edema.  Patient was told to get SKYE evaluation (never did) and follow up with DR. Bharath Adamson (never did), this should be arranged as out patient on discharge.  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    Will follow with you.

## 2019-09-09 NOTE — CONSULT NOTE ADULT - SUBJECTIVE AND OBJECTIVE BOX
CARDIOLOGY CONSULT NOTE    19 @ 13:27  LAINEY DUNLAP is a 34y Female with a known history of severe pulmonary hypertension and right sided CHF althouigh she has been documented with biventricular failure as well .  She presented with worsening dyspnea, nausea and vomitin and worsened edema x 2 days.  Patient presented to ED in respiratory distress requiring BiPAP.  Patient admits that she typically has shortness of breath at home and she uses oxygen therapy as needed to improve her symptoms, patient states that for the last two days the oxygen has not helped.  Patient also reports 2 week hx of worsening bilateral leg swelling.  Patient reports compliance with her medications including self titration of her loop diuretics.  Patient reports some cough productive of yellow sputum but states her cough is no worse than it normally is.      Of note patient was admitted to [facility] on  for exacerbation of CHF.  Presented with hypotension needing vasopressor support.   Found to have LE DVT despite long term oral AC (non-compliant?). Last INR in August was 1.55, she had previously been followed in [facility] but was dismissed in  to non-compliance.  Patient states she is having chronic chest tightness but no overt chest pain, no new symptoms.        REVIEW OF SYSTEMS:    CONSTITUTIONAL: Appears depressed  EYES: No eye pain, visual disturbances, or discharge  ENMT:  No difficulty hearing, tinnitus, vertigo; No sinus or throat pain  NECK: No pain or stiffness  BREASTS: No pain, masses, or nipple discharge  RESPIRATORY: as per HPI  CARDIOVASCULAR: No chest pain, palpitations, dizziness, or leg swelling  GASTROINTESTINAL: No abdominal or epigastric pain. No nausea, vomiting, or hematemesis; No diarrhea or constipation. No melena or hematochezia.  GENITOURINARY: No dysuria, frequency, hematuria, or incontinence  NEUROLOGICAL: No headaches, memory loss, loss of strength, numbness, or tremors  SKIN: No itching, burning, rashes, or lesions   LYMPH NODES: No enlarged glands  ENDOCRINE: No heat or cold intolerance; No hair loss  MUSCULOSKELETAL: No joint pain or swelling; No muscle, back, or extremity pain  PSYCHIATRIC: No depression, anxiety, mood swings, or difficulty sleeping  HEME/LYMPH: No easy bruising, or bleeding gums  ALLERGY AND IMMUNOLOGIC: No hives or eczema    MEDICATIONS  (STANDING):  apixaban 10 milliGRAM(s) Oral two times a day  aspirin enteric coated 81 milliGRAM(s) Oral daily  buDESOnide 160 MICROgram(s)/formoterol 4.5 MICROgram(s) Inhaler 2 Puff(s) Inhalation two times a day  buMETAnide Injectable 2 milliGRAM(s) IV Push every 8 hours  dextrose 5%. 1000 milliLiter(s) (50 mL/Hr) IV Continuous <Continuous>  dextrose 50% Injectable 12.5 Gram(s) IV Push once  dextrose 50% Injectable 25 Gram(s) IV Push once  dextrose 50% Injectable 25 Gram(s) IV Push once  insulin lispro (HumaLOG) corrective regimen sliding scale   SubCutaneous three times a day before meals  insulin lispro (HumaLOG) corrective regimen sliding scale   SubCutaneous at bedtime  levothyroxine 37.5 MICROGram(s) Oral daily  midodrine 10 milliGRAM(s) Oral every 8 hours  nystatin Powder 1 Application(s) Topical two times a day  potassium chloride    Tablet ER 20 milliEquivalent(s) Oral daily  riociguat 1.5 milliGRAM(s) Oral three times a day  sertraline 50 milliGRAM(s) Oral daily  simvastatin 40 milliGRAM(s) Oral at bedtime    MEDICATIONS  (PRN):  acetaminophen   Tablet .. 650 milliGRAM(s) Oral every 6 hours PRN Mild Pain (1 - 3)  dextrose 40% Gel 15 Gram(s) Oral once PRN Blood Glucose LESS THAN 70 milliGRAM(s)/deciliter  glucagon  Injectable 1 milliGRAM(s) IntraMuscular once PRN Glucose LESS THAN 70 milligrams/deciliter  guaiFENesin    Syrup 100 milliGRAM(s) Oral every 6 hours PRN Cough    Adempas 1.5 mg oral tablet: 1 tab(s) orally 3 times a day  aspirin 81 mg oral delayed release tablet: 1 tab(s) orally once a day  Bumex 2 mg oral tablet: 1 tab(s) orally once a day  calcium carbonate 500 mg (200 mg elemental calcium) oral tablet, chewable: 1 tab(s) orally 3 times a day, As needed, Heartburn  Colace 100 mg oral capsule: 1 cap(s) orally once a day  ergocalciferol 50,000 intl units (1.25 mg) oral capsule: 1 cap(s) orally once a week  Innerclean oral tablet: 1 tab(s) orally once  Klor-Con M20 oral tablet, extended release: 2 tab(s) orally once a day   Levoxyl 25 mcg (0.025 mg) oral tablet: 1 tab(s) orally once a day  losartan 25 mg oral tablet: 1 tab(s) orally once a day  midodrine 10 mg oral tablet: 1 tab(s) orally every 8 hours  riociguat 1.5 mg oral tablet: 1 tab(s) orally 3 times a day  simvastatin 40 mg oral tablet: 1 tab(s) orally once a day (at bedtime)  spironolactone 50 mg oral tablet: 1 tab(s) orally 2 times a day  Symbicort 160 mcg-4.5 mcg/inh inhalation aerosol: 1 puff(s) inhaled 2 times a day  warfarin 2 mg oral tablet: 1 tab(s) orally once a day  Zoloft 50 mg oral tablet: 1 tab(s) orally once a day    ALLERGIES: No Known Allergies      FAMILY HISTORY: FAMILY HISTORY:  Family history of leukemia (Sibling)      PHYSICAL EXAMINATION:  -----------------------------  T(C): 36.7 (19 @ 12:04), Max: 36.7 (19 @ 12:04)  HR: 84 (19 @ 12:04) (70 - 87)  BP: 116/70 (19 @ 12:04) (96/51 - 116/70)  RR: 18 (19 @ 12:04) (16 - 19)  SpO2: 88% (19 @ 12:04) (88% - 99%)  Wt(kg): --        Constitutional: well developed, normal appearance, well groomed, well nourished, no deformities and no acute distress.   Eyes: the conjunctiva exhibited no abnormalities and the eyelids demonstrated no xanthelasmas.   HEENT: normal oral mucosa, no oral pallor and no oral cyanosis.   Neck: normal jugular venous A waves present, normal jugular venous V waves present and no jugular venous kumar A waves.   Pulmonary: no respiratory distress, normal respiratory rhythm and effort, no accessory muscle use and lungs were clear to auscultation bilaterally.   Cardiovascular: heart rate and rhythm were normal, normal S1 and S2 and no murmur, gallop, rub, heave or thrill are present. 3+ bilateral LE edema noted.  Abdomen: soft, non-tender, no hepato-splenomegaly and no abdominal mass palpated.   Musculoskeletal: the gait could not be assessed..   Extremities: no clubbing of the fingernails, no localized cyanosis, no petechial hemorrhages and no ischemic changes.   Skin: normal skin color and pigmentation, no rash, no venous stasis, no skin lesions, no skin ulcer and no xanthoma was observed.   Psychiatric: oriented to person, place, and time, the affect was normal, the mood was normal and not feeling anxious.     LABS:   --------      131<L>  |  92<L>  |  13  ----------------------------<  77  3.7   |  29  |  0.90    Ca    9.0      09 Sep 2019 09:48  Phos  2.4       Mg     2.4                                11.2   3.71  )-----------( 183      ( 09 Sep 2019 09:48 )             35.5     PT/INR - ( 09 Sep 2019 09:48 )   PT: 42.7 sec;   INR: 3.66 ratio              @ 09:48 CPK total:--, CKMB --, Troponin I - .076 ng/mL<H>        CARDIAC MARKERS ( 09 Sep 2019 09:48 )  .076 ng/mL / x     / 82 U/L / x     / 1.2 ng/mL        RADIOLOGY:  -----------------  < from: TTE Echo Doppler w/o Cont (19 @ 08:44) >     EXAM:  TTE WO CON COMPLETE W DOPPL         PROCEDURE DATE:  2019        INTERPRETATION:  REPORT:    TRANSTHORACIC ECHOCARDIOGRAM REPORT         Patient Name:   LAINEY DUNLAP Patient Location: [facility] Rec #:  [id number]         Accession #:      [id number]  Account #:                         Height:           68.9 in 175.0 cm  YOB: 1985          Weight:           271.2 lb 123.00 kg  Patient Age:    34 years           BSA:              2.35 m²  Patient Gender: F           BP:               125/67 mmHg       Date of Exam:        2019 8:44:31 AM  Sonographer:         LIZET  Referring Physician: KAYCE    Procedure:   2D Echo/Doppler/Color Doppler Complete.  Indications: Heart failure, unspecified - I50.9; Dyspnea, unspecified -   R06.00  Diagnosis:   Nonrheumatic mitral (valve) insufficiency - I34.0;   Nonrheumatic               aortic (valve) insufficiency - I35.1         2D AND M-MODE MEASUREMENTS (normal ranges within parentheses):  Left Ventricle:               Normal   Aorta/Left Atrium:            Normal  IVSd (2D):              0.83 cm (0.7-1.1) Aortic Root (2D):  2.23 cm   (2.4-3.7)  LVPWd (2D):             1.27 cm (0.7-1.1) Left Atrium (2D):  4.30 cm   (1.9-4.0)  LVIDd (2D):             2.28 cm (3.4-5.7) Right Ventricle:  LVIDs (2D):             1.54 cm           TAPSE:           0.75 cm  LV FS (2D):             32.6 %   (>25%)  Relative Wall Thickness  1.11    (<0.42)    LV DIASTOLIC FUNCTION:  MV Peak E: 0.48 m/s Decel Time: 269 msec  MV Peak A: 0.58 m/s  E/A Ratio: 0.83    SPECTRAL DOPPLER ANALYSIS (where applicable):  Mitral Valve:  MV P1/2 Time: 78.11 msec  MV Area, PHT: 2.82 cm²    Aortic Valve: AoV Max Juan J: 1.04 m/s AoV Peak P.3 mmHg AoV Mean P.7 mmHg    LVOT Vmax: 0.85 m/s LVOT VTI: 0.116 m LVOT Diameter: 2.04 cm    AoV Area, Vmax: 2.67 cm² AoV Area, VTI: 2.79 cm² AoV Area, Vmn: 2.03 cm²    Tricuspid Valve and PA/RV Systolic Pressure: TR Max Velocity: 4.09 m/s RA   Pressure: 20 mmHg RVSP/PASP: 87.1 mmHg       PHYSICIAN INTERPRETATION:  Left Ventricle: The left ventricular internal cavity size is normal. Left   ventricular wall thickness is increased. There is mild asymmetric left   ventricular hypertrophy involving the posterior wall. The LVH involves   posterior walls.  Global LV systolic function was mildly decreased. Left ventricular   ejection fraction, by visual estimation, is 45 to 50%. The   interventricular septum is flattened in systole, consistent with right   ventricular pressure overload.Spectral Doppler shows impaired relaxation   pattern of left ventricular myocardial filling (Grade I diastolic   dysfunction). Elevated mean left atrial pressure. Findings are consistent   with global cardiomyopathy.  Right Ventricle: The right ventricular size is severely enlarged. RV wall   thickness is normal. RV systolic function is severely reduced. Findings   are consistent with pulmonary hypertension. RV ejection fraction 34%.  Left Atrium: Mildly enlarged left atrium.  Right Atrium: The right atrial pressure is moderately elevated.   Moderately enlarged right atrium.  Pericardium: A small pericardial effusion is present. The pericardial   effusion is posterior to the left ventricle.  Mitral Valve: The mitral valve is degenerative in appearance. Mitral   leaflet mobility is normal. There is mild mitral annular calcification.   Mild mitral valve regurgitation is seen.  Tricuspid Valve: Structurally normal tricuspid valve, with normal leaflet   excursion. The tricuspid valve is normal in structure. Moderate tricuspid   regurgitation is visualized. Estimated pulmonary artery systolic pressure   is 87.1 mmHg assuming a right atrial pressure of 20 mmHg, which is   consistent with severe pulmonary hypertension.  Aortic Valve: The aortic valve is normal. Peak transaortic gradient   equals 4.3 mmHg, mean transaortic gradient equals 2.7 mmHg, the   calculated aortic valve area equals 2.79 cm² by the continuity equation   consistent with normally opening aortic valve. Mild aortic valve   regurgitation is seen.  Pulmonic Valve: Structurally normal pulmonic valve, with normal leaflet   excursion. Mild to moderate pulmonic valve regurgitation.  Aorta: The aortic root is normal in size and structure.  Pulmonary Artery: The main pulmonary artery is normal in size. Pulmonary   hypertension is present.       Summary:   1. Left ventricular ejection fraction, by visual estimation, is 45 to   50%.   2. Mildly decreased global left ventricular systolic function.   3. Elevated mean left atrial pressure.   4. Global cardiomyopathy.   5. Increased LV wall thickness.   6. Normal left ventricular internal cavity size.   7. Right ventricular pressure overload.   8. Spectral Doppler shows impaired relaxation pattern of left   ventricular myocardial filling (Grade I diastolic dysfunction).   9. There is mild asymmetric left ventricular hypertrophy.  10. Pulmonary hypertension.  11. Severely enlarged right ventricle.  12. Severely reduced RV systolic function.  13. RV ejection fraction 34%.  14. Small pericardial effusion.  15. Degenerative mitral valve.  16. Mild mitral annular calcification.  17. Mild mitral valve regurgitation.  18. Moderate tricuspid regurgitation.  19. Structurally normal tricuspid valve, with normal leaflet excursion.  20. Mild aortic regurgitation.  21. Mild to moderate pulmonic valve regurgitation.  22. Structurally normal pulmonic valve, with normal leaflet excursion.  23. Estimated pulmonary artery systolic pressure is 87.1 mmHg assuming a   right atrial pressure of 20 mmHg, which is consistent with severe   pulmonary hypertension.  24. Pulmonary hypertension is present.  25. The main pulmonary artery is normal in size.  26. The right atrial pressure is moderately elevated.    [id number] Dana Montano MDMD  Electronically signed on 2019 at 2:01:34 PM         *** Final ***                    DANA MADDOX M.D.; Attending Cardiologist  This document has been electronically signed. 2019  8:44AM                < end of copied text >    < from: Xray Chest 1 View- PORTABLE-Urgent (19 @ 09:14) >    EXAM:  XR CHEST PORTABLE URGENT 1V                            PROCEDURE DATE:  2019          INTERPRETATION:  PROCEDURE: AP view of the chest.    CLINICAL INFORMATION: Chest tightness.    COMPARISON: 9/3/2019. Chest CT dated 2018.    FINDINGS:    Lungs: There are no lung consolidations.  Heart: There is cardiomegaly.  Mediastinum: Stable right hilar prominence.    IMPRESSION:    No lung consolidations.                SERGIO MELO M.D., ATTENDING RADIOLOGIST  This document has been electronically signed. Sep  9 2019  9:27AM                < end of copied text >          ECG:

## 2019-09-09 NOTE — PROGRESS NOTE ADULT - PROBLEM SELECTOR PROBLEM 4
Reactive airway disease without complication, unspecified asthma severity, unspecified whether persistent Hypokalemia

## 2019-09-09 NOTE — PROGRESS NOTE ADULT - SUBJECTIVE AND OBJECTIVE BOX
Interval Events:  Patient seen and examined at bedside. Episode of chest pain earlier today - now resolved. Seen by cardiology  No UO recorded since yesterday    REVIEW OF SYSTEMS:  Constitutional: no fevers  CV:  chest pain, resolved  Resp:  improved  GI: no abd pain    [x ] All other systems negative  [ ] Unable to assess ROS because ________    OBJECTIVE:  T(C): 36.7 (09-09-19 @ 12:04), Max: 36.7 (09-09-19 @ 12:04)  HR: 86 (09-09-19 @ 16:48) (70 - 87)  BP: 88/50 (09-09-19 @ 16:48) (88/50 - 116/70)  RR: 21 (09-09-19 @ 16:48) (16 - 21)  SpO2: 86% (09-09-19 @ 16:48) (86% - 99%)        PHYSICAL EXAM:  General: Well appearing Female. No apparent distress  HEENT:   Mucous membranes are moist.  Neck: Supple. +JVD  Respiratory:   Cardiovascular: RRR, +S4 gallop  Abdomen: Soft, non-tender, non-distended.   Extremities: +2 bilateral REUBEN to thigh  Skin: Warm, dry, no rash.   Neurological: CNII-XII grossly intact.   Psychiatry: appropriate mood and affect.     HOSPITAL MEDICATIONS:  MEDICATIONS  (STANDING):  apixaban 10 milliGRAM(s) Oral two times a day  aspirin enteric coated 81 milliGRAM(s) Oral daily  buDESOnide 160 MICROgram(s)/formoterol 4.5 MICROgram(s) Inhaler 2 Puff(s) Inhalation two times a day  buMETAnide Injectable 2 milliGRAM(s) IV Push every 8 hours  dextrose 5%. 1000 milliLiter(s) (50 mL/Hr) IV Continuous <Continuous>  dextrose 50% Injectable 12.5 Gram(s) IV Push once  dextrose 50% Injectable 25 Gram(s) IV Push once  dextrose 50% Injectable 25 Gram(s) IV Push once  insulin lispro (HumaLOG) corrective regimen sliding scale   SubCutaneous three times a day before meals  insulin lispro (HumaLOG) corrective regimen sliding scale   SubCutaneous at bedtime  levothyroxine 37.5 MICROGram(s) Oral daily  metolazone 5 milliGRAM(s) Oral daily  midodrine 5 milliGRAM(s) Oral every 8 hours  nystatin Powder 1 Application(s) Topical two times a day  potassium chloride    Tablet ER 20 milliEquivalent(s) Oral daily  riociguat 1.5 milliGRAM(s) Oral three times a day  sertraline 50 milliGRAM(s) Oral daily  simvastatin 40 milliGRAM(s) Oral at bedtime    MEDICATIONS  (PRN):  acetaminophen   Tablet .. 650 milliGRAM(s) Oral every 6 hours PRN Mild Pain (1 - 3)  dextrose 40% Gel 15 Gram(s) Oral once PRN Blood Glucose LESS THAN 70 milliGRAM(s)/deciliter  glucagon  Injectable 1 milliGRAM(s) IntraMuscular once PRN Glucose LESS THAN 70 milligrams/deciliter  guaiFENesin    Syrup 100 milliGRAM(s) Oral every 6 hours PRN Cough      LABS:                        11.2   3.71  )-----------( 183      ( 09 Sep 2019 09:48 )             35.5     09-09    131<L>  |  92<L>  |  13  ----------------------------<  77  3.7   |  29  |  0.90    Ca    9.0      09 Sep 2019 09:48  Phos  2.4     09-09  Mg     2.4     09-09      PT/INR - ( 09 Sep 2019 09:48 )   PT: 42.7 sec;   INR: 3.66 ratio        RADIOLOGY:  [ x ] Reviewed and interpreted by me    CXR: volume overload    < from: US Duplex Venous Lower Ext Complete, Bilateral (09.05.19 @ 09:19) >  Impression: Positive for extensive acute nonocclusive thrombus in the   left external iliac vein and the left common femoral vein  and left   greater saphenous vein.    < end of copied text >      ECHOCARDIOGRAPHY:  < from: TTE Echo Doppler w/o Cont (07.26.19 @ 08:44) >  Summary:   1. Left ventricular ejection fraction, by visual estimation, is 45 to   50%.   2. Mildly decreased global left ventricular systolic function.   3. Elevated mean left atrial pressure.   4. Global cardiomyopathy.   5. Increased LV wall thickness.   6. Normal left ventricular internal cavity size.   7. Right ventricular pressure overload.   8. Spectral Doppler shows impaired relaxation pattern of left   ventricular myocardial filling (Grade I diastolic dysfunction).   9. There is mild asymmetric left ventricular hypertrophy.  10. Pulmonary hypertension.  11. Severely enlarged right ventricle.  12. Severely reduced RV systolic function.  13. RV ejection fraction 34%.  14. Small pericardial effusion.  15. Degenerative mitral valve.  16. Mild mitral annular calcification.  17. Mild mitral valve regurgitation.  18. Moderate tricuspid regurgitation.  19. Structurally normal tricuspid valve, with normal leaflet excursion.  20. Mild aortic regurgitation.  21. Mild to moderate pulmonic valve regurgitation.  22. Structurally normal pulmonic valve, with normal leaflet excursion.  23. Estimated pulmonary artery systolic pressure is 87.1 mmHg assuming a   right atrial pressure of 20 mmHg, which is consistent with severe   pulmonary hypertension.  24. Pulmonary hypertension is present.  25. The main pulmonary artery is normal in size.  26. The right atrial pressure is moderately elevated.    [id number] Chago BLANCO  Electronically signed on 7/26/2019 at 2:01:34 PM    < end of copied text >

## 2019-09-09 NOTE — PROGRESS NOTE ADULT - SUBJECTIVE AND OBJECTIVE BOX
downgrade form ICU      Patient is a 34y old  Female who presents with a chief complaint of Dyspnea (07 Sep 2019 17:58)      OVERNIGHT EVENTS:        MEDICATIONS  (STANDING):  apixaban 10 milliGRAM(s) Oral two times a day  aspirin enteric coated 81 milliGRAM(s) Oral daily  buDESOnide 160 MICROgram(s)/formoterol 4.5 MICROgram(s) Inhaler 2 Puff(s) Inhalation two times a day  buMETAnide Injectable 2 milliGRAM(s) IV Push every 8 hours  dextrose 5%. 1000 milliLiter(s) (50 mL/Hr) IV Continuous <Continuous>  dextrose 50% Injectable 12.5 Gram(s) IV Push once  dextrose 50% Injectable 25 Gram(s) IV Push once  dextrose 50% Injectable 25 Gram(s) IV Push once  insulin lispro (HumaLOG) corrective regimen sliding scale   SubCutaneous three times a day before meals  insulin lispro (HumaLOG) corrective regimen sliding scale   SubCutaneous at bedtime  levothyroxine 37.5 MICROGram(s) Oral daily  midodrine 10 milliGRAM(s) Oral every 8 hours  nystatin Powder 1 Application(s) Topical two times a day  potassium chloride    Tablet ER 20 milliEquivalent(s) Oral daily  riociguat 1.5 milliGRAM(s) Oral three times a day  sertraline 50 milliGRAM(s) Oral daily  simvastatin 40 milliGRAM(s) Oral at bedtime    MEDICATIONS  (PRN):  acetaminophen   Tablet .. 650 milliGRAM(s) Oral every 6 hours PRN Mild Pain (1 - 3)  dextrose 40% Gel 15 Gram(s) Oral once PRN Blood Glucose LESS THAN 70 milliGRAM(s)/deciliter  glucagon  Injectable 1 milliGRAM(s) IntraMuscular once PRN Glucose LESS THAN 70 milligrams/deciliter  guaiFENesin    Syrup 100 milliGRAM(s) Oral every 6 hours PRN Cough    Allergies    No Known Allergies    Intolerances        SUBJECTIVE: in bed in NAD, no acute events overnight     Vital Signs Last 24 Hrs  T(C): 36.7 (09 Sep 2019 12:04), Max: 36.7 (09 Sep 2019 12:04)  T(F): 98.1 (09 Sep 2019 12:04), Max: 98.1 (09 Sep 2019 12:04)  HR: 84 (09 Sep 2019 12:04) (70 - 87)  BP: 116/70 (09 Sep 2019 12:04) (96/51 - 116/70)  BP(mean): --  RR: 18 (09 Sep 2019 12:04) (16 - 19)  SpO2: 88% (09 Sep 2019 12:04) (86% - 99%)    PHYSICAL EXAM:  GENERAL: NAD, well-groomed, well-developed  HEAD:  Atraumatic, Normocephalic  EYES: EOMI, PERRLA, conjunctiva and sclera clear  ENMT: No tonsillar erythema, exudates, or enlargement; Moist mucous membranes, Good dentition, No lesions  NECK: Supple, No JVD, Normal thyroid  CHEST/LUNG: mild decreased bs at bases right > left   HEART: Regular rate and rhythm; No murmurs, rubs, or gallops  ABDOMEN: Soft, Nontender, Nondistended; Bowel sounds present  EXTREMITIES:  2+ Peripheral Pulses, No clubbing, cyanosis, edema up to thighs b/l     SKIN: No rashes or lesions  NERVOUS SYSTEM:  Alert & Oriented X3, Good concentration; Motor Strength 5/5 B/L upper and lower extremities;   DTRs 2+ intact and symmetric, sensation intact BL    LABS:                        11.2   3.71  )-----------( 183      ( 09 Sep 2019 09:48 )             35.5          Cultures;   CAPILLARY BLOOD GLUCOSE      POCT Blood Glucose.: 120 mg/dL (08 Sep 2019 11:46)  POCT Blood Glucose.: 92 mg/dL (08 Sep 2019 08:15)  POCT Blood Glucose.: 108 mg/dL (07 Sep 2019 22:20)  POCT Blood Glucose.: 117 mg/dL (07 Sep 2019 16:55)    Lipid panel:           RADIOLOGY & ADDITIONAL TESTS:      Imaging Personally Reviewed:  [ x] YES      Consultant(s) Notes Reviewed:  [x ] YES     Care Discussed with [x ] Consultants [X ] Patient [x ] Family  [x ]    [x ]  Other; RN downgrade form ICU      Patient is a 34y old  Female who presents with a chief complaint of Dyspnea (07 Sep 2019 17:58)      OVERNIGHT EVENTS:  c/o chets pain thia am after walking from bathroom subsided with rest ekg unchanged from admission       MEDICATIONS  (STANDING):  apixaban 10 milliGRAM(s) Oral two times a day  aspirin enteric coated 81 milliGRAM(s) Oral daily  buDESOnide 160 MICROgram(s)/formoterol 4.5 MICROgram(s) Inhaler 2 Puff(s) Inhalation two times a day  buMETAnide Injectable 2 milliGRAM(s) IV Push every 8 hours  dextrose 5%. 1000 milliLiter(s) (50 mL/Hr) IV Continuous <Continuous>  dextrose 50% Injectable 12.5 Gram(s) IV Push once  dextrose 50% Injectable 25 Gram(s) IV Push once  dextrose 50% Injectable 25 Gram(s) IV Push once  insulin lispro (HumaLOG) corrective regimen sliding scale   SubCutaneous three times a day before meals  insulin lispro (HumaLOG) corrective regimen sliding scale   SubCutaneous at bedtime  levothyroxine 37.5 MICROGram(s) Oral daily  midodrine 10 milliGRAM(s) Oral every 8 hours  nystatin Powder 1 Application(s) Topical two times a day  potassium chloride    Tablet ER 20 milliEquivalent(s) Oral daily  riociguat 1.5 milliGRAM(s) Oral three times a day  sertraline 50 milliGRAM(s) Oral daily  simvastatin 40 milliGRAM(s) Oral at bedtime    MEDICATIONS  (PRN):  acetaminophen   Tablet .. 650 milliGRAM(s) Oral every 6 hours PRN Mild Pain (1 - 3)  dextrose 40% Gel 15 Gram(s) Oral once PRN Blood Glucose LESS THAN 70 milliGRAM(s)/deciliter  glucagon  Injectable 1 milliGRAM(s) IntraMuscular once PRN Glucose LESS THAN 70 milligrams/deciliter  guaiFENesin    Syrup 100 milliGRAM(s) Oral every 6 hours PRN Cough    Allergies    No Known Allergies    Intolerances        SUBJECTIVE: in bed in NAD, no acute events overnight     Vital Signs Last 24 Hrs  T(C): 36.7 (09 Sep 2019 12:04), Max: 36.7 (09 Sep 2019 12:04)  T(F): 98.1 (09 Sep 2019 12:04), Max: 98.1 (09 Sep 2019 12:04)  HR: 84 (09 Sep 2019 12:04) (70 - 87)  BP: 116/70 (09 Sep 2019 12:04) (96/51 - 116/70)  BP(mean): --  RR: 18 (09 Sep 2019 12:04) (16 - 19)  SpO2: 88% (09 Sep 2019 12:04) (86% - 99%)    PHYSICAL EXAM:  GENERAL: NAD, well-groomed, well-developed  HEAD:  Atraumatic, Normocephalic  EYES: EOMI, PERRLA, conjunctiva and sclera clear  ENMT: No tonsillar erythema, exudates, or enlargement; Moist mucous membranes, Good dentition, No lesions  NECK: Supple, No JVD, Normal thyroid  CHEST/LUNG: mild decreased bs at bases right > left   HEART: Regular rate and rhythm; No murmurs, rubs, or gallops  ABDOMEN: Soft, Nontender, Nondistended; Bowel sounds present  EXTREMITIES:  2+ Peripheral Pulses, No clubbing, cyanosis, edema up to thighs b/l     SKIN: No rashes or lesions  NERVOUS SYSTEM:  Alert & Oriented X3, Good concentration; Motor Strength 5/5 B/L upper and lower extremities;   DTRs 2+ intact and symmetric, sensation intact BL    LABS:                                       11.2   3.71  )-----------( 183      ( 09 Sep 2019 09:48 )             35.5   09-09    131<L>  |  92<L>  |  13  ----------------------------<  77  3.7   |  29  |  0.90    Ca    9.0      09 Sep 2019 09:48  Phos  2.4     09-09  Mg     2.4     09-09      Cultures;     CAPILLARY BLOOD GLUCOSE      POCT Blood Glucose.: 122 mg/dL (09 Sep 2019 11:30)  POCT Blood Glucose.: 91 mg/dL (09 Sep 2019 08:13)  POCT Blood Glucose.: 93 mg/dL (08 Sep 2019 21:41)  POCT Blood Glucose.: 99 mg/dL (08 Sep 2019 16:55)    Lipid panel:           RADIOLOGY & ADDITIONAL TESTS:      Imaging Personally Reviewed:  [ x] YES      Consultant(s) Notes Reviewed:  [x ] YES     Care Discussed with [x ] Consultants [X ] Patient [x ] Family  [x ]    [x ]  Other; RN

## 2019-09-09 NOTE — PROGRESS NOTE ADULT - ASSESSMENT
35 yo female with PMH: severe Pulm HTN with right sided heart failure admitted with c/o SOB.  Pt to be transferred to ICU for closer monitoring and aggressive diuresis which may require dobutamine and/or pressors.  Pt with prior admission for similar presentation in ICU.

## 2019-09-09 NOTE — PROGRESS NOTE ADULT - ASSESSMENT
33 yo woman with severe pHTN (group I/II, mPAP 67; W 9; RVR 9 on RHC 8/2018,  on Adempas), mildly positive anti-cardiolipin Ab and RAD admitted with decompensated right heart failure, volume overload and DIANN. Found to have acute LLE DVT.     #pHTN/decompensated right heart failure  -- c/w aggressive diuresis. Doing well for now on IV Bumex- dry wt is around 105 kg  -- would add metolazone to improve diuresis  -- Please document Strict is/os and daily weights.   -- c/w Adempas  -- c/w Midodrine for BP support  -- supplemental O2 to keep sats >90%. would avoid NIV given RHF. Can use high flow if needed    #LLE DVT in the setting of sub-therapeutic INR. Patient reports that her INR was not being monitored  -- agree with transitioning to NOAC given ease of use and hopefully better compliance  -- c/w Apixaban 10 mg bid for 7 days, then transition to 5 mg bid    #DIANN - in the setting of congestion and volume overload, resolved with aggressive diuresis  --continue to monitor    #Hypothyroid - TSH on Aug 22 was 7. Synthroid increased to 37.5mcg      #Reactive airway disease - PFTs in the past with reversible obstructive defect  -- c/w Symbicort and Albuterol prn  -- can d/c standing Duonebs  -- will need repeat PFTs as outpatient after discharge    Patient follows with Dr Unger at [address] [place] [address]. She should follow with him after discharge.     Thank you for this consult. I will follow with you. Please call with any questions. Cell phone # [phone]    Shirley Mejia MD    [facility]//Pulmonary Medicine  [facility] [address]; [place] [address]  tel: [phone]; fax: [phone] 35 yo woman with severe pHTN (group I/II, mPAP 67; W 9; RVR 9 on RHC 8/2018,  on Adempas), mildly positive anti-cardiolipin Ab and RAD admitted with decompensated right heart failure, volume overload and DIANN. Found to have acute LLE DVT.     #pHTN/decompensated right heart failure  -- c/w aggressive diuresis. Doing well for now on IV Bumex- dry wt is around 105 kg  -- agree with metolazone to improve diuresis  -- Please document Strict is/os and daily weights.   -- c/w Adempas  -- c/w Midodrine for BP support  -- supplemental O2 to keep sats >90%. would avoid NIV given RHF. Can use high flow if needed    #LLE DVT in the setting of sub-therapeutic INR. Patient reports that her INR was not being monitored  -- agree with transitioning to NOAC given ease of use and hopefully better compliance  -- c/w Apixaban 10 mg bid for 7 days, then transition to 5 mg bid    #DIANN - in the setting of congestion and volume overload, resolved with aggressive diuresis  --continue to monitor    #Hypothyroid - TSH on Aug 22 was 7. Synthroid increased to 37.5mcg      #Reactive airway disease - PFTs in the past with reversible obstructive defect  -- c/w Symbicort and Albuterol prn  -- can d/c standing Duonebs  -- will need repeat PFTs as outpatient after discharge    Patient follows with Dr Unger at [address] [place] [address]. She should follow with him after discharge.     Thank you for this consult. I will follow with you. Please call with any questions. Cell phone # [phone]    Shirley Mejia MD    [facility]//Pulmonary Medicine  [facility]; [place] [address]  tel: [phone]; fax: [phone]

## 2019-09-09 NOTE — PROGRESS NOTE ADULT - PROBLEM SELECTOR PLAN 6
continue with synthroid on AC   Eliquis 10 mg bid staretd on 9/6/19 after 10 days change to 5 mg bid on AC   Eliquis 10 mg bid started on 9/6/19 after 10 days change to 5 mg bid

## 2019-09-09 NOTE — PROGRESS NOTE ADULT - PROBLEM SELECTOR PLAN 5
on AC   Eliquis 10 mg bid staretd on 9/6/19 after 10 days change to 5 mg bid appreciate pulmonary note continue with bronchodilators

## 2019-09-10 DIAGNOSIS — F43.21 ADJUSTMENT DISORDER WITH DEPRESSED MOOD: ICD-10-CM

## 2019-09-10 LAB
GLUCOSE BLDC GLUCOMTR-MCNC: 102 MG/DL — HIGH (ref 70–99)
GLUCOSE BLDC GLUCOMTR-MCNC: 118 MG/DL — HIGH (ref 70–99)
GLUCOSE BLDC GLUCOMTR-MCNC: 140 MG/DL — HIGH (ref 70–99)
GLUCOSE BLDC GLUCOMTR-MCNC: 95 MG/DL — SIGNIFICANT CHANGE UP (ref 70–99)
TROPONIN I SERPL-MCNC: 0.07 NG/ML — HIGH (ref 0.01–0.04)

## 2019-09-10 PROCEDURE — 99232 SBSQ HOSP IP/OBS MODERATE 35: CPT

## 2019-09-10 PROCEDURE — 99233 SBSQ HOSP IP/OBS HIGH 50: CPT

## 2019-09-10 PROCEDURE — 90792 PSYCH DIAG EVAL W/MED SRVCS: CPT

## 2019-09-10 RX ORDER — MORPHINE SULFATE 50 MG/1
2 CAPSULE, EXTENDED RELEASE ORAL EVERY 6 HOURS
Refills: 0 | Status: DISCONTINUED | OUTPATIENT
Start: 2019-09-10 | End: 2019-09-10

## 2019-09-10 RX ORDER — DIPHENHYDRAMINE HCL 50 MG
25 CAPSULE ORAL ONCE
Refills: 0 | Status: COMPLETED | OUTPATIENT
Start: 2019-09-10 | End: 2019-09-10

## 2019-09-10 RX ORDER — TRAMADOL HYDROCHLORIDE 50 MG/1
50 TABLET ORAL EVERY 8 HOURS
Refills: 0 | Status: DISCONTINUED | OUTPATIENT
Start: 2019-09-10 | End: 2019-09-14

## 2019-09-10 RX ADMIN — RIOCIGUAT 1.5 MILLIGRAM(S): 1.5 TABLET, FILM COATED ORAL at 22:37

## 2019-09-10 RX ADMIN — NYSTATIN CREAM 1 APPLICATION(S): 100000 CREAM TOPICAL at 17:25

## 2019-09-10 RX ADMIN — BUMETANIDE 2 MILLIGRAM(S): 0.25 INJECTION INTRAMUSCULAR; INTRAVENOUS at 13:20

## 2019-09-10 RX ADMIN — RIOCIGUAT 1.5 MILLIGRAM(S): 1.5 TABLET, FILM COATED ORAL at 13:20

## 2019-09-10 RX ADMIN — BUDESONIDE AND FORMOTEROL FUMARATE DIHYDRATE 2 PUFF(S): 160; 4.5 AEROSOL RESPIRATORY (INHALATION) at 17:25

## 2019-09-10 RX ADMIN — MORPHINE SULFATE 2 MILLIGRAM(S): 50 CAPSULE, EXTENDED RELEASE ORAL at 16:00

## 2019-09-10 RX ADMIN — Medication 25 MILLIGRAM(S): at 22:53

## 2019-09-10 RX ADMIN — Medication 81 MILLIGRAM(S): at 12:30

## 2019-09-10 RX ADMIN — APIXABAN 10 MILLIGRAM(S): 2.5 TABLET, FILM COATED ORAL at 17:25

## 2019-09-10 RX ADMIN — MIDODRINE HYDROCHLORIDE 5 MILLIGRAM(S): 2.5 TABLET ORAL at 09:16

## 2019-09-10 RX ADMIN — Medication 20 MILLIEQUIVALENT(S): at 12:30

## 2019-09-10 RX ADMIN — MORPHINE SULFATE 2 MILLIGRAM(S): 50 CAPSULE, EXTENDED RELEASE ORAL at 17:06

## 2019-09-10 RX ADMIN — SERTRALINE 50 MILLIGRAM(S): 25 TABLET, FILM COATED ORAL at 12:30

## 2019-09-10 RX ADMIN — BUMETANIDE 2 MILLIGRAM(S): 0.25 INJECTION INTRAMUSCULAR; INTRAVENOUS at 21:33

## 2019-09-10 RX ADMIN — Medication 37.5 MICROGRAM(S): at 09:15

## 2019-09-10 RX ADMIN — APIXABAN 10 MILLIGRAM(S): 2.5 TABLET, FILM COATED ORAL at 09:16

## 2019-09-10 RX ADMIN — SIMVASTATIN 40 MILLIGRAM(S): 20 TABLET, FILM COATED ORAL at 21:33

## 2019-09-10 RX ADMIN — RIOCIGUAT 1.5 MILLIGRAM(S): 1.5 TABLET, FILM COATED ORAL at 09:15

## 2019-09-10 RX ADMIN — NYSTATIN CREAM 1 APPLICATION(S): 100000 CREAM TOPICAL at 09:20

## 2019-09-10 RX ADMIN — BUDESONIDE AND FORMOTEROL FUMARATE DIHYDRATE 2 PUFF(S): 160; 4.5 AEROSOL RESPIRATORY (INHALATION) at 09:16

## 2019-09-10 RX ADMIN — BUMETANIDE 2 MILLIGRAM(S): 0.25 INJECTION INTRAMUSCULAR; INTRAVENOUS at 09:16

## 2019-09-10 NOTE — BEHAVIORAL HEALTH ASSESSMENT NOTE - RISK ASSESSMENT
Chronic risk factors: single, chronic serious medical illness impacting life and functioning. Protective factors: young; female gender; no history of psych hospitalizations, no suicide attempts; no self-injurious behavior; no hx of aggression/violence; no substance / drug use; stable housing; no legal issues; motivated for help; articulate; strong family support; access to health services.

## 2019-09-10 NOTE — BEHAVIORAL HEALTH ASSESSMENT NOTE - AXIS III
extensive acute nonocclusive thrombus in the left external iliac vein and the left common femoral vein  and left greater saphenous vein; asthma, severe pHTN (previously on Adempas), mildly positive anti-cardiolipin Ab(but does not meet criteria for APLS per heme note); CHF on Bumex; 8/18- hospitalized at [facility] with decompensated right heart failure requiring Milrinone assisted diuresis; obesity; hx of Hyperbilirubinemia; Vitamin D deficiency

## 2019-09-10 NOTE — PROGRESS NOTE ADULT - SUBJECTIVE AND OBJECTIVE BOX
downgrade form ICU      Patient is a 34y old  Female who presents with a chief complaint of Dyspnea (07 Sep 2019 17:58)      OVERNIGHT EVENTS:  none   c/o abd cramping     MEDICATIONS  (STANDING):  apixaban 10 milliGRAM(s) Oral two times a day  aspirin enteric coated 81 milliGRAM(s) Oral daily  buDESOnide 160 MICROgram(s)/formoterol 4.5 MICROgram(s) Inhaler 2 Puff(s) Inhalation two times a day  buMETAnide Injectable 2 milliGRAM(s) IV Push every 8 hours  dextrose 5%. 1000 milliLiter(s) (50 mL/Hr) IV Continuous <Continuous>  dextrose 50% Injectable 12.5 Gram(s) IV Push once  dextrose 50% Injectable 25 Gram(s) IV Push once  dextrose 50% Injectable 25 Gram(s) IV Push once  insulin lispro (HumaLOG) corrective regimen sliding scale   SubCutaneous three times a day before meals  insulin lispro (HumaLOG) corrective regimen sliding scale   SubCutaneous at bedtime  levothyroxine 37.5 MICROGram(s) Oral daily  metolazone 5 milliGRAM(s) Oral daily  metolazone 10 milliGRAM(s) Oral daily  nystatin Powder 1 Application(s) Topical two times a day  potassium chloride    Tablet ER 20 milliEquivalent(s) Oral daily  riociguat 1.5 milliGRAM(s) Oral three times a day  sertraline 50 milliGRAM(s) Oral daily  simvastatin 40 milliGRAM(s) Oral at bedtime    MEDICATIONS  (PRN):  acetaminophen   Tablet .. 650 milliGRAM(s) Oral every 6 hours PRN Mild Pain (1 - 3)  dextrose 40% Gel 15 Gram(s) Oral once PRN Blood Glucose LESS THAN 70 milliGRAM(s)/deciliter  glucagon  Injectable 1 milliGRAM(s) IntraMuscular once PRN Glucose LESS THAN 70 milligrams/deciliter  guaiFENesin    Syrup 100 milliGRAM(s) Oral every 6 hours PRN Cough  traMADol 50 milliGRAM(s) Oral every 8 hours PRN Moderate Pain (4 - 6)    Allergies    No Known Allergies    Intolerances        SUBJECTIVE: in bed in NAD, no acute events overnight     Vital Signs Last 24 Hrs  T(C): 36.3 (10 Sep 2019 11:17), Max: 37.5 (09 Sep 2019 18:00)  T(F): 97.4 (10 Sep 2019 11:17), Max: 99.5 (09 Sep 2019 18:00)  HR: 77 (10 Sep 2019 12:44) (73 - 92)  BP: 102/57 (10 Sep 2019 11:17) (88/50 - 115/68)  BP(mean): --  RR: 16 (10 Sep 2019 11:17) (16 - 21)  SpO2: 92% (10 Sep 2019 12:44) (86% - 96%)  PHYSICAL EXAM:  GENERAL: NAD, well-groomed, well-developed  HEAD:  Atraumatic, Normocephalic  EYES: EOMI, PERRLA, conjunctiva and sclera clear  ENMT: No tonsillar erythema, exudates, or enlargement; Moist mucous membranes, Good dentition, No lesions  NECK: Supple, No JVD, Normal thyroid  CHEST/LUNG: mild decreased bs at bases right > left   HEART: Regular rate and rhythm; No murmurs, rubs, or gallops  ABDOMEN: Soft, Nontender, Nondistended; Bowel sounds present  EXTREMITIES:  2+ Peripheral Pulses, No clubbing, cyanosis, edema up to thighs b/l     SKIN: No rashes or lesions  NERVOUS SYSTEM:  Alert & Oriented X3, Good concentration; Motor Strength 5/5 B/L upper and lower extremities;   DTRs 2+ intact and symmetric, sensation intact BL    LABS:                                             11.2   3.71  )-----------( 183      ( 09 Sep 2019 09:48 )             35.5   09-09    131<L>  |  92<L>  |  13  ----------------------------<  77  3.7   |  29  |  0.90    Ca    9.0      09 Sep 2019 09:48  Phos  2.4     09-09  Mg     2.4     09-09        Cultures;       CAPILLARY BLOOD GLUCOSE      POCT Blood Glucose.: 118 mg/dL (10 Sep 2019 11:32)  POCT Blood Glucose.: 95 mg/dL (10 Sep 2019 08:26)  POCT Blood Glucose.: 111 mg/dL (09 Sep 2019 21:43)  POCT Blood Glucose.: 134 mg/dL (09 Sep 2019 16:03)    Lipid panel:           RADIOLOGY & ADDITIONAL TESTS:      Imaging Personally Reviewed:  [ x] YES      Consultant(s) Notes Reviewed:  [x ] YES     Care Discussed with [x ] Consultants [X ] Patient [x ] Family  [x ]    [x ]  Other; RN

## 2019-09-10 NOTE — BEHAVIORAL HEALTH ASSESSMENT NOTE - NSBHCHARTREVIEWIMAGING_PSY_A_CORE FT
LE Doppler: Positive for extensive acute nonocclusive thrombus in the left external iliac vein and the left common femoral vein  and left   greater saphenous vein.

## 2019-09-10 NOTE — PROGRESS NOTE ADULT - SUBJECTIVE AND OBJECTIVE BOX
Interval Events:  Patient seen and examined at bedside.   Abdominal pain and large clots from menstrual period. - no CBC today      REVIEW OF SYSTEMS:  Constitutional: no fevers  CV:  no more chest pain  Resp:  stable  GI: cramps  MSK:  back pain    [x ] All other systems negative  [ ] Unable to assess ROS because ________    OBJECTIVE:  T(C): 36.3 (09-10-19 @ 11:17), Max: 37.5 (09-09-19 @ 18:00)  HR: 77 (09-10-19 @ 12:44) (73 - 92)  BP: 102/57 (09-10-19 @ 11:17) (88/50 - 115/68)  RR: 16 (09-10-19 @ 11:17) (16 - 21)  SpO2: 92% (09-10-19 @ 12:44) (86% - 96%)      09-09-19 @ 07:01  -  09-10-19 @ 07:00  --------------------------------------------------------  IN: 250 mL / OUT: 0 mL / NET: 250 mL        PHYSICAL EXAM:  General: Well appearing Female. No apparent distress  HEENT:   Mucous membranes are moist.  Neck: Supple. +JVD  Respiratory:   Cardiovascular: RRR, +S4 gallop  Abdomen: Soft, non-tender, non-distended.   Extremities: +2 bilateral REUBEN to thigh  Skin: Warm, dry, no rash.   Neurological: CNII-XII grossly intact.   Psychiatry: appropriate mood and affect.       HOSPITAL MEDICATIONS:  MEDICATIONS  (STANDING):  apixaban 10 milliGRAM(s) Oral two times a day  aspirin enteric coated 81 milliGRAM(s) Oral daily  buDESOnide 160 MICROgram(s)/formoterol 4.5 MICROgram(s) Inhaler 2 Puff(s) Inhalation two times a day  buMETAnide Injectable 2 milliGRAM(s) IV Push every 8 hours  dextrose 5%. 1000 milliLiter(s) (50 mL/Hr) IV Continuous <Continuous>  dextrose 50% Injectable 12.5 Gram(s) IV Push once  dextrose 50% Injectable 25 Gram(s) IV Push once  dextrose 50% Injectable 25 Gram(s) IV Push once  insulin lispro (HumaLOG) corrective regimen sliding scale   SubCutaneous three times a day before meals  insulin lispro (HumaLOG) corrective regimen sliding scale   SubCutaneous at bedtime  levothyroxine 37.5 MICROGram(s) Oral daily  metolazone 5 milliGRAM(s) Oral daily  nystatin Powder 1 Application(s) Topical two times a day  potassium chloride    Tablet ER 20 milliEquivalent(s) Oral daily  riociguat 1.5 milliGRAM(s) Oral three times a day  sertraline 50 milliGRAM(s) Oral daily  simvastatin 40 milliGRAM(s) Oral at bedtime    MEDICATIONS  (PRN):  acetaminophen   Tablet .. 650 milliGRAM(s) Oral every 6 hours PRN Mild Pain (1 - 3)  dextrose 40% Gel 15 Gram(s) Oral once PRN Blood Glucose LESS THAN 70 milliGRAM(s)/deciliter  glucagon  Injectable 1 milliGRAM(s) IntraMuscular once PRN Glucose LESS THAN 70 milligrams/deciliter  guaiFENesin    Syrup 100 milliGRAM(s) Oral every 6 hours PRN Cough  traMADol 50 milliGRAM(s) Oral every 8 hours PRN Moderate Pain (4 - 6)      LABS:                        11.2   3.71  )-----------( 183      ( 09 Sep 2019 09:48 )             35.5     09-09    131<L>  |  92<L>  |  13  ----------------------------<  77  3.7   |  29  |  0.90    Ca    9.0      09 Sep 2019 09:48  Phos  2.4     09-09  Mg     2.4     09-09      PT/INR - ( 09 Sep 2019 09:48 )   PT: 42.7 sec;   INR: 3.66 ratio         RADIOLOGY:  [ x ] Reviewed and interpreted by me  CXR: volume overload    < from: US Duplex Venous Lower Ext Complete, Bilateral (09.05.19 @ 09:19) >  Impression: Positive for extensive acute nonocclusive thrombus in the   left external iliac vein and the left common femoral vein  and left   greater saphenous vein.    < end of copied text >      ECHOCARDIOGRAPHY:  < from: TTE Echo Doppler w/o Cont (07.26.19 @ 08:44) >  Summary:   1. Left ventricular ejection fraction, by visual estimation, is 45 to   50%.   2. Mildly decreased global left ventricular systolic function.   3. Elevated mean left atrial pressure.   4. Global cardiomyopathy.   5. Increased LV wall thickness.   6. Normal left ventricular internal cavity size.   7. Right ventricular pressure overload.   8. Spectral Doppler shows impaired relaxation pattern of left   ventricular myocardial filling (Grade I diastolic dysfunction).   9. There is mild asymmetric left ventricular hypertrophy.  10. Pulmonary hypertension.  11. Severely enlarged right ventricle.  12. Severely reduced RV systolic function.  13. RV ejection fraction 34%.  14. Small pericardial effusion.  15. Degenerative mitral valve.  16. Mild mitral annular calcification.  17. Mild mitral valve regurgitation.  18. Moderate tricuspid regurgitation.  19. Structurally normal tricuspid valve, with normal leaflet excursion.  20. Mild aortic regurgitation.  21. Mild to moderate pulmonic valve regurgitation.  22. Structurally normal pulmonic valve, with normal leaflet excursion.  23. Estimated pulmonary artery systolic pressure is 87.1 mmHg assuming a   right atrial pressure of 20 mmHg, which is consistent with severe   pulmonary hypertension.  24. Pulmonary hypertension is present.  25. The main pulmonary artery is normal in size.  26. The right atrial pressure is moderately elevated.    [id number] Chago BLANCO  Electronically signed on 7/26/2019 at 2:01:34 PM    < end of copied text >

## 2019-09-10 NOTE — PROGRESS NOTE ADULT - ASSESSMENT
33 yo female with end stage lung disease due to idiopathic/primary pulmonary HTN and Anticardiolipin antibodies.  Admitted with symptoms of worsening right heart failure. cardiac cath negative in the past for any CAD.   Demand troponin levels noted.  would try to more aggressively diurese her but this will ultimately be limited by her blood pressures being chronically low   Patient has lost 5 kg since readmission but is still clearly suffering from symptoms of cor pulmonale. No recent weights. Pt refusing to be weighed   She may qualify for a lung transplant, but has scheduled appointment with Dr. Unger on 9/12 to discuss care further.    Plan:  cont with IV Bumex/ Zaroxolyn   cont with antidepressant meds  cont with synthroid  cont with Eliquis for DVT  Cont with supplemental O2/ Riociguat  cont with asa/statin    Patient was told to get SKYE evaluation (never did) and follow up with DR. Bharath Adamson (never did), this should be arranged as out patient on discharge.  Pt has scheduled appointment with Dr. Unger on 9/12 @ pul HTN clinic 33 yo female with end stage lung disease due to idiopathic/primary pulmonary HTN and Anticardiolipin antibodies.  Admitted with symptoms of worsening right heart failure. cardiac cath negative in the past for any CAD in 2013.   Demand troponin levels noted.  would try to more aggressively diurese her but this will ultimately be limited by her blood pressures being chronically low   Patient has lost 5 kg since readmission but is still clearly suffering from symptoms of cor pulmonale. No recent weights. Pt refusing to be weighed   She may qualify for a lung transplant, but has scheduled appointment with Dr. Unger on 9/12 to discuss care further.  Pt states she is "very concerned about her condition, and just want to cry most of the time"  Plan:  cont with IV Bumex/ Zaroxolyn   monitor renal function and electrolytes   cont with antidepressant meds, ? psych eval  cont with synthroid  cont with Eliquis for DVT  Cont with supplemental O2/ Riociguat  cont with asa/statin    Patient was told to get SKYE evaluation in the past and follow up with DR. Bharath Adamson, this should be arranged as out patient on discharge.  Pt has scheduled appointment with Dr. Unger on 9/12 @ pul HTN clinic

## 2019-09-10 NOTE — PROGRESS NOTE ADULT - SUBJECTIVE AND OBJECTIVE BOX
Patient is a 34y old  Female who presents with a chief complaint of Dyspnea (09 Sep 2019 17:00)    PAST MEDICAL & SURGICAL HISTORY:  Former smoker, stopped smoking in distant past  Vitamin D deficiency  Edema extremities  Diabetes mellitus  Hyperbilirubinemia  COPD (chronic obstructive pulmonary disease)  Anticoagulation goal of INR 2 to 3  Anticardiolipin antibody positive  Congestive heart failure, unspecified HF chronicity, unspecified heart failure type  Moderate asthma, unspecified whether complicated, unspecified whether persistent  Other secondary hypertension  Anticardiolipin antibody positive  Essential hypertension  Pulmonary HTN  Morbid obesity    INTERVAL HISTORY: resting in bed  	  MEDICATIONS:  MEDICATIONS  (STANDING):  apixaban 10 milliGRAM(s) Oral two times a day  aspirin enteric coated 81 milliGRAM(s) Oral daily  buDESOnide 160 MICROgram(s)/formoterol 4.5 MICROgram(s) Inhaler 2 Puff(s) Inhalation two times a day  buMETAnide Injectable 2 milliGRAM(s) IV Push every 8 hours  dextrose 5%. 1000 milliLiter(s) (50 mL/Hr) IV Continuous <Continuous>  dextrose 50% Injectable 12.5 Gram(s) IV Push once  dextrose 50% Injectable 25 Gram(s) IV Push once  dextrose 50% Injectable 25 Gram(s) IV Push once  insulin lispro (HumaLOG) corrective regimen sliding scale   SubCutaneous three times a day before meals  insulin lispro (HumaLOG) corrective regimen sliding scale   SubCutaneous at bedtime  levothyroxine 37.5 MICROGram(s) Oral daily  metolazone 5 milliGRAM(s) Oral daily  nystatin Powder 1 Application(s) Topical two times a day  potassium chloride    Tablet ER 20 milliEquivalent(s) Oral daily  riociguat 1.5 milliGRAM(s) Oral three times a day  sertraline 50 milliGRAM(s) Oral daily  simvastatin 40 milliGRAM(s) Oral at bedtime    MEDICATIONS  (PRN):  acetaminophen   Tablet .. 650 milliGRAM(s) Oral every 6 hours PRN Mild Pain (1 - 3)  dextrose 40% Gel 15 Gram(s) Oral once PRN Blood Glucose LESS THAN 70 milliGRAM(s)/deciliter  glucagon  Injectable 1 milliGRAM(s) IntraMuscular once PRN Glucose LESS THAN 70 milligrams/deciliter  guaiFENesin    Syrup 100 milliGRAM(s) Oral every 6 hours PRN Cough    Vitals:  T(F): 97.4 (09-10-19 @ 11:17), Max: 99.5 (09-09-19 @ 18:00)  HR: 77 (09-10-19 @ 12:44) (73 - 92)  BP: 102/57 (09-10-19 @ 11:17) (88/50 - 115/68)  RR: 16 (09-10-19 @ 11:17) (16 - 21)  SpO2: 92% (09-10-19 @ 12:44) (86% - 96%)  Wt(kg): --    09-09 @ 07:01  -  09-10 @ 07:00  --------------------------------------------------------  IN:    Oral Fluid: 250 mL  Total IN: 250 mL    OUT:  Total OUT: 0 mL    Total NET: 250 mL    PHYSICAL EXAM:  Neuro: Awake, responsive  CV: S1 S2 RRR  Lungs: CTABL  GI: Soft, BS +, ND, NT  Extremities: No edema    RADIOLOGY: < from: Xray Chest 1 View- PORTABLE-Urgent (09.09.19 @ 09:14) >  Lungs: There are no lung consolidations.  Heart: There is cardiomegaly.  Mediastinum: Stable right hilar prominence.    IMPRESSION:    No lung consolidations.    < end of copied text >    DIAGNOSTIC TESTING:    [x ] Echocardiogram: < from: TTE Echo Doppler w/o Cont (07.26.19 @ 08:44) >   1. Left ventricular ejection fraction, by visual estimation, is 45 to   50%.   2. Mildly decreased global left ventricular systolic function.   3. Elevated mean left atrial pressure.   4. Global cardiomyopathy.   5. Increased LV wall thickness.   6. Normal left ventricular internal cavity size.   7. Right ventricular pressure overload.   8. Spectral Doppler shows impaired relaxation pattern of left   ventricular myocardial filling (Grade I diastolic dysfunction).   9. There is mild asymmetric left ventricular hypertrophy.  10. Pulmonary hypertension.  11. Severely enlarged right ventricle.  12. Severely reduced RV systolic function.  13. RV ejection fraction 34%.  14. Small pericardial effusion.  15. Degenerative mitral valve.  16. Mild mitral annular calcification.  17. Mild mitral valve regurgitation.  18. Moderate tricuspid regurgitation.  19. Structurally normal tricuspid valve, with normal leaflet excursion.  20. Mild aortic regurgitation.  21. Mild to moderate pulmonic valve regurgitation.  22. Structurally normal pulmonic valve, with normal leaflet excursion.  23. Estimated pulmonary artery systolic pressure is 87.1 mmHg assuming a   right atrial pressure of 20 mmHg, which is consistent with severe   pulmonary hypertension.  24. Pulmonary hypertension is present.  25. The main pulmonary artery is normal in size.  26. The right atrial pressure is moderately elevated.    < end of copied text >    [x ]  Catheterization: < from: Cardiac Cath Lab - Adult (08.10.18 @ 10:48) >  "Right Heart Catheterization" procedure to  the Diagnostic Report.  DIAGNOSTIC IMPRESSIONS: Severe Pulmonary Hypertension with no response with  80ppm of Nitric Oxide  INTERVENTIONAL IMPRESSIONS: Severe Pulmonary Hypertension with no response with  80ppm of Nitric Oxide    < end of copied text >    LABS:	 	    CARDIAC MARKERS:  Troponin I, Serum: .073 ng/mL (09-10 @ 01:48)  Troponin I, Serum: .066 ng/mL (09-09 @ 18:08)  Troponin I, Serum: .076 ng/mL (09-09 @ 09:48)    09 Sep 2019 09:48    131    |  92     |  13     ----------------------------<  77     3.7     |  29     |  0.90   08 Sep 2019 07:38    131    |  91     |  12     ----------------------------<  92     3.2     |  31     |  0.86     Ca    9.0        09 Sep 2019 09:48  Phos  2.4       09 Sep 2019 09:48  Mg     2.4       09 Sep 2019 09:48                       11.2   3.71  )-----------( 183      ( 09 Sep 2019 09:48 )             35.5 ,                       11.1   4.50  )-----------( 170      ( 08 Sep 2019 07:38 )             35.3   INR: 3.66 ratio (09-09 @ 09:48)  INR: 2.83 ratio (09-08 @ 07:38) Patient is a 34y old  Female who presents with a chief complaint of Dyspnea (09 Sep 2019 17:00)    PAST MEDICAL & SURGICAL HISTORY:  Former smoker, stopped smoking in distant past  Vitamin D deficiency  Edema extremities  Diabetes mellitus  Hyperbilirubinemia  COPD (chronic obstructive pulmonary disease)  Anticoagulation goal of INR 2 to 3  Anticardiolipin antibody positive  Congestive heart failure, unspecified HF chronicity, unspecified heart failure type  Moderate asthma, unspecified whether complicated, unspecified whether persistent  Other secondary hypertension  Anticardiolipin antibody positive  Essential hypertension  Pulmonary HTN  Morbid obesity    INTERVAL HISTORY: resting in bed  	  MEDICATIONS:  MEDICATIONS  (STANDING):  apixaban 10 milliGRAM(s) Oral two times a day  aspirin enteric coated 81 milliGRAM(s) Oral daily  buDESOnide 160 MICROgram(s)/formoterol 4.5 MICROgram(s) Inhaler 2 Puff(s) Inhalation two times a day  buMETAnide Injectable 2 milliGRAM(s) IV Push every 8 hours  dextrose 5%. 1000 milliLiter(s) (50 mL/Hr) IV Continuous <Continuous>  dextrose 50% Injectable 12.5 Gram(s) IV Push once  dextrose 50% Injectable 25 Gram(s) IV Push once  dextrose 50% Injectable 25 Gram(s) IV Push once  insulin lispro (HumaLOG) corrective regimen sliding scale   SubCutaneous three times a day before meals  insulin lispro (HumaLOG) corrective regimen sliding scale   SubCutaneous at bedtime  levothyroxine 37.5 MICROGram(s) Oral daily  metolazone 5 milliGRAM(s) Oral daily  nystatin Powder 1 Application(s) Topical two times a day  potassium chloride    Tablet ER 20 milliEquivalent(s) Oral daily  riociguat 1.5 milliGRAM(s) Oral three times a day  sertraline 50 milliGRAM(s) Oral daily  simvastatin 40 milliGRAM(s) Oral at bedtime    MEDICATIONS  (PRN):  acetaminophen   Tablet .. 650 milliGRAM(s) Oral every 6 hours PRN Mild Pain (1 - 3)  dextrose 40% Gel 15 Gram(s) Oral once PRN Blood Glucose LESS THAN 70 milliGRAM(s)/deciliter  glucagon  Injectable 1 milliGRAM(s) IntraMuscular once PRN Glucose LESS THAN 70 milligrams/deciliter  guaiFENesin    Syrup 100 milliGRAM(s) Oral every 6 hours PRN Cough    Vitals:  T(F): 97.4 (09-10-19 @ 11:17), Max: 99.5 (09-09-19 @ 18:00)  HR: 77 (09-10-19 @ 12:44) (73 - 92)  BP: 102/57 (09-10-19 @ 11:17) (88/50 - 115/68)  RR: 16 (09-10-19 @ 11:17) (16 - 21)  SpO2: 92% (09-10-19 @ 12:44) (86% - 96%)  Wt(kg): -- refusing to be weighed as per nursing team    09-09 @ 07:01  -  09-10 @ 07:00  --------------------------------------------------------  IN:    Oral Fluid: 250 mL  Total IN: 250 mL    OUT:  Total OUT: 0 mL    Total NET: 250 mL    PHYSICAL EXAM:  Neuro: Awake, responsive  CV: S1 S2 RRR  Lungs: CTABL  GI: Soft, BS +, ND, NT  Extremities: No edema    RADIOLOGY: < from: Xray Chest 1 View- PORTABLE-Urgent (09.09.19 @ 09:14) >  Lungs: There are no lung consolidations.  Heart: There is cardiomegaly.  Mediastinum: Stable right hilar prominence.    IMPRESSION:    No lung consolidations.    < end of copied text >  < from: US Duplex Venous Lower Ext Complete, Bilateral (09.05.19 @ 09:19) >  Positive for extensive acute nonocclusive thrombus in the   left external iliac vein and the left common femoral vein  and left   greater saphenous vein.    < end of copied text >    DIAGNOSTIC TESTING:    [x ] Echocardiogram: < from: TTE Echo Doppler w/o Cont (07.26.19 @ 08:44) >   1. Left ventricular ejection fraction, by visual estimation, is 45 to   50%.   2. Mildly decreased global left ventricular systolic function.   3. Elevated mean left atrial pressure.   4. Global cardiomyopathy.   5. Increased LV wall thickness.   6. Normal left ventricular internal cavity size.   7. Right ventricular pressure overload.   8. Spectral Doppler shows impaired relaxation pattern of left   ventricular myocardial filling (Grade I diastolic dysfunction).   9. There is mild asymmetric left ventricular hypertrophy.  10. Pulmonary hypertension.  11. Severely enlarged right ventricle.  12. Severely reduced RV systolic function.  13. RV ejection fraction 34%.  14. Small pericardial effusion.  15. Degenerative mitral valve.  16. Mild mitral annular calcification.  17. Mild mitral valve regurgitation.  18. Moderate tricuspid regurgitation.  19. Structurally normal tricuspid valve, with normal leaflet excursion.  20. Mild aortic regurgitation.  21. Mild to moderate pulmonic valve regurgitation.  22. Structurally normal pulmonic valve, with normal leaflet excursion.  23. Estimated pulmonary artery systolic pressure is 87.1 mmHg assuming a   right atrial pressure of 20 mmHg, which is consistent with severe   pulmonary hypertension.  24. Pulmonary hypertension is present.  25. The main pulmonary artery is normal in size.  26. The right atrial pressure is moderately elevated.    < end of copied text >    [x ]  Catheterization: < from: Cardiac Cath Lab - Adult (08.10.18 @ 10:48) >  "Right Heart Catheterization" procedure to  the Diagnostic Report.  DIAGNOSTIC IMPRESSIONS: Severe Pulmonary Hypertension with no response with  80ppm of Nitric Oxide  INTERVENTIONAL IMPRESSIONS: Severe Pulmonary Hypertension with no response with  80ppm of Nitric Oxide    < end of copied text >    LABS:	 	    CARDIAC MARKERS:  Troponin I, Serum: .073 ng/mL (09-10 @ 01:48)  Troponin I, Serum: .066 ng/mL (09-09 @ 18:08)  Troponin I, Serum: .076 ng/mL (09-09 @ 09:48)    09 Sep 2019 09:48    131    |  92     |  13     ----------------------------<  77     3.7     |  29     |  0.90   08 Sep 2019 07:38    131    |  91     |  12     ----------------------------<  92     3.2     |  31     |  0.86     Ca    9.0        09 Sep 2019 09:48  Phos  2.4       09 Sep 2019 09:48  Mg     2.4       09 Sep 2019 09:48                       11.2   3.71  )-----------( 183      ( 09 Sep 2019 09:48 )             35.5 ,                       11.1   4.50  )-----------( 170      ( 08 Sep 2019 07:38 )             35.3   INR: 3.66 ratio (09-09 @ 09:48)  INR: 2.83 ratio (09-08 @ 07:38) Patient is a 34y old  Female who presents with a chief complaint of Dyspnea (09 Sep 2019 17:00)    PAST MEDICAL & SURGICAL HISTORY:  Former smoker, stopped smoking in distant past  Vitamin D deficiency  Edema extremities  Diabetes mellitus  Hyperbilirubinemia  COPD (chronic obstructive pulmonary disease)  Anticoagulation goal of INR 2 to 3  Anticardiolipin antibody positive  Congestive heart failure, unspecified HF chronicity, unspecified heart failure type  Moderate asthma, unspecified whether complicated, unspecified whether persistent  Other secondary hypertension  Anticardiolipin antibody positive  Essential hypertension  Pulmonary HTN  Morbid obesity    INTERVAL HISTORY: resting in bed, not in any acute distress, states I just want to cry most of the time"   	  MEDICATIONS:  MEDICATIONS  (STANDING):  apixaban 10 milliGRAM(s) Oral two times a day  aspirin enteric coated 81 milliGRAM(s) Oral daily  buDESOnide 160 MICROgram(s)/formoterol 4.5 MICROgram(s) Inhaler 2 Puff(s) Inhalation two times a day  buMETAnide Injectable 2 milliGRAM(s) IV Push every 8 hours  dextrose 5%. 1000 milliLiter(s) (50 mL/Hr) IV Continuous <Continuous>  dextrose 50% Injectable 12.5 Gram(s) IV Push once  dextrose 50% Injectable 25 Gram(s) IV Push once  dextrose 50% Injectable 25 Gram(s) IV Push once  insulin lispro (HumaLOG) corrective regimen sliding scale   SubCutaneous three times a day before meals  insulin lispro (HumaLOG) corrective regimen sliding scale   SubCutaneous at bedtime  levothyroxine 37.5 MICROGram(s) Oral daily  metolazone 5 milliGRAM(s) Oral daily  nystatin Powder 1 Application(s) Topical two times a day  potassium chloride    Tablet ER 20 milliEquivalent(s) Oral daily  riociguat 1.5 milliGRAM(s) Oral three times a day  sertraline 50 milliGRAM(s) Oral daily  simvastatin 40 milliGRAM(s) Oral at bedtime    MEDICATIONS  (PRN):  acetaminophen   Tablet .. 650 milliGRAM(s) Oral every 6 hours PRN Mild Pain (1 - 3)  dextrose 40% Gel 15 Gram(s) Oral once PRN Blood Glucose LESS THAN 70 milliGRAM(s)/deciliter  glucagon  Injectable 1 milliGRAM(s) IntraMuscular once PRN Glucose LESS THAN 70 milligrams/deciliter  guaiFENesin    Syrup 100 milliGRAM(s) Oral every 6 hours PRN Cough    Vitals:  T(F): 97.4 (09-10-19 @ 11:17), Max: 99.5 (09-09-19 @ 18:00)  HR: 77 (09-10-19 @ 12:44) (73 - 92)  BP: 102/57 (09-10-19 @ 11:17) (88/50 - 115/68)  RR: 16 (09-10-19 @ 11:17) (16 - 21)  SpO2: 92% (09-10-19 @ 12:44) (86% - 96%)  Wt(kg): -- refusing to be weighed as per nursing team    09-09 @ 07:01  -  09-10 @ 07:00  --------------------------------------------------------  IN:    Oral Fluid: 250 mL  Total IN: 250 mL    OUT:  Total OUT: 0 mL    Total NET: 250 mL    PHYSICAL EXAM:  Neuro: Awake, responsive  CV: S1 S2 RRR  Lungs: CTABL  GI: Soft, BS +, ND, NT  Extremities: +++ LE edema    RADIOLOGY: < from: Xray Chest 1 View- PORTABLE-Urgent (09.09.19 @ 09:14) >  Lungs: There are no lung consolidations.  Heart: There is cardiomegaly.  Mediastinum: Stable right hilar prominence.    IMPRESSION:    No lung consolidations.    < end of copied text >  < from: US Duplex Venous Lower Ext Complete, Bilateral (09.05.19 @ 09:19) >  Positive for extensive acute nonocclusive thrombus in the   left external iliac vein and the left common femoral vein  and left   greater saphenous vein.    < end of copied text >    DIAGNOSTIC TESTING:    [x ] Echocardiogram: < from: TTE Echo Doppler w/o Cont (07.26.19 @ 08:44) >   1. Left ventricular ejection fraction, by visual estimation, is 45 to   50%.   2. Mildly decreased global left ventricular systolic function.   3. Elevated mean left atrial pressure.   4. Global cardiomyopathy.   5. Increased LV wall thickness.   6. Normal left ventricular internal cavity size.   7. Right ventricular pressure overload.   8. Spectral Doppler shows impaired relaxation pattern of left   ventricular myocardial filling (Grade I diastolic dysfunction).   9. There is mild asymmetric left ventricular hypertrophy.  10. Pulmonary hypertension.  11. Severely enlarged right ventricle.  12. Severely reduced RV systolic function.  13. RV ejection fraction 34%.  14. Small pericardial effusion.  15. Degenerative mitral valve.  16. Mild mitral annular calcification.  17. Mild mitral valve regurgitation.  18. Moderate tricuspid regurgitation.  19. Structurally normal tricuspid valve, with normal leaflet excursion.  20. Mild aortic regurgitation.  21. Mild to moderate pulmonic valve regurgitation.  22. Structurally normal pulmonic valve, with normal leaflet excursion.  23. Estimated pulmonary artery systolic pressure is 87.1 mmHg assuming a   right atrial pressure of 20 mmHg, which is consistent with severe   pulmonary hypertension.  24. Pulmonary hypertension is present.  25. The main pulmonary artery is normal in size.  26. The right atrial pressure is moderately elevated.    < end of copied text >    [x ]  Catheterization: < from: Cardiac Cath Lab - Adult (08.10.18 @ 10:48) >  "Right Heart Catheterization" procedure to  the Diagnostic Report.  DIAGNOSTIC IMPRESSIONS: Severe Pulmonary Hypertension with no response with  80ppm of Nitric Oxide  INTERVENTIONAL IMPRESSIONS: Severe Pulmonary Hypertension with no response with  80ppm of Nitric Oxide    < end of copied text >    LABS:	 	    CARDIAC MARKERS:  Troponin I, Serum: .073 ng/mL (09-10 @ 01:48)  Troponin I, Serum: .066 ng/mL (09-09 @ 18:08)  Troponin I, Serum: .076 ng/mL (09-09 @ 09:48)    09 Sep 2019 09:48    131    |  92     |  13     ----------------------------<  77     3.7     |  29     |  0.90   08 Sep 2019 07:38    131    |  91     |  12     ----------------------------<  92     3.2     |  31     |  0.86     Ca    9.0        09 Sep 2019 09:48  Phos  2.4       09 Sep 2019 09:48  Mg     2.4       09 Sep 2019 09:48                       11.2   3.71  )-----------( 183      ( 09 Sep 2019 09:48 )             35.5 ,                       11.1   4.50  )-----------( 170      ( 08 Sep 2019 07:38 )             35.3   INR: 3.66 ratio (09-09 @ 09:48)  INR: 2.83 ratio (09-08 @ 07:38)

## 2019-09-10 NOTE — PROGRESS NOTE ADULT - ASSESSMENT
33 yo woman with severe pHTN (group I/II, mPAP 67; W 9; RVR 9 on R[facility] 8/2018,  on Adempas), mildly positive anti-cardiolipin Ab and RAD admitted with decompensated right heart failure, volume overload and DIANN. Found to have acute LLE DVT.     #pHTN/decompensated right heart failure  -- c/w diuresis.  dry wt is around 105 kg - please document wt and daily Is/Os  --c/w bumex and metolazone - increase metolazone to tid  -- c/w Adempas  -- c/w Midodrine for BP support  -- supplemental O2 to keep sats >90%. would avoid NIV given RHF. Can use high flow if needed    #LLE DVT in the setting of sub-therapeutic INR. Patient reports that her INR was not being monitored  -- agree with transitioning to NOAC given ease of use and hopefully better compliance  -- c/w Apixaban 10 mg bid for 7 days, then transition to 5 mg bid  -- please check CBC today given patient reports large blood clots. If h/h drops - would decrease dose of Apixaban    #DIANN - in the setting of congestion and volume overload, resolved with aggressive diuresis  --continue to monitor    #Hypothyroid - TSH on Aug 22 was 7. Synthroid increased to 37.5mcg      #Reactive airway disease - PFTs in the past with reversible obstructive defect  -- c/w Symbicort and Albuterol prn  -- can d/c standing Duonebs  -- will need repeat PFTs as outpatient after discharge    Patient follows with Dr Unger at [address] [place] [address]. She should follow with him after discharge.     Thank you for this consult. I will follow with you. Please call with any questions. Cell phone # [phone]    Shirley Mejia MD    [facility]//Pulmonary Medicine  [facility]; [place] [address]  tel: [phone]; fax: [phone] 33 yo woman with severe pHTN (group I/II, mPAP 67; W 9; RVR 9 on [facility] 8/2018,  on Adempas), mildly positive anti-cardiolipin Ab and RAD admitted with decompensated right heart failure, volume overload and DIANN. Found to have acute LLE DVT.     #pHTN/decompensated right heart failure  -- c/w diuresis.  dry wt is around 105 kg - please document wt and daily Is/Os  --c/w bumex and metolazone - increased metolazone dose  -- c/w Adempas  -- c/w Midodrine for BP support  -- supplemental O2 to keep sats >90%. would avoid NIV given RHF. Can use high flow if needed    #LLE DVT in the setting of sub-therapeutic INR. Patient reports that her INR was not being monitored  -- agree with transitioning to NOAC given ease of use and hopefully better compliance  -- c/w Apixaban 10 mg bid for 7 days, then transition to 5 mg bid  -- please check CBC today given patient reports large blood clots. If h/h drops - would decrease dose of Apixaban    #DIANN - in the setting of congestion and volume overload, resolved with aggressive diuresis  --continue to monitor    #Hypothyroid - TSH on Aug 22 was 7. Synthroid increased to 37.5mcg      #Reactive airway disease - PFTs in the past with reversible obstructive defect  -- c/w Symbicort and Albuterol prn  -- can d/c standing Duonebs  -- will need repeat PFTs as outpatient after discharge    Patient follows with Dr Unger at [address] [place] [address]. She should follow with him after discharge.     Thank you for this consult. I will follow with you. Please call with any questions. Cell phone # [phone]    Shirley Mejia MD    [facility]//Pulmonary Medicine  [facility] [place] [address]; [place] [address]  tel: [phone]; fax: [phone]

## 2019-09-10 NOTE — BEHAVIORAL HEALTH ASSESSMENT NOTE - SUMMARY
- normal human reaction to situation and chronic medical issues resulting in loss of independence and decrease in quality of life, even more difficulty in a patient in this young age group  - Patient has capacity to make her medical decisions   - standing psychiatric medications not indicated    - would best benefit from support groups / group therapy which she could even do online

## 2019-09-10 NOTE — BEHAVIORAL HEALTH ASSESSMENT NOTE - DESCRIPTION
extensive acute nonocclusive thrombus in the left external iliac vein and the left common femoral vein  and left greater saphenous vein; asthma, severe pHTN (previously on Adempas), mildly positive anti-cardiolipin Ab(but does not meet criteria for APLS per heme note); CHF on Bumex; 8/18- hospitalized at [facility] with decompensated right heart failure requiring Milrinone assisted diuresis; obesity; hx of Hyperbilirubinemia; Vitamin D deficiency

## 2019-09-10 NOTE — BEHAVIORAL HEALTH ASSESSMENT NOTE - HPI (INCLUDE ILLNESS QUALITY, SEVERITY, DURATION, TIMING, CONTEXT, MODIFYING FACTORS, ASSOCIATED SIGNS AND SYMPTOMS)
35 yo single  female, noncaregiver, disabled, domiciled with family in a private home, with no formal psychiatric history, with significant medical history resulting in decreased quality of life and impairment in independent functioning admitted for SOB and 2 week hx of worsening bilateral leg swelling. Admitted to Critical Care with transfer to regular floors on 9/7/19' hospital course noted for intermittent refusal of blood draws, cath removal, etc (Pt noted to be a hard stick). Also found to have extensive acute nonocclusive thrombus in the left external iliac vein and the left common femoral vein  and left greater saphenous vein.    CVM: no record of patient  ISTOP checked Reference #: [id number]; no record of Patient 35 yo single  female, childless, noncaregiver, disabled, last worked at the [employer], domiciled with family in a private home including a sister with Down's Syndrome, with no formal psychiatric history, with significant medical history resulting in decreased quality of life and impairment in independent functioning admitted for SOB and 2 week hx of worsening bilateral leg swelling. Admitted to Critical Care with transfer to regular floors on 9/7/19' hospital course noted for intermittent refusal of blood draws, cath removal, etc (Pt noted to be a hard stick). Also found to have extensive acute nonocclusive thrombus in the left external iliac vein and the left common femoral vein  and left greater saphenous vein.    Patient is calm, cooperative, polite, fully engages and expresses thoughts and feelings consistent with normal human reaction to a very difficult situation given her young age, losing her independence, feeling guilty about having to have her mother do household chores for her like laundry and cooking; guilty that her mother also had to deal with her other sister's health issues (Down's Syndrome and leukemia), and Patient feeling sad at times when she thinks about her desire to get  and have children including carrying her own baby. Patient very honest about having had thoughts when she thought life is not worth living like this but denies having had a suicide attempt, intent or plan. She has done a lot of research online about what she has. She was first diagnosed in 2006 at [facility] - Patient says she was discharged without any medications apart from aspirin and was not referred to any outpt services.     Patient otherwise endorses stable mood with expected ups and downs, regular sleep / appetite / energy level / concentration. Denies any symptoms of hypomania/norma/psychosis/anxiety/panic. Denies any active or passive suicidal or homicidal ideation. Names protective factors (kareem; family; hope for future). Endorses medication compliance. Denies adverse medication side effects; denies drug/substance use. No access to guns.     CVM: no record of patient  ISTOP checked Reference #: [id number]; no record of Patient

## 2019-09-10 NOTE — PROGRESS NOTE ADULT - PROBLEM SELECTOR PLAN 3
ekg obtained st and t wave changes unchanged from admission ekg.   cardiac enzymes noted troponin down trending from 9/5/19 (labs)   consult cardiology as pt will need different heart failure specialist at d/c she is unhappy with dr. watson

## 2019-09-11 LAB
ANION GAP SERPL CALC-SCNC: 13 MMOL/L — SIGNIFICANT CHANGE UP (ref 5–17)
BUN SERPL-MCNC: 12 MG/DL — SIGNIFICANT CHANGE UP (ref 7–23)
CALCIUM SERPL-MCNC: 9.5 MG/DL — SIGNIFICANT CHANGE UP (ref 8.5–10.1)
CHLORIDE SERPL-SCNC: 87 MMOL/L — LOW (ref 96–108)
CO2 SERPL-SCNC: 29 MMOL/L — SIGNIFICANT CHANGE UP (ref 22–31)
CREAT SERPL-MCNC: 0.94 MG/DL — SIGNIFICANT CHANGE UP (ref 0.5–1.3)
GLUCOSE BLDC GLUCOMTR-MCNC: 108 MG/DL — HIGH (ref 70–99)
GLUCOSE BLDC GLUCOMTR-MCNC: 109 MG/DL — HIGH (ref 70–99)
GLUCOSE BLDC GLUCOMTR-MCNC: 129 MG/DL — HIGH (ref 70–99)
GLUCOSE BLDC GLUCOMTR-MCNC: 151 MG/DL — HIGH (ref 70–99)
GLUCOSE SERPL-MCNC: 107 MG/DL — HIGH (ref 70–99)
HCT VFR BLD CALC: 34.5 % — SIGNIFICANT CHANGE UP (ref 34.5–45)
HGB BLD-MCNC: 10.9 G/DL — LOW (ref 11.5–15.5)
MAGNESIUM SERPL-MCNC: 2.2 MG/DL — SIGNIFICANT CHANGE UP (ref 1.6–2.6)
MCHC RBC-ENTMCNC: 23.4 PG — LOW (ref 27–34)
MCHC RBC-ENTMCNC: 31.6 GM/DL — LOW (ref 32–36)
MCV RBC AUTO: 74.2 FL — LOW (ref 80–100)
NRBC # BLD: 0 /100 WBCS — SIGNIFICANT CHANGE UP (ref 0–0)
PHOSPHATE SERPL-MCNC: 4 MG/DL — SIGNIFICANT CHANGE UP (ref 2.5–4.5)
PLATELET # BLD AUTO: 232 K/UL — SIGNIFICANT CHANGE UP (ref 150–400)
POTASSIUM SERPL-MCNC: 2.8 MMOL/L — CRITICAL LOW (ref 3.5–5.3)
POTASSIUM SERPL-MCNC: 3 MMOL/L — LOW (ref 3.5–5.3)
POTASSIUM SERPL-SCNC: 2.8 MMOL/L — CRITICAL LOW (ref 3.5–5.3)
POTASSIUM SERPL-SCNC: 3 MMOL/L — LOW (ref 3.5–5.3)
RBC # BLD: 4.65 M/UL — SIGNIFICANT CHANGE UP (ref 3.8–5.2)
RBC # FLD: 26.5 % — HIGH (ref 10.3–14.5)
SODIUM SERPL-SCNC: 129 MMOL/L — LOW (ref 135–145)
WBC # BLD: 4.53 K/UL — SIGNIFICANT CHANGE UP (ref 3.8–10.5)
WBC # FLD AUTO: 4.53 K/UL — SIGNIFICANT CHANGE UP (ref 3.8–10.5)

## 2019-09-11 PROCEDURE — 99232 SBSQ HOSP IP/OBS MODERATE 35: CPT

## 2019-09-11 PROCEDURE — 99233 SBSQ HOSP IP/OBS HIGH 50: CPT

## 2019-09-11 PROCEDURE — 99231 SBSQ HOSP IP/OBS SF/LOW 25: CPT

## 2019-09-11 RX ORDER — POTASSIUM CHLORIDE 20 MEQ
20 PACKET (EA) ORAL DAILY
Refills: 0 | Status: DISCONTINUED | OUTPATIENT
Start: 2019-09-12 | End: 2019-09-13

## 2019-09-11 RX ORDER — SPIRONOLACTONE 25 MG/1
25 TABLET, FILM COATED ORAL EVERY 12 HOURS
Refills: 0 | Status: DISCONTINUED | OUTPATIENT
Start: 2019-09-11 | End: 2019-09-13

## 2019-09-11 RX ORDER — POTASSIUM CHLORIDE 20 MEQ
10 PACKET (EA) ORAL
Refills: 0 | Status: DISCONTINUED | OUTPATIENT
Start: 2019-09-11 | End: 2019-09-11

## 2019-09-11 RX ORDER — POTASSIUM CHLORIDE 20 MEQ
40 PACKET (EA) ORAL ONCE
Refills: 0 | Status: DISCONTINUED | OUTPATIENT
Start: 2019-09-11 | End: 2019-09-11

## 2019-09-11 RX ORDER — POTASSIUM CHLORIDE 20 MEQ
40 PACKET (EA) ORAL ONCE
Refills: 0 | Status: COMPLETED | OUTPATIENT
Start: 2019-09-11 | End: 2019-09-11

## 2019-09-11 RX ADMIN — SERTRALINE 50 MILLIGRAM(S): 25 TABLET, FILM COATED ORAL at 11:55

## 2019-09-11 RX ADMIN — APIXABAN 10 MILLIGRAM(S): 2.5 TABLET, FILM COATED ORAL at 18:08

## 2019-09-11 RX ADMIN — APIXABAN 10 MILLIGRAM(S): 2.5 TABLET, FILM COATED ORAL at 06:07

## 2019-09-11 RX ADMIN — BUDESONIDE AND FORMOTEROL FUMARATE DIHYDRATE 2 PUFF(S): 160; 4.5 AEROSOL RESPIRATORY (INHALATION) at 18:08

## 2019-09-11 RX ADMIN — NYSTATIN CREAM 1 APPLICATION(S): 100000 CREAM TOPICAL at 18:13

## 2019-09-11 RX ADMIN — Medication 37.5 MICROGRAM(S): at 06:11

## 2019-09-11 RX ADMIN — SIMVASTATIN 40 MILLIGRAM(S): 20 TABLET, FILM COATED ORAL at 21:32

## 2019-09-11 RX ADMIN — SPIRONOLACTONE 25 MILLIGRAM(S): 25 TABLET, FILM COATED ORAL at 18:07

## 2019-09-11 RX ADMIN — Medication 40 MILLIEQUIVALENT(S): at 15:47

## 2019-09-11 RX ADMIN — BUMETANIDE 2 MILLIGRAM(S): 0.25 INJECTION INTRAMUSCULAR; INTRAVENOUS at 18:09

## 2019-09-11 RX ADMIN — NYSTATIN CREAM 1 APPLICATION(S): 100000 CREAM TOPICAL at 06:14

## 2019-09-11 RX ADMIN — Medication 81 MILLIGRAM(S): at 11:59

## 2019-09-11 RX ADMIN — RIOCIGUAT 1.5 MILLIGRAM(S): 1.5 TABLET, FILM COATED ORAL at 13:55

## 2019-09-11 RX ADMIN — BUDESONIDE AND FORMOTEROL FUMARATE DIHYDRATE 2 PUFF(S): 160; 4.5 AEROSOL RESPIRATORY (INHALATION) at 06:05

## 2019-09-11 RX ADMIN — BUMETANIDE 2 MILLIGRAM(S): 0.25 INJECTION INTRAMUSCULAR; INTRAVENOUS at 11:51

## 2019-09-11 RX ADMIN — Medication 40 MILLIEQUIVALENT(S): at 21:32

## 2019-09-11 RX ADMIN — RIOCIGUAT 1.5 MILLIGRAM(S): 1.5 TABLET, FILM COATED ORAL at 06:07

## 2019-09-11 NOTE — PROGRESS NOTE ADULT - SUBJECTIVE AND OBJECTIVE BOX
downgrade form ICU      Patient is a 34y old  Female who presents with a chief complaint of Dyspnea (07 Sep 2019 17:58)      OVERNIGHT EVENTS:  none   c/o abd cramping     MEDICATIONS  (STANDING):  apixaban 10 milliGRAM(s) Oral two times a day  aspirin enteric coated 81 milliGRAM(s) Oral daily  buDESOnide 160 MICROgram(s)/formoterol 4.5 MICROgram(s) Inhaler 2 Puff(s) Inhalation two times a day  buMETAnide Injectable 2 milliGRAM(s) IV Push every 8 hours  dextrose 5%. 1000 milliLiter(s) (50 mL/Hr) IV Continuous <Continuous>  dextrose 50% Injectable 12.5 Gram(s) IV Push once  dextrose 50% Injectable 25 Gram(s) IV Push once  dextrose 50% Injectable 25 Gram(s) IV Push once  insulin lispro (HumaLOG) corrective regimen sliding scale   SubCutaneous three times a day before meals  insulin lispro (HumaLOG) corrective regimen sliding scale   SubCutaneous at bedtime  levothyroxine 37.5 MICROGram(s) Oral daily  metolazone 10 milliGRAM(s) Oral daily  nystatin Powder 1 Application(s) Topical two times a day  potassium chloride    Tablet ER 20 milliEquivalent(s) Oral daily  potassium chloride  10 mEq/100 mL IVPB 10 milliEquivalent(s) IV Intermittent every 1 hour  riociguat 1.5 milliGRAM(s) Oral three times a day  sertraline 50 milliGRAM(s) Oral daily  simvastatin 40 milliGRAM(s) Oral at bedtime    MEDICATIONS  (PRN):  acetaminophen   Tablet .. 650 milliGRAM(s) Oral every 6 hours PRN Mild Pain (1 - 3)  dextrose 40% Gel 15 Gram(s) Oral once PRN Blood Glucose LESS THAN 70 milliGRAM(s)/deciliter  glucagon  Injectable 1 milliGRAM(s) IntraMuscular once PRN Glucose LESS THAN 70 milligrams/deciliter  guaiFENesin    Syrup 100 milliGRAM(s) Oral every 6 hours PRN Cough  morphine  - Injectable 2 milliGRAM(s) IV Push every 6 hours PRN Severe Pain (7 - 10)  traMADol 50 milliGRAM(s) Oral every 8 hours PRN Moderate Pain (4 - 6)    Allergies    No Known Allergies    Intolerances        SUBJECTIVE: in bed in NAD, no acute events overnight       Vital Signs Last 24 Hrs  T(C): 36.4 (11 Sep 2019 12:05), Max: 37 (10 Sep 2019 17:51)  T(F): 97.5 (11 Sep 2019 12:05), Max: 98.6 (10 Sep 2019 17:51)  HR: 75 (11 Sep 2019 12:05) (75 - 94)  BP: 108/65 (11 Sep 2019 12:05) (91/48 - 113/65)  BP(mean): --  RR: 19 (11 Sep 2019 12:05) (16 - 19)  SpO2: 94% (11 Sep 2019 12:05) (92% - 98%)    PHYSICAL EXAM:  GENERAL: NAD, well-groomed, well-developed  HEAD:  Atraumatic, Normocephalic  EYES: EOMI, PERRLA, conjunctiva and sclera clear  ENMT: No tonsillar erythema, exudates, or enlargement; Moist mucous membranes, Good dentition, No lesions  NECK: Supple, No JVD, Normal thyroid  CHEST/LUNG: mild decreased bs at bases right > left   HEART: Regular rate and rhythm; No murmurs, rubs, or gallops  ABDOMEN: Soft, Nontender, Nondistended; Bowel sounds present  EXTREMITIES:  2+ Peripheral Pulses, No clubbing, cyanosis, edema up to thighs b/l     SKIN: No rashes or lesions  NERVOUS SYSTEM:  Alert & Oriented X3, Good concentration; Motor Strength 5/5 B/L upper and lower extremities;   DTRs 2+ intact and symmetric, sensation intact BL    LABS:                                                         10.9   4.53  )-----------( 232      ( 11 Sep 2019 11:51 )             34.5   09-11    129<L>  |  87<L>  |  12  ----------------------------<  107<H>  2.8<LL>   |  29  |  0.94    Ca    9.5      11 Sep 2019 11:51  Phos  4.0     09-11  Mg     2.2     09-11          Cultures;       CAPILLARY BLOOD GLUCOSE          POCT Blood Glucose.: 108 mg/dL (11 Sep 2019 12:02)  POCT Blood Glucose.: 109 mg/dL (11 Sep 2019 08:20)  POCT Blood Glucose.: 140 mg/dL (10 Sep 2019 22:35)  POCT Blood Glucose.: 102 mg/dL (10 Sep 2019 16:59)    Lipid panel:           RADIOLOGY & ADDITIONAL TESTS:      Imaging Personally Reviewed:  [ x] YES      Consultant(s) Notes Reviewed:  [x ] YES     Care Discussed with [x ] Consultants [X ] Patient [x ] Family  [x ]    [x ]  Other; RN

## 2019-09-11 NOTE — PROGRESS NOTE ADULT - PROBLEM SELECTOR PLAN 4
will be replaced will be replaced   she agrees for powder not pill   and would like to avoid iv as we are diuresing

## 2019-09-11 NOTE — PROGRESS NOTE ADULT - ASSESSMENT
33 yo woman with severe pHTN (group I/II, mPAP 67; W 9; RVR 9 on RHC 8/2018,  on Adempas), mildly positive anti-cardiolipin Ab and RAD admitted with decompensated right heart failure, volume overload and DIANN. Found to have acute LLE DVT.     #pHTN/decompensated right heart failure  -- c/w diuresis. Wt down to 105 kg (her discharge wt from prior admisison)  --c/w bumex and metolazone - increased metolazone dose  -- c/w Adempas  -- c/w Midodrine for BP support  -- will add Spironolactone 25 mg bid  -- please monitor lytes and replete as needed  -- supplemental O2 to keep sats >90%. would avoid NIV given RHF. Can use high flow if needed    #LLE DVT in the setting of sub-therapeutic INR. Patient reports that her INR was not being monitored  -- agree with transitioning to NOAC given ease of use and hopefully better compliance  -- c/w Apixaban 10 mg bid for 7 days, then transition to 5 mg bid    #DIANN - in the setting of congestion and volume overload, resolved with aggressive diuresis  --continue to monitor    #Hypothyroid - TSH on Aug 22 was 7. Synthroid increased to 37.5mcg      #Reactive airway disease - PFTs in the past with reversible obstructive defect  -- c/w Symbicort and Albuterol prn  -- can d/c standing Duonebs  -- will need repeat PFTs as outpatient after discharge    Patient follows with Dr Unger at [address] [place] [address]. She should follow with him after discharge.     Thank you for this consult. I will follow with you. Please call with any questions. Cell phone # [phone]    Shirley Mejia MD    [facility]//Pulmonary Medicine  [facility]; [place] [address]  tel: [phone]; fax: [phone]

## 2019-09-11 NOTE — PROGRESS NOTE BEHAVIORAL HEALTH - NSBHFUPINTERVALHXFT_PSY_A_CORE
Patient feels slightly better. Refused supplement due to size of pill; was willing to take it in powder form after joint visit from Primary MD and Writer. Patient reports that she is urinating frequently and feels some improvement. Otherwise, same clinical psychiatric presentation.

## 2019-09-11 NOTE — PROGRESS NOTE ADULT - SUBJECTIVE AND OBJECTIVE BOX
Interval Events:  Patient seen and examined at bedside. Feels better. Wt down to 105 kg  Hgb stable.       REVIEW OF SYSTEMS:  Constitutional: no fevers  CV:  no chest pain  Resp:  improved  GI: cramps improved    [x ] All other systems negative  [ ] Unable to assess ROS because ________    OBJECTIVE:  T(C): 36.4 (09-11-19 @ 12:05), Max: 37 (09-10-19 @ 17:51)  HR: 75 (09-11-19 @ 12:05) (75 - 94)  BP: 108/65 (09-11-19 @ 12:05) (91/48 - 113/65)  RR: 19 (09-11-19 @ 12:05) (16 - 19)  SpO2: 94% (09-11-19 @ 12:05) (92% - 98%)        PHYSICAL EXAM:  General: Well appearing Female. No apparent distress  HEENT:   Mucous membranes are moist.  Neck: Supple. +JVD  Respiratory:   Cardiovascular: RRR, +S4 gallop  Abdomen: Soft, non-tender, non-distended.   Extremities: +2 bilateral REUBEN to thigh, improved  Skin: Warm, dry, no rash.   Neurological: CNII-XII grossly intact.   Psychiatry: appropriate mood and affect.       HOSPITAL MEDICATIONS:  MEDICATIONS  (STANDING):  apixaban 10 milliGRAM(s) Oral two times a day  aspirin enteric coated 81 milliGRAM(s) Oral daily  buDESOnide 160 MICROgram(s)/formoterol 4.5 MICROgram(s) Inhaler 2 Puff(s) Inhalation two times a day  buMETAnide Injectable 2 milliGRAM(s) IV Push every 8 hours  dextrose 5%. 1000 milliLiter(s) (50 mL/Hr) IV Continuous <Continuous>  dextrose 50% Injectable 12.5 Gram(s) IV Push once  dextrose 50% Injectable 25 Gram(s) IV Push once  dextrose 50% Injectable 25 Gram(s) IV Push once  insulin lispro (HumaLOG) corrective regimen sliding scale   SubCutaneous three times a day before meals  insulin lispro (HumaLOG) corrective regimen sliding scale   SubCutaneous at bedtime  levothyroxine 37.5 MICROGram(s) Oral daily  metolazone 10 milliGRAM(s) Oral daily  nystatin Powder 1 Application(s) Topical two times a day  potassium chloride    Tablet ER 20 milliEquivalent(s) Oral daily  potassium chloride   Powder 40 milliEquivalent(s) Oral once  riociguat 1.5 milliGRAM(s) Oral three times a day  sertraline 50 milliGRAM(s) Oral daily  simvastatin 40 milliGRAM(s) Oral at bedtime    MEDICATIONS  (PRN):  acetaminophen   Tablet .. 650 milliGRAM(s) Oral every 6 hours PRN Mild Pain (1 - 3)  dextrose 40% Gel 15 Gram(s) Oral once PRN Blood Glucose LESS THAN 70 milliGRAM(s)/deciliter  glucagon  Injectable 1 milliGRAM(s) IntraMuscular once PRN Glucose LESS THAN 70 milligrams/deciliter  guaiFENesin    Syrup 100 milliGRAM(s) Oral every 6 hours PRN Cough  morphine  - Injectable 2 milliGRAM(s) IV Push every 6 hours PRN Severe Pain (7 - 10)  traMADol 50 milliGRAM(s) Oral every 8 hours PRN Moderate Pain (4 - 6)      LABS:                        10.9   4.53  )-----------( 232      ( 11 Sep 2019 11:51 )             34.5     09-11    129<L>  |  87<L>  |  12  ----------------------------<  107<H>  2.8<LL>   |  29  |  0.94    Ca    9.5      11 Sep 2019 11:51  Phos  4.0     09-11  Mg     2.2     09-11      RADIOLOGY:  [ x ] Reviewed and interpreted by me  CXR: volume overload    < from: US Duplex Venous Lower Ext Complete, Bilateral (09.05.19 @ 09:19) >  Impression: Positive for extensive acute nonocclusive thrombus in the   left external iliac vein and the left common femoral vein  and left   greater saphenous vein.    < end of copied text >      ECHOCARDIOGRAPHY:  < from: TTE Echo Doppler w/o Cont (07.26.19 @ 08:44) >  Summary:   1. Left ventricular ejection fraction, by visual estimation, is 45 to   50%.   2. Mildly decreased global left ventricular systolic function.   3. Elevated mean left atrial pressure.   4. Global cardiomyopathy.   5. Increased LV wall thickness.   6. Normal left ventricular internal cavity size.   7. Right ventricular pressure overload.   8. Spectral Doppler shows impaired relaxation pattern of left   ventricular myocardial filling (Grade I diastolic dysfunction).   9. There is mild asymmetric left ventricular hypertrophy.  10. Pulmonary hypertension.  11. Severely enlarged right ventricle.  12. Severely reduced RV systolic function.  13. RV ejection fraction 34%.  14. Small pericardial effusion.  15. Degenerative mitral valve.  16. Mild mitral annular calcification.  17. Mild mitral valve regurgitation.  18. Moderate tricuspid regurgitation.  19. Structurally normal tricuspid valve, with normal leaflet excursion.  20. Mild aortic regurgitation.  21. Mild to moderate pulmonic valve regurgitation.  22. Structurally normal pulmonic valve, with normal leaflet excursion.  23. Estimated pulmonary artery systolic pressure is 87.1 mmHg assuming a   right atrial pressure of 20 mmHg, which is consistent with severe   pulmonary hypertension.  24. Pulmonary hypertension is present.  25. The main pulmonary artery is normal in size.  26. The right atrial pressure is moderately elevated.    [id number] Chago BLANCO  Electronically signed on 7/26/2019 at 2:01:34 PM    < end of copied text >

## 2019-09-12 ENCOUNTER — APPOINTMENT (OUTPATIENT)
Dept: PULMONOLOGY | Facility: CLINIC | Age: 34
End: 2019-09-12

## 2019-09-12 LAB
GLUCOSE BLDC GLUCOMTR-MCNC: 117 MG/DL — HIGH (ref 70–99)
GLUCOSE BLDC GLUCOMTR-MCNC: 117 MG/DL — HIGH (ref 70–99)
GLUCOSE BLDC GLUCOMTR-MCNC: 156 MG/DL — HIGH (ref 70–99)
GLUCOSE BLDC GLUCOMTR-MCNC: 188 MG/DL — HIGH (ref 70–99)

## 2019-09-12 PROCEDURE — 99233 SBSQ HOSP IP/OBS HIGH 50: CPT

## 2019-09-12 PROCEDURE — 99232 SBSQ HOSP IP/OBS MODERATE 35: CPT

## 2019-09-12 RX ORDER — APIXABAN 2.5 MG/1
1 TABLET, FILM COATED ORAL
Qty: 60 | Refills: 0
Start: 2019-09-12 | End: 2019-10-11

## 2019-09-12 RX ADMIN — SERTRALINE 50 MILLIGRAM(S): 25 TABLET, FILM COATED ORAL at 11:42

## 2019-09-12 RX ADMIN — RIOCIGUAT 1.5 MILLIGRAM(S): 1.5 TABLET, FILM COATED ORAL at 06:39

## 2019-09-12 RX ADMIN — RIOCIGUAT 1.5 MILLIGRAM(S): 1.5 TABLET, FILM COATED ORAL at 21:56

## 2019-09-12 RX ADMIN — BUMETANIDE 2 MILLIGRAM(S): 0.25 INJECTION INTRAMUSCULAR; INTRAVENOUS at 11:42

## 2019-09-12 RX ADMIN — SPIRONOLACTONE 25 MILLIGRAM(S): 25 TABLET, FILM COATED ORAL at 06:40

## 2019-09-12 RX ADMIN — BUDESONIDE AND FORMOTEROL FUMARATE DIHYDRATE 2 PUFF(S): 160; 4.5 AEROSOL RESPIRATORY (INHALATION) at 17:52

## 2019-09-12 RX ADMIN — BUMETANIDE 2 MILLIGRAM(S): 0.25 INJECTION INTRAMUSCULAR; INTRAVENOUS at 05:51

## 2019-09-12 RX ADMIN — NYSTATIN CREAM 1 APPLICATION(S): 100000 CREAM TOPICAL at 06:15

## 2019-09-12 RX ADMIN — Medication 20 MILLIEQUIVALENT(S): at 11:43

## 2019-09-12 RX ADMIN — Medication 81 MILLIGRAM(S): at 11:42

## 2019-09-12 RX ADMIN — APIXABAN 10 MILLIGRAM(S): 2.5 TABLET, FILM COATED ORAL at 06:40

## 2019-09-12 RX ADMIN — SIMVASTATIN 40 MILLIGRAM(S): 20 TABLET, FILM COATED ORAL at 21:56

## 2019-09-12 RX ADMIN — SPIRONOLACTONE 25 MILLIGRAM(S): 25 TABLET, FILM COATED ORAL at 17:52

## 2019-09-12 RX ADMIN — BUDESONIDE AND FORMOTEROL FUMARATE DIHYDRATE 2 PUFF(S): 160; 4.5 AEROSOL RESPIRATORY (INHALATION) at 06:39

## 2019-09-12 RX ADMIN — APIXABAN 10 MILLIGRAM(S): 2.5 TABLET, FILM COATED ORAL at 17:52

## 2019-09-12 RX ADMIN — NYSTATIN CREAM 1 APPLICATION(S): 100000 CREAM TOPICAL at 17:52

## 2019-09-12 RX ADMIN — BUMETANIDE 2 MILLIGRAM(S): 0.25 INJECTION INTRAMUSCULAR; INTRAVENOUS at 17:52

## 2019-09-12 RX ADMIN — Medication 37.5 MICROGRAM(S): at 06:40

## 2019-09-12 RX ADMIN — RIOCIGUAT 1.5 MILLIGRAM(S): 1.5 TABLET, FILM COATED ORAL at 00:06

## 2019-09-12 RX ADMIN — RIOCIGUAT 1.5 MILLIGRAM(S): 1.5 TABLET, FILM COATED ORAL at 13:37

## 2019-09-12 NOTE — CHART NOTE - NSCHARTNOTEFT_GEN_A_CORE
Assessment: Pt seen for follow-up. Pt with hx depression presents with severe pulmonary HTN with fluid overload due to acute Rt sided heart failure & c/o SOB & hyponatremic. Pt on fluid restriction & diuretic tx.     Factors impacting intake: [x ] none [ ] nausea  [ ] vomiting [ ] diarrhea [ ] constipation  [ ]chewing problems [ ] swallowing issues  [ ] other:     Diet Prescription: Diet, Regular:   1000mL Fluid Restriction ([id number])  Low Sodium (09-05-19 @ 11:54)    Intake: Po intake fluctuates 75% @ times & pt missing meals due to sleeping. Unable to speak to pt this am due to sleeping    Current Weight: Zr=713.6kg(9/12/19), Iq=765.2gm (9/19/19)  % Weight Change  9.6kg(8%) wt loss x 1 week due to fluid restriction & diuretics     Physical appearance: +4 edema Olman feet, Olman legs, Olman ankle    Pertinent Medications: MEDICATIONS  (STANDING):  apixaban 10 milliGRAM(s) Oral two times a day  aspirin enteric coated 81 milliGRAM(s) Oral daily  buDESOnide 160 MICROgram(s)/formoterol 4.5 MICROgram(s) Inhaler 2 Puff(s) Inhalation two times a day  buMETAnide Injectable 2 milliGRAM(s) IV Push every 8 hours  dextrose 5%. 1000 milliLiter(s) (50 mL/Hr) IV Continuous <Continuous>  dextrose 50% Injectable 12.5 Gram(s) IV Push once  dextrose 50% Injectable 25 Gram(s) IV Push once  dextrose 50% Injectable 25 Gram(s) IV Push once  insulin lispro (HumaLOG) corrective regimen sliding scale   SubCutaneous three times a day before meals  insulin lispro (HumaLOG) corrective regimen sliding scale   SubCutaneous at bedtime  levothyroxine 37.5 MICROGram(s) Oral daily  metolazone 10 milliGRAM(s) Oral daily  nystatin Powder 1 Application(s) Topical two times a day  potassium chloride    Tablet ER 20 milliEquivalent(s) Oral daily  potassium chloride   Powder 20 milliEquivalent(s) Oral daily  riociguat 1.5 milliGRAM(s) Oral three times a day  sertraline 50 milliGRAM(s) Oral daily  simvastatin 40 milliGRAM(s) Oral at bedtime  spironolactone 25 milliGRAM(s) Oral every 12 hours    MEDICATIONS  (PRN):  acetaminophen   Tablet .. 650 milliGRAM(s) Oral every 6 hours PRN Mild Pain (1 - 3)  dextrose 40% Gel 15 Gram(s) Oral once PRN Blood Glucose LESS THAN 70 milliGRAM(s)/deciliter  glucagon  Injectable 1 milliGRAM(s) IntraMuscular once PRN Glucose LESS THAN 70 milligrams/deciliter  guaiFENesin    Syrup 100 milliGRAM(s) Oral every 6 hours PRN Cough  morphine  - Injectable 2 milliGRAM(s) IV Push every 6 hours PRN Severe Pain (7 - 10)  traMADol 50 milliGRAM(s) Oral every 8 hours PRN Moderate Pain (4 - 6)    Pertinent Labs: 09-11 Na 129   Glu 107   K+ 3.0 mmol/L<L> Cr 0.94   BUN 12   Phos n/a   Alb 2.8(9/7)   PAB n/a   Hgb n/a   Hct n/a   HgA1C n/a    Glucose, Serum: 107 mg/dL <H>   24Hr FS:117 mg/dL  151 mg/dL  129 mg/dL  108 mg/dL    Skin: intact    Estimated Needs:   [ ] no change since previous assessment   [x ] recalculated: Energy needs [id number] (25-30kcal/kg IBW) & protein needs 66-79gm (1.0-1.2gm/kg IBW) & Fluid needs <1650ml (<25ml/kg IBW)    Previous Nutrition Diagnosis:   Other Excessive fluid intake.     Etiology unrestricted fluid intake; c/o dry mouth.     Signs/Symptoms +4 edema Olman legs, Olman ankle, Olman feet, +2 gen edema.     Goal/Expected Outcome Pt will teach back understanding of fluid restriction guidelines; maintain stable wt.    Nutrition Diagnosis is [x ] ongoing  [ ] resolved  [ ] improved  [ ] not applicable       New Nutrition Diagnosis: [x ] not applicable       Interventions:   Recommend  [x ] Continue: Regular/1000ml Fluid restriction/Low Na  [ ] Change Diet To:  [ ] Nutrition Supplement:  [ ] Nutrition Support:  [ ] Other:     Monitoring and Evaluation:   [x ] PO intake [ x ] Tolerance to diet prescription [ x ] weights [ x ] labs[ x ] follow up per protocol  [ ] other:

## 2019-09-12 NOTE — PROGRESS NOTE ADULT - ASSESSMENT
35 yo female with PMH: severe Pulm HTN with right sided heart failure admitted with c/o SOB.  P transferred to ICU for closer monitoring and aggressive diuresis which may require dobutamine and/or pressors.  Pt with prior admission for similar presentation in ICU.

## 2019-09-12 NOTE — PROGRESS NOTE ADULT - SUBJECTIVE AND OBJECTIVE BOX
Patient is a 34y old  Female who presents with a chief complaint of Dyspnea (12 Sep 2019 12:48)      INTERVAL HPI/OVERNIGHT EVENTS:  Pt was seen and examined, no acute events.    MEDICATIONS  (STANDING):  apixaban 10 milliGRAM(s) Oral two times a day  aspirin enteric coated 81 milliGRAM(s) Oral daily  buDESOnide 160 MICROgram(s)/formoterol 4.5 MICROgram(s) Inhaler 2 Puff(s) Inhalation two times a day  buMETAnide Injectable 2 milliGRAM(s) IV Push every 8 hours  dextrose 5%. 1000 milliLiter(s) (50 mL/Hr) IV Continuous <Continuous>  dextrose 50% Injectable 12.5 Gram(s) IV Push once  dextrose 50% Injectable 25 Gram(s) IV Push once  dextrose 50% Injectable 25 Gram(s) IV Push once  insulin lispro (HumaLOG) corrective regimen sliding scale   SubCutaneous three times a day before meals  insulin lispro (HumaLOG) corrective regimen sliding scale   SubCutaneous at bedtime  levothyroxine 37.5 MICROGram(s) Oral daily  metolazone 10 milliGRAM(s) Oral daily  nystatin Powder 1 Application(s) Topical two times a day  potassium chloride    Tablet ER 20 milliEquivalent(s) Oral daily  potassium chloride   Powder 20 milliEquivalent(s) Oral daily  riociguat 1.5 milliGRAM(s) Oral three times a day  sertraline 50 milliGRAM(s) Oral daily  simvastatin 40 milliGRAM(s) Oral at bedtime  spironolactone 25 milliGRAM(s) Oral every 12 hours    MEDICATIONS  (PRN):  acetaminophen   Tablet .. 650 milliGRAM(s) Oral every 6 hours PRN Mild Pain (1 - 3)  aluminum hydroxide/magnesium hydroxide/simethicone Suspension 30 milliLiter(s) Oral every 4 hours PRN Dyspepsia  dextrose 40% Gel 15 Gram(s) Oral once PRN Blood Glucose LESS THAN 70 milliGRAM(s)/deciliter  glucagon  Injectable 1 milliGRAM(s) IntraMuscular once PRN Glucose LESS THAN 70 milligrams/deciliter  guaiFENesin    Syrup 100 milliGRAM(s) Oral every 6 hours PRN Cough  morphine  - Injectable 2 milliGRAM(s) IV Push every 6 hours PRN Severe Pain (7 - 10)  traMADol 50 milliGRAM(s) Oral every 8 hours PRN Moderate Pain (4 - 6)      Allergies  No Known Allergies        Vital Signs Last 24 Hrs  T(C): 37 (12 Sep 2019 17:00), Max: 37 (12 Sep 2019 17:00)  T(F): 98.6 (12 Sep 2019 17:00), Max: 98.6 (12 Sep 2019 17:00)  HR: 82 (12 Sep 2019 17:00) (70 - 91)  BP: 101/55 (12 Sep 2019 17:00) (100/52 - 104/59)  BP(mean): --  RR: 18 (12 Sep 2019 17:00) (16 - 18)  SpO2: 96% (12 Sep 2019 17:00) (94% - 98%)    PHYSICAL EXAM:  GENERAL: NAD  HEAD:  Atraumatic  EYES: PERRLA  NERVOUS SYSTEM:  A, O x 3, non focal  CHEST/LUNG: Clear  HEART: RRR  ABDOMEN: Soft, non tender  EXTREMITIES: 3+ edema       LABS:                        10.9   4.53  )-----------( 232      ( 11 Sep 2019 11:51 )             34.5     09-11    x   |  x   |  x   ----------------------------<  x   3.0<L>   |  x   |  x     Ca    9.5      11 Sep 2019 11:51  Phos  4.0     09-11  Mg     2.2     09-11          CAPILLARY BLOOD GLUCOSE      POCT Blood Glucose.: 156 mg/dL (12 Sep 2019 16:15)  POCT Blood Glucose.: 117 mg/dL (12 Sep 2019 11:24)  POCT Blood Glucose.: 117 mg/dL (12 Sep 2019 08:10)  POCT Blood Glucose.: 151 mg/dL (11 Sep 2019 22:06)      RADIOLOGY & ADDITIONAL TESTS:    Imaging Personally Reviewed:  [ ] YES  [ ] NO    Consultant(s) Notes Reviewed:  [ ] YES  [ ] NO    Care Discussed with Consultants/Other Providers [ ] YES  [ ] NO

## 2019-09-12 NOTE — PROGRESS NOTE ADULT - SUBJECTIVE AND OBJECTIVE BOX
GERMÁN RETANA LIJ  [phone]   1985 DOA 2019 DR ANGLE CANO   ALLERGY  nka       CONTACT   Cyndie Bernard  [facility] [id number]            Pulmonary Critical Care followup  requested for -9/15/2019    by Dr Shirley Mejia  from Dr Choudhury   Patient examined chart reviewed    HOSPITAL ADMISSION   PATIENT CAME  FROM (if information available)        TYPE OF VISIT      Subsequent Pulmonary followup     REASON FOR VISIT  PLEASE SEE PROBLEM LIST/ASSESSMENT AND RECOMMENDATIONS     PATIENT GERMÁN RETANA LIKRISTEN  [phone]   1985 DOA 2019 [address] ANGLE CANO     VITALS/LABS       2019 afeb 82 101/55 18 96%   2019 W 4.5 Hb 10.9 Plt 232 Na 129 K 3 CO2 29 Cr .9           REVIEW OF SYMPTOMS     NOTE Noteworthy changes  if any  in ROS and PE are also entered  in note  below      Able to give ROS  Yes     RELIABLE No   CONSTITUTIONAL Weakness Yes  Chills No Vision changes No  ENDOCRINE No unexplained hair loss No heat or cold intolerance    ALLERGY No hives  Sore throat No   RESP Coughing blood no  Shortness of breath YES   NEURO No Headache  Confusion Pain neck No   CARDIAC No Chest pain No Palpitations   GI No Pain abdomen NO   Vomiting NO     PHYSICAL EXAM    HEENT Unremarkable PERRLA atraumatic   RESP Fair air entry EXP prolonged    Harsh breath sound Resp distres mild   CARDIAC S1 S2 No S3     NO JVD    ABDOMEN SOFT BS PRESENT NOT DISTENDED No hepatosplenomegaly PEDAL EDEMA present No calf tenderness  NO rash   GENERAL Not TOXIC looking    PATIENT GERMÁN RETANA LIJ  [phone]   1985 DOA 2019 [address] ANGLE CANO                            Pt description                          35 yo woman with severe pHTN (group I/II, mPAP 67; W 9; RVR 9 on RHC 2018,  previously on Adempas), mildly positive anti-cardiolipin Ab and RAD admitted with decompensated right heart failure and volume overload  and DIANN. Found to have acute LLE DVT.

## 2019-09-12 NOTE — PROGRESS NOTE ADULT - PROBLEM SELECTOR PLAN 3
ekg obtained st and t wave changes unchanged from admission ekg.  cardiac enzymes noted troponin down trending from 9/5/19 (labs)  onsult cardiology as pt will need different heart failure specialist at d/c she is unhappy with dr. watson

## 2019-09-12 NOTE — PROGRESS NOTE ADULT - PROBLEM SELECTOR PLAN 1
Continue Bumex 2 h Q 8, metolazone 10 mg , also on aldactone.  Mild decreased EF, severe pulm HTN  Pt non compliant with fluid restriction  Improved volume status

## 2019-09-12 NOTE — PROGRESS NOTE ADULT - SUBJECTIVE AND OBJECTIVE BOX
Patient is a 34y old  Female who presents with a chief complaint of Dyspnea (11 Sep 2019 14:26)    PAST MEDICAL & SURGICAL HISTORY:  Former smoker, stopped smoking in distant past  Vitamin D deficiency  Morbid obesity due to excess calories  Edema extremities  Diabetes mellitus  Hyperbilirubinemia  COPD (chronic obstructive pulmonary disease)  Anticoagulation goal of INR 2 to 3  Anticardiolipin antibody positive  Congestive heart failure, unspecified HF chronicity, unspecified heart failure type  Moderate asthma, unspecified whether complicated, unspecified whether persistent  COPD (chronic obstructive pulmonary disease)  Essential hypertension  Pulmonary HTN  Morbid obesity    INTERVAL HISTORY: Resting in bed, not in any acute distress   	  MEDICATIONS:  MEDICATIONS  (STANDING):  apixaban 10 milliGRAM(s) Oral two times a day  aspirin enteric coated 81 milliGRAM(s) Oral daily  buDESOnide 160 MICROgram(s)/formoterol 4.5 MICROgram(s) Inhaler 2 Puff(s) Inhalation two times a day  buMETAnide Injectable 2 milliGRAM(s) IV Push every 8 hours  dextrose 5%. 1000 milliLiter(s) (50 mL/Hr) IV Continuous <Continuous>  dextrose 50% Injectable 12.5 Gram(s) IV Push once  dextrose 50% Injectable 25 Gram(s) IV Push once  dextrose 50% Injectable 25 Gram(s) IV Push once  insulin lispro (HumaLOG) corrective regimen sliding scale   SubCutaneous three times a day before meals  insulin lispro (HumaLOG) corrective regimen sliding scale   SubCutaneous at bedtime  levothyroxine 37.5 MICROGram(s) Oral daily  metolazone 10 milliGRAM(s) Oral daily  nystatin Powder 1 Application(s) Topical two times a day  potassium chloride    Tablet ER 20 milliEquivalent(s) Oral daily  potassium chloride   Powder 20 milliEquivalent(s) Oral daily  riociguat 1.5 milliGRAM(s) Oral three times a day  sertraline 50 milliGRAM(s) Oral daily  simvastatin 40 milliGRAM(s) Oral at bedtime  spironolactone 25 milliGRAM(s) Oral every 12 hours    MEDICATIONS  (PRN):  acetaminophen   Tablet .. 650 milliGRAM(s) Oral every 6 hours PRN Mild Pain (1 - 3)  dextrose 40% Gel 15 Gram(s) Oral once PRN Blood Glucose LESS THAN 70 milliGRAM(s)/deciliter  glucagon  Injectable 1 milliGRAM(s) IntraMuscular once PRN Glucose LESS THAN 70 milligrams/deciliter  guaiFENesin    Syrup 100 milliGRAM(s) Oral every 6 hours PRN Cough  morphine  - Injectable 2 milliGRAM(s) IV Push every 6 hours PRN Severe Pain (7 - 10)  traMADol 50 milliGRAM(s) Oral every 8 hours PRN Moderate Pain (4 - 6)    Vitals:  T(F): 97.7 (09-12-19 @ 12:37), Max: 98.9 (09-11-19 @ 18:00)  HR: 83 (09-12-19 @ 12:37) (70 - 91)  BP: 104/59 (09-12-19 @ 12:37) (100/52 - 110/62)  RR: 16 (09-12-19 @ 12:37) (16 - 18)  SpO2: 95% (09-12-19 @ 12:37) (94% - 98%)  Wt(kg): -- 105.6 kg    09-12 @ 07:01  -  09-12 @ 12:48  --------------------------------------------------------  IN:    Oral Fluid: 350 mL  Total IN: 350 mL    OUT:  Total OUT: 0 mL    Total NET: 350 mL    PHYSICAL EXAM:  Neuro: Awake, responsive  CV: S1 S2 RRR  Lungs: CTABL  GI: Soft, BS +, ND, NT  Extremities: No edema    RADIOLOGY: < from: Xray Chest 1 View- PORTABLE-Urgent (09.09.19 @ 09:14) >  Lungs: There are no lung consolidations.  Heart: There is cardiomegaly.  Mediastinum: Stable right hilar prominence.    IMPRESSION:    No lung consolidations.    < end of copied text >    DIAGNOSTIC TESTING:    [x ] Echocardiogram: < from: TTE Echo Doppler w/o Cont (07.26.19 @ 08:44) >   1. Left ventricular ejection fraction, by visual estimation, is 45 to   50%.   2. Mildly decreased global left ventricular systolic function.   3. Elevated mean left atrial pressure.   4. Global cardiomyopathy.   5. Increased LV wall thickness.   6. Normal left ventricular internal cavity size.   7. Right ventricular pressure overload.   8. Spectral Doppler shows impaired relaxation pattern of left   ventricular myocardial filling (Grade I diastolic dysfunction).   9. There is mild asymmetric left ventricular hypertrophy.  10. Pulmonary hypertension.  11. Severely enlarged right ventricle.  12. Severely reduced RV systolic function.  13. RV ejection fraction 34%.  14. Small pericardial effusion.  15. Degenerative mitral valve.  16. Mild mitral annular calcification.  17. Mild mitral valve regurgitation.  18. Moderate tricuspid regurgitation.  19. Structurally normal tricuspid valve, with normal leaflet excursion.  20. Mild aortic regurgitation.  21. Mild to moderate pulmonic valve regurgitation.  22. Structurally normal pulmonic valve, with normal leaflet excursion.  23. Estimated pulmonary artery systolic pressure is 87.1 mmHg assuming a   right atrial pressure of 20 mmHg, which is consistent with severe   pulmonary hypertension.  24. Pulmonary hypertension is present.  25. The main pulmonary artery is normal in size.  26. The right atrial pressure is moderately elevated.    < end of copied text >    [ x]  Catheterization: < from: Cardiac Cath Lab - Adult (08.10.18 @ 10:48) >  DIAGNOSTIC IMPRESSIONS: Severe Pulmonary Hypertension with no response with  80ppm of Nitric Oxide  INTERVENTIONAL IMPRESSIONS: Severe Pulmonary Hypertension with no response with  80ppm of Nitric Oxide    < end of copied text >    < from: Cardiac Cath Lab (12.30.13 @ 14:08) >  VENTRICLES: No LV gram was performed; however, a recent echocardiogram  demonstrated normal global and regional LV function.  CORONARY VESSELS: The coronary circulation is right dominant.  LM:   --LM: Normal. The left main coronary artery appeared to course  posterior to the aorta and pulmonary artery.  LAD:   --  LAD: Normal.  CX:   --  Circumflex: Normal.  RCA:   --  RCA: Normal. The vessel arose anomalously high and anterior.    < end of copied text >    LABS:	 	    CARDIAC MARKERS:  Troponin I, Serum: .073 ng/mL (09-10 @ 01:48)  Troponin I, Serum: .066 ng/mL (09-09 @ 18:08)    11 Sep 2019 18:17    x      |  x      |  x      ----------------------------<  x      3.0     |  x      |  x      11 Sep 2019 11:51    129    |  87     |  12     ----------------------------<  107    2.8     |  29     |  0.94     Ca    9.5        11 Sep 2019 11:51  Phos  4.0       11 Sep 2019 11:51  Mg     2.2       11 Sep 2019 11:51                        10.9   4.53  )-----------( 232      ( 11 Sep 2019 11:51 )             34.5 Patient is a 34y old  Female who presents with a chief complaint of Dyspnea (11 Sep 2019 14:26)    PAST MEDICAL & SURGICAL HISTORY:  Former smoker, stopped smoking in distant past  Vitamin D deficiency  Morbid obesity due to excess calories  Edema extremities  Diabetes mellitus  Hyperbilirubinemia  COPD (chronic obstructive pulmonary disease)  Anticoagulation goal of INR 2 to 3  Anticardiolipin antibody positive  Congestive heart failure, unspecified HF chronicity, unspecified heart failure type  Moderate asthma, unspecified whether complicated, unspecified whether persistent  Essential hypertension  Pulmonary HTN  Morbid obesity    INTERVAL HISTORY: Resting in bed, not in any acute distress, c/o poor appetite    	  MEDICATIONS:  MEDICATIONS  (STANDING):  apixaban 10 milliGRAM(s) Oral two times a day  aspirin enteric coated 81 milliGRAM(s) Oral daily  buDESOnide 160 MICROgram(s)/formoterol 4.5 MICROgram(s) Inhaler 2 Puff(s) Inhalation two times a day  buMETAnide Injectable 2 milliGRAM(s) IV Push every 8 hours  insulin lispro (HumaLOG) corrective regimen sliding scale   SubCutaneous three times a day before meals  insulin lispro (HumaLOG) corrective regimen sliding scale   SubCutaneous at bedtime  levothyroxine 37.5 MICROGram(s) Oral daily  metolazone 10 milliGRAM(s) Oral daily  nystatin Powder 1 Application(s) Topical two times a day  potassium chloride    Tablet ER 20 milliEquivalent(s) Oral daily  potassium chloride   Powder 20 milliEquivalent(s) Oral daily  riociguat 1.5 milliGRAM(s) Oral three times a day  sertraline 50 milliGRAM(s) Oral daily  simvastatin 40 milliGRAM(s) Oral at bedtime  spironolactone 25 milliGRAM(s) Oral every 12 hours    MEDICATIONS  (PRN):  acetaminophen   Tablet .. 650 milliGRAM(s) Oral every 6 hours PRN Mild Pain (1 - 3)  dextrose 40% Gel 15 Gram(s) Oral once PRN Blood Glucose LESS THAN 70 milliGRAM(s)/deciliter  glucagon  Injectable 1 milliGRAM(s) IntraMuscular once PRN Glucose LESS THAN 70 milligrams/deciliter  guaiFENesin    Syrup 100 milliGRAM(s) Oral every 6 hours PRN Cough  morphine  - Injectable 2 milliGRAM(s) IV Push every 6 hours PRN Severe Pain (7 - 10)  traMADol 50 milliGRAM(s) Oral every 8 hours PRN Moderate Pain (4 - 6)    Vitals:  T(F): 97.7 (09-12-19 @ 12:37), Max: 98.9 (09-11-19 @ 18:00)  HR: 83 (09-12-19 @ 12:37) (70 - 91)  BP: 104/59 (09-12-19 @ 12:37) (100/52 - 110/62)  RR: 16 (09-12-19 @ 12:37) (16 - 18)  SpO2: 95% (09-12-19 @ 12:37) (94% - 98%)  Wt(kg): -- 105.6 kg    09-12 @ 07:01  -  09-12 @ 12:48  --------------------------------------------------------  IN:    Oral Fluid: 350 mL  Total IN: 350 mL    OUT:  Total OUT: 0 mL    Total NET: 350 mL    PHYSICAL EXAM:  Neuro: Awake, responsive  CV: S1 S2 RRR  Lungs: CTABL  GI: Soft, BS +, ND, NT  Extremities: +++ LE edema    RADIOLOGY: < from: Xray Chest 1 View- PORTABLE-Urgent (09.09.19 @ 09:14) >  Lungs: There are no lung consolidations.  Heart: There is cardiomegaly.  Mediastinum: Stable right hilar prominence.    IMPRESSION:    No lung consolidations.    < end of copied text >    DIAGNOSTIC TESTING:    [x ] Echocardiogram: < from: TTE Echo Doppler w/o Cont (07.26.19 @ 08:44) >   1. Left ventricular ejection fraction, by visual estimation, is 45 to   50%.   2. Mildly decreased global left ventricular systolic function.   3. Elevated mean left atrial pressure.   4. Global cardiomyopathy.   5. Increased LV wall thickness.   6. Normal left ventricular internal cavity size.   7. Right ventricular pressure overload.   8. Spectral Doppler shows impaired relaxation pattern of left   ventricular myocardial filling (Grade I diastolic dysfunction).   9. There is mild asymmetric left ventricular hypertrophy.  10. Pulmonary hypertension.  11. Severely enlarged right ventricle.  12. Severely reduced RV systolic function.  13. RV ejection fraction 34%.  14. Small pericardial effusion.  15. Degenerative mitral valve.  16. Mild mitral annular calcification.  17. Mild mitral valve regurgitation.  18. Moderate tricuspid regurgitation.  19. Structurally normal tricuspid valve, with normal leaflet excursion.  20. Mild aortic regurgitation.  21. Mild to moderate pulmonic valve regurgitation.  22. Structurally normal pulmonic valve, with normal leaflet excursion.  23. Estimated pulmonary artery systolic pressure is 87.1 mmHg assuming a   right atrial pressure of 20 mmHg, which is consistent with severe   pulmonary hypertension.  24. Pulmonary hypertension is present.  25. The main pulmonary artery is normal in size.  26. The right atrial pressure is moderately elevated.    < end of copied text >    [ x]  Catheterization: < from: Cardiac Cath Lab - Adult (08.10.18 @ 10:48) >  DIAGNOSTIC IMPRESSIONS: Severe Pulmonary Hypertension with no response with  80ppm of Nitric Oxide  INTERVENTIONAL IMPRESSIONS: Severe Pulmonary Hypertension with no response with  80ppm of Nitric Oxide    < end of copied text >    < from: Cardiac Cath Lab (12.30.13 @ 14:08) >  VENTRICLES: No LV gram was performed; however, a recent echocardiogram  demonstrated normal global and regional LV function.  CORONARY VESSELS: The coronary circulation is right dominant.  LM:   --LM: Normal. The left main coronary artery appeared to course  posterior to the aorta and pulmonary artery.  LAD:   --  LAD: Normal.  CX:   --  Circumflex: Normal.  RCA:   --  RCA: Normal. The vessel arose anomalously high and anterior.    < end of copied text >    LABS:	 	    CARDIAC MARKERS:  Troponin I, Serum: .073 ng/mL (09-10 @ 01:48)  Troponin I, Serum: .066 ng/mL (09-09 @ 18:08)    11 Sep 2019 18:17    x      |  x      |  x      ----------------------------<  x      3.0     |  x      |  x      11 Sep 2019 11:51    129    |  87     |  12     ----------------------------<  107    2.8     |  29     |  0.94     Ca    9.5        11 Sep 2019 11:51  Phos  4.0       11 Sep 2019 11:51  Mg     2.2       11 Sep 2019 11:51                        10.9   4.53  )-----------( 232      ( 11 Sep 2019 11:51 )             34.5

## 2019-09-12 NOTE — PROGRESS NOTE ADULT - ASSESSMENT
PATIENT GERMÁN RETANA LIKRISTEN  [phone]   1985 DOA 2019 DR ANGLE CANO                            PULMONARY/CRITICAL CARE ASSESSMENT/RECOMMENDATIONS LIST                   PULMONARY HYPERTENSION  2019 ef 45% dd1 severe enlarged ra rv pressure overload rvsp 87 ra 20 rv ef 34%   riociguat 1.5x3 ()   Cont Rx     R HF  2019 105k   spironolactone 25.2 ()   metolazone 10 ()   bumex 2.3 ()   1 l fr lo Na ()   Cont rx Monitor lytes    DVT   2019 v duplex extensive acute nonocclusive thrombus l ext iliac and l cf vein and l greater saphenous   Apixaban 10.2 ()   A/R 2019 Suggest changing ac to coumadin as apixaban may not be effective in setting of apla Consider getting hemat recommendations re choice of anticoagulant     COPD ASTHMA   symbicort ()     DIANN   2019 Cr .9   Monitor lytes                      DM   insulin               TIME SPENT Over 25 minutes aggregate care time spent on encounter; activities included   direct pt care, counseling and/or coordinating care reviewing notes, lab data/ imaging , discussion with multidisciplinary team/ pt /family. Risks, benefits, alternatives  discussed in detail.

## 2019-09-12 NOTE — PROGRESS NOTE ADULT - ASSESSMENT
35 yo female with end stage lung disease due to idiopathic/primary pulmonary HTN and Anticardiolipin antibodies.  Admitted with symptoms of worsening right heart failure. cardiac cath negative in the past for any CAD in 2013.   Demand troponin levels noted.  would try to more aggressively diurese her but this will ultimately be limited by her blood pressures being chronically low   Patient has lost 5 kg since readmission but is still clearly suffering from symptoms of cor pulmonale. No recent weights. Pt refusing to be weighed   She may qualify for a lung transplant, but has scheduled appointment with Dr. Unger on 9/12 to discuss care further.  Pt states she is "very concerned about her condition, and just want to cry most of the time"  Plan:  cont with IV Bumex/ Zaroxolyn   monitor renal function and electrolytes   cont with antidepressant meds, ? psych eval  cont with synthroid  cont with Eliquis for DVT  Cont with supplemental O2/ Riociguat  cont with asa/statin    Patient was told to get SKYE evaluation in the past and follow up with DR. Bharath Adamson, this should be arranged as out patient on discharge.  Pt has scheduled appointment with Dr. Unger on 9/12 @ pul HTN clinic 33 yo female with end stage lung disease due to idiopathic/primary pulmonary HTN and Anticardiolipin antibodies.  Admitted with symptoms of worsening right heart failure. cardiac cath negative in the past for any CAD in 2013.   Demand troponin levels noted.  Patient has lost 10 kg since readmission but is still clearly suffering from symptoms of cor pulmonale.   She may qualify for a lung transplant, but has scheduled appointment with Dr. Unger on 9/12 to discuss care further.  ·	pHTN/decompensated right heart failure    Plan:  cont with IV Bumex/ Zaroxolyn /aldactone  Can add midodrine if needed for BP support   monitor renal function and electrolytes, daily weight    cont with antidepressant meds, ? psych eval  cont with synthroid  cont with Eliquis for DVT  Cont with supplemental O2/ Riociguat  cont with asa/statin    Patient was told to get SKYE evaluation in the past and follow up with Dr. Bharath Adamson, this should be arranged as out patient on discharge.  Pt has scheduled appointment with Dr. Unger on 9/12 @ pul HTN clinic 35 yo female with end stage lung disease due to idiopathic/primary pulmonary HTN and Anticardiolipin antibodies.  Admitted with symptoms of worsening right heart failure. cardiac cath negative for any CAD in 2013.   Demand troponin levels noted.  Patient has lost 10 kg since readmission but is still clearly suffering from symptoms of cor pulmonale.   She may qualify for a lung transplant,  had scheduled appointment with Dr. Unger to discuss care further.    ·	Cor pulmonale  ·	DVT  ·	Hypokalemia    Plan:  cont with IV Bumex/ Zaroxolyn /aldactone, pt would like to have her home dose Bumex increased   Can add midodrine if needed for BP support   monitor renal function and electrolytes, daily weight    cont with zoloft   cont with synthroid  cont with Eliquis for DVT  Cont with supplemental O2/ Riociguat, pulmonary following   cont with asa/statin    Patient was told to get SKYE evaluation in the past and follow up with Dr. Bharath Adamson, this should be arranged as out patient on discharge.  Pt had scheduled appointment with Dr. Unger today @ pul HTN clinic, pt will reschedule it for early next week

## 2019-09-13 DIAGNOSIS — E87.1 HYPO-OSMOLALITY AND HYPONATREMIA: ICD-10-CM

## 2019-09-13 LAB
ALBUMIN SERPL ELPH-MCNC: 3.2 G/DL — LOW (ref 3.3–5)
ALP SERPL-CCNC: 89 U/L — SIGNIFICANT CHANGE UP (ref 40–120)
ALT FLD-CCNC: 37 U/L — SIGNIFICANT CHANGE UP (ref 12–78)
ANION GAP SERPL CALC-SCNC: 13 MMOL/L — SIGNIFICANT CHANGE UP (ref 5–17)
ANION GAP SERPL CALC-SCNC: 8 MMOL/L — SIGNIFICANT CHANGE UP (ref 5–17)
ANION GAP SERPL CALC-SCNC: 9 MMOL/L — SIGNIFICANT CHANGE UP (ref 5–17)
AST SERPL-CCNC: 56 U/L — HIGH (ref 15–37)
BASE EXCESS BLDA CALC-SCNC: 20.8 MMOL/L — HIGH (ref -2–2)
BILIRUB SERPL-MCNC: 5.6 MG/DL — HIGH (ref 0.2–1.2)
BLOOD GAS COMMENTS: SIGNIFICANT CHANGE UP
BLOOD GAS COMMENTS: SIGNIFICANT CHANGE UP
BLOOD GAS SOURCE: SIGNIFICANT CHANGE UP
BUN SERPL-MCNC: 13 MG/DL — SIGNIFICANT CHANGE UP (ref 7–23)
CALCIUM SERPL-MCNC: 9.3 MG/DL — SIGNIFICANT CHANGE UP (ref 8.5–10.1)
CALCIUM SERPL-MCNC: 9.3 MG/DL — SIGNIFICANT CHANGE UP (ref 8.5–10.1)
CALCIUM SERPL-MCNC: 9.4 MG/DL — SIGNIFICANT CHANGE UP (ref 8.5–10.1)
CHLORIDE SERPL-SCNC: 70 MMOL/L — LOW (ref 96–108)
CHLORIDE SERPL-SCNC: 70 MMOL/L — LOW (ref 96–108)
CHLORIDE SERPL-SCNC: 74 MMOL/L — LOW (ref 96–108)
CO2 SERPL-SCNC: 38 MMOL/L — HIGH (ref 22–31)
CO2 SERPL-SCNC: 43 MMOL/L — HIGH (ref 22–31)
CO2 SERPL-SCNC: 44 MMOL/L — HIGH (ref 22–31)
CREAT SERPL-MCNC: 0.99 MG/DL — SIGNIFICANT CHANGE UP (ref 0.5–1.3)
CREAT SERPL-MCNC: 1.01 MG/DL — SIGNIFICANT CHANGE UP (ref 0.5–1.3)
CREAT SERPL-MCNC: 1.08 MG/DL — SIGNIFICANT CHANGE UP (ref 0.5–1.3)
GLUCOSE BLDC GLUCOMTR-MCNC: 105 MG/DL — HIGH (ref 70–99)
GLUCOSE BLDC GLUCOMTR-MCNC: 107 MG/DL — HIGH (ref 70–99)
GLUCOSE BLDC GLUCOMTR-MCNC: 131 MG/DL — HIGH (ref 70–99)
GLUCOSE BLDC GLUCOMTR-MCNC: 138 MG/DL — HIGH (ref 70–99)
GLUCOSE SERPL-MCNC: 100 MG/DL — HIGH (ref 70–99)
GLUCOSE SERPL-MCNC: 104 MG/DL — HIGH (ref 70–99)
GLUCOSE SERPL-MCNC: 153 MG/DL — HIGH (ref 70–99)
HCO3 BLDA-SCNC: 46 MMOL/L — HIGH (ref 21–29)
HCT VFR BLD CALC: 34.4 % — LOW (ref 34.5–45)
HGB BLD-MCNC: 10.9 G/DL — LOW (ref 11.5–15.5)
HOROWITZ INDEX BLDA+IHG-RTO: 32 — SIGNIFICANT CHANGE UP
INR BLD: 2.61 RATIO — HIGH (ref 0.88–1.16)
MAGNESIUM SERPL-MCNC: 2.1 MG/DL — SIGNIFICANT CHANGE UP (ref 1.6–2.6)
MAGNESIUM SERPL-MCNC: 2.2 MG/DL — SIGNIFICANT CHANGE UP (ref 1.6–2.6)
MCHC RBC-ENTMCNC: 23.2 PG — LOW (ref 27–34)
MCHC RBC-ENTMCNC: 31.7 GM/DL — LOW (ref 32–36)
MCV RBC AUTO: 73.3 FL — LOW (ref 80–100)
NRBC # BLD: 0 /100 WBCS — SIGNIFICANT CHANGE UP (ref 0–0)
PCO2 BLDA: 48 MMHG — HIGH (ref 32–46)
PH BLD: 7.59 — HIGH (ref 7.35–7.45)
PHOSPHATE SERPL-MCNC: 4 MG/DL — SIGNIFICANT CHANGE UP (ref 2.5–4.5)
PLATELET # BLD AUTO: 263 K/UL — SIGNIFICANT CHANGE UP (ref 150–400)
PO2 BLDA: 47 MMHG — CRITICAL LOW (ref 74–108)
POTASSIUM SERPL-MCNC: 2.1 MMOL/L — CRITICAL LOW (ref 3.5–5.3)
POTASSIUM SERPL-MCNC: 2.1 MMOL/L — CRITICAL LOW (ref 3.5–5.3)
POTASSIUM SERPL-MCNC: 2.2 MMOL/L — CRITICAL LOW (ref 3.5–5.3)
POTASSIUM SERPL-SCNC: 2.1 MMOL/L — CRITICAL LOW (ref 3.5–5.3)
POTASSIUM SERPL-SCNC: 2.1 MMOL/L — CRITICAL LOW (ref 3.5–5.3)
POTASSIUM SERPL-SCNC: 2.2 MMOL/L — CRITICAL LOW (ref 3.5–5.3)
PROT SERPL-MCNC: 8.6 GM/DL — HIGH (ref 6–8.3)
PROTHROM AB SERPL-ACNC: 30.1 SEC — HIGH (ref 10–12.9)
RBC # BLD: 4.69 M/UL — SIGNIFICANT CHANGE UP (ref 3.8–5.2)
RBC # FLD: 26.4 % — HIGH (ref 10.3–14.5)
SAO2 % BLDA: 83 % — LOW (ref 92–96)
SODIUM SERPL-SCNC: 122 MMOL/L — LOW (ref 135–145)
SODIUM SERPL-SCNC: 122 MMOL/L — LOW (ref 135–145)
SODIUM SERPL-SCNC: 125 MMOL/L — LOW (ref 135–145)
WBC # BLD: 5.4 K/UL — SIGNIFICANT CHANGE UP (ref 3.8–10.5)
WBC # FLD AUTO: 5.4 K/UL — SIGNIFICANT CHANGE UP (ref 3.8–10.5)

## 2019-09-13 PROCEDURE — 99232 SBSQ HOSP IP/OBS MODERATE 35: CPT

## 2019-09-13 PROCEDURE — 99233 SBSQ HOSP IP/OBS HIGH 50: CPT

## 2019-09-13 PROCEDURE — 93010 ELECTROCARDIOGRAM REPORT: CPT

## 2019-09-13 PROCEDURE — 99231 SBSQ HOSP IP/OBS SF/LOW 25: CPT

## 2019-09-13 RX ORDER — POTASSIUM CHLORIDE 20 MEQ
40 PACKET (EA) ORAL DAILY
Refills: 0 | Status: DISCONTINUED | OUTPATIENT
Start: 2019-09-13 | End: 2019-09-13

## 2019-09-13 RX ORDER — ENOXAPARIN SODIUM 100 MG/ML
100 INJECTION SUBCUTANEOUS EVERY 12 HOURS
Refills: 0 | Status: DISCONTINUED | OUTPATIENT
Start: 2019-09-13 | End: 2019-09-14

## 2019-09-13 RX ORDER — POTASSIUM CHLORIDE 20 MEQ
10 PACKET (EA) ORAL
Refills: 0 | Status: COMPLETED | OUTPATIENT
Start: 2019-09-13 | End: 2019-09-13

## 2019-09-13 RX ORDER — WARFARIN SODIUM 2.5 MG/1
3 TABLET ORAL ONCE
Refills: 0 | Status: COMPLETED | OUTPATIENT
Start: 2019-09-13 | End: 2019-09-13

## 2019-09-13 RX ORDER — POTASSIUM CHLORIDE 20 MEQ
40 PACKET (EA) ORAL EVERY 4 HOURS
Refills: 0 | Status: DISCONTINUED | OUTPATIENT
Start: 2019-09-13 | End: 2019-09-13

## 2019-09-13 RX ORDER — POTASSIUM CHLORIDE 20 MEQ
10 PACKET (EA) ORAL
Refills: 0 | Status: DISCONTINUED | OUTPATIENT
Start: 2019-09-13 | End: 2019-09-13

## 2019-09-13 RX ORDER — SPIRONOLACTONE 25 MG/1
25 TABLET, FILM COATED ORAL ONCE
Refills: 0 | Status: COMPLETED | OUTPATIENT
Start: 2019-09-13 | End: 2019-09-13

## 2019-09-13 RX ORDER — POTASSIUM CHLORIDE 20 MEQ
40 PACKET (EA) ORAL EVERY 4 HOURS
Refills: 0 | Status: COMPLETED | OUTPATIENT
Start: 2019-09-13 | End: 2019-09-14

## 2019-09-13 RX ORDER — SPIRONOLACTONE 25 MG/1
50 TABLET, FILM COATED ORAL
Refills: 0 | Status: DISCONTINUED | OUTPATIENT
Start: 2019-09-14 | End: 2019-09-14

## 2019-09-13 RX ADMIN — SPIRONOLACTONE 25 MILLIGRAM(S): 25 TABLET, FILM COATED ORAL at 05:43

## 2019-09-13 RX ADMIN — Medication 100 MILLIEQUIVALENT(S): at 19:47

## 2019-09-13 RX ADMIN — RIOCIGUAT 1.5 MILLIGRAM(S): 1.5 TABLET, FILM COATED ORAL at 22:41

## 2019-09-13 RX ADMIN — Medication 37.5 MICROGRAM(S): at 05:43

## 2019-09-13 RX ADMIN — Medication 650 MILLIGRAM(S): at 00:22

## 2019-09-13 RX ADMIN — NYSTATIN CREAM 1 APPLICATION(S): 100000 CREAM TOPICAL at 18:37

## 2019-09-13 RX ADMIN — BUMETANIDE 2 MILLIGRAM(S): 0.25 INJECTION INTRAMUSCULAR; INTRAVENOUS at 05:42

## 2019-09-13 RX ADMIN — ENOXAPARIN SODIUM 100 MILLIGRAM(S): 100 INJECTION SUBCUTANEOUS at 18:36

## 2019-09-13 RX ADMIN — RIOCIGUAT 1.5 MILLIGRAM(S): 1.5 TABLET, FILM COATED ORAL at 05:42

## 2019-09-13 RX ADMIN — SERTRALINE 50 MILLIGRAM(S): 25 TABLET, FILM COATED ORAL at 12:48

## 2019-09-13 RX ADMIN — BUDESONIDE AND FORMOTEROL FUMARATE DIHYDRATE 2 PUFF(S): 160; 4.5 AEROSOL RESPIRATORY (INHALATION) at 18:37

## 2019-09-13 RX ADMIN — WARFARIN SODIUM 3 MILLIGRAM(S): 2.5 TABLET ORAL at 22:39

## 2019-09-13 RX ADMIN — BUDESONIDE AND FORMOTEROL FUMARATE DIHYDRATE 2 PUFF(S): 160; 4.5 AEROSOL RESPIRATORY (INHALATION) at 05:41

## 2019-09-13 RX ADMIN — APIXABAN 10 MILLIGRAM(S): 2.5 TABLET, FILM COATED ORAL at 05:44

## 2019-09-13 RX ADMIN — Medication 100 MILLIEQUIVALENT(S): at 22:36

## 2019-09-13 RX ADMIN — Medication 100 MILLIEQUIVALENT(S): at 11:53

## 2019-09-13 RX ADMIN — Medication 40 MILLIEQUIVALENT(S): at 15:52

## 2019-09-13 RX ADMIN — BUMETANIDE 2 MILLIGRAM(S): 0.25 INJECTION INTRAMUSCULAR; INTRAVENOUS at 11:04

## 2019-09-13 RX ADMIN — NYSTATIN CREAM 1 APPLICATION(S): 100000 CREAM TOPICAL at 05:48

## 2019-09-13 RX ADMIN — Medication 40 MILLIEQUIVALENT(S): at 22:39

## 2019-09-13 RX ADMIN — SIMVASTATIN 40 MILLIGRAM(S): 20 TABLET, FILM COATED ORAL at 22:40

## 2019-09-13 RX ADMIN — Medication 40 MILLIEQUIVALENT(S): at 19:48

## 2019-09-13 RX ADMIN — Medication 100 MILLIEQUIVALENT(S): at 14:23

## 2019-09-13 RX ADMIN — SPIRONOLACTONE 25 MILLIGRAM(S): 25 TABLET, FILM COATED ORAL at 20:29

## 2019-09-13 RX ADMIN — SPIRONOLACTONE 25 MILLIGRAM(S): 25 TABLET, FILM COATED ORAL at 18:36

## 2019-09-13 RX ADMIN — Medication 650 MILLIGRAM(S): at 00:52

## 2019-09-13 RX ADMIN — Medication 81 MILLIGRAM(S): at 12:48

## 2019-09-13 RX ADMIN — RIOCIGUAT 1.5 MILLIGRAM(S): 1.5 TABLET, FILM COATED ORAL at 13:19

## 2019-09-13 NOTE — PROGRESS NOTE ADULT - ASSESSMENT
33 yo female with end stage lung disease due to idiopathic/primary pulmonary HTN and Anticardiolipin antibodies.  Admitted with symptoms of worsening right heart failure. cardiac cath negative for any CAD in 2013.   Demand troponin levels noted.  Patient has lost ~15 kg since readmission but is still clearly suffering from symptoms of cor pulmonale.   She may qualify for a lung transplant,  had scheduled appointment with Dr. Unger to discuss care further.    ·	Cor pulmonale  ·	DVT  ·	Hypokalemia    Plan:  Currently on IV Bumex 2 mg Q 8/ Zaroxolyn 10 mg /aldactone 25 mg Bid, now with hypokalemia/hyponatremia, consider decreasing bumex/holding zaroxolyn/increasing aldactone, pt on aldactone 50 mg bid at home, awaiting renal consult.  potassium supplementation, cardiacmonitoring is imperative in hypokalemia with K  of 2.1. pt refusing to be upgraded to telebed     Can add midodrine if needed for BP support   monitor renal function and electrolytes, daily weight    cont with zoloft/psych eval appreciated  cont with synthroid  Started on Lovenox bridge to coumadin in place of Eliquis as per pulm recommendation   Cont with supplemental O2/ Riociguat, pulmonary following   cont with asa/statin    Patient to have SKYE evaluation and Dr. Bharath Adamson, cardio follow up as out patient    Pt had scheduled appointment with Dr. Unger today @ pulm HTN clinic, pt will reschedule it for early next week 35 yo female with end stage lung disease due to idiopathic/primary pulmonary HTN and Anticardiolipin antibodies.  Admitted with symptoms of worsening right heart failure. cardiac cath negative for any CAD in 2013.   Demand troponin levels noted.  Patient has lost ~15 kg since readmission but is still clearly suffering from symptoms of cor pulmonale.   She may qualify for a lung transplant,  had scheduled appointment with Dr. Unger to discuss care further.    ·	Cor pulmonale  ·	DVT  ·	Hypokalemia  ·	Hyponatremia    Plan:  Currently on IV Bumex 2 mg Q 8/ Zaroxolyn 10 mg /aldactone 25 mg Bid, now with hypokalemia/hyponatremia, consider decreasing bumex/holding zaroxolyn/increasing aldactone, pt on aldactone 50 mg bid at home, awaiting renal consult.  potassium supplementation, cardiac monitoring is imperative in hypokalemia with K  of 2.1. pt refusing to be upgraded to telebed      Can add midodrine if needed for BP support   Repeat BMP tonight, monitor renal function and electrolytes, daily weight    cont with zoloft/psych eval appreciated  cont with synthroid  Started on Lovenox bridge to coumadin in place of Eliquis as per pulm recommendation   Cont with supplemental O2/ Riociguat, pulmonary following   cont with asa/statin    Patient to have SKYE evaluation and Dr. Bharath Adamson, cardio follow up as out patient    Pt had scheduled appointment with Dr. Unger today @ pulm HTN clinic, pt will reschedule it for early next week 35 yo female with end stage lung disease due to idiopathic/primary pulmonary HTN and Anticardiolipin antibodies.  Admitted with symptoms of worsening right heart failure. cardiac cath negative for any CAD in 2013.   Demand troponin levels noted.  Patient has lost ~15 kg since readmission but is still clearly suffering from symptoms of cor pulmonale.   She may qualify for a lung transplant,  had scheduled appointment with Dr. Unger to discuss care further.    ·	Cor pulmonale  ·	DVT  ·	Hypokalemia  ·	Hyponatremia    Plan:  Currently on IV Bumex 2 mg Q 8/ Zaroxolyn 10 mg /aldactone 25 mg Bid, now with hypokalemia/hyponatremia, consider decreasing bumex/holding zaroxolyn/increasing aldactone, pt on aldactone 50 mg bid at home, awaiting renal consult.  potassium supplementation, cardiac monitoring is imperative in hypokalemia with K  of 2.1. pt refusing to be upgraded to telebed      Can add midodrine if needed for BP support   Repeat BMP tonight, monitor renal function and electrolytes, daily weight    cont with zoloft/psych eval appreciated  cont with synthroid  Started on Lovenox bridge to coumadin in place of Eliquis as per pulm recommendation   Cont with supplemental O2/ Riociguat, pulmonary following   cont with asa/statin    Patient to have SKYE evaluation and Dr. Bharath Adamson, cardio follow up as out patient    Pt had scheduled appointment with Dr. Unger  @ pul HTN clinic, pt will reschedule it for early next week 35 yo female with end stage lung disease due to idiopathic/primary pulmonary HTN and Anticardiolipin antibodies.  Admitted with symptoms of worsening right heart failure. cardiac cath negative for any CAD in 2013.   Demand troponin levels noted.  Patient has lost ~15 kg since readmission but is still clearly suffering from symptoms of cor pulmonale.   She may qualify for a lung transplant,  had scheduled appointment this week with Dr. Unger to discuss care further.    ·	Cor pulmonale  ·	DVT  ·	Hypokalemia  ·	Hyponatremia    Plan:  Currently on IV Bumex 2 mg Q 8/ Zaroxolyn 10 mg /aldactone 25 mg Bid, now with hypokalemia/hyponatremia, consider decreasing bumex/holding zaroxolyn/increasing aldactone, pt on aldactone 50 mg bid at home, awaiting renal consult.  potassium supplementation, cardiac monitoring is imperative in hypokalemia with K  of 2.1. pt refusing to be upgraded to telebed      Can add midodrine if needed for BP support   Repeat BMP tonight, monitor renal function and electrolytes, daily weight    cont with zoloft/psych eval appreciated  cont with synthroid  Started on Lovenox bridge to coumadin in place of Eliquis as per pulm recommendation   Cont with supplemental O2/ Riociguat, pulmonary following   cont with asa/statin    Patient to have SKYE evaluation and Dr. Bharath Adamson, cardio follow up as out patient    Pt had scheduled appointment with Dr. Unger  @ pul HTN clinic this week, pt will reschedule post discharge     Addendum  12 lead EKG with Prolonged QTc of 615, otherwise unchanged from previous EKG.  Discussed again with pt regarding the impact of hypokalemia on cardiac arrhythmia, pt now agreed to be upgraded to telebed and was transferred to telebed 35 yo female with end stage lung disease due to idiopathic/primary pulmonary HTN and Anticardiolipin antibodies.  Admitted with symptoms of worsening right heart failure. cardiac cath negative for any CAD in 2013.   Demand troponin levels noted.  Patient has lost ~15 kg since readmission but is still clearly suffering from symptoms of cor pulmonale.   She may qualify for a lung transplant,  had scheduled appointment this week with Dr. Unger to discuss care further.    ·	Cor pulmonale  ·	DVT  ·	Hypokalemia  ·	Hyponatremia    Plan:  Currently on IV Bumex 2 mg Q 8/ Zaroxolyn 10 mg /aldactone 25 mg Bid, now with hypokalemia/hyponatremia, consider decreasing bumex/holding zaroxolyn/increasing aldactone, pt on aldactone 50 mg bid at home, awaiting renal consult.  aggresive potassium replacement, cardiac monitoring is imperative in hypokalemia with K  of 2.1. pt refusing to be upgraded to tele-bed      Can add midodrine if needed for BP support   Repeat BMP tonight, monitor renal function and electrolytes, daily weight    cont with zoloft/psych eval appreciated  cont with synthroid  Started on Lovenox bridge to coumadin in place of Eliquis as per pulm recommendation   Cont with supplemental O2/ Riociguat, pulmonary following   cont with asa/statin    Patient to have SKYE evaluation and Dr. Bharath Adamson, cardio follow up as out patient    Pt had scheduled appointment with Dr. Unger  @ pul HTN clinic this week, pt will reschedule post discharge     Addendum:    12 lead EKG with Prolonged QTc of 615, otherwise unchanged from previous EKG.  Discussed again with pt regarding the impact of hypokalemia on cardiac arrhythmia, pt now agreed to be upgraded to tele-bed and pt was transferred to tele-bed, for repeat EKG in am

## 2019-09-13 NOTE — PROGRESS NOTE ADULT - SUBJECTIVE AND OBJECTIVE BOX
Patient is a 34y old  Female who presents with a chief complaint of Dyspnea (13 Sep 2019 14:17)    PAST MEDICAL & SURGICAL HISTORY:  Former smoker, stopped smoking in distant past  Vitamin D deficiency  Morbid obesity due to excess calories  Edema extremities  Diabetes mellitus  Hyperbilirubinemia  COPD (chronic obstructive pulmonary disease)  Anticoagulation goal of INR 2 to 3  Anticardiolipin antibody positive  Congestive heart failure, unspecified HF chronicity, unspecified heart failure type  Moderate asthma, unspecified whether complicated, unspecified whether persistent  Other secondary hypertension  Anticardiolipin antibody positive  Essential hypertension  Pulmonary HTN  Morbid obesity    INTERVAL HISTORY: Resting in bed, not in any acute distress   	  MEDICATIONS:  MEDICATIONS  (STANDING):  aspirin enteric coated 81 milliGRAM(s) Oral daily  buDESOnide 160 MICROgram(s)/formoterol 4.5 MICROgram(s) Inhaler 2 Puff(s) Inhalation two times a day  buMETAnide Injectable 2 milliGRAM(s) IV Push every 8 hours  dextrose 5%. 1000 milliLiter(s) (50 mL/Hr) IV Continuous <Continuous>  dextrose 50% Injectable 12.5 Gram(s) IV Push once  dextrose 50% Injectable 25 Gram(s) IV Push once  dextrose 50% Injectable 25 Gram(s) IV Push once  enoxaparin Injectable 100 milliGRAM(s) SubCutaneous every 12 hours  insulin lispro (HumaLOG) corrective regimen sliding scale   SubCutaneous three times a day before meals  insulin lispro (HumaLOG) corrective regimen sliding scale   SubCutaneous at bedtime  levothyroxine 37.5 MICROGram(s) Oral daily  metolazone 10 milliGRAM(s) Oral daily  nystatin Powder 1 Application(s) Topical two times a day  potassium chloride    Tablet ER 40 milliEquivalent(s) Oral every 4 hours  potassium chloride  10 mEq/100 mL IVPB 10 milliEquivalent(s) IV Intermittent every 1 hour  riociguat 1.5 milliGRAM(s) Oral three times a day  sertraline 50 milliGRAM(s) Oral daily  simvastatin 40 milliGRAM(s) Oral at bedtime  spironolactone 25 milliGRAM(s) Oral every 12 hours  warfarin 3 milliGRAM(s) Oral once    MEDICATIONS  (PRN):  acetaminophen   Tablet .. 650 milliGRAM(s) Oral every 6 hours PRN Mild Pain (1 - 3)  aluminum hydroxide/magnesium hydroxide/simethicone Suspension 30 milliLiter(s) Oral every 4 hours PRN Dyspepsia  dextrose 40% Gel 15 Gram(s) Oral once PRN Blood Glucose LESS THAN 70 milliGRAM(s)/deciliter  glucagon  Injectable 1 milliGRAM(s) IntraMuscular once PRN Glucose LESS THAN 70 milligrams/deciliter  guaiFENesin    Syrup 100 milliGRAM(s) Oral every 6 hours PRN Cough  morphine  - Injectable 2 milliGRAM(s) IV Push every 6 hours PRN Severe Pain (7 - 10)  traMADol 50 milliGRAM(s) Oral every 8 hours PRN Moderate Pain (4 - 6)    Vitals:  T(F): 97 (09-13-19 @ 11:38), Max: 98.6 (09-12-19 @ 17:00)  HR: 71 (09-13-19 @ 11:38) (71 - 83)  BP: 108/65 (09-13-19 @ 11:38) (101/55 - 117/62)  RR: 16 (09-13-19 @ 11:38) (16 - 18)  SpO2: 96% (09-13-19 @ 11:38) (90% - 98%)  Wt(kg): -- 103.8 kg    09-12 @ 07:01  -  09-13 @ 07:00  --------------------------------------------------------  IN:    Oral Fluid: 700 mL  Total IN: 700 mL    OUT:  Total OUT: 0 mL    Total NET: 700 mL      09-13 @ 07:01  -  09-13 @ 15:22  --------------------------------------------------------  IN:    Oral Fluid: 300 mL  Total IN: 300 mL    OUT:  Total OUT: 0 mL    Total NET: 300 mL    PHYSICAL EXAM:  Neuro: Awake, responsive  CV: S1 S2 RRR  Lungs: CTABL  GI: Soft, BS +, ND, NT  Extremities: ++ LE edema  	    ECG:  	  RADIOLOGY: < from: Xray Chest 1 View- PORTABLE-Urgent (09.09.19 @ 09:14) >  Lungs: There are no lung consolidations.  Heart: There is cardiomegaly.  Mediastinum: Stable right hilar prominence.    IMPRESSION:    No lung consolidations.    < end of copied text >    DIAGNOSTIC TESTING:    [x ] Echocardiogram: < from: TTE Echo Doppler w/o Cont (07.26.19 @ 08:44) >   1. Left ventricular ejection fraction, by visual estimation, is 45 to   50%.   2. Mildly decreased global left ventricular systolic function.   3. Elevated mean left atrial pressure.   4. Global cardiomyopathy.   5. Increased LV wall thickness.   6. Normal left ventricular internal cavity size.   7. Right ventricular pressure overload.   8. Spectral Doppler shows impaired relaxation pattern of left   ventricular myocardial filling (Grade I diastolic dysfunction).   9. There is mild asymmetric left ventricular hypertrophy.  10. Pulmonary hypertension.  11. Severely enlarged right ventricle.  12. Severely reduced RV systolic function.  13. RV ejection fraction 34%.  14. Small pericardial effusion.  15. Degenerative mitral valve.  16. Mild mitral annular calcification.  17. Mild mitral valve regurgitation.  18. Moderate tricuspid regurgitation.  19. Structurally normal tricuspid valve, with normal leaflet excursion.  20. Mild aortic regurgitation.  21. Mild to moderate pulmonic valve regurgitation.  22. Structurally normal pulmonic valve, with normal leaflet excursion.  23. Estimated pulmonary artery systolic pressure is 87.1 mmHg assuming a   right atrial pressure of 20 mmHg, which is consistent with severe   pulmonary hypertension.  24. Pulmonary hypertension is present.  25. The main pulmonary artery is normal in size.  26. The right atrial pressure is moderately elevated.    < end of copied text >    [ x]  Catheterization:     LABS:	 	    13 Sep 2019 07:38    125    |  74     |  13     ----------------------------<  104    2.1     |  38     |  1.01   11 Sep 2019 18:17    x      |  x      |  x      ----------------------------<  x      3.0     |  x      |  x      11 Sep 2019 11:51    129    |  87     |  12     ----------------------------<  107    2.8     |  29     |  0.94     Ca    9.4        13 Sep 2019 07:38  Phos  4.0       13 Sep 2019 08:44  Mg     2.1       13 Sep 2019 08:44    TPro  8.6    /  Alb  3.2    /  TBili  5.6    /  DBili  x      /  AST  56     /  ALT  37     /  AlkPhos  89     13 Sep 2019 07:38                        10.9   5.40  )-----------( 263      ( 13 Sep 2019 07:38 )             34.4 ,                       10.9   4.53  )-----------( 232      ( 11 Sep 2019 11:51 )             34.5

## 2019-09-13 NOTE — PROGRESS NOTE ADULT - PROBLEM SELECTOR PLAN 1
Continue Bumex 2 h Q 8, will decrease to BID  On metolazone 10 mg , will stop now   Also on aldactone.  Mild decreased EF, severe pulm HTN  Pt non compliant with fluid restriction  Improved volume status, lost > 20 lbs wt since admission

## 2019-09-13 NOTE — PROGRESS NOTE BEHAVIORAL HEALTH - NSBHFUPINTERVALHXFT_PSY_A_CORE
Patient feels a little better since admission; no specific complaints other than craving French fries. Spends her days texting or on the Internet via her phone. Looking forward to discharge home. Pain is better.

## 2019-09-13 NOTE — PROGRESS NOTE ADULT - PROBLEM SELECTOR PLAN 7
Pt was on coumadin at home for possible APLA, non compliant.  Switched to Eliquis inpt as pt unable to check regular INR  now S/P 7 days Eliquis, will restart coumadin per Dr. Choudhury.  Will need bridging with Lovenox.

## 2019-09-13 NOTE — PROGRESS NOTE ADULT - SUBJECTIVE AND OBJECTIVE BOX
Patient is a 34y old  Female who presents with a chief complaint of Dyspnea (13 Sep 2019 15:22)      INTERVAL HPI/OVERNIGHT EVENTS:  Pt was seen and examined, no acute events.    MEDICATIONS  (STANDING):  aspirin enteric coated 81 milliGRAM(s) Oral daily  buDESOnide 160 MICROgram(s)/formoterol 4.5 MICROgram(s) Inhaler 2 Puff(s) Inhalation two times a day  dextrose 5%. 1000 milliLiter(s) (50 mL/Hr) IV Continuous <Continuous>  dextrose 50% Injectable 12.5 Gram(s) IV Push once  dextrose 50% Injectable 25 Gram(s) IV Push once  dextrose 50% Injectable 25 Gram(s) IV Push once  enoxaparin Injectable 100 milliGRAM(s) SubCutaneous every 12 hours  insulin lispro (HumaLOG) corrective regimen sliding scale   SubCutaneous three times a day before meals  insulin lispro (HumaLOG) corrective regimen sliding scale   SubCutaneous at bedtime  levothyroxine 37.5 MICROGram(s) Oral daily  nystatin Powder 1 Application(s) Topical two times a day  potassium chloride    Tablet ER 40 milliEquivalent(s) Oral every 4 hours  potassium chloride  10 mEq/100 mL IVPB 10 milliEquivalent(s) IV Intermittent every 1 hour  riociguat 1.5 milliGRAM(s) Oral three times a day  sertraline 50 milliGRAM(s) Oral daily  simvastatin 40 milliGRAM(s) Oral at bedtime  spironolactone 25 milliGRAM(s) Oral every 12 hours  warfarin 3 milliGRAM(s) Oral once    MEDICATIONS  (PRN):  acetaminophen   Tablet .. 650 milliGRAM(s) Oral every 6 hours PRN Mild Pain (1 - 3)  aluminum hydroxide/magnesium hydroxide/simethicone Suspension 30 milliLiter(s) Oral every 4 hours PRN Dyspepsia  dextrose 40% Gel 15 Gram(s) Oral once PRN Blood Glucose LESS THAN 70 milliGRAM(s)/deciliter  glucagon  Injectable 1 milliGRAM(s) IntraMuscular once PRN Glucose LESS THAN 70 milligrams/deciliter  guaiFENesin    Syrup 100 milliGRAM(s) Oral every 6 hours PRN Cough  morphine  - Injectable 2 milliGRAM(s) IV Push every 6 hours PRN Severe Pain (7 - 10)  traMADol 50 milliGRAM(s) Oral every 8 hours PRN Moderate Pain (4 - 6)      Allergies  No Known Allergies      Vital Signs Last 24 Hrs  T(C): 36.8 (13 Sep 2019 16:35), Max: 36.8 (12 Sep 2019 23:39)  T(F): 98.2 (13 Sep 2019 16:35), Max: 98.2 (12 Sep 2019 23:39)  HR: 78 (13 Sep 2019 17:13) (69 - 83)  BP: 109/50 (13 Sep 2019 16:35) (102/55 - 117/62)  BP(mean): --  RR: 17 (13 Sep 2019 16:35) (16 - 18)  SpO2: 95% (13 Sep 2019 17:13) (90% - 99%)    PHYSICAL EXAM:  GENERAL: NAD  HEAD:  Atraumatic  EYES: PERRLA  NERVOUS SYSTEM:  A, o x 3, non focal  CHEST/LUNG: Clear  HEART: RRR  ABDOMEN: Soft, non tender  EXTREMITIES:  no edema      LABS:                        10.9   5.40  )-----------( 263      ( 13 Sep 2019 07:38 )             34.4     09-13    125<L>  |  74<L>  |  13  ----------------------------<  104<H>  2.1<LL>   |  38<H>  |  1.01    Ca    9.4      13 Sep 2019 07:38  Phos  4.0     09-13  Mg     2.1     09-13    TPro  8.6<H>  /  Alb  3.2<L>  /  TBili  5.6<H>  /  DBili  x   /  AST  56<H>  /  ALT  37  /  AlkPhos  89  09-13        CAPILLARY BLOOD GLUCOSE      POCT Blood Glucose.: 138 mg/dL (13 Sep 2019 16:24)  POCT Blood Glucose.: 105 mg/dL (13 Sep 2019 12:13)  POCT Blood Glucose.: 107 mg/dL (13 Sep 2019 08:52)  POCT Blood Glucose.: 188 mg/dL (12 Sep 2019 22:12)      RADIOLOGY & ADDITIONAL TESTS:    Imaging Personally Reviewed:  [ ] YES  [ ] NO    Consultant(s) Notes Reviewed:  [ ] YES  [ ] NO    Care Discussed with Consultants/Other Providers [ ] YES  [ ] NO

## 2019-09-13 NOTE — PROGRESS NOTE ADULT - PROBLEM SELECTOR PLAN 4
Severe hypokalemia  In the setting of aggressive diuresis  Will decrease dose of diuresis  Pt refusing tele  EKG with no acute change  Renal consulted  Aggressive replacement, pt refuses Po supplement Severe hypokalemia  In the setting of aggressive diuresis  Will decrease dose of diuresis  Pt refusing telemetry.  EKG with new QT prolongation.   Renal consulted  Aggressive replacement, pt refuses Po supplement

## 2019-09-13 NOTE — CHART NOTE - NSCHARTNOTEFT_GEN_A_CORE
Planned to transfer patient to telemetry for cardiac monitoring for hypokalemia   Patient refused transfer   The risks of hypokalemia explained to patient in detail and she verbalized understanding of these risks. Planned to transfer patient to telemetry for cardiac monitoring for hypokalemia   Patient refused transfer   The risks of hypokalemia explained to patient in detail and she verbalized understanding of these risks.    EKG performed,  NSR 72, ELINA 202ms, /615ms Planned to transfer patient to telemetry for cardiac monitoring for hypokalemia   Patient refused transfer   The risks of hypokalemia explained to patient in detail and she verbalized understanding of these risks.    EKG performed,  NSR 72, ELINA 202ms, /615ms    QT is prolonged since prior admission     after long discussion with patient she agreed to take PO potassium, however is only willing to take 2  tablets at a time (10mEq).   will recheck BMP at 6 pm   INR drawn at 6 pm - patient to receive coumadin tonight    Repeat EKG in the AM

## 2019-09-13 NOTE — PROGRESS NOTE ADULT - SUBJECTIVE AND OBJECTIVE BOX
Preliminary note based on available patient data was entered below in order to expedite patient management  Patient will be examined within the next few hours (and this note will be edited if so dictated by the then latest available data) Please note that  by the end of this day the below note should be considered as my final patient progress note for today     GERMÁN KENNY  [phone]   1985 [address]A 2019 [address] ANGLE CANO   ALLERGY  nka       CONTACT   Cyndie Bernard  [facility] [id number]            Pulmonary Critical Care followup  requested for -9/15/2019    by Dr Shirley Mejia  from Dr Choudhury   Patient examined chart reviewed    HOSPITAL ADMISSION   PATIENT CAME  FROM (if information available)        TYPE OF VISIT      Subsequent Pulmonary followup     REASON FOR VISIT  PLEASE SEE PROBLEM LIST/ASSESSMENT AND RECOMMENDATIONS     PATIENT EGRMÁN KENNY  [id number] [phone]   1985 DOA 2019 [address] ANGLE CANO     VITALS/LABS       2019 afeb 71 108/65 16 95%   2019 W 5.4 Hb 10.9 Plt 263 Na 125 K 2.1 CO2 38 Cr 1     REVIEW OF SYMPTOMS     NOTE Noteworthy changes  if any  in ROS and PE are also entered  in note  below      Able to give ROS  Yes     RELIABLE No   CONSTITUTIONAL Weakness Yes  Chills No Vision changes No  ENDOCRINE No unexplained hair loss No heat or cold intolerance    ALLERGY No hives  Sore throat No   RESP Coughing blood no  Shortness of breath YES   NEURO No Headache  Confusion Pain neck No   CARDIAC No Chest pain No Palpitations   GI No Pain abdomen NO   Vomiting NO     PHYSICAL EXAM    HEENT Unremarkable PERRLA atraumatic   RESP Fair air entry EXP prolonged    Harsh breath sound Resp distres mild   CARDIAC S1 S2 No S3     NO JVD    ABDOMEN SOFT BS PRESENT NOT DISTENDED No hepatosplenomegaly PEDAL EDEMA present No calf tenderness  NO rash   GENERAL Not TOXIC looking    PATIENT GERMÁN KENNY  [id number] [phone]   1985 DOA 2019 [address] ANGLE CANO                            Pt description                          35 yo woman with severe pHTN (group I/II, mPAP 67; W 9; RVR 9 on RHC 2018,  previously on Adempas), mildly positive anti-cardiolipin Ab and RAD admitted with decompensated right heart failure and volume overload  and DIANN. Found to have acute LLE DVT.                        PATIENT  GERMÁN JACOBSJ  [phone]   1985 DOA 2019 [address] ANGLE CANO                            PATIENT DATA     ALLERGY       nka                          HEAD OF BED ELEVATION Yes   DVT PROPHYLAXIS doac  DIET         1l fr ()                             PULMONARY HYPERTENSION  2019 ef 45% dd1 severe enlarged ra rv pressure overload rvsp 87 ra 20 rv ef 34%   riociguat 1.5x3 ()     R HF  2019 105k   spironolactone 25.2 ()   metolazone 10 ()   bumex 2.3 ()   1 l fr lo Na ()   Cont rx Monitor lytes    HYPONATREMIA   - Na 129-125   Metolazone 10 (9/10)   spironolactone 25.2 ()   1l fr ()       HYPOKALEMIA   - K 3-2.1     DVT   2019 v duplex extensive acute nonocclusive thrombus l ext iliac and l cf vein and l greater saphenous   Apixaban 10.2 ()

## 2019-09-13 NOTE — PROGRESS NOTE ADULT - ASSESSMENT
PATIENT GERMÁN RETANA LIJ  [phone]   [address] 2019 [address] ANGLE CANO                            PULMONARY/CRITICAL CARE ASSESSMENT/RECOMMENDATIONS LIST                   PULMONARY HYPERTENSION  Is on  riociguat 1.5x3 ()  Cont Rx     R HF  Is on bumex metolazone and spironolactone  Monitor lytes    DVT   A/R 2019 Suggest changing ac to coumadin as apixaban may not be effective in setting of apla Consider getting hemat recommendations re choice of anticoagulant   If changed bridge with lovenox message sent to dr Ye 2019     COPD ASTHMA   On symbicort () Cont Rx      DIANN   Monitor lytes     HYPOKALEMIA   2019Being replaced     HYPONATREMIA    2019 Likely sec to heavy duty diuretic use  Suggest salt tab renal consult    Message sent tyo Dr Ye (2019)                      DM   insulin               TIME SPENT Over 25 minutes aggregate care time spent on encounter; activities included   direct pt care, counseling and/or coordinating care reviewing notes, lab data/ imaging , discussion with multidisciplinary team/ pt /family. Risks, benefits, alternatives  discussed in deta

## 2019-09-14 LAB
ALBUMIN SERPL ELPH-MCNC: 2.9 G/DL — LOW (ref 3.3–5)
ALP SERPL-CCNC: 80 U/L — SIGNIFICANT CHANGE UP (ref 40–120)
ALT FLD-CCNC: 34 U/L — SIGNIFICANT CHANGE UP (ref 12–78)
ANION GAP SERPL CALC-SCNC: 10 MMOL/L — SIGNIFICANT CHANGE UP (ref 5–17)
ANION GAP SERPL CALC-SCNC: 12 MMOL/L — SIGNIFICANT CHANGE UP (ref 5–17)
AST SERPL-CCNC: 50 U/L — HIGH (ref 15–37)
BILIRUB SERPL-MCNC: 5.3 MG/DL — HIGH (ref 0.2–1.2)
BUN SERPL-MCNC: 12 MG/DL — SIGNIFICANT CHANGE UP (ref 7–23)
BUN SERPL-MCNC: 13 MG/DL — SIGNIFICANT CHANGE UP (ref 7–23)
CALCIUM SERPL-MCNC: 9.1 MG/DL — SIGNIFICANT CHANGE UP (ref 8.5–10.1)
CALCIUM SERPL-MCNC: 9.5 MG/DL — SIGNIFICANT CHANGE UP (ref 8.5–10.1)
CHLORIDE SERPL-SCNC: 71 MMOL/L — LOW (ref 96–108)
CHLORIDE SERPL-SCNC: 76 MMOL/L — LOW (ref 96–108)
CO2 SERPL-SCNC: 40 MMOL/L — HIGH (ref 22–31)
CO2 SERPL-SCNC: 41 MMOL/L — HIGH (ref 22–31)
CREAT SERPL-MCNC: 0.97 MG/DL — SIGNIFICANT CHANGE UP (ref 0.5–1.3)
CREAT SERPL-MCNC: 1.05 MG/DL — SIGNIFICANT CHANGE UP (ref 0.5–1.3)
GLUCOSE BLDC GLUCOMTR-MCNC: 108 MG/DL — HIGH (ref 70–99)
GLUCOSE BLDC GLUCOMTR-MCNC: 110 MG/DL — HIGH (ref 70–99)
GLUCOSE BLDC GLUCOMTR-MCNC: 128 MG/DL — HIGH (ref 70–99)
GLUCOSE SERPL-MCNC: 116 MG/DL — HIGH (ref 70–99)
GLUCOSE SERPL-MCNC: 92 MG/DL — SIGNIFICANT CHANGE UP (ref 70–99)
HCT VFR BLD CALC: 32.1 % — LOW (ref 34.5–45)
HGB BLD-MCNC: 10.3 G/DL — LOW (ref 11.5–15.5)
INR BLD: 2.34 RATIO — HIGH (ref 0.88–1.16)
MAGNESIUM SERPL-MCNC: 2.1 MG/DL — SIGNIFICANT CHANGE UP (ref 1.6–2.6)
MCHC RBC-ENTMCNC: 23.4 PG — LOW (ref 27–34)
MCHC RBC-ENTMCNC: 32.1 GM/DL — SIGNIFICANT CHANGE UP (ref 32–36)
MCV RBC AUTO: 73 FL — LOW (ref 80–100)
NRBC # BLD: 0 /100 WBCS — SIGNIFICANT CHANGE UP (ref 0–0)
PHOSPHATE SERPL-MCNC: 3.3 MG/DL — SIGNIFICANT CHANGE UP (ref 2.5–4.5)
PLATELET # BLD AUTO: 272 K/UL — SIGNIFICANT CHANGE UP (ref 150–400)
POTASSIUM SERPL-MCNC: 2.6 MMOL/L — CRITICAL LOW (ref 3.5–5.3)
POTASSIUM SERPL-MCNC: 3.2 MMOL/L — LOW (ref 3.5–5.3)
POTASSIUM SERPL-SCNC: 2.6 MMOL/L — CRITICAL LOW (ref 3.5–5.3)
POTASSIUM SERPL-SCNC: 3.2 MMOL/L — LOW (ref 3.5–5.3)
PROT SERPL-MCNC: 7.8 GM/DL — SIGNIFICANT CHANGE UP (ref 6–8.3)
PROTHROM AB SERPL-ACNC: 26.9 SEC — HIGH (ref 10–12.9)
RBC # BLD: 4.4 M/UL — SIGNIFICANT CHANGE UP (ref 3.8–5.2)
RBC # FLD: 26.1 % — HIGH (ref 10.3–14.5)
SODIUM SERPL-SCNC: 124 MMOL/L — LOW (ref 135–145)
SODIUM SERPL-SCNC: 126 MMOL/L — LOW (ref 135–145)
WBC # BLD: 4.71 K/UL — SIGNIFICANT CHANGE UP (ref 3.8–10.5)
WBC # FLD AUTO: 4.71 K/UL — SIGNIFICANT CHANGE UP (ref 3.8–10.5)

## 2019-09-14 PROCEDURE — 99232 SBSQ HOSP IP/OBS MODERATE 35: CPT

## 2019-09-14 PROCEDURE — 99233 SBSQ HOSP IP/OBS HIGH 50: CPT

## 2019-09-14 RX ORDER — ACETAZOLAMIDE 250 MG/1
250 TABLET ORAL
Refills: 0 | Status: COMPLETED | OUTPATIENT
Start: 2019-09-14 | End: 2019-09-16

## 2019-09-14 RX ORDER — POTASSIUM CHLORIDE 20 MEQ
40 PACKET (EA) ORAL EVERY 8 HOURS
Refills: 0 | Status: COMPLETED | OUTPATIENT
Start: 2019-09-14 | End: 2019-09-14

## 2019-09-14 RX ORDER — WARFARIN SODIUM 2.5 MG/1
3 TABLET ORAL ONCE
Refills: 0 | Status: COMPLETED | OUTPATIENT
Start: 2019-09-14 | End: 2019-09-14

## 2019-09-14 RX ADMIN — BUDESONIDE AND FORMOTEROL FUMARATE DIHYDRATE 2 PUFF(S): 160; 4.5 AEROSOL RESPIRATORY (INHALATION) at 06:37

## 2019-09-14 RX ADMIN — Medication 40 MILLIEQUIVALENT(S): at 14:10

## 2019-09-14 RX ADMIN — SIMVASTATIN 40 MILLIGRAM(S): 20 TABLET, FILM COATED ORAL at 21:58

## 2019-09-14 RX ADMIN — RIOCIGUAT 1.5 MILLIGRAM(S): 1.5 TABLET, FILM COATED ORAL at 21:58

## 2019-09-14 RX ADMIN — Medication 81 MILLIGRAM(S): at 12:48

## 2019-09-14 RX ADMIN — Medication 100 MILLIEQUIVALENT(S): at 02:41

## 2019-09-14 RX ADMIN — SPIRONOLACTONE 50 MILLIGRAM(S): 25 TABLET, FILM COATED ORAL at 06:36

## 2019-09-14 RX ADMIN — RIOCIGUAT 1.5 MILLIGRAM(S): 1.5 TABLET, FILM COATED ORAL at 14:14

## 2019-09-14 RX ADMIN — Medication 37.5 MICROGRAM(S): at 06:36

## 2019-09-14 RX ADMIN — RIOCIGUAT 1.5 MILLIGRAM(S): 1.5 TABLET, FILM COATED ORAL at 06:37

## 2019-09-14 RX ADMIN — BUDESONIDE AND FORMOTEROL FUMARATE DIHYDRATE 2 PUFF(S): 160; 4.5 AEROSOL RESPIRATORY (INHALATION) at 17:01

## 2019-09-14 RX ADMIN — SERTRALINE 50 MILLIGRAM(S): 25 TABLET, FILM COATED ORAL at 12:49

## 2019-09-14 RX ADMIN — ENOXAPARIN SODIUM 100 MILLIGRAM(S): 100 INJECTION SUBCUTANEOUS at 06:37

## 2019-09-14 RX ADMIN — Medication 40 MILLIEQUIVALENT(S): at 21:58

## 2019-09-14 RX ADMIN — Medication 40 MILLIEQUIVALENT(S): at 02:18

## 2019-09-14 RX ADMIN — TRAMADOL HYDROCHLORIDE 50 MILLIGRAM(S): 50 TABLET ORAL at 10:04

## 2019-09-14 RX ADMIN — Medication 100 MILLIEQUIVALENT(S): at 00:17

## 2019-09-14 RX ADMIN — TRAMADOL HYDROCHLORIDE 50 MILLIGRAM(S): 50 TABLET ORAL at 11:04

## 2019-09-14 RX ADMIN — NYSTATIN CREAM 1 APPLICATION(S): 100000 CREAM TOPICAL at 06:38

## 2019-09-14 RX ADMIN — ACETAZOLAMIDE 250 MILLIGRAM(S): 250 TABLET ORAL at 17:04

## 2019-09-14 RX ADMIN — Medication 20 MILLIGRAM(S): at 17:02

## 2019-09-14 RX ADMIN — NYSTATIN CREAM 1 APPLICATION(S): 100000 CREAM TOPICAL at 17:01

## 2019-09-14 RX ADMIN — WARFARIN SODIUM 3 MILLIGRAM(S): 2.5 TABLET ORAL at 21:58

## 2019-09-14 NOTE — PROGRESS NOTE ADULT - PROBLEM SELECTOR PLAN 7
Pt was on coumadin at home for possible APLA, non compliant.  Switched to Eliquis inpt as pt unable to check regular INR  now S/P 7 days Eliquis, will restart coumadin per Dr. Choudhury.   Last INR 2.32   D/c Lovelox and Continue Coumdin 3 mg tonight  INR in AM

## 2019-09-14 NOTE — PROGRESS NOTE ADULT - ASSESSMENT
PATIENT GERMÁN KENNY  [phone]   1985 DOA 2019 DR ANGLE CANO                            PULMONARY/CRITICAL CARE ASSESSMENT/RECOMMENDATIONS LIST                   PULMONARY HYPERTENSION  Is on  riociguat 1.5x3 ()  Cont Rx     HYPOXEMIC RESP FAILURE    . 759/48/47   A/R Monitor Pulse ox Target PO 90-95%    R HF  Is on bumex metolazone and spironolactone  Monitor lytes    DVT   A/R   2019 Pt now on coumadin  2019 Suggest changing ac to coumadin as apixaban may not be effective in setting of apla Consider getting hemat recommendations re choice of anticoagulant   If changed bridge with lovenox message sent to dr Ye 2019     COPD ASTHMA   On symbicort () Cont Rx      DIANN   Monitor lytes     HYPOKALEMIA   2019 Being replaced     HYPONATREMIA    2019 Diuretics deescalated by renal to Torsemide 20.2 () and acetazolamide 250.2 ()   2019 Likely sec to heavy duty diuretic use    Message sent to Dr Ye (2019)                      DM   insulin       TIME SPENT Over 25 minutes aggregate care time spent on encounter; activities included   direct pt care, counseling and/or coordinating care reviewing notes, lab data/ imaging , discussion with multidisciplinary team/ pt /family. Risks, benefits, alternatives  discussed in detail.

## 2019-09-14 NOTE — PROGRESS NOTE ADULT - PROBLEM SELECTOR PLAN 5
In the setting of volume overload/ aggressive diuresis and possible SSRI  Renal consult appreciate  1 liter fluid restriction

## 2019-09-14 NOTE — PROGRESS NOTE ADULT - PROBLEM SELECTOR PLAN 1
Continue Torsemide 20 mg twice daily  Acetazolamid e250 mg twiec daily   metolazone 10 mg ,  stoped   Aldactone  d/kamaljit   Mild decreased EF, severe pulm HTN  Pt non compliant with fluid restriction  Improved volume status, lost > 20 lbs wt since admission

## 2019-09-14 NOTE — PROGRESS NOTE ADULT - SUBJECTIVE AND OBJECTIVE BOX
GERMÁN KENNY  [phone]   1985 DOA 2019 DR ANGLE CANO   ALLERGY  nka       CONTACT   Cyndie Bernard  [facility] [id number]            Pulmonary Critical Care followup  requested for -9/15/2019    by Dr Shirley Mejia  from Dr Choudhury   Patient examined chart reviewed    HOSPITAL ADMISSION   PATIENT CAME  FROM (if information available)        TYPE OF VISIT      Subsequent Pulmonary followup     REASON FOR VISIT  PLEASE SEE PROBLEM LIST/ASSESSMENT AND RECOMMENDATIONS     PATIENT GERMÁN KENNY  [phone]   1985 DOA 2019 DR ANGLE CANO     VITALS/LABS       2019 96f 88 93/48 18 92%   2019 W 4.7 Hb 10.3 Plt 272  Na 126 K 3.2 CO2 40 Cr .9   2019 Warfarin 3 given   2019 acetazolamide 250.2 started by renal   2019 Torsemide 20.2 started by Renal     REVIEW OF SYMPTOMS     NOTE Noteworthy changes  if any  in ROS and PE are also entered  in note  below      Able to give ROS  Yes     RELIABLE No   CONSTITUTIONAL Weakness Yes  Chills No Vision changes No  ENDOCRINE No unexplained hair loss No heat or cold intolerance    ALLERGY No hives  Sore throat No   RESP Coughing blood no  Shortness of breath YES   NEURO No Headache  Confusion Pain neck No   CARDIAC No Chest pain No Palpitations   GI No Pain abdomen NO   Vomiting NO     PHYSICAL EXAM    HEENT Unremarkable PERRLA atraumatic   RESP Fair air entry EXP prolonged    Harsh breath sound Resp distres mild   CARDIAC S1 S2 No S3     NO JVD    ABDOMEN SOFT BS PRESENT NOT DISTENDED No hepatosplenomegaly PEDAL EDEMA present No calf tenderness  NO rash   GENERAL Not TOXIC looking    PATIENT GERMÁN KENNY  [phone]   1985 DOA 2019 DR ANGLE CANO                            Pt description                          35 yo woman with severe pHTN (group I/II, mPAP 67; W 9; RVR 9 on RHC 2018,  previously on Adempas), mildly positive anti-cardiolipin Ab and RAD admitted with decompensated right heart failure and volume overload  and DIANN. Found to have acute LLE DVT.                        PATIENT  GERMÁN KENNY  [phone]   1985 DOA 2019 DR ANGLE CANO                            PATIENT DATA     ALLERGY       nka                          HEAD OF BED ELEVATION Yes   DVT PROPHYLAXIS doac  DIET         1l fr ()    RESP GAS EXCHANGE    7p .32 759/48/47    MEDS   ASA 81 ()   Warfarin ()   insulin   acezolamide 250.2 ()   symbicort ()   levoxyl 37.5 ()   Riociguat 1.5x3 ()   Torsemide 20.2 ()

## 2019-09-14 NOTE — PROGRESS NOTE ADULT - ASSESSMENT
35 yo female with end stage lung disease due to idiopathic/primary pulmonary HTN and Anticardiolipin antibodies.  Admitted with symptoms of worsening right heart failure. cardiac cath negative for any CAD in 2013.   Demand troponin levels noted.  Patient has lost ~15 kg since readmission but is still clearly suffering from symptoms of cor pulmonale.   She may qualify for a lung transplant,  had scheduled appointment this week with Dr. Unger to discuss care further.    ·	Cor pulmonale sec to PPH?  ·	DVT  ·	Hypokalemia  ·	Hyponatremia    Plan:  Currently on IV Bumex 2 mg Q 8/ Zaroxolyn 10 mg /aldactone 25 mg Bid, now with hypokalemia/hyponatremia and evidence of significant contraction alkalosis. Agree with nephro, holding ALdactone, started trial of Acetozolamide.  Would consider decreasing bumex and holding zaroxolyn if worsened.   Needs continued aggressive potassium replacement,   Continue to monitor renal function and electrolytes, daily weight    Started on Lovenox bridge to coumadin in place of Eliquis as per pulm recommendation   Cont with supplemental O2/ Riociguat, pulmonary following   Cont with asa/statin    Patient to have SKYE evaluation and Dr. Bharath Adamson, cardio follow up as out patient    Pt had scheduled appointment with Dr. Unger  @ pulm HTN clinic this week, pt will reschedule post discharge   Repeat ECG pending.

## 2019-09-14 NOTE — PROGRESS NOTE ADULT - SUBJECTIVE AND OBJECTIVE BOX
CHIEF COMPLAINT/INTERVAL HISTORY:    Patient is a 34y old  Female who presents with a chief complaint of Dyspnea (14 Sep 2019 08:58)      HPI:  34F with a PMH of severe pulmonary hypertension and right sided CHF - (R sided EF 35) presents with worsening shortness of breath x 2 days.  Patient presented to ED in respiratory distress requiring BiPAP and is able to provide limited history.  Patient reports that she typically has shortness of breath at home and she uses oxygen therapy as needed to improve her symptoms, patient states that for the last two days the oxygen has not helped.  Patient also reports 2 week hx of worsening bilateral leg swelling.  Patient reports compliance with her medications.  Patient reports some cough productive of yellow sputum but states her cough is no worse than it normally is.      Of note patient was admitted to [facility] on 7/26 for exacerbation of CHF.  Presented with hypotension needing vasopressor support. 123 -> 105 kg (04 Sep 2019 02:03)    Overnight issues    No fever Chills   Feeling some what better        SUBJECTIVE & OBJECTIVE: Pt seen and examined at bedside.   ROS:  CONSTITUTIONAL: No fever, weight loss, or fatigue  EYES: No eye pain, visual disturbances, or discharge  ENMT:  No difficulty hearing, tinnitus, vertigo; No sinus or throat pain  NECK: No pain or stiffness  RESPIRATORY: No cough, wheezing, chills or hemoptysis; No shortness of breath  CARDIOVASCULAR: No chest pain, palpitations, dizziness,  edema LE better  GASTROINTESTINAL: No abdominal or epigastric pain. No nausea, vomiting,   GENITOURINARY: No dysuria, frequency, hematuria, or incontinence  NEUROLOGICAL: No headaches, memory loss, loss of strength, numbness, or tremors  SKIN: No itching, burning, rashes, or lesions   LYMPH NODES: No enlarged glands  ENDOCRINE: No heat or cold intolerance; No hair loss  MUSCULOSKELETAL: No joint pain or swelling; No muscle, back, or extremity pain    ICU Vital Signs Last 24 Hrs  T(C): 35.8 (14 Sep 2019 11:29), Max: 36.8 (13 Sep 2019 16:35)  T(F): 96.5 (14 Sep 2019 11:29), Max: 98.2 (13 Sep 2019 16:35)  HR: 88 (14 Sep 2019 11:29) (69 - 89)  BP: 93/48 (14 Sep 2019 11:29) (93/48 - 109/50)  BP(mean): 73 (14 Sep 2019 11:29) (73 - 73)  ABP: --  ABP(mean): --  RR: 18 (14 Sep 2019 11:29) (17 - 18)  SpO2: 92% (14 Sep 2019 11:29) (92% - 99%)        MEDICATIONS  (STANDING):  acetazolamide    Tablet 250 milliGRAM(s) Oral two times a day  aspirin enteric coated 81 milliGRAM(s) Oral daily  buDESOnide 160 MICROgram(s)/formoterol 4.5 MICROgram(s) Inhaler 2 Puff(s) Inhalation two times a day  dextrose 5%. 1000 milliLiter(s) (50 mL/Hr) IV Continuous <Continuous>  dextrose 50% Injectable 12.5 Gram(s) IV Push once  dextrose 50% Injectable 25 Gram(s) IV Push once  dextrose 50% Injectable 25 Gram(s) IV Push once  insulin lispro (HumaLOG) corrective regimen sliding scale   SubCutaneous three times a day before meals  insulin lispro (HumaLOG) corrective regimen sliding scale   SubCutaneous at bedtime  levothyroxine 37.5 MICROGram(s) Oral daily  nystatin Powder 1 Application(s) Topical two times a day  potassium chloride    Tablet ER 40 milliEquivalent(s) Oral every 8 hours  riociguat 1.5 milliGRAM(s) Oral three times a day  sertraline 50 milliGRAM(s) Oral daily  simvastatin 40 milliGRAM(s) Oral at bedtime  torsemide 20 milliGRAM(s) Oral two times a day  warfarin 3 milliGRAM(s) Oral once    MEDICATIONS  (PRN):  acetaminophen   Tablet .. 650 milliGRAM(s) Oral every 6 hours PRN Mild Pain (1 - 3)  aluminum hydroxide/magnesium hydroxide/simethicone Suspension 30 milliLiter(s) Oral every 4 hours PRN Dyspepsia  dextrose 40% Gel 15 Gram(s) Oral once PRN Blood Glucose LESS THAN 70 milliGRAM(s)/deciliter  glucagon  Injectable 1 milliGRAM(s) IntraMuscular once PRN Glucose LESS THAN 70 milligrams/deciliter  guaiFENesin    Syrup 100 milliGRAM(s) Oral every 6 hours PRN Cough  morphine  - Injectable 2 milliGRAM(s) IV Push every 6 hours PRN Severe Pain (7 - 10)  traMADol 50 milliGRAM(s) Oral every 8 hours PRN Moderate Pain (4 - 6)        PHYSICAL EXAM:    GENERAL: NAD, well-groomed, well-developed  HEAD:  Atraumatic, Normocephalic  EYES: EOMI, PERRLA, conjunctiva and sclera clear  ENMT: Moist mucous membranes  NECK: Supple, No JVD  NERVOUS SYSTEM:  Alert & Oriented X3, Moving all 4 limbs  CHEST/LUNG: Clear to auscultation bilaterally; No rales, rhonchi, wheezing, or rubs  HEART: Regular rate and rhythm; No murmurs, rubs, or gallops  ABDOMEN: Soft, Nontender, Nondistended; Bowel sounds present  EXTREMITIES:  2+ Peripheral Pulses, No clubbing, cyanosis,  LE edema present    LABS:                        10.3   4.71  )-----------( 272      ( 14 Sep 2019 07:19 )             32.1     09-14    126<L>  |  76<L>  |  13  ----------------------------<  92  3.2<L>   |  40<H>  |  0.97    Ca    9.1      14 Sep 2019 07:19  Phos  3.3     09-14  Mg     2.1     09-14    TPro  7.8  /  Alb  2.9<L>  /  TBili  5.3<H>  /  DBili  x   /  AST  50<H>  /  ALT  34  /  AlkPhos  80  09-14    PT/INR - ( 14 Sep 2019 07:19 )   PT: 26.9 sec;   INR: 2.34 ratio               CAPILLARY BLOOD GLUCOSE      POCT Blood Glucose.: 128 mg/dL (14 Sep 2019 07:48)  POCT Blood Glucose.: 131 mg/dL (13 Sep 2019 22:35)  POCT Blood Glucose.: 138 mg/dL (13 Sep 2019 16:24)      RECENT CULTURES:      RADIOLOGY & ADDITIONAL TESTS:  Imaging Personally Reviewed:  [ ] YES      Consultant(s) Notes Reviewed:  [ ] YES     Care Discussed with [ ] Consultants [X ] Patient [ ] Family  [ ]    [x ]  Other; RN  HEALTH ISSUES - PROBLEM Dx:  Hyponatremia: Hyponatremia  Adjustment disorder with depressed mood  Chest pain, unspecified type: Chest pain, unspecified type  Depression, unspecified depression type: Depression, unspecified depression type  Hyperlipidemia, unspecified hyperlipidemia type: Hyperlipidemia, unspecified hyperlipidemia type  Acquired hypothyroidism: Acquired hypothyroidism  Acute deep vein thrombosis (DVT) of femoral vein of left lower extremity: Acute deep vein thrombosis (DVT) of femoral vein of left lower extremity  Reactive airway disease without complication, unspecified asthma severity, unspecified whether persistent: Reactive airway disease without complication, unspecified asthma severity, unspecified whether persistent  Hypokalemia: Hypokalemia  Pulmonary hypertension: Pulmonary hypertension  Acute on chronic combined systolic and diastolic congestive heart failure: Acute on chronic combined systolic and diastolic congestive heart failure  Hypothyroidism, unspecified type: Hypothyroidism, unspecified type  Preventive measure: Preventive measure  Essential hypertension: Essential hypertension  Moderate asthma, unspecified whether complicated, unspecified whether persistent: Moderate asthma, unspecified whether complicated, unspecified whether persistent  Hyperbilirubinemia: Hyperbilirubinemia  Edema extremities: Edema extremities  Diabetes mellitus: Diabetes mellitus  Morbid obesity: Morbid obesity  Congestive heart failure, unspecified HF chronicity, unspecified heart failure type: Congestive heart failure, unspecified HF chronicity, unspecified heart failure type  Pulmonary HTN: Pulmonary HTN  Acute respiratory failure, unspecified whether with hypoxia or hypercapnia: Acute respiratory failure, unspecified whether with hypoxia or hypercapnia        DVT/GI ppx  Discussed with pt @ bedside

## 2019-09-14 NOTE — CONSULT NOTE ADULT - SUBJECTIVE AND OBJECTIVE BOX
HPI:    Patient is a 34y old  Female here since  for exacerbation of her RHF, gross volume overload. Has been well diuresed since admission. However, noted to have persistent moderate hypoNa.   In addition, she has developed progressive metabolic alkalosis.   She related improvement in total body edema. Off BiPAP this am, transitioned to NC O2. Pos FERRO          PAST MEDICAL & SURGICAL HISTORY:  Former smoker, stopped smoking in distant past  Vitamin D deficiency  Morbid obesity due to excess calories  Edema extremities  Diabetes mellitus  Hyperbilirubinemia  COPD (chronic obstructive pulmonary disease)  COPD (chronic obstructive pulmonary disease)  Anticoagulation goal of INR 2 to 3  Anticardiolipin antibody positive  Congestive heart failure, unspecified HF chronicity, unspecified heart failure type  Moderate asthma, unspecified whether complicated, unspecified whether persistent  Other secondary hypertension  COPD (chronic obstructive pulmonary disease)  Anticardiolipin antibody positive  Essential hypertension  Pulmonary HTN  Morbid obesity  No significant past surgical history      Social Hx: not a smoker    FAMILY HISTORY:  Family history of leukemia (Sibling)      Allergies    No Known Allergies    Intolerances        MEDICATIONS  (STANDING):  aspirin enteric coated 81 milliGRAM(s) Oral daily  buDESOnide 160 MICROgram(s)/formoterol 4.5 MICROgram(s) Inhaler 2 Puff(s) Inhalation two times a day  dextrose 5%. 1000 milliLiter(s) (50 mL/Hr) IV Continuous <Continuous>  dextrose 50% Injectable 12.5 Gram(s) IV Push once  dextrose 50% Injectable 25 Gram(s) IV Push once  dextrose 50% Injectable 25 Gram(s) IV Push once  enoxaparin Injectable 100 milliGRAM(s) SubCutaneous every 12 hours  insulin lispro (HumaLOG) corrective regimen sliding scale   SubCutaneous three times a day before meals  insulin lispro (HumaLOG) corrective regimen sliding scale   SubCutaneous at bedtime  levothyroxine 37.5 MICROGram(s) Oral daily  nystatin Powder 1 Application(s) Topical two times a day  potassium chloride    Tablet ER 40 milliEquivalent(s) Oral every 8 hours  riociguat 1.5 milliGRAM(s) Oral three times a day  sertraline 50 milliGRAM(s) Oral daily  simvastatin 40 milliGRAM(s) Oral at bedtime  spironolactone 50 milliGRAM(s) Oral two times a day    MEDICATIONS  (PRN):  acetaminophen   Tablet .. 650 milliGRAM(s) Oral every 6 hours PRN Mild Pain (1 - 3)  aluminum hydroxide/magnesium hydroxide/simethicone Suspension 30 milliLiter(s) Oral every 4 hours PRN Dyspepsia  dextrose 40% Gel 15 Gram(s) Oral once PRN Blood Glucose LESS THAN 70 milliGRAM(s)/deciliter  glucagon  Injectable 1 milliGRAM(s) IntraMuscular once PRN Glucose LESS THAN 70 milligrams/deciliter  guaiFENesin    Syrup 100 milliGRAM(s) Oral every 6 hours PRN Cough  morphine  - Injectable 2 milliGRAM(s) IV Push every 6 hours PRN Severe Pain (7 - 10)  traMADol 50 milliGRAM(s) Oral every 8 hours PRN Moderate Pain (4 - 6)      Daily     Daily Weight in k.5 (14 Sep 2019 04:48)    Drug Dosing Weight  Height (cm): 175.26 (03 Sep 2019 20:57)  Weight (kg): 115.2 (04 Sep 2019 05:40)  BMI (kg/m2): 37.5 (04 Sep 2019 05:40)  BSA (m2): 2.29 (04 Sep 2019 05:40)      REVIEW OF SYSTEMS:    CONSTITUTIONAL: fatigue, wt gain  ENMT:  No difficulty hearing, tinnitus, vertigo; No sinus or throat pain  NECK: No pain or stiffness  RESPIRATORY: cough, SOB  CARDIOVASCULAR: leg edema  GASTROINTESTINAL: No abdominal or epigastric pain. No nausea, vomiting, or hematemesis; No diarrhea or constipation. No melena or hematochezia.  GENITOURINARY: No dysuria, frequency, hematuria, or incontinence  SKIN: No itching, burning, rashes, or lesions   LYMPH NODES: No enlarged glands  NEURO: no asterixis        ABG - ( 13 Sep 2019 19:47 )  pH, Arterial: x     pH, Blood: 7.59  /  pCO2: 48    /  pO2: 47    / HCO3: 46    / Base Excess: 20.8  /  SaO2: 83                  I&O's Detail    13 Sep 2019 07:01  -  14 Sep 2019 07:00  --------------------------------------------------------  IN:    Oral Fluid: 780 mL    Solution: 400 mL  Total IN: 1180 mL    OUT:  Total OUT: 0 mL    Total NET: 1180 mL           @ 07: @ 07:00  --------------------------------------------------------  IN: 1180 mL / OUT: 0 mL / NET: 1180 mL        PHYSICAL EXAM:    GENERAL: mildly dyspneic at rest  HEAD:  Atraumatic, Normocephalic  EYES: scleral icterus  ENMT: No tonsillar erythema, exudates, or enlargement; Moist mucous membranes, Good dentition, No lesions  NECK: Supple, POS JVD, Normal thyroid  NERVOUS SYSTEM:  Alert & Oriented X3, Good concentration; Motor Strength 5/5 B/L upper and lower extremities; DTRs 2+ intact and symmetric  CHEST/LUNG: Clear to percussion bilaterally; No rales, rhonchi, wheezing, or rubs  HEART: Regular rate and rhythm; No murmurs, rubs, or gallops  ABDOMEN: distended  EXTREMITIES:  massive edema  LYMPH: No lymphadenopathy noted  SKIN: No rashes or lesions    LABS:  CBC Full  -  ( 14 Sep 2019 07:19 )  WBC Count : 4.71 K/uL  RBC Count : 4.40 M/uL  Hemoglobin : 10.3 g/dL  Hematocrit : 32.1 %  Platelet Count - Automated : 272 K/uL  Mean Cell Volume : 73.0 fl  Mean Cell Hemoglobin : 23.4 pg  Mean Cell Hemoglobin Concentration : 32.1 gm/dL  Auto Neutrophil # : x  Auto Lymphocyte # : x  Auto Monocyte # : x  Auto Eosinophil # : x  Auto Basophil # : x  Auto Neutrophil % : x  Auto Lymphocyte % : x  Auto Monocyte % : x  Auto Eosinophil % : x  Auto Basophil % : x        126<L>  |  76<L>  |  13  ----------------------------<  92  3.2<L>   |  40<H>  |  0.97    Ca    9.1      14 Sep 2019 07:19  Phos  3.3       Mg     2.1         TPro  7.8  /  Alb  2.9<L>  /  TBili  5.3<H>  /  DBili  x   /  AST  50<H>  /  ALT  34  /  AlkPhos  80      CAPILLARY BLOOD GLUCOSE      POCT Blood Glucose.: 128 mg/dL (14 Sep 2019 07:48)    PT/INR - ( 14 Sep 2019 07:19 )   PT: 26.9 sec;   INR: 2.34 ratio           Impression:  * HypoNa -- hypervolemic, aldactone, SSRI effect  * Metabolic alkalosis -- contraction  * HypoK due to above  * Severe RHF, pulm HTN ( ? primary )  * L fem DVT    Recommendations:   * D/c Aldactone  * Acetazolamide 250mg PO BID for 2 days  * Torsemide 40mg PO daily  * ? if Zoloft can be held temporarily  * FR 1000cc/day.

## 2019-09-14 NOTE — PROGRESS NOTE ADULT - PROBLEM SELECTOR PLAN 4
Severe hypokalemia  In the setting of aggressive diuresis  Will decrease dose of diuresis  Pt refusing telemetry.  EKG with new QT prolongation.   Renal consult appreciate  Aggressive replacement, pt refuses Po supplement

## 2019-09-14 NOTE — PHARMACY COMMUNICATION NOTE - COMMENTS
Spoke with NP regarding dosing and weight of person for therapeutic lovenox dose, due to patient's condition, the weight is fluctuating. NP wants to keep current therapeutic dose.
MD ordered one dose of bumetanide injection 2mg.  Patient on oral bumex and IV lasix.  MD to clarify diuretic orders, but wants IV bumex.
Recommended dose for Edema is 250mg to 375 mg daily; contacted provider who states that he wants 250mg BID and would like the dose to be kept as ordered. Thank you!

## 2019-09-14 NOTE — PROGRESS NOTE ADULT - SUBJECTIVE AND OBJECTIVE BOX
Patient is a 34y old  Female who presents with a chief complaint of Dyspnea (13 Sep 2019 14:17)    PAST MEDICAL & SURGICAL HISTORY:  Former smoker, stopped smoking in distant past  Vitamin D deficiency  Morbid obesity due to excess calories  Edema extremities  Diabetes mellitus  Hyperbilirubinemia  COPD (chronic obstructive pulmonary disease)  Anticoagulation goal of INR 2 to 3  Anticardiolipin antibody positive  Congestive heart failure, unspecified HF chronicity, unspecified heart failure type  Moderate asthma, unspecified whether complicated, unspecified whether persistent  Other secondary hypertension  Anticardiolipin antibody positive  Essential hypertension  Pulmonary HTN  Morbid obesity    INTERVAL HISTORY: Resting in bed, not in any acute distress   	  MEDICATIONS:  MEDICATIONS  (STANDING):  aspirin enteric coated 81 milliGRAM(s) Oral daily  buDESOnide 160 MICROgram(s)/formoterol 4.5 MICROgram(s) Inhaler 2 Puff(s) Inhalation two times a day  buMETAnide Injectable 2 milliGRAM(s) IV Push every 8 hours  dextrose 5%. 1000 milliLiter(s) (50 mL/Hr) IV Continuous <Continuous>  dextrose 50% Injectable 12.5 Gram(s) IV Push once  dextrose 50% Injectable 25 Gram(s) IV Push once  dextrose 50% Injectable 25 Gram(s) IV Push once  enoxaparin Injectable 100 milliGRAM(s) SubCutaneous every 12 hours  insulin lispro (HumaLOG) corrective regimen sliding scale   SubCutaneous three times a day before meals  insulin lispro (HumaLOG) corrective regimen sliding scale   SubCutaneous at bedtime  levothyroxine 37.5 MICROGram(s) Oral daily  metolazone 10 milliGRAM(s) Oral daily  nystatin Powder 1 Application(s) Topical two times a day  potassium chloride    Tablet ER 40 milliEquivalent(s) Oral every 4 hours  potassium chloride  10 mEq/100 mL IVPB 10 milliEquivalent(s) IV Intermittent every 1 hour  riociguat 1.5 milliGRAM(s) Oral three times a day  sertraline 50 milliGRAM(s) Oral daily  simvastatin 40 milliGRAM(s) Oral at bedtime  spironolactone 25 milliGRAM(s) Oral every 12 hours  warfarin 3 milliGRAM(s) Oral once    MEDICATIONS  (PRN):  acetaminophen   Tablet .. 650 milliGRAM(s) Oral every 6 hours PRN Mild Pain (1 - 3)  aluminum hydroxide/magnesium hydroxide/simethicone Suspension 30 milliLiter(s) Oral every 4 hours PRN Dyspepsia  dextrose 40% Gel 15 Gram(s) Oral once PRN Blood Glucose LESS THAN 70 milliGRAM(s)/deciliter  glucagon  Injectable 1 milliGRAM(s) IntraMuscular once PRN Glucose LESS THAN 70 milligrams/deciliter  guaiFENesin    Syrup 100 milliGRAM(s) Oral every 6 hours PRN Cough  morphine  - Injectable 2 milliGRAM(s) IV Push every 6 hours PRN Severe Pain (7 - 10)  traMADol 50 milliGRAM(s) Oral every 8 hours PRN Moderate Pain (4 - 6)    Vitals:  Vital Signs Last 24 Hrs  T(C): 36.1 (14 Sep 2019 04:48), Max: 36.8 (13 Sep 2019 16:35)  T(F): 97 (14 Sep 2019 04:48), Max: 98.2 (13 Sep 2019 16:35)  HR: 79 (14 Sep 2019 05:38) (69 - 89)  BP: 105/87 (14 Sep 2019 04:48) (97/57 - 117/62)  BP(mean): --  RR: 17 (14 Sep 2019 04:48) (16 - 18)  SpO2: 97% (14 Sep 2019 05:38) (93% - 99%)    Daily     Daily Weights in k.2 kg on admission to 103.5 today(14 Sep 2019 04:48)    PHYSICAL EXAM:  Neuro: Awake, responsive  CV: S1 S2 RRR  Lungs: CTABL  GI: Soft, BS +, ND, NT  Extremities: ++ LE edema  	    ECG:  	  RADIOLOGY: < from: Xray Chest 1 View- PORTABLE-Urgent (19 @ 09:14) >  Lungs: There are no lung consolidations.  Heart: There is cardiomegaly.  Mediastinum: Stable right hilar prominence.    IMPRESSION:    No lung consolidations.    < end of copied text >    DIAGNOSTIC TESTING:    [x ] Echocardiogram: < from: TTE Echo Doppler w/o Cont (19 @ 08:44) >   1. Left ventricular ejection fraction, by visual estimation, is 45 to   50%.   2. Mildly decreased global left ventricular systolic function.   3. Elevated mean left atrial pressure.   4. Global cardiomyopathy.   5. Increased LV wall thickness.   6. Normal left ventricular internal cavity size.   7. Right ventricular pressure overload.   8. Spectral Doppler shows impaired relaxation pattern of left   ventricular myocardial filling (Grade I diastolic dysfunction).   9. There is mild asymmetric left ventricular hypertrophy.  10. Pulmonary hypertension.  11. Severely enlarged right ventricle.  12. Severely reduced RV systolic function.  13. RV ejection fraction 34%.  14. Small pericardial effusion.  15. Degenerative mitral valve.  16. Mild mitral annular calcification.  17. Mild mitral valve regurgitation.  18. Moderate tricuspid regurgitation.  19. Structurally normal tricuspid valve, with normal leaflet excursion.  20. Mild aortic regurgitation.  21. Mild to moderate pulmonic valve regurgitation.  22. Structurally normal pulmonic valve, with normal leaflet excursion.  23. Estimated pulmonary artery systolic pressure is 87.1 mmHg assuming a   right atrial pressure of 20 mmHg, which is consistent with severe   pulmonary hypertension.  24. Pulmonary hypertension is present.  25. The main pulmonary artery is normal in size.  26. The right atrial pressure is moderately elevated.    < end of copied text >    [ x]  Catheterization:     LABS:	 	        126<L>  |  76<L>  |  13  ----------------------------<  92  3.2<L>   |  40<H>  |  0.97    Ca    9.1      14 Sep 2019 07:19  Phos  3.3       Mg     2.1         TPro  7.8  /  Alb  2.9<L>  /  TBili  5.3<H>  /  DBili  x   /  AST  50<H>  /  ALT  34  /  AlkPhos  80  -14  13 Sep 2019 07:38    125    |  74     |  13     ----------------------------<  104    2.1     |  38     |  1.01                                10.3   4.71  )-----------( 272      ( 14 Sep 2019 07:19 )             32.1                      10.9   5.40  )-----------( 263      ( 13 Sep 2019 07:38 )             34.4 ,                       10.9   4.53  )-----------( 232      ( 11 Sep 2019 11:51 )             34.5

## 2019-09-15 LAB
ANION GAP SERPL CALC-SCNC: 12 MMOL/L — SIGNIFICANT CHANGE UP (ref 5–17)
ANION GAP SERPL CALC-SCNC: 13 MMOL/L — SIGNIFICANT CHANGE UP (ref 5–17)
BUN SERPL-MCNC: 16 MG/DL — SIGNIFICANT CHANGE UP (ref 7–23)
BUN SERPL-MCNC: 18 MG/DL — SIGNIFICANT CHANGE UP (ref 7–23)
CALCIUM SERPL-MCNC: 9.5 MG/DL — SIGNIFICANT CHANGE UP (ref 8.5–10.1)
CALCIUM SERPL-MCNC: 9.6 MG/DL — SIGNIFICANT CHANGE UP (ref 8.5–10.1)
CHLORIDE SERPL-SCNC: 73 MMOL/L — LOW (ref 96–108)
CHLORIDE SERPL-SCNC: 75 MMOL/L — LOW (ref 96–108)
CO2 SERPL-SCNC: 35 MMOL/L — HIGH (ref 22–31)
CO2 SERPL-SCNC: 36 MMOL/L — HIGH (ref 22–31)
CREAT SERPL-MCNC: 1.2 MG/DL — SIGNIFICANT CHANGE UP (ref 0.5–1.3)
CREAT SERPL-MCNC: 1.24 MG/DL — SIGNIFICANT CHANGE UP (ref 0.5–1.3)
GLUCOSE BLDC GLUCOMTR-MCNC: 108 MG/DL — HIGH (ref 70–99)
GLUCOSE BLDC GLUCOMTR-MCNC: 130 MG/DL — HIGH (ref 70–99)
GLUCOSE BLDC GLUCOMTR-MCNC: 147 MG/DL — HIGH (ref 70–99)
GLUCOSE BLDC GLUCOMTR-MCNC: 159 MG/DL — HIGH (ref 70–99)
GLUCOSE SERPL-MCNC: 104 MG/DL — HIGH (ref 70–99)
GLUCOSE SERPL-MCNC: 108 MG/DL — HIGH (ref 70–99)
HCT VFR BLD CALC: 31.9 % — LOW (ref 34.5–45)
HGB BLD-MCNC: 10 G/DL — LOW (ref 11.5–15.5)
INR BLD: 1.75 RATIO — HIGH (ref 0.88–1.16)
MAGNESIUM SERPL-MCNC: 2.5 MG/DL — SIGNIFICANT CHANGE UP (ref 1.6–2.6)
MCHC RBC-ENTMCNC: 23.2 PG — LOW (ref 27–34)
MCHC RBC-ENTMCNC: 31.3 GM/DL — LOW (ref 32–36)
MCV RBC AUTO: 74 FL — LOW (ref 80–100)
NRBC # BLD: 0 /100 WBCS — SIGNIFICANT CHANGE UP (ref 0–0)
PLATELET # BLD AUTO: 284 K/UL — SIGNIFICANT CHANGE UP (ref 150–400)
POTASSIUM SERPL-MCNC: 2.6 MMOL/L — CRITICAL LOW (ref 3.5–5.3)
POTASSIUM SERPL-MCNC: 2.8 MMOL/L — CRITICAL LOW (ref 3.5–5.3)
POTASSIUM SERPL-SCNC: 2.6 MMOL/L — CRITICAL LOW (ref 3.5–5.3)
POTASSIUM SERPL-SCNC: 2.8 MMOL/L — CRITICAL LOW (ref 3.5–5.3)
PROT SERPL-MCNC: 7.4 G/DL — SIGNIFICANT CHANGE UP (ref 6–8.3)
PROT SERPL-MCNC: 7.4 G/DL — SIGNIFICANT CHANGE UP (ref 6–8.3)
PROTHROM AB SERPL-ACNC: 19.9 SEC — HIGH (ref 10–12.9)
RBC # BLD: 4.31 M/UL — SIGNIFICANT CHANGE UP (ref 3.8–5.2)
RBC # FLD: 26.4 % — HIGH (ref 10.3–14.5)
SODIUM SERPL-SCNC: 122 MMOL/L — LOW (ref 135–145)
SODIUM SERPL-SCNC: 122 MMOL/L — LOW (ref 135–145)
WBC # BLD: 4.89 K/UL — SIGNIFICANT CHANGE UP (ref 3.8–10.5)
WBC # FLD AUTO: 4.89 K/UL — SIGNIFICANT CHANGE UP (ref 3.8–10.5)

## 2019-09-15 PROCEDURE — 99232 SBSQ HOSP IP/OBS MODERATE 35: CPT

## 2019-09-15 PROCEDURE — 99233 SBSQ HOSP IP/OBS HIGH 50: CPT

## 2019-09-15 RX ORDER — POTASSIUM CHLORIDE 20 MEQ
10 PACKET (EA) ORAL
Refills: 0 | Status: COMPLETED | OUTPATIENT
Start: 2019-09-15 | End: 2019-09-15

## 2019-09-15 RX ORDER — ENOXAPARIN SODIUM 100 MG/ML
100 INJECTION SUBCUTANEOUS EVERY 12 HOURS
Refills: 0 | Status: DISCONTINUED | OUTPATIENT
Start: 2019-09-15 | End: 2019-09-17

## 2019-09-15 RX ORDER — POTASSIUM CHLORIDE 20 MEQ
10 PACKET (EA) ORAL
Refills: 0 | Status: DISCONTINUED | OUTPATIENT
Start: 2019-09-15 | End: 2019-09-15

## 2019-09-15 RX ORDER — WARFARIN SODIUM 2.5 MG/1
3 TABLET ORAL ONCE
Refills: 0 | Status: COMPLETED | OUTPATIENT
Start: 2019-09-15 | End: 2019-09-15

## 2019-09-15 RX ORDER — POTASSIUM CHLORIDE 20 MEQ
40 PACKET (EA) ORAL EVERY 6 HOURS
Refills: 0 | Status: COMPLETED | OUTPATIENT
Start: 2019-09-15 | End: 2019-09-15

## 2019-09-15 RX ORDER — POTASSIUM CHLORIDE 20 MEQ
40 PACKET (EA) ORAL EVERY 4 HOURS
Refills: 0 | Status: DISCONTINUED | OUTPATIENT
Start: 2019-09-15 | End: 2019-09-15

## 2019-09-15 RX ADMIN — Medication 81 MILLIGRAM(S): at 12:08

## 2019-09-15 RX ADMIN — WARFARIN SODIUM 3 MILLIGRAM(S): 2.5 TABLET ORAL at 22:10

## 2019-09-15 RX ADMIN — Medication 37.5 MICROGRAM(S): at 05:50

## 2019-09-15 RX ADMIN — RIOCIGUAT 1.5 MILLIGRAM(S): 1.5 TABLET, FILM COATED ORAL at 22:10

## 2019-09-15 RX ADMIN — RIOCIGUAT 1.5 MILLIGRAM(S): 1.5 TABLET, FILM COATED ORAL at 17:59

## 2019-09-15 RX ADMIN — SERTRALINE 50 MILLIGRAM(S): 25 TABLET, FILM COATED ORAL at 12:08

## 2019-09-15 RX ADMIN — Medication 20 MILLIGRAM(S): at 17:53

## 2019-09-15 RX ADMIN — RIOCIGUAT 1.5 MILLIGRAM(S): 1.5 TABLET, FILM COATED ORAL at 09:04

## 2019-09-15 RX ADMIN — NYSTATIN CREAM 1 APPLICATION(S): 100000 CREAM TOPICAL at 05:49

## 2019-09-15 RX ADMIN — Medication 100 MILLIEQUIVALENT(S): at 19:55

## 2019-09-15 RX ADMIN — Medication 40 MILLIEQUIVALENT(S): at 23:23

## 2019-09-15 RX ADMIN — BUDESONIDE AND FORMOTEROL FUMARATE DIHYDRATE 2 PUFF(S): 160; 4.5 AEROSOL RESPIRATORY (INHALATION) at 05:58

## 2019-09-15 RX ADMIN — BUDESONIDE AND FORMOTEROL FUMARATE DIHYDRATE 2 PUFF(S): 160; 4.5 AEROSOL RESPIRATORY (INHALATION) at 17:56

## 2019-09-15 RX ADMIN — Medication 100 MILLIEQUIVALENT(S): at 12:08

## 2019-09-15 RX ADMIN — NYSTATIN CREAM 1 APPLICATION(S): 100000 CREAM TOPICAL at 17:58

## 2019-09-15 RX ADMIN — Medication 100 MILLIEQUIVALENT(S): at 23:22

## 2019-09-15 RX ADMIN — Medication 40 MILLIEQUIVALENT(S): at 12:08

## 2019-09-15 RX ADMIN — SIMVASTATIN 40 MILLIGRAM(S): 20 TABLET, FILM COATED ORAL at 22:10

## 2019-09-15 RX ADMIN — Medication 100 MILLIEQUIVALENT(S): at 22:08

## 2019-09-15 RX ADMIN — ENOXAPARIN SODIUM 100 MILLIGRAM(S): 100 INJECTION SUBCUTANEOUS at 17:53

## 2019-09-15 RX ADMIN — Medication 20 MILLIGRAM(S): at 05:57

## 2019-09-15 RX ADMIN — Medication 100 MILLIEQUIVALENT(S): at 10:19

## 2019-09-15 RX ADMIN — Medication 40 MILLIEQUIVALENT(S): at 17:55

## 2019-09-15 RX ADMIN — ACETAZOLAMIDE 250 MILLIGRAM(S): 250 TABLET ORAL at 05:56

## 2019-09-15 RX ADMIN — ACETAZOLAMIDE 250 MILLIGRAM(S): 250 TABLET ORAL at 17:56

## 2019-09-15 NOTE — PROGRESS NOTE ADULT - PROBLEM SELECTOR PLAN 4
Severe hypokalemia  In the setting of aggressive diuresis   On reduced dose of diuresis  Pt refusing telemetry.  EKG with new QT prolongation.   Renal consult appreciate  Aggressive replacement,

## 2019-09-15 NOTE — PROGRESS NOTE ADULT - ASSESSMENT
33 yo female with end stage lung disease due to idiopathic/primary pulmonary HTN and Anticardiolipin antibodies.  Admitted with symptoms of worsening right heart failure. cardiac cath negative for any CAD in 2013.   Demand troponin levels noted.  Patient has lost ~15 kg since readmission but is still clearly suffering from symptoms of cor pulmonale.   She may qualify for a lung transplant,  had scheduled appointment this week with Dr. Unger to discuss care further.    ·	Cor pulmonale sec to PPH?  ·	DVT  ·	Hypokalemia  ·	Hyponatremia    Plan:  Currently on Torsemide, now with worsening hypokalemia/hyponatremia (still due to volume overload) and significant contraction alkalosis. Agree with nephro, holding aldactone, on trial of Acetozolamide. Needs increased potassium supplementation (likely total body stores quite depleted). Nurse has discussed this with HO.  Continue to monitor renal function and electrolytes, daily weight    Started on Lovenox bridge to coumadin in place of Eliquis as per pulm recommendation   Cont with supplemental O2/ Riociguat, pulmonary following   Cont with asa/statin    Patient to have SKYE evaluation and Dr. Bharath Adamson, cardio follow up as out patient    Pt had scheduled appointment with Dr. Unger  @ pul HTN clinic this week, pt will reschedule post discharge

## 2019-09-15 NOTE — PROGRESS NOTE ADULT - SUBJECTIVE AND OBJECTIVE BOX
CHIEF COMPLAINT/INTERVAL HISTORY:    Patient is a 34y old  Female who presents with a chief complaint of Dyspnea (15 Sep 2019 10:04)      HPI:  34F with a PMH of severe pulmonary hypertension and right sided CHF - (R sided EF 35) presents with worsening shortness of breath x 2 days.  Patient presented to ED in respiratory distress requiring BiPAP and is able to provide limited history.  Patient reports that she typically has shortness of breath at home and she uses oxygen therapy as needed to improve her symptoms, patient states that for the last two days the oxygen has not helped.  Patient also reports 2 week hx of worsening bilateral leg swelling.  Patient reports compliance with her medications.  Patient reports some cough productive of yellow sputum but states her cough is no worse than it normally is.      Of note patient was admitted to [facility] on 7/26 for exacerbation of CHF.  Presented with hypotension needing vasopressor support. 123 -> 105 kg (04 Sep 2019 02:03)    Overnight issues  Denies any chest pain  No SOB  Feeling some what better    SUBJECTIVE & OBJECTIVE: Pt seen and examined at bedside.   ROS:  CONSTITUTIONAL: No fever, weight loss, or fatigue  EYES: No eye pain, visual disturbances, or discharge  ENMT:  No difficulty hearing, tinnitus, vertigo; No sinus or throat pain  NECK: No pain or stiffness  RESPIRATORY: No cough, wheezing, chills or hemoptysis; No shortness of breath  CARDIOVASCULAR: No chest pain, palpitations, dizziness, Does have + LE swelling  GASTROINTESTINAL: No abdominal or epigastric pain. No nausea, vomiting, or hematemesis; No diarrhea or constipation.   GENITOURINARY: No dysuria, frequency, hematuria, or incontinence  NEUROLOGICAL: No headaches, memory loss, loss of strength, numbness, or tremors  SKIN: No itching, burning, rashes, or lesions   LYMPH NODES: No enlarged glands  ENDOCRINE: No heat or cold intolerance; No hair loss  MUSCULOSKELETAL: No joint pain or swelling; No muscle, back, or extremity pain  PSYCHIATRIC: No depression, anxiety, mood swings, or difficulty sleeping  HEME/LYMPH: No easy bruising, or bleeding gums  ALLERGY AND IMMUNOLOGIC: No hives or eczema  ICU Vital Signs Last 24 Hrs  T(C): 36.1 (15 Sep 2019 11:30), Max: 36.1 (15 Sep 2019 11:30)  T(F): 97 (15 Sep 2019 11:30), Max: 97 (15 Sep 2019 11:30)  HR: 73 (15 Sep 2019 11:30) (70 - 80)  BP: 106/55 (15 Sep 2019 11:30) (100/56 - 106/55)  BP(mean): 72 (15 Sep 2019 11:30) (72 - 72)  ABP: --  ABP(mean): --  RR: 18 (15 Sep 2019 11:30) (17 - 18)  SpO2: 96% (15 Sep 2019 11:30) (91% - 96%)        MEDICATIONS  (STANDING):  acetazolamide    Tablet 250 milliGRAM(s) Oral two times a day  aspirin enteric coated 81 milliGRAM(s) Oral daily  buDESOnide 160 MICROgram(s)/formoterol 4.5 MICROgram(s) Inhaler 2 Puff(s) Inhalation two times a day  dextrose 5%. 1000 milliLiter(s) (50 mL/Hr) IV Continuous <Continuous>  dextrose 50% Injectable 12.5 Gram(s) IV Push once  dextrose 50% Injectable 25 Gram(s) IV Push once  dextrose 50% Injectable 25 Gram(s) IV Push once  enoxaparin Injectable 100 milliGRAM(s) SubCutaneous every 12 hours  insulin lispro (HumaLOG) corrective regimen sliding scale   SubCutaneous three times a day before meals  insulin lispro (HumaLOG) corrective regimen sliding scale   SubCutaneous at bedtime  levothyroxine 37.5 MICROGram(s) Oral daily  nystatin Powder 1 Application(s) Topical two times a day  potassium chloride    Tablet ER 40 milliEquivalent(s) Oral every 6 hours  riociguat 1.5 milliGRAM(s) Oral three times a day  sertraline 50 milliGRAM(s) Oral daily  simvastatin 40 milliGRAM(s) Oral at bedtime  torsemide 20 milliGRAM(s) Oral two times a day  warfarin 3 milliGRAM(s) Oral once    MEDICATIONS  (PRN):  acetaminophen   Tablet .. 650 milliGRAM(s) Oral every 6 hours PRN Mild Pain (1 - 3)  aluminum hydroxide/magnesium hydroxide/simethicone Suspension 30 milliLiter(s) Oral every 4 hours PRN Dyspepsia  dextrose 40% Gel 15 Gram(s) Oral once PRN Blood Glucose LESS THAN 70 milliGRAM(s)/deciliter  glucagon  Injectable 1 milliGRAM(s) IntraMuscular once PRN Glucose LESS THAN 70 milligrams/deciliter  guaiFENesin    Syrup 100 milliGRAM(s) Oral every 6 hours PRN Cough  morphine  - Injectable 2 milliGRAM(s) IV Push every 6 hours PRN Severe Pain (7 - 10)  traMADol 50 milliGRAM(s) Oral every 8 hours PRN Moderate Pain (4 - 6)        PHYSICAL EXAM:    GENERAL: NAD, well-groomed, well-developed  HEAD:  Atraumatic, Normocephalic  EYES: EOMI, PERRLA, conjunctiva and sclera clear  ENMT: Moist mucous membranes  NECK: Supple, No JVD  NERVOUS SYSTEM:  Alert & Oriented X3, Moving all 4 limbs  CHEST/LUNG: Clear to auscultation bilaterally; No rales, rhonchi, wheezing, or rubs  HEART: Regular rate and rhythm; No murmurs, rubs, or gallops  ABDOMEN: Soft, Nontender, Nondistended; Bowel sounds present  EXTREMITIES:  2+ Peripheral Pulses, No clubbing, cyanosis, LE edema present    LABS:                        10.0   4.89  )-----------( 284      ( 15 Sep 2019 07:33 )             31.9     09-15    122<L>  |  73<L>  |  16  ----------------------------<  108<H>  2.6<LL>   |  36<H>  |  1.20    Ca    9.6      15 Sep 2019 07:33  Phos  3.3     09-14  Mg     2.5     09-15    TPro  7.8  /  Alb  2.9<L>  /  TBili  5.3<H>  /  DBili  x   /  AST  50<H>  /  ALT  34  /  AlkPhos  80  09-14    PT/INR - ( 15 Sep 2019 07:33 )   PT: 19.9 sec;   INR: 1.75 ratio               CAPILLARY BLOOD GLUCOSE      POCT Blood Glucose.: 159 mg/dL (15 Sep 2019 12:14)  POCT Blood Glucose.: 130 mg/dL (15 Sep 2019 08:34)  POCT Blood Glucose.: 108 mg/dL (14 Sep 2019 21:13)  POCT Blood Glucose.: 110 mg/dL (14 Sep 2019 17:00)      RECENT CULTURES:      RADIOLOGY & ADDITIONAL TESTS:  Imaging Personally Reviewed:  [ ] YES      Consultant(s) Notes Reviewed:  [ ] YES     Care Discussed with [ ] Consultants [X ] Patient [ ] Family  [ ]    [x ]  Other; RN  HEALTH ISSUES - PROBLEM Dx:  Hyponatremia: Hyponatremia  Adjustment disorder with depressed mood  Chest pain, unspecified type: Chest pain, unspecified type  Depression, unspecified depression type: Depression, unspecified depression type  Hyperlipidemia, unspecified hyperlipidemia type: Hyperlipidemia, unspecified hyperlipidemia type  Acquired hypothyroidism: Acquired hypothyroidism  Acute deep vein thrombosis (DVT) of femoral vein of left lower extremity: Acute deep vein thrombosis (DVT) of femoral vein of left lower extremity  Reactive airway disease without complication, unspecified asthma severity, unspecified whether persistent: Reactive airway disease without complication, unspecified asthma severity, unspecified whether persistent  Hypokalemia: Hypokalemia  Pulmonary hypertension: Pulmonary hypertension  Acute on chronic combined systolic and diastolic congestive heart failure: Acute on chronic combined systolic and diastolic congestive heart failure  Hypothyroidism, unspecified type: Hypothyroidism, unspecified type  Preventive measure: Preventive measure  Essential hypertension: Essential hypertension  Moderate asthma, unspecified whether complicated, unspecified whether persistent: Moderate asthma, unspecified whether complicated, unspecified whether persistent  Hyperbilirubinemia: Hyperbilirubinemia  Edema extremities: Edema extremities  Diabetes mellitus: Diabetes mellitus  Morbid obesity: Morbid obesity  Congestive heart failure, unspecified HF chronicity, unspecified heart failure type: Congestive heart failure, unspecified HF chronicity, unspecified heart failure type  Pulmonary HTN: Pulmonary HTN  Acute respiratory failure, unspecified whether with hypoxia or hypercapnia: Acute respiratory failure, unspecified whether with hypoxia or hypercapnia        DVT/GI ppx  Discussed with pt @ bedside

## 2019-09-15 NOTE — PROGRESS NOTE ADULT - SUBJECTIVE AND OBJECTIVE BOX
Subjective: quite somnolent this am. ? compliance with FR      MEDICATIONS  (STANDING):  acetazolamide    Tablet 250 milliGRAM(s) Oral two times a day  aspirin enteric coated 81 milliGRAM(s) Oral daily  buDESOnide 160 MICROgram(s)/formoterol 4.5 MICROgram(s) Inhaler 2 Puff(s) Inhalation two times a day  dextrose 5%. 1000 milliLiter(s) (50 mL/Hr) IV Continuous <Continuous>  dextrose 50% Injectable 12.5 Gram(s) IV Push once  dextrose 50% Injectable 25 Gram(s) IV Push once  dextrose 50% Injectable 25 Gram(s) IV Push once  insulin lispro (HumaLOG) corrective regimen sliding scale   SubCutaneous three times a day before meals  insulin lispro (HumaLOG) corrective regimen sliding scale   SubCutaneous at bedtime  levothyroxine 37.5 MICROGram(s) Oral daily  nystatin Powder 1 Application(s) Topical two times a day  potassium chloride    Tablet ER 40 milliEquivalent(s) Oral every 6 hours  potassium chloride    Tablet ER 40 milliEquivalent(s) Oral every 4 hours  potassium chloride  10 mEq/100 mL IVPB 10 milliEquivalent(s) IV Intermittent every 1 hour  riociguat 1.5 milliGRAM(s) Oral three times a day  sertraline 50 milliGRAM(s) Oral daily  simvastatin 40 milliGRAM(s) Oral at bedtime  torsemide 20 milliGRAM(s) Oral two times a day    MEDICATIONS  (PRN):  acetaminophen   Tablet .. 650 milliGRAM(s) Oral every 6 hours PRN Mild Pain (1 - 3)  aluminum hydroxide/magnesium hydroxide/simethicone Suspension 30 milliLiter(s) Oral every 4 hours PRN Dyspepsia  dextrose 40% Gel 15 Gram(s) Oral once PRN Blood Glucose LESS THAN 70 milliGRAM(s)/deciliter  glucagon  Injectable 1 milliGRAM(s) IntraMuscular once PRN Glucose LESS THAN 70 milligrams/deciliter  guaiFENesin    Syrup 100 milliGRAM(s) Oral every 6 hours PRN Cough  morphine  - Injectable 2 milliGRAM(s) IV Push every 6 hours PRN Severe Pain (7 - 10)  traMADol 50 milliGRAM(s) Oral every 8 hours PRN Moderate Pain (4 - 6)          T(C): 35.7 (09-15-19 @ 06:00), Max: 36 (09-14-19 @ 23:45)  HR: 70 (09-15-19 @ 06:13) (70 - 88)  BP: 100/56 (09-15-19 @ 06:00) (93/48 - 106/54)  RR: 18 (09-15-19 @ 06:00) (17 - 18)  SpO2: 93% (09-15-19 @ 06:13) (91% - 96%)  Wt(kg): --    ABG - ( 13 Sep 2019 19:47 )  pH, Arterial: x     pH, Blood: 7.59  /  pCO2: 48    /  pO2: 47    / HCO3: 46    / Base Excess: 20.8  /  SaO2: 83                  I&O's Detail    14 Sep 2019 07:01  -  15 Sep 2019 07:00  --------------------------------------------------------  IN:    Oral Fluid: 650 mL  Total IN: 650 mL    OUT:  Total OUT: 0 mL    Total NET: 650 mL               PHYSICAL EXAM:    GENERAL: somnolent  EYES: EOMI, PERRLA, conjunctiva and sclera clear  NECK: Supple, no inc in JVP  CHEST/LUNG: dec BS at bases  HEART: S1S2  ABDOMEN: Soft, Nontender, Nondistended; Bowel sounds present  EXTREMITIES:  massive LE edema  NEURO: no asterixis      LABS:  CBC Full  -  ( 15 Sep 2019 07:33 )  WBC Count : 4.89 K/uL  RBC Count : 4.31 M/uL  Hemoglobin : 10.0 g/dL  Hematocrit : 31.9 %  Platelet Count - Automated : 284 K/uL  Mean Cell Volume : 74.0 fl  Mean Cell Hemoglobin : 23.2 pg  Mean Cell Hemoglobin Concentration : 31.3 gm/dL  Auto Neutrophil # : x  Auto Lymphocyte # : x  Auto Monocyte # : x  Auto Eosinophil # : x  Auto Basophil # : x  Auto Neutrophil % : x  Auto Lymphocyte % : x  Auto Monocyte % : x  Auto Eosinophil % : x  Auto Basophil % : x    09-15    122<L>  |  73<L>  |  16  ----------------------------<  108<H>  2.6<LL>   |  36<H>  |  1.20    Ca    9.6      15 Sep 2019 07:33  Phos  3.3     09-14  Mg     2.5     09-15    TPro  7.8  /  Alb  2.9<L>  /  TBili  5.3<H>  /  DBili  x   /  AST  50<H>  /  ALT  34  /  AlkPhos  80  09-14    PT/INR - ( 15 Sep 2019 07:33 )   PT: 19.9 sec;   INR: 1.75 ratio               Impression:  * HypoNa -- hypervolemic, aldactone, SSRI effect  * Metabolic alkalosis -- contraction  * HypoK due to above  * Severe RHF, pulm HTN ( ? primary )  * L fem DVT    Recommendations:   * Off Aldactone  * Acetazolamide 250mg PO BID for 2 days  * Torsemide 40mg PO daily  * ? if Zoloft can be held temporarily  * Check UA, quantify proteinuria ( low Alb, ? nephrosis ). SPEP, UPEP  * FR 800cc/day.

## 2019-09-15 NOTE — CHART NOTE - NSCHARTNOTEFT_GEN_A_CORE
[facility]    Called by RN for critically low potassium level. Discussed with Dr. Bui regarding hypokalemia secondary to diarrhesis, as well as hyponatremia, possibly due to Zoloft. Will discontinue Zoloft. Discussed with Dr. Jenkins who stated it is not needed at this time.

## 2019-09-15 NOTE — PROGRESS NOTE ADULT - SUBJECTIVE AND OBJECTIVE BOX
GERMÁN KENNY  [phone]   [address] DOA 2019 DR ANGLE CANO   ALLERGY  nka       CONTACT   Cyndie Bernard  [facility] [id number]            Pulmonary Critical Care followup  requested for -9/15/2019    by Dr Shirley Mejia  from Dr Choudhury   Patient examined chart reviewed    HOSPITAL ADMISSION   PATIENT CAME  FROM (if information available)        TYPE OF VISIT      Subsequent Pulmonary followup     REASON FOR VISIT  PLEASE SEE PROBLEM LIST/ASSESSMENT AND RECOMMENDATIONS     PATIENT GERMÁN KENNY  [phone]   1985 DOA 2019 DR ANGLE CANO     VITALS/LABS       9/15/2019 afeb 80 92/53 18 95%   9/15/2019 w 4.8 Hb 10 Plt 284 Na 122 K 2.8 CO2 CO2 35 Cr 1.3     REVIEW OF SYMPTOMS     NOTE Noteworthy changes  if any  in ROS and PE are also entered  in note  below      Able to give ROS  Yes     RELIABLE No   CONSTITUTIONAL Weakness Yes  Chills No Vision changes No  ENDOCRINE No unexplained hair loss No heat or cold intolerance    ALLERGY No hives  Sore throat No   RESP Coughing blood no  Shortness of breath YES   NEURO No Headache  Confusion Pain neck No   CARDIAC No Chest pain No Palpitations   GI No Pain abdomen NO   Vomiting NO     PHYSICAL EXAM    HEENT Unremarkable PERRLA atraumatic   RESP Fair air entry EXP prolonged    Harsh breath sound Resp distres mild   CARDIAC S1 S2 No S3     NO JVD    ABDOMEN SOFT BS PRESENT NOT DISTENDED No hepatosplenomegaly PEDAL EDEMA present No calf tenderness  NO rash   GENERAL Not TOXIC looking    PATIENT GERMÁN KENNY  [id number] [phone]   1985 DOA 2019 DR ANGLE CANO                            Pt description                          33 yo woman with severe pHTN (group I/II, mPAP 67; W 9; RVR 9 on RHC 2018,  previously on Adempas), mildly positive anti-cardiolipin Ab and RAD admitted with decompensated right heart failure and volume overload  and DIANN. Found to have acute LLE DVT.                        PATIENT  GERMÁN KENNY  [phone]   1985 DOA 2019 DR ANGLE CANO                            PATIENT DATA     ALLERGY       nka                          HEAD OF BED ELEVATION Yes   DVT PROPHYLAXIS doac  DIET         1l fr ()    RESP GAS EXCHANGE    7p .32 759/48/47    MEDS   ASA 81 ()   Warfarin ()   insulin   acezolamide 250.2 ()   symbicort ()   levoxyl 37.5 ()   Riociguat 1.5x3 ()   Torsemide 20.2 ()         HYPONATREMIA   --9/15 Na 129-125 -124-122  acezolamide 250.2 ()   Torsemide 20.2 ()       Metolazone 10 (9/10-)   spironolactone 25.2 (-  1.2 l fr () )   1l fr ()       HYPOKALEMIA   - K 3-2.1

## 2019-09-15 NOTE — PROGRESS NOTE ADULT - PROBLEM SELECTOR PLAN 7
Pt was on coumadin at home for possible APLA, non compliant.  Switched to Eliquis inpt as pt unable to check regular INR  now S/P 7 days Eliquis, will restart coumadin per Dr. Choudhury.   Last INR 1.75  will restart Lovelox and Continue Coumdin 3 mg tonight  INR in AM   called Hematology consult Dr Loco

## 2019-09-15 NOTE — PROGRESS NOTE ADULT - PROBLEM SELECTOR PLAN 5
In the setting of volume overload/ aggressive diuresis and possible SSRI  Renal consult appreciate  1 liter fluid restriction  May need to hold SSRI- may need to discuss with Psych

## 2019-09-15 NOTE — PROGRESS NOTE ADULT - ASSESSMENT
33 yo female with PMH: severe Pulm HTN with right sided heart failure admitted with c/o SOB.  P transferred to ICU for closer monitoring and aggressive diuresis which may require dobutamine and/or pressors.  Pt with prior admission for similar presentation in ICU.

## 2019-09-15 NOTE — PROGRESS NOTE ADULT - PROBLEM SELECTOR PLAN 1
Continue Torsemide 20 mg twice daily  [id number] mg twice daily for 2 days   metolazone 10 mg  d/kamaljit   Aldactone  d/kamaljit   Mild decreased EF, severe pulm HTN  Pt non compliant with fluid restriction  Improved volume status, lost > 20 lbs wt since admission

## 2019-09-15 NOTE — PROGRESS NOTE ADULT - ASSESSMENT
PATIENT GERMÁN KENNY  [phone]   1985 DOA 2019 DR ANGLE CANO                            PULMONARY/CRITICAL CARE ASSESSMENT/RECOMMENDATIONS LIST                   PULMONARY HYPERTENSION TYPE 1  Is on  riociguat 1.5x3 ()  Cont Rx     HYPOXEMIC RESP FAILURE    . 759/48/47   A/R Monitor Pulse ox Target PO 90-95%    R HF  Is on diuretics as guided by renal   Monitor lytes    DVT   A/R   2019 Pt now on coumadin  2019 Suggest changing ac to coumadin as apixaban may not be effective in setting of apla Consider getting hemat recommendations re choice of anticoagulant   If changed bridge with lovenox message sent to dr Ye 2019     COPD ASTHMA   On symbicort () Cont Rx      DIANN   Monitor lytes     HYPOKALEMIA   2019 Being replaced     HYPONATREMIA    2019 Diuretics deescalated by renal to Torsemide 20.2 () and acetazolamide 250.2 ()   2019 Likely sec to heavy duty diuretic use    Message sent to Dr Ye (2019)   9/15/2019 Na worse Now 122   May need iv NS to replace natriuresis   May have to dc torsemide   Renal is on case ()                      DM   insulin       TIME SPENT Over 25 minutes aggregate care time spent on encounter; activities included   direct pt care, counseling and/or coordinating care reviewing notes, lab data/ imaging , discussion with multidisciplinary team/ pt /family. Risks, benefits, alternatives  discussed in detail.

## 2019-09-15 NOTE — PROGRESS NOTE ADULT - SUBJECTIVE AND OBJECTIVE BOX
Patient is a 34y old  Female who presents with a chief complaint of Dyspnea (13 Sep 2019 14:17)    PAST MEDICAL & SURGICAL HISTORY:  Former smoker, stopped smoking in distant past  Vitamin D deficiency  Morbid obesity due to excess calories  Edema extremities  Diabetes mellitus  Hyperbilirubinemia  COPD (chronic obstructive pulmonary disease)  Anticoagulation goal of INR 2 to 3  Anticardiolipin antibody positive  Congestive heart failure, unspecified HF chronicity, unspecified heart failure type  Moderate asthma, unspecified whether complicated, unspecified whether persistent  Other secondary hypertension  Anticardiolipin antibody positive  Essential hypertension  Pulmonary HTN  Morbid obesity    INTERVAL HISTORY: Resting in bed, not in any acute distress . No events O/N  	  MEDICATIONS:  MEDICATIONS  (STANDING):  aspirin enteric coated 81 milliGRAM(s) Oral daily  buDESOnide 160 MICROgram(s)/formoterol 4.5 MICROgram(s) Inhaler 2 Puff(s) Inhalation two times a day  buMETAnide Injectable 2 milliGRAM(s) IV Push every 8 hours  dextrose 5%. 1000 milliLiter(s) (50 mL/Hr) IV Continuous <Continuous>  dextrose 50% Injectable 12.5 Gram(s) IV Push once  dextrose 50% Injectable 25 Gram(s) IV Push once  dextrose 50% Injectable 25 Gram(s) IV Push once  enoxaparin Injectable 100 milliGRAM(s) SubCutaneous every 12 hours  insulin lispro (HumaLOG) corrective regimen sliding scale   SubCutaneous three times a day before meals  insulin lispro (HumaLOG) corrective regimen sliding scale   SubCutaneous at bedtime  levothyroxine 37.5 MICROGram(s) Oral daily  metolazone 10 milliGRAM(s) Oral daily  nystatin Powder 1 Application(s) Topical two times a day  potassium chloride    Tablet ER 40 milliEquivalent(s) Oral every 4 hours  potassium chloride  10 mEq/100 mL IVPB 10 milliEquivalent(s) IV Intermittent every 1 hour  riociguat 1.5 milliGRAM(s) Oral three times a day  sertraline 50 milliGRAM(s) Oral daily  simvastatin 40 milliGRAM(s) Oral at bedtime  spironolactone 25 milliGRAM(s) Oral every 12 hours  warfarin 3 milliGRAM(s) Oral once    MEDICATIONS  (PRN):  acetaminophen   Tablet .. 650 milliGRAM(s) Oral every 6 hours PRN Mild Pain (1 - 3)  aluminum hydroxide/magnesium hydroxide/simethicone Suspension 30 milliLiter(s) Oral every 4 hours PRN Dyspepsia  dextrose 40% Gel 15 Gram(s) Oral once PRN Blood Glucose LESS THAN 70 milliGRAM(s)/deciliter  glucagon  Injectable 1 milliGRAM(s) IntraMuscular once PRN Glucose LESS THAN 70 milligrams/deciliter  guaiFENesin    Syrup 100 milliGRAM(s) Oral every 6 hours PRN Cough  morphine  - Injectable 2 milliGRAM(s) IV Push every 6 hours PRN Severe Pain (7 - 10)  traMADol 50 milliGRAM(s) Oral every 8 hours PRN Moderate Pain (4 - 6)    Vitals:  Vital Signs Last 24 Hrs  T(C): 35.7 (15 Sep 2019 06:00), Max: 36 (14 Sep 2019 23:45)  T(F): 96.2 (15 Sep 2019 06:00), Max: 96.8 (14 Sep 2019 23:45)  HR: 70 (15 Sep 2019 06:13) (70 - 88)  BP: 100/56 (15 Sep 2019 06:00) (93/48 - 106/54)  BP(mean): 73 (14 Sep 2019 11:29) (73 - 73)  RR: 18 (15 Sep 2019 06:00) (17 - 18)  SpO2: 93% (15 Sep 2019 06:13) (91% - 96%)      Daily     Daily Weights in k.2 kg on admission to 92.5 today (15 Sep 2019 04:48) - LIKELY ERRONEOUS!    PHYSICAL EXAM:  Neuro: Awake, responsive  CV: S1 S2 RRR  Lungs: CTABL  GI: Soft, BS +, ND, NT  Extremities: ++ LE edema, improving  	    ECG:  	  RADIOLOGY: < from: Xray Chest 1 View- PORTABLE-Urgent (19 @ 09:14) >  Lungs: There are no lung consolidations.  Heart: There is cardiomegaly.  Mediastinum: Stable right hilar prominence.    IMPRESSION:    No lung consolidations.    < end of copied text >    DIAGNOSTIC TESTING:    [x ] Echocardiogram: < from: TTE Echo Doppler w/o Cont (19 @ 08:44) >   1. Left ventricular ejection fraction, by visual estimation, is 45 to   50%.   2. Mildly decreased global left ventricular systolic function.   3. Elevated mean left atrial pressure.   4. Global cardiomyopathy.   5. Increased LV wall thickness.   6. Normal left ventricular internal cavity size.   7. Right ventricular pressure overload.   8. Spectral Doppler shows impaired relaxation pattern of left   ventricular myocardial filling (Grade I diastolic dysfunction).   9. There is mild asymmetric left ventricular hypertrophy.  10. Pulmonary hypertension.  11. Severely enlarged right ventricle.  12. Severely reduced RV systolic function.  13. RV ejection fraction 34%.  14. Small pericardial effusion.  15. Degenerative mitral valve.  16. Mild mitral annular calcification.  17. Mild mitral valve regurgitation.  18. Moderate tricuspid regurgitation.  19. Structurally normal tricuspid valve, with normal leaflet excursion.  20. Mild aortic regurgitation.  21. Mild to moderate pulmonic valve regurgitation.  22. Structurally normal pulmonic valve, with normal leaflet excursion.  23. Estimated pulmonary artery systolic pressure is 87.1 mmHg assuming a   right atrial pressure of 20 mmHg, which is consistent with severe   pulmonary hypertension.  24. Pulmonary hypertension is present.  25. The main pulmonary artery is normal in size.  26. The right atrial pressure is moderately elevated.    < end of copied text >    [ x]  Catheterization:     LABS:	 	    09-15    122<L>  |  73<L>  |  16  ----------------------------<  108<H>  2.6<LL>   |  36<H>  |  1.20    Ca    9.6      15 Sep 2019 07:33  Phos  3.3       Mg     2.5     09-15    TPro  7.8  /  Alb  2.9<L>  /  TBili  5.3<H>  /  DBili  x   /  AST  50<H>  /  ALT  34  /  AlkPhos  80      126<L>  |  76<L>  |  13  ----------------------------<  92  3.2<L>   |  40<H>  |  0.97    Ca    9.1      14 Sep 2019 07:19  Phos  3.3       Mg     2.1         TPro  7.8  /  Alb  2.9<L>  /  TBili  5.3<H>  /  DBili  x   /  AST  50<H>  /  ALT  34  /  AlkPhos  80  14  13 Sep 2019 07:38    125    |  74     |  13     ----------------------------<  104    2.1     |  38     |  1.01                                10.3   4.71  )-----------( 272      ( 14 Sep 2019 07:19 )             32.1                      10.9   5.40  )-----------( 263      ( 13 Sep 2019 07:38 )             34.4 ,                       10.9   4.53  )-----------( 232      ( 11 Sep 2019 11:51 )             34.5

## 2019-09-16 LAB
% ALBUMIN: 46.1 % — SIGNIFICANT CHANGE UP
% ALPHA 1: 5.5 % — SIGNIFICANT CHANGE UP
% ALPHA 2: 6.9 % — SIGNIFICANT CHANGE UP
% BETA: 13.3 % — SIGNIFICANT CHANGE UP
% GAMMA: 28.2 % — SIGNIFICANT CHANGE UP
ALBUMIN SERPL ELPH-MCNC: 3.4 G/DL — LOW (ref 3.6–5.5)
ALBUMIN/GLOB SERPL ELPH: 0.8 RATIO — SIGNIFICANT CHANGE UP
ALPHA1 GLOB SERPL ELPH-MCNC: 0.4 G/DL — SIGNIFICANT CHANGE UP (ref 0.1–0.4)
ALPHA2 GLOB SERPL ELPH-MCNC: 0.5 G/DL — SIGNIFICANT CHANGE UP (ref 0.5–1)
ANION GAP SERPL CALC-SCNC: 10 MMOL/L — SIGNIFICANT CHANGE UP (ref 5–17)
B-GLOBULIN SERPL ELPH-MCNC: 1 G/DL — SIGNIFICANT CHANGE UP (ref 0.5–1)
BUN SERPL-MCNC: 16 MG/DL — SIGNIFICANT CHANGE UP (ref 7–23)
CALCIUM SERPL-MCNC: 9.6 MG/DL — SIGNIFICANT CHANGE UP (ref 8.5–10.1)
CHLORIDE SERPL-SCNC: 81 MMOL/L — LOW (ref 96–108)
CO2 SERPL-SCNC: 32 MMOL/L — HIGH (ref 22–31)
CREAT SERPL-MCNC: 1.02 MG/DL — SIGNIFICANT CHANGE UP (ref 0.5–1.3)
GAMMA GLOBULIN: 2.1 G/DL — HIGH (ref 0.6–1.6)
GLUCOSE BLDC GLUCOMTR-MCNC: 92 MG/DL — SIGNIFICANT CHANGE UP (ref 70–99)
GLUCOSE SERPL-MCNC: 120 MG/DL — HIGH (ref 70–99)
HCT VFR BLD CALC: 29.2 % — LOW (ref 34.5–45)
HGB BLD-MCNC: 9.2 G/DL — LOW (ref 11.5–15.5)
INR BLD: 1.68 RATIO — HIGH (ref 0.88–1.16)
MAGNESIUM SERPL-MCNC: 2.3 MG/DL — SIGNIFICANT CHANGE UP (ref 1.6–2.6)
MCHC RBC-ENTMCNC: 23.5 PG — LOW (ref 27–34)
MCHC RBC-ENTMCNC: 31.5 GM/DL — LOW (ref 32–36)
MCV RBC AUTO: 74.5 FL — LOW (ref 80–100)
NRBC # BLD: 0 /100 WBCS — SIGNIFICANT CHANGE UP (ref 0–0)
PLATELET # BLD AUTO: 319 K/UL — SIGNIFICANT CHANGE UP (ref 150–400)
POTASSIUM SERPL-MCNC: 2.5 MMOL/L — CRITICAL LOW (ref 3.5–5.3)
POTASSIUM SERPL-SCNC: 2.5 MMOL/L — CRITICAL LOW (ref 3.5–5.3)
PROT PATTERN SERPL ELPH-IMP: SIGNIFICANT CHANGE UP
PROTHROM AB SERPL-ACNC: 19.1 SEC — HIGH (ref 10–12.9)
RBC # BLD: 3.92 M/UL — SIGNIFICANT CHANGE UP (ref 3.8–5.2)
RBC # FLD: 26.4 % — HIGH (ref 10.3–14.5)
SODIUM SERPL-SCNC: 123 MMOL/L — LOW (ref 135–145)
WBC # BLD: 5.65 K/UL — SIGNIFICANT CHANGE UP (ref 3.8–10.5)
WBC # FLD AUTO: 5.65 K/UL — SIGNIFICANT CHANGE UP (ref 3.8–10.5)

## 2019-09-16 PROCEDURE — 99233 SBSQ HOSP IP/OBS HIGH 50: CPT

## 2019-09-16 PROCEDURE — 76937 US GUIDE VASCULAR ACCESS: CPT | Mod: 26,59

## 2019-09-16 PROCEDURE — 99232 SBSQ HOSP IP/OBS MODERATE 35: CPT

## 2019-09-16 PROCEDURE — 36569 INSJ PICC 5 YR+ W/O IMAGING: CPT | Mod: LT

## 2019-09-16 RX ORDER — POTASSIUM CHLORIDE 20 MEQ
40 PACKET (EA) ORAL EVERY 4 HOURS
Refills: 0 | Status: COMPLETED | OUTPATIENT
Start: 2019-09-16 | End: 2019-09-16

## 2019-09-16 RX ORDER — WARFARIN SODIUM 2.5 MG/1
10 TABLET ORAL ONCE
Refills: 0 | Status: COMPLETED | OUTPATIENT
Start: 2019-09-16 | End: 2019-09-16

## 2019-09-16 RX ORDER — POTASSIUM CHLORIDE 20 MEQ
10 PACKET (EA) ORAL ONCE
Refills: 0 | Status: COMPLETED | OUTPATIENT
Start: 2019-09-16 | End: 2019-09-16

## 2019-09-16 RX ORDER — POTASSIUM CHLORIDE 20 MEQ
10 PACKET (EA) ORAL
Refills: 0 | Status: DISCONTINUED | OUTPATIENT
Start: 2019-09-16 | End: 2019-09-16

## 2019-09-16 RX ORDER — CHLORHEXIDINE GLUCONATE 213 G/1000ML
1 SOLUTION TOPICAL DAILY
Refills: 0 | Status: DISCONTINUED | OUTPATIENT
Start: 2019-09-16 | End: 2019-09-20

## 2019-09-16 RX ADMIN — WARFARIN SODIUM 10 MILLIGRAM(S): 2.5 TABLET ORAL at 22:11

## 2019-09-16 RX ADMIN — Medication 81 MILLIGRAM(S): at 12:28

## 2019-09-16 RX ADMIN — RIOCIGUAT 1.5 MILLIGRAM(S): 1.5 TABLET, FILM COATED ORAL at 22:04

## 2019-09-16 RX ADMIN — RIOCIGUAT 1.5 MILLIGRAM(S): 1.5 TABLET, FILM COATED ORAL at 16:21

## 2019-09-16 RX ADMIN — SIMVASTATIN 40 MILLIGRAM(S): 20 TABLET, FILM COATED ORAL at 22:04

## 2019-09-16 RX ADMIN — BUDESONIDE AND FORMOTEROL FUMARATE DIHYDRATE 2 PUFF(S): 160; 4.5 AEROSOL RESPIRATORY (INHALATION) at 12:18

## 2019-09-16 RX ADMIN — NYSTATIN CREAM 1 APPLICATION(S): 100000 CREAM TOPICAL at 18:11

## 2019-09-16 RX ADMIN — Medication 40 MILLIEQUIVALENT(S): at 18:09

## 2019-09-16 RX ADMIN — Medication 100 MILLIEQUIVALENT(S): at 13:56

## 2019-09-16 RX ADMIN — NYSTATIN CREAM 1 APPLICATION(S): 100000 CREAM TOPICAL at 05:49

## 2019-09-16 RX ADMIN — Medication 37.5 MICROGRAM(S): at 05:46

## 2019-09-16 RX ADMIN — ACETAZOLAMIDE 250 MILLIGRAM(S): 250 TABLET ORAL at 05:46

## 2019-09-16 RX ADMIN — Medication 40 MILLIEQUIVALENT(S): at 13:56

## 2019-09-16 RX ADMIN — Medication 40 MILLIEQUIVALENT(S): at 22:04

## 2019-09-16 RX ADMIN — Medication 30 MILLILITER(S): at 20:34

## 2019-09-16 RX ADMIN — BUDESONIDE AND FORMOTEROL FUMARATE DIHYDRATE 2 PUFF(S): 160; 4.5 AEROSOL RESPIRATORY (INHALATION) at 05:49

## 2019-09-16 RX ADMIN — Medication 20 MILLIGRAM(S): at 11:22

## 2019-09-16 RX ADMIN — RIOCIGUAT 1.5 MILLIGRAM(S): 1.5 TABLET, FILM COATED ORAL at 11:18

## 2019-09-16 NOTE — PROGRESS NOTE ADULT - SUBJECTIVE AND OBJECTIVE BOX
CHIEF COMPLAINT/INTERVAL HISTORY:    Patient is a 34y old  Female who presents with a chief complaint of Dyspnea (15 Sep 2019 10:04)      HPI:  34F with a PMH of severe pulmonary hypertension and right sided CHF - (R sided EF 35) presents with worsening shortness of breath x 2 days.  Patient presented to ED in respiratory distress requiring BiPAP and is able to provide limited history.  Patient reports that she typically has shortness of breath at home and she uses oxygen therapy as needed to improve her symptoms, patient states that for the last two days the oxygen has not helped.  Patient also reports 2 week hx of worsening bilateral leg swelling.  Patient reports compliance with her medications.  Patient reports some cough productive of yellow sputum but states her cough is no worse than it normally is.      Of note patient was admitted to [facility] on 7/26 for exacerbation of CHF.  Presented with hypotension needing vasopressor support. 123 -> 105 kg (04 Sep 2019 02:03)    Overnight issues  Denies any chest pain  No SOB  Feeling some what better    SUBJECTIVE & OBJECTIVE: Pt seen and examined at bedside.   T(C): 36.9 (09-16-19 @ 18:02), Max: 36.9 (09-16-19 @ 18:02)  HR: 77 (09-16-19 @ 18:02) (76 - 80)  BP: 94/55 (09-16-19 @ 18:02) (91/45 - 94/55)  RR: 18 (09-16-19 @ 18:02) (17 - 19)  SpO2: 97% (09-16-19 @ 18:02) (85% - 97%)      MEDICATIONS  (STANDING):  aspirin enteric coated 81 milliGRAM(s) Oral daily  buDESOnide 160 MICROgram(s)/formoterol 4.5 MICROgram(s) Inhaler 2 Puff(s) Inhalation two times a day  chlorhexidine 4% Liquid 1 Application(s) Topical daily  dextrose 5%. 1000 milliLiter(s) (50 mL/Hr) IV Continuous <Continuous>  dextrose 50% Injectable 12.5 Gram(s) IV Push once  dextrose 50% Injectable 25 Gram(s) IV Push once  dextrose 50% Injectable 25 Gram(s) IV Push once  enoxaparin Injectable 100 milliGRAM(s) SubCutaneous every 12 hours  insulin lispro (HumaLOG) corrective regimen sliding scale   SubCutaneous three times a day before meals  insulin lispro (HumaLOG) corrective regimen sliding scale   SubCutaneous at bedtime  levothyroxine 37.5 MICROGram(s) Oral daily  nystatin Powder 1 Application(s) Topical two times a day  potassium chloride    Tablet ER 40 milliEquivalent(s) Oral every 4 hours  riociguat 1.5 milliGRAM(s) Oral three times a day  simvastatin 40 milliGRAM(s) Oral at bedtime  warfarin 10 milliGRAM(s) Oral once    MEDICATIONS  (PRN):  acetaminophen   Tablet .. 650 milliGRAM(s) Oral every 6 hours PRN Mild Pain (1 - 3)  aluminum hydroxide/magnesium hydroxide/simethicone Suspension 30 milliLiter(s) Oral every 4 hours PRN Dyspepsia  dextrose 40% Gel 15 Gram(s) Oral once PRN Blood Glucose LESS THAN 70 milliGRAM(s)/deciliter  glucagon  Injectable 1 milliGRAM(s) IntraMuscular once PRN Glucose LESS THAN 70 milligrams/deciliter  guaiFENesin    Syrup 100 milliGRAM(s) Oral every 6 hours PRN Cough  morphine  - Injectable 2 milliGRAM(s) IV Push every 6 hours PRN Severe Pain (7 - 10)  traMADol 50 milliGRAM(s) Oral every 8 hours PRN Moderate Pain (4 - 6)  PHYSICAL EXAM:    GENERAL: NAD, well-groomed, well-developed  HEAD:  Atraumatic, Normocephalic  EYES: EOMI, conjunctiva and sclera clear  ENMT: Moist mucous membranes  NECK: Supple, No JVD  NERVOUS SYSTEM:  Alert & Oriented X3, Moving all 4 limbs  CHEST/LUNG: Clear to auscultation bilaterally; No rales, rhonchi, wheezing, or rubs  HEART: Regular rate and rhythm; No murmurs, rubs, or gallops  ABDOMEN: Soft, Nontender, Nondistended; Bowel sounds present  EXTREMITIES:  2+ Peripheral Pulses, No clubbing, cyanosis, LE edema present                                   9.2    5.65  )-----------( 319      ( 16 Sep 2019 18:18 )             29.2           LIVER FUNCTIONS - ( 15 Sep 2019 17:11 )  Alb: 3.4 g/dL / Pro: 7.4 g/dL / ALK PHOS: x     / ALT: x     / AST: x     / GGT: x           PT/INR - ( 16 Sep 2019 18:18 )   PT: 19.1 sec;   INR: 1.68 ratio           123|81|16<120  2.5|32|1.02  9.6,2.3,--  09-16 @ 12:31             CAPILLARY BLOOD GLUCOSE      POCT Blood Glucose.: 159 mg/dL (15 Sep 2019 12:14)  POCT Blood Glucose.: 130 mg/dL (15 Sep 2019 08:34)  POCT Blood Glucose.: 108 mg/dL (14 Sep 2019 21:13)  POCT Blood Glucose.: 110 mg/dL (14 Sep 2019 17:00)      RECENT CULTURES:      RADIOLOGY & ADDITIONAL TESTS:  Imaging Personally Reviewed:  [ ] YES      Consultant(s) Notes Reviewed:  [ ] YES     Care Discussed with [ ] Consultants [X ] Patient [ ] Family  [ ]    [x ]  Other; RN  HEALTH ISSUES - PROBLEM Dx:  Hyponatremia: Hyponatremia  Adjustment disorder with depressed mood  Chest pain, unspecified type: Chest pain, unspecified type  Depression, unspecified depression type: Depression, unspecified depression type  Hyperlipidemia, unspecified hyperlipidemia type: Hyperlipidemia, unspecified hyperlipidemia type  Acquired hypothyroidism: Acquired hypothyroidism  Acute deep vein thrombosis (DVT) of femoral vein of left lower extremity: Acute deep vein thrombosis (DVT) of femoral vein of left lower extremity  Reactive airway disease without complication, unspecified asthma severity, unspecified whether persistent: Reactive airway disease without complication, unspecified asthma severity, unspecified whether persistent  Hypokalemia: Hypokalemia  Pulmonary hypertension: Pulmonary hypertension  Acute on chronic combined systolic and diastolic congestive heart failure: Acute on chronic combined systolic and diastolic congestive heart failure  Hypothyroidism, unspecified type: Hypothyroidism, unspecified type  Preventive measure: Preventive measure  Essential hypertension: Essential hypertension  Moderate asthma, unspecified whether complicated, unspecified whether persistent: Moderate asthma, unspecified whether complicated, unspecified whether persistent  Hyperbilirubinemia: Hyperbilirubinemia  Edema extremities: Edema extremities  Diabetes mellitus: Diabetes mellitus  Morbid obesity: Morbid obesity  Congestive heart failure, unspecified HF chronicity, unspecified heart failure type: Congestive heart failure, unspecified HF chronicity, unspecified heart failure type  Pulmonary HTN: Pulmonary HTN  Acute respiratory failure, unspecified whether with hypoxia or hypercapnia: Acute respiratory failure, unspecified whether with hypoxia or hypercapnia        DVT/GI ppx  Discussed with pt @ bedside

## 2019-09-16 NOTE — PROGRESS NOTE ADULT - ASSESSMENT
33 yo woman with severe pHTN (group I/II, mPAP 67; W 9; RVR 9 on RHC 8/2018,  on Adempas), mildly positive anti-cardiolipin Ab and RAD admitted with decompensated right heart failure, volume overload, DIANN and acute LLE DVT.   Volume status is much improved with diuresis but now with hyponatremia, hypochloremia, metabolic alkalosis and worsening renal failure.     #pHTN/decompensated right heart failure  -- most recent wt from 9/14 is 103 kg, no recent Is/Os or wts   -- her volume status is much improved and I do think that she is overdiuresed at this point. I would recommend holding diuretics today  -- repeat bloodwork including VBG, replete K aggressively.   -- please continue checking daily weights  -- c/w Adempas  -- c/w Midodrine for BP support  -- supplemental O2 to keep sats >90%. would avoid NIV given RHF. Can use high flow if needed    #LLE DVT in the setting of sub-therapeutic INR. Patient reports that her INR was not being monitored. While I do agree that there is better evidence for AC with coumadin, this patient has very poor compliance and had difficulty getting her INRs checked.   I think that NOAC would be a better choice given compliance issues.   -- keep RLE elevated, can try compression stockings      #Renal: worsening renal failure, metabolic alkalosis, hypokalemia, hyponatremia  --Nephrology input appreciated.    #Hypothyroid - TSH on Aug 22 was 7. Synthroid increased to 37.5mcg      #Reactive airway disease - PFTs in the past with reversible obstructive defect  -- c/w Symbicort and Albuterol prn  -- can d/c standing Duonebs  -- will need repeat PFTs as outpatient after discharge    Patient follows with Dr Unger at [address] [place] [address]. She should follow with him after discharge.     Thank you for this consult. I will follow with you. Please call with any questions. Cell phone # [phone]    Shirley Mejia MD    [facility]//Pulmonary Medicine  [address]; [place] [address]  tel: [phone]; fax: [phone]

## 2019-09-16 NOTE — PROGRESS NOTE ADULT - PROBLEM SELECTOR PLAN 5
In the setting of volume overload/ aggressive diuresis and possible SSRI  Renal following, consider Tolvaptin as per renal if Na levels persistently low.   Fluid restriction  May need to hold SSRI- may need to discuss with Psych

## 2019-09-16 NOTE — CHART NOTE - NSCHARTNOTEFT_GEN_A_CORE
Assessment:   Pt seen for nutrition followup. Pt admitted with respiratory distress. Pt with T2DM, COPD, CHF, pulmonary HTN, Vit D deficiency. As per chart review fluid restriction liberalized to 1200ml (09-14). Tolerating fluid restriction well. Pt reports poor PO intake. Denies N/C/D. Vomiting reported (09-15). Pt taught back sodium restriction but not fluid restriction.       Factors impacting intake: [ ] none [ ] nausea  [X] vomiting [ ] diarrhea [ ] constipation  [ ]chewing problems [ ] swallowing issues  [X] other: "not feeling well"    Diet Presciption: Diet, Regular:   1200mL Fluid Restriction ([id number])  Low Sodium (09-14-19 @ 16:22)    Intake: <50%    Current Weight: Weight (kg): 115.2 (09-04, edema 2+ generalized, 4+ bilateral foot, ankle, legs)  92.5 (09-15, edema +4 bilateral legs)  % Weight Change: 20% loss, change in fluid accumulation noted     Pertinent Medications: MEDICATIONS  (STANDING):  aspirin enteric coated 81 milliGRAM(s) Oral daily  buDESOnide 160 MICROgram(s)/formoterol 4.5 MICROgram(s) Inhaler 2 Puff(s) Inhalation two times a day  dextrose 5%. 1000 milliLiter(s) (50 mL/Hr) IV Continuous <Continuous>  dextrose 50% Injectable 12.5 Gram(s) IV Push once  dextrose 50% Injectable 25 Gram(s) IV Push once  dextrose 50% Injectable 25 Gram(s) IV Push once  enoxaparin Injectable 100 milliGRAM(s) SubCutaneous every 12 hours  insulin lispro (HumaLOG) corrective regimen sliding scale   SubCutaneous three times a day before meals  insulin lispro (HumaLOG) corrective regimen sliding scale   SubCutaneous at bedtime  levothyroxine 37.5 MICROGram(s) Oral daily  nystatin Powder 1 Application(s) Topical two times a day  potassium chloride    Tablet ER 40 milliEquivalent(s) Oral every 4 hours  riociguat 1.5 milliGRAM(s) Oral three times a day  simvastatin 40 milliGRAM(s) Oral at bedtime  warfarin 10 milliGRAM(s) Oral once    MEDICATIONS  (PRN):  acetaminophen   Tablet .. 650 milliGRAM(s) Oral every 6 hours PRN Mild Pain (1 - 3)  aluminum hydroxide/magnesium hydroxide/simethicone Suspension 30 milliLiter(s) Oral every 4 hours PRN Dyspepsia  dextrose 40% Gel 15 Gram(s) Oral once PRN Blood Glucose LESS THAN 70 milliGRAM(s)/deciliter  glucagon  Injectable 1 milliGRAM(s) IntraMuscular once PRN Glucose LESS THAN 70 milligrams/deciliter  guaiFENesin    Syrup 100 milliGRAM(s) Oral every 6 hours PRN Cough  morphine  - Injectable 2 milliGRAM(s) IV Push every 6 hours PRN Severe Pain (7 - 10)  traMADol 50 milliGRAM(s) Oral every 8 hours PRN Moderate Pain (4 - 6)    Pertinent Labs:   Skin: Bruised, otherwise intact WDL     Estimated Needs:   [X] no change since previous assessment (recalculated on 09-12)  [ ] recalculated:     Nutrition Diagnosis:      Nutrition diagnosis yes...     Nutrition Diagnostic Terminology #1 Other...     Other Excessive fluid intake.     Etiology unrestricted fluid intake; c/o dry mouth.     Signs/Symptoms +4 edema Barbara legs, Barbara ankle, Barbara feet, +2 gen edema. Modified 09-16 +4 Barbara legs     Previous Goal/Expected Outcome Pt will teach back understanding of fluid restriction guidelines; maintain stable wt.- Partially met: see assessment.    Nutrition Diagnosis is [X] ongoing  [ ] resolved [ ] not applicable     New Nutrition Diagnosis: [X] not applicable       Interventions:   Recommend  [X] Continue current diet Rx  [ ] Change Diet To:  [ ] Nutrition Supplement  [ ] Nutrition Support  [ ] Other:     Monitoring and Evaluation:   [X] PO intake [ x ] Tolerance to diet prescription [ x ] weights [ x ] labs[ x ] follow up per protocol  [X] other: fluid intake

## 2019-09-16 NOTE — PROGRESS NOTE BEHAVIORAL HEALTH - NSBHFUPINTERVALHXFT_PSY_A_CORE
Interval events of hypokalemia and hyponatremia (2.8/122) noted. Zoloft 50mg PO qd discontinued in light of the hyponatremia and because it was at a subtheraputic dose. Moreover, Patient would be best helped with support groups/therapy more than any psychiatric medication.

## 2019-09-16 NOTE — CONSULT NOTE ADULT - PROBLEM SELECTOR RECOMMENDATION 9
Anticoagulation.
due to severe pulm HTN.  will continue current diuresis as ordered for now.  Strict i/o's with jimenez.  Will evaluate for possibility of adding  and/or pressors.  Will start midodrine 10mg tid for now.  Monitor renal function. trend CE's. Dose coumadin tonight.

## 2019-09-16 NOTE — PROGRESS NOTE ADULT - SUBJECTIVE AND OBJECTIVE BOX
Patient feels well no complaints today.    MEDICATIONS  (STANDING):  aspirin enteric coated 81 milliGRAM(s) Oral daily  buDESOnide 160 MICROgram(s)/formoterol 4.5 MICROgram(s) Inhaler 2 Puff(s) Inhalation two times a day  dextrose 5%. 1000 milliLiter(s) (50 mL/Hr) IV Continuous <Continuous>  dextrose 50% Injectable 12.5 Gram(s) IV Push once  dextrose 50% Injectable 25 Gram(s) IV Push once  dextrose 50% Injectable 25 Gram(s) IV Push once  enoxaparin Injectable 100 milliGRAM(s) SubCutaneous every 12 hours  insulin lispro (HumaLOG) corrective regimen sliding scale   SubCutaneous three times a day before meals  insulin lispro (HumaLOG) corrective regimen sliding scale   SubCutaneous at bedtime  levothyroxine 37.5 MICROGram(s) Oral daily  nystatin Powder 1 Application(s) Topical two times a day  potassium chloride    Tablet ER 40 milliEquivalent(s) Oral every 4 hours  riociguat 1.5 milliGRAM(s) Oral three times a day  simvastatin 40 milliGRAM(s) Oral at bedtime  warfarin 10 milliGRAM(s) Oral once    MEDICATIONS  (PRN):  acetaminophen   Tablet .. 650 milliGRAM(s) Oral every 6 hours PRN Mild Pain (1 - 3)  aluminum hydroxide/magnesium hydroxide/simethicone Suspension 30 milliLiter(s) Oral every 4 hours PRN Dyspepsia  dextrose 40% Gel 15 Gram(s) Oral once PRN Blood Glucose LESS THAN 70 milliGRAM(s)/deciliter  glucagon  Injectable 1 milliGRAM(s) IntraMuscular once PRN Glucose LESS THAN 70 milligrams/deciliter  guaiFENesin    Syrup 100 milliGRAM(s) Oral every 6 hours PRN Cough  morphine  - Injectable 2 milliGRAM(s) IV Push every 6 hours PRN Severe Pain (7 - 10)  traMADol 50 milliGRAM(s) Oral every 8 hours PRN Moderate Pain (4 - 6)      09-15-19 @ 07:01  -  09-16-19 @ 07:00  --------------------------------------------------------  IN: 350 mL / OUT: 0 mL / NET: 350 mL      PHYSICAL EXAM:      T(C): 36.6 (09-16-19 @ 11:05), Max: 36.6 (09-16-19 @ 11:05)  HR: 80 (09-16-19 @ 16:23) (67 - 81)  BP: 91/45 (09-16-19 @ 11:05) (91/45 - 104/52)  RR: 19 (09-16-19 @ 16:23) (17 - 22)  SpO2: 85% (09-16-19 @ 16:23) (80% - 98%)  Wt(kg): --  Respiratory: clear anteriorly, decreased BS at bases  Cardiovascular: S1 S2  Gastrointestinal: soft NT ND +BS  Extremities:   3 edema; element of lymphedema                                    10.0   4.89  )-----------( 284      ( 15 Sep 2019 07:33 )             31.9     09-16    123<L>  |  81<L>  |  16  ----------------------------<  120<H>  2.5<LL>   |  32<H>  |  1.02    Ca    9.6      16 Sep 2019 12:31  Mg     2.3     09-16    TPro  7.4  /  Alb  x   /  TBili  x   /  DBili  x   /  AST  x   /  ALT  x   /  AlkPhos  x   09-15      LIVER FUNCTIONS - ( 15 Sep 2019 17:11 )  Alb: x     / Pro: 7.4 g/dL / ALK PHOS: x     / ALT: x     / AST: x     / GGT: x           Creatinine Trend: 1.02<--, 1.24<--, 1.20<--, 0.97<--, 1.05<--, 0.99<--  Assessment and Plan:  HypoNa -- hypervolemic, aldactone, SSRI effect  * Metabolic alkalosis -- contraction  * HypoK due to above  * Severe RHF, pulm HTN ( ? primary )  * L fem DVT    Hold diuretics for now; replete K po and IV;    ml;   Will consider Tolvaptan if no improving trend;

## 2019-09-16 NOTE — PROGRESS NOTE ADULT - PROBLEM SELECTOR PLAN 4
Severe hypokalemia  In the setting of aggressive diuresis   On reduced dose of diuresis  Pt refusing telemetry.  EKG with new QT prolongation.   Real following

## 2019-09-16 NOTE — CONSULT NOTE ADULT - CONSULT REQUESTED DATE/TIME
Phoned [facility] office regarding [facility] C&S results.  They will treat pt for infection and will fax us the results as well.    16-Sep-2019 10:32

## 2019-09-16 NOTE — PROGRESS NOTE ADULT - ASSESSMENT
33 yo female with end stage lung disease due to idiopathic/primary pulmonary HTN and Anticardiolipin antibodies.  Admitted with symptoms of worsening right heart failure. cardiac cath negative for any CAD in 2013.   Demand troponin levels noted.  Patient has lost ~15 kg since readmission but is still clearly suffering from symptoms of cor pulmonale.   She may qualify for a lung transplant,  had scheduled appointment last week with Dr. Unger to discuss care further.    ·	Cor pulmonale sec to PPH?  ·	DVT  ·	Hypokalemia  ·	Hyponatremia    Plan:  Currently diuretics on hold 2/2 worsening hypokalemia/hyponatremia (still due to volume overload) and significant contraction alkalosis.    s/p Acetazolamide x 2 doses. Needs increased potassium supplementation (likely total body stores quite depleted). Renal following  Continue to monitor renal function and electrolytes, daily weight    on Lovenox full dose for AC, bridge to coumadin, monitor h/h closely  Cont with supplemental O2/ Riociguat, pulmonary following   Cont with asa/statin    Patient to have SKYE evaluation and Dr. Bharath Adamson, cardio follow up as out patient    Pt had scheduled appointment with Dr. Unger  @ pulm HTN clinic last week, pt will reschedule post discharge 35 yo female with end stage lung disease due to idiopathic/primary pulmonary HTN and Anticardiolipin antibodies.  Admitted with symptoms of worsening right heart failure. cardiac cath negative for any CAD in 2013.   Demand troponin levels noted.  Patient has lost ~15 kg since readmission but is still clearly suffering from symptoms of cor pulmonale.   She may qualify for a lung transplant,  had scheduled appointment last week with Dr. Unger to discuss care further.    ·	Cor pulmonale sec to PPH?  ·	DVT  ·	Hypokalemia  ·	Hyponatremia    Plan:  Currently diuretics on hold 2/2 worsening hypokalemia/hyponatremia (still due to volume overload) and significant contraction alkalosis.  s/p Acetazolamide x 2 doses. Needs increased potassium supplementation (likely total body stores quite depleted). Renal following  continue telemetry   Repeat EKG, follow up QTc  Continue to monitor renal function and electrolytes, daily weight   Likely need Mdline/central line for IV access and potassium replacement as potassium remains low   on Lovenox full dose for AC, bridge to coumadin, monitor h/h closely  Cont with supplemental O2/ Riociguat, pulmonary following   Cont with asa/statin    Patient to have SKYE evaluation and Dr. Bharath Adamson, cardio follow up as out patient    Pt had scheduled appointment with Dr. Unger  @ pulm HTN clinic last week, pt will reschedule post discharge 35 yo female with end stage lung disease due to idiopathic/primary pulmonary HTN and Anticardiolipin antibodies.  Admitted with symptoms of worsening right heart failure. cardiac cath negative for any CAD in 2013.   Demand troponin levels noted.  Patient has lost ~15 kg since readmission but is still clearly suffering from symptoms of cor pulmonale.   She may qualify for a lung transplant,  had scheduled appointment last week with Dr. Unger to discuss care further.    ·	Cor pulmonale sec to PPH?  ·	DVT  ·	Hypokalemia  ·	Hyponatremia    Plan:  Currently diuretics on hold 2/2 worsening hypokalemia/hyponatremia (still due to volume overload) and significant contraction alkalosis.  s/p Acetazolamide x 2 doses. Needs increased potassium supplementation (likely total body stores quite depleted). Renal following  continue telemetry   Repeat EKG, follow up QTc  Continue to monitor renal function and electrolytes, daily weight   Likely need Mdline/central line for IV access and potassium replacement as potassium remains low   on Lovenox full dose for AC, bridge to coumadin, monitor h/h closely, also, pt is having her monthly period with increased clots  Cont with supplemental O2/ Riociguat, pulmonary following   Cont with asa/statin    Patient to have SKYE evaluation and Dr. Bharath Adamson, cardio follow up as out patient    Pt had scheduled appointment with Dr. Unger  @ pulm HTN clinic last week, pt will reschedule post discharge 35 yo female with end stage lung disease due to idiopathic/primary pulmonary HTN and Anticardiolipin antibodies.  Admitted with symptoms of worsening right heart failure. cardiac cath negative for any CAD in 2013.   Demand troponin levels noted.  Patient has lost ~15 kg since readmission but still has symptoms of cor pulmonale.   She may qualify for a lung transplant,  had scheduled appointment last week with Dr. Unger to discuss care further.    ·	Cor pulmonale sec to PPH?  ·	DVT  ·	Hypokalemia  ·	Hyponatremia    Plan:  ·	Currently diuretics on hold 2/2 worsening hypokalemia/hyponatremia (due to volume overload) and significant contraction alkalosis.  s/p Acetazolamide x 2 doses. Needs increased potassium supplementation (likely total body stores quite depleted). Renal following  ·	Continue telemetry for severe electrolyte abnormalities. Repeat EKG, follow up QTc  ·	Continue to monitor renal function and electrolytes, daily weight   ·	May need Midline/central line for IV access and potassium replacement if potassium remains low   ·	On Lovenox full dose for AC, bridge to coumadin, monitor h/h closely, also, pt is having her monthly period with increased clots  ·	Cont with supplemental O2/ Riociguat, pulmonary following   ·	Cont with asa/statin    ·	Patient to have SKYE evaluation and Dr. Bharath Adamson, cardio follow up as out patient    ·	Pt had scheduled appointment with Dr. Unger  @ pulm HTN clinic last week, pt will reschedule post discharge

## 2019-09-16 NOTE — PROGRESS NOTE ADULT - PROBLEM SELECTOR PLAN 1
Hold Torsemide 20 mg twice daily, continue with   Mild decreased EF, severe pulm HTN  Pt non compliant with fluid restriction  Improved volume status, lost > 20 lbs wt since admission

## 2019-09-16 NOTE — PROGRESS NOTE ADULT - PROBLEM SELECTOR PLAN 7
Pt was on coumadin at home for possible APLA, non compliant.  Switched to Eliquis inpt as pt unable to check regular INR  now S/P 7 days Eliquis, will restart coumadin per Dr. Choudhury.   Last INR 1.75  Continue Lovelox and Continue Coumdin 3 mg tonight  INR in AM

## 2019-09-16 NOTE — PROCEDURE NOTE - NSPROCDETAILS_GEN_ALL_CORE
location identified, draped/prepped, sterile technique used/sterile dressing applied/ultrasound guidance/supine position/ultrasound assessment/sterile technique, catheter placed

## 2019-09-16 NOTE — CONSULT NOTE ADULT - PROBLEM SELECTOR PROBLEM 1
Acute deep vein thrombosis (DVT) of femoral vein of left lower extremity
Congestive heart failure, unspecified HF chronicity, unspecified heart failure type

## 2019-09-16 NOTE — PROGRESS NOTE ADULT - SUBJECTIVE AND OBJECTIVE BOX
Patient is a 34y old  Female who presents with a chief complaint of Dyspnea (16 Sep 2019 13:50)      PAST MEDICAL & SURGICAL HISTORY:  Former smoker, stopped smoking in distant past  Vitamin D deficiency  Morbid obesity due to excess calories  Edema extremities  Diabetes mellitus  Hyperbilirubinemia  COPD (chronic obstructive pulmonary disease)  Anticoagulation goal of INR 2 to 3  Anticardiolipin antibody positive  Congestive heart failure, unspecified HF chronicity, unspecified heart failure type  Moderate asthma, unspecified whether complicated, unspecified whether persistent  Other secondary hypertension  Anticardiolipin antibody positive  Essential hypertension  Pulmonary HTN  Morbid obesity    INTERVAL HISTORY:  	  MEDICATIONS:  MEDICATIONS  (STANDING):  aspirin enteric coated 81 milliGRAM(s) Oral daily  buDESOnide 160 MICROgram(s)/formoterol 4.5 MICROgram(s) Inhaler 2 Puff(s) Inhalation two times a day  dextrose 5%. 1000 milliLiter(s) (50 mL/Hr) IV Continuous <Continuous>  dextrose 50% Injectable 12.5 Gram(s) IV Push once  dextrose 50% Injectable 25 Gram(s) IV Push once  dextrose 50% Injectable 25 Gram(s) IV Push once  enoxaparin Injectable 100 milliGRAM(s) SubCutaneous every 12 hours  insulin lispro (HumaLOG) corrective regimen sliding scale   SubCutaneous three times a day before meals  insulin lispro (HumaLOG) corrective regimen sliding scale   SubCutaneous at bedtime  levothyroxine 37.5 MICROGram(s) Oral daily  nystatin Powder 1 Application(s) Topical two times a day  potassium chloride    Tablet ER 40 milliEquivalent(s) Oral every 4 hours  riociguat 1.5 milliGRAM(s) Oral three times a day  simvastatin 40 milliGRAM(s) Oral at bedtime  warfarin 10 milliGRAM(s) Oral once    MEDICATIONS  (PRN):  acetaminophen   Tablet .. 650 milliGRAM(s) Oral every 6 hours PRN Mild Pain (1 - 3)  aluminum hydroxide/magnesium hydroxide/simethicone Suspension 30 milliLiter(s) Oral every 4 hours PRN Dyspepsia  dextrose 40% Gel 15 Gram(s) Oral once PRN Blood Glucose LESS THAN 70 milliGRAM(s)/deciliter  glucagon  Injectable 1 milliGRAM(s) IntraMuscular once PRN Glucose LESS THAN 70 milligrams/deciliter  guaiFENesin    Syrup 100 milliGRAM(s) Oral every 6 hours PRN Cough  morphine  - Injectable 2 milliGRAM(s) IV Push every 6 hours PRN Severe Pain (7 - 10)  traMADol 50 milliGRAM(s) Oral every 8 hours PRN Moderate Pain (4 - 6)    Vitals:  T(F): 97.8 (09-16-19 @ 11:05), Max: 97.8 (09-16-19 @ 11:05)  HR: 77 (09-16-19 @ 11:22) (67 - 81)  BP: 91/45 (09-16-19 @ 11:05) (91/45 - 104/52)  RR: 17 (09-16-19 @ 11:05) (17 - 22)  SpO2: 92% (09-16-19 @ 11:22) (80% - 98%)  Wt(kg): --92.5 (?)    09-15 @ 07:01  -  09-16 @ 07:00  --------------------------------------------------------  IN:    Oral Fluid: 350 mL  Total IN: 350 mL    OUT:  Total OUT: 0 mL    Total NET: 350 mL    Weight (kg): 92.5 (09-15 @ 06:00)    PHYSICAL EXAM:  Neuro: Awake, responsive  CV: S1 S2 RRR  Lungs: CTABL  GI: Soft, BS +, ND, NT  Extremities: No edema    TELEMETRY: RSR    DIAGNOSTIC TESTING:    [x ] Echocardiogram:   < from: TTE Echo Doppler w/o Cont (07.26.19 @ 08:44) >   1. Left ventricular ejection fraction, by visual estimation, is 45 to   50%.   2. Mildly decreased global left ventricular systolic function.   3. Elevated mean left atrial pressure.   4. Global cardiomyopathy.   5. Increased LV wall thickness.   6. Normal left ventricular internal cavity size.   7. Right ventricular pressure overload.   8. Spectral Doppler shows impaired relaxation pattern of left   ventricular myocardial filling (Grade I diastolic dysfunction).   9. There is mild asymmetric left ventricular hypertrophy.  10. Pulmonary hypertension.  11. Severely enlarged right ventricle.  12. Severely reduced RV systolic function.  13. RV ejection fraction 34%.  14. Small pericardial effusion.  15. Degenerative mitral valve.  16. Mild mitral annular calcification.  17. Mild mitral valve regurgitation.  18. Moderate tricuspid regurgitation.  19. Structurally normal tricuspid valve, with normal leaflet excursion.  20. Mild aortic regurgitation.  21. Mild to moderate pulmonic valve regurgitation.  22. Structurally normal pulmonic valve, with normal leaflet excursion.  23. Estimated pulmonary artery systolic pressure is 87.1 mmHg assuming a   right atrial pressure of 20 mmHg, which is consistent with severe   pulmonary hypertension.  24. Pulmonary hypertension is present.  25. The main pulmonary artery is normal in size.  26. The right atrial pressure is moderately elevated.    < end of copied text >    LABS:	 	    16 Sep 2019 12:31    123    |  81     |  16     ----------------------------<  120    2.5     |  32     |  1.02   15 Sep 2019 17:31    122    |  75     |  18     ----------------------------<  104    2.8     |  35     |  1.24   15 Sep 2019 07:33    122    |  73     |  16     ----------------------------<  108    2.6     |  36     |  1.20     Ca    9.6        16 Sep 2019 12:31  Mg     2.3       16 Sep 2019 12:31    TPro  7.4    /  Alb  x      /  TBili  x      /  DBili  x      /  AST  x      /  ALT  x      /  AlkPhos  x      15 Sep 2019 17:11                          10.0   4.89  )-----------( 284      ( 15 Sep 2019 07:33 )             31.9 ,                       10.3   4.71  )-----------( 272      ( 14 Sep 2019 07:19 )             32.1     INR: 1.75 ratio (09-15 @ 07:33)  INR: 2.34 ratio (09-14 @ 07:19)  INR: 2.61 ratio (09-13 @ 18:45) Patient is a 34y old  Female who presents with a chief complaint of Dyspnea (16 Sep 2019 13:50)      PAST MEDICAL & SURGICAL HISTORY:  Former smoker, stopped smoking in distant past  Vitamin D deficiency  Morbid obesity due to excess calories  Edema extremities  Diabetes mellitus  Hyperbilirubinemia  COPD (chronic obstructive pulmonary disease)  Anticoagulation goal of INR 2 to 3  Anticardiolipin antibody positive  Congestive heart failure, unspecified HF chronicity, unspecified heart failure type  Moderate asthma, unspecified whether complicated, unspecified whether persistent  Other secondary hypertension  Anticardiolipin antibody positive  Essential hypertension  Pulmonary HTN  Morbid obesity    INTERVAL HISTORY: resting in bed, not in nay acute distress, having regular period with increased clots as per pt   	  MEDICATIONS:  MEDICATIONS  (STANDING):  aspirin enteric coated 81 milliGRAM(s) Oral daily  buDESOnide 160 MICROgram(s)/formoterol 4.5 MICROgram(s) Inhaler 2 Puff(s) Inhalation two times a day  dextrose 5%. 1000 milliLiter(s) (50 mL/Hr) IV Continuous <Continuous>  dextrose 50% Injectable 12.5 Gram(s) IV Push once  dextrose 50% Injectable 25 Gram(s) IV Push once  dextrose 50% Injectable 25 Gram(s) IV Push once  enoxaparin Injectable 100 milliGRAM(s) SubCutaneous every 12 hours  insulin lispro (HumaLOG) corrective regimen sliding scale   SubCutaneous three times a day before meals  insulin lispro (HumaLOG) corrective regimen sliding scale   SubCutaneous at bedtime  levothyroxine 37.5 MICROGram(s) Oral daily  nystatin Powder 1 Application(s) Topical two times a day  potassium chloride    Tablet ER 40 milliEquivalent(s) Oral every 4 hours  riociguat 1.5 milliGRAM(s) Oral three times a day  simvastatin 40 milliGRAM(s) Oral at bedtime  warfarin 10 milliGRAM(s) Oral once    MEDICATIONS  (PRN):  acetaminophen   Tablet .. 650 milliGRAM(s) Oral every 6 hours PRN Mild Pain (1 - 3)  aluminum hydroxide/magnesium hydroxide/simethicone Suspension 30 milliLiter(s) Oral every 4 hours PRN Dyspepsia  dextrose 40% Gel 15 Gram(s) Oral once PRN Blood Glucose LESS THAN 70 milliGRAM(s)/deciliter  glucagon  Injectable 1 milliGRAM(s) IntraMuscular once PRN Glucose LESS THAN 70 milligrams/deciliter  guaiFENesin    Syrup 100 milliGRAM(s) Oral every 6 hours PRN Cough  morphine  - Injectable 2 milliGRAM(s) IV Push every 6 hours PRN Severe Pain (7 - 10)  traMADol 50 milliGRAM(s) Oral every 8 hours PRN Moderate Pain (4 - 6)    Vitals:  T(F): 97.8 (09-16-19 @ 11:05), Max: 97.8 (09-16-19 @ 11:05)  HR: 77 (09-16-19 @ 11:22) (67 - 81)  BP: 91/45 (09-16-19 @ 11:05) (91/45 - 104/52)  RR: 17 (09-16-19 @ 11:05) (17 - 22)  SpO2: 92% (09-16-19 @ 11:22) (80% - 98%)  Wt(kg): --92.5 (?)    09-15 @ 07:01  -  09-16 @ 07:00  --------------------------------------------------------  IN:    Oral Fluid: 350 mL  Total IN: 350 mL    OUT:  Total OUT: 0 mL    Total NET: 350 mL    Weight (kg): 92.5 (09-15 @ 06:00)    PHYSICAL EXAM:  Neuro: Awake, responsive  CV: S1 S2 RRR  Lungs: CTABL  GI: Soft, BS +, ND, NT  Extremities: +++ LE edema    TELEMETRY: RSR    DIAGNOSTIC TESTING:    [x ] Echocardiogram:   < from: TTE Echo Doppler w/o Cont (07.26.19 @ 08:44) >   1. Left ventricular ejection fraction, by visual estimation, is 45 to   50%.   2. Mildly decreased global left ventricular systolic function.   3. Elevated mean left atrial pressure.   4. Global cardiomyopathy.   5. Increased LV wall thickness.   6. Normal left ventricular internal cavity size.   7. Right ventricular pressure overload.   8. Spectral Doppler shows impaired relaxation pattern of left   ventricular myocardial filling (Grade I diastolic dysfunction).   9. There is mild asymmetric left ventricular hypertrophy.  10. Pulmonary hypertension.  11. Severely enlarged right ventricle.  12. Severely reduced RV systolic function.  13. RV ejection fraction 34%.  14. Small pericardial effusion.  15. Degenerative mitral valve.  16. Mild mitral annular calcification.  17. Mild mitral valve regurgitation.  18. Moderate tricuspid regurgitation.  19. Structurally normal tricuspid valve, with normal leaflet excursion.  20. Mild aortic regurgitation.  21. Mild to moderate pulmonic valve regurgitation.  22. Structurally normal pulmonic valve, with normal leaflet excursion.  23. Estimated pulmonary artery systolic pressure is 87.1 mmHg assuming a   right atrial pressure of 20 mmHg, which is consistent with severe   pulmonary hypertension.  24. Pulmonary hypertension is present.  25. The main pulmonary artery is normal in size.  26. The right atrial pressure is moderately elevated.    < end of copied text >    LABS:	 	    16 Sep 2019 12:31    123    |  81     |  16     ----------------------------<  120    2.5     |  32     |  1.02   15 Sep 2019 17:31    122    |  75     |  18     ----------------------------<  104    2.8     |  35     |  1.24   15 Sep 2019 07:33    122    |  73     |  16     ----------------------------<  108    2.6     |  36     |  1.20     Ca    9.6        16 Sep 2019 12:31  Mg     2.3       16 Sep 2019 12:31    TPro  7.4    /  Alb  x      /  TBili  x      /  DBili  x      /  AST  x      /  ALT  x      /  AlkPhos  x      15 Sep 2019 17:11                          10.0   4.89  )-----------( 284      ( 15 Sep 2019 07:33 )             31.9 ,                       10.3   4.71  )-----------( 272      ( 14 Sep 2019 07:19 )             32.1     INR: 1.75 ratio (09-15 @ 07:33)  INR: 2.34 ratio (09-14 @ 07:19)  INR: 2.61 ratio (09-13 @ 18:45) Patient is a 34y old  Female who presents with a chief complaint of Dyspnea (16 Sep 2019 13:50)      PAST MEDICAL & SURGICAL HISTORY:  Former smoker, stopped smoking in distant past  Vitamin D deficiency  Morbid obesity due to excess calories  Edema extremities  Diabetes mellitus  Hyperbilirubinemia  COPD (chronic obstructive pulmonary disease)  Anticoagulation goal of INR 2 to 3  Anticardiolipin antibody positive  Congestive heart failure, unspecified HF chronicity, unspecified heart failure type  Moderate asthma, unspecified whether complicated, unspecified whether persistent  Other secondary hypertension  Anticardiolipin antibody positive  Essential hypertension  Pulmonary HTN  Morbid obesity    INTERVAL HISTORY: resting in bed, no acute distress, having regular period with increased clots as per pt   	  MEDICATIONS:  MEDICATIONS  (STANDING):  aspirin enteric coated 81 milliGRAM(s) Oral daily  buDESOnide 160 MICROgram(s)/formoterol 4.5 MICROgram(s) Inhaler 2 Puff(s) Inhalation two times a day  dextrose 5%. 1000 milliLiter(s) (50 mL/Hr) IV Continuous <Continuous>  dextrose 50% Injectable 12.5 Gram(s) IV Push once  dextrose 50% Injectable 25 Gram(s) IV Push once  dextrose 50% Injectable 25 Gram(s) IV Push once  enoxaparin Injectable 100 milliGRAM(s) SubCutaneous every 12 hours  insulin lispro (HumaLOG) corrective regimen sliding scale   SubCutaneous three times a day before meals  insulin lispro (HumaLOG) corrective regimen sliding scale   SubCutaneous at bedtime  levothyroxine 37.5 MICROGram(s) Oral daily  nystatin Powder 1 Application(s) Topical two times a day  potassium chloride    Tablet ER 40 milliEquivalent(s) Oral every 4 hours  riociguat 1.5 milliGRAM(s) Oral three times a day  simvastatin 40 milliGRAM(s) Oral at bedtime  warfarin 10 milliGRAM(s) Oral once    MEDICATIONS  (PRN):  acetaminophen   Tablet .. 650 milliGRAM(s) Oral every 6 hours PRN Mild Pain (1 - 3)  aluminum hydroxide/magnesium hydroxide/simethicone Suspension 30 milliLiter(s) Oral every 4 hours PRN Dyspepsia  dextrose 40% Gel 15 Gram(s) Oral once PRN Blood Glucose LESS THAN 70 milliGRAM(s)/deciliter  glucagon  Injectable 1 milliGRAM(s) IntraMuscular once PRN Glucose LESS THAN 70 milligrams/deciliter  guaiFENesin    Syrup 100 milliGRAM(s) Oral every 6 hours PRN Cough  morphine  - Injectable 2 milliGRAM(s) IV Push every 6 hours PRN Severe Pain (7 - 10)  traMADol 50 milliGRAM(s) Oral every 8 hours PRN Moderate Pain (4 - 6)    Vitals:  T(F): 97.8 (09-16-19 @ 11:05), Max: 97.8 (09-16-19 @ 11:05)  HR: 77 (09-16-19 @ 11:22) (67 - 81)  BP: 91/45 (09-16-19 @ 11:05) (91/45 - 104/52)  RR: 17 (09-16-19 @ 11:05) (17 - 22)  SpO2: 92% (09-16-19 @ 11:22) (80% - 98%)  Wt(kg): --92.5 (?)    09-15 @ 07:01  -  09-16 @ 07:00  --------------------------------------------------------  IN:    Oral Fluid: 350 mL  Total IN: 350 mL    OUT:  Total OUT: 0 mL    Total NET: 350 mL    Weight (kg): 92.5 (09-15 @ 06:00)    PHYSICAL EXAM:  Neuro: Awake, responsive  CV: S1 S2 RRR  Lungs: CTABL  GI: Soft, BS +, ND, NT  Extremities: +++ LE edema    TELEMETRY: RSR    DIAGNOSTIC TESTING:    [x ] Echocardiogram:   < from: TTE Echo Doppler w/o Cont (07.26.19 @ 08:44) >   1. Left ventricular ejection fraction, by visual estimation, is 45 to   50%.   2. Mildly decreased global left ventricular systolic function.   3. Elevated mean left atrial pressure.   4. Global cardiomyopathy.   5. Increased LV wall thickness.   6. Normal left ventricular internal cavity size.   7. Right ventricular pressure overload.   8. Spectral Doppler shows impaired relaxation pattern of left   ventricular myocardial filling (Grade I diastolic dysfunction).   9. There is mild asymmetric left ventricular hypertrophy.  10. Pulmonary hypertension.  11. Severely enlarged right ventricle.  12. Severely reduced RV systolic function.  13. RV ejection fraction 34%.  14. Small pericardial effusion.  15. Degenerative mitral valve.  16. Mild mitral annular calcification.  17. Mild mitral valve regurgitation.  18. Moderate tricuspid regurgitation.  19. Structurally normal tricuspid valve, with normal leaflet excursion.  20. Mild aortic regurgitation.  21. Mild to moderate pulmonic valve regurgitation.  22. Structurally normal pulmonic valve, with normal leaflet excursion.  23. Estimated pulmonary artery systolic pressure is 87.1 mmHg assuming a   right atrial pressure of 20 mmHg, which is consistent with severe   pulmonary hypertension.  24. Pulmonary hypertension is present.  25. The main pulmonary artery is normal in size.  26. The right atrial pressure is moderately elevated.    < end of copied text >    LABS:	 	    16 Sep 2019 12:31    123    |  81     |  16     ----------------------------<  120    2.5     |  32     |  1.02   15 Sep 2019 17:31    122    |  75     |  18     ----------------------------<  104    2.8     |  35     |  1.24   15 Sep 2019 07:33    122    |  73     |  16     ----------------------------<  108    2.6     |  36     |  1.20     Ca    9.6        16 Sep 2019 12:31  Mg     2.3       16 Sep 2019 12:31    TPro  7.4    /  Alb  x      /  TBili  x      /  DBili  x      /  AST  x      /  ALT  x      /  AlkPhos  x      15 Sep 2019 17:11                          10.0   4.89  )-----------( 284      ( 15 Sep 2019 07:33 )             31.9 ,                       10.3   4.71  )-----------( 272      ( 14 Sep 2019 07:19 )             32.1     INR: 1.75 ratio (09-15 @ 07:33)  INR: 2.34 ratio (09-14 @ 07:19)  INR: 2.61 ratio (09-13 @ 18:45)

## 2019-09-16 NOTE — PROGRESS NOTE ADULT - ASSESSMENT
33 yo female with PMH: severe Pulm HTN with right sided heart failure admitted with c/o SOB.  P transferred to ICU for closer monitoring and aggressive diuresis which may require dobutamine and/or pressors.  Pt with prior admission for similar presentation in ICU. MODERATE

## 2019-09-16 NOTE — CONSULT NOTE ADULT - SUBJECTIVE AND OBJECTIVE BOX
REASON FOR CONSULTATION:  Right leg Swelling  Dyspnea.  Orthopnea.    INTERVAL HISTORY:      Allergies    No Known Allergies    Intolerances        MEDICATIONS  (STANDING):  aspirin enteric coated 81 milliGRAM(s) Oral daily  buDESOnide 160 MICROgram(s)/formoterol 4.5 MICROgram(s) Inhaler 2 Puff(s) Inhalation two times a day  dextrose 5%. 1000 milliLiter(s) (50 mL/Hr) IV Continuous <Continuous>  dextrose 50% Injectable 12.5 Gram(s) IV Push once  dextrose 50% Injectable 25 Gram(s) IV Push once  dextrose 50% Injectable 25 Gram(s) IV Push once  enoxaparin Injectable 100 milliGRAM(s) SubCutaneous every 12 hours  insulin lispro (HumaLOG) corrective regimen sliding scale   SubCutaneous three times a day before meals  insulin lispro (HumaLOG) corrective regimen sliding scale   SubCutaneous at bedtime  levothyroxine 37.5 MICROGram(s) Oral daily  nystatin Powder 1 Application(s) Topical two times a day  riociguat 1.5 milliGRAM(s) Oral three times a day  simvastatin 40 milliGRAM(s) Oral at bedtime  torsemide 20 milliGRAM(s) Oral two times a day    MEDICATIONS  (PRN):  acetaminophen   Tablet .. 650 milliGRAM(s) Oral every 6 hours PRN Mild Pain (1 - 3)  aluminum hydroxide/magnesium hydroxide/simethicone Suspension 30 milliLiter(s) Oral every 4 hours PRN Dyspepsia  dextrose 40% Gel 15 Gram(s) Oral once PRN Blood Glucose LESS THAN 70 milliGRAM(s)/deciliter  glucagon  Injectable 1 milliGRAM(s) IntraMuscular once PRN Glucose LESS THAN 70 milligrams/deciliter  guaiFENesin    Syrup 100 milliGRAM(s) Oral every 6 hours PRN Cough  morphine  - Injectable 2 milliGRAM(s) IV Push every 6 hours PRN Severe Pain (7 - 10)  traMADol 50 milliGRAM(s) Oral every 8 hours PRN Moderate Pain (4 - 6)      Vital Signs Last 24 Hrs  T(C): 36.1 (16 Sep 2019 05:00), Max: 36.4 (16 Sep 2019 00:16)  T(F): 96.9 (16 Sep 2019 05:00), Max: 97.5 (16 Sep 2019 00:16)  HR: 72 (16 Sep 2019 06:17) (67 - 81)  BP: 100/60 (16 Sep 2019 05:41) (92/53 - 106/55)  BP(mean): 72 (15 Sep 2019 11:30) (72 - 72)  RR: 22 (16 Sep 2019 05:00) (18 - 22)  SpO2: 98% (16 Sep 2019 06:17) (80% - 98%)    PHYSICAL EXAM:    EYES: EOMI, PERRLA, conjunctiva and sclera clear  CHEST/LUNG: Rhochi:- ++  HEART: Regular rate and rhythm;   ABDOMEN: Soft, Nontender, Nondistended;   LYMPH: No lymphadenopathy noted.  Edema:- +++        LABS:                        10.0   4.89  )-----------( 284      ( 15 Sep 2019 07:33 )             31.9     09-15    122<L>  |  75<L>  |  18  ----------------------------<  104<H>  2.8<LL>   |  35<H>  |  1.24    Ca    9.5      15 Sep 2019 17:31  Mg     2.5     09-15    TPro  7.4  /  Alb  x   /  TBili  x   /  DBili  x   /  AST  x   /  ALT  x   /  AlkPhos  x   09-15    PT/INR - ( 15 Sep 2019 07:33 )   PT: 19.9 sec;   INR: 1.75 ratio                 RADIOLOGY & ADDITIONAL STUDIES:    PATHOLOGY:

## 2019-09-16 NOTE — PROGRESS NOTE ADULT - SUBJECTIVE AND OBJECTIVE BOX
Interval Events:  Patient seen and examined at bedside. She looks a feels well. Her edema is significantly improved.   Worsening renal failure, metabolic alkalosis, hyponatremia and hypochloremia noted on bloodwork. Zoloft and Aldactone held. Started on Diamox.      REVIEW OF SYSTEMS:  Constitutional: no fevers  CV:  no chest pain  Resp:  no SOB  GI: no abd pain  GYN: cramps    [x ] All other systems negative  [ ] Unable to assess ROS because ________    OBJECTIVE:  T(C): 36.6 (09-16-19 @ 11:05), Max: 36.6 (09-16-19 @ 11:05)  HR: 77 (09-16-19 @ 11:22) (67 - 81)  BP: 91/45 (09-16-19 @ 11:05) (91/45 - 104/52)  RR: 17 (09-16-19 @ 11:05) (17 - 22)  SpO2: 92% (09-16-19 @ 11:22) (80% - 98%)      09-15-19 @ 07:01  -  09-16-19 @ 07:00  --------------------------------------------------------  IN: 350 mL / OUT: 0 mL / NET: 350 mL        PHYSICAL EXAM:  General: Well appearing Female. No apparent distress  HEENT:   Mucous membranes are moist.  Neck: Supple. no JVD  Respiratory:   Cardiovascular: RRR  Abdomen: Soft, non-tender, non-distended.   Extremities: +2 bilateral REUBEN, R>L, improved  Skin: Warm, dry, no rash.   Neurological: CNII-XII grossly intact.   Psychiatry: appropriate mood and affect.       HOSPITAL MEDICATIONS:  MEDICATIONS  (STANDING):  aspirin enteric coated 81 milliGRAM(s) Oral daily  buDESOnide 160 MICROgram(s)/formoterol 4.5 MICROgram(s) Inhaler 2 Puff(s) Inhalation two times a day  dextrose 5%. 1000 milliLiter(s) (50 mL/Hr) IV Continuous <Continuous>  dextrose 50% Injectable 12.5 Gram(s) IV Push once  dextrose 50% Injectable 25 Gram(s) IV Push once  dextrose 50% Injectable 25 Gram(s) IV Push once  enoxaparin Injectable 100 milliGRAM(s) SubCutaneous every 12 hours  insulin lispro (HumaLOG) corrective regimen sliding scale   SubCutaneous three times a day before meals  insulin lispro (HumaLOG) corrective regimen sliding scale   SubCutaneous at bedtime  levothyroxine 37.5 MICROGram(s) Oral daily  nystatin Powder 1 Application(s) Topical two times a day  potassium chloride    Tablet ER 40 milliEquivalent(s) Oral every 4 hours  potassium chloride  10 mEq/100 mL IVPB 10 milliEquivalent(s) IV Intermittent once  riociguat 1.5 milliGRAM(s) Oral three times a day  simvastatin 40 milliGRAM(s) Oral at bedtime  torsemide 20 milliGRAM(s) Oral two times a day  warfarin 10 milliGRAM(s) Oral once    MEDICATIONS  (PRN):  acetaminophen   Tablet .. 650 milliGRAM(s) Oral every 6 hours PRN Mild Pain (1 - 3)  aluminum hydroxide/magnesium hydroxide/simethicone Suspension 30 milliLiter(s) Oral every 4 hours PRN Dyspepsia  dextrose 40% Gel 15 Gram(s) Oral once PRN Blood Glucose LESS THAN 70 milliGRAM(s)/deciliter  glucagon  Injectable 1 milliGRAM(s) IntraMuscular once PRN Glucose LESS THAN 70 milligrams/deciliter  guaiFENesin    Syrup 100 milliGRAM(s) Oral every 6 hours PRN Cough  morphine  - Injectable 2 milliGRAM(s) IV Push every 6 hours PRN Severe Pain (7 - 10)  traMADol 50 milliGRAM(s) Oral every 8 hours PRN Moderate Pain (4 - 6)      LABS:                        10.0   4.89  )-----------( 284      ( 15 Sep 2019 07:33 )             31.9     09-16    123<L>  |  81<L>  |  16  ----------------------------<  120<H>  2.5<LL>   |  32<H>  |  1.02    Ca    9.6      16 Sep 2019 12:31  Mg     2.5     09-15    TPro  7.4  /  Alb  x   /  TBili  x   /  DBili  x   /  AST  x   /  ALT  x   /  AlkPhos  x   09-15    PT/INR - ( 15 Sep 2019 07:33 )   PT: 19.9 sec;   INR: 1.75 ratio         RADIOLOGY:  [ x ] Reviewed and interpreted by me    CXR: volume overload    < from: US Duplex Venous Lower Ext Complete, Bilateral (09.05.19 @ 09:19) >  Impression: Positive for extensive acute nonocclusive thrombus in the   left external iliac vein and the left common femoral vein  and left   greater saphenous vein.    < end of copied text >      ECHOCARDIOGRAPHY:  < from: TTE Echo Doppler w/o Cont (07.26.19 @ 08:44) >  Summary:   1. Left ventricular ejection fraction, by visual estimation, is 45 to   50%.   2. Mildly decreased global left ventricular systolic function.   3. Elevated mean left atrial pressure.   4. Global cardiomyopathy.   5. Increased LV wall thickness.   6. Normal left ventricular internal cavity size.   7. Right ventricular pressure overload.   8. Spectral Doppler shows impaired relaxation pattern of left   ventricular myocardial filling (Grade I diastolic dysfunction).   9. There is mild asymmetric left ventricular hypertrophy.  10. Pulmonary hypertension.  11. Severely enlarged right ventricle.  12. Severely reduced RV systolic function.  13. RV ejection fraction 34%.  14. Small pericardial effusion.  15. Degenerative mitral valve.  16. Mild mitral annular calcification.  17. Mild mitral valve regurgitation.  18. Moderate tricuspid regurgitation.  19. Structurally normal tricuspid valve, with normal leaflet excursion.  20. Mild aortic regurgitation.  21. Mild to moderate pulmonic valve regurgitation.  22. Structurally normal pulmonic valve, with normal leaflet excursion.  23. Estimated pulmonary artery systolic pressure is 87.1 mmHg assuming a   right atrial pressure of 20 mmHg, which is consistent with severe   pulmonary hypertension.  24. Pulmonary hypertension is present.  25. The main pulmonary artery is normal in size.  26. The right atrial pressure is moderately elevated.    [id number] Chago BLANCO  Electronically signed on 7/26/2019 at 2:01:34 PM    < end of copied text >

## 2019-09-17 LAB
ANION GAP SERPL CALC-SCNC: 10 MMOL/L — SIGNIFICANT CHANGE UP (ref 5–17)
ANION GAP SERPL CALC-SCNC: 10 MMOL/L — SIGNIFICANT CHANGE UP (ref 5–17)
APPEARANCE UR: ABNORMAL
APTT BLD: 100 SEC — HIGH (ref 27.5–36.3)
BACTERIA # UR AUTO: ABNORMAL
BILIRUB UR-MCNC: NEGATIVE — SIGNIFICANT CHANGE UP
BUN SERPL-MCNC: 13 MG/DL — SIGNIFICANT CHANGE UP (ref 7–23)
BUN SERPL-MCNC: 14 MG/DL — SIGNIFICANT CHANGE UP (ref 7–23)
CALCIUM SERPL-MCNC: 9.1 MG/DL — SIGNIFICANT CHANGE UP (ref 8.5–10.1)
CALCIUM SERPL-MCNC: 9.3 MG/DL — SIGNIFICANT CHANGE UP (ref 8.5–10.1)
CHLORIDE SERPL-SCNC: 83 MMOL/L — LOW (ref 96–108)
CHLORIDE SERPL-SCNC: 86 MMOL/L — LOW (ref 96–108)
CO2 SERPL-SCNC: 30 MMOL/L — SIGNIFICANT CHANGE UP (ref 22–31)
CO2 SERPL-SCNC: 33 MMOL/L — HIGH (ref 22–31)
COLOR SPEC: YELLOW — SIGNIFICANT CHANGE UP
CREAT SERPL-MCNC: 0.95 MG/DL — SIGNIFICANT CHANGE UP (ref 0.5–1.3)
CREAT SERPL-MCNC: 0.98 MG/DL — SIGNIFICANT CHANGE UP (ref 0.5–1.3)
DIFF PNL FLD: ABNORMAL
EPI CELLS # UR: SIGNIFICANT CHANGE UP
GLUCOSE SERPL-MCNC: 127 MG/DL — HIGH (ref 70–99)
GLUCOSE SERPL-MCNC: 93 MG/DL — SIGNIFICANT CHANGE UP (ref 70–99)
GLUCOSE UR QL: NEGATIVE MG/DL — SIGNIFICANT CHANGE UP
HCT VFR BLD CALC: 30 % — LOW (ref 34.5–45)
HCT VFR BLD CALC: 31.2 % — LOW (ref 34.5–45)
HGB BLD-MCNC: 9.4 G/DL — LOW (ref 11.5–15.5)
HGB BLD-MCNC: 9.9 G/DL — LOW (ref 11.5–15.5)
INR BLD: 1.49 RATIO — HIGH (ref 0.88–1.16)
KETONES UR-MCNC: NEGATIVE — SIGNIFICANT CHANGE UP
LEUKOCYTE ESTERASE UR-ACNC: ABNORMAL
MAGNESIUM SERPL-MCNC: 2.7 MG/DL — HIGH (ref 1.6–2.6)
MCHC RBC-ENTMCNC: 23.5 PG — LOW (ref 27–34)
MCHC RBC-ENTMCNC: 23.6 PG — LOW (ref 27–34)
MCHC RBC-ENTMCNC: 31.3 GM/DL — LOW (ref 32–36)
MCHC RBC-ENTMCNC: 31.7 GM/DL — LOW (ref 32–36)
MCV RBC AUTO: 74.1 FL — LOW (ref 80–100)
MCV RBC AUTO: 75.4 FL — LOW (ref 80–100)
NITRITE UR-MCNC: NEGATIVE — SIGNIFICANT CHANGE UP
NRBC # BLD: 0 /100 WBCS — SIGNIFICANT CHANGE UP (ref 0–0)
NRBC # BLD: 0 /100 WBCS — SIGNIFICANT CHANGE UP (ref 0–0)
OSMOLALITY SERPL: 267 MOSMOL/KG — LOW (ref 275–300)
OSMOLALITY UR: 310 MOSM/KG — SIGNIFICANT CHANGE UP (ref 50–1200)
PH UR: 8 — SIGNIFICANT CHANGE UP (ref 5–8)
PLATELET # BLD AUTO: 313 K/UL — SIGNIFICANT CHANGE UP (ref 150–400)
PLATELET # BLD AUTO: 325 K/UL — SIGNIFICANT CHANGE UP (ref 150–400)
POTASSIUM SERPL-MCNC: 2.7 MMOL/L — CRITICAL LOW (ref 3.5–5.3)
POTASSIUM SERPL-MCNC: 2.8 MMOL/L — CRITICAL LOW (ref 3.5–5.3)
POTASSIUM SERPL-SCNC: 2.7 MMOL/L — CRITICAL LOW (ref 3.5–5.3)
POTASSIUM SERPL-SCNC: 2.8 MMOL/L — CRITICAL LOW (ref 3.5–5.3)
PROT UR-MCNC: 30 MG/DL
PROTHROM AB SERPL-ACNC: 16.9 SEC — HIGH (ref 10–12.9)
RBC # BLD: 3.98 M/UL — SIGNIFICANT CHANGE UP (ref 3.8–5.2)
RBC # BLD: 4.21 M/UL — SIGNIFICANT CHANGE UP (ref 3.8–5.2)
RBC # FLD: 26 % — HIGH (ref 10.3–14.5)
RBC # FLD: 26.2 % — HIGH (ref 10.3–14.5)
RBC CASTS # UR COMP ASSIST: >50 /HPF (ref 0–4)
SODIUM SERPL-SCNC: 126 MMOL/L — LOW (ref 135–145)
SODIUM SERPL-SCNC: 126 MMOL/L — LOW (ref 135–145)
SP GR SPEC: 1.01 — SIGNIFICANT CHANGE UP (ref 1.01–1.02)
T3 SERPL-MCNC: 64 NG/DL — LOW (ref 80–200)
T4 AB SER-ACNC: 6.1 UG/DL — SIGNIFICANT CHANGE UP (ref 4.6–12)
TSH SERPL-MCNC: 4.14 UIU/ML — HIGH (ref 0.36–3.74)
URATE SERPL-MCNC: 11.1 MG/DL — HIGH (ref 2.5–7)
UROBILINOGEN FLD QL: 8 MG/DL
WBC # BLD: 4.74 K/UL — SIGNIFICANT CHANGE UP (ref 3.8–10.5)
WBC # BLD: 5.11 K/UL — SIGNIFICANT CHANGE UP (ref 3.8–10.5)
WBC # FLD AUTO: 4.74 K/UL — SIGNIFICANT CHANGE UP (ref 3.8–10.5)
WBC # FLD AUTO: 5.11 K/UL — SIGNIFICANT CHANGE UP (ref 3.8–10.5)
WBC UR QL: SIGNIFICANT CHANGE UP

## 2019-09-17 PROCEDURE — 93010 ELECTROCARDIOGRAM REPORT: CPT

## 2019-09-17 PROCEDURE — 99232 SBSQ HOSP IP/OBS MODERATE 35: CPT

## 2019-09-17 PROCEDURE — 99233 SBSQ HOSP IP/OBS HIGH 50: CPT

## 2019-09-17 RX ORDER — POTASSIUM CHLORIDE 20 MEQ
40 PACKET (EA) ORAL EVERY 4 HOURS
Refills: 0 | Status: COMPLETED | OUTPATIENT
Start: 2019-09-18 | End: 2019-09-18

## 2019-09-17 RX ORDER — POTASSIUM CHLORIDE 20 MEQ
40 PACKET (EA) ORAL EVERY 6 HOURS
Refills: 0 | Status: DISCONTINUED | OUTPATIENT
Start: 2019-09-17 | End: 2019-09-17

## 2019-09-17 RX ORDER — HEPARIN SODIUM 5000 [USP'U]/ML
7500 INJECTION INTRAVENOUS; SUBCUTANEOUS ONCE
Refills: 0 | Status: COMPLETED | OUTPATIENT
Start: 2019-09-17 | End: 2019-09-17

## 2019-09-17 RX ORDER — POTASSIUM CHLORIDE 20 MEQ
40 PACKET (EA) ORAL EVERY 4 HOURS
Refills: 0 | Status: COMPLETED | OUTPATIENT
Start: 2019-09-17 | End: 2019-09-17

## 2019-09-17 RX ORDER — SODIUM CHLORIDE 9 MG/ML
2 INJECTION INTRAMUSCULAR; INTRAVENOUS; SUBCUTANEOUS
Refills: 0 | Status: COMPLETED | OUTPATIENT
Start: 2019-09-17 | End: 2019-09-18

## 2019-09-17 RX ORDER — HEPARIN SODIUM 5000 [USP'U]/ML
3500 INJECTION INTRAVENOUS; SUBCUTANEOUS EVERY 6 HOURS
Refills: 0 | Status: DISCONTINUED | OUTPATIENT
Start: 2019-09-17 | End: 2019-09-18

## 2019-09-17 RX ORDER — HEPARIN SODIUM 5000 [USP'U]/ML
INJECTION INTRAVENOUS; SUBCUTANEOUS
Qty: 25000 | Refills: 0 | Status: DISCONTINUED | OUTPATIENT
Start: 2019-09-17 | End: 2019-09-18

## 2019-09-17 RX ORDER — FAMOTIDINE 10 MG/ML
20 INJECTION INTRAVENOUS DAILY
Refills: 0 | Status: DISCONTINUED | OUTPATIENT
Start: 2019-09-17 | End: 2019-09-20

## 2019-09-17 RX ORDER — HEPARIN SODIUM 5000 [USP'U]/ML
7500 INJECTION INTRAVENOUS; SUBCUTANEOUS EVERY 6 HOURS
Refills: 0 | Status: DISCONTINUED | OUTPATIENT
Start: 2019-09-17 | End: 2019-09-18

## 2019-09-17 RX ORDER — POTASSIUM CHLORIDE 20 MEQ
10 PACKET (EA) ORAL
Refills: 0 | Status: DISCONTINUED | OUTPATIENT
Start: 2019-09-17 | End: 2019-09-17

## 2019-09-17 RX ADMIN — RIOCIGUAT 1.5 MILLIGRAM(S): 1.5 TABLET, FILM COATED ORAL at 13:12

## 2019-09-17 RX ADMIN — SODIUM CHLORIDE 2 GRAM(S): 9 INJECTION INTRAMUSCULAR; INTRAVENOUS; SUBCUTANEOUS at 11:40

## 2019-09-17 RX ADMIN — BUDESONIDE AND FORMOTEROL FUMARATE DIHYDRATE 2 PUFF(S): 160; 4.5 AEROSOL RESPIRATORY (INHALATION) at 06:26

## 2019-09-17 RX ADMIN — FAMOTIDINE 20 MILLIGRAM(S): 10 INJECTION INTRAVENOUS at 12:17

## 2019-09-17 RX ADMIN — CHLORHEXIDINE GLUCONATE 1 APPLICATION(S): 213 SOLUTION TOPICAL at 07:30

## 2019-09-17 RX ADMIN — HEPARIN SODIUM 7500 UNIT(S): 5000 INJECTION INTRAVENOUS; SUBCUTANEOUS at 11:49

## 2019-09-17 RX ADMIN — RIOCIGUAT 1.5 MILLIGRAM(S): 1.5 TABLET, FILM COATED ORAL at 06:42

## 2019-09-17 RX ADMIN — Medication 40 MILLIEQUIVALENT(S): at 21:26

## 2019-09-17 RX ADMIN — HEPARIN SODIUM 1500 UNIT(S)/HR: 5000 INJECTION INTRAVENOUS; SUBCUTANEOUS at 19:44

## 2019-09-17 RX ADMIN — SIMVASTATIN 40 MILLIGRAM(S): 20 TABLET, FILM COATED ORAL at 21:28

## 2019-09-17 RX ADMIN — NYSTATIN CREAM 1 APPLICATION(S): 100000 CREAM TOPICAL at 17:16

## 2019-09-17 RX ADMIN — Medication 81 MILLIGRAM(S): at 11:40

## 2019-09-17 RX ADMIN — Medication 40 MILLIEQUIVALENT(S): at 17:15

## 2019-09-17 RX ADMIN — RIOCIGUAT 1.5 MILLIGRAM(S): 1.5 TABLET, FILM COATED ORAL at 21:25

## 2019-09-17 RX ADMIN — Medication 37.5 MICROGRAM(S): at 06:21

## 2019-09-17 RX ADMIN — SODIUM CHLORIDE 2 GRAM(S): 9 INJECTION INTRAMUSCULAR; INTRAVENOUS; SUBCUTANEOUS at 17:16

## 2019-09-17 RX ADMIN — HEPARIN SODIUM 1700 UNIT(S)/HR: 5000 INJECTION INTRAVENOUS; SUBCUTANEOUS at 11:41

## 2019-09-17 RX ADMIN — BUDESONIDE AND FORMOTEROL FUMARATE DIHYDRATE 2 PUFF(S): 160; 4.5 AEROSOL RESPIRATORY (INHALATION) at 17:16

## 2019-09-17 RX ADMIN — NYSTATIN CREAM 1 APPLICATION(S): 100000 CREAM TOPICAL at 06:22

## 2019-09-17 RX ADMIN — Medication 40 MILLIEQUIVALENT(S): at 13:12

## 2019-09-17 NOTE — PROGRESS NOTE ADULT - SUBJECTIVE AND OBJECTIVE BOX
CHIEF COMPLAINT/INTERVAL HISTORY:    Patient is a 34y old  Female who presents with a chief complaint of Dyspnea (15 Sep 2019 10:04)      HPI:  34F with a PMH of severe pulmonary hypertension and right sided CHF - (R sided EF 35) presents with worsening shortness of breath x 2 days.  Patient presented to ED in respiratory distress requiring BiPAP and is able to provide limited history.  Patient reports that she typically has shortness of breath at home and she uses oxygen therapy as needed to improve her symptoms, patient states that for the last two days the oxygen has not helped.  Patient also reports 2 week hx of worsening bilateral leg swelling.  Patient reports compliance with her medications.  Patient reports some cough productive of yellow sputum but states her cough is no worse than it normally is.      Of note patient was admitted to [facility] on 7/26 for exacerbation of CHF.  Presented with hypotension needing vasopressor support. 123 -> 105 kg (04 Sep 2019 02:03)    Overnight issues No over night issues     SUBJECTIVE & OBJECTIVE: Pt seen and examined at bedside.   No complaints.     T(C): 36.6 (09-17-19 @ 11:05), Max: 36.6 (09-17-19 @ 11:05)  HR: 78 (09-17-19 @ 11:05) (65 - 78)  BP: 98/49 (09-17-19 @ 11:05) (98/49 - 101/61)  RR: 17 (09-17-19 @ 11:05) (17 - 17)  SpO2: 95% (09-17-19 @ 11:05) (93% - 97%)    MEDICATIONS  (STANDING):  aspirin enteric coated 81 milliGRAM(s) Oral daily  buDESOnide 160 MICROgram(s)/formoterol 4.5 MICROgram(s) Inhaler 2 Puff(s) Inhalation two times a day  chlorhexidine 4% Liquid 1 Application(s) Topical daily  dextrose 5%. 1000 milliLiter(s) (50 mL/Hr) IV Continuous <Continuous>  dextrose 50% Injectable 12.5 Gram(s) IV Push once  dextrose 50% Injectable 25 Gram(s) IV Push once  dextrose 50% Injectable 25 Gram(s) IV Push once  famotidine    Tablet 20 milliGRAM(s) Oral daily  heparin  Infusion.  Unit(s)/Hr (17 mL/Hr) IV Continuous <Continuous>  insulin lispro (HumaLOG) corrective regimen sliding scale   SubCutaneous three times a day before meals  insulin lispro (HumaLOG) corrective regimen sliding scale   SubCutaneous at bedtime  levothyroxine 37.5 MICROGram(s) Oral daily  nystatin Powder 1 Application(s) Topical two times a day  potassium chloride    Tablet ER 40 milliEquivalent(s) Oral every 4 hours  riociguat 1.5 milliGRAM(s) Oral three times a day  simvastatin 40 milliGRAM(s) Oral at bedtime  sodium chloride 2 Gram(s) Oral two times a day    MEDICATIONS  (PRN):  acetaminophen   Tablet .. 650 milliGRAM(s) Oral every 6 hours PRN Mild Pain (1 - 3)  aluminum hydroxide/magnesium hydroxide/simethicone Suspension 30 milliLiter(s) Oral every 4 hours PRN Dyspepsia  dextrose 40% Gel 15 Gram(s) Oral once PRN Blood Glucose LESS THAN 70 milliGRAM(s)/deciliter  glucagon  Injectable 1 milliGRAM(s) IntraMuscular once PRN Glucose LESS THAN 70 milligrams/deciliter  guaiFENesin    Syrup 100 milliGRAM(s) Oral every 6 hours PRN Cough  heparin  Injectable 7500 Unit(s) IV Push every 6 hours PRN For aPTT less than 40  heparin  Injectable 3500 Unit(s) IV Push every 6 hours PRN For aPTT between 40 - 57  morphine  - Injectable 2 milliGRAM(s) IV Push every 6 hours PRN Severe Pain (7 - 10)  traMADol 50 milliGRAM(s) Oral every 8 hours PRN Moderate Pain (4 - 6)    GENERAL: NAD, well-groomed, well-developed  HEAD:  Atraumatic, Normocephalic  EYES: EOMI, conjunctiva and sclera clear  ENMT: Moist mucous membranes  NECK: Supple, No JVD  NERVOUS SYSTEM:  Alert & Oriented X3, Moving all 4 limbs  CHEST/LUNG: Clear to auscultation bilaterally; No rales, rhonchi, wheezing, or rubs  HEART: Regular rate and rhythm; No murmurs, rubs, or gallops  ABDOMEN: Soft, Nontender, Nondistended; Bowel sounds present  EXTREMITIES:  2+ Peripheral Pulses, No clubbing, cyanosis, LE edema present                                                   9.9    4.74  )-----------( 325      ( 17 Sep 2019 09:17 )             31.2           LIVER FUNCTIONS - ( 15 Sep 2019 17:11 )  Alb: 3.4 g/dL / Pro: 7.4 g/dL / ALK PHOS: x     / ALT: x     / AST: x     / GGT: x           PT/INR - ( 17 Sep 2019 09:17 )   PT: 16.9 sec;   INR: 1.49 ratio           126|83|14<93  2.8|33|0.98  9.3,2.7,--  09-17 @ 09:17           CAPILLARY BLOOD GLUCOSE      POCT Blood Glucose.: 150 mg/dL (17 Sep 2019 10:51)  POCT Blood Glucose.: 98 mg/dL (17 Sep 2019 08:10)  POCT Blood Glucose.: 118 mg/dL (16 Sep 2019 22:02)  POCT Blood Glucose.: 141 mg/dL (16 Sep 2019 16:20)      RECENT CULTURES:      RADIOLOGY & ADDITIONAL TESTS:  Imaging Personally Reviewed:  [ ] YES      Consultant(s) Notes Reviewed:  [ ] YES     Care Discussed with [ ] Consultants [X ] Patient [ ] Family  [ ]    [x ]  Other; RN  HEALTH ISSUES - PROBLEM Dx:  Hyponatremia: Hyponatremia  Adjustment disorder with depressed mood  Chest pain, unspecified type: Chest pain, unspecified type  Depression, unspecified depression type: Depression, unspecified depression type  Hyperlipidemia, unspecified hyperlipidemia type: Hyperlipidemia, unspecified hyperlipidemia type  Acquired hypothyroidism: Acquired hypothyroidism  Acute deep vein thrombosis (DVT) of femoral vein of left lower extremity: Acute deep vein thrombosis (DVT) of femoral vein of left lower extremity  Reactive airway disease without complication, unspecified asthma severity, unspecified whether persistent: Reactive airway disease without complication, unspecified asthma severity, unspecified whether persistent  Hypokalemia: Hypokalemia  Pulmonary hypertension: Pulmonary hypertension  Acute on chronic combined systolic and diastolic congestive heart failure: Acute on chronic combined systolic and diastolic congestive heart failure  Hypothyroidism, unspecified type: Hypothyroidism, unspecified type  Preventive measure: Preventive measure  Essential hypertension: Essential hypertension  Moderate asthma, unspecified whether complicated, unspecified whether persistent: Moderate asthma, unspecified whether complicated, unspecified whether persistent  Hyperbilirubinemia: Hyperbilirubinemia  Edema extremities: Edema extremities  Diabetes mellitus: Diabetes mellitus  Morbid obesity: Morbid obesity  Congestive heart failure, unspecified HF chronicity, unspecified heart failure type: Congestive heart failure, unspecified HF chronicity, unspecified heart failure type  Pulmonary HTN: Pulmonary HTN  Acute respiratory failure, unspecified whether with hypoxia or hypercapnia: Acute respiratory failure, unspecified whether with hypoxia or hypercapnia        DVT/GI ppx  Discussed with pt @ bedside

## 2019-09-17 NOTE — PROGRESS NOTE ADULT - SUBJECTIVE AND OBJECTIVE BOX
Patient is a 34y old  Female who presents with a chief complaint of Dyspnea (17 Sep 2019 09:22)    PAST MEDICAL & SURGICAL HISTORY:  Former smoker, stopped smoking in distant past  Vitamin D deficiency  Morbid obesity due to excess calories  Edema extremities  Diabetes mellitus  Hyperbilirubinemia  COPD (chronic obstructive pulmonary disease)  COPD (chronic obstructive pulmonary disease)  Anticoagulation goal of INR 2 to 3  Anticardiolipin antibody positive  Congestive heart failure, unspecified HF chronicity, unspecified heart failure type  Moderate asthma, unspecified whether complicated, unspecified whether persistent  Other secondary hypertension  COPD (chronic obstructive pulmonary disease)  Anticardiolipin antibody positive  Essential hypertension  Pulmonary HTN  Morbid obesity  No significant past surgical history    INTERVAL HISTORY: Patient sitting in bed, comfortable, denies chest pain, palpitations and reports improvement in dyspnea.  	  MEDICATIONS:  MEDICATIONS  (STANDING):  aspirin enteric coated 81 milliGRAM(s) Oral daily  buDESOnide 160 MICROgram(s)/formoterol 4.5 MICROgram(s) Inhaler 2 Puff(s) Inhalation two times a day  chlorhexidine 4% Liquid 1 Application(s) Topical daily  dextrose 5%. 1000 milliLiter(s) (50 mL/Hr) IV Continuous <Continuous>  dextrose 50% Injectable 12.5 Gram(s) IV Push once  dextrose 50% Injectable 25 Gram(s) IV Push once  dextrose 50% Injectable 25 Gram(s) IV Push once  famotidine    Tablet 20 milliGRAM(s) Oral daily  heparin  Infusion.  Unit(s)/Hr (17 mL/Hr) IV Continuous <Continuous>  insulin lispro (HumaLOG) corrective regimen sliding scale   SubCutaneous three times a day before meals  insulin lispro (HumaLOG) corrective regimen sliding scale   SubCutaneous at bedtime  levothyroxine 37.5 MICROGram(s) Oral daily  nystatin Powder 1 Application(s) Topical two times a day  potassium chloride    Tablet ER 40 milliEquivalent(s) Oral every 4 hours  riociguat 1.5 milliGRAM(s) Oral three times a day  simvastatin 40 milliGRAM(s) Oral at bedtime  sodium chloride 2 Gram(s) Oral two times a day    MEDICATIONS  (PRN):  acetaminophen   Tablet .. 650 milliGRAM(s) Oral every 6 hours PRN Mild Pain (1 - 3)  aluminum hydroxide/magnesium hydroxide/simethicone Suspension 30 milliLiter(s) Oral every 4 hours PRN Dyspepsia  dextrose 40% Gel 15 Gram(s) Oral once PRN Blood Glucose LESS THAN 70 milliGRAM(s)/deciliter  glucagon  Injectable 1 milliGRAM(s) IntraMuscular once PRN Glucose LESS THAN 70 milligrams/deciliter  guaiFENesin    Syrup 100 milliGRAM(s) Oral every 6 hours PRN Cough  heparin  Injectable 7500 Unit(s) IV Push every 6 hours PRN For aPTT less than 40  heparin  Injectable 3500 Unit(s) IV Push every 6 hours PRN For aPTT between 40 - 57  morphine  - Injectable 2 milliGRAM(s) IV Push every 6 hours PRN Severe Pain (7 - 10)  traMADol 50 milliGRAM(s) Oral every 8 hours PRN Moderate Pain (4 - 6)      Vitals:  T(F): 97.8 (09-17-19 @ 11:05), Max: 99 (09-17-19 @ 00:12)  HR: 78 (09-17-19 @ 11:05) (65 - 80)  BP: 98/49 (09-17-19 @ 11:05) (94/55 - 101/61)  RR: 17 (09-17-19 @ 11:05) (17 - 19)  SpO2: 95% (09-17-19 @ 11:05) (85% - 99%)  Wt(kg): --91.2<--91.5 KG    09-16 @ 07:01  -  09-17 @ 07:00  --------------------------------------------------------  IN:    Oral Fluid: 340 mL    Solution: 50 mL  Total IN: 390 mL    OUT:  Total OUT: 0 mL    Total NET: 390 mL    Weight (kg): 92.5 (09-15 @ 06:00)    PHYSICAL EXAM:  Neuro: Awake, responsive  CV: S1 S2 RRR  Lungs: Mildly diminished/clear  GI: Soft, BS +, ND, NT  Extremities: edema    TELEMETRY: SR 60  	    ECG:  <---614    RADIOLOGY: < from: Xray Chest 1 View- PORTABLE-Urgent (09.09.19 @ 09:14) >  INTERPRETATION:  PROCEDURE: AP view of the chest.    CLINICAL INFORMATION: Chest tightness.    COMPARISON: 9/3/2019. Chest CT dated 7/25/2018.    FINDINGS:    Lungs: There are no lung consolidations.  Heart: There is cardiomegaly.  Mediastinum: Stable right hilar prominence.    IMPRESSION:    No lung consolidations.    < end of copied text >  < from: US Duplex Venous Lower Ext Complete, Bilateral (09.05.19 @ 09:19) >  INTERPRETATION:  History: Bilateral leg swelling.    Bilateral lower extremity venous Doppler.    There is extensive echogenic acute thrombus within noncompressible left   external iliac and left common femoral vein as well as superficial   thrombus in the left greater saphenous vein. The remainder of the   bilateral femoral popliteal posterior tibial veins are patent, compress   without thrombus.    Impression: Positive for extensive acute nonocclusive thrombus in the   left external iliac vein and the left common femoral vein  and left   greater saphenous vein.    Discussed with Dr. Archibald  prior to this dictation    < end of copied text >      DIAGNOSTIC TESTING:    [x] Echocardiogram: < from: TTE Echo Doppler w/o Cont (07.26.19 @ 08:44) >  Summary:   1. Left ventricular ejection fraction, by visual estimation, is 45 to   50%.   2. Mildly decreased global left ventricular systolic function.   3. Elevated mean left atrial pressure.   4. Global cardiomyopathy.   5. Increased LV wall thickness.   6. Normal left ventricular internal cavity size.   7. Right ventricular pressure overload.   8. Spectral Doppler shows impaired relaxation pattern of left   ventricular myocardial filling (Grade I diastolic dysfunction).   9. There is mild asymmetric left ventricular hypertrophy.  10. Pulmonary hypertension.  11. Severely enlarged right ventricle.  12. Severely reduced RV systolic function.  13. RV ejection fraction 34%.  14. Small pericardial effusion.  15. Degenerative mitral valve.  16. Mild mitral annular calcification.  17. Mild mitral valve regurgitation.  18. Moderate tricuspid regurgitation.  19. Structurally normal tricuspid valve, with normal leaflet excursion.  20. Mild aortic regurgitation.  21. Mild to moderate pulmonic valve regurgitation.  22. Structurally normal pulmonic valve, with normal leaflet excursion.  23. Estimated pulmonary artery systolic pressure is 87.1 mmHg assuming a   right atrial pressure of 20 mmHg, which is consistent with severe   pulmonary hypertension.  24. Pulmonary hypertension is present.  25. The main pulmonary artery is normal in size.  26. The right atrial pressure is moderately elevated.    [id number] Chago BLANCO  Electronically signed on 7/26/2019 at 2:01:34 PM         < end of copied text >    [x ]  Catheterization: < from: Cardiac Cath Lab - Adult (08.10.18 @ 10:48) >  VENTRICLES: No left ventriculogram was performed.  COMPLICATIONS: There were no complications.  SUMMARY:  Summary: Addendum 8/30/2018: Added "Right Heart Catheterization" procedure to  the Diagnostic Report.  DIAGNOSTIC IMPRESSIONS: Severe Pulmonary Hypertension with no response with  80ppm of Nitric Oxide  INTERVENTIONAL IMPRESSIONS: Severe Pulmonary Hypertension with no response with  80ppm of Nitric Oxide  Prepared and signed by  Alondra Coughlin M.D.  Signed 08/31/2018 16:08:26    < end of copied text >    LABS:	 	     17 Sep 2019 09:17    126    |  83     |  14     ----------------------------<  93     2.8     |  33     |  0.98   16 Sep 2019 12:31    123    |  81     |  16     ----------------------------<  120    2.5     |  32     |  1.02   15 Sep 2019 17:31    122    |  75     |  18     ----------------------------<  104    2.8     |  35     |  1.24     Ca    9.3        17 Sep 2019 09:17  Mg     2.7       17 Sep 2019 09:17    TPro  7.4    /  Alb  3.4    /  TBili  x      /  DBili  x      /  AST  x      /  ALT  x      /  AlkPhos  x      15 Sep 2019 17:11                          9.9    4.74  )-----------( 325      ( 17 Sep 2019 09:17 )             31.2 ,                       9.2    5.65  )-----------( 319      ( 16 Sep 2019 18:18 )             29.2 ,                       10.0   4.89  )-----------( 284      ( 15 Sep 2019 07:33 )             31.9   proBNP:   Lipid Profile:   HgA1c:   TSH: Thyroid Stimulating Hormone, Serum: 4.140 uIU/mL (09-17 @ 09:17)      PT/PTT- ( 17 Sep 2019 09:17 )   PT: 16.9 sec;  PTT: x           PT/PTT- ( 16 Sep 2019 18:18 )   PT: 19.1 sec;  PTT: x         INR: 1.49 ratio (09-17 @ 09:17)  INR: 1.68 ratio (09-16 @ 18:18)  INR: 1.75 ratio (09-15 @ 07:33)

## 2019-09-17 NOTE — PROGRESS NOTE ADULT - PROBLEM SELECTOR PLAN 4
Severe hypokalemia from aggressive diuresis  Hold Torsemide.   EKG with new QT prolongation.   Renal following

## 2019-09-17 NOTE — PROGRESS NOTE ADULT - PROBLEM SELECTOR PLAN 1
Hold Torsemide 20 mg twice daily in the setting of hyponatremia.   Mild decreased EF, severe pulm HTN  Pt non compliant with fluid restriction  Improved volume status, lost > 20 lbs wt since admission

## 2019-09-17 NOTE — PROGRESS NOTE ADULT - SUBJECTIVE AND OBJECTIVE BOX
Patient ok no distress;  Not eating well;   refusing IV KCL       MEDICATIONS  (STANDING):  aspirin enteric coated 81 milliGRAM(s) Oral daily  buDESOnide 160 MICROgram(s)/formoterol 4.5 MICROgram(s) Inhaler 2 Puff(s) Inhalation two times a day  chlorhexidine 4% Liquid 1 Application(s) Topical daily  dextrose 5%. 1000 milliLiter(s) (50 mL/Hr) IV Continuous <Continuous>  dextrose 50% Injectable 12.5 Gram(s) IV Push once  dextrose 50% Injectable 25 Gram(s) IV Push once  dextrose 50% Injectable 25 Gram(s) IV Push once  famotidine    Tablet 20 milliGRAM(s) Oral daily  heparin  Infusion.  Unit(s)/Hr (17 mL/Hr) IV Continuous <Continuous>  insulin lispro (HumaLOG) corrective regimen sliding scale   SubCutaneous three times a day before meals  insulin lispro (HumaLOG) corrective regimen sliding scale   SubCutaneous at bedtime  levothyroxine 37.5 MICROGram(s) Oral daily  nystatin Powder 1 Application(s) Topical two times a day  potassium chloride    Tablet ER 40 milliEquivalent(s) Oral every 4 hours  riociguat 1.5 milliGRAM(s) Oral three times a day  simvastatin 40 milliGRAM(s) Oral at bedtime  sodium chloride 2 Gram(s) Oral two times a day    MEDICATIONS  (PRN):  acetaminophen   Tablet .. 650 milliGRAM(s) Oral every 6 hours PRN Mild Pain (1 - 3)  aluminum hydroxide/magnesium hydroxide/simethicone Suspension 30 milliLiter(s) Oral every 4 hours PRN Dyspepsia  dextrose 40% Gel 15 Gram(s) Oral once PRN Blood Glucose LESS THAN 70 milliGRAM(s)/deciliter  glucagon  Injectable 1 milliGRAM(s) IntraMuscular once PRN Glucose LESS THAN 70 milligrams/deciliter  guaiFENesin    Syrup 100 milliGRAM(s) Oral every 6 hours PRN Cough  heparin  Injectable 7500 Unit(s) IV Push every 6 hours PRN For aPTT less than 40  heparin  Injectable 3500 Unit(s) IV Push every 6 hours PRN For aPTT between 40 - 57  morphine  - Injectable 2 milliGRAM(s) IV Push every 6 hours PRN Severe Pain (7 - 10)  traMADol 50 milliGRAM(s) Oral every 8 hours PRN Moderate Pain (4 - 6)      09-16-19 @ 07:01  -  09-17-19 @ 07:00  --------------------------------------------------------  IN: 390 mL / OUT: 0 mL / NET: 390 mL      PHYSICAL EXAM:      T(C): 36.6 (09-17-19 @ 11:05), Max: 37.2 (09-17-19 @ 00:12)  HR: 78 (09-17-19 @ 11:05) (65 - 80)  BP: 98/49 (09-17-19 @ 11:05) (94/55 - 101/61)  RR: 17 (09-17-19 @ 11:05) (17 - 19)  SpO2: 95% (09-17-19 @ 11:05) (85% - 99%)  Wt(kg): --  Respiratory: clear anteriorly, decreased BS at bases  Cardiovascular: S1 S2  Gastrointestinal: soft NT ND +BS  Extremities:   2-3 + edema with lymphedema                                    9.9    4.74  )-----------( 325      ( 17 Sep 2019 09:17 )             31.2     09-17    126<L>  |  83<L>  |  14  ----------------------------<  93  2.8<LL>   |  33<H>  |  0.98    Ca    9.3      17 Sep 2019 09:17  Mg     2.7     09-17    TPro  7.4  /  Alb  3.4<L>  /  TBili  x   /  DBili  x   /  AST  x   /  ALT  x   /  AlkPhos  x   09-15      LIVER FUNCTIONS - ( 15 Sep 2019 17:11 )  Alb: 3.4 g/dL / Pro: 7.4 g/dL / ALK PHOS: x     / ALT: x     / AST: x     / GGT: x           Creatinine Trend: 0.98<--, 1.02<--, 1.24<--, 1.20<--, 0.97<--, 1.05<--  Assessment and Plan:    HypoNa -- hypervolemic, aldactone, SSRI effect, slowly improving   * Metabolic alkalosis -- contraction  * HypoK due to above  * Severe RHF, pulm HTN ( ? primary )  * L fem DVT    Hold diuretics for now; replete K po for now as patient agreed;     ml; NaCl tab for 3 doses as poor po intake  Stressed fluid restriction for now, may liberalize if po intake improved;

## 2019-09-17 NOTE — PROGRESS NOTE ADULT - PROBLEM SELECTOR PLAN 7
Pt was on coumadin at home for possible APLA, non compliant.  Started on Heparin drip and continue with Coumadin as per INR.

## 2019-09-17 NOTE — PROGRESS NOTE ADULT - ASSESSMENT
33 yo woman with severe pHTN (group I/II, mPAP 67; W 9; RVR 9 on RHC 8/2018,  on Adempas), mildly positive anti-cardiolipin Ab and RAD admitted with decompensated right heart failure, volume overload, DIANN and acute LLE DVT.   Diuretics are currently on hold 2/2 metabolic abnormalities related to overdiuresis    #pHTN/decompensated right heart failure  -- lost over 50 lb since admission. I would consider her clinically euvolemic at this point  -- agree with holding diuretics today  --  replete K aggressively, daily BMPs   -- please continue checking daily weights  -- c/w Adempas  -- c/w Midodrine for BP support  -- supplemental O2 to keep sats >90%. would avoid NIV given RHF. Can use high flow if needed    #LLE DVT in the setting of sub-therapeutic INR. Patient reports that her INR was not being monitored. While I do agree that there is better evidence for AC with coumadin, this patient has very poor compliance and had difficulty getting her INRs checked.   I think that NOAC would be a better choice given compliance issues.   -- keep RLE elevated, can try compression stockings      #Renal: worsening renal failure, metabolic alkalosis, hypokalemia, hyponatremia in the setting of aggressive diuresis - slowly improving  --Nephrology input appreciated.  -- plan as above    #Hypothyroid - TSH on Aug 22 was 7. Synthroid increased to 37.5mcg. TSH still elevated today. Would consider increasing Synthroid dose to 50 mcg      #Reactive airway disease - PFTs in the past with reversible obstructive defect  -- c/w Symbicort and Albuterol prn  -- can d/c standing Duonebs  -- will need repeat PFTs as outpatient after discharge    Patient follows with Dr Unger at [address] [place] [address]. She should follow with him after discharge.     Thank you for this consult. I will follow with you. Please call with any questions. Cell phone # [phone]    Shirley Mejia MD    [facility]//Pulmonary Medicine  [address] [place] [address] Ave; [place] [address]  tel: [phone]; fax: [phone]

## 2019-09-17 NOTE — PROGRESS NOTE ADULT - PROBLEM SELECTOR PLAN 9
on statin
on statin
continue with zoloft
on statin
continue with zoloft

## 2019-09-17 NOTE — PROGRESS NOTE ADULT - PROBLEM SELECTOR PROBLEM 9
Hyperlipidemia, unspecified hyperlipidemia type
Depression, unspecified depression type
Hyperlipidemia, unspecified hyperlipidemia type
Depression, unspecified depression type

## 2019-09-17 NOTE — PROGRESS NOTE ADULT - SUBJECTIVE AND OBJECTIVE BOX
Interval Events:  Patient seen and examined at bedside. Feels well  Wt down to 91 kg (201 lb) from 115 kg (253.9lb) on admission      REVIEW OF SYSTEMS:  Constitutional: no fevers  CV:  no chest pain  Resp:  no sob  GI: no abd pain  MSK:  bilateral LE swelling    [x ] All other systems negative  [ ] Unable to assess ROS because ________    OBJECTIVE:  T(C): 36.6 (09-17-19 @ 11:05), Max: 37.2 (09-17-19 @ 00:12)  HR: 78 (09-17-19 @ 11:05) (65 - 80)  BP: 98/49 (09-17-19 @ 11:05) (94/55 - 101/61)  RR: 17 (09-17-19 @ 11:05) (17 - 19)  SpO2: 95% (09-17-19 @ 11:05) (85% - 99%)      09-16-19 @ 07:01  -  09-17-19 @ 07:00  --------------------------------------------------------  IN: 390 mL / OUT: 0 mL / NET: 390 mL      PHYSICAL EXAM:  General: Well appearing Female. No apparent distress  HEENT:   Mucous membranes are moist.  Neck: Supple. no JVD  Respiratory:   Cardiovascular: RRR  Abdomen: Soft, non-tender, non-distended.   Extremities: +2 bilateral REUBEN, R>L, improved  Skin: Warm, dry, no rash.   Neurological: CNII-XII grossly intact.   Psychiatry: appropriate mood and affect.    HOSPITAL MEDICATIONS:  MEDICATIONS  (STANDING):  aspirin enteric coated 81 milliGRAM(s) Oral daily  buDESOnide 160 MICROgram(s)/formoterol 4.5 MICROgram(s) Inhaler 2 Puff(s) Inhalation two times a day  chlorhexidine 4% Liquid 1 Application(s) Topical daily  dextrose 5%. 1000 milliLiter(s) (50 mL/Hr) IV Continuous <Continuous>  dextrose 50% Injectable 12.5 Gram(s) IV Push once  dextrose 50% Injectable 25 Gram(s) IV Push once  dextrose 50% Injectable 25 Gram(s) IV Push once  famotidine    Tablet 20 milliGRAM(s) Oral daily  heparin  Infusion.  Unit(s)/Hr (17 mL/Hr) IV Continuous <Continuous>  insulin lispro (HumaLOG) corrective regimen sliding scale   SubCutaneous three times a day before meals  insulin lispro (HumaLOG) corrective regimen sliding scale   SubCutaneous at bedtime  levothyroxine 37.5 MICROGram(s) Oral daily  nystatin Powder 1 Application(s) Topical two times a day  potassium chloride    Tablet ER 40 milliEquivalent(s) Oral every 4 hours  riociguat 1.5 milliGRAM(s) Oral three times a day  simvastatin 40 milliGRAM(s) Oral at bedtime  sodium chloride 2 Gram(s) Oral two times a day    MEDICATIONS  (PRN):  acetaminophen   Tablet .. 650 milliGRAM(s) Oral every 6 hours PRN Mild Pain (1 - 3)  aluminum hydroxide/magnesium hydroxide/simethicone Suspension 30 milliLiter(s) Oral every 4 hours PRN Dyspepsia  dextrose 40% Gel 15 Gram(s) Oral once PRN Blood Glucose LESS THAN 70 milliGRAM(s)/deciliter  glucagon  Injectable 1 milliGRAM(s) IntraMuscular once PRN Glucose LESS THAN 70 milligrams/deciliter  guaiFENesin    Syrup 100 milliGRAM(s) Oral every 6 hours PRN Cough  heparin  Injectable 7500 Unit(s) IV Push every 6 hours PRN For aPTT less than 40  heparin  Injectable 3500 Unit(s) IV Push every 6 hours PRN For aPTT between 40 - 57  morphine  - Injectable 2 milliGRAM(s) IV Push every 6 hours PRN Severe Pain (7 - 10)  traMADol 50 milliGRAM(s) Oral every 8 hours PRN Moderate Pain (4 - 6)      LABS:                        9.9    4.74  )-----------( 325      ( 17 Sep 2019 09:17 )             31.2     09-17    126<L>  |  83<L>  |  14  ----------------------------<  93  2.8<LL>   |  33<H>  |  0.98    Ca    9.3      17 Sep 2019 09:17  Mg     2.7     09-17    TPro  7.4  /  Alb  3.4<L>  /  TBili  x   /  DBili  x   /  AST  x   /  ALT  x   /  AlkPhos  x   09-15    PT/INR - ( 17 Sep 2019 09:17 )   PT: 16.9 sec;   INR: 1.49 ratio        RADIOLOGY:  [ x ] Reviewed and interpreted by me    [ x ] Reviewed and interpreted by me    CXR: volume overload    < from: US Duplex Venous Lower Ext Complete, Bilateral (09.05.19 @ 09:19) >  Impression: Positive for extensive acute nonocclusive thrombus in the   left external iliac vein and the left common femoral vein  and left   greater saphenous vein.    < end of copied text >      ECHOCARDIOGRAPHY:  < from: TTE Echo Doppler w/o Cont (07.26.19 @ 08:44) >  Summary:   1. Left ventricular ejection fraction, by visual estimation, is 45 to   50%.   2. Mildly decreased global left ventricular systolic function.   3. Elevated mean left atrial pressure.   4. Global cardiomyopathy.   5. Increased LV wall thickness.   6. Normal left ventricular internal cavity size.   7. Right ventricular pressure overload.   8. Spectral Doppler shows impaired relaxation pattern of left   ventricular myocardial filling (Grade I diastolic dysfunction).   9. There is mild asymmetric left ventricular hypertrophy.  10. Pulmonary hypertension.  11. Severely enlarged right ventricle.  12. Severely reduced RV systolic function.  13. RV ejection fraction 34%.  14. Small pericardial effusion.  15. Degenerative mitral valve.  16. Mild mitral annular calcification.  17. Mild mitral valve regurgitation.  18. Moderate tricuspid regurgitation.  19. Structurally normal tricuspid valve, with normal leaflet excursion.  20. Mild aortic regurgitation.  21. Mild to moderate pulmonic valve regurgitation.  22. Structurally normal pulmonic valve, with normal leaflet excursion.  23. Estimated pulmonary artery systolic pressure is 87.1 mmHg assuming a   right atrial pressure of 20 mmHg, which is consistent with severe   pulmonary hypertension.  24. Pulmonary hypertension is present.  25. The main pulmonary artery is normal in size.  26. The right atrial pressure is moderately elevated.    [id number] Chago BLANCO  Electronically signed on 7/26/2019 at 2:01:34 PM    < end of copied text >

## 2019-09-17 NOTE — PROGRESS NOTE ADULT - SUBJECTIVE AND OBJECTIVE BOX
INTERVAL History:  Dyspnea  Orthopnea  Leg Swelling  Allergies    No Known Allergies    Intolerances        MEDICATIONS  (STANDING):  aspirin enteric coated 81 milliGRAM(s) Oral daily  buDESOnide 160 MICROgram(s)/formoterol 4.5 MICROgram(s) Inhaler 2 Puff(s) Inhalation two times a day  chlorhexidine 4% Liquid 1 Application(s) Topical daily  dextrose 5%. 1000 milliLiter(s) (50 mL/Hr) IV Continuous <Continuous>  dextrose 50% Injectable 12.5 Gram(s) IV Push once  dextrose 50% Injectable 25 Gram(s) IV Push once  dextrose 50% Injectable 25 Gram(s) IV Push once  enoxaparin Injectable 100 milliGRAM(s) SubCutaneous every 12 hours  insulin lispro (HumaLOG) corrective regimen sliding scale   SubCutaneous three times a day before meals  insulin lispro (HumaLOG) corrective regimen sliding scale   SubCutaneous at bedtime  levothyroxine 37.5 MICROGram(s) Oral daily  nystatin Powder 1 Application(s) Topical two times a day  riociguat 1.5 milliGRAM(s) Oral three times a day  simvastatin 40 milliGRAM(s) Oral at bedtime    MEDICATIONS  (PRN):  acetaminophen   Tablet .. 650 milliGRAM(s) Oral every 6 hours PRN Mild Pain (1 - 3)  aluminum hydroxide/magnesium hydroxide/simethicone Suspension 30 milliLiter(s) Oral every 4 hours PRN Dyspepsia  dextrose 40% Gel 15 Gram(s) Oral once PRN Blood Glucose LESS THAN 70 milliGRAM(s)/deciliter  glucagon  Injectable 1 milliGRAM(s) IntraMuscular once PRN Glucose LESS THAN 70 milligrams/deciliter  guaiFENesin    Syrup 100 milliGRAM(s) Oral every 6 hours PRN Cough  morphine  - Injectable 2 milliGRAM(s) IV Push every 6 hours PRN Severe Pain (7 - 10)  traMADol 50 milliGRAM(s) Oral every 8 hours PRN Moderate Pain (4 - 6)      Vital Signs Last 24 Hrs  T(C): 36.4 (17 Sep 2019 05:20), Max: 37.2 (17 Sep 2019 00:12)  T(F): 97.5 (17 Sep 2019 05:20), Max: 99 (17 Sep 2019 00:12)  HR: 67 (17 Sep 2019 08:19) (65 - 80)  BP: 101/61 (17 Sep 2019 05:20) (91/45 - 101/61)  BP(mean): 60 (16 Sep 2019 11:05) (60 - 60)  RR: 17 (17 Sep 2019 05:20) (17 - 19)  SpO2: 93% (17 Sep 2019 08:19) (85% - 99%)    PHYSICAL EXAM:      EYES: EOMI, PERRLA, conjunctiva and sclera clear  NECK: Supple, No JVD, Normal thyroid  CHEST/LUNG:  rales, rhonchi,   HEART: Regular rate and rhythm;   ABDOMEN: Soft, Nontender.  LYMPH: No lymphadenopathy noted  Edema+++        LABS:                        9.9    4.74  )-----------( 325      ( 17 Sep 2019 09:17 )             31.2     09-16    123<L>  |  81<L>  |  16  ----------------------------<  120<H>  2.5<LL>   |  32<H>  |  1.02    Ca    9.6      16 Sep 2019 12:31  Mg     2.3     09-16    TPro  7.4  /  Alb  3.4<L>  /  TBili  x   /  DBili  x   /  AST  x   /  ALT  x   /  AlkPhos  x   09-15    PT/INR - ( 16 Sep 2019 18:18 )   PT: 19.1 sec;   INR: 1.68 ratio                 RADIOLOGY & ADDITIONAL STUDIES:    PATHOLOGY:

## 2019-09-18 LAB
ANION GAP SERPL CALC-SCNC: 10 MMOL/L — SIGNIFICANT CHANGE UP (ref 5–17)
ANION GAP SERPL CALC-SCNC: 11 MMOL/L — SIGNIFICANT CHANGE UP (ref 5–17)
APTT BLD: 115.5 SEC — HIGH (ref 28.5–37)
APTT BLD: 92.3 SEC — HIGH (ref 27.5–36.3)
BUN SERPL-MCNC: 10 MG/DL — SIGNIFICANT CHANGE UP (ref 7–23)
BUN SERPL-MCNC: 13 MG/DL — SIGNIFICANT CHANGE UP (ref 7–23)
CALCIUM SERPL-MCNC: 8.9 MG/DL — SIGNIFICANT CHANGE UP (ref 8.5–10.1)
CALCIUM SERPL-MCNC: 9.4 MG/DL — SIGNIFICANT CHANGE UP (ref 8.5–10.1)
CHLORIDE SERPL-SCNC: 89 MMOL/L — LOW (ref 96–108)
CHLORIDE SERPL-SCNC: 90 MMOL/L — LOW (ref 96–108)
CO2 SERPL-SCNC: 28 MMOL/L — SIGNIFICANT CHANGE UP (ref 22–31)
CO2 SERPL-SCNC: 30 MMOL/L — SIGNIFICANT CHANGE UP (ref 22–31)
CREAT ?TM UR-MCNC: 35 MG/DL — SIGNIFICANT CHANGE UP
CREAT SERPL-MCNC: 0.96 MG/DL — SIGNIFICANT CHANGE UP (ref 0.5–1.3)
CREAT SERPL-MCNC: 0.96 MG/DL — SIGNIFICANT CHANGE UP (ref 0.5–1.3)
CREATININE, URINE RESULT: 33 MG/DL — SIGNIFICANT CHANGE UP
GLUCOSE SERPL-MCNC: 102 MG/DL — HIGH (ref 70–99)
GLUCOSE SERPL-MCNC: 103 MG/DL — HIGH (ref 70–99)
HCT VFR BLD CALC: 29.4 % — LOW (ref 34.5–45)
HGB BLD-MCNC: 9.1 G/DL — LOW (ref 11.5–15.5)
INR BLD: 2.01 RATIO — HIGH (ref 0.88–1.16)
MAGNESIUM SERPL-MCNC: 2.6 MG/DL — SIGNIFICANT CHANGE UP (ref 1.6–2.6)
MCHC RBC-ENTMCNC: 23 PG — LOW (ref 27–34)
MCHC RBC-ENTMCNC: 31 GM/DL — LOW (ref 32–36)
MCV RBC AUTO: 74.4 FL — LOW (ref 80–100)
NRBC # BLD: 0 /100 WBCS — SIGNIFICANT CHANGE UP (ref 0–0)
PLATELET # BLD AUTO: 320 K/UL — SIGNIFICANT CHANGE UP (ref 150–400)
POTASSIUM SERPL-MCNC: 2.6 MMOL/L — CRITICAL LOW (ref 3.5–5.3)
POTASSIUM SERPL-MCNC: 2.9 MMOL/L — CRITICAL LOW (ref 3.5–5.3)
POTASSIUM SERPL-SCNC: 2.6 MMOL/L — CRITICAL LOW (ref 3.5–5.3)
POTASSIUM SERPL-SCNC: 2.9 MMOL/L — CRITICAL LOW (ref 3.5–5.3)
PROT ?TM UR-MCNC: 31 MG/DL — HIGH (ref 0–12)
PROT ?TM UR-MCNC: 34 MG/DL — HIGH (ref 0–12)
PROT/CREAT UR-RTO: 1 RATIO — HIGH (ref 0–0.2)
PROTHROM AB SERPL-ACNC: 23 SEC — HIGH (ref 10–12.9)
RBC # BLD: 3.95 M/UL — SIGNIFICANT CHANGE UP (ref 3.8–5.2)
RBC # FLD: 26.3 % — HIGH (ref 10.3–14.5)
SODIUM SERPL-SCNC: 129 MMOL/L — LOW (ref 135–145)
SODIUM SERPL-SCNC: 129 MMOL/L — LOW (ref 135–145)
WBC # BLD: 5.83 K/UL — SIGNIFICANT CHANGE UP (ref 3.8–10.5)
WBC # FLD AUTO: 5.83 K/UL — SIGNIFICANT CHANGE UP (ref 3.8–10.5)

## 2019-09-18 PROCEDURE — 99232 SBSQ HOSP IP/OBS MODERATE 35: CPT

## 2019-09-18 PROCEDURE — 99233 SBSQ HOSP IP/OBS HIGH 50: CPT

## 2019-09-18 RX ORDER — POTASSIUM CHLORIDE 20 MEQ
40 PACKET (EA) ORAL EVERY 6 HOURS
Refills: 0 | Status: COMPLETED | OUTPATIENT
Start: 2019-09-18 | End: 2019-09-19

## 2019-09-18 RX ORDER — POTASSIUM CHLORIDE 20 MEQ
10 PACKET (EA) ORAL
Refills: 0 | Status: COMPLETED | OUTPATIENT
Start: 2019-09-18 | End: 2019-09-18

## 2019-09-18 RX ORDER — POTASSIUM CHLORIDE 20 MEQ
40 PACKET (EA) ORAL DAILY
Refills: 0 | Status: DISCONTINUED | OUTPATIENT
Start: 2019-09-18 | End: 2019-09-18

## 2019-09-18 RX ORDER — POTASSIUM CHLORIDE 20 MEQ
40 PACKET (EA) ORAL EVERY 4 HOURS
Refills: 0 | Status: DISCONTINUED | OUTPATIENT
Start: 2019-09-18 | End: 2019-09-18

## 2019-09-18 RX ORDER — POTASSIUM CHLORIDE 20 MEQ
40 PACKET (EA) ORAL EVERY 4 HOURS
Refills: 0 | Status: COMPLETED | OUTPATIENT
Start: 2019-09-18 | End: 2019-09-19

## 2019-09-18 RX ORDER — WARFARIN SODIUM 2.5 MG/1
7.5 TABLET ORAL ONCE
Refills: 0 | Status: DISCONTINUED | OUTPATIENT
Start: 2019-09-18 | End: 2019-09-18

## 2019-09-18 RX ORDER — SODIUM,POTASSIUM PHOSPHATES 278-250MG
1 POWDER IN PACKET (EA) ORAL
Refills: 0 | Status: COMPLETED | OUTPATIENT
Start: 2019-09-18 | End: 2019-09-19

## 2019-09-18 RX ADMIN — HEPARIN SODIUM 1500 UNIT(S)/HR: 5000 INJECTION INTRAVENOUS; SUBCUTANEOUS at 02:32

## 2019-09-18 RX ADMIN — RIOCIGUAT 1.5 MILLIGRAM(S): 1.5 TABLET, FILM COATED ORAL at 16:13

## 2019-09-18 RX ADMIN — Medication 100 MILLIEQUIVALENT(S): at 12:47

## 2019-09-18 RX ADMIN — Medication 100 MILLIEQUIVALENT(S): at 23:14

## 2019-09-18 RX ADMIN — RIOCIGUAT 1.5 MILLIGRAM(S): 1.5 TABLET, FILM COATED ORAL at 21:26

## 2019-09-18 RX ADMIN — SIMVASTATIN 40 MILLIGRAM(S): 20 TABLET, FILM COATED ORAL at 21:26

## 2019-09-18 RX ADMIN — Medication 81 MILLIGRAM(S): at 13:54

## 2019-09-18 RX ADMIN — Medication 1 PACKET(S): at 21:26

## 2019-09-18 RX ADMIN — Medication 40 MILLIEQUIVALENT(S): at 02:55

## 2019-09-18 RX ADMIN — Medication 1 PACKET(S): at 17:35

## 2019-09-18 RX ADMIN — NYSTATIN CREAM 1 APPLICATION(S): 100000 CREAM TOPICAL at 17:34

## 2019-09-18 RX ADMIN — Medication 37.5 MICROGRAM(S): at 05:57

## 2019-09-18 RX ADMIN — Medication 100 MILLIEQUIVALENT(S): at 16:13

## 2019-09-18 RX ADMIN — BUDESONIDE AND FORMOTEROL FUMARATE DIHYDRATE 2 PUFF(S): 160; 4.5 AEROSOL RESPIRATORY (INHALATION) at 05:57

## 2019-09-18 RX ADMIN — CHLORHEXIDINE GLUCONATE 1 APPLICATION(S): 213 SOLUTION TOPICAL at 13:54

## 2019-09-18 RX ADMIN — BUDESONIDE AND FORMOTEROL FUMARATE DIHYDRATE 2 PUFF(S): 160; 4.5 AEROSOL RESPIRATORY (INHALATION) at 17:35

## 2019-09-18 RX ADMIN — NYSTATIN CREAM 1 APPLICATION(S): 100000 CREAM TOPICAL at 05:58

## 2019-09-18 RX ADMIN — Medication 1 PACKET(S): at 13:54

## 2019-09-18 RX ADMIN — FAMOTIDINE 20 MILLIGRAM(S): 10 INJECTION INTRAVENOUS at 13:57

## 2019-09-18 RX ADMIN — RIOCIGUAT 1.5 MILLIGRAM(S): 1.5 TABLET, FILM COATED ORAL at 05:58

## 2019-09-18 RX ADMIN — Medication 40 MILLIEQUIVALENT(S): at 21:30

## 2019-09-18 RX ADMIN — Medication 100 MILLIEQUIVALENT(S): at 21:27

## 2019-09-18 RX ADMIN — Medication 100 MILLIEQUIVALENT(S): at 10:25

## 2019-09-18 NOTE — PROGRESS NOTE ADULT - SUBJECTIVE AND OBJECTIVE BOX
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 She is lying in bed in NAD.    MEDICATIONS  (STANDING):  aspirin enteric coated 81 milliGRAM(s) Oral daily  buDESOnide 160 MICROgram(s)/formoterol 4.5 MICROgram(s) Inhaler 2 Puff(s) Inhalation two times a day  chlorhexidine 4% Liquid 1 Application(s) Topical daily  dextrose 5%. 1000 milliLiter(s) (50 mL/Hr) IV Continuous <Continuous>  dextrose 50% Injectable 12.5 Gram(s) IV Push once  dextrose 50% Injectable 25 Gram(s) IV Push once  dextrose 50% Injectable 25 Gram(s) IV Push once  famotidine    Tablet 20 milliGRAM(s) Oral daily  insulin lispro (HumaLOG) corrective regimen sliding scale   SubCutaneous three times a day before meals  insulin lispro (HumaLOG) corrective regimen sliding scale   SubCutaneous at bedtime  levothyroxine 37.5 MICROGram(s) Oral daily  nystatin Powder 1 Application(s) Topical two times a day  potassium chloride    Tablet ER 40 milliEquivalent(s) Oral every 6 hours  potassium phosphate / sodium phosphate powder 1 Packet(s) Oral four times a day with meals  riociguat 1.5 milliGRAM(s) Oral three times a day  simvastatin 40 milliGRAM(s) Oral at bedtime    MEDICATIONS  (PRN):  acetaminophen   Tablet .. 650 milliGRAM(s) Oral every 6 hours PRN Mild Pain (1 - 3)  aluminum hydroxide/magnesium hydroxide/simethicone Suspension 30 milliLiter(s) Oral every 4 hours PRN Dyspepsia  dextrose 40% Gel 15 Gram(s) Oral once PRN Blood Glucose LESS THAN 70 milliGRAM(s)/deciliter  glucagon  Injectable 1 milliGRAM(s) IntraMuscular once PRN Glucose LESS THAN 70 milligrams/deciliter  guaiFENesin    Syrup 100 milliGRAM(s) Oral every 6 hours PRN Cough      Allergies    No Known Allergies    Intolerances        Vital Signs Last 24 Hrs  T(C): 36.2 (18 Sep 2019 16:44), Max: 37.1 (18 Sep 2019 00:17)  T(F): 97.1 (18 Sep 2019 16:44), Max: 98.7 (18 Sep 2019 00:17)  HR: 76 (18 Sep 2019 16:44) (70 - 84)  BP: 88/50 (18 Sep 2019 16:44) (88/50 - 100/55)  BP(mean): --  RR: 18 (18 Sep 2019 16:44) (16 - 24)  SpO2: 97% (18 Sep 2019 16:44) (92% - 97%)    PHYSICAL EXAM:  GENERAL: NAD, well-groomed, well-developed  HEAD:  Atraumatic, Normocephalic  EYES: EOMI, PERRLA   NECK: Supple,   NERVOUS SYSTEM: Alert    CHEST/LUNG: Clear to auscultation bilaterally; No rales, rhonchi, wheezing, or rubs  HEART: Regular rate and rhythm; No murmurs, rubs, or gallops  ABDOMEN: Soft, Nontender, Nondistended; Bowel sounds present  EXTREMITIES: B/l LE edema         LABS:                        9.1    5.83  )-----------( 320      ( 18 Sep 2019 02:03 )             29.4     09-18    129<L>  |  89<L>  |  13  ----------------------------<  102<H>  2.6<LL>   |  30  |  0.96    Ca    9.4      18 Sep 2019 02:03  Phos  2.3     09-18  Mg     2.4     09-18      PT/INR - ( 18 Sep 2019 02:03 )   PT: 23.0 sec;   INR: 2.01 ratio         PTT - ( 18 Sep 2019 09:01 )  PTT:115.5 sec  Urinalysis Basic - ( 17 Sep 2019 14:58 )    Color: Yellow / Appearance: Slightly Turbid / S.010 / pH: x  Gluc: x / Ketone: Negative  / Bili: Negative / Urobili: 8 mg/dL   Blood: x / Protein: 30 mg/dL / Nitrite: Negative   Leuk Esterase: Trace / RBC: >50 /HPF / WBC 3-5   Sq Epi: x / Non Sq Epi: Occasional / Bacteria: Few      CAPILLARY BLOOD GLUCOSE      POCT Blood Glucose.: 117 mg/dL (18 Sep 2019 17:07)  POCT Blood Glucose.: 145 mg/dL (18 Sep 2019 11:42)  POCT Blood Glucose.: 95 mg/dL (18 Sep 2019 08:11)  POCT Blood Glucose.: 143 mg/dL (17 Sep 2019 20:59)      RADIOLOGY & ADDITIONAL TESTS:    19 @ 07:01  -  19 @ 07:00  --------------------------------------------------------  IN:    heparin  Infusion.: 334 mL    Oral Fluid: 790 mL  Total IN: 1124 mL    OUT:  Total OUT: 0 mL    Total NET: 1124 mL

## 2019-09-18 NOTE — PROVIDER CONTACT NOTE (CRITICAL VALUE NOTIFICATION) - RECOMMENDATIONS
one more potassium IV and potassium PO. TERRY Gross to order more potassium IV
No recommendation. Patient is currently on Potassium.
Potassium odered.
will replace pottasium
will replace pottasium

## 2019-09-18 NOTE — PROVIDER CONTACT NOTE (CRITICAL VALUE NOTIFICATION) - BACKGROUND
Patient is currently on Lasix. As per am nurse, Primary Md made aware of previous potassium of 2.8 and the low K is due to frequent diuresis.

## 2019-09-18 NOTE — PROGRESS NOTE ADULT - ASSESSMENT
33 yo woman with severe pHTN (group I/II, mPAP 67; W 9; RVR 9 on RHC 8/2018,  on Adempas), mildly positive anti-cardiolipin Ab and RAD admitted with decompensated right heart failure, volume overload, DIANN and acute LLE DVT.   Diuretics are currently on hold 2/2 metabolic abnormalities related to overdiuresis    #pHTN/decompensated right heart failure  -- lost over 50 lb since admission. I would consider her clinically euvolemic at this point. She has gained >2Lb since yesterday  --would replete K aggressively (it appears patient didn't take all oral repletions, agree with IV repletions)  --  would restart diuretics after K is repleted and monitor closely  -- please continue checking daily weights  -- c/w Adempas  -- c/w Midodrine for BP support  -- supplemental O2 to keep sats >90%. would avoid NIV given RHF. Can use high flow if needed    #LLE DVT in the setting of sub-therapeutic INR. Patient reports that her INR was not being monitored. While I do agree that there is better evidence for AC with coumadin, this patient has very poor compliance and had difficulty getting her INRs checked.   I think that NOAC would be a better choice given compliance issues.   -- keep RLE elevated, can try compression stockings      #Renal: worsening renal failure, metabolic alkalosis, hypokalemia, hyponatremia in the setting of aggressive diuresis - slowly improving  --Nephrology input appreciated.  -- plan as above    #Hypothyroid - TSH on Aug 22 was 7. Synthroid increased to 37.5mcg. TSH still elevated today. Would consider increasing Synthroid dose      #Reactive airway disease - PFTs in the past with reversible obstructive defect  -- c/w Symbicort and Albuterol prn  -- can d/c standing Duonebs  -- will need repeat PFTs as outpatient after discharge    Patient follows with Dr Unger at [address] [place] [address]. She should follow with him after discharge.     Thank you for this consult. I will follow with you. Please call with any questions. Cell phone # [phone]    Shirley Mejia MD    [facility]//Pulmonary Medicine  [facility] [address]; [place] [address]  tel: [phone]; fax: [phone]

## 2019-09-18 NOTE — PROGRESS NOTE ADULT - SUBJECTIVE AND OBJECTIVE BOX
Interval Events:  Patient seen and examined at bedside. Feels well.   Wt up 2 lb today      REVIEW OF SYSTEMS:  Constitutional: no fevers  CV:  no chest pain  Resp:  no SOB  GI: no abd pain    [x ] All other systems negative  [ ] Unable to assess ROS because ________    OBJECTIVE:  T(C): 36.5 (19 @ 12:15), Max: 37.1 (19 @ 00:17)  HR: 76 (19 @ 12:15) (64 - 84)  BP: 95/51 (19 @ 12:15) (95/51 - 102/62)  RR: 24 (19 @ 12:15) (16 - 24)  SpO2: 94% (19 @ 12:15) (92% - 98%)      19 @ 07:01  -  19 @ 07:00  --------------------------------------------------------  IN: 1124 mL / OUT: 0 mL / NET: 1124 mL    Daily     Daily Weight in k.3 (18 Sep 2019 06:00)      PHYSICAL EXAM:  PHYSICAL EXAM:  General: Well appearing Female. No apparent distress  HEENT:   Mucous membranes are moist.  Neck: Supple. no JVD  Respiratory: CTAB  Cardiovascular: RRR  Abdomen: Soft, non-tender, non-distended.   Extremities: +1 bilateral REUBEN, R>L, improved  Skin: Warm, dry, no rash.   Neurological: CNII-XII grossly intact.   Psychiatry: appropriate mood and affect    HOSPITAL MEDICATIONS:  MEDICATIONS  (STANDING):  aspirin enteric coated 81 milliGRAM(s) Oral daily  buDESOnide 160 MICROgram(s)/formoterol 4.5 MICROgram(s) Inhaler 2 Puff(s) Inhalation two times a day  chlorhexidine 4% Liquid 1 Application(s) Topical daily  dextrose 5%. 1000 milliLiter(s) (50 mL/Hr) IV Continuous <Continuous>  dextrose 50% Injectable 12.5 Gram(s) IV Push once  dextrose 50% Injectable 25 Gram(s) IV Push once  dextrose 50% Injectable 25 Gram(s) IV Push once  famotidine    Tablet 20 milliGRAM(s) Oral daily  insulin lispro (HumaLOG) corrective regimen sliding scale   SubCutaneous three times a day before meals  insulin lispro (HumaLOG) corrective regimen sliding scale   SubCutaneous at bedtime  levothyroxine 37.5 MICROGram(s) Oral daily  nystatin Powder 1 Application(s) Topical two times a day  potassium chloride    Tablet ER 40 milliEquivalent(s) Oral every 6 hours  potassium chloride  10 mEq/100 mL IVPB 10 milliEquivalent(s) IV Intermittent every 1 hour  potassium phosphate / sodium phosphate powder 1 Packet(s) Oral four times a day with meals  riociguat 1.5 milliGRAM(s) Oral three times a day  simvastatin 40 milliGRAM(s) Oral at bedtime    MEDICATIONS  (PRN):  acetaminophen   Tablet .. 650 milliGRAM(s) Oral every 6 hours PRN Mild Pain (1 - 3)  aluminum hydroxide/magnesium hydroxide/simethicone Suspension 30 milliLiter(s) Oral every 4 hours PRN Dyspepsia  dextrose 40% Gel 15 Gram(s) Oral once PRN Blood Glucose LESS THAN 70 milliGRAM(s)/deciliter  glucagon  Injectable 1 milliGRAM(s) IntraMuscular once PRN Glucose LESS THAN 70 milligrams/deciliter  guaiFENesin    Syrup 100 milliGRAM(s) Oral every 6 hours PRN Cough      LABS:                        9.1    5.83  )-----------( 320      ( 18 Sep 2019 02:03 )             29.4     09-18    129<L>  |  89<L>  |  13  ----------------------------<  102<H>  2.6<LL>   |  30  |  0.96    Ca    9.4      18 Sep 2019 02:03  Phos  2.3     09-18  Mg     2.4     09-18      PT/INR - ( 18 Sep 2019 02:03 )   PT: 23.0 sec;   INR: 2.01 ratio         PTT - ( 18 Sep 2019 09:01 )  PTT:115.5 sec  Urinalysis Basic - ( 17 Sep 2019 14:58 )    Color: Yellow / Appearance: Slightly Turbid / S.010 / pH: x  Gluc: x / Ketone: Negative  / Bili: Negative / Urobili: 8 mg/dL   Blood: x / Protein: 30 mg/dL / Nitrite: Negative   Leuk Esterase: Trace / RBC: >50 /HPF / WBC 3-5   Sq Epi: x / Non Sq Epi: Occasional / Bacteria: Few      RADIOLOGY:  [ x ] Reviewed and interpreted by me  [ x ] Reviewed and interpreted by me    CXR: volume overload    < from: US Duplex Venous Lower Ext Complete, Bilateral (19 @ 09:19) >  Impression: Positive for extensive acute nonocclusive thrombus in the   left external iliac vein and the left common femoral vein  and left   greater saphenous vein.    < end of copied text >      ECHOCARDIOGRAPHY:  < from: TTE Echo Doppler w/o Cont (19 @ 08:44) >  Summary:   1. Left ventricular ejection fraction, by visual estimation, is 45 to   50%.   2. Mildly decreased global left ventricular systolic function.   3. Elevated mean left atrial pressure.   4. Global cardiomyopathy.   5. Increased LV wall thickness.   6. Normal left ventricular internal cavity size.   7. Right ventricular pressure overload.   8. Spectral Doppler shows impaired relaxation pattern of left   ventricular myocardial filling (Grade I diastolic dysfunction).   9. There is mild asymmetric left ventricular hypertrophy.  10. Pulmonary hypertension.  11. Severely enlarged right ventricle.  12. Severely reduced RV systolic function.  13. RV ejection fraction 34%.  14. Small pericardial effusion.  15. Degenerative mitral valve.  16. Mild mitral annular calcification.  17. Mild mitral valve regurgitation.  18. Moderate tricuspid regurgitation.  19. Structurally normal tricuspid valve, with normal leaflet excursion.  20. Mild aortic regurgitation.  21. Mild to moderate pulmonic valve regurgitation.  22. Structurally normal pulmonic valve, with normal leaflet excursion.  23. Estimated pulmonary artery systolic pressure is 87.1 mmHg assuming a   right atrial pressure of 20 mmHg, which is consistent with severe   pulmonary hypertension.  24. Pulmonary hypertension is present.  25. The main pulmonary artery is normal in size.  26. The right atrial pressure is moderately elevated.    [id number] Chago BLANCO  Electronically signed on 2019 at 2:01:34 PM    < end of copied text >

## 2019-09-18 NOTE — PROVIDER CONTACT NOTE (CRITICAL VALUE NOTIFICATION) - NAME OF MD/NP/PA/DO NOTIFIED:
Dr. Sarah
Dr. perez
Lee Bradford
TERRY Delgadillo
TERRY Moya
TERRY Winter
dr Ye
marvin gar
np keturah
TERRY Moya
np keturah

## 2019-09-18 NOTE — PROVIDER CONTACT NOTE (CRITICAL VALUE NOTIFICATION) - SITUATION
K=2.6
Call received from [facility]. Potassium 2.7.  [facility] notified.
Patient is on Lasix IVP. Potassium 2.9
Patient's potassium is low x3. Lee Bradford made aware. Patient is non compliant to meds regimen.
k 2.8
lactate 4
patient still awaiting 3 potassium IVs and two dosages of potassium PO.
pt moniterd for low pottasium
pt monitored for low potasium

## 2019-09-18 NOTE — PROGRESS NOTE ADULT - SUBJECTIVE AND OBJECTIVE BOX
Patient is a 34y old  Female who presents with a chief complaint of Dyspnea (18 Sep 2019 10:16)    PAST MEDICAL & SURGICAL HISTORY:  Former smoker, stopped smoking in distant past  Vitamin D deficiency  Morbid obesity due to excess calories  Edema extremities  Diabetes mellitus  Hyperbilirubinemia  COPD (chronic obstructive pulmonary disease)  Anticoagulation goal of INR 2 to 3  Anticardiolipin antibody positive  Congestive heart failure, unspecified HF chronicity, unspecified heart failure type  Moderate asthma, unspecified whether complicated, unspecified whether persistent  Other secondary hypertension  Anticardiolipin antibody positive  Essential hypertension  Pulmonary HTN  Morbid obesity  No significant past surgical history    INTERVAL HISTORY:  	  MEDICATIONS:  MEDICATIONS  (STANDING):  aspirin enteric coated 81 milliGRAM(s) Oral daily  buDESOnide 160 MICROgram(s)/formoterol 4.5 MICROgram(s) Inhaler 2 Puff(s) Inhalation two times a day  chlorhexidine 4% Liquid 1 Application(s) Topical daily  dextrose 5%. 1000 milliLiter(s) (50 mL/Hr) IV Continuous <Continuous>  dextrose 50% Injectable 12.5 Gram(s) IV Push once  dextrose 50% Injectable 25 Gram(s) IV Push once  dextrose 50% Injectable 25 Gram(s) IV Push once  famotidine    Tablet 20 milliGRAM(s) Oral daily  insulin lispro (HumaLOG) corrective regimen sliding scale   SubCutaneous three times a day before meals  insulin lispro (HumaLOG) corrective regimen sliding scale   SubCutaneous at bedtime  levothyroxine 37.5 MICROGram(s) Oral daily  nystatin Powder 1 Application(s) Topical two times a day  potassium chloride    Tablet ER 40 milliEquivalent(s) Oral every 6 hours  potassium chloride  10 mEq/100 mL IVPB 10 milliEquivalent(s) IV Intermittent every 1 hour  potassium phosphate / sodium phosphate powder 1 Packet(s) Oral four times a day with meals  riociguat 1.5 milliGRAM(s) Oral three times a day  simvastatin 40 milliGRAM(s) Oral at bedtime    MEDICATIONS  (PRN):  acetaminophen   Tablet .. 650 milliGRAM(s) Oral every 6 hours PRN Mild Pain (1 - 3)  aluminum hydroxide/magnesium hydroxide/simethicone Suspension 30 milliLiter(s) Oral every 4 hours PRN Dyspepsia  dextrose 40% Gel 15 Gram(s) Oral once PRN Blood Glucose LESS THAN 70 milliGRAM(s)/deciliter  glucagon  Injectable 1 milliGRAM(s) IntraMuscular once PRN Glucose LESS THAN 70 milligrams/deciliter  guaiFENesin    Syrup 100 milliGRAM(s) Oral every 6 hours PRN Cough    Vitals:  T(F): 97.7 (09-18-19 @ 12:15), Max: 98.7 (09-18-19 @ 00:17)  HR: 76 (09-18-19 @ 12:15) (64 - 84)  BP: 95/51 (09-18-19 @ 12:15) (95/51 - 102/62)  RR: 24 (09-18-19 @ 12:15) (16 - 24)  SpO2: 94% (09-18-19 @ 12:15) (92% - 98%)  Wt(kg): --92.3 kg    09-17 @ 07:01  -  09-18 @ 07:00  --------------------------------------------------------  IN:    heparin  Infusion.: 334 mL    Oral Fluid: 790 mL  Total IN: 1124 mL    OUT:  Total OUT: 0 mL    Total NET: 1124 mL    PHYSICAL EXAM:  Neuro: Awake, responsive  CV: S1 S2 RRR  Lungs: CTABL  GI: Soft, BS +, ND, NT  Extremities: + LEedema    TELEMETRY: RSR    DIAGNOSTIC TESTING:    [x ] Echocardiogram:< from: TTE Echo Doppler w/o Cont (07.26.19 @ 08:44) >   1. Left ventricular ejection fraction, by visual estimation, is 45 to   50%.   2. Mildly decreased global left ventricular systolic function.   3. Elevated mean left atrial pressure.   4. Global cardiomyopathy.   5. Increased LV wall thickness.   6. Normal left ventricular internal cavity size.   7. Right ventricular pressure overload.   8. Spectral Doppler shows impaired relaxation pattern of left   ventricular myocardial filling (Grade I diastolic dysfunction).   9. There is mild asymmetric left ventricular hypertrophy.  10. Pulmonary hypertension.  11. Severely enlarged right ventricle.  12. Severely reduced RV systolic function.  13. RV ejection fraction 34%.  14. Small pericardial effusion.  15. Degenerative mitral valve.  16. Mild mitral annular calcification.  17. Mild mitral valve regurgitation.  18. Moderate tricuspid regurgitation.  19. Structurally normal tricuspid valve, with normal leaflet excursion.  20. Mild aortic regurgitation.  21. Mild to moderate pulmonic valve regurgitation.  22. Structurally normal pulmonic valve, with normal leaflet excursion.  23. Estimated pulmonary artery systolic pressure is 87.1 mmHg assuming a   right atrial pressure of 20 mmHg, which is consistent with severe   pulmonary hypertension.  24. Pulmonary hypertension is present.  25. The main pulmonary artery is normal in size.  26. The right atrial pressure is moderately elevated.    < end of copied text >    LABS:	 	    18 Sep 2019 02:03    129    |  89     |  13     ----------------------------<  102    2.6     |  30     |  0.96   17 Sep 2019 19:09    126    |  86     |  13     ----------------------------<  127    2.7     |  30     |  0.95   17 Sep 2019 09:17    126    |  83     |  14     ----------------------------<  93     2.8     |  33     |  0.98     Ca    9.4        18 Sep 2019 02:03  Phos  2.3       18 Sep 2019 02:03  Mg     2.4       18 Sep 2019 02:03                       9.1    5.83  )-----------( 320      ( 18 Sep 2019 02:03 )             29.4 ,                       9.4    5.11  )-----------( 313      ( 17 Sep 2019 19:09 )             30.0 ,                       9.9    4.74  )-----------( 325      ( 17 Sep 2019 09:17 )             31.2 ,                       9.2    5.65  )-----------( 319      ( 16 Sep 2019 18:18 )             29.2     TSH: Thyroid Stimulating Hormone, Serum: 4.140 uIU/mL (09-17 @ 09:17)    PT/PTT- ( 18 Sep 2019 09:01 )   PT: x    ;  PTT: 115.5 sec     PT/PTT- ( 18 Sep 2019 02:03 )   PT: 23.0 sec;  PTT: 92.3 sec     PT/PTT- ( 17 Sep 2019 19:09 )   PT: x    ;  PTT: 100.0 sec  INR: 2.01 ratio (09-18 @ 02:03)  INR: 1.49 ratio (09-17 @ 09:17)  INR: 1.68 ratio (09-16 @ 18:18) Patient is a 34y old  Female who presents with a chief complaint of Dyspnea (18 Sep 2019 10:16)    PAST MEDICAL & SURGICAL HISTORY:  Former smoker, stopped smoking in distant past  Vitamin D deficiency  Morbid obesity due to excess calories  Edema extremities  Diabetes mellitus  Hyperbilirubinemia  COPD (chronic obstructive pulmonary disease)  Anticoagulation goal of INR 2 to 3  Anticardiolipin antibody positive  Congestive heart failure, unspecified HF chronicity, unspecified heart failure type  Moderate asthma, unspecified whether complicated, unspecified whether persistent  Other secondary hypertension  Anticardiolipin antibody positive  Essential hypertension  Pulmonary HTN  Morbid obesity  No significant past surgical history    INTERVAL HISTORY: Resting in bed, not in any acute distress   	  MEDICATIONS:  MEDICATIONS  (STANDING):  aspirin enteric coated 81 milliGRAM(s) Oral daily  buDESOnide 160 MICROgram(s)/formoterol 4.5 MICROgram(s) Inhaler 2 Puff(s) Inhalation two times a day  chlorhexidine 4% Liquid 1 Application(s) Topical daily  dextrose 5%. 1000 milliLiter(s) (50 mL/Hr) IV Continuous <Continuous>  dextrose 50% Injectable 12.5 Gram(s) IV Push once  dextrose 50% Injectable 25 Gram(s) IV Push once  dextrose 50% Injectable 25 Gram(s) IV Push once  famotidine    Tablet 20 milliGRAM(s) Oral daily  insulin lispro (HumaLOG) corrective regimen sliding scale   SubCutaneous three times a day before meals  insulin lispro (HumaLOG) corrective regimen sliding scale   SubCutaneous at bedtime  levothyroxine 37.5 MICROGram(s) Oral daily  nystatin Powder 1 Application(s) Topical two times a day  potassium chloride    Tablet ER 40 milliEquivalent(s) Oral every 6 hours  potassium chloride  10 mEq/100 mL IVPB 10 milliEquivalent(s) IV Intermittent every 1 hour  potassium phosphate / sodium phosphate powder 1 Packet(s) Oral four times a day with meals  riociguat 1.5 milliGRAM(s) Oral three times a day  simvastatin 40 milliGRAM(s) Oral at bedtime    MEDICATIONS  (PRN):  acetaminophen   Tablet .. 650 milliGRAM(s) Oral every 6 hours PRN Mild Pain (1 - 3)  aluminum hydroxide/magnesium hydroxide/simethicone Suspension 30 milliLiter(s) Oral every 4 hours PRN Dyspepsia  dextrose 40% Gel 15 Gram(s) Oral once PRN Blood Glucose LESS THAN 70 milliGRAM(s)/deciliter  glucagon  Injectable 1 milliGRAM(s) IntraMuscular once PRN Glucose LESS THAN 70 milligrams/deciliter  guaiFENesin    Syrup 100 milliGRAM(s) Oral every 6 hours PRN Cough    Vitals:  T(F): 97.7 (09-18-19 @ 12:15), Max: 98.7 (09-18-19 @ 00:17)  HR: 76 (09-18-19 @ 12:15) (64 - 84)  BP: 95/51 (09-18-19 @ 12:15) (95/51 - 102/62)  RR: 24 (09-18-19 @ 12:15) (16 - 24)  SpO2: 94% (09-18-19 @ 12:15) (92% - 98%)  Wt(kg): --92.3 kg    09-17 @ 07:01  -  09-18 @ 07:00  --------------------------------------------------------  IN:    heparin  Infusion.: 334 mL    Oral Fluid: 790 mL  Total IN: 1124 mL    OUT:  Total OUT: 0 mL    Total NET: 1124 mL    PHYSICAL EXAM:  Neuro: Awake, responsive  CV: S1 S2 RRR  Lungs: CTABL  GI: Soft, BS +, ND, NT  Extremities: + LE edema, Rt > Lt    TELEMETRY: RSR    DIAGNOSTIC TESTING:    [x ] Echocardiogram:< from: TTE Echo Doppler w/o Cont (07.26.19 @ 08:44) >   1. Left ventricular ejection fraction, by visual estimation, is 45 to   50%.   2. Mildly decreased global left ventricular systolic function.   3. Elevated mean left atrial pressure.   4. Global cardiomyopathy.   5. Increased LV wall thickness.   6. Normal left ventricular internal cavity size.   7. Right ventricular pressure overload.   8. Spectral Doppler shows impaired relaxation pattern of left   ventricular myocardial filling (Grade I diastolic dysfunction).   9. There is mild asymmetric left ventricular hypertrophy.  10. Pulmonary hypertension.  11. Severely enlarged right ventricle.  12. Severely reduced RV systolic function.  13. RV ejection fraction 34%.  14. Small pericardial effusion.  15. Degenerative mitral valve.  16. Mild mitral annular calcification.  17. Mild mitral valve regurgitation.  18. Moderate tricuspid regurgitation.  19. Structurally normal tricuspid valve, with normal leaflet excursion.  20. Mild aortic regurgitation.  21. Mild to moderate pulmonic valve regurgitation.  22. Structurally normal pulmonic valve, with normal leaflet excursion.  23. Estimated pulmonary artery systolic pressure is 87.1 mmHg assuming a   right atrial pressure of 20 mmHg, which is consistent with severe   pulmonary hypertension.  24. Pulmonary hypertension is present.  25. The main pulmonary artery is normal in size.  26. The right atrial pressure is moderately elevated.    < end of copied text >    LABS:	 	    18 Sep 2019 02:03    129    |  89     |  13     ----------------------------<  102    2.6     |  30     |  0.96   17 Sep 2019 19:09    126    |  86     |  13     ----------------------------<  127    2.7     |  30     |  0.95   17 Sep 2019 09:17    126    |  83     |  14     ----------------------------<  93     2.8     |  33     |  0.98     Ca    9.4        18 Sep 2019 02:03  Phos  2.3       18 Sep 2019 02:03  Mg     2.4       18 Sep 2019 02:03                       9.1    5.83  )-----------( 320      ( 18 Sep 2019 02:03 )             29.4 ,                       9.4    5.11  )-----------( 313      ( 17 Sep 2019 19:09 )             30.0 ,                       9.9    4.74  )-----------( 325      ( 17 Sep 2019 09:17 )             31.2 ,                       9.2    5.65  )-----------( 319      ( 16 Sep 2019 18:18 )             29.2     TSH: Thyroid Stimulating Hormone, Serum: 4.140 uIU/mL (09-17 @ 09:17)    PT/PTT- ( 18 Sep 2019 09:01 )   PT: x    ;  PTT: 115.5 sec     PT/PTT- ( 18 Sep 2019 02:03 )   PT: 23.0 sec;  PTT: 92.3 sec     PT/PTT- ( 17 Sep 2019 19:09 )   PT: x    ;  PTT: 100.0 sec  INR: 2.01 ratio (09-18 @ 02:03)  INR: 1.49 ratio (09-17 @ 09:17)  INR: 1.68 ratio (09-16 @ 18:18)

## 2019-09-18 NOTE — PROGRESS NOTE ADULT - ASSESSMENT
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    Acute on chronic combined systolic and diastolic congestive heart failure  - hold Torsemide in the setting of hyponatremia.   - spoke w/ Dr. Linares today, who recommended holding off on further diuretics until K is >3 - will recheck K tonight and give bumex if K>3  - Echo in august showed EF 45-50% w/ elevated mean left atrial pressure w/ global cardiomyopathy, grade I diastolic dysfunction, pulmonary hypertension w/ a severely enlarged right ventricle and severely reduced RV systolic function w/ RV ejection fraction 34%, moderate tricuspid regurgitation, mild to moderate pulmonic valve regurgitation & severe pulmonary hypertension     Pulmonary hypertension  - c/w riociguat  - on chronic oxygen as per patient, but does not have portable oxygen tank     Chest pain  - EKG unchanged  - Cardio following  - c/w ASA    DM II  - c/w ISS  - check Hgb A1C    Hypokalemia  - severe hypokalemia from aggressive diuresis  - even though it was documented that the patient had recieved ample PO KCl yesterday, nurse reports that she refused most of it  - will give more IV and PO KCl today  - Renal following  - recheck K tonight    Hypophosphatemia  - replete w/ KPhos    Hyponatremia  - improving  - likely due to volume overload/ aggressive diuresis and possible SSRI- on hold  - Renal following   - c/w Fluid restriction    HTN  - hold all BP meds due to borderline BP    Asthma   - pulmonary following  - c/w Symbicort    Acute deep vein thrombosis (DVT) of femoral vein of left lower extremity  - US showed an extensive acute nonocclusive thrombus in the left external iliac vein and the left common femoral vein and left greater saphenous vein  - spoke w/ Brittney Loco and Roberto who both recommended Eliquis as patient is not compliant w/ coumadin - will start Eliquis once INR<2    Acquired hypothyroidism  - c/w synthroid   - recheck TSH and free T4 tomorrow    Hyperlipidemia   - c/w Zocor    Depression  - Zoloft on hold    Prophylaxis:  DVT: Eliquis once INR<2  GI: Pepcid

## 2019-09-18 NOTE — PROVIDER CONTACT NOTE (CRITICAL VALUE NOTIFICATION) - ASSESSMENT
Patient is aware of potassium result and benefits of replacement explained. Patient is verbally abusive and refuses to take the dose of potassium prescribed. Patient stated I hate it and I don't want it IV either.   Night supervisor Kimberly notified as well as Lee Bradford.

## 2019-09-18 NOTE — PROGRESS NOTE ADULT - SUBJECTIVE AND OBJECTIVE BOX
Patient resistant to consistent  PO and IV KCL; now allowing 10 meq Iv;     MEDICATIONS  (STANDING):  aspirin enteric coated 81 milliGRAM(s) Oral daily  buDESOnide 160 MICROgram(s)/formoterol 4.5 MICROgram(s) Inhaler 2 Puff(s) Inhalation two times a day  chlorhexidine 4% Liquid 1 Application(s) Topical daily  dextrose 5%. 1000 milliLiter(s) (50 mL/Hr) IV Continuous <Continuous>  dextrose 50% Injectable 12.5 Gram(s) IV Push once  dextrose 50% Injectable 25 Gram(s) IV Push once  dextrose 50% Injectable 25 Gram(s) IV Push once  famotidine    Tablet 20 milliGRAM(s) Oral daily  insulin lispro (HumaLOG) corrective regimen sliding scale   SubCutaneous three times a day before meals  insulin lispro (HumaLOG) corrective regimen sliding scale   SubCutaneous at bedtime  levothyroxine 37.5 MICROGram(s) Oral daily  nystatin Powder 1 Application(s) Topical two times a day  potassium chloride    Tablet ER 40 milliEquivalent(s) Oral every 6 hours  potassium chloride  10 mEq/100 mL IVPB 10 milliEquivalent(s) IV Intermittent every 1 hour  potassium phosphate / sodium phosphate powder 1 Packet(s) Oral four times a day with meals  riociguat 1.5 milliGRAM(s) Oral three times a day  simvastatin 40 milliGRAM(s) Oral at bedtime    MEDICATIONS  (PRN):  acetaminophen   Tablet .. 650 milliGRAM(s) Oral every 6 hours PRN Mild Pain (1 - 3)  aluminum hydroxide/magnesium hydroxide/simethicone Suspension 30 milliLiter(s) Oral every 4 hours PRN Dyspepsia  dextrose 40% Gel 15 Gram(s) Oral once PRN Blood Glucose LESS THAN 70 milliGRAM(s)/deciliter  glucagon  Injectable 1 milliGRAM(s) IntraMuscular once PRN Glucose LESS THAN 70 milligrams/deciliter  guaiFENesin    Syrup 100 milliGRAM(s) Oral every 6 hours PRN Cough      09-17-19 @ 07:01  -  09-18-19 @ 07:00  --------------------------------------------------------  IN: 1124 mL / OUT: 0 mL / NET: 1124 mL      PHYSICAL EXAM:      T(C): 36.5 (09-18-19 @ 12:15), Max: 37.1 (09-18-19 @ 00:17)  HR: 76 (09-18-19 @ 12:15) (64 - 84)  BP: 95/51 (09-18-19 @ 12:15) (95/51 - 102/62)  RR: 24 (09-18-19 @ 12:15) (16 - 24)  SpO2: 94% (09-18-19 @ 12:15) (92% - 98%)  Wt(kg): --  Respiratory: clear anteriorly, decreased BS at bases  Cardiovascular: S1 S2  Gastrointestinal: soft NT ND +BS  Extremities:  tr edema                                    9.1    5.83  )-----------( 320      ( 18 Sep 2019 02:03 )             29.4     09-18    129<L>  |  89<L>  |  13  ----------------------------<  102<H>  2.6<LL>   |  30  |  0.96    Ca    9.4      18 Sep 2019 02:03  Phos  2.3     09-18  Mg     2.4     09-18          Creatinine Trend: 0.96<--, 0.95<--, 0.98<--, 1.02<--, 1.24<--, 1.20<--  Assessment and Plan:    Hyponatremia, hypokalemia;  hypervolemic, aldactone, SSRI effect, slowly improving;  Large KCL deficit of 150-200 meq, slow to replace.  Will follow.

## 2019-09-18 NOTE — PROVIDER CONTACT NOTE (CRITICAL VALUE NOTIFICATION) - ACTION/TREATMENT ORDERED:
KCL 40meq po powder .
will follow up
Will administer Potassium as odered.
Will administer potassium as odered.
will continue to monitor
will follow up

## 2019-09-18 NOTE — PROGRESS NOTE ADULT - SUBJECTIVE AND OBJECTIVE BOX
INTERVAL History:  Leg Swelling  Dyspnea    Allergies    No Known Allergies    Intolerances        MEDICATIONS  (STANDING):  aspirin enteric coated 81 milliGRAM(s) Oral daily  buDESOnide 160 MICROgram(s)/formoterol 4.5 MICROgram(s) Inhaler 2 Puff(s) Inhalation two times a day  chlorhexidine 4% Liquid 1 Application(s) Topical daily  dextrose 5%. 1000 milliLiter(s) (50 mL/Hr) IV Continuous <Continuous>  dextrose 50% Injectable 12.5 Gram(s) IV Push once  dextrose 50% Injectable 25 Gram(s) IV Push once  dextrose 50% Injectable 25 Gram(s) IV Push once  famotidine    Tablet 20 milliGRAM(s) Oral daily  insulin lispro (HumaLOG) corrective regimen sliding scale   SubCutaneous three times a day before meals  insulin lispro (HumaLOG) corrective regimen sliding scale   SubCutaneous at bedtime  levothyroxine 37.5 MICROGram(s) Oral daily  nystatin Powder 1 Application(s) Topical two times a day  potassium chloride  10 mEq/100 mL IVPB 10 milliEquivalent(s) IV Intermittent every 1 hour  riociguat 1.5 milliGRAM(s) Oral three times a day  simvastatin 40 milliGRAM(s) Oral at bedtime    MEDICATIONS  (PRN):  acetaminophen   Tablet .. 650 milliGRAM(s) Oral every 6 hours PRN Mild Pain (1 - 3)  aluminum hydroxide/magnesium hydroxide/simethicone Suspension 30 milliLiter(s) Oral every 4 hours PRN Dyspepsia  dextrose 40% Gel 15 Gram(s) Oral once PRN Blood Glucose LESS THAN 70 milliGRAM(s)/deciliter  glucagon  Injectable 1 milliGRAM(s) IntraMuscular once PRN Glucose LESS THAN 70 milligrams/deciliter  guaiFENesin    Syrup 100 milliGRAM(s) Oral every 6 hours PRN Cough      Vital Signs Last 24 Hrs  T(C): 36.4 (18 Sep 2019 04:35), Max: 37.1 (18 Sep 2019 00:17)  T(F): 97.6 (18 Sep 2019 04:35), Max: 98.7 (18 Sep 2019 00:17)  HR: 84 (18 Sep 2019 05:52) (64 - 84)  BP: 100/55 (18 Sep 2019 04:35) (98/49 - 102/62)  BP(mean): 97 (17 Sep 2019 11:05) (97 - 97)  RR: 16 (18 Sep 2019 04:35) (16 - 18)  SpO2: 95% (18 Sep 2019 05:52) (92% - 98%)    PHYSICAL EXAM:      EYES: EOMI, PERRLA, conjunctiva and sclera clear  NECK: Supple, No JVD, Normal thyroid  CHEST/LUNG:    rales, rhonchi,   HEART: Regular rate and rhythm;   ABDOMEN: Soft, Nontender.    Edema:- ++        LABS:                        9.1    5.83  )-----------( 320      ( 18 Sep 2019 02:03 )             29.4     09-18    129<L>  |  89<L>  |  13  ----------------------------<  102<H>  2.6<LL>   |  30  |  0.96    Ca    9.4      18 Sep 2019 02:03  Phos  2.3     09-18  Mg     2.4     -18      PT/INR - ( 18 Sep 2019 02:03 )   PT: 23.0 sec;   INR: 2.01 ratio         PTT - ( 18 Sep 2019 02:03 )  PTT:92.3 sec  Urinalysis Basic - ( 17 Sep 2019 14:58 )    Color: Yellow / Appearance: Slightly Turbid / S.010 / pH: x  Gluc: x / Ketone: Negative  / Bili: Negative / Urobili: 8 mg/dL   Blood: x / Protein: 30 mg/dL / Nitrite: Negative   Leuk Esterase: Trace / RBC: >50 /HPF / WBC 3-5   Sq Epi: x / Non Sq Epi: Occasional / Bacteria: Few          RADIOLOGY & ADDITIONAL STUDIES:    PATHOLOGY:

## 2019-09-18 NOTE — PROGRESS NOTE ADULT - ASSESSMENT
33 yo female with end stage lung disease due to idiopathic/primary pulmonary HTN and Anticardiolipin antibodies.  Admitted with symptoms of worsening right heart failure. cardiac cath negative for any CAD in 2013.   Demand troponin levels noted.  Patient has lost ~15 kg since readmission but still has symptoms of cor pulmonale.   She may qualify for a lung transplant,  had scheduled appointment last week with Dr. Unger to discuss care further.    ·	Cor pulmonale sec to PPH?  ·	DVT  ·	Hypokalemia  ·	Hyponatremia    Plan:  ·	EKG on 9/17:  improved from 615  ·	Currently diuretics on hold 2/2 worsening hypokalemia/hyponatremia (due to volume overload) and significant contraction alkalosis.  s/p Acetazolamide x 2 doses. Needs increased potassium supplementation (likely total body stores quite depleted). Renal following  ·	Continue telemetry for severe electrolyte abnormalities.  ·	Continue to monitor renal function and electrolytes, daily weight   ·	Cont with supplemental O2/ Riociguat, pulmonary following   ·	Cont with asa/statin    ·	Cont AC   ·	Patient to have SKEY evaluation and Dr. Bharath Adamson, cardio follow up as out patient    ·	Pt had scheduled appointment with Dr. Unger  @ pulm HTN clinic last week, pt will reschedule post discharge 35 yo female with end stage lung disease due to idiopathic/primary pulmonary HTN and Anticardiolipin antibodies.  Admitted with symptoms of worsening right heart failure. cardiac cath negative for any CAD in 2013.   Demand troponin levels noted.  Patient has lost ~15 kg since readmission but still has symptoms of cor pulmonale.   She may qualify for a lung transplant, missed her scheduled appointment last week with Dr. Unger, pHTN specialist  to discuss care further due to hospitalization   Hypokalemia/hyponatremia/ Metabolic alkalosis -contraction, in setting of diuretics use/SSRI effect    ·	Cor pulmonale sec to PPH?  ·	DVT  ·	Hypokalemia  ·	Hyponatremia    Plan:  ·	EKG on 9/17:  improved from 615  ·	Currently diuretics on hold 2/2 worsening hypokalemia/hyponatremia (due to volume overload) and significant contraction alkalosis.  s/p Acetazolamide x 2 doses. Needs increased potassium supplementation (likely total body stores quite depleted). Renal following  ·	Continue telemetry for severe electrolyte abnormalities.  ·	Continue to monitor renal function and electrolytes, daily weight   ·	Cont with supplemental O2/ Riociguat, pulmonary following   ·	Cont with asa/statin    ·	Cont AC- coumadin  ·	Patient to have SKYE evaluation and Dr. Bharath Adamson, cardio follow up as out patient    ·	Pt missed her scheduled appointment with Dr. Unger  @ pulm HTN clinic last week, pt will reschedule again post discharge for further follow up

## 2019-09-19 LAB
ANION GAP SERPL CALC-SCNC: 7 MMOL/L — SIGNIFICANT CHANGE UP (ref 5–17)
BUN SERPL-MCNC: 9 MG/DL — SIGNIFICANT CHANGE UP (ref 7–23)
CALCIUM SERPL-MCNC: 9.4 MG/DL — SIGNIFICANT CHANGE UP (ref 8.5–10.1)
CHLORIDE SERPL-SCNC: 97 MMOL/L — SIGNIFICANT CHANGE UP (ref 96–108)
CO2 SERPL-SCNC: 27 MMOL/L — SIGNIFICANT CHANGE UP (ref 22–31)
CREAT SERPL-MCNC: 0.79 MG/DL — SIGNIFICANT CHANGE UP (ref 0.5–1.3)
GLUCOSE SERPL-MCNC: 85 MG/DL — SIGNIFICANT CHANGE UP (ref 70–99)
HBA1C BLD-MCNC: 5.9 % — HIGH (ref 4–5.6)
HCT VFR BLD CALC: 27.7 % — LOW (ref 34.5–45)
HGB BLD-MCNC: 8.6 G/DL — LOW (ref 11.5–15.5)
INR BLD: 1.58 RATIO — HIGH (ref 0.88–1.16)
MAGNESIUM SERPL-MCNC: 2.3 MG/DL — SIGNIFICANT CHANGE UP (ref 1.6–2.6)
MCHC RBC-ENTMCNC: 23.4 PG — LOW (ref 27–34)
MCHC RBC-ENTMCNC: 31 GM/DL — LOW (ref 32–36)
MCV RBC AUTO: 75.5 FL — LOW (ref 80–100)
NRBC # BLD: 0 /100 WBCS — SIGNIFICANT CHANGE UP (ref 0–0)
PHOSPHATE SERPL-MCNC: 2.4 MG/DL — LOW (ref 2.5–4.5)
PLATELET # BLD AUTO: 304 K/UL — SIGNIFICANT CHANGE UP (ref 150–400)
POTASSIUM SERPL-MCNC: 3.2 MMOL/L — LOW (ref 3.5–5.3)
POTASSIUM SERPL-SCNC: 3.2 MMOL/L — LOW (ref 3.5–5.3)
PROTHROM AB SERPL-ACNC: 18 SEC — HIGH (ref 10–12.9)
RBC # BLD: 3.67 M/UL — LOW (ref 3.8–5.2)
RBC # FLD: 26.1 % — HIGH (ref 10.3–14.5)
SODIUM SERPL-SCNC: 131 MMOL/L — LOW (ref 135–145)
T4 FREE SERPL-MCNC: 2 NG/DL — HIGH (ref 0.9–1.8)
TSH SERPL-MCNC: 3.59 UU/ML — SIGNIFICANT CHANGE UP (ref 0.36–3.74)
WBC # BLD: 4.23 K/UL — SIGNIFICANT CHANGE UP (ref 3.8–10.5)
WBC # FLD AUTO: 4.23 K/UL — SIGNIFICANT CHANGE UP (ref 3.8–10.5)

## 2019-09-19 PROCEDURE — 99233 SBSQ HOSP IP/OBS HIGH 50: CPT

## 2019-09-19 PROCEDURE — 99232 SBSQ HOSP IP/OBS MODERATE 35: CPT

## 2019-09-19 RX ORDER — WARFARIN SODIUM 2.5 MG/1
5 TABLET ORAL ONCE
Refills: 0 | Status: COMPLETED | OUTPATIENT
Start: 2019-09-19 | End: 2019-09-19

## 2019-09-19 RX ORDER — POTASSIUM CHLORIDE 20 MEQ
10 PACKET (EA) ORAL
Refills: 0 | Status: COMPLETED | OUTPATIENT
Start: 2019-09-19 | End: 2019-09-19

## 2019-09-19 RX ORDER — POTASSIUM CHLORIDE 20 MEQ
40 PACKET (EA) ORAL EVERY 4 HOURS
Refills: 0 | Status: DISCONTINUED | OUTPATIENT
Start: 2019-09-19 | End: 2019-09-19

## 2019-09-19 RX ORDER — APIXABAN 2.5 MG/1
5 TABLET, FILM COATED ORAL EVERY 12 HOURS
Refills: 0 | Status: DISCONTINUED | OUTPATIENT
Start: 2019-09-19 | End: 2019-09-20

## 2019-09-19 RX ADMIN — NYSTATIN CREAM 1 APPLICATION(S): 100000 CREAM TOPICAL at 05:57

## 2019-09-19 RX ADMIN — BUDESONIDE AND FORMOTEROL FUMARATE DIHYDRATE 2 PUFF(S): 160; 4.5 AEROSOL RESPIRATORY (INHALATION) at 05:57

## 2019-09-19 RX ADMIN — Medication 37.5 MICROGRAM(S): at 05:57

## 2019-09-19 RX ADMIN — CHLORHEXIDINE GLUCONATE 1 APPLICATION(S): 213 SOLUTION TOPICAL at 12:27

## 2019-09-19 RX ADMIN — Medication 1 PACKET(S): at 09:42

## 2019-09-19 RX ADMIN — Medication 100 MILLIEQUIVALENT(S): at 12:27

## 2019-09-19 RX ADMIN — BUDESONIDE AND FORMOTEROL FUMARATE DIHYDRATE 2 PUFF(S): 160; 4.5 AEROSOL RESPIRATORY (INHALATION) at 17:33

## 2019-09-19 RX ADMIN — FAMOTIDINE 20 MILLIGRAM(S): 10 INJECTION INTRAVENOUS at 12:27

## 2019-09-19 RX ADMIN — Medication 100 MILLIEQUIVALENT(S): at 00:27

## 2019-09-19 RX ADMIN — RIOCIGUAT 1.5 MILLIGRAM(S): 1.5 TABLET, FILM COATED ORAL at 05:58

## 2019-09-19 RX ADMIN — NYSTATIN CREAM 1 APPLICATION(S): 100000 CREAM TOPICAL at 17:33

## 2019-09-19 RX ADMIN — RIOCIGUAT 1.5 MILLIGRAM(S): 1.5 TABLET, FILM COATED ORAL at 21:30

## 2019-09-19 RX ADMIN — Medication 81 MILLIGRAM(S): at 12:27

## 2019-09-19 RX ADMIN — Medication 30 MILLILITER(S): at 21:29

## 2019-09-19 RX ADMIN — Medication 100 MILLIEQUIVALENT(S): at 15:03

## 2019-09-19 RX ADMIN — WARFARIN SODIUM 5 MILLIGRAM(S): 2.5 TABLET ORAL at 18:55

## 2019-09-19 RX ADMIN — Medication 100 MILLIEQUIVALENT(S): at 18:40

## 2019-09-19 RX ADMIN — SIMVASTATIN 40 MILLIGRAM(S): 20 TABLET, FILM COATED ORAL at 21:29

## 2019-09-19 NOTE — PROGRESS NOTE ADULT - ASSESSMENT
35 yo female with end stage lung disease due to idiopathic/primary pulmonary HTN and Anticardiolipin antibodies.  Admitted with symptoms of worsening right heart failure. cardiac cath negative for any CAD in 2013.   Demand troponin levels noted.  Patient has lost ~15 kg since readmission but still has symptoms of cor pulmonale.   She may qualify for a lung transplant, missed her scheduled appointment last week with Dr. Unger, pHTN specialist  to discuss care further due to hospitalization   Hypokalemia/hyponatremia/ Metabolic alkalosis -contraction, in setting of diuretics use/SSRI effect  EKG on 9/17:  improved from 615    ·	Cor pulmonale sec to PPH?  ·	DVT  ·	Hypokalemia  ·	Hyponatremia    Plan:  ·	Currently diuretics on hold 2/2 worsening hypokalemia/hyponatremia (due to volume overload) and significant contraction alkalosis.  s/p Acetazolamide x 2 doses. Needs increased potassium supplementation (likely total body stores quite depleted). Renal following  ·	Continue telemetry for severe electrolyte abnormalities.  ·	Continue to monitor renal function and electrolytes, daily weight   ·	Cont with supplemental O2/ Riociguat, pulmonary following   ·	Cont with asa/statin    ·	Pt to go back onto [facility] for AC as per pulm reccomendations  ·	Patient to have SKYE evaluation and Dr. Bharath Adamson, cardio follow up as out patient    ·	Pt missed her scheduled appointment with Dr. Unger  @ pul HTN clinic last week, pt will reschedule again post discharge for further follow up

## 2019-09-19 NOTE — PROGRESS NOTE ADULT - PROBLEM SELECTOR PROBLEM 2
Pulmonary hypertension
Hypokalemia
Pulmonary hypertension

## 2019-09-19 NOTE — PROGRESS NOTE ADULT - SUBJECTIVE AND OBJECTIVE BOX
INTERVAL History:  Dyspnea.  Orthopnea.  Leg Swelling.  Allergies    No Known Allergies    Intolerances        MEDICATIONS  (STANDING):  aspirin enteric coated 81 milliGRAM(s) Oral daily  buDESOnide 160 MICROgram(s)/formoterol 4.5 MICROgram(s) Inhaler 2 Puff(s) Inhalation two times a day  chlorhexidine 4% Liquid 1 Application(s) Topical daily  dextrose 5%. 1000 milliLiter(s) (50 mL/Hr) IV Continuous <Continuous>  dextrose 50% Injectable 12.5 Gram(s) IV Push once  dextrose 50% Injectable 25 Gram(s) IV Push once  dextrose 50% Injectable 25 Gram(s) IV Push once  famotidine    Tablet 20 milliGRAM(s) Oral daily  insulin lispro (HumaLOG) corrective regimen sliding scale   SubCutaneous three times a day before meals  insulin lispro (HumaLOG) corrective regimen sliding scale   SubCutaneous at bedtime  levothyroxine 37.5 MICROGram(s) Oral daily  nystatin Powder 1 Application(s) Topical two times a day  riociguat 1.5 milliGRAM(s) Oral three times a day  simvastatin 40 milliGRAM(s) Oral at bedtime    MEDICATIONS  (PRN):  acetaminophen   Tablet .. 650 milliGRAM(s) Oral every 6 hours PRN Mild Pain (1 - 3)  aluminum hydroxide/magnesium hydroxide/simethicone Suspension 30 milliLiter(s) Oral every 4 hours PRN Dyspepsia  dextrose 40% Gel 15 Gram(s) Oral once PRN Blood Glucose LESS THAN 70 milliGRAM(s)/deciliter  glucagon  Injectable 1 milliGRAM(s) IntraMuscular once PRN Glucose LESS THAN 70 milligrams/deciliter  guaiFENesin    Syrup 100 milliGRAM(s) Oral every 6 hours PRN Cough      Vital Signs Last 24 Hrs  T(C): 36.1 (19 Sep 2019 08:07), Max: 36.5 (18 Sep 2019 12:15)  T(F): 97 (19 Sep 2019 08:07), Max: 97.7 (18 Sep 2019 12:15)  HR: 69 (19 Sep 2019 08:07) (69 - 80)  BP: 95/48 (19 Sep 2019 08:07) (80/46 - 95/51)  BP(mean): --  RR: 18 (19 Sep 2019 08:07) (18 - 24)  SpO2: 96% (19 Sep 2019 08:07) (91% - 97%)    PHYSICAL EXAM:      EYES: EOMI, PERRLA, conjunctiva and sclera clear  NECK: Supple, No JVD, Normal thyroid  CHEST/LUNG:  rales, rhonchi,   HEART: Regular rate and rhythm;   ABDOMEN: Soft,  Edema:- ++        LABS:                        9.1    5.83  )-----------( 320      ( 18 Sep 2019 02:03 )             29.4     -18    129<L>  |  90<L>  |  10  ----------------------------<  103<H>  2.9<LL>   |  28  |  0.96    Ca    8.9      18 Sep 2019 19:57  Phos  2.3       Mg     2.6           PT/INR - ( 18 Sep 2019 02:03 )   PT: 23.0 sec;   INR: 2.01 ratio         PTT - ( 18 Sep 2019 09:01 )  PTT:115.5 sec  Urinalysis Basic - ( 17 Sep 2019 14:58 )    Color: Yellow / Appearance: Slightly Turbid / S.010 / pH: x  Gluc: x / Ketone: Negative  / Bili: Negative / Urobili: 8 mg/dL   Blood: x / Protein: 30 mg/dL / Nitrite: Negative   Leuk Esterase: Trace / RBC: >50 /HPF / WBC 3-5   Sq Epi: x / Non Sq Epi: Occasional / Bacteria: Few          RADIOLOGY & ADDITIONAL STUDIES:    PATHOLOGY:

## 2019-09-19 NOTE — PROGRESS NOTE ADULT - ASSESSMENT
33 yo female with history of Anticardiolipin antibody positive on AC at home, COPD, DM II, Essential hypertension, Vitamin D deficiency & severe Pulm HTN with right sided heart failure p/w SOB & was found to have acute on chronic combined systolic and diastolic congestive heart failure, hyponatremia, DIANN & acute RLE DVT.    Acute on chronic combined systolic and diastolic congestive heart failure:  - Hold Torsemide in the setting of hyponatremia.   - Will off on further diuretics until K is >3 - will recheck K tonight and give bumex if K>3  - Echo in august showed EF 45-50% w/ elevated mean left atrial pressure w/ global cardiomyopathy, grade I diastolic dysfunction, pulmonary hypertension w/ a severely enlarged right ventricle and severely reduced RV systolic function w/ RV ejection fraction 34%, moderate tricuspid regurgitation, mild to moderate pulmonic valve regurgitation & severe pulmonary hypertension     Pulmonary hypertension:  - c/w riociguat  - on chronic oxygen as per patient, but does not have portable oxygen tank     Chest pain:  - EKG unchanged  - Cardio following  - Continue ASA    DM II:  - Continue current insulin regimen    Hypokalemia:  - Severe hypokalemia from aggressive diuresis  - Even though it was documented that the patient had recieved ample PO KCl yesterday, nurse reports that she refused most of it  - Will give more IV KCl   - Renal following  - Repeat BMP in afternoon    Hypophosphatemia:  - replete w/ KPhos    Hyponatremia:  - improving  - likely due to volume overload/ aggressive diuresis and possible SSRI- on hold  - Renal following   - c/w Fluid restriction    HTN:  - hold all BP meds due to borderline BP    Asthma:  - pulmonary following  - c/w Symbicort    Acute deep vein thrombosis (DVT) of femoral vein of left lower extremity:  - US showed an extensive acute nonocclusive thrombus in the left external iliac vein and the left common femoral vein and left greater saphenous vein  - Restart Eliquis, pt non compliant with INR check.    Acquired hypothyroidism:  - c/w synthroid   - recheck TSH and free T4 tomorrow    Hyperlipidemia:  - c/w Zocor    Depression:  - Zoloft on hold    Prophylaxis:  DVT: Eliquis  GI: Pepcid

## 2019-09-19 NOTE — PROGRESS NOTE ADULT - SUBJECTIVE AND OBJECTIVE BOX
Subjective: refused BW this am.       MEDICATIONS  (STANDING):  aspirin enteric coated 81 milliGRAM(s) Oral daily  buDESOnide 160 MICROgram(s)/formoterol 4.5 MICROgram(s) Inhaler 2 Puff(s) Inhalation two times a day  chlorhexidine 4% Liquid 1 Application(s) Topical daily  dextrose 5%. 1000 milliLiter(s) (50 mL/Hr) IV Continuous <Continuous>  dextrose 50% Injectable 12.5 Gram(s) IV Push once  dextrose 50% Injectable 25 Gram(s) IV Push once  dextrose 50% Injectable 25 Gram(s) IV Push once  famotidine    Tablet 20 milliGRAM(s) Oral daily  insulin lispro (HumaLOG) corrective regimen sliding scale   SubCutaneous three times a day before meals  insulin lispro (HumaLOG) corrective regimen sliding scale   SubCutaneous at bedtime  levothyroxine 37.5 MICROGram(s) Oral daily  nystatin Powder 1 Application(s) Topical two times a day  riociguat 1.5 milliGRAM(s) Oral three times a day  simvastatin 40 milliGRAM(s) Oral at bedtime    MEDICATIONS  (PRN):  acetaminophen   Tablet .. 650 milliGRAM(s) Oral every 6 hours PRN Mild Pain (1 - 3)  aluminum hydroxide/magnesium hydroxide/simethicone Suspension 30 milliLiter(s) Oral every 4 hours PRN Dyspepsia  dextrose 40% Gel 15 Gram(s) Oral once PRN Blood Glucose LESS THAN 70 milliGRAM(s)/deciliter  glucagon  Injectable 1 milliGRAM(s) IntraMuscular once PRN Glucose LESS THAN 70 milligrams/deciliter  guaiFENesin    Syrup 100 milliGRAM(s) Oral every 6 hours PRN Cough          T(C): 36.1 (19 @ 08:07), Max: 36.5 (19 @ 12:15)  HR: 69 (19 @ 08:07) (69 - 80)  BP: 95/48 (19 @ 08:07) (80/46 - 95/51)  RR: 18 (19 @ 08:07) (18 - 24)  SpO2: 96% (19 @ 08:07) (91% - 97%)  Wt(kg): --        I&O's Detail    18 Sep 2019 07:01  -  19 Sep 2019 07:00  --------------------------------------------------------  IN:    Oral Fluid: 200 mL    Solution: 100 mL  Total IN: 300 mL    OUT:  Total OUT: 0 mL    Total NET: 300 mL               PHYSICAL EXAM:    GENERAL: somnolent  EYES: EOMI, PERRLA, conjunctiva and sclera clear  NECK: Supple, no inc in JVP  CHEST/LUNG: dec BS at bases  HEART: S1S2  ABDOMEN: Soft, Nontender, Nondistended; Bowel sounds present  EXTREMITIES:  massive LE edema  NEURO: no asterixis          LABS:  CBC Full  -  ( 18 Sep 2019 02:03 )  WBC Count : 5.83 K/uL  RBC Count : 3.95 M/uL  Hemoglobin : 9.1 g/dL  Hematocrit : 29.4 %  Platelet Count - Automated : 320 K/uL  Mean Cell Volume : 74.4 fl  Mean Cell Hemoglobin : 23.0 pg  Mean Cell Hemoglobin Concentration : 31.0 gm/dL  Auto Neutrophil # : x  Auto Lymphocyte # : x  Auto Monocyte # : x  Auto Eosinophil # : x  Auto Basophil # : x  Auto Neutrophil % : x  Auto Lymphocyte % : x  Auto Monocyte % : x  Auto Eosinophil % : x  Auto Basophil % : x        129<L>  |  90<L>  |  10  ----------------------------<  103<H>  2.9<LL>   |  28  |  0.96    Ca    8.9      18 Sep 2019 19:57  Phos  2.3       Mg     2.6     -18      PT/INR - ( 18 Sep 2019 02:03 )   PT: 23.0 sec;   INR: 2.01 ratio         PTT - ( 18 Sep 2019 09:01 )  PTT:115.5 sec  Urinalysis Basic - ( 17 Sep 2019 14:58 )    Color: Yellow / Appearance: Slightly Turbid / S.010 / pH: x  Gluc: x / Ketone: Negative  / Bili: Negative / Urobili: 8 mg/dL   Blood: x / Protein: 30 mg/dL / Nitrite: Negative   Leuk Esterase: Trace / RBC: >50 /HPF / WBC 3-5   Sq Epi: x / Non Sq Epi: Occasional / Bacteria: Few          Impression:  * HypoNa -- hypervolemic, aldactone, SSRI effect  * Metabolic alkalosis -- contraction, better post Acetazolamide  * HypoK due to above  * Severe RHF, pulm HTN ( ? primary )  * L fem DVT    Recommendations:   * Follow repeat BMP this am. Supplement K as needed. Consider twice daily electrolyte check till serum K is > 3.5  * Zoloft on hold  * Follow requested w/u  * FR 800cc/day.

## 2019-09-19 NOTE — PROGRESS NOTE ADULT - SUBJECTIVE AND OBJECTIVE BOX
Patient is a 34y old  Female who presents with a chief complaint of Dyspnea (19 Sep 2019 14:31)      PAST MEDICAL & SURGICAL HISTORY:  Former smoker, stopped smoking in distant past  Vitamin D deficiency  Morbid obesity due to excess calories  Edema extremities  Diabetes mellitus  Hyperbilirubinemia  COPD (chronic obstructive pulmonary disease)  Anticoagulation goal of INR 2 to 3  Anticardiolipin antibody positive  Congestive heart failure, unspecified HF chronicity, unspecified heart failure type  Moderate asthma, unspecified whether complicated, unspecified whether persistent  Other secondary hypertension  Essential hypertension  Pulmonary HTN  No significant past surgical history    INTERVAL HISTORY:  	  MEDICATIONS:  MEDICATIONS  (STANDING):  aspirin enteric coated 81 milliGRAM(s) Oral daily  buDESOnide 160 MICROgram(s)/formoterol 4.5 MICROgram(s) Inhaler 2 Puff(s) Inhalation two times a day  chlorhexidine 4% Liquid 1 Application(s) Topical daily  famotidine    Tablet 20 milliGRAM(s) Oral daily  insulin lispro (HumaLOG) corrective regimen sliding scale   SubCutaneous three times a day before meals  insulin lispro (HumaLOG) corrective regimen sliding scale   SubCutaneous at bedtime  levothyroxine 37.5 MICROGram(s) Oral daily  nystatin Powder 1 Application(s) Topical two times a day  potassium chloride  10 mEq/100 mL IVPB 10 milliEquivalent(s) IV Intermittent every 1 hour  riociguat 1.5 milliGRAM(s) Oral three times a day  simvastatin 40 milliGRAM(s) Oral at bedtime    MEDICATIONS  (PRN):  acetaminophen   Tablet .. 650 milliGRAM(s) Oral every 6 hours PRN Mild Pain (1 - 3)  aluminum hydroxide/magnesium hydroxide/simethicone Suspension 30 milliLiter(s) Oral every 4 hours PRN Dyspepsia  dextrose 40% Gel 15 Gram(s) Oral once PRN Blood Glucose LESS THAN 70 milliGRAM(s)/deciliter  glucagon  Injectable 1 milliGRAM(s) IntraMuscular once PRN Glucose LESS THAN 70 milligrams/deciliter  guaiFENesin    Syrup 100 milliGRAM(s) Oral every 6 hours PRN Cough      Vitals:  T(F): 98 (09-19-19 @ 11:51), Max: 98 (09-19-19 @ 11:51)  HR: 78 (09-19-19 @ 12:16) (69 - 80)  BP: 100/47 (09-19-19 @ 11:51) (80/46 - 100/47)  RR: 18 (09-19-19 @ 11:51) (18 - 18)  SpO2: 98% (09-19-19 @ 12:16) (91% - 98%)  Wt(kg): 92.5kg    09-18 @ 07:01  -  09-19 @ 07:00  --------------------------------------------------------  IN:    Oral Fluid: 200 mL    Solution: 100 mL  Total IN: 300 mL    OUT:  Total OUT: 0 mL    Total NET: 300 mL    PHYSICAL EXAM:  Neuro: Awake, responsive  CV: S1 S2 RRR  Lungs: CTABL  GI: Soft, BS +, ND, NT  Extremities: No edema    TELEMETRY: RSR    RADIOLOGY:   < from: US Duplex Venous Lower Ext Complete, Bilateral (09.05.19 @ 09:19) >  INTERPRETATION:  History: Bilateral leg swelling.    Bilateral lower extremity venous Doppler.    There is extensive echogenic acute thrombus within noncompressible left   external iliac and left common femoral vein as well as superficial   thrombus in the left greater saphenous vein. The remainder of the   bilateral femoral popliteal posterior tibial veins are patent, compress   without thrombus.    Impression: Positive for extensive acute nonocclusive thrombus in the   left external iliac vein and the left common femoral vein  and left   greater saphenous vein.  < end of copied text >    < from: Xray Chest 1 View- PORTABLE-Urgent (09.09.19 @ 09:14) >  FINDINGS:    Lungs: There are no lung consolidations.  Heart: There is cardiomegaly.  Mediastinum: Stable right hilar prominence.    IMPRESSION:    No lung consolidations.  < end of copied text >    DIAGNOSTIC TESTING:    [X] Echocardiogram: < from: TTE Echo Doppler w/o Cont (07.26.19 @ 08:44) >  Summary:   1. Left ventricular ejection fraction, by visual estimation, is 45 to   50%.   2. Mildly decreased global left ventricular systolic function.   3. Elevated mean left atrial pressure.   4. Global cardiomyopathy.   5. Increased LV wall thickness.   6. Normal left ventricular internal cavity size.   7. Right ventricular pressure overload.   8. Spectral Doppler shows impaired relaxation pattern of left   ventricular myocardial filling (Grade I diastolic dysfunction).   9. There is mild asymmetric left ventricular hypertrophy.  10. Pulmonary hypertension.  11. Severely enlarged right ventricle.12. Severely reduced RV systolic function.  13. RV ejection fraction 34%.  14. Small pericardial effusion.  15. Degenerative mitral valve.  16. Mild mitral annular calcification.  17. Mild mitral valve regurgitation.  18. Moderate tricuspid regurgitation.  19. Structurally normal tricuspid valve, with normal leaflet excursion.  20. Mild aortic regurgitation.  21. Mild to moderate pulmonic valve regurgitation.  22. Structurally normal pulmonic valve, with normal leaflet excursion.  23. Estimated pulmonary artery systolic pressure is 87.1 mmHg assuming a   right atrial pressure of 20 mmHg, which is consistent with severe   pulmonary hypertension.  24. Pulmonary hypertension is present.  25. The main pulmonary artery is normal in size.  26. The right atrial pressure is moderately elevated.  < end of copied text >    [ ]  Catheterization: < from: Cardiac Cath Lab - Adult (08.10.18 @ 10:48) >  PROCEDURE:  --  Right heart catheterization.  --  Sonosite - Diagnostic.SUMMARY:  Summary: Addendum 8/30/2018: Added "Right Heart Catheterization" procedure to  the Diagnostic Report.  DIAGNOSTIC IMPRESSIONS: Severe Pulmonary Hypertension with no response with  80ppm of Nitric Oxide  INTERVENTIONAL IMPRESSIONS: Severe Pulmonary Hypertension with no response with  80ppm of Nitric Oxide  < end of copied text >    LABS:	 	    19 Sep 2019 11:10    131    |  97     |  9      ----------------------------<  85     3.2     |  27     |  0.79   18 Sep 2019 19:57    129    |  90     |  10     ----------------------------<  103    2.9     |  28     |  0.96   18 Sep 2019 02:03    129    |  89     |  13     ----------------------------<  102    2.6     |  30     |  0.96     Ca    9.4        19 Sep 2019 11:10  Phos  2.4       19 Sep 2019 11:10  Mg     2.3       19 Sep 2019 11:10                8.6    4.23  )-----------( 304      ( 19 Sep 2019 11:10 )             27.7 ,                       9.1    5.83  )-----------( 320      ( 18 Sep 2019 02:03 )             29.4 ,                       9.4    5.11  )-----------( 313      ( 17 Sep 2019 19:09 )             30.0 ,                       9.9    4.74  )-----------( 325      ( 17 Sep 2019 09:17 )             31.2 ,                       9.2    5.65  )-----------( 319      ( 16 Sep 2019 18:18 )             29.2   TSH: Thyroid Stimulating Hormone, Serum: 3.590 uU/mL (09-19 @ 11:10)    PT/PTT- ( 19 Sep 2019 11:10 )   PT: 18.0 sec;  PTT: x        INR: 1.58 ratio (09-19 @ 11:10)  INR: 2.01 ratio (09-18 @ 02:03)  INR: 1.49 ratio (09-17 @ 09:17)  INR: 1.68 ratio (09-16 @ 18:18) Patient is a 34y old  Female who presents with a chief complaint of Dyspnea (19 Sep 2019 14:31)    PAST MEDICAL & SURGICAL HISTORY:  Former smoker, stopped smoking in distant past  Vitamin D deficiency  Morbid obesity due to excess calories  Edema extremities  Diabetes mellitus  Hyperbilirubinemia  COPD (chronic obstructive pulmonary disease)  Anticoagulation goal of INR 2 to 3  Anticardiolipin antibody positive  Congestive heart failure, unspecified HF chronicity, unspecified heart failure type  Moderate asthma, unspecified whether complicated, unspecified whether persistent  Other secondary hypertension  Essential hypertension  Pulmonary HTN  No significant past surgical history    INTERVAL HISTORY: Resting in bed, not in any acute distress   	  MEDICATIONS:  MEDICATIONS  (STANDING):  aspirin enteric coated 81 milliGRAM(s) Oral daily  buDESOnide 160 MICROgram(s)/formoterol 4.5 MICROgram(s) Inhaler 2 Puff(s) Inhalation two times a day  chlorhexidine 4% Liquid 1 Application(s) Topical daily  famotidine    Tablet 20 milliGRAM(s) Oral daily  insulin lispro (HumaLOG) corrective regimen sliding scale   SubCutaneous three times a day before meals  insulin lispro (HumaLOG) corrective regimen sliding scale   SubCutaneous at bedtime  levothyroxine 37.5 MICROGram(s) Oral daily  nystatin Powder 1 Application(s) Topical two times a day  potassium chloride  10 mEq/100 mL IVPB 10 milliEquivalent(s) IV Intermittent every 1 hour  riociguat 1.5 milliGRAM(s) Oral three times a day  simvastatin 40 milliGRAM(s) Oral at bedtime    MEDICATIONS  (PRN):  acetaminophen   Tablet .. 650 milliGRAM(s) Oral every 6 hours PRN Mild Pain (1 - 3)  aluminum hydroxide/magnesium hydroxide/simethicone Suspension 30 milliLiter(s) Oral every 4 hours PRN Dyspepsia  dextrose 40% Gel 15 Gram(s) Oral once PRN Blood Glucose LESS THAN 70 milliGRAM(s)/deciliter  glucagon  Injectable 1 milliGRAM(s) IntraMuscular once PRN Glucose LESS THAN 70 milligrams/deciliter  guaiFENesin    Syrup 100 milliGRAM(s) Oral every 6 hours PRN Cough    Vitals:  T(F): 98 (09-19-19 @ 11:51), Max: 98 (09-19-19 @ 11:51)  HR: 78 (09-19-19 @ 12:16) (69 - 80)  BP: 100/47 (09-19-19 @ 11:51) (80/46 - 100/47)  RR: 18 (09-19-19 @ 11:51) (18 - 18)  SpO2: 98% (09-19-19 @ 12:16) (91% - 98%)  Wt(kg): 92.5kg    09-18 @ 07:01  -  09-19 @ 07:00  --------------------------------------------------------  IN:    Oral Fluid: 200 mL    Solution: 100 mL  Total IN: 300 mL    OUT:  Total OUT: 0 mL    Total NET: 300 mL    PHYSICAL EXAM:  Neuro: Awake, responsive  CV: S1 S2 RRR  Lungs: CTABL  GI: Soft, BS +, ND, NT  Extremities: + LE edema, Rt > Lt    TELEMETRY: RSR    RADIOLOGY:   < from: US Duplex Venous Lower Ext Complete, Bilateral (09.05.19 @ 09:19) >  INTERPRETATION:  History: Bilateral leg swelling.    Bilateral lower extremity venous Doppler.    There is extensive echogenic acute thrombus within noncompressible left   external iliac and left common femoral vein as well as superficial   thrombus in the left greater saphenous vein. The remainder of the   bilateral femoral popliteal posterior tibial veins are patent, compress   without thrombus.    Impression: Positive for extensive acute nonocclusive thrombus in the   left external iliac vein and the left common femoral vein  and left   greater saphenous vein.  < end of copied text >    < from: Xray Chest 1 View- PORTABLE-Urgent (09.09.19 @ 09:14) >  FINDINGS:    Lungs: There are no lung consolidations.  Heart: There is cardiomegaly.  Mediastinum: Stable right hilar prominence.    IMPRESSION:    No lung consolidations.  < end of copied text >    DIAGNOSTIC TESTING:    [X] Echocardiogram: < from: TTE Echo Doppler w/o Cont (07.26.19 @ 08:44) >  Summary:   1. Left ventricular ejection fraction, by visual estimation, is 45 to   50%.   2. Mildly decreased global left ventricular systolic function.   3. Elevated mean left atrial pressure.   4. Global cardiomyopathy.   5. Increased LV wall thickness.   6. Normal left ventricular internal cavity size.   7. Right ventricular pressure overload.   8. Spectral Doppler shows impaired relaxation pattern of left   ventricular myocardial filling (Grade I diastolic dysfunction).   9. There is mild asymmetric left ventricular hypertrophy.  10. Pulmonary hypertension.  11. Severely enlarged right ventricle.12. Severely reduced RV systolic function.  13. RV ejection fraction 34%.  14. Small pericardial effusion.  15. Degenerative mitral valve.  16. Mild mitral annular calcification.  17. Mild mitral valve regurgitation.  18. Moderate tricuspid regurgitation.  19. Structurally normal tricuspid valve, with normal leaflet excursion.  20. Mild aortic regurgitation.  21. Mild to moderate pulmonic valve regurgitation.  22. Structurally normal pulmonic valve, with normal leaflet excursion.  23. Estimated pulmonary artery systolic pressure is 87.1 mmHg assuming a   right atrial pressure of 20 mmHg, which is consistent with severe   pulmonary hypertension.  24. Pulmonary hypertension is present.  25. The main pulmonary artery is normal in size.  26. The right atrial pressure is moderately elevated.  < end of copied text >    [x ]  Catheterization: < from: Cardiac Cath Lab - Adult (08.10.18 @ 10:48) >  PROCEDURE:  --  Right heart catheterization.  --  Sonosite - Diagnostic.SUMMARY:  Summary: Addendum 8/30/2018: Added "Right Heart Catheterization" procedure to  the Diagnostic Report.  DIAGNOSTIC IMPRESSIONS: Severe Pulmonary Hypertension with no response with  80ppm of Nitric Oxide  INTERVENTIONAL IMPRESSIONS: Severe Pulmonary Hypertension with no response with  80ppm of Nitric Oxide  < end of copied text >    LABS:	 	    19 Sep 2019 11:10    131    |  97     |  9      ----------------------------<  85     3.2     |  27     |  0.79   18 Sep 2019 19:57    129    |  90     |  10     ----------------------------<  103    2.9     |  28     |  0.96   18 Sep 2019 02:03    129    |  89     |  13     ----------------------------<  102    2.6     |  30     |  0.96     Ca    9.4        19 Sep 2019 11:10  Phos  2.4       19 Sep 2019 11:10  Mg     2.3       19 Sep 2019 11:10                8.6    4.23  )-----------( 304      ( 19 Sep 2019 11:10 )             27.7 ,                       9.1    5.83  )-----------( 320      ( 18 Sep 2019 02:03 )             29.4 ,                       9.4    5.11  )-----------( 313      ( 17 Sep 2019 19:09 )             30.0 ,                       9.9    4.74  )-----------( 325      ( 17 Sep 2019 09:17 )             31.2 ,                       9.2    5.65  )-----------( 319      ( 16 Sep 2019 18:18 )             29.2   TSH: Thyroid Stimulating Hormone, Serum: 3.590 uU/mL (09-19 @ 11:10)    PT/PTT- ( 19 Sep 2019 11:10 )   PT: 18.0 sec;  PTT: x        INR: 1.58 ratio (09-19 @ 11:10)  INR: 2.01 ratio (09-18 @ 02:03)  INR: 1.49 ratio (09-17 @ 09:17)  INR: 1.68 ratio (09-16 @ 18:18)

## 2019-09-19 NOTE — CHART NOTE - NSCHARTNOTEFT_GEN_A_CORE
Medicine Hospitalist PA    Pt now refusing Eliquis BID. States Eliquis causes heavy bleeding during her menstrual cycle and prefers coumadin. Pt can be difficult. Discussed with Dr. Ye, will give STAT coumadin 5mg in meantime for INR 1.58 and repeat INR in AM.

## 2019-09-19 NOTE — PROGRESS NOTE ADULT - SUBJECTIVE AND OBJECTIVE BOX
Interval Events:  Patient seen and examined at bedside. Feels well      REVIEW OF SYSTEMS:  Constitutional: no fevers  CV:  wt stable, no chest pain  Resp:  no SOB  GI: no abd pain    [x ] All other systems negative  [ ] Unable to assess ROS because ________    OBJECTIVE:  T(C): 36.7 (19 @ 11:51), Max: 36.7 (19 @ 11:51)  HR: 78 (19 @ 12:16) (69 - 80)  BP: 100/47 (19 @ 11:51) (80/46 - 100/47)  RR: 18 (19 @ 11:51) (18 - 18)  SpO2: 98% (19 @ 12:16) (91% - 98%)      19 @ 07:01  -  19 @ 07:00  --------------------------------------------------------  IN: 300 mL / OUT: 0 mL / NET: 300 mL    Daily     Daily Weight in k.3 (18 Sep 2019 06:00)  Daily Weight in k.5 (19 Sep 2019 04:50)    PHYSICAL EXAM:  General: Well appearing Female. No apparent distress  HEENT:   Mucous membranes are moist.  Neck: Supple. no JVD  Respiratory: CTAB  Cardiovascular: RRR  Abdomen: Soft, non-tender, non-distended.   Extremities: +1 bilateral REUBEN, R>L, improved  Skin: Warm, dry, no rash.   Neurological: CNII-XII grossly intact.   Psychiatry: appropriate mood and affect      HOSPITAL MEDICATIONS:  MEDICATIONS  (STANDING):  aspirin enteric coated 81 milliGRAM(s) Oral daily  buDESOnide 160 MICROgram(s)/formoterol 4.5 MICROgram(s) Inhaler 2 Puff(s) Inhalation two times a day  chlorhexidine 4% Liquid 1 Application(s) Topical daily  dextrose 5%. 1000 milliLiter(s) (50 mL/Hr) IV Continuous <Continuous>  dextrose 50% Injectable 12.5 Gram(s) IV Push once  dextrose 50% Injectable 25 Gram(s) IV Push once  dextrose 50% Injectable 25 Gram(s) IV Push once  famotidine    Tablet 20 milliGRAM(s) Oral daily  insulin lispro (HumaLOG) corrective regimen sliding scale   SubCutaneous three times a day before meals  insulin lispro (HumaLOG) corrective regimen sliding scale   SubCutaneous at bedtime  levothyroxine 37.5 MICROGram(s) Oral daily  nystatin Powder 1 Application(s) Topical two times a day  potassium chloride  10 mEq/100 mL IVPB 10 milliEquivalent(s) IV Intermittent every 1 hour  riociguat 1.5 milliGRAM(s) Oral three times a day  simvastatin 40 milliGRAM(s) Oral at bedtime    MEDICATIONS  (PRN):  acetaminophen   Tablet .. 650 milliGRAM(s) Oral every 6 hours PRN Mild Pain (1 - 3)  aluminum hydroxide/magnesium hydroxide/simethicone Suspension 30 milliLiter(s) Oral every 4 hours PRN Dyspepsia  dextrose 40% Gel 15 Gram(s) Oral once PRN Blood Glucose LESS THAN 70 milliGRAM(s)/deciliter  glucagon  Injectable 1 milliGRAM(s) IntraMuscular once PRN Glucose LESS THAN 70 milligrams/deciliter  guaiFENesin    Syrup 100 milliGRAM(s) Oral every 6 hours PRN Cough      LABS:                        8.6    4.23  )-----------( 304      ( 19 Sep 2019 11:10 )             27.7     -    131<L>  |  97  |  9   ----------------------------<  85  3.2<L>   |  27  |  0.79    Ca    9.4      19 Sep 2019 11:10  Phos  2.4       Mg     2.3           PT/INR - ( 19 Sep 2019 11:10 )   PT: 18.0 sec;   INR: 1.58 ratio         PTT - ( 18 Sep 2019 09:01 )  PTT:115.5 sec  Urinalysis Basic - ( 17 Sep 2019 14:58 )    Color: Yellow / Appearance: Slightly Turbid / S.010 / pH: x  Gluc: x / Ketone: Negative  / Bili: Negative / Urobili: 8 mg/dL   Blood: x / Protein: 30 mg/dL / Nitrite: Negative   Leuk Esterase: Trace / RBC: >50 /HPF / WBC 3-5   Sq Epi: x / Non Sq Epi: Occasional / Bacteria: Few    RADIOLOGY:  [ x ] Reviewed and interpreted by me    CXR: volume overload    < from: US Duplex Venous Lower Ext Complete, Bilateral (19 @ 09:19) >  Impression: Positive for extensive acute nonocclusive thrombus in the   left external iliac vein and the left common femoral vein  and left   greater saphenous vein.    < end of copied text >      ECHOCARDIOGRAPHY:  < from: TTE Echo Doppler w/o Cont (19 @ 08:44) >  Summary:   1. Left ventricular ejection fraction, by visual estimation, is 45 to   50%.   2. Mildly decreased global left ventricular systolic function.   3. Elevated mean left atrial pressure.   4. Global cardiomyopathy.   5. Increased LV wall thickness.   6. Normal left ventricular internal cavity size.   7. Right ventricular pressure overload.   8. Spectral Doppler shows impaired relaxation pattern of left   ventricular myocardial filling (Grade I diastolic dysfunction).   9. There is mild asymmetric left ventricular hypertrophy.  10. Pulmonary hypertension.  11. Severely enlarged right ventricle.  12. Severely reduced RV systolic function.  13. RV ejection fraction 34%.  14. Small pericardial effusion.  15. Degenerative mitral valve.  16. Mild mitral annular calcification.  17. Mild mitral valve regurgitation.  18. Moderate tricuspid regurgitation.  19. Structurally normal tricuspid valve, with normal leaflet excursion.  20. Mild aortic regurgitation.  21. Mild to moderate pulmonic valve regurgitation.  22. Structurally normal pulmonic valve, with normal leaflet excursion.  23. Estimated pulmonary artery systolic pressure is 87.1 mmHg assuming a   right atrial pressure of 20 mmHg, which is consistent with severe   pulmonary hypertension.  24. Pulmonary hypertension is present.  25. The main pulmonary artery is normal in size.  26. The right atrial pressure is moderately elevated.    [id number] Chago BLANCO  Electronically signed on 2019 at 2:01:34 PM    < end of copied text >

## 2019-09-19 NOTE — PROGRESS NOTE ADULT - PROBLEM SELECTOR PROBLEM 1
Acute deep vein thrombosis (DVT) of femoral vein of left lower extremity
Acute on chronic combined systolic and diastolic congestive heart failure

## 2019-09-19 NOTE — PROGRESS NOTE ADULT - SUBJECTIVE AND OBJECTIVE BOX
Patient is a 34y old  Female who presents with a chief complaint of Dyspnea (19 Sep 2019 14:31)      INTERVAL HPI/OVERNIGHT EVENTS:  Pt was seen and examined, no acute events.    MEDICATIONS  (STANDING):  aspirin enteric coated 81 milliGRAM(s) Oral daily  buDESOnide 160 MICROgram(s)/formoterol 4.5 MICROgram(s) Inhaler 2 Puff(s) Inhalation two times a day  chlorhexidine 4% Liquid 1 Application(s) Topical daily  dextrose 5%. 1000 milliLiter(s) (50 mL/Hr) IV Continuous <Continuous>  dextrose 50% Injectable 12.5 Gram(s) IV Push once  dextrose 50% Injectable 25 Gram(s) IV Push once  dextrose 50% Injectable 25 Gram(s) IV Push once  famotidine    Tablet 20 milliGRAM(s) Oral daily  insulin lispro (HumaLOG) corrective regimen sliding scale   SubCutaneous three times a day before meals  insulin lispro (HumaLOG) corrective regimen sliding scale   SubCutaneous at bedtime  levothyroxine 37.5 MICROGram(s) Oral daily  nystatin Powder 1 Application(s) Topical two times a day  potassium chloride  10 mEq/100 mL IVPB 10 milliEquivalent(s) IV Intermittent every 1 hour  riociguat 1.5 milliGRAM(s) Oral three times a day  simvastatin 40 milliGRAM(s) Oral at bedtime    MEDICATIONS  (PRN):  acetaminophen   Tablet .. 650 milliGRAM(s) Oral every 6 hours PRN Mild Pain (1 - 3)  aluminum hydroxide/magnesium hydroxide/simethicone Suspension 30 milliLiter(s) Oral every 4 hours PRN Dyspepsia  dextrose 40% Gel 15 Gram(s) Oral once PRN Blood Glucose LESS THAN 70 milliGRAM(s)/deciliter  glucagon  Injectable 1 milliGRAM(s) IntraMuscular once PRN Glucose LESS THAN 70 milligrams/deciliter  guaiFENesin    Syrup 100 milliGRAM(s) Oral every 6 hours PRN Cough      Allergies  No Known Allergies      Vital Signs Last 24 Hrs  T(C): 36.7 (19 Sep 2019 11:51), Max: 36.7 (19 Sep 2019 11:51)  T(F): 98 (19 Sep 2019 11:51), Max: 98 (19 Sep 2019 11:51)  HR: 76 (19 Sep 2019 14:48) (69 - 80)  BP: 99/53 (19 Sep 2019 14:48) (80/46 - 100/47)  BP(mean): --  RR: 18 (19 Sep 2019 11:51) (18 - 18)  SpO2: 98% (19 Sep 2019 12:16) (91% - 98%)    PHYSICAL EXAM:  GENERAL: NAD  HEAD:  Atraumatic  EYES: PERRLA  NERVOUS SYSTEM:  A, O x 3, non focal  CHEST/LUNG: Clear  HEART: RRR  ABDOMEN: Soft, non tender  EXTREMITIES: no edema      LABS:                        8.6    4.23  )-----------( 304      ( 19 Sep 2019 11:10 )             27.7     09-19    131<L>  |  97  |  9   ----------------------------<  85  3.2<L>   |  27  |  0.79    Ca    9.4      19 Sep 2019 11:10  Phos  2.4     -  Mg     2.3     09-19      PT/INR - ( 19 Sep 2019 11:10 )   PT: 18.0 sec;   INR: 1.58 ratio         PTT - ( 18 Sep 2019 09:01 )  PTT:115.5 sec  Urinalysis Basic - ( 17 Sep 2019 14:58 )    Color: Yellow / Appearance: Slightly Turbid / S.010 / pH: x  Gluc: x / Ketone: Negative  / Bili: Negative / Urobili: 8 mg/dL   Blood: x / Protein: 30 mg/dL / Nitrite: Negative   Leuk Esterase: Trace / RBC: >50 /HPF / WBC 3-5   Sq Epi: x / Non Sq Epi: Occasional / Bacteria: Few      CAPILLARY BLOOD GLUCOSE      POCT Blood Glucose.: 99 mg/dL (19 Sep 2019 11:47)  POCT Blood Glucose.: 96 mg/dL (19 Sep 2019 07:57)  POCT Blood Glucose.: 121 mg/dL (18 Sep 2019 20:54)  POCT Blood Glucose.: 117 mg/dL (18 Sep 2019 17:07)      RADIOLOGY & ADDITIONAL TESTS:    Imaging Personally Reviewed:  [ ] YES  [ ] NO    Consultant(s) Notes Reviewed:  [ ] YES  [ ] NO    Care Discussed with Consultants/Other Providers [ ] YES  [ ] NO

## 2019-09-19 NOTE — PROGRESS NOTE ADULT - ASSESSMENT
33 yo woman with severe pHTN (group I/II, mPAP 67; W 9; RVR 9 on RHC 8/2018,  on Adempas), mildly positive anti-cardiolipin Ab and RAD admitted with decompensated right heart failure, volume overload, DIANN and acute LLE DVT. Course complicated by hypokalemia, metabolic alkalosis and hyponatremia in the setting of overdiuresis.   Currently clinically euvolemic. Metabolic abnormalities improving.     #pHTN/decompensated right heart failure  -- lost over 50 lb since admission. I would consider her clinically euvolemic at this point.   -- c/w aggressive electrolyte repletions  --  would restart diuretics   -- please continue checking daily weights  -- c/w Adempas  -- Midodrine can be added for BP support if needed  -- supplemental O2 to keep sats >90%. would avoid NIV given RHF. Can use high flow if needed    #LLE DVT in the setting of sub-therapeutic INR. Patient reports that her INR was not being monitored. While I do agree that there is better evidence for AC with coumadin, this patient has very poor compliance and had difficulty getting her INRs checked.   I think that NOAC would be a better choice given compliance issues.   -- keep RLE elevated, can try compression stockings      #Renal: initially admitted with DIANN 2/2 volume overload, course c/b worsening renal failure, metabolic alkalosis, hypokalemia, hyponatremia in the setting of aggressive diuresis - slowly improving  --Nephrology input appreciated.  -- plan as above    #Hypothyroid - TSH on Aug 22 was 7. Synthroid increased to 37.5mcg.   -- c/w synthroid, repeat THS wnl    #Reactive airway disease - PFTs in the past with reversible obstructive defect  -- c/w Symbicort and Albuterol prn  -- can d/c standing Duonebs  -- will need repeat PFTs as outpatient after discharge    Patient follows with Dr Unger at [address] [place] [address]. She should follow with him after discharge.     Thank you for this consult. I will follow with you. Please call with any questions. Cell phone # [phone]    Shirley Mejia MD    [facility]//Pulmonary Medicine  [address] [place], [place] [address]; [place] [address]  tel: [phone]; fax: [phone]

## 2019-09-20 ENCOUNTER — TRANSCRIPTION ENCOUNTER (OUTPATIENT)
Age: 34
End: 2019-09-20

## 2019-09-20 VITALS
OXYGEN SATURATION: 97 % | HEART RATE: 77 BPM | SYSTOLIC BLOOD PRESSURE: 95 MMHG | DIASTOLIC BLOOD PRESSURE: 49 MMHG | RESPIRATION RATE: 16 BRPM

## 2019-09-20 LAB
ALBUMIN SERPL ELPH-MCNC: 3 G/DL — LOW (ref 3.3–5)
ALP SERPL-CCNC: 80 U/L — SIGNIFICANT CHANGE UP (ref 40–120)
ALT FLD-CCNC: 31 U/L — SIGNIFICANT CHANGE UP (ref 12–78)
ANION GAP SERPL CALC-SCNC: 12 MMOL/L — SIGNIFICANT CHANGE UP (ref 5–17)
AST SERPL-CCNC: 35 U/L — SIGNIFICANT CHANGE UP (ref 15–37)
BILIRUB SERPL-MCNC: 3.3 MG/DL — HIGH (ref 0.2–1.2)
BUN SERPL-MCNC: 8 MG/DL — SIGNIFICANT CHANGE UP (ref 7–23)
CALCIUM SERPL-MCNC: 8.8 MG/DL — SIGNIFICANT CHANGE UP (ref 8.5–10.1)
CHLORIDE SERPL-SCNC: 95 MMOL/L — LOW (ref 96–108)
CO2 SERPL-SCNC: 24 MMOL/L — SIGNIFICANT CHANGE UP (ref 22–31)
CREAT SERPL-MCNC: 0.83 MG/DL — SIGNIFICANT CHANGE UP (ref 0.5–1.3)
GLUCOSE SERPL-MCNC: 128 MG/DL — HIGH (ref 70–99)
HCT VFR BLD CALC: 27.8 % — LOW (ref 34.5–45)
HGB BLD-MCNC: 8.6 G/DL — LOW (ref 11.5–15.5)
INR BLD: 1.42 RATIO — HIGH (ref 0.88–1.16)
MCHC RBC-ENTMCNC: 23.4 PG — LOW (ref 27–34)
MCHC RBC-ENTMCNC: 30.9 GM/DL — LOW (ref 32–36)
MCV RBC AUTO: 75.7 FL — LOW (ref 80–100)
NRBC # BLD: 0 /100 WBCS — SIGNIFICANT CHANGE UP (ref 0–0)
PHOSPHATE SERPL-MCNC: 2.2 MG/DL — LOW (ref 2.5–4.5)
PLATELET # BLD AUTO: 319 K/UL — SIGNIFICANT CHANGE UP (ref 150–400)
POTASSIUM SERPL-MCNC: 3.1 MMOL/L — LOW (ref 3.5–5.3)
POTASSIUM SERPL-SCNC: 3.1 MMOL/L — LOW (ref 3.5–5.3)
PROT SERPL-MCNC: 7.6 GM/DL — SIGNIFICANT CHANGE UP (ref 6–8.3)
PROTHROM AB SERPL-ACNC: 16.1 SEC — HIGH (ref 10–12.9)
RBC # BLD: 3.67 M/UL — LOW (ref 3.8–5.2)
RBC # FLD: 26.4 % — HIGH (ref 10.3–14.5)
SODIUM SERPL-SCNC: 131 MMOL/L — LOW (ref 135–145)
WBC # BLD: 4.72 K/UL — SIGNIFICANT CHANGE UP (ref 3.8–10.5)
WBC # FLD AUTO: 4.72 K/UL — SIGNIFICANT CHANGE UP (ref 3.8–10.5)

## 2019-09-20 PROCEDURE — 99232 SBSQ HOSP IP/OBS MODERATE 35: CPT

## 2019-09-20 PROCEDURE — 99239 HOSP IP/OBS DSCHRG MGMT >30: CPT

## 2019-09-20 RX ORDER — SODIUM,POTASSIUM PHOSPHATES 278-250MG
1 POWDER IN PACKET (EA) ORAL
Qty: 9 | Refills: 0
Start: 2019-09-20 | End: 2019-09-22

## 2019-09-20 RX ORDER — WARFARIN SODIUM 2.5 MG/1
5 TABLET ORAL ONCE
Refills: 0 | Status: DISCONTINUED | OUTPATIENT
Start: 2019-09-20 | End: 2019-09-20

## 2019-09-20 RX ORDER — ACETAMINOPHEN 500 MG
2 TABLET ORAL
Qty: 0 | Refills: 0 | DISCHARGE
Start: 2019-09-20

## 2019-09-20 RX ORDER — SPIRONOLACTONE 25 MG/1
1 TABLET, FILM COATED ORAL
Qty: 10 | Refills: 0
Start: 2019-09-20 | End: 2019-10-19

## 2019-09-20 RX ORDER — SPIRONOLACTONE 25 MG/1
1 TABLET, FILM COATED ORAL
Qty: 0 | Refills: 0 | DISCHARGE

## 2019-09-20 RX ORDER — ERGOCALCIFEROL 1.25 MG/1
1 CAPSULE ORAL
Qty: 0 | Refills: 0 | DISCHARGE

## 2019-09-20 RX ORDER — POTASSIUM CHLORIDE 20 MEQ
40 PACKET (EA) ORAL EVERY 4 HOURS
Refills: 0 | Status: COMPLETED | OUTPATIENT
Start: 2019-09-20 | End: 2019-09-20

## 2019-09-20 RX ORDER — BUMETANIDE 0.25 MG/ML
1 INJECTION INTRAMUSCULAR; INTRAVENOUS
Qty: 10 | Refills: 0
Start: 2019-09-20

## 2019-09-20 RX ORDER — MIDODRINE HYDROCHLORIDE 2.5 MG/1
1 TABLET ORAL
Qty: 30 | Refills: 0
Start: 2019-09-20 | End: 2019-09-29

## 2019-09-20 RX ORDER — SERTRALINE 25 MG/1
1 TABLET, FILM COATED ORAL
Qty: 0 | Refills: 0 | DISCHARGE

## 2019-09-20 RX ORDER — RIOCIGUAT 1.5 MG/1
1 TABLET, FILM COATED ORAL
Qty: 0 | Refills: 0 | DISCHARGE

## 2019-09-20 RX ORDER — BUMETANIDE 0.25 MG/ML
1 INJECTION INTRAMUSCULAR; INTRAVENOUS
Qty: 0 | Refills: 0 | DISCHARGE

## 2019-09-20 RX ORDER — LOSARTAN POTASSIUM 100 MG/1
1 TABLET, FILM COATED ORAL
Qty: 0 | Refills: 0 | DISCHARGE

## 2019-09-20 RX ORDER — POTASSIUM CHLORIDE 20 MEQ
2 PACKET (EA) ORAL
Qty: 20 | Refills: 0
Start: 2019-09-20 | End: 2019-09-24

## 2019-09-20 RX ORDER — POTASSIUM PHOSPHATE, MONOBASIC POTASSIUM PHOSPHATE, DIBASIC 236; 224 MG/ML; MG/ML
15 INJECTION, SOLUTION INTRAVENOUS ONCE
Refills: 0 | Status: COMPLETED | OUTPATIENT
Start: 2019-09-20 | End: 2019-09-20

## 2019-09-20 RX ORDER — LEVOTHYROXINE SODIUM 125 MCG
1 TABLET ORAL
Qty: 30 | Refills: 0
Start: 2019-09-20

## 2019-09-20 RX ORDER — LEVOTHYROXINE SODIUM 125 MCG
1 TABLET ORAL
Qty: 0 | Refills: 0 | DISCHARGE

## 2019-09-20 RX ADMIN — RIOCIGUAT 1.5 MILLIGRAM(S): 1.5 TABLET, FILM COATED ORAL at 05:39

## 2019-09-20 RX ADMIN — BUDESONIDE AND FORMOTEROL FUMARATE DIHYDRATE 2 PUFF(S): 160; 4.5 AEROSOL RESPIRATORY (INHALATION) at 18:20

## 2019-09-20 RX ADMIN — FAMOTIDINE 20 MILLIGRAM(S): 10 INJECTION INTRAVENOUS at 12:21

## 2019-09-20 RX ADMIN — Medication 81 MILLIGRAM(S): at 12:20

## 2019-09-20 RX ADMIN — RIOCIGUAT 1.5 MILLIGRAM(S): 1.5 TABLET, FILM COATED ORAL at 15:11

## 2019-09-20 RX ADMIN — Medication 40 MILLIEQUIVALENT(S): at 12:20

## 2019-09-20 RX ADMIN — Medication 37.5 MICROGRAM(S): at 05:40

## 2019-09-20 RX ADMIN — NYSTATIN CREAM 1 APPLICATION(S): 100000 CREAM TOPICAL at 05:43

## 2019-09-20 RX ADMIN — POTASSIUM PHOSPHATE, MONOBASIC POTASSIUM PHOSPHATE, DIBASIC 62.5 MILLIMOLE(S): 236; 224 INJECTION, SOLUTION INTRAVENOUS at 16:15

## 2019-09-20 RX ADMIN — BUDESONIDE AND FORMOTEROL FUMARATE DIHYDRATE 2 PUFF(S): 160; 4.5 AEROSOL RESPIRATORY (INHALATION) at 05:43

## 2019-09-20 RX ADMIN — Medication 40 MILLIEQUIVALENT(S): at 16:17

## 2019-09-20 NOTE — DISCHARGE NOTE NURSING/CASE MANAGEMENT/SOCIAL WORK - NSDCPEWEB_GEN_ALL_CORE
[facility] for [facility] Control website --- [email or web address] website --- [email or web address]

## 2019-09-20 NOTE — DISCHARGE NOTE NURSING/CASE MANAGEMENT/SOCIAL WORK - PATIENT PORTAL LINK FT
You can access the FollowMyHealth Patient Portal offered by [facility] by registering at the following website: [email or web address]. By joining [employer]’s FollowMyHealth portal, you will also be able to view your health information using other applications (apps) compatible with our system.

## 2019-09-20 NOTE — DISCHARGE NOTE PROVIDER - HOSPITAL COURSE
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        patient requested to leave AMA despite education on risks of leaving AMA from multiple subspecialists and myself which include recurrent fluid overload and even death. Patient expressed understanding but still requested to leave AMA.     Educated patient to make appointment with PCP and pulmonary hypertension specialist for Monday 9/23/19 , importance of repeating labs on Monday 9/23/19. If for whatever reason she can't follow-up, advised to come to [facility] for repeat labs.     Advised to take potassium supplements and midodrine, and hold off on diuretics until Monday. Monday she can start bumex and spironolactone per Nephrology and Pulmonary.     Reinforced importance of proper  and timely follow-up and medication adherence.     Patient agreed to take eliquis 5mg bid for acute DVT. She was on coumadin but was noncompliant with medication and INR checks consistently.         DISCHARGE DIAGNOSES:        Acute on chronic combined systolic RIGHT SIDED Congestive heart failure:    - Echo in august showed EF 45-50% w/ elevated mean left atrial pressure w/ global cardiomyopathy, grade I diastolic dysfunction, pulmonary hypertension w/ a severely enlarged right ventricle and severely reduced RV systolic function w/ RV ejection fraction 34%, moderate tricuspid regurgitation, mild to moderate pulmonic valve regurgitation & severe pulmonary hypertension     per cardiology patient had left and right cath recent past which were negative         Pulmonary hypertension:    - c/w riociguat    - on chronic oxygen as per patient, but does not have portable oxygen tank     to make an appointment with pulmonary hypertension specialist for monday 9/23/19             DM II:    - Continue current insulin regimen        Hypokalemia:    - Severe hypokalemia from aggressive diuresis    - Even though it was documented that the patient had recieved ample PO KCl yesterday, nurse reports that she refused most of it    -agreeing to PO potassium and phosphate replacement today         Hypophosphatemia:    - s/p IV phos replacement and will send few doses of PO replacement Rx        Hyponatremia:    - improving    - likely due to volume overload/ aggressive diuresis and possible SSRI- on hold    - Renal following     - c/w Fluid restriction        HTN:    - hold all BP meds due to borderline BP        Asthma:    - pulmonary following    - c/w Symbicort        Acute deep vein thrombosis (DVT) of femoral vein of left lower extremity:    - US showed an extensive acute nonocclusive thrombus in the left external iliac vein and the left common femoral vein and left greater saphenous vein    - continue with eliquis         Acquired hypothyroidism:    - c/w synthroid         Hyperlipidemia:    - c/w Zocor        Depression:    - Zoloft on hold        RETURN PARAMETERS DISCUSSED WITH PATIENT, PATIENT EXPRESSED UNDERSTANDING AND IS AGREEABLE.         Discharge time : 40 min

## 2019-09-20 NOTE — PROGRESS NOTE BEHAVIORAL HEALTH - NSBHCHARTREVIEWVS_PSY_A_CORE FT
Heart Rate Heart Rate (beats/min): 72 /min; SpO2 (%) SpO2 (%): 98 %; /60
Heart Rate Heart Rate (beats/min): 77 /min; BP 95/49; RR 16; SpO2 (%) SpO2 (%): 97 %
Temp (F): 97 Degrees F; HR 71; /65;  RR 16: SpO2 (%) SpO2 (%): 96 %; O2 delivery Patient On: supplemental O2
Temp (F): 97.5 Degrees F; HR 75; /65; RR 19 /min; SpO2 (%) SpO2 (%): 94 %; O2 delivery Patient On: supplemental O2

## 2019-09-20 NOTE — PROGRESS NOTE BEHAVIORAL HEALTH - AXIS III
extensive acute nonocclusive thrombus in the left external iliac vein and the left common femoral vein  and left greater saphenous vein; asthma, severe pHTN (previously on Adempas), mildly positive anti-cardiolipin Ab(but does not meet criteria for APLS per heme note); CHF on Bumex; 8/18- hospitalized at [facility] with decompensated right heart failure requiring Milrinone assisted diuresis; obesity; hx of Hyperbilirubinemia; Vitamin D deficiency
extensive acute nonocclusive thrombus in the left external iliac vein and the left common femoral vein  and left greater saphenous vein; asthma, severe pHTN (previously on Adempas), mildly positive anti-cardiolipin Ab(but does not meet criteria for APLS per heme note); CHF on Bumex; 8/18- hospitalized at [facility] with decompensated right heart failure requiring Milrinone assisted diuresis; obesity; hx of Hyperbilirubinemia; Vitamin D deficiency
extensive acute nonocclusive thrombus in the left external iliac vein and the left common femoral vein  and left greater saphenous vein; asthma, severe pHTN (previously on Adempas), mildly positive anti-cardiolipin Ab(but does not meet criteria for APLS per heme note); CHF on Bumex; 8/18- hospitalized at [facility] with decompensated right heart failure requiring Milrinone assisted diuresis; obesity; hx of Hyperbilirubinemia; Vitamin D deficiency
extensive acute nonocclusive thrombus in the left external iliac vein and the left common femoral vein  and left greater saphenous vein; asthma, severe pHTN (previously on Adempas), mildly positive anti-cardiolipin Ab(but does not meet criteria for APLS per heme note); CHF on Bumex; 8/18- hospitalized at [facility] with decompensated right heart failure requiring Milrinone assisted diuresis; obesity; hx of Hyperbilirubinemia; Vitamin D deficiency

## 2019-09-20 NOTE — PROGRESS NOTE ADULT - SUBJECTIVE AND OBJECTIVE BOX
Interval Events:  Patient seen and examined at bedside. Wants to go home.       REVIEW OF SYSTEMS:  Constitutional: no fevers  CV:  no chest pain  Resp:  no SOB    [x ] All other systems negative  [ ] Unable to assess ROS because ________    OBJECTIVE:  T(C): 36.6 (09-20-19 @ 05:00), Max: 36.7 (09-19-19 @ 16:47)  HR: 77 (09-20-19 @ 11:09) (67 - 97)  BP: 95/49 (09-20-19 @ 11:09) (90/53 - 102/54)  RR: 16 (09-20-19 @ 11:09) (16 - 18)  SpO2: 97% (09-20-19 @ 11:09) (95% - 99%)      09-19-19 @ 07:01  -  09-20-19 @ 07:00  --------------------------------------------------------  IN: 720 mL / OUT: 0 mL / NET: 720 mL        PHYSICAL EXAM:  General: Well appearing Female. No apparent distress  HEENT:   Mucous membranes are moist.  Neck: Supple. no JVD  Respiratory: CTAB  Cardiovascular: RRR  Abdomen: Soft, non-tender, non-distended.   Extremities: +1 bilateral REUBEN, R>L, improved  Skin: Warm, dry, no rash.   Neurological: CNII-XII grossly intact.   Psychiatry: appropriate mood and affect      HOSPITAL MEDICATIONS:  MEDICATIONS  (STANDING):  aspirin enteric coated 81 milliGRAM(s) Oral daily  buDESOnide 160 MICROgram(s)/formoterol 4.5 MICROgram(s) Inhaler 2 Puff(s) Inhalation two times a day  chlorhexidine 4% Liquid 1 Application(s) Topical daily  dextrose 5%. 1000 milliLiter(s) (50 mL/Hr) IV Continuous <Continuous>  dextrose 50% Injectable 12.5 Gram(s) IV Push once  dextrose 50% Injectable 25 Gram(s) IV Push once  dextrose 50% Injectable 25 Gram(s) IV Push once  famotidine    Tablet 20 milliGRAM(s) Oral daily  insulin lispro (HumaLOG) corrective regimen sliding scale   SubCutaneous three times a day before meals  insulin lispro (HumaLOG) corrective regimen sliding scale   SubCutaneous at bedtime  levothyroxine 37.5 MICROGram(s) Oral daily  nystatin Powder 1 Application(s) Topical two times a day  potassium chloride    Tablet ER 40 milliEquivalent(s) Oral every 4 hours  potassium phosphate IVPB 15 milliMole(s) IV Intermittent once  riociguat 1.5 milliGRAM(s) Oral three times a day  simvastatin 40 milliGRAM(s) Oral at bedtime  warfarin 5 milliGRAM(s) Oral once    MEDICATIONS  (PRN):  acetaminophen   Tablet .. 650 milliGRAM(s) Oral every 6 hours PRN Mild Pain (1 - 3)  aluminum hydroxide/magnesium hydroxide/simethicone Suspension 30 milliLiter(s) Oral every 4 hours PRN Dyspepsia  dextrose 40% Gel 15 Gram(s) Oral once PRN Blood Glucose LESS THAN 70 milliGRAM(s)/deciliter  glucagon  Injectable 1 milliGRAM(s) IntraMuscular once PRN Glucose LESS THAN 70 milligrams/deciliter  guaiFENesin    Syrup 100 milliGRAM(s) Oral every 6 hours PRN Cough      LABS:                        8.6    4.72  )-----------( 319      ( 20 Sep 2019 07:44 )             27.8     09-20    131<L>  |  95<L>  |  8   ----------------------------<  128<H>  3.1<L>   |  24  |  0.83    Ca    8.8      20 Sep 2019 07:44  Phos  2.2     09-20  Mg     2.3     09-19    TPro  7.6  /  Alb  3.0<L>  /  TBili  3.3<H>  /  DBili  x   /  AST  35  /  ALT  31  /  AlkPhos  80  09-20    PT/INR - ( 20 Sep 2019 07:44 )   PT: 16.1 sec;   INR: 1.42 ratio         RADIOLOGY:  [ x ] Reviewed and interpreted by me   x ] Reviewed and interpreted by me    CXR: volume overload    < from: US Duplex Venous Lower Ext Complete, Bilateral (09.05.19 @ 09:19) >  Impression: Positive for extensive acute nonocclusive thrombus in the   left external iliac vein and the left common femoral vein  and left   greater saphenous vein.    < end of copied text >      ECHOCARDIOGRAPHY:  < from: TTE Echo Doppler w/o Cont (07.26.19 @ 08:44) >  Summary:   1. Left ventricular ejection fraction, by visual estimation, is 45 to   50%.   2. Mildly decreased global left ventricular systolic function.   3. Elevated mean left atrial pressure.   4. Global cardiomyopathy.   5. Increased LV wall thickness.   6. Normal left ventricular internal cavity size.   7. Right ventricular pressure overload.   8. Spectral Doppler shows impaired relaxation pattern of left   ventricular myocardial filling (Grade I diastolic dysfunction).   9. There is mild asymmetric left ventricular hypertrophy.  10. Pulmonary hypertension.  11. Severely enlarged right ventricle.  12. Severely reduced RV systolic function.  13. RV ejection fraction 34%.  14. Small pericardial effusion.  15. Degenerative mitral valve.  16. Mild mitral annular calcification.  17. Mild mitral valve regurgitation.  18. Moderate tricuspid regurgitation.  19. Structurally normal tricuspid valve, with normal leaflet excursion.  20. Mild aortic regurgitation.  21. Mild to moderate pulmonic valve regurgitation.  22. Structurally normal pulmonic valve, with normal leaflet excursion.  23. Estimated pulmonary artery systolic pressure is 87.1 mmHg assuming a   right atrial pressure of 20 mmHg, which is consistent with severe   pulmonary hypertension.  24. Pulmonary hypertension is present.  25. The main pulmonary artery is normal in size.  26. The right atrial pressure is moderately elevated.    [id number] Chago BLANCO  Electronically signed on 7/26/2019 at 2:01:34 PM    < end of copied text >

## 2019-09-20 NOTE — CHART NOTE - NSCHARTNOTEFT_GEN_A_CORE
Medicine Hospitalist PA    Pt leaving AMA. Explained to pt the risks of leaving against medical advice. Many attempts have been made to reason with pt to remain in hospital however pt refusing to cooperate and wants to leave AMA. Pt aware of consequences of leaving against medical advice including harm, injury or death. Pt fully understands, all question have been answered. Explained to pt the importance of Following up with PMD. Pt agrees to comply. Pt signed AMA form, witnessed by RN at 18:50. Dr. Green aware.

## 2019-09-20 NOTE — PROGRESS NOTE BEHAVIORAL HEALTH - NSBHFUPINTERVALHXFT_PSY_A_CORE
Patient is calm, cooperative, polite and would like to leave today and sign out AMA. Same clinical psychiatric presentation otherwise. Patient able to demonstrate Understanding (ability to comprehend the disclosed information about the nature and purpose of the study, the procedures involved, as well as risks / benefits of treatment/non-treatment/participating/not participating); 2)Appreciation, 3)Reasoning (ability to engage in a reasoning process about the risks and benefits of participating versus alternatives); 4)Ability to express a choice about whether or not to participate. Denies suicidal/homicidal ideation or plan. names protective factors (her mother, family, has plans for future).

## 2019-09-20 NOTE — DISCHARGE NOTE PROVIDER - CARE PROVIDER_API CALL
Pulmonary specialist,   Dr. Francy Unger  Internal medicine - pulmonary disease  [address] [facility] [address], [place] [address]  [phone]    -make appointment for monday 9/23/19  Phone: (   )    -  Fax: (   )    -  Follow Up Time: 1-3 days    PCP,   has appointment 9/26/19  Phone: (   )    -  Fax: (   )    -  Follow Up Time: 1-3 days    Mina Dwyer)  Cardiology; Cardiovascular Disease; Nuclear Cardiology  [address] [place] [address]  Phone: [phone]  Fax: [phone]  Follow Up Time:

## 2019-09-20 NOTE — PROGRESS NOTE ADULT - ASSESSMENT
33 yo female with end stage lung disease due to idiopathic/primary pulmonary HTN and Anticardiolipin antibodies.  Admitted with symptoms of worsening right heart failure. cardiac cath negative for any CAD in 2013.   Demand troponin levels noted.  Patient has lost ~15 kg since readmission but still has symptoms of cor pulmonale.   She may qualify for a lung transplant, missed her scheduled appointment last week with Dr. Unger, pHTN specialist  to discuss care further due to hospitalization   Hypokalemia/hyponatremia/ Metabolic alkalosis -contraction, in setting of diuretics use/SSRI effect  EKG on 9/17:  improved from 615    ·	Cor pulmonale sec to PPH?  ·	DVT  ·	Hypokalemia  ·	Hyponatremia    Plan:  ·	Currently diuretics on hold 2/2 worsening hypokalemia/hyponatremia (due to volume overload) and significant contraction alkalosis.  s/p Acetazolamide x 2 doses. Needs increased potassium supplementation (likely total body stores quite depleted). Renal following  ·	Continue telemetry for severe electrolyte abnormalities.  ·	Continue to monitor renal function and electrolytes, daily weight   ·	Cont with supplemental O2/ Riociguat, pulmonary following   ·	Cont with asa/statin    ·	Pt on Elequis for AC as per pulm reccomendations  ·	Patient to have SKYE evaluation and Dr. Bharath Adamson, cardio follow up as out patient    ·	Pt missed her scheduled appointment with Dr. Unger  @ pul HTN clinic last week, pt will reschedule again post discharge for further follow up 35 yo female with end stage lung disease due to idiopathic/primary pulmonary HTN and Anticardiolipin antibodies.  Admitted with symptoms of worsening right heart failure. cardiac cath negative for any CAD in 2013.   Demand troponin levels noted.  Patient has lost ~15 kg since readmission but still has symptoms of cor pulmonale.   She may qualify for a lung transplant, missed her scheduled appointment last week with Dr. Unger, pHTN specialist  to discuss care further due to hospitalization   Hypokalemia/hyponatremia/ Metabolic alkalosis -contraction, in setting of diuretics use/SSRI effect  EKG on 9/17:  improved from 615    ·	Cor pulmonale sec to PPH?  ·	DVT  ·	Hypokalemia  ·	Hyponatremia    Plan:  ·	Currently diuretics on hold 2/2 worsening hypokalemia/hyponatremia (due to volume overload) and significant contraction alkalosis.  s/p Acetazolamide x 2 doses. Needs increased potassium supplementation (likely total body stores quite depleted). Renal following  ·	Continue telemetry for electrolyte abnormalities.  ·	Continue to monitor renal function and electrolytes, daily weight   ·	Cont with supplemental O2/ Riociguat, pulmonary following   ·	Cont with asa/statin    ·	Pt refusing Eliquis would prefer AC management with Coumadin  ·	Patient to have SKYE evaluation and Dr. Bharath Adamson, cardio follow up as out patient    ·	Pt missed her scheduled appointment with Dr. Unger  @ pulm HTN clinic last week, pt will reschedule again post discharge for further follow up

## 2019-09-20 NOTE — DISCHARGE NOTE PROVIDER - PROVIDER TOKENS
FREE:[LAST:[Pulmonary specialist],PHONE:[(   )    -],FAX:[(   )    -],ADDRESS:[Dr. Francy Unger  Internal medicine - pulmonary disease  [address] [place] [address] [address], [place] [address]  [phone]    -make appointment for monday 9/23/19],FOLLOWUP:[1-3 days]],FREE:[LAST:[PCP],PHONE:[(   )    -],FAX:[(   )    -],ADDRESS:[has appointment 9/26/19],FOLLOWUP:[1-3 days]],PROVIDER:[TOKEN:[[id number]]]

## 2019-09-20 NOTE — PROGRESS NOTE ADULT - REASON FOR ADMISSION
Dyspnea

## 2019-09-20 NOTE — PROGRESS NOTE BEHAVIORAL HEALTH - NSBHCONSULTFOLLOW_PSY_A_CORE
Signing off - call with questions…
yes

## 2019-09-20 NOTE — PROGRESS NOTE BEHAVIORAL HEALTH - NSBHCONSFOLLOWNEEDS_PSY_A_CORE
Abdominal mass  s/p mass removed 2013,on chemo-right chest infusa port  Encounter for biopsy  2/14/13 - right iliac lymph node biopsy  H/O hydronephrosis  ureteral stent, multiple stent exchanges  H/O total hysterectomy  2001  History of chemotherapy  Infusaport insertion ; 2013  History of D&C  age 23  History of tonsillectomy
no psychiatric contraindications to discharge

## 2019-09-20 NOTE — PROGRESS NOTE ADULT - SUBJECTIVE AND OBJECTIVE BOX
Patient wants to go home;   Still inconsistent with potassium supplement      MEDICATIONS  (STANDING):  aspirin enteric coated 81 milliGRAM(s) Oral daily  buDESOnide 160 MICROgram(s)/formoterol 4.5 MICROgram(s) Inhaler 2 Puff(s) Inhalation two times a day  chlorhexidine 4% Liquid 1 Application(s) Topical daily  dextrose 5%. 1000 milliLiter(s) (50 mL/Hr) IV Continuous <Continuous>  dextrose 50% Injectable 12.5 Gram(s) IV Push once  dextrose 50% Injectable 25 Gram(s) IV Push once  dextrose 50% Injectable 25 Gram(s) IV Push once  famotidine    Tablet 20 milliGRAM(s) Oral daily  insulin lispro (HumaLOG) corrective regimen sliding scale   SubCutaneous three times a day before meals  insulin lispro (HumaLOG) corrective regimen sliding scale   SubCutaneous at bedtime  levothyroxine 37.5 MICROGram(s) Oral daily  nystatin Powder 1 Application(s) Topical two times a day  potassium chloride    Tablet ER 40 milliEquivalent(s) Oral every 4 hours  potassium phosphate IVPB 15 milliMole(s) IV Intermittent once  riociguat 1.5 milliGRAM(s) Oral three times a day  simvastatin 40 milliGRAM(s) Oral at bedtime  warfarin 5 milliGRAM(s) Oral once    MEDICATIONS  (PRN):  acetaminophen   Tablet .. 650 milliGRAM(s) Oral every 6 hours PRN Mild Pain (1 - 3)  aluminum hydroxide/magnesium hydroxide/simethicone Suspension 30 milliLiter(s) Oral every 4 hours PRN Dyspepsia  dextrose 40% Gel 15 Gram(s) Oral once PRN Blood Glucose LESS THAN 70 milliGRAM(s)/deciliter  glucagon  Injectable 1 milliGRAM(s) IntraMuscular once PRN Glucose LESS THAN 70 milligrams/deciliter  guaiFENesin    Syrup 100 milliGRAM(s) Oral every 6 hours PRN Cough      09-19-19 @ 07:01  -  09-20-19 @ 07:00  --------------------------------------------------------  IN: 720 mL / OUT: 0 mL / NET: 720 mL      PHYSICAL EXAM:      T(C): 36.6 (09-20-19 @ 05:00), Max: 36.7 (09-19-19 @ 16:47)  HR: 77 (09-20-19 @ 11:09) (67 - 97)  BP: 95/49 (09-20-19 @ 11:09) (90/53 - 102/54)  RR: 16 (09-20-19 @ 11:09) (16 - 18)  SpO2: 97% (09-20-19 @ 11:09) (95% - 99%)  Wt(kg): --  Respiratory: clear anteriorly, decreased BS at bases  Cardiovascular: S1 S2  Gastrointestinal: soft NT ND +BS  Extremities:   2- 3 + lymphedema                 8.6    4.72  )-----------( 319      ( 20 Sep 2019 07:44 )             27.8     09-20    131<L>  |  95<L>  |  8   ----------------------------<  128<H>  3.1<L>   |  24  |  0.83    Ca    8.8      20 Sep 2019 07:44  Phos  2.2     09-20  Mg     2.3     09-19    TPro  7.6  /  Alb  3.0<L>  /  TBili  3.3<H>  /  DBili  x   /  AST  35  /  ALT  31  /  AlkPhos  80  09-20      LIVER FUNCTIONS - ( 20 Sep 2019 07:44 )  Alb: 3.0 g/dL / Pro: 7.6 gm/dL / ALK PHOS: 80 U/L / ALT: 31 U/L / AST: 35 U/L / GGT: x           Creatinine Trend: 0.83<--, 0.79<--, 0.96<--, 0.96<--, 0.95<--, 0.98<--    Assessment and Plan:  Hyponatremia, hypokalemia;  hypervolemic, aldactone, SSRI effect, slowly improving;  Discussed with Dr. Green, recommend against patient leaving AMA.  She is at high risk for worsening electrolyte derangement with associated cardiac arrhythmia and death.  Due to hypokalemia she was instructed to resume diuretics in 2 days; maintain KCL 40 meq twice daily.   Lab testing no later than Monday.  Follow up with PMD, cardiologist and pulmonologist asap.

## 2019-09-20 NOTE — PROGRESS NOTE BEHAVIORAL HEALTH - BODY HABITUS
Well nourished/Overweight
Well nourished/Overweight
Overweight/Well nourished
Overweight/Well nourished

## 2019-09-20 NOTE — DISCHARGE NOTE PROVIDER - CARE PROVIDERS DIRECT ADDRESSES
,DirectAddress_Unknown,DirectAddress_Unknown,[email or web address]

## 2019-09-20 NOTE — DISCHARGE NOTE PROVIDER - NSDCFUSCHEDAPPT_GEN_ALL_CORE_FT
LAINEY DUNLAP ; 09/26/2019 ; NPP Med Pulm [address] [facility]  LAINEY DUNLAP ; 10/18/2019 ; NPP Hepatology [address] [place] [address] LAINEY DUNLAP ; 09/26/2019 ; NPP Med Pulm [address] [facility]  LAINEY DUNLAP ; 10/18/2019 ; NPP Hepatology [address] [place] [address]

## 2019-09-20 NOTE — PROGRESS NOTE BEHAVIORAL HEALTH - NSBHCONSULTFOLLOWAFTERCARE_PSY_A_CORE FT
would best benefit from support groups / group therapy which she could even do online
would best benefit from support groups / group therapy which she could even do online
see above
would best benefit from support groups / group therapy which she could even do online

## 2019-09-20 NOTE — PROGRESS NOTE ADULT - ASSESSMENT
33 yo woman with severe pHTN (group I/II, mPAP 67; W 9; RVR 9 on RHC 8/2018,  on Adempas), mildly positive anti-cardiolipin Ab and RAD admitted with decompensated right heart failure, volume overload, DIANN and acute LLE DVT. Course complicated by hypokalemia, metabolic alkalosis and hyponatremia in the setting of overdiuresis.   Currently clinically euvolemic. Metabolic abnormalities improving.     #pHTN/decompensated right heart failure  -- lost over 50 lb since admission. I would consider her clinically euvolemic at this point. Wt today 92.4 kg  -- c/w aggressive electrolyte repletions  --  would restart diuretics   -- please continue checking daily weights  -- c/w Adempas  -- Midodrine can be added for BP support if needed  -- supplemental O2 to keep sats >90%. would avoid NIV given RHF. Can use high flow if needed    #LLE DVT in the setting of sub-therapeutic INR. Patient reports that her INR was not being monitored. While I do agree that there is better evidence for AC with coumadin, this patient has very poor compliance and had difficulty getting her INRs checked.   She now agrees to taking Eliquis.   -- keep RLE elevated, can try compression stockings      #Renal: initially admitted with DIANN 2/2 volume overload, course c/b worsening renal failure, metabolic alkalosis, hypokalemia, hyponatremia in the setting of aggressive diuresis - slowly improving  --Nephrology input appreciated.  -- plan as above    #Hypothyroid - TSH on Aug 22 was 7. Synthroid increased to 37.5mcg.   -- c/w synthroid, repeat THS wnl    #Reactive airway disease - PFTs in the past with reversible obstructive defect  -- c/w Symbicort and Albuterol prn  -- can d/c standing Duonebs  -- will need repeat PFTs as outpatient after discharge    Patient follows with Dr Unger at [address] [place] [address]. She should follow with him after discharge.     Thank you for this consult. I will follow with you. Please call with any questions. Cell phone # [phone]    Shirley Mejai MD    [facility]//Pulmonary Medicine  [facility] [address]; [place] [address]  tel: [phone]; fax: [phone]

## 2019-09-20 NOTE — PROGRESS NOTE ADULT - PROVIDER SPECIALTY LIST ADULT
Cardiology
Critical Care
Critical Care
Heme/Onc
Hospitalist
Nephrology
Pulmonology
Hospitalist
Critical Care
Nephrology
Pulmonology
Cardiology
Pulmonology
Pulmonology
Hospitalist

## 2019-09-20 NOTE — PROGRESS NOTE ADULT - SUBJECTIVE AND OBJECTIVE BOX
Patient is a 34y old  Female who presents with a chief complaint of Dyspnea (19 Sep 2019 14:54)    PAST MEDICAL & SURGICAL HISTORY:  Former smoker, stopped smoking in distant past  Vitamin D deficiency  Morbid obesity due to excess calories  Edema extremities  Diabetes mellitus  Hyperbilirubinemia  COPD (chronic obstructive pulmonary disease)  COPD (chronic obstructive pulmonary disease)  Anticoagulation goal of INR 2 to 3  Anticardiolipin antibody positive  Congestive heart failure, unspecified HF chronicity, unspecified heart failure type  Moderate asthma, unspecified whether complicated, unspecified whether persistent  Other secondary hypertension  COPD (chronic obstructive pulmonary disease)  Anticardiolipin antibody positive  Essential hypertension  Pulmonary HTN  Morbid obesity  No significant past surgical history    INTERVAL HISTORY:  	  MEDICATIONS:  MEDICATIONS  (STANDING):  apixaban 5 milliGRAM(s) Oral every 12 hours  aspirin enteric coated 81 milliGRAM(s) Oral daily  buDESOnide 160 MICROgram(s)/formoterol 4.5 MICROgram(s) Inhaler 2 Puff(s) Inhalation two times a day  chlorhexidine 4% Liquid 1 Application(s) Topical daily  famotidine    Tablet 20 milliGRAM(s) Oral daily  insulin lispro (HumaLOG) corrective regimen sliding scale   SubCutaneous three times a day before meals  insulin lispro (HumaLOG) corrective regimen sliding scale   SubCutaneous at bedtime  levothyroxine 37.5 MICROGram(s) Oral daily  nystatin Powder 1 Application(s) Topical two times a day  potassium chloride    Tablet ER 40 milliEquivalent(s) Oral every 4 hours  riociguat 1.5 milliGRAM(s) Oral three times a day  simvastatin 40 milliGRAM(s) Oral at bedtime    MEDICATIONS  (PRN):  acetaminophen   Tablet .. 650 milliGRAM(s) Oral every 6 hours PRN Mild Pain (1 - 3)  aluminum hydroxide/magnesium hydroxide/simethicone Suspension 30 milliLiter(s) Oral every 4 hours PRN Dyspepsia  dextrose 40% Gel 15 Gram(s) Oral once PRN Blood Glucose LESS THAN 70 milliGRAM(s)/deciliter  glucagon  Injectable 1 milliGRAM(s) IntraMuscular once PRN Glucose LESS THAN 70 milligrams/deciliter  guaiFENesin    Syrup 100 milliGRAM(s) Oral every 6 hours PRN Cough    Vitals:  T(F): 97.8 (09-20-19 @ 05:00), Max: 98.1 (09-19-19 @ 16:47)  HR: 81 (09-20-19 @ 05:00) (67 - 97)  BP: 90/53 (09-20-19 @ 05:00) (90/53 - 102/54)  RR: 18 (09-20-19 @ 05:00) (17 - 18)  SpO2: 97% (09-20-19 @ 05:00) (95% - 99%)  Wt(kg): 92.4kg    09-19 @ 07:01  -  09-20 @ 07:00  --------------------------------------------------------  IN:    Oral Fluid: 420 mL    Solution: 300 mL  Total IN: 720 mL    OUT:  Total OUT: 0 mL    Total NET: 720 mL    PHYSICAL EXAM:  Neuro: Awake, responsive  CV: S1 S2 RRR  Lungs: CTABL  GI: Soft, BS +, ND, NT  Extremities: No edema    TELEMETRY: NSR 70s with 3 PVCs     RADIOLOGY:  < from: US Duplex Venous Lower Ext Complete, Bilateral (09.05.19 @ 09:19) >  Impression: Positive for extensive acute nonocclusive thrombus in the   left external iliac vein and the left common femoral vein  and left   greater saphenous vein.  < end of copied text >    < from: Xray Chest 1 View- PORTABLE-Urgent (09.09.19 @ 09:14) >  FINDINGS:    Lungs: There are no lung consolidations.  Heart: There is cardiomegaly.  Mediastinum: Stable right hilar prominence.    IMPRESSION:  No lung consolidations.  < end of copied text >    DIAGNOSTIC TESTING:    [X] Echocardiogram: < from: TTE Echo Doppler w/o Cont (07.26.19 @ 08:44) >  Summary:   1. Left ventricular ejection fraction, by visual estimation, is 45 to   50%.   2. Mildly decreased global left ventricular systolic function.   3. Elevated mean left atrial pressure.   4. Global cardiomyopathy.   5. Increased LV wall thickness.   6. Normal left ventricular internal cavity size.   7. Right ventricular pressure overload.   8. Spectral Doppler shows impaired relaxation pattern of left   ventricular myocardial filling (Grade I diastolic dysfunction).   9. There is mild asymmetric left ventricular hypertrophy.  10. Pulmonary hypertension.  11. Severely enlarged right ventricle.  12. Severely reduced RV systolic function.  13. RV ejection fraction 34%.  14. Small pericardial effusion.  15. Degenerative mitral valve.  16. Mild mitral annular calcification.  17. Mild mitral valve regurgitation.  18. Moderate tricuspid regurgitation.  19. Structurally normal tricuspid valve, with normal leaflet excursion.  20. Mild aortic regurgitation.  21. Mild to moderate pulmonic valve regurgitation.  22. Structurally normal pulmonic valve, with normal leaflet excursion.  23. Estimated pulmonary artery systolic pressure is 87.1 mmHg assuming a   right atrial pressure of 20 mmHg, which is consistent with severe   pulmonary hypertension.  24. Pulmonary hypertension is present.  25. The main pulmonary artery is normal in size.  26. The right atrial pressure is moderately elevated.  < end of copied text >    [ ]  Catheterization:   < from: Cardiac Cath Lab - Adult (08.10.18 @ 10:48) >  PROCEDURE:  --  Right heart catheterization.  --  Sonosite - Diagnostic.DIAGNOSTIC IMPRESSIONS: Severe Pulmonary Hypertension with no response with  80ppm of Nitric Oxide  INTERVENTIONAL IMPRESSIONS: Severe Pulmonary Hypertension with no response with  80ppm of Nitric Oxide  < end of copied text >    LABS:	 	  20 Sep 2019 07:44    131    |  95     |  8      ----------------------------<  128    3.1     |  24     |  0.83   19 Sep 2019 11:10    131    |  97     |  9      ----------------------------<  85     3.2     |  27     |  0.79   18 Sep 2019 19:57    129    |  90     |  10     ----------------------------<  103    2.9     |  28     |  0.96     Ca    8.8        20 Sep 2019 07:44  Phos  2.2       20 Sep 2019 09:50  Mg     2.3       19 Sep 2019 11:10    TPro  7.6    /  Alb  3.0    /  TBili  3.3    /  DBili  x      /  AST  35     /  ALT  31     /  AlkPhos  80     20 Sep 2019 07:44               8.6    4.72  )-----------( 319      ( 20 Sep 2019 07:44 )             27.8 ,                       8.6    4.23  )-----------( 304      ( 19 Sep 2019 11:10 )             27.7 ,                       9.1    5.83  )-----------( 320      ( 18 Sep 2019 02:03 )             29.4 ,                       9.4    5.11  )-----------( 313      ( 17 Sep 2019 19:09 )             30.0   HgA1c: Hemoglobin A1C, Whole Blood: 5.9 % (09-19 @ 15:58)    TSH: Thyroid Stimulating Hormone, Serum: 3.590 uU/mL (09-19 @ 11:10)    PT/PTT- ( 20 Sep 2019 07:44 )   PT: 16.1 sec;  PTT: x        PT/PTT- ( 19 Sep 2019 11:10 )   PT: 18.0 sec;  PTT: x        INR: 1.42 ratio (09-20 @ 07:44)  INR: 1.58 ratio (09-19 @ 11:10)  INR: 2.01 ratio (09-18 @ 02:03) Patient is a 34y old  Female who presents with a chief complaint of Dyspnea (19 Sep 2019 14:54)    PAST MEDICAL & SURGICAL HISTORY:  Former smoker, stopped smoking in distant past  Vitamin D deficiency  Morbid obesity due to excess calories  Edema extremities  Diabetes mellitus  Hyperbilirubinemia  COPD (chronic obstructive pulmonary disease)  COPD (chronic obstructive pulmonary disease)  Anticoagulation goal of INR 2 to 3  Anticardiolipin antibody positive  Congestive heart failure, unspecified HF chronicity, unspecified heart failure type  Moderate asthma, unspecified whether complicated, unspecified whether persistent  Other secondary hypertension  COPD (chronic obstructive pulmonary disease)  Anticardiolipin antibody positive  Essential hypertension  Pulmonary HTN  Morbid obesity  No significant past surgical history    INTERVAL HISTORY: Pt approached at bedside, seated comfortably and in no acute distress. Pt verbalized that she would prefer Coumadin as AC instead of Eliquis  	  MEDICATIONS:  MEDICATIONS  (STANDING):  apixaban 5 milliGRAM(s) Oral every 12 hours  aspirin enteric coated 81 milliGRAM(s) Oral daily  buDESOnide 160 MICROgram(s)/formoterol 4.5 MICROgram(s) Inhaler 2 Puff(s) Inhalation two times a day  chlorhexidine 4% Liquid 1 Application(s) Topical daily  famotidine    Tablet 20 milliGRAM(s) Oral daily  insulin lispro (HumaLOG) corrective regimen sliding scale   SubCutaneous three times a day before meals  insulin lispro (HumaLOG) corrective regimen sliding scale   SubCutaneous at bedtime  levothyroxine 37.5 MICROGram(s) Oral daily  nystatin Powder 1 Application(s) Topical two times a day  potassium chloride    Tablet ER 40 milliEquivalent(s) Oral every 4 hours  riociguat 1.5 milliGRAM(s) Oral three times a day  simvastatin 40 milliGRAM(s) Oral at bedtime    MEDICATIONS  (PRN):  acetaminophen   Tablet .. 650 milliGRAM(s) Oral every 6 hours PRN Mild Pain (1 - 3)  aluminum hydroxide/magnesium hydroxide/simethicone Suspension 30 milliLiter(s) Oral every 4 hours PRN Dyspepsia  dextrose 40% Gel 15 Gram(s) Oral once PRN Blood Glucose LESS THAN 70 milliGRAM(s)/deciliter  glucagon  Injectable 1 milliGRAM(s) IntraMuscular once PRN Glucose LESS THAN 70 milligrams/deciliter  guaiFENesin    Syrup 100 milliGRAM(s) Oral every 6 hours PRN Cough    Vitals:  T(F): 97.8 (09-20-19 @ 05:00), Max: 98.1 (09-19-19 @ 16:47)  HR: 81 (09-20-19 @ 05:00) (67 - 97)  BP: 90/53 (09-20-19 @ 05:00) (90/53 - 102/54)  RR: 18 (09-20-19 @ 05:00) (17 - 18)  SpO2: 97% (09-20-19 @ 05:00) (95% - 99%)  Wt(kg): 92.4kg    09-19 @ 07:01  -  09-20 @ 07:00  --------------------------------------------------------  IN:    Oral Fluid: 420 mL    Solution: 300 mL  Total IN: 720 mL    OUT:  Total OUT: 0 mL    Total NET: 720 mL    PHYSICAL EXAM:  Neuro: Awake, responsive  CV: S1 S2 RRR  Lungs: CTABL, no wheezing no rales no rhonchi  GI: Soft, BS +, ND, NT  Extremities: bilateral lower extremity edema, L > R    TELEMETRY: NSR 70s with 3 PVCs     RADIOLOGY:  < from: US Duplex Venous Lower Ext Complete, Bilateral (09.05.19 @ 09:19) >  Impression: Positive for extensive acute nonocclusive thrombus in the   left external iliac vein and the left common femoral vein  and left   greater saphenous vein.  < end of copied text >    < from: Xray Chest 1 View- PORTABLE-Urgent (09.09.19 @ 09:14) >  FINDINGS:    Lungs: There are no lung consolidations.  Heart: There is cardiomegaly.  Mediastinum: Stable right hilar prominence.    IMPRESSION:  No lung consolidations.  < end of copied text >    DIAGNOSTIC TESTING:    [X] Echocardiogram: < from: TTE Echo Doppler w/o Cont (07.26.19 @ 08:44) >  Summary:   1. Left ventricular ejection fraction, by visual estimation, is 45 to   50%.   2. Mildly decreased global left ventricular systolic function.   3. Elevated mean left atrial pressure.   4. Global cardiomyopathy.   5. Increased LV wall thickness.   6. Normal left ventricular internal cavity size.   7. Right ventricular pressure overload.   8. Spectral Doppler shows impaired relaxation pattern of left   ventricular myocardial filling (Grade I diastolic dysfunction).   9. There is mild asymmetric left ventricular hypertrophy.  10. Pulmonary hypertension.  11. Severely enlarged right ventricle.  12. Severely reduced RV systolic function.  13. RV ejection fraction 34%.  14. Small pericardial effusion.  15. Degenerative mitral valve.  16. Mild mitral annular calcification.  17. Mild mitral valve regurgitation.  18. Moderate tricuspid regurgitation.  19. Structurally normal tricuspid valve, with normal leaflet excursion.  20. Mild aortic regurgitation.  21. Mild to moderate pulmonic valve regurgitation.  22. Structurally normal pulmonic valve, with normal leaflet excursion.  23. Estimated pulmonary artery systolic pressure is 87.1 mmHg assuming a   right atrial pressure of 20 mmHg, which is consistent with severe   pulmonary hypertension.  24. Pulmonary hypertension is present.  25. The main pulmonary artery is normal in size.  26. The right atrial pressure is moderately elevated.  < end of copied text >    [ ]  Catheterization:   < from: Cardiac Cath Lab - Adult (08.10.18 @ 10:48) >  PROCEDURE:  --  Right heart catheterization.  --  Sonosite - Diagnostic.DIAGNOSTIC IMPRESSIONS: Severe Pulmonary Hypertension with no response with  80ppm of Nitric Oxide  INTERVENTIONAL IMPRESSIONS: Severe Pulmonary Hypertension with no response with  80ppm of Nitric Oxide  < end of copied text >    LABS:	 	  20 Sep 2019 07:44    131    |  95     |  8      ----------------------------<  128    3.1     |  24     |  0.83   19 Sep 2019 11:10    131    |  97     |  9      ----------------------------<  85     3.2     |  27     |  0.79   18 Sep 2019 19:57    129    |  90     |  10     ----------------------------<  103    2.9     |  28     |  0.96     Ca    8.8        20 Sep 2019 07:44  Phos  2.2       20 Sep 2019 09:50  Mg     2.3       19 Sep 2019 11:10    TPro  7.6    /  Alb  3.0    /  TBili  3.3    /  DBili  x      /  AST  35     /  ALT  31     /  AlkPhos  80     20 Sep 2019 07:44               8.6    4.72  )-----------( 319      ( 20 Sep 2019 07:44 )             27.8 ,                       8.6    4.23  )-----------( 304      ( 19 Sep 2019 11:10 )             27.7 ,                       9.1    5.83  )-----------( 320      ( 18 Sep 2019 02:03 )             29.4 ,                       9.4    5.11  )-----------( 313      ( 17 Sep 2019 19:09 )             30.0   HgA1c: Hemoglobin A1C, Whole Blood: 5.9 % (09-19 @ 15:58)    TSH: Thyroid Stimulating Hormone, Serum: 3.590 uU/mL (09-19 @ 11:10)    PT/PTT- ( 20 Sep 2019 07:44 )   PT: 16.1 sec;  PTT: x        PT/PTT- ( 19 Sep 2019 11:10 )   PT: 18.0 sec;  PTT: x        INR: 1.42 ratio (09-20 @ 07:44)  INR: 1.58 ratio (09-19 @ 11:10)  INR: 2.01 ratio (09-18 @ 02:03) Patient is a 34y old  Female who presents with a chief complaint of Dyspnea (19 Sep 2019 14:54)    PAST MEDICAL & SURGICAL HISTORY:  Former smoker, stopped smoking in distant past  Vitamin D deficiency  Morbid obesity due to excess calories  Edema extremities  Diabetes mellitus  Hyperbilirubinemia  COPD (chronic obstructive pulmonary disease)  Anticoagulation goal of INR 2 to 3  Anticardiolipin antibody positive  Congestive heart failure, unspecified HF chronicity, unspecified heart failure type  Moderate asthma, unspecified whether complicated, unspecified whether persistent  Other secondary hypertension  Essential hypertension  Pulmonary HTN  No significant past surgical history    INTERVAL HISTORY: Pt approached at bedside, seated comfortably and in no acute distress. Pt verbalized that she would prefer Coumadin as AC instead of Eliquis  	  MEDICATIONS:  MEDICATIONS  (STANDING):  apixaban 5 milliGRAM(s) Oral every 12 hours  aspirin enteric coated 81 milliGRAM(s) Oral daily  buDESOnide 160 MICROgram(s)/formoterol 4.5 MICROgram(s) Inhaler 2 Puff(s) Inhalation two times a day  chlorhexidine 4% Liquid 1 Application(s) Topical daily  famotidine    Tablet 20 milliGRAM(s) Oral daily  insulin lispro (HumaLOG) corrective regimen sliding scale   SubCutaneous three times a day before meals  insulin lispro (HumaLOG) corrective regimen sliding scale   SubCutaneous at bedtime  levothyroxine 37.5 MICROGram(s) Oral daily  nystatin Powder 1 Application(s) Topical two times a day  potassium chloride    Tablet ER 40 milliEquivalent(s) Oral every 4 hours  riociguat 1.5 milliGRAM(s) Oral three times a day  simvastatin 40 milliGRAM(s) Oral at bedtime    MEDICATIONS  (PRN):  acetaminophen   Tablet .. 650 milliGRAM(s) Oral every 6 hours PRN Mild Pain (1 - 3)  aluminum hydroxide/magnesium hydroxide/simethicone Suspension 30 milliLiter(s) Oral every 4 hours PRN Dyspepsia  dextrose 40% Gel 15 Gram(s) Oral once PRN Blood Glucose LESS THAN 70 milliGRAM(s)/deciliter  glucagon  Injectable 1 milliGRAM(s) IntraMuscular once PRN Glucose LESS THAN 70 milligrams/deciliter  guaiFENesin    Syrup 100 milliGRAM(s) Oral every 6 hours PRN Cough    Vitals:  T(F): 97.8 (09-20-19 @ 05:00), Max: 98.1 (09-19-19 @ 16:47)  HR: 81 (09-20-19 @ 05:00) (67 - 97)  BP: 90/53 (09-20-19 @ 05:00) (90/53 - 102/54)  RR: 18 (09-20-19 @ 05:00) (17 - 18)  SpO2: 97% (09-20-19 @ 05:00) (95% - 99%)  Wt(kg): 92.4kg    09-19 @ 07:01  -  09-20 @ 07:00  --------------------------------------------------------  IN:    Oral Fluid: 420 mL    Solution: 300 mL  Total IN: 720 mL    OUT:  Total OUT: 0 mL    Total NET: 720 mL    PHYSICAL EXAM:  Neuro: Awake, responsive  CV: S1 S2 RRR  Lungs: CTABL, no wheezing no rales no rhonchi  GI: Soft, BS +, ND, NT  Extremities: bilateral lower extremity edema, L > R    TELEMETRY: NSR 70s with 3 PVCs     RADIOLOGY:  < from: US Duplex Venous Lower Ext Complete, Bilateral (09.05.19 @ 09:19) >  Impression: Positive for extensive acute nonocclusive thrombus in the   left external iliac vein and the left common femoral vein  and left   greater saphenous vein.  < end of copied text >    < from: Xray Chest 1 View- PORTABLE-Urgent (09.09.19 @ 09:14) >  FINDINGS:    Lungs: There are no lung consolidations.  Heart: There is cardiomegaly.  Mediastinum: Stable right hilar prominence.    IMPRESSION:  No lung consolidations.  < end of copied text >    DIAGNOSTIC TESTING:    [X] Echocardiogram: < from: TTE Echo Doppler w/o Cont (07.26.19 @ 08:44) >  Summary:   1. Left ventricular ejection fraction, by visual estimation, is 45 to   50%.   2. Mildly decreased global left ventricular systolic function.   3. Elevated mean left atrial pressure.   4. Global cardiomyopathy.   5. Increased LV wall thickness.   6. Normal left ventricular internal cavity size.   7. Right ventricular pressure overload.   8. Spectral Doppler shows impaired relaxation pattern of left   ventricular myocardial filling (Grade I diastolic dysfunction).   9. There is mild asymmetric left ventricular hypertrophy.  10. Pulmonary hypertension.  11. Severely enlarged right ventricle.  12. Severely reduced RV systolic function.  13. RV ejection fraction 34%.  14. Small pericardial effusion.  15. Degenerative mitral valve.  16. Mild mitral annular calcification.  17. Mild mitral valve regurgitation.  18. Moderate tricuspid regurgitation.  19. Structurally normal tricuspid valve, with normal leaflet excursion.  20. Mild aortic regurgitation.  21. Mild to moderate pulmonic valve regurgitation.  22. Structurally normal pulmonic valve, with normal leaflet excursion.  23. Estimated pulmonary artery systolic pressure is 87.1 mmHg assuming a   right atrial pressure of 20 mmHg, which is consistent with severe   pulmonary hypertension.  24. Pulmonary hypertension is present.  25. The main pulmonary artery is normal in size.  26. The right atrial pressure is moderately elevated.  < end of copied text >    [ ]  Catheterization:   < from: Cardiac Cath Lab - Adult (08.10.18 @ 10:48) >  PROCEDURE:  --  Right heart catheterization.  --  Sonosite - Diagnostic.DIAGNOSTIC IMPRESSIONS: Severe Pulmonary Hypertension with no response with  80ppm of Nitric Oxide  INTERVENTIONAL IMPRESSIONS: Severe Pulmonary Hypertension with no response with  80ppm of Nitric Oxide  < end of copied text >    LABS:	 	  20 Sep 2019 07:44    131    |  95     |  8      ----------------------------<  128    3.1     |  24     |  0.83   19 Sep 2019 11:10    131    |  97     |  9      ----------------------------<  85     3.2     |  27     |  0.79   18 Sep 2019 19:57    129    |  90     |  10     ----------------------------<  103    2.9     |  28     |  0.96     Ca    8.8        20 Sep 2019 07:44  Phos  2.2       20 Sep 2019 09:50  Mg     2.3       19 Sep 2019 11:10    TPro  7.6    /  Alb  3.0    /  TBili  3.3    /  DBili  x      /  AST  35     /  ALT  31     /  AlkPhos  80     20 Sep 2019 07:44               8.6    4.72  )-----------( 319      ( 20 Sep 2019 07:44 )             27.8 ,                       8.6    4.23  )-----------( 304      ( 19 Sep 2019 11:10 )             27.7 ,                       9.1    5.83  )-----------( 320      ( 18 Sep 2019 02:03 )             29.4 ,                       9.4    5.11  )-----------( 313      ( 17 Sep 2019 19:09 )             30.0   HgA1c: Hemoglobin A1C, Whole Blood: 5.9 % (09-19 @ 15:58)    TSH: Thyroid Stimulating Hormone, Serum: 3.590 uU/mL (09-19 @ 11:10)    PT/PTT- ( 20 Sep 2019 07:44 )   PT: 16.1 sec;  PTT: x        PT/PTT- ( 19 Sep 2019 11:10 )   PT: 18.0 sec;  PTT: x        INR: 1.42 ratio (09-20 @ 07:44)  INR: 1.58 ratio (09-19 @ 11:10)  INR: 2.01 ratio (09-18 @ 02:03)

## 2019-09-20 NOTE — PROGRESS NOTE BEHAVIORAL HEALTH - SUMMARY
- Zoloft discontinued at this time. Not recommending any SSRIs to be used in this case at this time or in the near future given risks > benefits and also because it is not clinically necessary
Patient at this time HAS capacity to make her medical decisions including signing out AMA.
no new recommendations
no new recommendations; cleared psychiatrically for discharge home

## 2019-09-21 LAB
% GAMMA, URINE: 18 % — SIGNIFICANT CHANGE UP
ALBUMIN 24H MFR UR ELPH: 28.4 % — SIGNIFICANT CHANGE UP
ALPHA1 GLOB 24H MFR UR ELPH: 18 % — SIGNIFICANT CHANGE UP
ALPHA2 GLOB 24H MFR UR ELPH: 12.8 % — SIGNIFICANT CHANGE UP
B-GLOBULIN 24H MFR UR ELPH: 22.8 % — SIGNIFICANT CHANGE UP
INTERPRETATION 24H UR IFE-IMP: SIGNIFICANT CHANGE UP
INTERPRETATION 24H UR IFE-IMP: SIGNIFICANT CHANGE UP
M PROTEIN 24H UR ELPH-MRATE: SIGNIFICANT CHANGE UP
PROT ?TM UR-MCNC: 31 MG/DL — HIGH (ref 0–12)
PROT PATTERN 24H UR ELPH-IMP: SIGNIFICANT CHANGE UP
TOTAL VOLUME - 24 HOUR: SIGNIFICANT CHANGE UP ML
URINE CREATININE CALCULATION: SIGNIFICANT CHANGE UP G/24 H (ref 0.8–1.8)

## 2019-09-23 DIAGNOSIS — E87.70 FLUID OVERLOAD, UNSPECIFIED: ICD-10-CM

## 2019-09-23 DIAGNOSIS — I42.9 CARDIOMYOPATHY, UNSPECIFIED: ICD-10-CM

## 2019-09-23 DIAGNOSIS — E80.7 DISORDER OF BILIRUBIN METABOLISM, UNSPECIFIED: ICD-10-CM

## 2019-09-23 DIAGNOSIS — E55.9 VITAMIN D DEFICIENCY, UNSPECIFIED: ICD-10-CM

## 2019-09-23 DIAGNOSIS — N17.9 ACUTE KIDNEY FAILURE, UNSPECIFIED: ICD-10-CM

## 2019-09-23 DIAGNOSIS — J44.9 CHRONIC OBSTRUCTIVE PULMONARY DISEASE, UNSPECIFIED: ICD-10-CM

## 2019-09-23 DIAGNOSIS — Z91.14 PATIENT'S OTHER NONCOMPLIANCE WITH MEDICATION REGIMEN: ICD-10-CM

## 2019-09-23 DIAGNOSIS — E87.1 HYPO-OSMOLALITY AND HYPONATREMIA: ICD-10-CM

## 2019-09-23 DIAGNOSIS — I07.1 RHEUMATIC TRICUSPID INSUFFICIENCY: ICD-10-CM

## 2019-09-23 DIAGNOSIS — I50.43 ACUTE ON CHRONIC COMBINED SYSTOLIC (CONGESTIVE) AND DIASTOLIC (CONGESTIVE) HEART FAILURE: ICD-10-CM

## 2019-09-23 DIAGNOSIS — E11.9 TYPE 2 DIABETES MELLITUS WITHOUT COMPLICATIONS: ICD-10-CM

## 2019-09-23 DIAGNOSIS — I24.8 OTHER FORMS OF ACUTE ISCHEMIC HEART DISEASE: ICD-10-CM

## 2019-09-23 DIAGNOSIS — I11.0 HYPERTENSIVE HEART DISEASE WITH HEART FAILURE: ICD-10-CM

## 2019-09-23 DIAGNOSIS — E87.6 HYPOKALEMIA: ICD-10-CM

## 2019-09-23 DIAGNOSIS — J45.909 UNSPECIFIED ASTHMA, UNCOMPLICATED: ICD-10-CM

## 2019-09-23 DIAGNOSIS — F43.21 ADJUSTMENT DISORDER WITH DEPRESSED MOOD: ICD-10-CM

## 2019-09-23 DIAGNOSIS — Z87.891 PERSONAL HISTORY OF NICOTINE DEPENDENCE: ICD-10-CM

## 2019-09-23 DIAGNOSIS — I27.20 PULMONARY HYPERTENSION, UNSPECIFIED: ICD-10-CM

## 2019-09-23 DIAGNOSIS — Z79.01 LONG TERM (CURRENT) USE OF ANTICOAGULANTS: ICD-10-CM

## 2019-09-23 DIAGNOSIS — R17 UNSPECIFIED JAUNDICE: ICD-10-CM

## 2019-09-23 DIAGNOSIS — I35.1 NONRHEUMATIC AORTIC (VALVE) INSUFFICIENCY: ICD-10-CM

## 2019-09-23 DIAGNOSIS — Z79.82 LONG TERM (CURRENT) USE OF ASPIRIN: ICD-10-CM

## 2019-09-23 DIAGNOSIS — I27.81 COR PULMONALE (CHRONIC): ICD-10-CM

## 2019-09-23 DIAGNOSIS — E87.3 ALKALOSIS: ICD-10-CM

## 2019-09-23 DIAGNOSIS — J96.00 ACUTE RESPIRATORY FAILURE, UNSPECIFIED WHETHER WITH HYPOXIA OR HYPERCAPNIA: ICD-10-CM

## 2019-09-23 DIAGNOSIS — E83.39 OTHER DISORDERS OF PHOSPHORUS METABOLISM: ICD-10-CM

## 2019-09-23 DIAGNOSIS — E66.9 OBESITY, UNSPECIFIED: ICD-10-CM

## 2019-09-23 DIAGNOSIS — E03.9 HYPOTHYROIDISM, UNSPECIFIED: ICD-10-CM

## 2019-09-23 DIAGNOSIS — D68.61 ANTIPHOSPHOLIPID SYNDROME: ICD-10-CM

## 2019-09-23 DIAGNOSIS — Z79.51 LONG TERM (CURRENT) USE OF INHALED STEROIDS: ICD-10-CM

## 2019-09-23 DIAGNOSIS — I82.412 ACUTE EMBOLISM AND THROMBOSIS OF LEFT FEMORAL VEIN: ICD-10-CM

## 2019-09-25 ENCOUNTER — INBOUND DOCUMENT (OUTPATIENT)
Age: 34
End: 2019-09-25

## 2019-09-26 ENCOUNTER — APPOINTMENT (OUTPATIENT)
Dept: PULMONOLOGY | Facility: CLINIC | Age: 34
End: 2019-09-26

## 2019-10-09 ENCOUNTER — INPATIENT (INPATIENT)
Facility: HOSPITAL | Age: 34
LOS: 13 days | End: 2019-10-23
Attending: STUDENT IN AN ORGANIZED HEALTH CARE EDUCATION/TRAINING PROGRAM | Admitting: STUDENT IN AN ORGANIZED HEALTH CARE EDUCATION/TRAINING PROGRAM
Payer: COMMERCIAL

## 2019-10-09 VITALS
SYSTOLIC BLOOD PRESSURE: 103 MMHG | DIASTOLIC BLOOD PRESSURE: 56 MMHG | HEIGHT: 69 IN | TEMPERATURE: 98 F | OXYGEN SATURATION: 100 % | HEART RATE: 85 BPM | WEIGHT: 250 LBS | RESPIRATION RATE: 17 BRPM

## 2019-10-09 LAB
ALBUMIN SERPL ELPH-MCNC: 3.3 G/DL — SIGNIFICANT CHANGE UP (ref 3.3–5)
ALP SERPL-CCNC: 92 U/L — SIGNIFICANT CHANGE UP (ref 40–120)
ALT FLD-CCNC: 28 U/L — SIGNIFICANT CHANGE UP (ref 12–78)
ANION GAP SERPL CALC-SCNC: 11 MMOL/L — SIGNIFICANT CHANGE UP (ref 5–17)
APTT BLD: 29.2 SEC — SIGNIFICANT CHANGE UP (ref 27.5–36.3)
AST SERPL-CCNC: 34 U/L — SIGNIFICANT CHANGE UP (ref 15–37)
BILIRUB SERPL-MCNC: 4.2 MG/DL — HIGH (ref 0.2–1.2)
BUN SERPL-MCNC: 20 MG/DL — SIGNIFICANT CHANGE UP (ref 7–23)
CALCIUM SERPL-MCNC: 9.5 MG/DL — SIGNIFICANT CHANGE UP (ref 8.5–10.1)
CHLORIDE SERPL-SCNC: 94 MMOL/L — LOW (ref 96–108)
CK MB CFR SERPL CALC: 2.6 NG/ML — SIGNIFICANT CHANGE UP (ref 0.5–3.6)
CO2 SERPL-SCNC: 25 MMOL/L — SIGNIFICANT CHANGE UP (ref 22–31)
CREAT SERPL-MCNC: 1.16 MG/DL — SIGNIFICANT CHANGE UP (ref 0.5–1.3)
GLUCOSE SERPL-MCNC: 119 MG/DL — HIGH (ref 70–99)
HCG SERPL-ACNC: <1 MIU/ML — SIGNIFICANT CHANGE UP
HCT VFR BLD CALC: 28.7 % — LOW (ref 34.5–45)
HGB BLD-MCNC: 8.4 G/DL — LOW (ref 11.5–15.5)
INR BLD: 1.75 RATIO — HIGH (ref 0.88–1.16)
MAGNESIUM SERPL-MCNC: 2.4 MG/DL — SIGNIFICANT CHANGE UP (ref 1.6–2.6)
MCHC RBC-ENTMCNC: 21.6 PG — LOW (ref 27–34)
MCHC RBC-ENTMCNC: 29.3 GM/DL — LOW (ref 32–36)
MCV RBC AUTO: 73.8 FL — LOW (ref 80–100)
NRBC # BLD: 3 /100 WBCS — HIGH (ref 0–0)
NT-PROBNP SERPL-SCNC: 1930 PG/ML — HIGH (ref 0–125)
PLATELET # BLD AUTO: 277 K/UL — SIGNIFICANT CHANGE UP (ref 150–400)
POTASSIUM SERPL-MCNC: 3.8 MMOL/L — SIGNIFICANT CHANGE UP (ref 3.5–5.3)
POTASSIUM SERPL-SCNC: 3.8 MMOL/L — SIGNIFICANT CHANGE UP (ref 3.5–5.3)
PROT SERPL-MCNC: 8.1 GM/DL — SIGNIFICANT CHANGE UP (ref 6–8.3)
PROTHROM AB SERPL-ACNC: 20 SEC — HIGH (ref 10–12.9)
RBC # BLD: 3.89 M/UL — SIGNIFICANT CHANGE UP (ref 3.8–5.2)
RBC # FLD: 22.6 % — HIGH (ref 10.3–14.5)
SODIUM SERPL-SCNC: 130 MMOL/L — LOW (ref 135–145)
TROPONIN I SERPL-MCNC: 0.18 NG/ML — HIGH (ref 0.01–0.04)
WBC # BLD: 4.32 K/UL — SIGNIFICANT CHANGE UP (ref 3.8–10.5)
WBC # FLD AUTO: 4.32 K/UL — SIGNIFICANT CHANGE UP (ref 3.8–10.5)

## 2019-10-09 PROCEDURE — 93010 ELECTROCARDIOGRAM REPORT: CPT

## 2019-10-09 PROCEDURE — 99285 EMERGENCY DEPT VISIT HI MDM: CPT

## 2019-10-09 PROCEDURE — 71045 X-RAY EXAM CHEST 1 VIEW: CPT | Mod: 26

## 2019-10-09 RX ORDER — IPRATROPIUM/ALBUTEROL SULFATE 18-103MCG
3 AEROSOL WITH ADAPTER (GRAM) INHALATION
Refills: 0 | Status: COMPLETED | OUTPATIENT
Start: 2019-10-09 | End: 2019-10-09

## 2019-10-09 RX ORDER — BUDESONIDE AND FORMOTEROL FUMARATE DIHYDRATE 160; 4.5 UG/1; UG/1
1 AEROSOL RESPIRATORY (INHALATION)
Qty: 0 | Refills: 0 | DISCHARGE

## 2019-10-09 RX ORDER — BUMETANIDE 0.25 MG/ML
1 INJECTION INTRAMUSCULAR; INTRAVENOUS ONCE
Refills: 0 | Status: COMPLETED | OUTPATIENT
Start: 2019-10-09 | End: 2019-10-09

## 2019-10-09 RX ORDER — MIDODRINE HYDROCHLORIDE 2.5 MG/1
10 TABLET ORAL ONCE
Refills: 0 | Status: COMPLETED | OUTPATIENT
Start: 2019-10-09 | End: 2019-10-09

## 2019-10-09 RX ORDER — ENOXAPARIN SODIUM 100 MG/ML
100 INJECTION SUBCUTANEOUS ONCE
Refills: 0 | Status: COMPLETED | OUTPATIENT
Start: 2019-10-09 | End: 2019-10-09

## 2019-10-09 RX ADMIN — Medication 3 MILLILITER(S): at 22:01

## 2019-10-09 RX ADMIN — Medication 125 MILLIGRAM(S): at 21:30

## 2019-10-09 RX ADMIN — BUMETANIDE 1 MILLIGRAM(S): 0.25 INJECTION INTRAMUSCULAR; INTRAVENOUS at 20:34

## 2019-10-09 RX ADMIN — MIDODRINE HYDROCHLORIDE 10 MILLIGRAM(S): 2.5 TABLET ORAL at 21:31

## 2019-10-09 RX ADMIN — Medication 3 MILLILITER(S): at 22:00

## 2019-10-09 RX ADMIN — Medication 3 MILLILITER(S): at 21:50

## 2019-10-09 NOTE — ED PROVIDER NOTE - PROGRESS NOTE DETAILS
pt. reports improvement after meds  endorsed to Dr. Membreno for admission, requests CTA chest to r/o PE, will oblige and admit to tele   pt. refused sub-q heparin, offered apixaban

## 2019-10-09 NOTE — ED PROVIDER NOTE - CLINICAL SUMMARY MEDICAL DECISION MAKING FREE TEXT BOX
33 yo F with sob, likely 2/2 chf/copd, non-compliant with meds, doubt ACS, pna  -basic labs, coags, blood gas, trop, ckmb, hcg, mag, bnp, cxr, ekg, iv, albuterol prn, bumex iv, midodrine  -f/u results, reeval

## 2019-10-09 NOTE — ED PROVIDER NOTE - PHYSICAL EXAMINATION
Vitals: hypotensive in 90's systolic, otherwise WNL  Gen: AAOx3, NAD, sitting uncomfortably in stretcher albeit non-toxic, speaking in full sentences  Head: ncat, perrla, eomi b/l, scleral icterus noted  Neck: supple, no lymphadenopathy, no midline deviation  Heart: rrr, no m/r/g  Lungs: b/l lower lobe rales, mild expiratory wheezing diffusely   Abd: soft, nontender, non-distended, no rebound or guarding  Ext: no clubbing/cyanosis, 3+ b/l LE edema  Neuro: sensation and muscle strength intact b/l, CN2-12 intact b/l

## 2019-10-09 NOTE — ED PROVIDER NOTE - OBJECTIVE STATEMENT
33 yo F with sob.  Pt. went to PCP today who noted her hypotension and SOB and recommended coming to ER.  Pt. is amenable to staying here tonight, says she's too sob to go home and further she hasn't been able to  meds from pharmacy as outpt. because she can't walk due to chronic leg swelling.  No other complaints of inciting event.  Pt. is otherwise well.  ROS: negative for fever, cough, headache, chest pain, abd pain, nausea, vomiting, diarrhea, rash, paresthesia, and focal weakness--all other systems reviewed are negative.   PMH: pulmonary hypertension and right sided CHF (?autoimmune, R sided, EF 35%), pulm HTN, COPD, HTN, DM, hyperbilirubinemia, L femoral DVT (note on AC), chronic hypoK; Meds: See EMR for list; SH: Denies smoking/drinking/drug use

## 2019-10-09 NOTE — ED ADULT NURSE NOTE - OBJECTIVE STATEMENT
34y female received in bed 1 c/o of difficulty breathing and b/l swelling of the legs states on and off for about 2-3 days. hx of asthma and COPD. able to complete full sentences without discomfort. no s/s of acute distress noted. will continue to monitor and provide care as needed.

## 2019-10-09 NOTE — ED ADULT NURSE REASSESSMENT NOTE - NS ED NURSE REASSESS COMMENT FT1
Pt refused abg after multiple attempts. dr perez. will continue to monitor and provide care as needed.

## 2019-10-10 DIAGNOSIS — J44.1 CHRONIC OBSTRUCTIVE PULMONARY DISEASE WITH (ACUTE) EXACERBATION: ICD-10-CM

## 2019-10-10 DIAGNOSIS — R06.02 SHORTNESS OF BREATH: ICD-10-CM

## 2019-10-10 DIAGNOSIS — R09.89 OTHER SPECIFIED SYMPTOMS AND SIGNS INVOLVING THE CIRCULATORY AND RESPIRATORY SYSTEMS: ICD-10-CM

## 2019-10-10 DIAGNOSIS — E80.6 OTHER DISORDERS OF BILIRUBIN METABOLISM: ICD-10-CM

## 2019-10-10 DIAGNOSIS — E11.9 TYPE 2 DIABETES MELLITUS WITHOUT COMPLICATIONS: ICD-10-CM

## 2019-10-10 DIAGNOSIS — I27.20 PULMONARY HYPERTENSION, UNSPECIFIED: ICD-10-CM

## 2019-10-10 DIAGNOSIS — I95.9 HYPOTENSION, UNSPECIFIED: ICD-10-CM

## 2019-10-10 DIAGNOSIS — I50.9 HEART FAILURE, UNSPECIFIED: ICD-10-CM

## 2019-10-10 LAB
ALBUMIN SERPL ELPH-MCNC: 3.3 G/DL — SIGNIFICANT CHANGE UP (ref 3.3–5)
ALP SERPL-CCNC: 91 U/L — SIGNIFICANT CHANGE UP (ref 40–120)
ALT FLD-CCNC: 30 U/L — SIGNIFICANT CHANGE UP (ref 12–78)
ANION GAP SERPL CALC-SCNC: 16 MMOL/L — SIGNIFICANT CHANGE UP (ref 5–17)
ANISOCYTOSIS BLD QL: SIGNIFICANT CHANGE UP
AST SERPL-CCNC: 30 U/L — SIGNIFICANT CHANGE UP (ref 15–37)
BASOPHILS # BLD AUTO: 0 K/UL — SIGNIFICANT CHANGE UP (ref 0–0.2)
BASOPHILS NFR BLD AUTO: 0 % — SIGNIFICANT CHANGE UP (ref 0–2)
BILIRUB DIRECT SERPL-MCNC: 2.87 MG/DL — HIGH (ref 0.05–0.2)
BILIRUB INDIRECT FLD-MCNC: 0.9 MG/DL — SIGNIFICANT CHANGE UP (ref 0.2–1)
BILIRUB SERPL-MCNC: 3.8 MG/DL — HIGH (ref 0.2–1.2)
BUN SERPL-MCNC: 22 MG/DL — SIGNIFICANT CHANGE UP (ref 7–23)
CALCIUM SERPL-MCNC: 9.8 MG/DL — SIGNIFICANT CHANGE UP (ref 8.5–10.1)
CHLORIDE SERPL-SCNC: 93 MMOL/L — LOW (ref 96–108)
CO2 SERPL-SCNC: 19 MMOL/L — LOW (ref 22–31)
CREAT SERPL-MCNC: 1.34 MG/DL — HIGH (ref 0.5–1.3)
DACRYOCYTES BLD QL SMEAR: SLIGHT — SIGNIFICANT CHANGE UP
ELLIPTOCYTES BLD QL SMEAR: SLIGHT — SIGNIFICANT CHANGE UP
EOSINOPHIL # BLD AUTO: 0 K/UL — SIGNIFICANT CHANGE UP (ref 0–0.5)
EOSINOPHIL NFR BLD AUTO: 0 % — SIGNIFICANT CHANGE UP (ref 0–6)
GLUCOSE BLDC GLUCOMTR-MCNC: 263 MG/DL — HIGH (ref 70–99)
GLUCOSE BLDC GLUCOMTR-MCNC: 275 MG/DL — HIGH (ref 70–99)
GLUCOSE BLDC GLUCOMTR-MCNC: 296 MG/DL — HIGH (ref 70–99)
GLUCOSE BLDC GLUCOMTR-MCNC: 322 MG/DL — HIGH (ref 70–99)
GLUCOSE SERPL-MCNC: 281 MG/DL — HIGH (ref 70–99)
HCT VFR BLD CALC: 27.9 % — LOW (ref 34.5–45)
HGB BLD-MCNC: 8.3 G/DL — LOW (ref 11.5–15.5)
HYPOCHROMIA BLD QL: SLIGHT — SIGNIFICANT CHANGE UP
IMM GRANULOCYTES NFR BLD AUTO: 1.1 % — SIGNIFICANT CHANGE UP (ref 0–1.5)
LG PLATELETS BLD QL AUTO: SLIGHT — SIGNIFICANT CHANGE UP
LYMPHOCYTES # BLD AUTO: 0.54 K/UL — LOW (ref 1–3.3)
LYMPHOCYTES # BLD AUTO: 14.8 % — SIGNIFICANT CHANGE UP (ref 13–44)
MACROCYTES BLD QL: SLIGHT — SIGNIFICANT CHANGE UP
MANUAL SMEAR VERIFICATION: SIGNIFICANT CHANGE UP
MCHC RBC-ENTMCNC: 21.8 PG — LOW (ref 27–34)
MCHC RBC-ENTMCNC: 29.7 GM/DL — LOW (ref 32–36)
MCV RBC AUTO: 73.4 FL — LOW (ref 80–100)
MICROCYTES BLD QL: SIGNIFICANT CHANGE UP
MONOCYTES # BLD AUTO: 0.07 K/UL — SIGNIFICANT CHANGE UP (ref 0–0.9)
MONOCYTES NFR BLD AUTO: 1.9 % — LOW (ref 2–14)
NEUTROPHILS # BLD AUTO: 3 K/UL — SIGNIFICANT CHANGE UP (ref 1.8–7.4)
NEUTROPHILS NFR BLD AUTO: 82.2 % — HIGH (ref 43–77)
NRBC # BLD: 4 /100 WBCS — HIGH (ref 0–0)
NT-PROBNP SERPL-SCNC: 3633 PG/ML — HIGH (ref 0–125)
OVALOCYTES BLD QL SMEAR: SLIGHT — SIGNIFICANT CHANGE UP
PLAT MORPH BLD: ABNORMAL
PLATELET # BLD AUTO: 283 K/UL — SIGNIFICANT CHANGE UP (ref 150–400)
PLATELET COUNT - ESTIMATE: NORMAL — SIGNIFICANT CHANGE UP
POIKILOCYTOSIS BLD QL AUTO: SLIGHT — SIGNIFICANT CHANGE UP
POLYCHROMASIA BLD QL SMEAR: SIGNIFICANT CHANGE UP
POTASSIUM SERPL-MCNC: 4.2 MMOL/L — SIGNIFICANT CHANGE UP (ref 3.5–5.3)
POTASSIUM SERPL-SCNC: 4.2 MMOL/L — SIGNIFICANT CHANGE UP (ref 3.5–5.3)
PROT SERPL-MCNC: 8.1 GM/DL — SIGNIFICANT CHANGE UP (ref 6–8.3)
RBC # BLD: 3.8 M/UL — SIGNIFICANT CHANGE UP (ref 3.8–5.2)
RBC # FLD: 22.6 % — HIGH (ref 10.3–14.5)
RBC BLD AUTO: ABNORMAL
SCHISTOCYTES BLD QL AUTO: SLIGHT — SIGNIFICANT CHANGE UP
SODIUM SERPL-SCNC: 128 MMOL/L — LOW (ref 135–145)
SPHEROCYTES BLD QL SMEAR: SLIGHT — SIGNIFICANT CHANGE UP
TARGETS BLD QL SMEAR: SLIGHT — SIGNIFICANT CHANGE UP
TROPONIN I SERPL-MCNC: 0.15 NG/ML — HIGH (ref 0.01–0.04)
TSH SERPL-MCNC: 2.75 UU/ML — SIGNIFICANT CHANGE UP (ref 0.36–3.74)
WBC # BLD: 3.65 K/UL — LOW (ref 3.8–10.5)
WBC # FLD AUTO: 3.65 K/UL — LOW (ref 3.8–10.5)

## 2019-10-10 PROCEDURE — 12345: CPT | Mod: NC

## 2019-10-10 PROCEDURE — 99223 1ST HOSP IP/OBS HIGH 75: CPT

## 2019-10-10 PROCEDURE — 93970 EXTREMITY STUDY: CPT | Mod: 26

## 2019-10-10 RX ORDER — LEVOTHYROXINE SODIUM 125 MCG
25 TABLET ORAL DAILY
Refills: 0 | Status: DISCONTINUED | OUTPATIENT
Start: 2019-10-10 | End: 2019-10-23

## 2019-10-10 RX ORDER — INSULIN LISPRO 100/ML
VIAL (ML) SUBCUTANEOUS
Refills: 0 | Status: DISCONTINUED | OUTPATIENT
Start: 2019-10-10 | End: 2019-10-23

## 2019-10-10 RX ORDER — FUROSEMIDE 40 MG
40 TABLET ORAL DAILY
Refills: 0 | Status: DISCONTINUED | OUTPATIENT
Start: 2019-10-10 | End: 2019-10-15

## 2019-10-10 RX ORDER — SODIUM CHLORIDE 9 MG/ML
500 INJECTION INTRAMUSCULAR; INTRAVENOUS; SUBCUTANEOUS
Refills: 0 | Status: DISCONTINUED | OUTPATIENT
Start: 2019-10-10 | End: 2019-10-10

## 2019-10-10 RX ORDER — APIXABAN 2.5 MG/1
10 TABLET, FILM COATED ORAL EVERY 12 HOURS
Refills: 0 | Status: COMPLETED | OUTPATIENT
Start: 2019-10-10 | End: 2019-10-11

## 2019-10-10 RX ORDER — IPRATROPIUM/ALBUTEROL SULFATE 18-103MCG
3 AEROSOL WITH ADAPTER (GRAM) INHALATION EVERY 6 HOURS
Refills: 0 | Status: DISCONTINUED | OUTPATIENT
Start: 2019-10-10 | End: 2019-10-23

## 2019-10-10 RX ORDER — APIXABAN 2.5 MG/1
10 TABLET, FILM COATED ORAL ONCE
Refills: 0 | Status: COMPLETED | OUTPATIENT
Start: 2019-10-10 | End: 2019-10-10

## 2019-10-10 RX ORDER — INSULIN LISPRO 100/ML
VIAL (ML) SUBCUTANEOUS AT BEDTIME
Refills: 0 | Status: DISCONTINUED | OUTPATIENT
Start: 2019-10-11 | End: 2019-10-23

## 2019-10-10 RX ORDER — ATORVASTATIN CALCIUM 80 MG/1
20 TABLET, FILM COATED ORAL AT BEDTIME
Refills: 0 | Status: DISCONTINUED | OUTPATIENT
Start: 2019-10-10 | End: 2019-10-23

## 2019-10-10 RX ORDER — DEXTROSE 50 % IN WATER 50 %
25 SYRINGE (ML) INTRAVENOUS ONCE
Refills: 0 | Status: DISCONTINUED | OUTPATIENT
Start: 2019-10-10 | End: 2019-10-23

## 2019-10-10 RX ORDER — GLUCAGON INJECTION, SOLUTION 0.5 MG/.1ML
1 INJECTION, SOLUTION SUBCUTANEOUS ONCE
Refills: 0 | Status: DISCONTINUED | OUTPATIENT
Start: 2019-10-10 | End: 2019-10-23

## 2019-10-10 RX ORDER — DEXTROSE 50 % IN WATER 50 %
15 SYRINGE (ML) INTRAVENOUS ONCE
Refills: 0 | Status: DISCONTINUED | OUTPATIENT
Start: 2019-10-10 | End: 2019-10-23

## 2019-10-10 RX ORDER — INSULIN LISPRO 100/ML
6 VIAL (ML) SUBCUTANEOUS ONCE
Refills: 0 | Status: COMPLETED | OUTPATIENT
Start: 2019-10-10 | End: 2019-10-10

## 2019-10-10 RX ORDER — MIDODRINE HYDROCHLORIDE 2.5 MG/1
10 TABLET ORAL EVERY 8 HOURS
Refills: 0 | Status: DISCONTINUED | OUTPATIENT
Start: 2019-10-10 | End: 2019-10-18

## 2019-10-10 RX ORDER — SODIUM CHLORIDE 9 MG/ML
1000 INJECTION, SOLUTION INTRAVENOUS
Refills: 0 | Status: DISCONTINUED | OUTPATIENT
Start: 2019-10-10 | End: 2019-10-23

## 2019-10-10 RX ORDER — ASPIRIN/CALCIUM CARB/MAGNESIUM 324 MG
81 TABLET ORAL DAILY
Refills: 0 | Status: DISCONTINUED | OUTPATIENT
Start: 2019-10-10 | End: 2019-10-16

## 2019-10-10 RX ORDER — DEXTROSE 50 % IN WATER 50 %
12.5 SYRINGE (ML) INTRAVENOUS ONCE
Refills: 0 | Status: DISCONTINUED | OUTPATIENT
Start: 2019-10-10 | End: 2019-10-23

## 2019-10-10 RX ADMIN — MIDODRINE HYDROCHLORIDE 10 MILLIGRAM(S): 2.5 TABLET ORAL at 22:03

## 2019-10-10 RX ADMIN — Medication 40 MILLIGRAM(S): at 22:03

## 2019-10-10 RX ADMIN — MIDODRINE HYDROCHLORIDE 10 MILLIGRAM(S): 2.5 TABLET ORAL at 06:14

## 2019-10-10 RX ADMIN — Medication 3 MILLILITER(S): at 05:41

## 2019-10-10 RX ADMIN — MIDODRINE HYDROCHLORIDE 10 MILLIGRAM(S): 2.5 TABLET ORAL at 14:58

## 2019-10-10 RX ADMIN — ATORVASTATIN CALCIUM 20 MILLIGRAM(S): 80 TABLET, FILM COATED ORAL at 22:02

## 2019-10-10 RX ADMIN — Medication 3 MILLILITER(S): at 11:23

## 2019-10-10 RX ADMIN — Medication 3 MILLILITER(S): at 17:13

## 2019-10-10 RX ADMIN — APIXABAN 10 MILLIGRAM(S): 2.5 TABLET, FILM COATED ORAL at 11:20

## 2019-10-10 RX ADMIN — Medication 40 MILLIGRAM(S): at 14:58

## 2019-10-10 RX ADMIN — Medication 3 MILLILITER(S): at 23:34

## 2019-10-10 RX ADMIN — Medication 25 MICROGRAM(S): at 06:14

## 2019-10-10 RX ADMIN — Medication 6 UNIT(S): at 22:53

## 2019-10-10 RX ADMIN — Medication 3: at 08:33

## 2019-10-10 RX ADMIN — Medication 40 MILLIGRAM(S): at 06:14

## 2019-10-10 RX ADMIN — APIXABAN 10 MILLIGRAM(S): 2.5 TABLET, FILM COATED ORAL at 22:03

## 2019-10-10 RX ADMIN — Medication 81 MILLIGRAM(S): at 11:20

## 2019-10-10 RX ADMIN — APIXABAN 10 MILLIGRAM(S): 2.5 TABLET, FILM COATED ORAL at 01:19

## 2019-10-10 NOTE — H&P ADULT - PROBLEM SELECTOR PLAN 1
Possible PE, COPD exacerbation; O2, Possible BIPAP but caution with right sided CHF, venous dopplers, V/Q scan, anticoagulation apixaban

## 2019-10-10 NOTE — H&P ADULT - ASSESSMENT
33 y/o Female PMH pulmonary hypertension and right sided CHF( RV EF 35%) COPD, HTN, DM2, hyperbilirubinemia, L femoral DVT,  Pt. went to PCP today who noted her hypotension and SOB and recommended coming to ER.  Pt. is amenable to staying here tonight, says she's too sob to go home and further she hasn't been able to  meds from pharmacy as outpatient., because she can't walk due to chronic leg swelling, right calf pain.  No other complaints of inciting event, has had poor oral intake for several days.  Pt. is otherwise well. Pt refusing ABG, parenteral anticoagulants; has 22G IV, refusing attempt to insert larger catheter for CTA, will allow oral anticoagulation  	ROS: negative for fever, cough, headache, dizziness, chest pain, palpitations, abd pain, nausea, vomiting, diarrhea, rash, paresthesia, and focal weakness--all other systems reviewed are negative. Pt has BIPAP ordered, refusing ABG, questionable compliance with her medications, has possible DVT and COPD exacerbation, she is c/o worsening edema in her lower extremities with cramps in RLE, refusing insertion larger catheter for CTA, unable to perform CTA. Pt will take oral anticoagulation, venous dopplers and VQ scan to exclude DVT/PE ordered. Her BP is borderline 90- 100 systolic, will give cautious fluid bolus, continue midodrine and hold Adempas for now.  IMPROVE VTE Individual Risk Assessment          RISK                                                          Points    [ x ] Previous VTE                                                3    [  ] Thrombophilia                                             2    [  ] Lower limb paralysis                                    2        (unable to hold up >15 seconds)      [  ] Current Cancer                                             2         (within 6 months)    [  ] Immobilization > 24 hrs                              1    [  ] ICU/CCU stay > 24 hours                            1    [  ] Age > 60                                                    1    IMPROVE VTE Score ___3______

## 2019-10-10 NOTE — H&P ADULT - PROBLEM SELECTOR PLAN 5
given diuretic in ED, will hold diuresis for now in setting hypotension  has combined systolic left and right ventricular failure, daily weights, trends cardiac enzymes

## 2019-10-10 NOTE — H&P ADULT - NSHPPHYSICALEXAM_GEN_ALL_CORE
T(C): 36.4 (10 Oct 2019 00:00), Max: 36.4 (09 Oct 2019 19:59)  T(F): 97.6 (10 Oct 2019 00:00), Max: 97.6 (09 Oct 2019 19:59)  HR: 96 (10 Oct 2019 02:05) (85 - 96)  BP: 98/44 (10 Oct 2019 02:05) (98/44 - 117/53)  BP(mean): --  RR: 21 (10 Oct 2019 02:05) (17 - 21)  SpO2: 95% (10 Oct 2019 02:05) (95% - 100%)    PHYSICAL EXAM:  GENERAL: NAD, well-groomed, well-developed, obese, speaking full sentences  HEAD:  Atraumatic, Normocephalic  EYES: EOMI, PERRLA, conjunctiva and sclera icteric  ENMT: No tonsillar erythema, exudates, or enlargement; Moist mucous membranes,  No lesions  NECK: Supple, No JVD, Normal thyroid  NERVOUS SYSTEM:  Alert & Oriented X3, CN 2-12 intact, no focal deficits  CHEST/LUNG: rales at bases, no wheezing  HEART: Regular rate and rhythm; No murmurs, rubs, or gallops  ABDOMEN: Soft, Nontender, Nondistended; Bowel sounds present  EXTREMITIES:  + Peripheral Pulses, No clubbing, cyanosis, ++ edema  LYMPH: No lymphadenopathy noted  SKIN: No rashes or lesions

## 2019-10-10 NOTE — H&P ADULT - HISTORY OF PRESENT ILLNESS
35 y/o Female PMH pulmonary hypertension and right sided CHF( RV EF 35%) COPD, HTN, DM2, hyperbilirubinemia, L femoral DVT,  Pt. went to PCP today who noted her hypotension and SOB and recommended coming to ER.  Pt. is amenable to staying here tonight, says she's too sob to go home and further she hasn't been able to  meds from pharmacy as outpatient., because she can't walk due to chronic leg swelling, right calf pain.  No other complaints of inciting event, has had poor oral intake for several days,   Pt. is otherwise well. Pt refusing ABG, parenteral anticoagulants; has 22G IV, refusing attempt to insert larger catheter for CTA, will allow oral anticoagulation  ROS: negative for fever, cough, headache, dizziness, chest pain, palpitations, abd pain, nausea, vomiting, diarrhea, rash, paresthesia, and focal weakness--all other systems reviewed are negative.

## 2019-10-10 NOTE — CONSULT NOTE ADULT - ASSESSMENT
35 y/o lady with a PMH severe pulmonary hypertension and right sided CHF (RV EF 35%), COPD, HTN, DM2, hyperbilirubinemia, L femoral DVT,  Pt. went to her PCP today who noted her hypotension and SOB and recommended coming to ER.  Pt. is amenable to staying here tonight, says she's too sob to go home and further she hasn't been able to  meds from pharmacy as outpatient., because she can't walk due to chronic leg swelling, right calf pain.  No other complaints of inciting event, has had poor oral intake for several days,   Pt. is otherwise well. Pt refusing ABG, parenteral anticoagulants; has 22G IV, refusing attempt to insert larger catheter for CTA, will allow oral anticoagulation      Discussed with Dr. Unger at length..... pt very non-compliant both with follow-ups and with meds.   Will diuresis the next few days back to a euvolemic state; if she agrees, will then transfer for  and [facility] and eval with Dr. Unger at [facility].    -monitor on tele  -cont apixaban for AC  -cont asa/statin  -on nebs/steroids for COPD  -will add lasix  -monitor lytes and creat; replete as needed

## 2019-10-10 NOTE — H&P ADULT - NSHPLABSRESULTS_GEN_ALL_CORE
LABS:                        8.4    4.32  )-----------( 277      ( 09 Oct 2019 21:58 )             28.7     10-09    130<L>  |  94<L>  |  20  ----------------------------<  119<H>  3.8   |  25  |  1.16    Ca    9.5      09 Oct 2019 21:58  Mg     2.4     10-09    TPro  8.1  /  Alb  3.3  /  TBili  4.2<H>  /  DBili  x   /  AST  34  /  ALT  28  /  AlkPhos  92  10-09    PT/INR - ( 09 Oct 2019 21:58 )   PT: 20.0 sec;   INR: 1.75 ratio         PTT - ( 09 Oct 2019 21:58 )  PTT:29.2 sec    CAPILLARY BLOOD GLUCOSE  Troponin I, Serum: .178: ng/mL (10.09.19 @ 21:58)  Serum Pro-Brain Natriuretic Peptide: 1930 pg/mL (10.09.19 @ 21:58)  HCG < 1            RADIOLOGY & ADDITIONAL TESTS:  CXR: severe cardiomegaly, enlarged pulmonary arteries  Imaging Personally Reviewed:  [x ] YES  [ ] NO  EKG: sinus@ 86 right axis RBBB

## 2019-10-10 NOTE — CONSULT NOTE ADULT - SUBJECTIVE AND OBJECTIVE BOX
Patient is a 34y old  Female who presents with a chief complaint of Shortness of breath and hypotension. (10 Oct 2019 10:24)    HPI:  35 y/o Female with severe Pulmonary Hypertension and right sided CHF( RV EF 35%) COPD, HTN, DM2, Hyperbilirubinemia, L femoral DVT. Not taking anticoagulant since she her last admission(claims prescription was not sent).   Pt. went to PCP today who noted her hypotension and SOB and recommended coming to ER.  She hasn't been able to  meds from pharmacy as outpatient., because she can't walk due to chronic leg swelling, right calf pain.  No other complaints of inciting event, has had poor oral intake for several days,   Pt. is otherwise well. Refused  ABG, parenteral anticoagulants;  On home O2. Denies tobacco abuse.    PAST MEDICAL & SURGICAL HISTORY:  Former smoker, stopped smoking in distant past  Vitamin D deficiency  Morbid obesity due to excess calories  Edema extremities  Diabetes mellitus  Hyperbilirubinemia  COPD (chronic obstructive pulmonary disease)  COPD (chronic obstructive pulmonary disease)  Anticoagulation goal of INR 2 to 3  Anticardiolipin antibody positive  Congestive heart failure, unspecified HF chronicity, unspecified heart failure type  Moderate asthma, unspecified whether complicated, unspecified whether persistent  Other secondary hypertension  COPD (chronic obstructive pulmonary disease)  Anticardiolipin antibody positive  Essential hypertension  Pulmonary HTN  Morbid obesity  No significant past surgical history    FAMILY HISTORY:  Family history of leukemia (Sibling)    SOCIAL HISTORY: BMI (kg/m2): 34.6 denies smoking    Allergies  No Known Allergies    MEDICATIONS  (STANDING):  ALBUTerol/ipratropium for Nebulization 3 milliLiter(s) Nebulizer every 6 hours  apixaban 10 milliGRAM(s) Oral every 12 hours  aspirin enteric coated 81 milliGRAM(s) Oral daily  atorvastatin 20 milliGRAM(s) Oral at bedtime  dextrose 5%. 1000 milliLiter(s) (50 mL/Hr) IV Continuous <Continuous>  dextrose 50% Injectable 12.5 Gram(s) IV Push once  dextrose 50% Injectable 25 Gram(s) IV Push once  dextrose 50% Injectable 25 Gram(s) IV Push once  insulin lispro (HumaLOG) corrective regimen sliding scale   SubCutaneous three times a day before meals  levothyroxine 25 MICROGram(s) Oral daily  methylPREDNISolone sodium succinate Injectable 40 milliGRAM(s) IV Push every 8 hours  midodrine. 10 milliGRAM(s) Oral every 8 hours    MEDICATIONS  (PRN):  dextrose 40% Gel 15 Gram(s) Oral once PRN Blood Glucose LESS THAN 70 milliGRAM(s)/deciliter  glucagon  Injectable 1 milliGRAM(s) IntraMuscular once PRN Glucose LESS THAN 70 milligrams/deciliter    Vital Signs Last 24 Hrs  T(C): 36.6 (10 Oct 2019 04:10), Max: 36.6 (10 Oct 2019 04:10)  T(F): 97.8 (10 Oct 2019 04:10), Max: 97.8 (10 Oct 2019 04:10)  HR: 98 (10 Oct 2019 11:30) (85 - 100)  BP: 111/51 (10 Oct 2019 04:10) (98/44 - 117/53)  BP(mean): --  RR: 18 (10 Oct 2019 04:10) (17 - 21)  SpO2: 99% (10 Oct 2019 11:30) (95% - 100%)    PHYSICAL EXAM:  GEN:         Awake, responsive and comfortable.  HEENT:    Normal.    RESP:       decreased air entry.  CVS:           Regular rate and rhythm.   ABD:         Soft, non-tender, non-distended;   :             No costovertebral angle tenderness  SKIN:           Warm and dry.  EXTR:          3-4+ edemaedema    LABS:                        8.3    3.65  )-----------( 283      ( 10 Oct 2019 11:08 )             27.9     10-10    128<L>  |  93<L>  |  22  ----------------------------<  281<H>  4.2   |  19<L>  |  1.34<H>    Ca    9.8      10 Oct 2019 11:08  Mg     2.4     10-09    TPro  8.1  /  Alb  3.3  /  TBili  3.8<H>  /  DBili  2.87<H>  /  AST  30  /  ALT  30  /  AlkPhos  91  10-10    PT/INR - ( 09 Oct 2019 21:58 )   PT: 20.0 sec;   INR: 1.75 ratio      PTT - ( 09 Oct 2019 21:58 )  PTT:29.2 sec    EKG: sinus rhythm, RBBB    RADIOLOGY & ADDITIONAL STUDIES:  < from: Xray Chest 1 View- PORTABLE-Urgent (09.09.19 @ 09:14) >    EXAM:  XR CHEST PORTABLE URGENT 1V                          PROCEDURE DATE:  09/09/2019      INTERPRETATION:  PROCEDURE: AP view of the chest.    CLINICAL INFORMATION: Chest tightness.    COMPARISON: 9/3/2019. Chest CT dated 7/25/2018.    FINDINGS:    Lungs: There are no lung consolidations.  Heart: There is cardiomegaly.  Mediastinum: Stable right hilar prominence.    IMPRESSION:    No lung consolidations.    SERGIO MELO M.D., ATTENDING RADIOLOGIST  This document has been electronically signed. Sep  9 2019  9:27AM    < from: US Duplex Venous Lower Ext Complete, Bilateral (10.10.19 @ 03:24) >  EXAM:  US DPLX LWR EXT VEINS COMPL BI                            PROCEDURE DATE:  10/10/2019          INTERPRETATION:  CLINICAL INFORMATION: Leg swelling. Known DVT on blood   thinner.    COMPARISON: Lower extremity Doppler 9/5/2019    TECHNIQUE: Duplex sonography of the BILATERAL LOWER extremity veins with   color and spectral Doppler, with and without compression.      FINDINGS:    Redemonstrated is nonocclusive thrombus in the left external iliac vein.   Additional superficial thrombus in the left greater saphenous vein is   again seen.    There is normal compressibility of the bilateral common femoral, femoral   and popliteal veins.     IMPRESSION:     No significant change in nonocclusive thrombus in the left external iliac   vein and superficial thrombus in the left greater saphenous vein.    ARELI BROWN M.D., ATTENDING RADIOLOGIST  This document has been electronically signed. Oct 10 2019  3:34AM      ASSESSMENT AND PLAN:  ·	SOB.  ·	Severe pulmonary HTN.  ·	Cor pulmonale.  ·	Fluid Retention.  ·	Left external DVT.  ·	Non Compliance with treatment.    Supplemental O2, Keep SPO2 89 to 94%.  Can restart Eliquis.  Diuretics to keep in negative balance.  Continue meds for pHTN.

## 2019-10-10 NOTE — CONSULT NOTE ADULT - SUBJECTIVE AND OBJECTIVE BOX
HPI:  Patient is a 34y old  Female with known severe pulm HTN, RHF who was sent to ED by outpt PMD for eval of hypotension, dizziness, dyspnea.   Seen by me in [facility] in mid Sept for hypoNa, metabolic alkalosis. Returned this time with serum Na of 130. Admits non-compliance with FR.           PAST MEDICAL & SURGICAL HISTORY:  Former smoker, stopped smoking in distant past  Vitamin D deficiency  Morbid obesity due to excess calories  Edema extremities  Diabetes mellitus  Hyperbilirubinemia  COPD (chronic obstructive pulmonary disease)  COPD (chronic obstructive pulmonary disease)  Anticoagulation goal of INR 2 to 3  Anticardiolipin antibody positive  Congestive heart failure, unspecified HF chronicity, unspecified heart failure type  Moderate asthma, unspecified whether complicated, unspecified whether persistent  Other secondary hypertension  COPD (chronic obstructive pulmonary disease)  Anticardiolipin antibody positive  Essential hypertension  Pulmonary HTN  Morbid obesity  No significant past surgical history      Social Hx: not a smoker    FAMILY HISTORY:  Family history of leukemia (Sibling)      Allergies    No Known Allergies            MEDICATIONS  (STANDING):  ALBUTerol/ipratropium for Nebulization 3 milliLiter(s) Nebulizer every 6 hours  apixaban 10 milliGRAM(s) Oral every 12 hours  aspirin enteric coated 81 milliGRAM(s) Oral daily  atorvastatin 20 milliGRAM(s) Oral at bedtime  dextrose 5%. 1000 milliLiter(s) (50 mL/Hr) IV Continuous <Continuous>  dextrose 50% Injectable 12.5 Gram(s) IV Push once  dextrose 50% Injectable 25 Gram(s) IV Push once  dextrose 50% Injectable 25 Gram(s) IV Push once  insulin lispro (HumaLOG) corrective regimen sliding scale   SubCutaneous three times a day before meals  levothyroxine 25 MICROGram(s) Oral daily  methylPREDNISolone sodium succinate Injectable 40 milliGRAM(s) IV Push every 8 hours  midodrine. 10 milliGRAM(s) Oral every 8 hours    MEDICATIONS  (PRN):  dextrose 40% Gel 15 Gram(s) Oral once PRN Blood Glucose LESS THAN 70 milliGRAM(s)/deciliter  glucagon  Injectable 1 milliGRAM(s) IntraMuscular once PRN Glucose LESS THAN 70 milligrams/deciliter      Daily Height in cm: 175.26 (09 Oct 2019 19:59)    Daily     Drug Dosing Weight  Height (cm): 175.26 (09 Oct 2019 19:59)  Weight (kg): 106.2 (10 Oct 2019 04:10)  BMI (kg/m2): 34.6 (10 Oct 2019 04:10)  BSA (m2): 2.21 (10 Oct 2019 04:10)      REVIEW OF SYSTEMS:    CONSTITUTIONAL: fatigue, wt gain  ENMT:  No difficulty hearing, tinnitus, vertigo; No sinus or throat pain  NECK: No pain or stiffness  RESPIRATORY: cough, SOB  CARDIOVASCULAR: leg edema  GASTROINTESTINAL: No abdominal or epigastric pain. No nausea, vomiting, or hematemesis; No diarrhea or constipation. No melena or hematochezia.  GENITOURINARY: No dysuria, frequency, hematuria, or incontinence  SKIN: No itching, burning, rashes, or lesions   LYMPH NODES: No enlarged glands  NEURO: no asterixis              I&O's Detail        PHYSICAL EXAM:    GENERAL: mildly dyspneic at rest  HEAD:  Atraumatic, Normocephalic  EYES: scleral icterus  ENMT: No tonsillar erythema, exudates, or enlargement; Moist mucous membranes, Good dentition, No lesions  NECK: Supple, POS JVD, Normal thyroid  NERVOUS SYSTEM:  Alert & Oriented X3, Good concentration; Motor Strength 5/5 B/L upper and lower extremities; DTRs 2+ intact and symmetric  CHEST/LUNG: Clear to percussion bilaterally; No rales, rhonchi, wheezing, or rubs  HEART: Regular rate and rhythm; No murmurs, rubs, or gallops  ABDOMEN: distended  EXTREMITIES:  massive edema  LYMPH: No lymphadenopathy noted  SKIN: No rashes or lesions      LABS:  CBC Full  -  ( 09 Oct 2019 21:58 )  WBC Count : 4.32 K/uL  RBC Count : 3.89 M/uL  Hemoglobin : 8.4 g/dL  Hematocrit : 28.7 %  Platelet Count - Automated : 277 K/uL  Mean Cell Volume : 73.8 fl  Mean Cell Hemoglobin : 21.6 pg  Mean Cell Hemoglobin Concentration : 29.3 gm/dL  Auto Neutrophil # : x  Auto Lymphocyte # : x  Auto Monocyte # : x  Auto Eosinophil # : x  Auto Basophil # : x  Auto Neutrophil % : x  Auto Lymphocyte % : x  Auto Monocyte % : x  Auto Eosinophil % : x  Auto Basophil % : x    10-09    130<L>  |  94<L>  |  20  ----------------------------<  119<H>  3.8   |  25  |  1.16    Ca    9.5      09 Oct 2019 21:58  Mg     2.4     10-09    TPro  8.1  /  Alb  3.3  /  TBili  4.2<H>  /  DBili  x   /  AST  34  /  ALT  28  /  AlkPhos  92  10-09    CAPILLARY BLOOD GLUCOSE      POCT Blood Glucose.: 296 mg/dL (10 Oct 2019 08:00)    PT/INR - ( 09 Oct 2019 21:58 )   PT: 20.0 sec;   INR: 1.75 ratio         PTT - ( 09 Oct 2019 21:58 )  PTT:29.2 sec    CARDIAC MARKERS ( 09 Oct 2019 21:58 )  .178 ng/mL / x     / x     / x     / 2.6 ng/mL        Impression:  * HypoNa -- hypervolemic, aldactone effect  * Severe RHF, pulm HTN ( ? primary )  * Chronic hypotension due to above  * L fem DVT    Recommendations:   * Resume maintenance oral loop diuretic  * Consider stopping Aldactone   * FR 1000cc/day.

## 2019-10-10 NOTE — CONSULT NOTE ADULT - SUBJECTIVE AND OBJECTIVE BOX
CARDIOLOGY CONSULT NOTE    Patient is a 34y Female with a known history of :  Hypotension, unspecified hypotension type (I95.9)  Suspected pulmonary embolism ([id number])  Suspected deep vein thrombosis (DVT) (990752317)  Hyperbilirubinemia (E80.6)  Congestive heart failure, unspecified HF chronicity, unspecified heart failure type (I50.9)  Type 2 diabetes mellitus without complication, without long-term current use of insulin (E11.9)  Pulmonary hypertension (I27.20)  COPD exacerbation (J44.1)  Shortness of breath (R06.02)    HPI:  33 y/o lady with a PMH severe pulmonary hypertension and right sided CHF (RV EF 35%), COPD, HTN, DM2, hyperbilirubinemia, L femoral DVT,  Pt. went to her PCP today who noted her hypotension and SOB and recommended coming to ER.  Pt. is amenable to staying here tonight, says she's too sob to go home and further she hasn't been able to  meds from pharmacy as outpatient., because she can't walk due to chronic leg swelling, right calf pain.  No other complaints of inciting event, has had poor oral intake for several days,   Pt. is otherwise well. Pt refusing ABG, parenteral anticoagulants; has 22G IV, refusing attempt to insert larger catheter for CTA, will allow oral anticoagulation    Discussed with Dr. Unger.. pt very non-compliant both with follow-ups and with meds.       REVIEW OF SYSTEMS:    CONSTITUTIONAL: No fever, weight loss, or fatigue  EYES: No eye pain, visual disturbances, or discharge  ENMT:  No difficulty hearing, tinnitus, vertigo; No sinus or throat pain  NECK: No pain or stiffness  BREASTS: No pain, masses, or nipple discharge  RESPIRATORY: No cough, wheezing, chills or hemoptysis; + shortness of breath  CARDIOVASCULAR: No chest pain, palpitations, dizziness; + leg swelling  GASTROINTESTINAL: No abdominal or epigastric pain. No nausea, vomiting, or hematemesis; No diarrhea or constipation. No melena or hematochezia.  GENITOURINARY: No dysuria, frequency, hematuria, or incontinence  NEUROLOGICAL: No headaches, memory loss, loss of strength, numbness, or tremors  SKIN: No itching, burning, rashes, or lesions   LYMPH NODES: No enlarged glands  ENDOCRINE: No heat or cold intolerance; No hair loss  MUSCULOSKELETAL: No joint pain or swelling; No muscle, back, or extremity pain  PSYCHIATRIC: No depression, anxiety, mood swings, or difficulty sleeping  HEME/LYMPH: No easy bruising, or bleeding gums  ALLERGY AND IMMUNOLOGIC: No hives or eczema    MEDICATIONS  (STANDING):  ALBUTerol/ipratropium for Nebulization 3 milliLiter(s) Nebulizer every 6 hours  apixaban 10 milliGRAM(s) Oral every 12 hours  aspirin enteric coated 81 milliGRAM(s) Oral daily  atorvastatin 20 milliGRAM(s) Oral at bedtime  insulin lispro (HumaLOG) corrective regimen sliding scale   SubCutaneous three times a day before meals  levothyroxine 25 MICROGram(s) Oral daily  methylPREDNISolone sodium succinate Injectable 40 milliGRAM(s) IV Push every 8 hours  midodrine. 10 milliGRAM(s) Oral every 8 hours    MEDICATIONS  (PRN):  dextrose 40% Gel 15 Gram(s) Oral once PRN Blood Glucose LESS THAN 70 milliGRAM(s)/deciliter  glucagon  Injectable 1 milliGRAM(s) IntraMuscular once PRN Glucose LESS THAN 70 milligrams/deciliter      ALLERGIES: No Known Allergies      FAMILY HISTORY:  Family history of leukemia (Sibling)      PHYSICAL EXAMINATION:  -----------------------------  T(C): 36.6 (10-10-19 @ 04:10), Max: 36.6 (10-10-19 @ 04:10)  HR: 98 (10-10-19 @ 11:30) (85 - 100)  BP: 111/51 (10-10-19 @ 04:10) (98/44 - 117/53)  RR: 18 (10-10-19 @ 04:10) (17 - 21)  SpO2: 99% (10-10-19 @ 11:30) (95% - 100%)  Wt(kg): --    Height (cm): 175.26 (10-09 @ 19:59)  Weight (kg): 106.2 (10-10 @ 04:10)  BMI (kg/m2): 34.6 (10-10 @ 04:10)  BSA (m2): 2.21 (10-10 @ 04:10)    Constitutional: well developed, normal appearance, well groomed, well nourished, no deformities and no acute distress.   Eyes: the conjunctiva exhibited no abnormalities and the eyelids demonstrated no xanthelasmas.   HEENT: normal oral mucosa, no oral pallor and no oral cyanosis.   Neck: normal jugular venous A waves present, normal jugular venous V waves present and no jugular venous kumar A waves.   Pulmonary: no respiratory distress, normal respiratory rhythm and effort, no accessory muscle use and lungs were clear to auscultation bilaterally.   Cardiovascular: heart rate and rhythm were normal, normal S1 and S2 and no murmur, gallop, rub, heave or thrill are present.   Abdomen: soft, non-tender, no hepato-splenomegaly and no abdominal mass palpated.   Musculoskeletal: the gait could not be assessed..   Extremities: no clubbing of the fingernails, no localized cyanosis, no petechial hemorrhages and no ischemic changes.   Skin: normal skin color and pigmentation, no rash, no venous stasis, no skin lesions, no skin ulcer and no xanthoma was observed.   Psychiatric: oriented to person, place, and time, the affect was normal, the mood was normal and not feeling anxious.     LABS:   --------  10-10    128<L>  |  93<L>  |  22  ----------------------------<  281<H>  4.2   |  19<L>  |  1.34<H>    Ca    9.8      10 Oct 2019 11:08  Mg     2.4     10-09    TPro  8.1  /  Alb  3.3  /  TBili  3.8<H>  /  DBili  2.87<H>  /  AST  30  /  ALT  30  /  AlkPhos  91  10-10                         8.3    3.65  )-----------( 283      ( 10 Oct 2019 11:08 )             27.9     PT/INR - ( 09 Oct 2019 21:58 )   PT: 20.0 sec;   INR: 1.75 ratio         PTT - ( 09 Oct 2019 21:58 )  PTT:29.2 sec  10-09 @ 21:58 BNP: 1930 pg/mL    10-10 @ 11:08 CPK total:--, CKMB --, Troponin I - .149 ng/mL<H>  10-09 @ 21:58 CPK total:--, CKMB --, Troponin I - .178 ng/mL<H>          RADIOLOGY:  -----------------    ECG: NSR 86bpm; RBBB, diffuse ST-T wave changes    < from: TTE Echo Doppler w/o Cont (07.26.19 @ 08:44) >  Summary:   1. Left ventricular ejection fraction, by visual estimation, is 45 to   50%.   2. Mildly decreased global left ventricular systolic function.   3. Elevated mean left atrial pressure.   4. Global cardiomyopathy.   5. Increased LV wall thickness.   6. Normal left ventricular internal cavity size.   7. Right ventricular pressure overload.   8. Spectral Doppler shows impaired relaxation pattern of left   ventricular myocardial filling (Grade I diastolic dysfunction).   9. There is mild asymmetric left ventricular hypertrophy.  10. Pulmonary hypertension.  11. Severely enlarged right ventricle.  12. Severely reduced RV systolic function.  13. RV ejection fraction 34%.  14. Small pericardial effusion.  15. Degenerative mitral valve.  16. Mild mitral annular calcification.  17. Mild mitral valve regurgitation.  18. Moderate tricuspid regurgitation.  19. Structurally normal tricuspid valve, with normal leaflet excursion.  20. Mild aortic regurgitation.  21. Mild to moderate pulmonic valve regurgitation.  22. Structurally normal pulmonic valve, with normal leaflet excursion.  23. Estimated pulmonary artery systolic pressure is 87.1 mmHg assuming a   right atrial pressure of 20 mmHg, which is consistent with severe   pulmonary hypertension.  24. Pulmonary hypertension is present.  25. The main pulmonary artery is normal in size.  26. The right atrial pressure is moderately elevated.    < end of copied text >      < from: Cardiac Cath Lab - Adult (08.10.18 @ 10:48) >  DIAGNOSTIC IMPRESSIONS: Severe Pulmonary Hypertension with no response with  80ppm of Nitric Oxide  INTERVENTIONAL IMPRESSIONS: Severe Pulmonary Hypertension with no response with  80ppm of Nitric Oxide    < end of copied text >      < from: Cardiac Cath Lab (12.30.13 @ 14:08) >  CORONARY VESSELS: The coronary circulation is right dominant.  LM:   --LM: Normal. The left main coronary artery appeared to course  posterior to the aorta and pulmonary artery.  LAD:   --  LAD: Normal.  CX:   --  Circumflex: Normal.  RCA:   --  RCA: Normal. The vessel arose anomalously high and anterior.  COMPLICATIONS: There were no complications.    < end of copied text >

## 2019-10-10 NOTE — H&P ADULT - NSHPREVIEWOFSYSTEMS_GEN_ALL_CORE
REVIEW OF SYSTEMS:  CONSTITUTIONAL: No fever, weight loss, or fatigue  EYES: No eye pain, visual disturbances, or discharge  ENMT:  No difficulty hearing, tinnitus, vertigo; No sinus or throat pain  NECK: No pain or stiffness  BREASTS: No pain, masses, or nipple discharge  RESPIRATORY: No cough, wheezing, chills or hemoptysis; + shortness of breath  CARDIOVASCULAR: No chest pain, palpitations, dizziness, + leg swelling  GASTROINTESTINAL: No abdominal or epigastric pain. No nausea, vomiting, or hematemesis; No diarrhea or constipation. No melena or hematochezia.  GENITOURINARY: No dysuria, frequency, hematuria, or incontinence  NEUROLOGICAL: No headaches, memory loss, loss of strength, numbness, or tremors  SKIN: No itching, burning, rashes, or lesions   LYMPH NODES: No enlarged glands  ENDOCRINE: No heat or cold intolerance; No hair loss  MUSCULOSKELETAL: No joint pain or swelling; No muscle, back, or extremity pain  PSYCHIATRIC: No depression, anxiety, mood swings, or difficulty sleeping  HEME/LYMPH: No easy bruising, or bleeding gums  ALLERGY AND IMMUNOLOGIC: No hives or eczema

## 2019-10-10 NOTE — CHART NOTE - NSCHARTNOTEFT_GEN_A_CORE
35 y/o lady with a PMH severe pulmonary hypertension and right sided CHF (RV EF 35%), COPD, HTN, DM2, hyperbilirubinemia, L femoral DVT,  Pt. sent by her PCP for hypotension and SOB. pt very non-compliant both with follow-ups and with meds.   -Will diuresis the next few days back to a euvolemic state

## 2019-10-11 LAB
ALBUMIN SERPL ELPH-MCNC: 3.1 G/DL — LOW (ref 3.3–5)
ALP SERPL-CCNC: 82 U/L — SIGNIFICANT CHANGE UP (ref 40–120)
ALT FLD-CCNC: 28 U/L — SIGNIFICANT CHANGE UP (ref 12–78)
ANION GAP SERPL CALC-SCNC: 12 MMOL/L — SIGNIFICANT CHANGE UP (ref 5–17)
AST SERPL-CCNC: 27 U/L — SIGNIFICANT CHANGE UP (ref 15–37)
BILIRUB SERPL-MCNC: 3.2 MG/DL — HIGH (ref 0.2–1.2)
BUN SERPL-MCNC: 20 MG/DL — SIGNIFICANT CHANGE UP (ref 7–23)
CALCIUM SERPL-MCNC: 9.3 MG/DL — SIGNIFICANT CHANGE UP (ref 8.5–10.1)
CHLORIDE SERPL-SCNC: 91 MMOL/L — LOW (ref 96–108)
CO2 SERPL-SCNC: 24 MMOL/L — SIGNIFICANT CHANGE UP (ref 22–31)
CREAT SERPL-MCNC: 1.05 MG/DL — SIGNIFICANT CHANGE UP (ref 0.5–1.3)
GLUCOSE BLDC GLUCOMTR-MCNC: 128 MG/DL — HIGH (ref 70–99)
GLUCOSE BLDC GLUCOMTR-MCNC: 167 MG/DL — HIGH (ref 70–99)
GLUCOSE BLDC GLUCOMTR-MCNC: 229 MG/DL — HIGH (ref 70–99)
GLUCOSE BLDC GLUCOMTR-MCNC: 292 MG/DL — HIGH (ref 70–99)
GLUCOSE SERPL-MCNC: 211 MG/DL — HIGH (ref 70–99)
POTASSIUM SERPL-MCNC: 4 MMOL/L — SIGNIFICANT CHANGE UP (ref 3.5–5.3)
POTASSIUM SERPL-SCNC: 4 MMOL/L — SIGNIFICANT CHANGE UP (ref 3.5–5.3)
PROT SERPL-MCNC: 7.7 GM/DL — SIGNIFICANT CHANGE UP (ref 6–8.3)
SODIUM SERPL-SCNC: 127 MMOL/L — LOW (ref 135–145)

## 2019-10-11 PROCEDURE — 99233 SBSQ HOSP IP/OBS HIGH 50: CPT

## 2019-10-11 PROCEDURE — 99232 SBSQ HOSP IP/OBS MODERATE 35: CPT

## 2019-10-11 RX ORDER — TRAMADOL HYDROCHLORIDE 50 MG/1
25 TABLET ORAL EVERY 6 HOURS
Refills: 0 | Status: DISCONTINUED | OUTPATIENT
Start: 2019-10-11 | End: 2019-10-18

## 2019-10-11 RX ADMIN — Medication 40 MILLIGRAM(S): at 05:59

## 2019-10-11 RX ADMIN — Medication 81 MILLIGRAM(S): at 11:28

## 2019-10-11 RX ADMIN — APIXABAN 10 MILLIGRAM(S): 2.5 TABLET, FILM COATED ORAL at 22:25

## 2019-10-11 RX ADMIN — ATORVASTATIN CALCIUM 20 MILLIGRAM(S): 80 TABLET, FILM COATED ORAL at 22:25

## 2019-10-11 RX ADMIN — MIDODRINE HYDROCHLORIDE 10 MILLIGRAM(S): 2.5 TABLET ORAL at 22:25

## 2019-10-11 RX ADMIN — APIXABAN 10 MILLIGRAM(S): 2.5 TABLET, FILM COATED ORAL at 11:28

## 2019-10-11 RX ADMIN — Medication 40 MILLIGRAM(S): at 05:58

## 2019-10-11 RX ADMIN — MIDODRINE HYDROCHLORIDE 10 MILLIGRAM(S): 2.5 TABLET ORAL at 05:58

## 2019-10-11 RX ADMIN — Medication 3: at 11:28

## 2019-10-11 RX ADMIN — TRAMADOL HYDROCHLORIDE 25 MILLIGRAM(S): 50 TABLET ORAL at 12:12

## 2019-10-11 RX ADMIN — MIDODRINE HYDROCHLORIDE 10 MILLIGRAM(S): 2.5 TABLET ORAL at 11:28

## 2019-10-11 RX ADMIN — Medication 3 MILLILITER(S): at 05:29

## 2019-10-11 RX ADMIN — Medication 3 MILLILITER(S): at 18:28

## 2019-10-11 RX ADMIN — Medication 3 MILLILITER(S): at 11:05

## 2019-10-11 RX ADMIN — TRAMADOL HYDROCHLORIDE 25 MILLIGRAM(S): 50 TABLET ORAL at 11:26

## 2019-10-11 RX ADMIN — Medication 25 MICROGRAM(S): at 05:58

## 2019-10-11 NOTE — PROGRESS NOTE ADULT - ASSESSMENT
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    Acute on chronic combined systolic and diastolic congestive heart failure:  - Hold Torsemide in the setting of hyponatremia.   - furosemide   Injectable 40 milliGRAM(s) IV Push daily  - monitor daily wt, i/o's    severe Pulmonary hypertension:  - c/w riociguat  - on chronic oxygen     Chest pain:  - EKG unchanged  - Cardio following  - Continue ASA    Type 2 diabetes mellitus with hyperglycemia, without long-term current use of insulin:  - Continue current insulin regimen    Hyponatremia:  - improving  - likely due to volume overload/ aggressive diuresis and possible SSRI- on hold  - Renal following   - c/w Fluid restriction    HTN:  - hold all BP meds due to borderline BP    Asthma:  - pulmonary following  - c/w Symbicort    Acute deep vein thrombosis (DVT) of femoral vein of left lower extremity:  - US showed an extensive acute nonocclusive thrombus in the left external iliac vein and the left common femoral vein and left greater saphenous vein  - Eliquis, pt non compliant with INR check.    Acquired hypothyroidism:  - c/w synthroid   - recheck TSH and free T4 tomorrow    Hyperlipidemia:  - c/w Zocor    Depression:  - Zoloft on hold    Prophylaxis:  DVT: Eliquis  GI: Pepcid

## 2019-10-11 NOTE — PROGRESS NOTE ADULT - SUBJECTIVE AND OBJECTIVE BOX
Patient is a 34y old  Female who presents with a chief complaint of Shortness of breath and hypotension. (10 Oct 2019 13:05)      OVERNIGHT EVENTS:  abdominal pain/cramp 2/2 dysmenorrhea. pt refused insulin, education provided. she does not like hospital food- ref to dietary eval. non-complaint w/leg elevation    REVIEW OF SYSTEMS: denies chest pain/SOB, diaphoresis, no F/C, cough, dizziness, headache, blurry vision, nausea, vomiting. All others review of systems negative     MEDICATIONS  (STANDING):  ALBUTerol/ipratropium for Nebulization 3 milliLiter(s) Nebulizer every 6 hours  apixaban 10 milliGRAM(s) Oral every 12 hours  aspirin enteric coated 81 milliGRAM(s) Oral daily  atorvastatin 20 milliGRAM(s) Oral at bedtime  dextrose 5%. 1000 milliLiter(s) (50 mL/Hr) IV Continuous <Continuous>  dextrose 50% Injectable 12.5 Gram(s) IV Push once  dextrose 50% Injectable 25 Gram(s) IV Push once  dextrose 50% Injectable 25 Gram(s) IV Push once  furosemide   Injectable 40 milliGRAM(s) IV Push daily  insulin lispro (HumaLOG) corrective regimen sliding scale   SubCutaneous three times a day before meals  insulin lispro (HumaLOG) corrective regimen sliding scale   SubCutaneous at bedtime  levothyroxine 25 MICROGram(s) Oral daily  midodrine. 10 milliGRAM(s) Oral every 8 hours    MEDICATIONS  (PRN):  dextrose 40% Gel 15 Gram(s) Oral once PRN Blood Glucose LESS THAN 70 milliGRAM(s)/deciliter  glucagon  Injectable 1 milliGRAM(s) IntraMuscular once PRN Glucose LESS THAN 70 milligrams/deciliter  traMADol 25 milliGRAM(s) Oral every 6 hours PRN Moderate Pain (4 - 6)      Allergies    No Known Allergies    Intolerances        T(F): 96.8 (10-11-19 @ 05:32), Max: 97.4 (10-10-19 @ 17:25)  HR: 97 (10-11-19 @ 08:57) (85 - 99)  BP: 98/49 (10-11-19 @ 05:32) (98/49 - 106/67)  RR: 18 (10-11-19 @ 05:32) (18 - 98)  SpO2: 96% (10-11-19 @ 08:57) (90% - 99%)  Wt(kg): --    PHYSICAL EXAM:  GENERAL: NAD, well-groomed, well-developed  HEAD:  Atraumatic, Normocephalic  EYES: EOMI, PERRLA, conjunctiva and sclera clear  ENMT: No tonsillar erythema, exudates, or enlargement; Moist mucous membranes, Good dentition, No lesions  NECK: Supple, No JVD, Normal thyroid  NERVOUS SYSTEM:  Alert & Oriented X3, Good concentration; Motor Strength 5/5 B/L upper and lower extremities; DTRs 2+ intact and symmetric  CHEST/LUNG: Clear to percussion bilaterally; No rales, rhonchi, wheezing, or rubs BL  HEART: Regular rate and rhythm; No murmurs, rubs, or gallops  ABDOMEN: Soft, Nontender, Nondistended; Bowel sounds present  EXTREMITIES:  4+ Peripheral edema BL LE  LYMPH: No lymphadenopathy noted  SKIN: No rashes or lesions    LABS:                        8.3    3.65  )-----------( 283      ( 10 Oct 2019 11:08 )             27.9     10-11    127<L>  |  91<L>  |  20  ----------------------------<  211<H>  4.0   |  24  |  1.05    Ca    9.3      11 Oct 2019 06:31  Mg     2.4     10-09    TPro  7.7  /  Alb  3.1<L>  /  TBili  3.2<H>  /  DBili  x   /  AST  27  /  ALT  28  /  AlkPhos  82  10-11    PT/INR - ( 09 Oct 2019 21:58 )   PT: 20.0 sec;   INR: 1.75 ratio         PTT - ( 09 Oct 2019 21:58 )  PTT:29.2 sec    Cultures;   CAPILLARY BLOOD GLUCOSE      POCT Blood Glucose.: 229 mg/dL (11 Oct 2019 07:14)  POCT Blood Glucose.: 275 mg/dL (10 Oct 2019 21:55)  POCT Blood Glucose.: 263 mg/dL (10 Oct 2019 16:31)  POCT Blood Glucose.: 322 mg/dL (10 Oct 2019 11:19)    Lipid panel:     CARDIAC MARKERS ( 10 Oct 2019 11:08 )  .149 ng/mL / x     / x     / x     / x      CARDIAC MARKERS ( 09 Oct 2019 21:58 )  .178 ng/mL / x     / x     / x     / 2.6 ng/mL        RADIOLOGY & ADDITIONAL TESTS:    Imaging Personally Reviewed:  [x ] YES    < from: TTE Echo Doppler w/o Cont (07.26.19 @ 08:44) >  1. Left ventricular ejection fraction, by visual estimation, is 45 to   50%.   2. Mildly decreased global left ventricular systolic function.   3. Elevated mean left atrial pressure.   4. Global cardiomyopathy.   5. Increased LV wall thickness.   6. Normal left ventricular internal cavity size.   7. Right ventricular pressure overload.   8. Spectral Doppler shows impaired relaxation pattern of left   ventricular myocardial filling (Grade I diastolic dysfunction).   9. There is mild asymmetric left ventricular hypertrophy.  10. Pulmonary hypertension.  11. Severely enlarged right ventricle.  12. Severely reduced RV systolic function.  13. RV ejection fraction 34%.  14. Small pericardial effusion.  15. Degenerative mitral valve.  16. Mild mitral annular calcification.  17. Mild mitral valve regurgitation.  18. Moderate tricuspid regurgitation.  19. Structurally normal tricuspid valve, with normal leaflet excursion.  20. Mild aortic regurgitation.  21. Mild to moderate pulmonic valve regurgitation.  22. Structurally normal pulmonic valve, with normal leaflet excursion.  23. Estimated pulmonary artery systolic pressure is 87.1 mmHg assuming a   right atrial pressure of 20 mmHg, which is consistent with severe   pulmonary hypertension.  24. Pulmonary hypertension is present.  25. The main pulmonary artery is normal in size.  26. The right atrial pressure is moderately elevated.    < end of copied text >    Consultant(s) Notes Reviewed:  [x ] YES     Care Discussed with [x ] Consultants [X ] Patient [ ] Family  [x ]    [x ]  Other; RN

## 2019-10-11 NOTE — PROGRESS NOTE ADULT - SUBJECTIVE AND OBJECTIVE BOX
Patient is a 34y old  Female who presents with a chief complaint of Shortness of breath and hypotension. (11 Oct 2019 13:04)    PAST MEDICAL & SURGICAL HISTORY:  Former smoker, stopped smoking in distant past  Vitamin D deficiency  Morbid obesity due to excess calories  Edema extremities  Diabetes mellitus  Hyperbilirubinemia  COPD (chronic obstructive pulmonary disease)  COPD (chronic obstructive pulmonary disease)  Anticoagulation goal of INR 2 to 3  Anticardiolipin antibody positive  Congestive heart failure, unspecified HF chronicity, unspecified heart failure type  Moderate asthma, unspecified whether complicated, unspecified whether persistent  Other secondary hypertension  COPD (chronic obstructive pulmonary disease)  Anticardiolipin antibody positive  Essential hypertension  Pulmonary HTN  Morbid obesity  No significant past surgical history      INTERVAL HISTORY:  	  MEDICATIONS:  MEDICATIONS  (STANDING):  ALBUTerol/ipratropium for Nebulization 3 milliLiter(s) Nebulizer every 6 hours  apixaban 10 milliGRAM(s) Oral every 12 hours  aspirin enteric coated 81 milliGRAM(s) Oral daily  atorvastatin 20 milliGRAM(s) Oral at bedtime  dextrose 5%. 1000 milliLiter(s) (50 mL/Hr) IV Continuous <Continuous>  dextrose 50% Injectable 12.5 Gram(s) IV Push once  dextrose 50% Injectable 25 Gram(s) IV Push once  dextrose 50% Injectable 25 Gram(s) IV Push once  furosemide   Injectable 40 milliGRAM(s) IV Push daily  insulin lispro (HumaLOG) corrective regimen sliding scale   SubCutaneous three times a day before meals  insulin lispro (HumaLOG) corrective regimen sliding scale   SubCutaneous at bedtime  levothyroxine 25 MICROGram(s) Oral daily  midodrine. 10 milliGRAM(s) Oral every 8 hours    MEDICATIONS  (PRN):  dextrose 40% Gel 15 Gram(s) Oral once PRN Blood Glucose LESS THAN 70 milliGRAM(s)/deciliter  glucagon  Injectable 1 milliGRAM(s) IntraMuscular once PRN Glucose LESS THAN 70 milligrams/deciliter  traMADol 25 milliGRAM(s) Oral every 6 hours PRN Moderate Pain (4 - 6)      Vitals:  T(F): 97.8 (10-11-19 @ 11:48), Max: 97.8 (10-11-19 @ 11:48)  HR: 89 (10-11-19 @ 11:50) (85 - 98)  BP: 102/62 (10-11-19 @ 11:48) (98/49 - 106/67)  RR: 17 (10-11-19 @ 11:48) (17 - 98)  SpO2: 92% (10-11-19 @ 11:50) (90% - 98%)  Wt(kg): --106.2 kg    10-10 @ 07:01  -  10-11 @ 07:00  --------------------------------------------------------  IN:    Oral Fluid: 240 mL  Total IN: 240 mL    OUT:  Total OUT: 0 mL    Total NET: 240 mL      10-11 @ 07:01  -  10-11 @ 13:44  --------------------------------------------------------  IN:    Oral Fluid: 240 mL  Total IN: 240 mL    OUT:  Total OUT: 0 mL    Total NET: 240 mL    Weight (kg): 106.2 (10-10 @ 04:10)  BMI (kg/m2): 34.6 (10-10 @ 04:10)      PHYSICAL EXAM:  Neuro: Awake, responsive  CV: S1 S2 RRR  Lungs: Occasional rhonchi in base   GI: Soft, BS +, ND, NT  Extremities: Bilateral  LE edema    TELEMETRY: NSR   	    RADIOLOGY: < from: Xray Chest 1 View- PORTABLE-Urgent (10.09.19 @ 21:34) >  INTERPRETATION:  PROCEDURE: AP view of the chest.    CLINICAL INFORMATION: Shortness of breath.    COMPARISON: 9/9/2019.    FINDINGS:    Lungs: The lungs are clear.  Heart: There is cardiomegaly.  Mediastinum: Hilar prominence noted.    IMPRESSION:    Clear lungs.    < end of copied text >      DIAGNOSTIC TESTING:    [x ] Echocardiogram: < from: TTE Echo Doppler w/o Cont (07.26.19 @ 08:44) >  Summary:   1. Left ventricular ejection fraction, by visual estimation, is 45 to   50%.   2. Mildly decreased global left ventricular systolic function.   3. Elevated mean left atrial pressure.   4. Global cardiomyopathy.   5. Increased LV wall thickness.   6. Normal left ventricular internal cavity size.   7. Right ventricular pressure overload.   8. Spectral Doppler shows impaired relaxation pattern of left   ventricular myocardial filling (Grade I diastolic dysfunction).   9. There is mild asymmetric left ventricular hypertrophy.  10. Pulmonary hypertension.  11. Severely enlarged right ventricle.  12. Severely reduced RV systolic function.  13. RV ejection fraction 34%.  14. Small pericardial effusion.  15. Degenerative mitral valve.  16. Mild mitral annular calcification.  17. Mild mitral valve regurgitation.  18. Moderate tricuspid regurgitation.  19. Structurally normal tricuspid valve, with normal leaflet excursion.  20. Mild aortic regurgitation.  21. Mild to moderate pulmonic valve regurgitation.  22. Structurally normal pulmonic valve, with normal leaflet excursion.  23. Estimated pulmonary artery systolic pressure is 87.1 mmHg assuming a   right atrial pressure of 20 mmHg, which is consistent with severe   pulmonary hypertension.  24. Pulmonary hypertension is present.  25. The main pulmonary artery is normal in size.  26. The right atrial pressure is moderately elevated.    < end of copied text >    [ ]  Catheterization:  [ ] Stress Test:    OTHER: 	2    LABS:	 	    CARDIAC MARKERS:  Troponin I, Serum: .149 ng/mL (10-10 @ 11:08)  Troponin I, Serum: .178 ng/mL (10-09 @ 21:58)          11 Oct 2019 06:31    127    |  91     |  20     ----------------------------<  211    4.0     |  24     |  1.05   10 Oct 2019 11:08    128    |  93     |  22     ----------------------------<  281    4.2     |  19     |  1.34   09 Oct 2019 21:58    130    |  94     |  20     ----------------------------<  119    3.8     |  25     |  1.16     Ca    9.3        11 Oct 2019 06:31  Mg     2.4       09 Oct 2019 21:58    TPro  7.7    /  Alb  3.1    /  TBili  3.2    /  DBili  x      /  AST  27     /  ALT  28     /  AlkPhos  82     11 Oct 2019 06:31                          8.3    3.65  )-----------( 283      ( 10 Oct 2019 11:08 )             27.9 ,                       8.4    4.32  )-----------( 277      ( 09 Oct 2019 21:58 )             28.7   proBNP: Serum Pro-Brain Natriuretic Peptide: 3633 pg/mL (10-10 @ 23:16)  Serum Pro-Brain Natriuretic Peptide: 1930 pg/mL (10-09 @ 21:58)    Lipid Profile:   HgA1c:   TSH: Thyroid Stimulating Hormone, Serum: 2.750 uU/mL (10-10 @ 11:08)        INR: 1.75 ratio (10-09 @ 21:58) Patient is a 34y old  Female who presents with a chief complaint of Shortness of breath and hypotension. (11 Oct 2019 13:04)    PAST MEDICAL & SURGICAL HISTORY:  Former smoker, stopped smoking in distant past  Vitamin D deficiency  Morbid obesity due to excess calories  Edema extremities  Diabetes mellitus  Hyperbilirubinemia  COPD (chronic obstructive pulmonary disease)  COPD (chronic obstructive pulmonary disease)  Anticoagulation goal of INR 2 to 3  Anticardiolipin antibody positive  Congestive heart failure, unspecified HF chronicity, unspecified heart failure type  Moderate asthma, unspecified whether complicated, unspecified whether persistent  Other secondary hypertension  COPD (chronic obstructive pulmonary disease)  Anticardiolipin antibody positive  Essential hypertension  Pulmonary HTN  Morbid obesity  No significant past surgical history    INTERVAL HISTORY: Patient sitting in side of bed, reports improvement in SOB; denies chest pain, palpitations, dizziness or cough.  	  MEDICATIONS:  MEDICATIONS  (STANDING):  ALBUTerol/ipratropium for Nebulization 3 milliLiter(s) Nebulizer every 6 hours  apixaban 10 milliGRAM(s) Oral every 12 hours  aspirin enteric coated 81 milliGRAM(s) Oral daily  atorvastatin 20 milliGRAM(s) Oral at bedtime  dextrose 5%. 1000 milliLiter(s) (50 mL/Hr) IV Continuous <Continuous>  dextrose 50% Injectable 12.5 Gram(s) IV Push once  dextrose 50% Injectable 25 Gram(s) IV Push once  dextrose 50% Injectable 25 Gram(s) IV Push once  furosemide   Injectable 40 milliGRAM(s) IV Push daily  insulin lispro (HumaLOG) corrective regimen sliding scale   SubCutaneous three times a day before meals  insulin lispro (HumaLOG) corrective regimen sliding scale   SubCutaneous at bedtime  levothyroxine 25 MICROGram(s) Oral daily  midodrine. 10 milliGRAM(s) Oral every 8 hours    MEDICATIONS  (PRN):  dextrose 40% Gel 15 Gram(s) Oral once PRN Blood Glucose LESS THAN 70 milliGRAM(s)/deciliter  glucagon  Injectable 1 milliGRAM(s) IntraMuscular once PRN Glucose LESS THAN 70 milligrams/deciliter  traMADol 25 milliGRAM(s) Oral every 6 hours PRN Moderate Pain (4 - 6)      Vitals:  T(F): 97.8 (10-11-19 @ 11:48), Max: 97.8 (10-11-19 @ 11:48)  HR: 89 (10-11-19 @ 11:50) (85 - 98)  BP: 102/62 (10-11-19 @ 11:48) (98/49 - 106/67)  RR: 17 (10-11-19 @ 11:48) (17 - 98)  SpO2: 92% (10-11-19 @ 11:50) (90% - 98%)  Wt(kg): --106.2 kg    10-10 @ 07:01  -  10-11 @ 07:00  --------------------------------------------------------  IN:    Oral Fluid: 240 mL  Total IN: 240 mL    OUT:  Total OUT: 0 mL    Total NET: 240 mL      10-11 @ 07:01  -  10-11 @ 13:44  --------------------------------------------------------  IN:    Oral Fluid: 240 mL  Total IN: 240 mL    OUT:  Total OUT: 0 mL    Total NET: 240 mL    Weight (kg): 106.2 (10-10 @ 04:10)  BMI (kg/m2): 34.6 (10-10 @ 04:10)      PHYSICAL EXAM:  Neuro: Awake, responsive  CV: S1 S2 RRR  Lungs: Occasional rhonchi in base   GI: Soft, BS +, ND, NT  Extremities: Bilateral  LE edema    TELEMETRY: NSR   	    RADIOLOGY: < from: Xray Chest 1 View- PORTABLE-Urgent (10.09.19 @ 21:34) >  INTERPRETATION:  PROCEDURE: AP view of the chest.    CLINICAL INFORMATION: Shortness of breath.    COMPARISON: 9/9/2019.    FINDINGS:    Lungs: The lungs are clear.  Heart: There is cardiomegaly.  Mediastinum: Hilar prominence noted.    IMPRESSION:    Clear lungs.    < end of copied text >      DIAGNOSTIC TESTING:    [x ] Echocardiogram: < from: TTE Echo Doppler w/o Cont (07.26.19 @ 08:44) >  Summary:   1. Left ventricular ejection fraction, by visual estimation, is 45 to   50%.   2. Mildly decreased global left ventricular systolic function.   3. Elevated mean left atrial pressure.   4. Global cardiomyopathy.   5. Increased LV wall thickness.   6. Normal left ventricular internal cavity size.   7. Right ventricular pressure overload.   8. Spectral Doppler shows impaired relaxation pattern of left   ventricular myocardial filling (Grade I diastolic dysfunction).   9. There is mild asymmetric left ventricular hypertrophy.  10. Pulmonary hypertension.  11. Severely enlarged right ventricle.  12. Severely reduced RV systolic function.  13. RV ejection fraction 34%.  14. Small pericardial effusion.  15. Degenerative mitral valve.  16. Mild mitral annular calcification.  17. Mild mitral valve regurgitation.  18. Moderate tricuspid regurgitation.  19. Structurally normal tricuspid valve, with normal leaflet excursion.  20. Mild aortic regurgitation.  21. Mild to moderate pulmonic valve regurgitation.  22. Structurally normal pulmonic valve, with normal leaflet excursion.  23. Estimated pulmonary artery systolic pressure is 87.1 mmHg assuming a   right atrial pressure of 20 mmHg, which is consistent with severe   pulmonary hypertension.  24. Pulmonary hypertension is present.  25. The main pulmonary artery is normal in size.  26. The right atrial pressure is moderately elevated.    < end of copied text >    LABS:	 	    CARDIAC MARKERS:  Troponin I, Serum: .149 ng/mL (10-10 @ 11:08)  Troponin I, Serum: .178 ng/mL (10-09 @ 21:58)      11 Oct 2019 06:31    127    |  91     |  20     ----------------------------<  211    4.0     |  24     |  1.05   10 Oct 2019 11:08    128    |  93     |  22     ----------------------------<  281    4.2     |  19     |  1.34   09 Oct 2019 21:58    130    |  94     |  20     ----------------------------<  119    3.8     |  25     |  1.16     Ca    9.3        11 Oct 2019 06:31  Mg     2.4       09 Oct 2019 21:58    TPro  7.7    /  Alb  3.1    /  TBili  3.2    /  DBili  x      /  AST  27     /  ALT  28     /  AlkPhos  82     11 Oct 2019 06:31                          8.3    3.65  )-----------( 283      ( 10 Oct 2019 11:08 )             27.9 ,                       8.4    4.32  )-----------( 277      ( 09 Oct 2019 21:58 )             28.7   proBNP: Serum Pro-Brain Natriuretic Peptide: 3633 pg/mL (10-10 @ 23:16)  Serum Pro-Brain Natriuretic Peptide: 1930 pg/mL (10-09 @ 21:58)    Lipid Profile:   HgA1c:   TSH: Thyroid Stimulating Hormone, Serum: 2.750 uU/mL (10-10 @ 11:08)    INR: 1.75 ratio (10-09 @ 21:58)

## 2019-10-11 NOTE — PROGRESS NOTE ADULT - SUBJECTIVE AND OBJECTIVE BOX
INTERVAL HPI:  33 y/o Female with severe Pulmonary Hypertension and right sided CHF( RV EF 35%) COPD, HTN, DM2, Hyperbilirubinemia, L femoral DVT. Not taking anticoagulant since she her last admission(claims prescription was not sent).   Pt. went to PCP today who noted her hypotension and SOB and recommended coming to ER.  She hasn't been able to  meds from pharmacy as outpatient., because she can't walk due to chronic leg swelling, right calf pain.  No other complaints of inciting event, has had poor oral intake for several days,   Pt. is otherwise well. Refused  ABG, parenteral anticoagulants;  On home O2. Denies tobacco abuse.    OVERNIGHT EVENTS:  Awake, comfortable    Vital Signs Last 24 Hrs  T(C): 36.6 (11 Oct 2019 11:48), Max: 36.6 (11 Oct 2019 11:48)  T(F): 97.8 (11 Oct 2019 11:48), Max: 97.8 (11 Oct 2019 11:48)  HR: 89 (11 Oct 2019 11:50) (85 - 98)  BP: 102/62 (11 Oct 2019 11:48) (98/49 - 106/67)  BP(mean): --  RR: 17 (11 Oct 2019 11:48) (17 - 98)  SpO2: 92% (11 Oct 2019 11:50) (90% - 98%)    PHYSICAL EXAM:  GEN:         Awake, responsive and comfortable.  HEENT:    Normal.    RESP:         no wheezing.  CVS:             Regular rate and rhythm.   ABD:         Soft, non-tender, non-distended;     MEDICATIONS  (STANDING):  ALBUTerol/ipratropium for Nebulization 3 milliLiter(s) Nebulizer every 6 hours  apixaban 10 milliGRAM(s) Oral every 12 hours  aspirin enteric coated 81 milliGRAM(s) Oral daily  atorvastatin 20 milliGRAM(s) Oral at bedtime  dextrose 5%. 1000 milliLiter(s) (50 mL/Hr) IV Continuous <Continuous>  dextrose 50% Injectable 12.5 Gram(s) IV Push once  dextrose 50% Injectable 25 Gram(s) IV Push once  dextrose 50% Injectable 25 Gram(s) IV Push once  furosemide   Injectable 40 milliGRAM(s) IV Push daily  insulin lispro (HumaLOG) corrective regimen sliding scale   SubCutaneous three times a day before meals  insulin lispro (HumaLOG) corrective regimen sliding scale   SubCutaneous at bedtime  levothyroxine 25 MICROGram(s) Oral daily  midodrine. 10 milliGRAM(s) Oral every 8 hours    MEDICATIONS  (PRN):  dextrose 40% Gel 15 Gram(s) Oral once PRN Blood Glucose LESS THAN 70 milliGRAM(s)/deciliter  glucagon  Injectable 1 milliGRAM(s) IntraMuscular once PRN Glucose LESS THAN 70 milligrams/deciliter  traMADol 25 milliGRAM(s) Oral every 6 hours PRN Moderate Pain (4 - 6)    LABS:                        8.3    3.65  )-----------( 283      ( 10 Oct 2019 11:08 )             27.9     10-11    127<L>  |  91<L>  |  20  ----------------------------<  211<H>  4.0   |  24  |  1.05    Ca    9.3      11 Oct 2019 06:31  Mg     2.4     10-09    TPro  7.7  /  Alb  3.1<L>  /  TBili  3.2<H>  /  DBili  x   /  AST  27  /  ALT  28  /  AlkPhos  82  10-11    PT/INR - ( 09 Oct 2019 21:58 )   PT: 20.0 sec;   INR: 1.75 ratio      PTT - ( 09 Oct 2019 21:58 )  PTT:29.2 sec    Supplemental O2, Keep SPO2 89 to 94%.   Eliquis.  Diuretics to keep in negative balance.

## 2019-10-11 NOTE — PROGRESS NOTE ADULT - ASSESSMENT
35 y/o lady with a PMH severe pulmonary hypertension and right sided CHF (RV EF 35%), COPD, HTN, DM2, hyperbilirubinemia, L femoral DVT,  Pt. went to her PCP today who noted her hypotension and SOB and recommended coming to ER.  Pt. is amenable to staying here tonight, says she's too sob to go home and further she hasn't been able to  meds from pharmacy as outpatient., because she can't walk due to chronic leg swelling, right calf pain.  No other complaints of inciting event, has had poor oral intake for several days,   Pt. is otherwise well. Pt refusing ABG, parenteral anticoagulants; has 22G IV, refusing attempt to insert larger catheter for CTA, will allow oral anticoagulation      Discussed with Dr. Unger at length..... pt very non-compliant both with follow-ups and with meds.   Will diuresis the next few days back to a euvolemic state; if she agrees, will then transfer for R and LHC and eval with Dr. Unger at [facility].    -monitor on tele  -cont apixaban for AC  -cont asa/statin  -on nebs/steroids for COPD/pulm following  -continue Lasix diuresis/monitor weight daily  -monitor lytes Na 127 and creat 1.05; replete as needed/renal following  -Will diuresis the next few days back to a euvolemic state; if she agrees, will then transfer for Right  and Left heart cath and eval with Dr. Unger at [facility].

## 2019-10-11 NOTE — PROGRESS NOTE ADULT - SUBJECTIVE AND OBJECTIVE BOX
[facility] NEPHROLOGY SERVICES, [facility]  NEPHROLOGY AND HYPERTENSION  [address]  [address]  [place] [address]  [phone]    MD FABIAN FERREIRA MD ANDREY GONCHARUK, MD MADHU KORRAPATI, MD YELENA ROSENBERG, MD BINNY KOSHY, MD CHRISTOPHER CAPUTO, MD RENEA ALCALA MD          Patient with menstrual cramping;     MEDICATIONS  (STANDING):  ALBUTerol/ipratropium for Nebulization 3 milliLiter(s) Nebulizer every 6 hours  apixaban 10 milliGRAM(s) Oral every 12 hours  aspirin enteric coated 81 milliGRAM(s) Oral daily  atorvastatin 20 milliGRAM(s) Oral at bedtime  dextrose 5%. 1000 milliLiter(s) (50 mL/Hr) IV Continuous <Continuous>  dextrose 50% Injectable 12.5 Gram(s) IV Push once  dextrose 50% Injectable 25 Gram(s) IV Push once  dextrose 50% Injectable 25 Gram(s) IV Push once  furosemide   Injectable 40 milliGRAM(s) IV Push daily  insulin lispro (HumaLOG) corrective regimen sliding scale   SubCutaneous three times a day before meals  insulin lispro (HumaLOG) corrective regimen sliding scale   SubCutaneous at bedtime  levothyroxine 25 MICROGram(s) Oral daily  midodrine. 10 milliGRAM(s) Oral every 8 hours    MEDICATIONS  (PRN):  dextrose 40% Gel 15 Gram(s) Oral once PRN Blood Glucose LESS THAN 70 milliGRAM(s)/deciliter  glucagon  Injectable 1 milliGRAM(s) IntraMuscular once PRN Glucose LESS THAN 70 milligrams/deciliter  traMADol 25 milliGRAM(s) Oral every 6 hours PRN Moderate Pain (4 - 6)      10-10-19 @ 07:01  -  10-11-19 @ 07:00  --------------------------------------------------------  IN: 240 mL / OUT: 0 mL / NET: 240 mL    10-11-19 @ 07:01  -  10-11-19 @ 15:42  --------------------------------------------------------  IN: 240 mL / OUT: 0 mL / NET: 240 mL      PHYSICAL EXAM:      T(C): 36.6 (10-11-19 @ 11:48), Max: 36.6 (10-11-19 @ 11:48)  HR: 89 (10-11-19 @ 11:50) (85 - 98)  BP: 102/62 (10-11-19 @ 11:48) (98/49 - 106/67)  RR: 17 (10-11-19 @ 11:48) (17 - 98)  SpO2: 92% (10-11-19 @ 11:50) (90% - 98%)  Wt(kg): --  Respiratory: clear anteriorly, decreased BS at bases  Cardiovascular: S1 S2  Gastrointestinal: soft NT ND +BS  Extremities:   2-3 edema                                    8.3    3.65  )-----------( 283      ( 10 Oct 2019 11:08 )             27.9     10-11    127<L>  |  91<L>  |  20  ----------------------------<  211<H>  4.0   |  24  |  1.05    Ca    9.3      11 Oct 2019 06:31  Mg     2.4     10-09    TPro  7.7  /  Alb  3.1<L>  /  TBili  3.2<H>  /  DBili  x   /  AST  27  /  ALT  28  /  AlkPhos  82  10-11      LIVER FUNCTIONS - ( 11 Oct 2019 06:31 )  Alb: 3.1 g/dL / Pro: 7.7 gm/dL / ALK PHOS: 82 U/L / ALT: 28 U/L / AST: 27 U/L / GGT: x           Creatinine Trend: 1.05<--, 1.34<--, 1.16<--, 0.83<--, 0.79<--, 0.96<--      Assessment   Hypervolemic hyponatremia; DIANN; pre renal azotemia ongoing CRS.    Plan:  Continue loop diuretic rx  FR 1000 ml  Supportive care.    Clint Linares MD

## 2019-10-12 LAB
GLUCOSE BLDC GLUCOMTR-MCNC: 128 MG/DL — HIGH (ref 70–99)
GLUCOSE BLDC GLUCOMTR-MCNC: 133 MG/DL — HIGH (ref 70–99)
GLUCOSE BLDC GLUCOMTR-MCNC: 152 MG/DL — HIGH (ref 70–99)
GLUCOSE BLDC GLUCOMTR-MCNC: 180 MG/DL — HIGH (ref 70–99)
GLUCOSE BLDC GLUCOMTR-MCNC: 196 MG/DL — HIGH (ref 70–99)

## 2019-10-12 PROCEDURE — 99232 SBSQ HOSP IP/OBS MODERATE 35: CPT

## 2019-10-12 RX ORDER — DIPHENHYDRAMINE HCL 50 MG
25 CAPSULE ORAL ONCE
Refills: 0 | Status: COMPLETED | OUTPATIENT
Start: 2019-10-12 | End: 2019-10-12

## 2019-10-12 RX ORDER — HEPARIN SODIUM 5000 [USP'U]/ML
5000 INJECTION INTRAVENOUS; SUBCUTANEOUS EVERY 12 HOURS
Refills: 0 | Status: DISCONTINUED | OUTPATIENT
Start: 2019-10-12 | End: 2019-10-12

## 2019-10-12 RX ADMIN — Medication 3 MILLILITER(S): at 00:05

## 2019-10-12 RX ADMIN — Medication 3 MILLILITER(S): at 17:23

## 2019-10-12 RX ADMIN — Medication 81 MILLIGRAM(S): at 12:47

## 2019-10-12 RX ADMIN — Medication 3 MILLILITER(S): at 11:12

## 2019-10-12 RX ADMIN — MIDODRINE HYDROCHLORIDE 10 MILLIGRAM(S): 2.5 TABLET ORAL at 23:12

## 2019-10-12 RX ADMIN — Medication 25 MICROGRAM(S): at 06:24

## 2019-10-12 RX ADMIN — Medication 25 MILLIGRAM(S): at 00:28

## 2019-10-12 RX ADMIN — Medication 1: at 07:30

## 2019-10-12 RX ADMIN — Medication 40 MILLIGRAM(S): at 06:24

## 2019-10-12 RX ADMIN — MIDODRINE HYDROCHLORIDE 10 MILLIGRAM(S): 2.5 TABLET ORAL at 06:24

## 2019-10-12 RX ADMIN — ATORVASTATIN CALCIUM 20 MILLIGRAM(S): 80 TABLET, FILM COATED ORAL at 23:12

## 2019-10-12 RX ADMIN — MIDODRINE HYDROCHLORIDE 10 MILLIGRAM(S): 2.5 TABLET ORAL at 15:50

## 2019-10-12 RX ADMIN — Medication 3 MILLILITER(S): at 23:46

## 2019-10-12 NOTE — PROGRESS NOTE ADULT - SUBJECTIVE AND OBJECTIVE BOX
Subjective: fatigue.       MEDICATIONS  (STANDING):  ALBUTerol/ipratropium for Nebulization 3 milliLiter(s) Nebulizer every 6 hours  aspirin enteric coated 81 milliGRAM(s) Oral daily  atorvastatin 20 milliGRAM(s) Oral at bedtime  dextrose 5%. 1000 milliLiter(s) (50 mL/Hr) IV Continuous <Continuous>  dextrose 50% Injectable 12.5 Gram(s) IV Push once  dextrose 50% Injectable 25 Gram(s) IV Push once  dextrose 50% Injectable 25 Gram(s) IV Push once  furosemide   Injectable 40 milliGRAM(s) IV Push daily  insulin lispro (HumaLOG) corrective regimen sliding scale   SubCutaneous three times a day before meals  insulin lispro (HumaLOG) corrective regimen sliding scale   SubCutaneous at bedtime  levothyroxine 25 MICROGram(s) Oral daily  midodrine. 10 milliGRAM(s) Oral every 8 hours    MEDICATIONS  (PRN):  dextrose 40% Gel 15 Gram(s) Oral once PRN Blood Glucose LESS THAN 70 milliGRAM(s)/deciliter  glucagon  Injectable 1 milliGRAM(s) IntraMuscular once PRN Glucose LESS THAN 70 milligrams/deciliter  traMADol 25 milliGRAM(s) Oral every 6 hours PRN Moderate Pain (4 - 6)          T(C): 36 (10-12-19 @ 05:29), Max: 36.6 (10-11-19 @ 11:48)  HR: 90 (10-12-19 @ 06:51) (85 - 97)  BP: 102/65 (10-12-19 @ 05:29) (101/63 - 121/62)  RR: 18 (10-12-19 @ 05:29) (17 - 18)  SpO2: 94% (10-12-19 @ 06:51) (92% - 98%)  Wt(kg): --        I&O's Detail    11 Oct 2019 07:01  -  12 Oct 2019 07:00  --------------------------------------------------------  IN:    Oral Fluid: 720 mL  Total IN: 720 mL    OUT:  Total OUT: 0 mL    Total NET: 720 mL               PHYSICAL EXAM:    GENERAL: NAD  EYES: EOMI, PERRLA, conjunctiva and sclera clear  NECK: Supple, no inc in JVP  CHEST/LUNG: Clear  HEART: S1S2  ABDOMEN: Soft, Nontender, Nondistended; Bowel sounds present  EXTREMITIES:  2+ edema        LABS:  CBC Full  -  ( 10 Oct 2019 11:08 )  WBC Count : 3.65 K/uL  RBC Count : 3.80 M/uL  Hemoglobin : 8.3 g/dL  Hematocrit : 27.9 %  Platelet Count - Automated : 283 K/uL  Mean Cell Volume : 73.4 fl  Mean Cell Hemoglobin : 21.8 pg  Mean Cell Hemoglobin Concentration : 29.7 gm/dL  Auto Neutrophil # : 3.00 K/uL  Auto Lymphocyte # : 0.54 K/uL  Auto Monocyte # : 0.07 K/uL  Auto Eosinophil # : 0.00 K/uL  Auto Basophil # : 0.00 K/uL  Auto Neutrophil % : 82.2 %  Auto Lymphocyte % : 14.8 %  Auto Monocyte % : 1.9 %  Auto Eosinophil % : 0.0 %  Auto Basophil % : 0.0 %    10-11    127<L>  |  91<L>  |  20  ----------------------------<  211<H>  4.0   |  24  |  1.05    Ca    9.3      11 Oct 2019 06:31    TPro  7.7  /  Alb  3.1<L>  /  TBili  3.2<H>  /  DBili  x   /  AST  27  /  ALT  28  /  AlkPhos  82  10-11            Impression:  * HypoNa -- hypervolemic, aldactone effect  * Severe RHF, pulm HTN ( ? primary )  * Chronic hypotension due to above  * L fem DVT    Recommendations:   * Consider diuretic gtt  * Cont to hold Aldactone   * FR 1000cc/day stressed with pt.

## 2019-10-12 NOTE — PROGRESS NOTE ADULT - SUBJECTIVE AND OBJECTIVE BOX
Patient is a 34y old  Female who presents with a chief complaint of Shortness of breath and hypotension. (11 Oct 2019 13:04)    PAST MEDICAL & SURGICAL HISTORY:  Former smoker, stopped smoking in distant past  Vitamin D deficiency  Morbid obesity due to excess calories  Edema extremities  Diabetes mellitus  Hyperbilirubinemia  COPD (chronic obstructive pulmonary disease)  COPD (chronic obstructive pulmonary disease)  Anticoagulation goal of INR 2 to 3  Anticardiolipin antibody positive  Congestive heart failure, unspecified HF chronicity, unspecified heart failure type  Moderate asthma, unspecified whether complicated, unspecified whether persistent  Other secondary hypertension  COPD (chronic obstructive pulmonary disease)  Anticardiolipin antibody positive  Essential hypertension  Pulmonary HTN  Morbid obesity  No significant past surgical history    INTERVAL HISTORY: Patient resting in bed, reports improvement in SOB; denies chest pain, palpitations, dizziness or cough.  	    Vitals:  Vital Signs Last 24 Hrs  T(C): 35.7 (12 Oct 2019 11:11), Max: 36 (11 Oct 2019 16:20)  T(F): 96.2 (12 Oct 2019 11:11), Max: 96.8 (11 Oct 2019 16:20)  HR: 92 (12 Oct 2019 11:12) (81 - 97)  BP: 100/60 (12 Oct 2019 11:11) (100/60 - 121/62)  RR: 16 (12 Oct 2019 11:11) (16 - 18)  SpO2: 92% (12 Oct 2019 11:12) (92% - 98%)  BMI (kg/m2): 34.6 (10-10 @ 04:10)      PHYSICAL EXAM:  Neuro: Awake, responsive  CV: S1 S2 RRR  Lungs: Occasional rhonchi in base   GI: Soft, BS +, ND, NT  Extremities: Bilateral  LE edema    TELEMETRY: NSR brief SVT  	    RADIOLOGY: < from: Xray Chest 1 View- PORTABLE-Urgent (10.09.19 @ 21:34) >  INTERPRETATION:  PROCEDURE: AP view of the chest.    CLINICAL INFORMATION: Shortness of breath.    COMPARISON: 9/9/2019.    FINDINGS:    Lungs: The lungs are clear.  Heart: There is cardiomegaly.  Mediastinum: Hilar prominence noted.    IMPRESSION:    Clear lungs.    < end of copied text >      DIAGNOSTIC TESTING:    [x ] Echocardiogram: < from: TTE Echo Doppler w/o Cont (07.26.19 @ 08:44) >  Summary:   1. Left ventricular ejection fraction, by visual estimation, is 45 to   50%.   2. Mildly decreased global left ventricular systolic function.   3. Elevated mean left atrial pressure.   4. Global cardiomyopathy.   5. Increased LV wall thickness.   6. Normal left ventricular internal cavity size.   7. Right ventricular pressure overload.   8. Spectral Doppler shows impaired relaxation pattern of left   ventricular myocardial filling (Grade I diastolic dysfunction).   9. There is mild asymmetric left ventricular hypertrophy.  10. Pulmonary hypertension.  11. Severely enlarged right ventricle.  12. Severely reduced RV systolic function.  13. RV ejection fraction 34%.  14. Small pericardial effusion.  15. Degenerative mitral valve.  16. Mild mitral annular calcification.  17. Mild mitral valve regurgitation.  18. Moderate tricuspid regurgitation.  19. Structurally normal tricuspid valve, with normal leaflet excursion.  20. Mild aortic regurgitation.  21. Mild to moderate pulmonic valve regurgitation.  22. Structurally normal pulmonic valve, with normal leaflet excursion.  23. Estimated pulmonary artery systolic pressure is 87.1 mmHg assuming a   right atrial pressure of 20 mmHg, which is consistent with severe   pulmonary hypertension.  24. Pulmonary hypertension is present.  25. The main pulmonary artery is normal in size.  26. The right atrial pressure is moderately elevated.    < end of copied text >    LABS:	 	                 10-11    127<L>  |  91<L>  |  20  ----------------------------<  211<H>  4.0   |  24  |  1.05    Ca    9.3      11 Oct 2019 06:31    TPro  7.7  /  Alb  3.1<L>  /  TBili  3.2<H>  /  DBili  x   /  AST  27  /  ALT  28  /  AlkPhos  82  10-11

## 2019-10-12 NOTE — PROGRESS NOTE ADULT - ASSESSMENT
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    Acute on chronic combined systolic and diastolic congestive heart failure:  - Hold Torsemide in the setting of hyponatremia.   - furosemide  drip to be consdidred by cardio . patient on iV lasix currently  - monitor daily wt, i/o's    severe Pulmonary hypertension:  - c/w riociguat  - on chronic oxygen     Chest pain:  - EKG unchanged  - Cardio following  - Continue ASA    Type 2 diabetes mellitus with hyperglycemia, without long-term current use of insulin:  - Continue current insulin regimen    Hyponatremia:  - improving  - likely due to volume overload/ aggressive diuresis and possible SSRI- on hold  - Renal following   - c/w Fluid restriction    HTN:  - hold all BP meds due to borderline BP    Asthma:  - pulmonary following  - c/w Symbicort    Acute deep vein thrombosis (DVT) of femoral vein of left lower extremity:  - US showed an extensive acute nonocclusive thrombus in the left external iliac vein and the left common femoral vein and left greater saphenous vein  - Eliquis, pt non compliant with INR check.  -once diured will archana cardiac cath    Acquired hypothyroidism:  - c/w synthroid   - recheck TSH and free T4 tomorrow    Hyperlipidemia:  - c/w Zocor    Depression:  - Zoloft on hold    Prophylaxis:  DVT: Eliquis  GI: Pepcid

## 2019-10-12 NOTE — PROGRESS NOTE ADULT - SUBJECTIVE AND OBJECTIVE BOX
INTERVAL HPI/OVERNIGHT EVENTS:   Patient seen and examined. still shob due to chf will likely need lasix gtt. defer to nephro and cardio    MEDICATIONS  (STANDING):  ALBUTerol/ipratropium for Nebulization 3 milliLiter(s) Nebulizer every 6 hours  aspirin enteric coated 81 milliGRAM(s) Oral daily  atorvastatin 20 milliGRAM(s) Oral at bedtime  dextrose 5%. 1000 milliLiter(s) (50 mL/Hr) IV Continuous <Continuous>  dextrose 50% Injectable 12.5 Gram(s) IV Push once  dextrose 50% Injectable 25 Gram(s) IV Push once  dextrose 50% Injectable 25 Gram(s) IV Push once  furosemide   Injectable 40 milliGRAM(s) IV Push daily  heparin  Injectable 5000 Unit(s) SubCutaneous every 12 hours  insulin lispro (HumaLOG) corrective regimen sliding scale   SubCutaneous at bedtime  insulin lispro (HumaLOG) corrective regimen sliding scale   SubCutaneous three times a day before meals  levothyroxine 25 MICROGram(s) Oral daily  midodrine. 10 milliGRAM(s) Oral every 8 hours    MEDICATIONS  (PRN):  dextrose 40% Gel 15 Gram(s) Oral once PRN Blood Glucose LESS THAN 70 milliGRAM(s)/deciliter  glucagon  Injectable 1 milliGRAM(s) IntraMuscular once PRN Glucose LESS THAN 70 milligrams/deciliter  traMADol 25 milliGRAM(s) Oral every 6 hours PRN Moderate Pain (4 - 6)      REVIEW OF SYSTEMS:  See HPI,  all others negative    PHYSICAL EXAM:  Vital Signs Last 24 Hrs  T(C): 35.7 (12 Oct 2019 11:11), Max: 36 (11 Oct 2019 16:20)  T(F): 96.2 (12 Oct 2019 11:11), Max: 96.8 (11 Oct 2019 16:20)  HR: 85 (12 Oct 2019 11:11) (81 - 97)  BP: 100/60 (12 Oct 2019 11:11) (100/60 - 121/62)  BP(mean): --  RR: 16 (12 Oct 2019 11:11) (16 - 18)  SpO2: 97% (12 Oct 2019 11:11) (92% - 98%)    GENERAL: NAD, well-groomed, well-developed, awake, alert, oriented x 3, fluent and coherent speech  HEAD:  Atraumatic, Normocephalic  EYES: EOMI, PERRLA, conjunctiva and sclera clear  ENMT: No tonsillar erythema, exudates, or enlargement; Moist mucous membranes, Good dentition, No lesions  NECK: Supple, No JVD, No Cervical LAD, No thyromegaly, No thyroid nodules felt  NERVOUS SYSTEM:  Good concentration; Moving all 4 extremities; No gross sensory deficits, No facial droop  CHEST WALL: No masses  CHEST/LUNG: bilateral crackles  HEART: Regular rate and rhythm; No murmurs, rubs, or gallops  ABDOMEN: Soft, Nontender, Nondistended, Bowel sounds present, No palpable masses or organomegaly, No bruits  EXTREMITIES:  2+ edema  LYMPH: No lymphadenopathy  SKIN: No rashes or lesions    LABS:      Ca    9.3        11 Oct 2019 06:31             RADIOLOGY & ADDITIONAL TESTS:

## 2019-10-12 NOTE — PROGRESS NOTE ADULT - SUBJECTIVE AND OBJECTIVE BOX
INTERVAL HPI:   33 y/o Female with severe Pulmonary Hypertension and right sided CHF( RV EF 35%) COPD, HTN, DM2, Hyperbilirubinemia, L femoral DVT. Not taking anticoagulant since she her last admission(claims prescription was not sent).   Pt. went to PCP today who noted her hypotension and SOB and recommended coming to ER.  She hasn't been able to  meds from pharmacy as outpatient., because she can't walk due to chronic leg swelling, right calf pain.  No other complaints of inciting event, has had poor oral intake for several days,   Pt. is otherwise well. Refused  ABG, parenteral anticoagulants;  On home O2. Denies tobacco abuse.    OVERNIGHT EVENTS:  Resting.    Vital Signs Last 24 Hrs  T(C): 35.6 (12 Oct 2019 17:05), Max: 36 (12 Oct 2019 05:29)  T(F): 96 (12 Oct 2019 17:05), Max: 96.8 (12 Oct 2019 05:29)  HR: 78 (12 Oct 2019 17:26) (78 - 97)  BP: 106/66 (12 Oct 2019 17:05) (100/60 - 121/62)  BP(mean): --  RR: 17 (12 Oct 2019 17:05) (16 - 18)  SpO2: 92% (12 Oct 2019 17:26) (90% - 97%)    PHYSICAL EXAM:  GEN:        comfortable.  HEENT:    Normal.    RESP:        no distress  CVS:          Regular rate and rhythm.   ABD:         Soft, non-tender, non-distended;     MEDICATIONS  (STANDING):  ALBUTerol/ipratropium for Nebulization 3 milliLiter(s) Nebulizer every 6 hours  aspirin enteric coated 81 milliGRAM(s) Oral daily  atorvastatin 20 milliGRAM(s) Oral at bedtime  dextrose 5%. 1000 milliLiter(s) (50 mL/Hr) IV Continuous <Continuous>  dextrose 50% Injectable 12.5 Gram(s) IV Push once  dextrose 50% Injectable 25 Gram(s) IV Push once  dextrose 50% Injectable 25 Gram(s) IV Push once  furosemide   Injectable 40 milliGRAM(s) IV Push daily  insulin lispro (HumaLOG) corrective regimen sliding scale   SubCutaneous at bedtime  insulin lispro (HumaLOG) corrective regimen sliding scale   SubCutaneous three times a day before meals  levothyroxine 25 MICROGram(s) Oral daily  midodrine. 10 milliGRAM(s) Oral every 8 hours    MEDICATIONS  (PRN):  dextrose 40% Gel 15 Gram(s) Oral once PRN Blood Glucose LESS THAN 70 milliGRAM(s)/deciliter  glucagon  Injectable 1 milliGRAM(s) IntraMuscular once PRN Glucose LESS THAN 70 milligrams/deciliter  traMADol 25 milliGRAM(s) Oral every 6 hours PRN Moderate Pain (4 - 6)    LABS:    10-11    127<L>  |  91<L>  |  20  ----------------------------<  211<H>  4.0   |  24  |  1.05    Ca    9.3      11 Oct 2019 06:31    TPro  7.7  /  Alb  3.1<L>  /  TBili  3.2<H>  /  DBili  x   /  AST  27  /  ALT  28  /  AlkPhos  82  10-11    ASSESSMENT AND PLAN:  ·	SOB.  ·	Severe pulmonary HTN.  ·	Cor pulmonale.  ·	Fluid Retention.  ·	Left external DVT.  ·	Non Compliance with treatment.    Continue treatment.

## 2019-10-12 NOTE — PROGRESS NOTE ADULT - ASSESSMENT
35 y/o lady with severe pulmonary hypertension and right sided HF (RV EF 35%), COPD, HTN, DM2, hyperbilirubinemia, L femoral DVT,  had hypotension. No other complaints of inciting event, has had poor oral intake for several days.  pt very non-compliant both with follow-ups and with meds.   Will diuresis the next few days back to a euvolemic state; if she agrees, will then transfer for R and LHC and eval with Dr. Unger at [facility].    -monitor on tele  -cont apixaban for AC  -cont asa/statin  -on nebs/steroids for COPD/pulm following  -continue Lasix diuresis/monitor weight daily  -monitor lytes Na 127 and creat 1.05; replete as needed/renal following  -Will diuresis the next few days back to a euvolemic state; if she agrees, will then transfer for Right  and Left heart cath and eval with Dr. Unger at [facility].    MEDICATIONS  (STANDING):  ALBUTerol/ipratropium for Nebulization 3 milliLiter(s) Nebulizer every 6 hours  aspirin enteric coated 81 milliGRAM(s) Oral daily  atorvastatin 20 milliGRAM(s) Oral at bedtime  dextrose 5%. 1000 milliLiter(s) (50 mL/Hr) IV Continuous <Continuous>  furosemide   Injectable 40 milliGRAM(s) IV Push daily  insulin lispro (HumaLOG) corrective regimen sliding scale   SubCutaneous at bedtime  insulin lispro (HumaLOG) corrective regimen sliding scale   SubCutaneous three times a day before meals  levothyroxine 25 MICROGram(s) Oral daily  midodrine. 10 milliGRAM(s) Oral every 8 hours    Try to keep outs more than inputs.  Fluid/salt restriction.    Moncho Bennett MD, [facility]

## 2019-10-13 DIAGNOSIS — D50.0 IRON DEFICIENCY ANEMIA SECONDARY TO BLOOD LOSS (CHRONIC): ICD-10-CM

## 2019-10-13 LAB
ANION GAP SERPL CALC-SCNC: 12 MMOL/L — SIGNIFICANT CHANGE UP (ref 5–17)
BASOPHILS # BLD AUTO: 0.01 K/UL — SIGNIFICANT CHANGE UP (ref 0–0.2)
BASOPHILS NFR BLD AUTO: 0.2 % — SIGNIFICANT CHANGE UP (ref 0–2)
BUN SERPL-MCNC: 33 MG/DL — HIGH (ref 7–23)
CALCIUM SERPL-MCNC: 9.8 MG/DL — SIGNIFICANT CHANGE UP (ref 8.5–10.1)
CHLORIDE SERPL-SCNC: 92 MMOL/L — LOW (ref 96–108)
CO2 SERPL-SCNC: 24 MMOL/L — SIGNIFICANT CHANGE UP (ref 22–31)
CREAT SERPL-MCNC: 1.19 MG/DL — SIGNIFICANT CHANGE UP (ref 0.5–1.3)
EOSINOPHIL # BLD AUTO: 0 K/UL — SIGNIFICANT CHANGE UP (ref 0–0.5)
EOSINOPHIL NFR BLD AUTO: 0 % — SIGNIFICANT CHANGE UP (ref 0–6)
GLUCOSE BLDC GLUCOMTR-MCNC: 109 MG/DL — HIGH (ref 70–99)
GLUCOSE BLDC GLUCOMTR-MCNC: 151 MG/DL — HIGH (ref 70–99)
GLUCOSE BLDC GLUCOMTR-MCNC: 152 MG/DL — HIGH (ref 70–99)
GLUCOSE BLDC GLUCOMTR-MCNC: 229 MG/DL — HIGH (ref 70–99)
GLUCOSE SERPL-MCNC: 116 MG/DL — HIGH (ref 70–99)
HCT VFR BLD CALC: 25.3 % — LOW (ref 34.5–45)
HGB BLD-MCNC: 7.6 G/DL — LOW (ref 11.5–15.5)
IMM GRANULOCYTES NFR BLD AUTO: 0.5 % — SIGNIFICANT CHANGE UP (ref 0–1.5)
LYMPHOCYTES # BLD AUTO: 0.86 K/UL — LOW (ref 1–3.3)
LYMPHOCYTES # BLD AUTO: 14.4 % — SIGNIFICANT CHANGE UP (ref 13–44)
MCHC RBC-ENTMCNC: 22 PG — LOW (ref 27–34)
MCHC RBC-ENTMCNC: 30 GM/DL — LOW (ref 32–36)
MCV RBC AUTO: 73.1 FL — LOW (ref 80–100)
MONOCYTES # BLD AUTO: 0.64 K/UL — SIGNIFICANT CHANGE UP (ref 0–0.9)
MONOCYTES NFR BLD AUTO: 10.7 % — SIGNIFICANT CHANGE UP (ref 2–14)
NEUTROPHILS # BLD AUTO: 4.42 K/UL — SIGNIFICANT CHANGE UP (ref 1.8–7.4)
NEUTROPHILS NFR BLD AUTO: 74.2 % — SIGNIFICANT CHANGE UP (ref 43–77)
NRBC # BLD: 23 /100 WBCS — HIGH (ref 0–0)
PLATELET # BLD AUTO: 271 K/UL — SIGNIFICANT CHANGE UP (ref 150–400)
POTASSIUM SERPL-MCNC: 5.3 MMOL/L — SIGNIFICANT CHANGE UP (ref 3.5–5.3)
POTASSIUM SERPL-SCNC: 5.3 MMOL/L — SIGNIFICANT CHANGE UP (ref 3.5–5.3)
RBC # BLD: 3.46 M/UL — LOW (ref 3.8–5.2)
RBC # FLD: 22.5 % — HIGH (ref 10.3–14.5)
SODIUM SERPL-SCNC: 128 MMOL/L — LOW (ref 135–145)
WBC # BLD: 5.96 K/UL — SIGNIFICANT CHANGE UP (ref 3.8–10.5)
WBC # FLD AUTO: 5.96 K/UL — SIGNIFICANT CHANGE UP (ref 3.8–10.5)

## 2019-10-13 PROCEDURE — 99233 SBSQ HOSP IP/OBS HIGH 50: CPT

## 2019-10-13 PROCEDURE — 99232 SBSQ HOSP IP/OBS MODERATE 35: CPT

## 2019-10-13 RX ORDER — PANTOPRAZOLE SODIUM 20 MG/1
40 TABLET, DELAYED RELEASE ORAL
Refills: 0 | Status: DISCONTINUED | OUTPATIENT
Start: 2019-10-13 | End: 2019-10-23

## 2019-10-13 RX ORDER — ONDANSETRON 8 MG/1
4 TABLET, FILM COATED ORAL ONCE
Refills: 0 | Status: COMPLETED | OUTPATIENT
Start: 2019-10-13 | End: 2019-10-13

## 2019-10-13 RX ADMIN — Medication 40 MILLIGRAM(S): at 06:05

## 2019-10-13 RX ADMIN — Medication 3 MILLILITER(S): at 23:30

## 2019-10-13 RX ADMIN — MIDODRINE HYDROCHLORIDE 10 MILLIGRAM(S): 2.5 TABLET ORAL at 23:23

## 2019-10-13 RX ADMIN — MIDODRINE HYDROCHLORIDE 10 MILLIGRAM(S): 2.5 TABLET ORAL at 15:32

## 2019-10-13 RX ADMIN — ONDANSETRON 4 MILLIGRAM(S): 8 TABLET, FILM COATED ORAL at 01:24

## 2019-10-13 RX ADMIN — ONDANSETRON 4 MILLIGRAM(S): 8 TABLET, FILM COATED ORAL at 09:00

## 2019-10-13 RX ADMIN — Medication 3 MILLILITER(S): at 06:12

## 2019-10-13 RX ADMIN — MIDODRINE HYDROCHLORIDE 10 MILLIGRAM(S): 2.5 TABLET ORAL at 06:06

## 2019-10-13 RX ADMIN — Medication 25 MICROGRAM(S): at 06:06

## 2019-10-13 RX ADMIN — Medication 3 MILLILITER(S): at 17:07

## 2019-10-13 RX ADMIN — ATORVASTATIN CALCIUM 20 MILLIGRAM(S): 80 TABLET, FILM COATED ORAL at 23:23

## 2019-10-13 RX ADMIN — Medication 3 MILLILITER(S): at 11:25

## 2019-10-13 RX ADMIN — Medication 1: at 07:54

## 2019-10-13 NOTE — DIETITIAN INITIAL EVALUATION ADULT. - ENERGY NEEDS
Height (cm): 175.26 (10-09)  Weight (kg): 106.2 (10-10)  BMI (kg/m2): 34.6 (10-10)  IBW:  65.7 kg        % IBW: 161%             UBW: 95.2 kg             %UBW: 111%, edema noted

## 2019-10-13 NOTE — PROGRESS NOTE ADULT - SUBJECTIVE AND OBJECTIVE BOX
INTERVAL HPI/OVERNIGHT EVENTS:   Patient seen and examined. pt hgb trending down. ordered stool occult and obtained gi eval. also d/w nephro who will hold off lasix gtt. pt feels weak and tired.    MEDICATIONS  (STANDING):  ALBUTerol/ipratropium for Nebulization 3 milliLiter(s) Nebulizer every 6 hours  aspirin enteric coated 81 milliGRAM(s) Oral daily  atorvastatin 20 milliGRAM(s) Oral at bedtime  dextrose 5%. 1000 milliLiter(s) (50 mL/Hr) IV Continuous <Continuous>  dextrose 50% Injectable 12.5 Gram(s) IV Push once  dextrose 50% Injectable 25 Gram(s) IV Push once  dextrose 50% Injectable 25 Gram(s) IV Push once  furosemide   Injectable 40 milliGRAM(s) IV Push daily  insulin lispro (HumaLOG) corrective regimen sliding scale   SubCutaneous at bedtime  insulin lispro (HumaLOG) corrective regimen sliding scale   SubCutaneous three times a day before meals  levothyroxine 25 MICROGram(s) Oral daily  midodrine. 10 milliGRAM(s) Oral every 8 hours    MEDICATIONS  (PRN):  dextrose 40% Gel 15 Gram(s) Oral once PRN Blood Glucose LESS THAN 70 milliGRAM(s)/deciliter  glucagon  Injectable 1 milliGRAM(s) IntraMuscular once PRN Glucose LESS THAN 70 milligrams/deciliter  traMADol 25 milliGRAM(s) Oral every 6 hours PRN Moderate Pain (4 - 6)      REVIEW OF SYSTEMS:  See HPI,  all others negative    PHYSICAL EXAM:  Vital Signs Last 24 Hrs  T(C): 35.8 (13 Oct 2019 05:30), Max: 36.1 (13 Oct 2019 00:30)  T(F): 96.4 (13 Oct 2019 05:30), Max: 96.9 (13 Oct 2019 00:30)  HR: 83 (13 Oct 2019 07:45) (78 - 94)  BP: 97/44 (13 Oct 2019 05:30) (97/44 - 106/66)  BP(mean): --  RR: 18 (13 Oct 2019 05:30) (16 - 18)  SpO2: 100% (13 Oct 2019 07:45) (89% - 100%)    GENERAL: on NC and is weak  HEAD:  Atraumatic, Normocephalic  EYES: EOMI, PERRLA, conjunctiva and sclera clear  ENMT: No tonsillar erythema, exudates, or enlargement; Moist mucous membranes, Good dentition, No lesions  NECK: Supple, No JVD, No Cervical LAD, No thyromegaly, No thyroid nodules felt  NERVOUS SYSTEM:  Good concentration; Moving all 4 extremities; No gross sensory deficits, No facial droop  CHEST WALL: No masses  CHEST/LUNG: bilateral crackles  HEART: Regular rate and rhythm; No murmurs, rubs, or gallops  ABDOMEN: Soft, Nontender, Nondistended, Bowel sounds present, No palpable masses or organomegaly, No bruits  EXTREMITIES:  2+ edema  SKIN: No rashes or lesions    LABS:                        7.6    5.96  )-----------( 271      ( 13 Oct 2019 06:29 )             25.3     13 Oct 2019 06:29    128    |  92     |  33     ----------------------------<  116    5.3     |  24     |  1.19     Ca    9.8        13 Oct 2019 06:29             RADIOLOGY & ADDITIONAL TESTS: INTERVAL HPI/OVERNIGHT EVENTS:   Patient seen and examined. pt hgb trending down she has been having very heavy menstarual bleed. ordered stool occult and obtained gi eval as pt had steroid use and also was on AC.. also d/w nephro who will hold off lasix gtt. pt feels weak and tired.    MEDICATIONS  (STANDING):  ALBUTerol/ipratropium for Nebulization 3 milliLiter(s) Nebulizer every 6 hours  aspirin enteric coated 81 milliGRAM(s) Oral daily  atorvastatin 20 milliGRAM(s) Oral at bedtime  dextrose 5%. 1000 milliLiter(s) (50 mL/Hr) IV Continuous <Continuous>  dextrose 50% Injectable 12.5 Gram(s) IV Push once  dextrose 50% Injectable 25 Gram(s) IV Push once  dextrose 50% Injectable 25 Gram(s) IV Push once  furosemide   Injectable 40 milliGRAM(s) IV Push daily  insulin lispro (HumaLOG) corrective regimen sliding scale   SubCutaneous at bedtime  insulin lispro (HumaLOG) corrective regimen sliding scale   SubCutaneous three times a day before meals  levothyroxine 25 MICROGram(s) Oral daily  midodrine. 10 milliGRAM(s) Oral every 8 hours    MEDICATIONS  (PRN):  dextrose 40% Gel 15 Gram(s) Oral once PRN Blood Glucose LESS THAN 70 milliGRAM(s)/deciliter  glucagon  Injectable 1 milliGRAM(s) IntraMuscular once PRN Glucose LESS THAN 70 milligrams/deciliter  traMADol 25 milliGRAM(s) Oral every 6 hours PRN Moderate Pain (4 - 6)      REVIEW OF SYSTEMS:  See HPI,  all others negative    PHYSICAL EXAM:  Vital Signs Last 24 Hrs  T(C): 35.8 (13 Oct 2019 05:30), Max: 36.1 (13 Oct 2019 00:30)  T(F): 96.4 (13 Oct 2019 05:30), Max: 96.9 (13 Oct 2019 00:30)  HR: 83 (13 Oct 2019 07:45) (78 - 94)  BP: 97/44 (13 Oct 2019 05:30) (97/44 - 106/66)  BP(mean): --  RR: 18 (13 Oct 2019 05:30) (16 - 18)  SpO2: 100% (13 Oct 2019 07:45) (89% - 100%)    GENERAL: on NC and is weak  HEAD:  Atraumatic, Normocephalic  EYES: EOMI, PERRLA, conjunctiva and sclera clear  ENMT: No tonsillar erythema, exudates, or enlargement; Moist mucous membranes, Good dentition, No lesions  NECK: Supple, No JVD, No Cervical LAD, No thyromegaly, No thyroid nodules felt  NERVOUS SYSTEM:  Good concentration; Moving all 4 extremities; No gross sensory deficits, No facial droop  CHEST WALL: No masses  CHEST/LUNG: bilateral crackles  HEART: Regular rate and rhythm; No murmurs, rubs, or gallops  ABDOMEN: Soft, Nontender, Nondistended, Bowel sounds present, No palpable masses or organomegaly, No bruits  EXTREMITIES:  2+ edema  SKIN: No rashes or lesions    LABS:                        7.6    5.96  )-----------( 271      ( 13 Oct 2019 06:29 )             25.3     13 Oct 2019 06:29    128    |  92     |  33     ----------------------------<  116    5.3     |  24     |  1.19     Ca    9.8        13 Oct 2019 06:29             RADIOLOGY & ADDITIONAL TESTS:

## 2019-10-13 NOTE — DIETITIAN INITIAL EVALUATION ADULT. - CONTINUE CURRENT NUTRITION CARE PLAN
In view of depressed oral intake, recommend liberalize diet to Low sodium , 1000 ml fluid restriction @ present and increase fluid allowance once Na WNL

## 2019-10-13 NOTE — CONSULT NOTE ADULT - PROBLEM SELECTOR RECOMMENDATION 9
-Occult blood pending   -Send iron/tibc, ferritin, b12, folate for further work-up  -Trend hg daily; transfuse < 7   -Ok to continue anticoagulation if no overt GI bleeding noted   -GYN consultation for heavy menstrual bleeding (most likely etiology of anemia)  **Pending above work-up consider EGD/Colonsocopy for evaluation of anemia (however she is very high risk given pulmonary hypertension, morbid obesity, CHF).

## 2019-10-13 NOTE — PROGRESS NOTE ADULT - ASSESSMENT
33 yo female with history of Anticardiolipin antibody positive on AC at home, COPD, DM II, Essential hypertension, Vitamin D deficiency & severe Pulm HTN with right sided heart failure p/w SOB & was found to have acute on chronic combined systolic and diastolic congestive heart failure, hyponatremia, DIANN & acute RLE DVT.    Acute on chronic combined systolic and diastolic congestive heart failure:   - cont lasix  - monitor daily wt, i/o's  -aldactone use in rhf??      Worsening anemia in the setting of AC  cont aspirin . will get stool occult and GI called  will start IV protonix    severe Pulmonary hypertension:  - c/w riociguat  - on chronic oxygen     Chest pain:  - EKG unchanged  - Cardio following  - Continue ASA    Type 2 diabetes mellitus with hyperglycemia, without long-term current use of insulin:  - Continue current insulin regimen    Hyponatremia:  - improving  - likely due to volume overload/ aggressive diuresis and possible SSRI- on hold  - Renal following   - c/w Fluid restriction    HTN:  - hold all BP meds due to borderline BP    Asthma:  - pulmonary following  - c/w Symbicort    Acute deep vein thrombosis (DVT) of femoral vein of left lower extremity:  - US showed an extensive acute nonocclusive thrombus in the left external iliac vein and the left common femoral vein and left greater saphenous vein  - Eliquis, pt non compliant with INR check.  -called dr poole for ivc filter    Acquired hypothyroidism:  - c/w synthroid   - recheck TSH and free T4    Hyperlipidemia:  - c/w Zocor    Depression:  - Zoloft on hold    Prophylaxis:  DVT: eliquis on hold till stool occult comes back AND GI evaluated. scd contra in dvt pt for risk of compartmet syndrome  GI: Pepcid changed to protonix 33 yo female with history of Anticardiolipin antibody positive on AC at home, COPD, DM II, Essential hypertension, Vitamin D deficiency & severe Pulm HTN with right sided heart failure p/w SOB & was found to have acute on chronic combined systolic and diastolic congestive heart failure, hyponatremia, DIANN & acute RLE DVT.    Acute on chronic combined systolic and diastolic congestive heart failure:   - cont lasix  - monitor daily wt, i/o's  -aldactone use in rhf??      Worsening anemia in the setting of AC and steroid use with heavy menstruation  cont aspirin . will get stool occult and GI called  will start PO protonix    severe Pulmonary hypertension:  - c/w riociguat  - on chronic oxygen     Chest pain:  - EKG unchanged  - Cardio following  - Continue ASA    Type 2 diabetes mellitus with hyperglycemia, without long-term current use of insulin:  - Continue current insulin regimen    Hyponatremia:  - improving  - likely due to volume overload/ aggressive diuresis and possible SSRI- on hold  - Renal following   - c/w Fluid restriction    HTN:  - hold all BP meds due to borderline BP    Asthma:  - pulmonary following  - c/w Symbicort    Acute deep vein thrombosis (DVT) of femoral vein of left lower extremity:  - US showed an extensive acute nonocclusive thrombus in the left external iliac vein and the left common femoral vein and left greater saphenous vein  - Eliquis, pt non compliant with INR check.  -called dr poole for ivc filter    Acquired hypothyroidism:  - c/w synthroid   - recheck TSH and free T4    Hyperlipidemia:  - c/w Zocor    Depression:  - Zoloft on hold    Prophylaxis:  DVT: eliquis on hold till stool occult comes back AND GI evaluated. scd contra in dvt pt for risk of compartmet syndrome  GI: Pepcid changed to protonix

## 2019-10-13 NOTE — DIETITIAN INITIAL EVALUATION ADULT. - PHYSICAL APPEARANCE
obese/other (specify)/BMI=34.6(10/10/11), 4+ edema of legs noted unable to conduct nutrition focus exam due to N/V @ present, w/o visible signs of fat/muscle depletion.

## 2019-10-13 NOTE — DIETITIAN INITIAL EVALUATION ADULT. - OTHER INFO
Pt reports depressed appetite 2-3 weeks PTA.  Pt's mother did the shopping/cooking.  Pt requested french fries, stated that she can't eat the hospital food.  Pt made aware of diet restrictions and heart healthy choices.  Pt did not appear ready to make diet/lifestyle changes and nutrition education.   Pt denied hx of DM.   RD went explained alternative choices, pt is willing to try yogurt & muffin, declined offer to try nutritional supplements.   RN administered anti-nausea meds, pt s/p vomiting @ present.

## 2019-10-13 NOTE — CONSULT NOTE ADULT - SUBJECTIVE AND OBJECTIVE BOX
Chief Complaint:  Patient is a 34y old  Female who presents with a chief complaint of Shortness of breath and hypotension. (13 Oct 2019 11:45)      HPI:  35 y/o Female PMH pulmonary hypertension and right sided CHF( RV EF 35%) COPD, HTN, DM2, hyperbilirubinemia, L femoral DVT,  Pt. went to PCP today who noted her hypotension and SOB and recommended coming to ER.  Pt. is amenable to staying here tonight, says she's too sob to go home and further she hasn't been able to  meds from pharmacy as outpatient., because she can't walk due to chronic leg swelling, right calf pain.  No other complaints of inciting event, has had poor oral intake for several days,   Pt. is otherwise well. Pt refusing ABG, parenteral anticoagulants; has 22G IV, refusing attempt to insert larger catheter for CTA, will allow oral anticoagulation  ROS: negative for fever, cough, headache, dizziness, chest pain, palpitations, abd pain, nausea, vomiting, diarrhea, rash, paresthesia, and focal weakness--all other systems reviewed are negative. (10 Oct 2019 02:00). GI was consulted for evaluation and management of anemia, microcytic. She denies any previous history of gastrointestinal bleeding. She reports that she has very heavy periods which has been worse since she started anticoagluation. She denies any family history of gastrointestinal malignancy. No previous colonoscopy.       PMH/PSH:PAST MEDICAL & SURGICAL HISTORY:  Former smoker, stopped smoking in distant past  Vitamin D deficiency  Morbid obesity due to excess calories  Edema extremities  Diabetes mellitus  Hyperbilirubinemia  COPD (chronic obstructive pulmonary disease)  COPD (chronic obstructive pulmonary disease)  Anticoagulation goal of INR 2 to 3  Anticardiolipin antibody positive  Congestive heart failure, unspecified HF chronicity, unspecified heart failure type  Moderate asthma, unspecified whether complicated, unspecified whether persistent  Other secondary hypertension  COPD (chronic obstructive pulmonary disease)  Anticardiolipin antibody positive  Essential hypertension  Pulmonary HTN  Morbid obesity  No significant past surgical history      Allergies:  No Known Allergies      Medications:  ALBUTerol/ipratropium for Nebulization 3 milliLiter(s) Nebulizer every 6 hours  aspirin enteric coated 81 milliGRAM(s) Oral daily  atorvastatin 20 milliGRAM(s) Oral at bedtime  dextrose 40% Gel 15 Gram(s) Oral once PRN  dextrose 5%. 1000 milliLiter(s) IV Continuous <Continuous>  dextrose 50% Injectable 12.5 Gram(s) IV Push once  dextrose 50% Injectable 25 Gram(s) IV Push once  dextrose 50% Injectable 25 Gram(s) IV Push once  furosemide   Injectable 40 milliGRAM(s) IV Push daily  glucagon  Injectable 1 milliGRAM(s) IntraMuscular once PRN  insulin lispro (HumaLOG) corrective regimen sliding scale   SubCutaneous at bedtime  insulin lispro (HumaLOG) corrective regimen sliding scale   SubCutaneous three times a day before meals  levothyroxine 25 MICROGram(s) Oral daily  midodrine. 10 milliGRAM(s) Oral every 8 hours  pantoprazole    Tablet 40 milliGRAM(s) Oral before breakfast  traMADol 25 milliGRAM(s) Oral every 6 hours PRN      Review of Systems:    General:  + fatigue   Eyes:  Good vision, no reported pain  ENT:  No sore throat, pain, runny nose, dysphagia  CV:  No pain, palpitations, hypo/hypertension  Resp:  + shortness of breath   GI:  as per HPI   :  No pain, bleeding, incontinence, nocturia  Muscle:  No pain, weakness  Breast:  No pain, abscess, mass, discharge  Neuro:  No weakness, tingling, memory problems  Psych:  No fatigue, insomnia, mood problems, depression  Endocrine:  No polyuria, polydipsia, cold/heat intolerance  Heme:  No petechiae, ecchymosis, easy bruisability  Skin:  No rash, tattoos, scars, edema    Relevant Family History:   FAMILY HISTORY:  Family history of leukemia (Sibling)      Relevant Social History: Alcohol ( -) , Tobacco ( -) , Illicit drugs (- )     Physical Exam:    Vital Signs:  Vital Signs Last 24 Hrs  T(C): 36.2 (13 Oct 2019 11:40), Max: 36.2 (13 Oct 2019 11:40)  T(F): 97.2 (13 Oct 2019 11:40), Max: 97.2 (13 Oct 2019 11:40)  HR: 82 (13 Oct 2019 11:40) (78 - 114)  BP: 102/56 (13 Oct 2019 11:40) (97/44 - 106/66)  BP(mean): --  RR: 18 (13 Oct 2019 11:40) (16 - 18)  SpO2: 97% (13 Oct 2019 11:40) (89% - 100%)  Daily     Daily Weight in k.7 (13 Oct 2019 09:45)    General:  NAD, morbidly obese   HEENT:  NC/AT,  conjunctivae clear and pink, no thyromegaly, nodules, adenopathy, no JVD, anicteric sclera  Chest:  Full & symmetric excursion, no increased effort, breath sounds clear  Cardiovascular:  Regular rhythm, S1, S2, no murmur/rub/S3/S4, no abdominal bruit, no edema  Abdomen:  Soft, non tender, non distended, normoactive bowel sounds,  no masses , no hepatosplenomegaly, no signs of chronic liver disease  Extremities:  no cyanosis, clubbing or edema  Skin:  No rash/erythema/ecchymoses/petechiae/wounds/abscess/warm/dry  Neuro/Psych:  Alert, oriented, no asterixis, no tremor, no encephalopathy  Rectal: deferred    Laboratory:                          7.6    5.96  )-----------( 271      ( 13 Oct 2019 06:29 )             25.3     10-13    128<L>  |  92<L>  |  33<H>  ----------------------------<  116<H>  5.3   |  24  |  1.19    Ca    9.8      13 Oct 2019 06:29                Intake and Output    10-12-19 @ 07:01  -  10-13-19 @ 07:00  --------------------------------------------------------  IN: 600 mL / OUT: 0 mL / NET: 600 mL        Imaging: Chief Complaint:  Patient is a 34y old  Female who presents with a chief complaint of Shortness of breath and hypotension. (13 Oct 2019 11:45)      HPI:  33 y/o Female PMH pulmonary hypertension and right sided CHF( RV EF 35%) COPD, HTN, DM2, hyperbilirubinemia, L femoral DVT,  Pt. went to PCP today who noted her hypotension and SOB and recommended coming to ER.  Pt. is amenable to staying here tonight, says she's too sob to go home and further she hasn't been able to  meds from pharmacy as outpatient., because she can't walk due to chronic leg swelling, right calf pain.  No other complaints of inciting event, has had poor oral intake for several days,   Pt. is otherwise well. Pt refusing ABG, parenteral anticoagulants; has 22G IV, refusing attempt to insert larger catheter for CTA, will allow oral anticoagulation  ROS: negative for fever, cough, headache, dizziness, chest pain, palpitations, abd pain, nausea, vomiting, diarrhea, rash, paresthesia, and focal weakness--all other systems reviewed are negative. (10 Oct 2019 02:00). GI was consulted for evaluation and management of anemia, microcytic. She denies any previous history of gastrointestinal bleeding. She reports that she has very heavy periods which has been worse since she started anticoagluation. Denies any blood in her stool. She denies any family history of gastrointestinal malignancy. No previous colonoscopy.       PMH/PSH:PAST MEDICAL & SURGICAL HISTORY:  Former smoker, stopped smoking in distant past  Vitamin D deficiency  Morbid obesity due to excess calories  Edema extremities  Diabetes mellitus  Hyperbilirubinemia  COPD (chronic obstructive pulmonary disease)  COPD (chronic obstructive pulmonary disease)  Anticoagulation goal of INR 2 to 3  Anticardiolipin antibody positive  Congestive heart failure, unspecified HF chronicity, unspecified heart failure type  Moderate asthma, unspecified whether complicated, unspecified whether persistent  Other secondary hypertension  COPD (chronic obstructive pulmonary disease)  Anticardiolipin antibody positive  Essential hypertension  Pulmonary HTN  Morbid obesity  No significant past surgical history      Allergies:  No Known Allergies      Medications:  ALBUTerol/ipratropium for Nebulization 3 milliLiter(s) Nebulizer every 6 hours  aspirin enteric coated 81 milliGRAM(s) Oral daily  atorvastatin 20 milliGRAM(s) Oral at bedtime  dextrose 40% Gel 15 Gram(s) Oral once PRN  dextrose 5%. 1000 milliLiter(s) IV Continuous <Continuous>  dextrose 50% Injectable 12.5 Gram(s) IV Push once  dextrose 50% Injectable 25 Gram(s) IV Push once  dextrose 50% Injectable 25 Gram(s) IV Push once  furosemide   Injectable 40 milliGRAM(s) IV Push daily  glucagon  Injectable 1 milliGRAM(s) IntraMuscular once PRN  insulin lispro (HumaLOG) corrective regimen sliding scale   SubCutaneous at bedtime  insulin lispro (HumaLOG) corrective regimen sliding scale   SubCutaneous three times a day before meals  levothyroxine 25 MICROGram(s) Oral daily  midodrine. 10 milliGRAM(s) Oral every 8 hours  pantoprazole    Tablet 40 milliGRAM(s) Oral before breakfast  traMADol 25 milliGRAM(s) Oral every 6 hours PRN      Review of Systems:    General:  + fatigue   Eyes:  Good vision, no reported pain  ENT:  No sore throat, pain, runny nose, dysphagia  CV:  No pain, palpitations, hypo/hypertension  Resp:  + shortness of breath   GI:  as per HPI   :  No pain, bleeding, incontinence, nocturia  Muscle:  No pain, weakness  Breast:  No pain, abscess, mass, discharge  Neuro:  No weakness, tingling, memory problems  Psych:  No fatigue, insomnia, mood problems, depression  Endocrine:  No polyuria, polydipsia, cold/heat intolerance  Heme:  No petechiae, ecchymosis, easy bruisability  Skin:  No rash, tattoos, scars, edema    Relevant Family History:   FAMILY HISTORY:  Family history of leukemia (Sibling)      Relevant Social History: Alcohol ( -) , Tobacco ( -) , Illicit drugs (- )     Physical Exam:    Vital Signs:  Vital Signs Last 24 Hrs  T(C): 36.2 (13 Oct 2019 11:40), Max: 36.2 (13 Oct 2019 11:40)  T(F): 97.2 (13 Oct 2019 11:40), Max: 97.2 (13 Oct 2019 11:40)  HR: 82 (13 Oct 2019 11:40) (78 - 114)  BP: 102/56 (13 Oct 2019 11:40) (97/44 - 106/66)  BP(mean): --  RR: 18 (13 Oct 2019 11:40) (16 - 18)  SpO2: 97% (13 Oct 2019 11:40) (89% - 100%)  Daily     Daily Weight in k.7 (13 Oct 2019 09:45)    General:  NAD, morbidly obese   HEENT:  NC/AT,  conjunctivae clear and pink, no thyromegaly, nodules, adenopathy, no JVD, anicteric sclera  Chest:  Full & symmetric excursion, no increased effort, breath sounds clear  Cardiovascular:  Regular rhythm, S1, S2, no murmur/rub/S3/S4, no abdominal bruit, no edema  Abdomen:  Soft, non tender, non distended, normoactive bowel sounds,  no masses , no hepatosplenomegaly, no signs of chronic liver disease  Extremities:  no cyanosis, clubbing or edema  Skin:  No rash/erythema/ecchymoses/petechiae/wounds/abscess/warm/dry  Neuro/Psych:  Alert, oriented, no asterixis, no tremor, no encephalopathy  Rectal: refused     Laboratory:                          7.6    5.96  )-----------( 271      ( 13 Oct 2019 06:29 )             25.3     10-    128<L>  |  92<L>  |  33<H>  ----------------------------<  116<H>  5.3   |  24  |  1.19    Ca    9.8      13 Oct 2019 06:29                Intake and Output    10-12-19 @ 07:01  -  10-13-19 @ 07:00  --------------------------------------------------------  IN: 600 mL / OUT: 0 mL / NET: 600 mL        Imaging:

## 2019-10-13 NOTE — PROGRESS NOTE ADULT - ASSESSMENT
33 y/o lady with severe pulmonary hypertension and right sided HF (RV EF 35%), COPD, HTN, DM2, hyperbilirubinemia, L femoral DVT,  had hypotension. No other complaints of inciting event, has had poor oral intake for several days.  pt very non-compliant both with follow-ups and with meds.   Will diuresis the next few days back to a euvolemic state; if she agrees, will then transfer for R and C and eval with Dr. Unger at [facility].  Eventual Right  and Left heart cath and eval with Dr. Unger at [facility].  fluid and salt restriction encouraged.    MEDICATIONS  (STANDING):  ALBUTerol/ipratropium for Nebulization 3 milliLiter(s) Nebulizer every 6 hours  aspirin enteric coated 81 milliGRAM(s) Oral daily  atorvastatin 20 milliGRAM(s) Oral at bedtime  furosemide   Injectable 40 milliGRAM(s) IV Push daily  insulin lispro (HumaLOG) corrective regimen sliding scale   SubCutaneous at bedtime  insulin lispro (HumaLOG) corrective regimen sliding scale   SubCutaneous three times a day before meals  levothyroxine 25 MICROGram(s) Oral daily  midodrine. 10 milliGRAM(s) Oral every 8 hours  pantoprazole    Tablet 40 milliGRAM(s) Oral before breakfast    follow labs.  Eventual Right  and Left heart cath and pulm eval with Dr. Unger at [facility].  fluid and salt restriction encouraged.    Moncho Bennett MD, [facility]

## 2019-10-13 NOTE — PROGRESS NOTE ADULT - SUBJECTIVE AND OBJECTIVE BOX
Subjective: dec FERRO      MEDICATIONS  (STANDING):  ALBUTerol/ipratropium for Nebulization 3 milliLiter(s) Nebulizer every 6 hours  aspirin enteric coated 81 milliGRAM(s) Oral daily  atorvastatin 20 milliGRAM(s) Oral at bedtime  dextrose 5%. 1000 milliLiter(s) (50 mL/Hr) IV Continuous <Continuous>  dextrose 50% Injectable 12.5 Gram(s) IV Push once  dextrose 50% Injectable 25 Gram(s) IV Push once  dextrose 50% Injectable 25 Gram(s) IV Push once  furosemide   Injectable 40 milliGRAM(s) IV Push daily  insulin lispro (HumaLOG) corrective regimen sliding scale   SubCutaneous at bedtime  insulin lispro (HumaLOG) corrective regimen sliding scale   SubCutaneous three times a day before meals  levothyroxine 25 MICROGram(s) Oral daily  midodrine. 10 milliGRAM(s) Oral every 8 hours    MEDICATIONS  (PRN):  dextrose 40% Gel 15 Gram(s) Oral once PRN Blood Glucose LESS THAN 70 milliGRAM(s)/deciliter  glucagon  Injectable 1 milliGRAM(s) IntraMuscular once PRN Glucose LESS THAN 70 milligrams/deciliter  traMADol 25 milliGRAM(s) Oral every 6 hours PRN Moderate Pain (4 - 6)          T(C): 35.8 (10-13-19 @ 05:30), Max: 36.1 (10-13-19 @ 00:30)  HR: 85 (10-13-19 @ 06:12) (78 - 94)  BP: 97/44 (10-13-19 @ 05:30) (97/44 - 106/66)  RR: 18 (10-13-19 @ 05:30) (16 - 18)  SpO2: 94% (10-13-19 @ 06:12) (89% - 99%)  Wt(kg): --        I&O's Detail    12 Oct 2019 07:01  -  13 Oct 2019 07:00  --------------------------------------------------------  IN:    Oral Fluid: 600 mL  Total IN: 600 mL    OUT:  Total OUT: 0 mL    Total NET: 600 mL               PHYSICAL EXAM:    GENERAL: NAD  EYES: EOMI, PERRLA, conjunctiva and sclera clear  NECK: Supple, no inc in JVP  CHEST/LUNG: Clear  HEART: S1S2  ABDOMEN: Soft, Nontender, Nondistended; Bowel sounds present  EXTREMITIES:  2+ edema        LABS:  CBC Full  -  ( 13 Oct 2019 06:29 )  WBC Count : x  RBC Count : 3.46 M/uL  Hemoglobin : 7.6 g/dL  Hematocrit : 25.3 %  Platelet Count - Automated : x  Mean Cell Volume : 73.1 fl  Mean Cell Hemoglobin : 22.0 pg  Mean Cell Hemoglobin Concentration : 30.0 gm/dL  Auto Neutrophil # : x  Auto Lymphocyte # : x  Auto Monocyte # : x  Auto Eosinophil # : x  Auto Basophil # : x  Auto Neutrophil % : x  Auto Lymphocyte % : x  Auto Monocyte % : x  Auto Eosinophil % : x  Auto Basophil % : x    10-13    128<L>  |  92<L>  |  33<H>  ----------------------------<  116<H>  5.3   |  24  |  1.19    Ca    9.8      13 Oct 2019 06:29              Impression:  * HypoNa -- hypervolemic, aldactone effect  * Severe RHF, pulm HTN ( ? primary )  * Chronic hypotension due to above  * L fem DVT    Recommendations:   * Consider transitioning to Torsemide 20mg BID  * Cont to hold Aldactone   * FR 1000cc/day stressed with pt.   * Needs outpt f/u with HF clinic immediately after dc

## 2019-10-13 NOTE — CONSULT NOTE ADULT - ASSESSMENT
35 y/o Female PMH severe pulmonary hypertension and right sided CHF( RV EF 35%) COPD, HTN, DM2, hyperbilirubinemia, L femoral DVT, who presents to the hospital with shortness of breath and hypotension     GI consulted for anemia, microcytic. Of denies any overt gastrointestinal bleeding and reports heavy mentrual bleeding, worse since starting anticoagluation.     Would recommend empiric protonix. Send stool for occult blood. If positive would discuss utility of EGD/Colonoscopy, however in the setting of heavy mentrual bleeding this is most likely the etiology, and she is high risk for endoscopy in the setting of severe pHTN, morbid obesity, CHF. 33 y/o Female PMH severe pulmonary hypertension and right sided CHF( RV EF 35%) COPD, HTN, DM2, hyperbilirubinemia, L femoral DVT, who presents to the hospital with shortness of breath and hypotension     GI consulted for anemia, microcytic. Of denies any overt gastrointestinal bleeding and reports heavy mentrual bleeding, worse since starting anticoagluation. Refuses rectal exam    Would recommend empiric protonix. Send stool for occult blood. If positive would discuss utility of EGD/Colonoscopy, however in the setting of heavy mentrual bleeding this is most likely the etiology, and she is high risk for endoscopy in the setting of severe pHTN, morbid obesity, CHF.

## 2019-10-13 NOTE — CHART NOTE - NSCHARTNOTEFT_GEN_A_CORE
Called to assess pt for L foot injury occurred this evening due to oxygen tank fell on her foot by the respiratory therapist.  Pt was seen and examined at bedside appeared in pain but no distress.  Pt reports her pain is 10/10 on the dorsal aspect of the foot.  Reports pain is local, but exacerbates when touch and move.  Also c/o numbness and tingling. Called to assess pt for L foot injury occurred this evening due to oxygen tank fell on her foot by the respiratory therapist.    HPI: Pt was seen and examined at bedside appeared in pain but no distress.  Pt reports her pain is 10/10 on the dorsal aspect of the foot.  Reports pain is local, but exacerbates when touch and move.  Also c/o swelling of the site with numbness and tingling but no redness.  RN applied cold compress    Vital Signs Last 24 Hrs  T(C): 36.1 (13 Oct 2019 16:45), Max: 36.2 (13 Oct 2019 11:40)  T(F): 96.9 (13 Oct 2019 16:45), Max: 97.2 (13 Oct 2019 11:40)  HR: 78 (13 Oct 2019 17:08) (71 - 94)  BP: 98/56 (13 Oct 2019 16:45) (97/44 - 102/56)  BP(mean): --  RR: 18 (13 Oct 2019 16:45) (16 - 18)  SpO2: 99% (13 Oct 2019 17:08) (89% - 100%)    AAOx4   Sitting in bed c/o pain.  LE's: L foot swelling, pain on both active and passive movement.  No redness but tender to touch.  Swelling noted.    A/P; L foot injury due to weight fell on it  Xray of the foot at bedside ordered--3 views.  Pain meds prn.  will f/u.

## 2019-10-13 NOTE — PROGRESS NOTE ADULT - SUBJECTIVE AND OBJECTIVE BOX
Patient is a 34y old  Female who presents with a chief complaint of Shortness of breath and hypotension. (11 Oct 2019 13:04)    PAST MEDICAL & SURGICAL HISTORY:  Former smoker, stopped smoking in distant past  Vitamin D deficiency  Morbid obesity due to excess calories  Edema extremities  Diabetes mellitus  Hyperbilirubinemia  COPD (chronic obstructive pulmonary disease)  COPD (chronic obstructive pulmonary disease)  Anticoagulation goal of INR 2 to 3  Anticardiolipin antibody positive  Congestive heart failure, unspecified HF chronicity, unspecified heart failure type  Moderate asthma, unspecified whether complicated, unspecified whether persistent  Other secondary hypertension  COPD (chronic obstructive pulmonary disease)  Anticardiolipin antibody positive  Essential hypertension  Pulmonary HTN  Morbid obesity  No significant past surgical history    INTERVAL HISTORY: Patient resting in bed, reports improvement in SOB; denies chest pain, palpitations, dizziness or cough. Feels appetite is down. Ankle/leg edema remains.  	    Vitals:  Vital Signs Last 24 Hrs  T(C): 36.2 (13 Oct 2019 11:40), Max: 36.2 (13 Oct 2019 11:40)  T(F): 97.2 (13 Oct 2019 11:40), Max: 97.2 (13 Oct 2019 11:40)  HR: 82 (13 Oct 2019 11:40) (78 - 114)  BP: 102/56 (13 Oct 2019 11:40) (97/44 - 106/66)  RR: 18 (13 Oct 2019 11:40) (16 - 18)  SpO2: 97% (13 Oct 2019 11:40) (89% - 100%)      PHYSICAL EXAM:  Neuro: Awake, responsive  CV: S1 S2 RRR  Lungs: Occasional rhonchi in base   GI: Soft, BS +, ND, NT  Extremities: Bilateral  LE edema    TELEMETRY: NSR brief SVT  	    RADIOLOGY: < from: Xray Chest 1 View- PORTABLE-Urgent (10.09.19 @ 21:34) >  INTERPRETATION:  PROCEDURE: AP view of the chest.    CLINICAL INFORMATION: Shortness of breath.    COMPARISON: 9/9/2019.    FINDINGS:    Lungs: The lungs are clear.  Heart: There is cardiomegaly.  Mediastinum: Hilar prominence noted.    IMPRESSION:    Clear lungs.    < end of copied text >      DIAGNOSTIC TESTING:    [x ] Echocardiogram: < from: TTE Echo Doppler w/o Cont (07.26.19 @ 08:44) >  Summary:   1. Left ventricular ejection fraction, by visual estimation, is 45 to   50%.   2. Mildly decreased global left ventricular systolic function.   3. Elevated mean left atrial pressure.   4. Global cardiomyopathy.   5. Increased LV wall thickness.   6. Normal left ventricular internal cavity size.   7. Right ventricular pressure overload.   8. Spectral Doppler shows impaired relaxation pattern of left   ventricular myocardial filling (Grade I diastolic dysfunction).   9. There is mild asymmetric left ventricular hypertrophy.  10. Pulmonary hypertension.  11. Severely enlarged right ventricle.  12. Severely reduced RV systolic function.  13. RV ejection fraction 34%.  14. Small pericardial effusion.  15. Degenerative mitral valve.  16. Mild mitral annular calcification.  17. Mild mitral valve regurgitation.  18. Moderate tricuspid regurgitation.  19. Structurally normal tricuspid valve, with normal leaflet excursion.  20. Mild aortic regurgitation.  21. Mild to moderate pulmonic valve regurgitation.  22. Structurally normal pulmonic valve, with normal leaflet excursion.  23. Estimated pulmonary artery systolic pressure is 87.1 mmHg assuming a   right atrial pressure of 20 mmHg, which is consistent with severe   pulmonary hypertension.  24. Pulmonary hypertension is present.  25. The main pulmonary artery is normal in size.  26. The right atrial pressure is moderately elevated.    < end of copied text >    LABS:	 	                          7.6    5.96  )-----------( 271      ( 13 Oct 2019 06:29 )             25.3     10-13    128<L>  |  92<L>  |  33<H>  ----------------------------<  116<H>  5.3   |  24  |  1.19    Ca    9.8      13 Oct 2019 06:29

## 2019-10-13 NOTE — DIETITIAN INITIAL EVALUATION ADULT. - PERTINENT LABORATORY DATA
10-13 Na128 mmol/L<L> Glu 116 mg/dL<H> K+ 5.3 mmol/L Cr  1.19 mg/dL BUN 33 mg/dL<H> 10-11 Alb 3.1 g/dL<L> 09-19 [id number] 5.9 %<H>10-11 ALT 28 U/L AST 27 U/L Alkaline Phosphatase 82 U/L  10-12 POCT blood Glucose range 128-180<acceptable range for DM>   10-13    Hgb 7.6 g/dL<L> Hct 25.3 %<L>

## 2019-10-13 NOTE — DIETITIAN INITIAL EVALUATION ADULT. - PERTINENT MEDS FT
MEDICATIONS  (STANDING):  ALBUTerol/ipratropium for Nebulization 3 milliLiter(s) Nebulizer every 6 hours  aspirin enteric coated 81 milliGRAM(s) Oral daily  atorvastatin 20 milliGRAM(s) Oral at bedtime  dextrose 5%. 1000 milliLiter(s) (50 mL/Hr) IV Continuous <Continuous>  dextrose 50% Injectable 12.5 Gram(s) IV Push once  dextrose 50% Injectable 25 Gram(s) IV Push once  dextrose 50% Injectable 25 Gram(s) IV Push once  furosemide   Injectable 40 milliGRAM(s) IV Push daily  insulin lispro (HumaLOG) corrective regimen sliding scale   SubCutaneous at bedtime  insulin lispro (HumaLOG) corrective regimen sliding scale   SubCutaneous three times a day before meals  levothyroxine 25 MICROGram(s) Oral daily  midodrine. 10 milliGRAM(s) Oral every 8 hours    MEDICATIONS  (PRN):  dextrose 40% Gel 15 Gram(s) Oral once PRN Blood Glucose LESS THAN 70 milliGRAM(s)/deciliter  glucagon  Injectable 1 milliGRAM(s) IntraMuscular once PRN Glucose LESS THAN 70 milligrams/deciliter  traMADol 25 milliGRAM(s) Oral every 6 hours PRN Moderate Pain (4 - 6)

## 2019-10-13 NOTE — PROGRESS NOTE ADULT - SUBJECTIVE AND OBJECTIVE BOX
INTERVAL HPI:  33 y/o Female with severe Pulmonary Hypertension and right sided CHF( RV EF 35%) COPD, HTN, DM2, Hyperbilirubinemia, L femoral DVT. Not taking anticoagulant since she her last admission(claims prescription was not sent).   Pt. went to PCP today who noted her hypotension and SOB and recommended coming to ER.  She hasn't been able to  meds from pharmacy as outpatient., because she can't walk due to chronic leg swelling, right calf pain.  No other complaints of inciting event, has had poor oral intake for several days,   Pt. is otherwise well. Refused  ABG, parenteral anticoagulants;  On home O2. Denies tobacco abuse.    OVERNIGHT EVENTS:  Wants more food.    Vital Signs Last 24 Hrs  T(C): 36.1 (13 Oct 2019 16:45), Max: 36.2 (13 Oct 2019 11:40)  T(F): 96.9 (13 Oct 2019 16:45), Max: 97.2 (13 Oct 2019 11:40)  HR: 78 (13 Oct 2019 17:08) (71 - 94)  BP: 98/56 (13 Oct 2019 16:45) (97/44 - 102/56)  BP(mean): --  RR: 18 (13 Oct 2019 16:45) (16 - 18)  SpO2: 99% (13 Oct 2019 17:08) (89% - 100%)    PHYSICAL EXAM:  GEN:         Awake, responsive.  HEENT:    Normal.    RESP:        no distress  CVS:           Regular rate and rhythm.   ABD:         Soft, non-tender, non-distended;     MEDICATIONS  (STANDING):  ALBUTerol/ipratropium for Nebulization 3 milliLiter(s) Nebulizer every 6 hours  aspirin enteric coated 81 milliGRAM(s) Oral daily  atorvastatin 20 milliGRAM(s) Oral at bedtime  dextrose 5%. 1000 milliLiter(s) (50 mL/Hr) IV Continuous <Continuous>  dextrose 50% Injectable 12.5 Gram(s) IV Push once  dextrose 50% Injectable 25 Gram(s) IV Push once  dextrose 50% Injectable 25 Gram(s) IV Push once  furosemide   Injectable 40 milliGRAM(s) IV Push daily  insulin lispro (HumaLOG) corrective regimen sliding scale   SubCutaneous at bedtime  insulin lispro (HumaLOG) corrective regimen sliding scale   SubCutaneous three times a day before meals  levothyroxine 25 MICROGram(s) Oral daily  midodrine. 10 milliGRAM(s) Oral every 8 hours  pantoprazole    Tablet 40 milliGRAM(s) Oral before breakfast    MEDICATIONS  (PRN):  dextrose 40% Gel 15 Gram(s) Oral once PRN Blood Glucose LESS THAN 70 milliGRAM(s)/deciliter  glucagon  Injectable 1 milliGRAM(s) IntraMuscular once PRN Glucose LESS THAN 70 milligrams/deciliter  traMADol 25 milliGRAM(s) Oral every 6 hours PRN Moderate Pain (4 - 6)    LABS:                        7.6    5.96  )-----------( 271      ( 13 Oct 2019 06:29 )             25.3     10-13    128<L>  |  92<L>  |  33<H>  ----------------------------<  116<H>  5.3   |  24  |  1.19    Ca    9.8      13 Oct 2019 06:29    ASSESSMENT AND PLAN:  ·	SOB.  ·	Severe pulmonary HTN.  ·	Cor pulmonale.  ·	Fluid Retention.  ·	Left external DVT.  ·	Non Compliance with treatment.    Last admission pt was on riociguat 1.5 mg TID for pulmonary HTN. Spoke with pharmacy, it is non formulary .  Please ask pt if she has her own.

## 2019-10-14 LAB
ANION GAP SERPL CALC-SCNC: 8 MMOL/L — SIGNIFICANT CHANGE UP (ref 5–17)
ANISOCYTOSIS BLD QL: SIGNIFICANT CHANGE UP
BASOPHILS # BLD AUTO: 0 K/UL — SIGNIFICANT CHANGE UP (ref 0–0.2)
BASOPHILS NFR BLD AUTO: 0 % — SIGNIFICANT CHANGE UP (ref 0–2)
BUN SERPL-MCNC: 24 MG/DL — HIGH (ref 7–23)
CALCIUM SERPL-MCNC: 9.9 MG/DL — SIGNIFICANT CHANGE UP (ref 8.5–10.1)
CHLORIDE SERPL-SCNC: 95 MMOL/L — LOW (ref 96–108)
CO2 SERPL-SCNC: 28 MMOL/L — SIGNIFICANT CHANGE UP (ref 22–31)
CREAT SERPL-MCNC: 0.89 MG/DL — SIGNIFICANT CHANGE UP (ref 0.5–1.3)
EOSINOPHIL # BLD AUTO: 0 K/UL — SIGNIFICANT CHANGE UP (ref 0–0.5)
EOSINOPHIL NFR BLD AUTO: 0 % — SIGNIFICANT CHANGE UP (ref 0–6)
FERRITIN SERPL-MCNC: 40 NG/ML — SIGNIFICANT CHANGE UP (ref 15–150)
FOLATE SERPL-MCNC: 19.3 NG/ML — SIGNIFICANT CHANGE UP
GLUCOSE BLDC GLUCOMTR-MCNC: 131 MG/DL — HIGH (ref 70–99)
GLUCOSE BLDC GLUCOMTR-MCNC: 197 MG/DL — HIGH (ref 70–99)
GLUCOSE BLDC GLUCOMTR-MCNC: 211 MG/DL — HIGH (ref 70–99)
GLUCOSE SERPL-MCNC: 110 MG/DL — HIGH (ref 70–99)
HCT VFR BLD CALC: 24 % — LOW (ref 34.5–45)
HGB BLD-MCNC: 7.1 G/DL — LOW (ref 11.5–15.5)
HYPOCHROMIA BLD QL: SIGNIFICANT CHANGE UP
IRON SATN MFR SERPL: 12 UG/DL — LOW (ref 30–160)
IRON SATN MFR SERPL: 3 % — LOW (ref 14–50)
LYMPHOCYTES # BLD AUTO: 1.15 K/UL — SIGNIFICANT CHANGE UP (ref 1–3.3)
LYMPHOCYTES # BLD AUTO: 21 % — SIGNIFICANT CHANGE UP (ref 13–44)
MANUAL SMEAR VERIFICATION: SIGNIFICANT CHANGE UP
MCHC RBC-ENTMCNC: 21.8 PG — LOW (ref 27–34)
MCHC RBC-ENTMCNC: 29.6 GM/DL — LOW (ref 32–36)
MCV RBC AUTO: 73.6 FL — LOW (ref 80–100)
MICROCYTES BLD QL: SIGNIFICANT CHANGE UP
MONOCYTES # BLD AUTO: 0.44 K/UL — SIGNIFICANT CHANGE UP (ref 0–0.9)
MONOCYTES NFR BLD AUTO: 8 % — SIGNIFICANT CHANGE UP (ref 2–14)
NEUTROPHILS # BLD AUTO: 3.84 K/UL — SIGNIFICANT CHANGE UP (ref 1.8–7.4)
NEUTROPHILS NFR BLD AUTO: 70 % — SIGNIFICANT CHANGE UP (ref 43–77)
NRBC # BLD: 15 /100 — HIGH (ref 0–0)
NRBC # BLD: SIGNIFICANT CHANGE UP /100 WBCS (ref 0–0)
PLAT MORPH BLD: NORMAL — SIGNIFICANT CHANGE UP
PLATELET # BLD AUTO: 288 K/UL — SIGNIFICANT CHANGE UP (ref 150–400)
POLYCHROMASIA BLD QL SMEAR: SLIGHT — SIGNIFICANT CHANGE UP
POTASSIUM SERPL-MCNC: 4.8 MMOL/L — SIGNIFICANT CHANGE UP (ref 3.5–5.3)
POTASSIUM SERPL-SCNC: 4.8 MMOL/L — SIGNIFICANT CHANGE UP (ref 3.5–5.3)
RBC # BLD: 3.26 M/UL — LOW (ref 3.8–5.2)
RBC # FLD: 22.1 % — HIGH (ref 10.3–14.5)
RBC BLD AUTO: ABNORMAL
SODIUM SERPL-SCNC: 131 MMOL/L — LOW (ref 135–145)
TARGETS BLD QL SMEAR: SLIGHT — SIGNIFICANT CHANGE UP
TIBC SERPL-MCNC: 374 UG/DL — SIGNIFICANT CHANGE UP (ref 220–430)
UIBC SERPL-MCNC: 362 UG/DL — SIGNIFICANT CHANGE UP (ref 110–370)
VARIANT LYMPHS # BLD: 1 % — SIGNIFICANT CHANGE UP (ref 0–6)
VIT B12 SERPL-MCNC: 1996 PG/ML — HIGH (ref 232–1245)
WBC # BLD: 5.49 K/UL — SIGNIFICANT CHANGE UP (ref 3.8–10.5)
WBC # FLD AUTO: 5.49 K/UL — SIGNIFICANT CHANGE UP (ref 3.8–10.5)

## 2019-10-14 PROCEDURE — 99232 SBSQ HOSP IP/OBS MODERATE 35: CPT

## 2019-10-14 PROCEDURE — 99233 SBSQ HOSP IP/OBS HIGH 50: CPT

## 2019-10-14 PROCEDURE — 73630 X-RAY EXAM OF FOOT: CPT | Mod: 26,LT,76

## 2019-10-14 RX ORDER — APIXABAN 2.5 MG/1
10 TABLET, FILM COATED ORAL EVERY 12 HOURS
Refills: 0 | Status: DISCONTINUED | OUTPATIENT
Start: 2019-10-14 | End: 2019-10-16

## 2019-10-14 RX ORDER — ONDANSETRON 8 MG/1
4 TABLET, FILM COATED ORAL EVERY 6 HOURS
Refills: 0 | Status: DISCONTINUED | OUTPATIENT
Start: 2019-10-14 | End: 2019-10-23

## 2019-10-14 RX ADMIN — Medication 3 MILLILITER(S): at 11:12

## 2019-10-14 RX ADMIN — PANTOPRAZOLE SODIUM 40 MILLIGRAM(S): 20 TABLET, DELAYED RELEASE ORAL at 06:18

## 2019-10-14 RX ADMIN — Medication 1: at 08:22

## 2019-10-14 RX ADMIN — Medication 25 MICROGRAM(S): at 06:17

## 2019-10-14 RX ADMIN — Medication 3 MILLILITER(S): at 06:05

## 2019-10-14 RX ADMIN — ONDANSETRON 4 MILLIGRAM(S): 8 TABLET, FILM COATED ORAL at 23:59

## 2019-10-14 RX ADMIN — Medication 40 MILLIGRAM(S): at 06:17

## 2019-10-14 RX ADMIN — Medication 3 MILLILITER(S): at 17:00

## 2019-10-14 RX ADMIN — MIDODRINE HYDROCHLORIDE 10 MILLIGRAM(S): 2.5 TABLET ORAL at 14:44

## 2019-10-14 RX ADMIN — Medication 3 MILLILITER(S): at 23:49

## 2019-10-14 RX ADMIN — Medication 81 MILLIGRAM(S): at 14:44

## 2019-10-14 RX ADMIN — APIXABAN 10 MILLIGRAM(S): 2.5 TABLET, FILM COATED ORAL at 17:46

## 2019-10-14 RX ADMIN — MIDODRINE HYDROCHLORIDE 10 MILLIGRAM(S): 2.5 TABLET ORAL at 06:17

## 2019-10-14 NOTE — PROGRESS NOTE ADULT - ASSESSMENT
33 yo female with history of Anticardiolipin antibody positive on AC at home, COPD, DM II, Essential hypertension, Vitamin D deficiency & severe Pulm HTN with right sided heart failure p/w SOB & was found to have acute on chronic combined systolic and diastolic congestive heart failure, hyponatremia, DIANN & acute RLE DVT.    Acute resp failure w/hypoxia;  -pulmonary hypertension, morbid obesity, CHF  -o2 supplemented    Acute on chronic combined systolic and diastolic congestive heart failure:   - cont lasix  - monitor daily wt, i/o's  -per cards- Will diuresis the next few days back to a euvolemic state; if she agrees, will then transfer for R and C and eval with Dr. Unger at [facility].  -fluid and salt restriction encouraged.      Worsening acute blood loss anemia in the setting of AC and steroid use with heavy menstruation  cont aspirin . pending stool occult and GI/[place])   PO protonix    severe Pulmonary hypertension:  - c/w riociguat  - on chronic oxygen   - pulmonary following      Type 2 diabetes mellitus with hyperglycemia, without long-term current use of insulin:  - Continue current insulin regimen    Hyponatremia:  - likely due to volume overload/ aggressive diuresis and possible SSRI- on hold  - Renal following   - c/w Fluid restriction    HTN:  - hold all BP meds due to borderline BP    Asthma:  - pulmonary following  - c/w Symbicort    Acute deep vein thrombosis (DVT) of femoral vein of left lower extremity:  - US showed an extensive acute nonocclusive thrombus in the left external iliac vein and the left common femoral vein and left greater saphenous vein  - Eliquis, pt non compliant with INR check.  -called dr poole for ivc filter    L foot injury due to weight fell on it  -Xray of the foot  -pain mgmt     Acquired hypothyroidism:  - c/w synthroid     Hyperlipidemia:  - c/w Zocor    Depression:  - Zoloft on hold    Prophylaxis:  DVT: resume eliquis per GI  GI: Pepcid changed to protonix

## 2019-10-14 NOTE — PROGRESS NOTE ADULT - ASSESSMENT
35 y/o lady with severe pulmonary hypertension and right sided HF (RV EF 35%), COPD, HTN, DM2, hyperbilirubinemia, L femoral DVT,  chronic hypotension.     Despite aggressive diuresis on her previous admission and significant improvement in her edema, she put back 16 kg of weight through likely non-compliance with diet and possibly her meds. She missed two appointments with Dr. Unger and claims its because she has no money for transportation.  She also never followed up with Dr Adamson as requested by Dr. Unger to arrange for further heart failure care.  Patient has been non-compliant with dietary restrictions in the hospital and is quite upset about the incident from last night where she was injured in her leg; she attributes here edema to that event.    Would continue diuresis for the next few days; I offered to try and arrange for R+LHC at [place] but patient has refused any transfer.  I am not sure that she has insight into the severity of her condition and there are significant psychosocial factors that prevent her from accepting the care she needs.   Suggest social work intervention and possible psych evaluation for depression.    Meanwhile continue diuretics, needs transfusion for worsening anemia, watch lytes carefully - she became quite hyponatremic last visit due to overdiuresis.

## 2019-10-14 NOTE — PROGRESS NOTE ADULT - SUBJECTIVE AND OBJECTIVE BOX
Patient is a 34y old  Female who presents with a chief complaint of Shortness of breath and hypotension. (11 Oct 2019 13:04)    PAST MEDICAL & SURGICAL HISTORY:  Former smoker, stopped smoking in distant past  Vitamin D deficiency  Morbid obesity due to excess calories  Edema extremities  Diabetes mellitus  Hyperbilirubinemia  COPD (chronic obstructive pulmonary disease)  COPD (chronic obstructive pulmonary disease)  Anticoagulation goal of INR 2 to 3  Anticardiolipin antibody positive  Congestive heart failure, unspecified HF chronicity, unspecified heart failure type  Moderate asthma, unspecified whether complicated, unspecified whether persistent  Other secondary hypertension  COPD (chronic obstructive pulmonary disease)  Anticardiolipin antibody positive  Essential hypertension  Pulmonary HTN  Morbid obesity  No significant past surgical history        Vitals:  Vital Signs Last 24 Hrs  T(C): 36.2 (13 Oct 2019 11:40), Max: 36.2 (13 Oct 2019 11:40)  T(F): 97.2 (13 Oct 2019 11:40), Max: 97.2 (13 Oct 2019 11:40)  HR: 82 (13 Oct 2019 11:40) (78 - 114)  BP: 102/56 (13 Oct 2019 11:40) (97/44 - 106/66)  RR: 18 (13 Oct 2019 11:40) (16 - 18)  SpO2: 97% (13 Oct 2019 11:40) (89% - 100%)      PHYSICAL EXAM: Patient declined examination  Neuro: Awake, responsive  Extremities: 3+ LE edema    TELEMETRY: NSR brief SVT  	    RADIOLOGY: < from: Xray Chest 1 View- PORTABLE-Urgent (10.09.19 @ 21:34) >  INTERPRETATION:  PROCEDURE: AP view of the chest.    CLINICAL INFORMATION: Shortness of breath.    COMPARISON: 9/9/2019.    FINDINGS:    Lungs: The lungs are clear.  Heart: There is cardiomegaly.  Mediastinum: Hilar prominence noted.    IMPRESSION:    Clear lungs.    < end of copied text >      DIAGNOSTIC TESTING:    [x ] Echocardiogram: < from: TTE Echo Doppler w/o Cont (07.26.19 @ 08:44) >  Summary:   1. Left ventricular ejection fraction, by visual estimation, is 45 to   50%.   2. Mildly decreased global left ventricular systolic function.   3. Elevated mean left atrial pressure.   4. Global cardiomyopathy.   5. Increased LV wall thickness.   6. Normal left ventricular internal cavity size.   7. Right ventricular pressure overload.   8. Spectral Doppler shows impaired relaxation pattern of left   ventricular myocardial filling (Grade I diastolic dysfunction).   9. There is mild asymmetric left ventricular hypertrophy.  10. Pulmonary hypertension.  11. Severely enlarged right ventricle.  12. Severely reduced RV systolic function.  13. RV ejection fraction 34%.  14. Small pericardial effusion.  15. Degenerative mitral valve.  16. Mild mitral annular calcification.  17. Mild mitral valve regurgitation.  18. Moderate tricuspid regurgitation.  19. Structurally normal tricuspid valve, with normal leaflet excursion.  20. Mild aortic regurgitation.  21. Mild to moderate pulmonic valve regurgitation.  22. Structurally normal pulmonic valve, with normal leaflet excursion.  23. Estimated pulmonary artery systolic pressure is 87.1 mmHg assuming a   right atrial pressure of 20 mmHg, which is consistent with severe   pulmonary hypertension.  24. Pulmonary hypertension is present.  25. The main pulmonary artery is normal in size.  26. The right atrial pressure is moderately elevated.    < end of copied text >    LABS:	 	                          7.1    5.49  )-----------( 288      ( 14 Oct 2019 11:37 )             24.0     10-14    131<L>  |  95<L>  |  24<H>  ----------------------------<  110<H>  4.8   |  28  |  0.89    Ca    9.9      14 Oct 2019 11:37

## 2019-10-14 NOTE — PROGRESS NOTE ADULT - PROBLEM SELECTOR PLAN 1
Occult blood pending   -Send iron/tibc, ferritin, b12, folate for further work-up  -Trend hg daily; transfuse < 7   -Ok to continue anticoagulation if no overt GI bleeding noted   -GYN consultation for heavy menstrual bleeding (most likely etiology of anemia)  **Pending above work-up consider EGD/Colonsocopy for evaluation of anemia (however she is very high risk given pulmonary hypertension, morbid obesity, CHF).

## 2019-10-14 NOTE — PROGRESS NOTE ADULT - ASSESSMENT
33 y/o Female PMH severe pulmonary hypertension and right sided CHF( RV EF 35%) COPD, HTN, DM2, hyperbilirubinemia, L femoral DVT, who presents to the hospital with shortness of breath and hypotension     GI consulted for anemia, microcytic. Of denies any overt gastrointestinal bleeding and reports heavy mentrual bleeding, worse since starting anticoagluation. Refuses rectal exam    Would recommend empiric protonix. Send stool for occult blood. If positive would discuss utility of EGD/Colonoscopy, however in the setting of heavy mentrual bleeding this is most likely the etiology, and she is high risk for endoscopy in the setting of severe pHTN, morbid obesity, CHF.

## 2019-10-14 NOTE — PROGRESS NOTE ADULT - SUBJECTIVE AND OBJECTIVE BOX
INTERVAL HPI:  35 y/o Female with severe Pulmonary Hypertension and right sided CHF( RV EF 35%) COPD, HTN, DM2, Hyperbilirubinemia, L femoral DVT. Not taking anticoagulant since she her last admission(claims prescription was not sent).   Pt. went to PCP today who noted her hypotension and SOB and recommended coming to ER.  She hasn't been able to  meds from pharmacy as outpatient., because she can't walk due to chronic leg swelling, right calf pain.  No other complaints of inciting event, has had poor oral intake for several days,   Pt. is otherwise well. Refused  ABG, parenteral anticoagulants;  On home O2. Denies tobacco abuse.    OVERNIGHT EVENTS:  Comfortable in bed.    Vital Signs Last 24 Hrs  T(C): 36.5 (14 Oct 2019 16:56), Max: 36.5 (14 Oct 2019 16:56)  T(F): 97.7 (14 Oct 2019 16:56), Max: 97.7 (14 Oct 2019 16:56)  HR: 83 (14 Oct 2019 17:55) (8 - 92)  BP: 94/56 (14 Oct 2019 16:56) (94/56 - 122/69)  BP(mean): --  RR: 18 (14 Oct 2019 16:56) (18 - 18)  SpO2: 98% (14 Oct 2019 17:55) (94% - 98%)    PHYSICAL EXAM:  GEN:         Awake, responsive and comfortable.  HEENT:    Normal.    RESP:       no distress  CVS:           Regular rate and rhythm.   ABD:         Soft, non-tender, non-distended;     MEDICATIONS  (STANDING):  ALBUTerol/ipratropium for Nebulization 3 milliLiter(s) Nebulizer every 6 hours  apixaban 10 milliGRAM(s) Oral every 12 hours  aspirin enteric coated 81 milliGRAM(s) Oral daily  atorvastatin 20 milliGRAM(s) Oral at bedtime  dextrose 5%. 1000 milliLiter(s) (50 mL/Hr) IV Continuous <Continuous>  dextrose 50% Injectable 12.5 Gram(s) IV Push once  dextrose 50% Injectable 25 Gram(s) IV Push once  dextrose 50% Injectable 25 Gram(s) IV Push once  furosemide   Injectable 40 milliGRAM(s) IV Push daily  insulin lispro (HumaLOG) corrective regimen sliding scale   SubCutaneous at bedtime  insulin lispro (HumaLOG) corrective regimen sliding scale   SubCutaneous three times a day before meals  levothyroxine 25 MICROGram(s) Oral daily  midodrine. 10 milliGRAM(s) Oral every 8 hours  pantoprazole    Tablet 40 milliGRAM(s) Oral before breakfast    MEDICATIONS  (PRN):  dextrose 40% Gel 15 Gram(s) Oral once PRN Blood Glucose LESS THAN 70 milliGRAM(s)/deciliter  glucagon  Injectable 1 milliGRAM(s) IntraMuscular once PRN Glucose LESS THAN 70 milligrams/deciliter  traMADol 25 milliGRAM(s) Oral every 6 hours PRN Moderate Pain (4 - 6)    LABS:                        7.1    5.49  )-----------( 288      ( 14 Oct 2019 11:37 )             24.0     10-14    131<L>  |  95<L>  |  24<H>  ----------------------------<  110<H>  4.8   |  28  |  0.89    Ca    9.9      14 Oct 2019 11:37    ASSESSMENT AND PLAN:  ·	SOB.  ·	Severe pulmonary HTN.  ·	Cor pulmonale.  ·	Fluid Retention.  ·	Left external DVT.  ·	Non Compliance with treatment.    Continue nebulizer, Lasix and Apixaban.  Needs follow up with Dr. Unger for pHTN.

## 2019-10-14 NOTE — PROGRESS NOTE ADULT - SUBJECTIVE AND OBJECTIVE BOX
[facility] NEPHROLOGY SERVICES, [facility]  NEPHROLOGY AND HYPERTENSION  [address]  [address]  [place] [address]  [phone]    MD FABIAN FERREIRA MD ANDREY GONCHARUK, MD MADHU KORRAPATI, MD YELENA ROSENBERG, MD BRIANNA FALK, MD RENEA ALCALA MD          Patient feels well no complaints today.    MEDICATIONS  (STANDING):  ALBUTerol/ipratropium for Nebulization 3 milliLiter(s) Nebulizer every 6 hours  apixaban 10 milliGRAM(s) Oral every 12 hours  aspirin enteric coated 81 milliGRAM(s) Oral daily  atorvastatin 20 milliGRAM(s) Oral at bedtime  dextrose 5%. 1000 milliLiter(s) (50 mL/Hr) IV Continuous <Continuous>  dextrose 50% Injectable 12.5 Gram(s) IV Push once  dextrose 50% Injectable 25 Gram(s) IV Push once  dextrose 50% Injectable 25 Gram(s) IV Push once  furosemide   Injectable 40 milliGRAM(s) IV Push daily  insulin lispro (HumaLOG) corrective regimen sliding scale   SubCutaneous at bedtime  insulin lispro (HumaLOG) corrective regimen sliding scale   SubCutaneous three times a day before meals  levothyroxine 25 MICROGram(s) Oral daily  midodrine. 10 milliGRAM(s) Oral every 8 hours  pantoprazole    Tablet 40 milliGRAM(s) Oral before breakfast    MEDICATIONS  (PRN):  dextrose 40% Gel 15 Gram(s) Oral once PRN Blood Glucose LESS THAN 70 milliGRAM(s)/deciliter  glucagon  Injectable 1 milliGRAM(s) IntraMuscular once PRN Glucose LESS THAN 70 milligrams/deciliter  traMADol 25 milliGRAM(s) Oral every 6 hours PRN Moderate Pain (4 - 6)      10-14-19 @ 07:01  -  10-14-19 @ 15:08  --------------------------------------------------------  IN: 0 mL / OUT: 2 mL / NET: -2 mL      PHYSICAL EXAM:      T(C): 36.1 (10-14-19 @ 10:36), Max: 36.4 (10-14-19 @ 00:14)  HR: 74 (10-14-19 @ 11:15) (8 - 92)  BP: 122/69 (10-14-19 @ 10:36) (98/56 - 122/69)  RR: 18 (10-14-19 @ 10:36) (18 - 18)  SpO2: 98% (10-14-19 @ 11:15) (94% - 100%)  Wt(kg): --  Respiratory: clear anteriorly, decreased BS at bases  Cardiovascular: S1 S2  Gastrointestinal: soft NT ND +BS  Extremities:   3 lymphedema                                    7.1    5.49  )-----------( 288      ( 14 Oct 2019 11:37 )             24.0     10-14    131<L>  |  95<L>  |  24<H>  ----------------------------<  110<H>  4.8   |  28  |  0.89    Ca    9.9      14 Oct 2019 11:37          Creatinine Trend: 0.89<--, 1.19<--, 1.05<--, 1.34<--, 1.16<--, 0.83<--      Assessment     DIANN, CRS; hypervolemic hyponatremia;   Warranted diuresis, elevated ADH state;     Plan:  Loop diuretic; fluid and salt restriction;     Clint Linares MD

## 2019-10-14 NOTE — PROGRESS NOTE ADULT - SUBJECTIVE AND OBJECTIVE BOX
Patient is a 34y old  Female who presents with a chief complaint of Shortness of breath and hypotension. (14 Oct 2019 11:01)      OVERNIGHT EVENTS: pt refused am labx, xray foot, TTE, complaints about hospital food, threatening staff. re-educated. hb low. patient relation called by me. pt very non-compliant both with follow-ups and with meds.     REVIEW OF SYSTEMS: denies chest pain/SOB, diaphoresis, no F/C, cough, dizziness, headache, blurry vision, nausea, vomiting, abdominal pain. All others review of systems negative     MEDICATIONS  (STANDING):  ALBUTerol/ipratropium for Nebulization 3 milliLiter(s) Nebulizer every 6 hours  aspirin enteric coated 81 milliGRAM(s) Oral daily  atorvastatin 20 milliGRAM(s) Oral at bedtime  dextrose 5%. 1000 milliLiter(s) (50 mL/Hr) IV Continuous <Continuous>  dextrose 50% Injectable 12.5 Gram(s) IV Push once  dextrose 50% Injectable 25 Gram(s) IV Push once  dextrose 50% Injectable 25 Gram(s) IV Push once  furosemide   Injectable 40 milliGRAM(s) IV Push daily  insulin lispro (HumaLOG) corrective regimen sliding scale   SubCutaneous at bedtime  insulin lispro (HumaLOG) corrective regimen sliding scale   SubCutaneous three times a day before meals  levothyroxine 25 MICROGram(s) Oral daily  midodrine. 10 milliGRAM(s) Oral every 8 hours  pantoprazole    Tablet 40 milliGRAM(s) Oral before breakfast    MEDICATIONS  (PRN):  dextrose 40% Gel 15 Gram(s) Oral once PRN Blood Glucose LESS THAN 70 milliGRAM(s)/deciliter  glucagon  Injectable 1 milliGRAM(s) IntraMuscular once PRN Glucose LESS THAN 70 milligrams/deciliter  traMADol 25 milliGRAM(s) Oral every 6 hours PRN Moderate Pain (4 - 6)      Allergies    No Known Allergies    Intolerances        T(F): 97 (10-14-19 @ 10:36), Max: 97.5 (10-14-19 @ 00:14)  HR: 79 (10-14-19 @ 11:15) (8 - 92)  BP: 122/69 (10-14-19 @ 10:36) (98/56 - 122/69)  RR: 18 (10-14-19 @ 10:36) (18 - 18)  SpO2: 98% (10-14-19 @ 11:15) (94% - 100%)  Wt(kg): --    PHYSICAL EXAM:  GENERAL: NAD, well-groomed, well-developed  HEAD:  Atraumatic, Normocephalic  EYES: EOMI, PERRLA, conjunctiva and sclera clear  ENMT: No tonsillar erythema, exudates, or enlargement; Moist mucous membranes, Good dentition, No lesions  NECK: Supple, No JVD, Normal thyroid  NERVOUS SYSTEM:  Alert & Oriented X3, Good concentration; Motor Strength 5/5 B/L upper and lower extremities; DTRs 2+ intact and symmetric  CHEST/LUNG: Clear to percussion bilaterally; No rales, rhonchi, wheezing, or rubs BL  HEART: Regular rate and rhythm; No murmurs, rubs, or gallops  ABDOMEN: Soft, Nontender, Nondistended; Bowel sounds present  EXTREMITIES:  3+ Peripheral  edema BL LE  LYMPH: No lymphadenopathy noted  SKIN: moist      LABS:                        7.6    5.96  )-----------( 271      ( 13 Oct 2019 06:29 )             25.3     10-13    128<L>  |  92<L>  |  33<H>  ----------------------------<  116<H>  5.3   |  24  |  1.19    Ca    9.8      13 Oct 2019 06:29          Cultures;   CAPILLARY BLOOD GLUCOSE      POCT Blood Glucose.: 131 mg/dL (14 Oct 2019 10:59)  POCT Blood Glucose.: 197 mg/dL (14 Oct 2019 07:17)  POCT Blood Glucose.: 229 mg/dL (13 Oct 2019 23:21)  POCT Blood Glucose.: 152 mg/dL (13 Oct 2019 16:25)    Lipid panel:           RADIOLOGY & ADDITIONAL TESTS:    Imaging Personally Reviewed:  [ x] YES    < from: Xray Chest 1 View- PORTABLE-Urgent (10.09.19 @ 21:34) >  Clear lungs.    < from: TTE Echo Doppler w/o Cont (07.26.19 @ 08:44) >  1. Left ventricular ejection fraction, by visual estimation, is 45 to   50%.   2. Mildly decreased global left ventricular systolic function.   3. Elevated mean left atrial pressure.   4. Global cardiomyopathy.   5. Increased LV wall thickness.   6. Normal left ventricular internal cavity size.   7. Right ventricular pressure overload.   8. Spectral Doppler shows impaired relaxation pattern of left   ventricular myocardial filling (Grade I diastolic dysfunction).   9. There is mild asymmetric left ventricular hypertrophy.  10. Pulmonary hypertension.  11. Severely enlarged right ventricle.  12. Severely reduced RV systolic function.  13. RV ejection fraction 34%.  14. Small pericardial effusion.  15. Degenerative mitral valve.  16. Mild mitral annular calcification.  17. Mild mitral valve regurgitation.  18. Moderate tricuspid regurgitation.  19. Structurally normal tricuspid valve, with normal leaflet excursion.  20. Mild aortic regurgitation.  21. Mild to moderate pulmonic valve regurgitation.  22. Structurally normal pulmonic valve, with normal leaflet excursion.  23. Estimated pulmonary artery systolic pressure is 87.1 mmHg assuming a   right atrial pressure of 20 mmHg, which is consistent with severe   pulmonary hypertension.  24. Pulmonary hypertension is present.  25. The main pulmonary artery is normal in size.  26. The right atrial pressure is moderately elevated.    < end of copied text >    Consultant(s) Notes Reviewed:  [ x] YES     Care Discussed with [x ] Consultants [X ] Patient [ ] Family  [x ]    [x ]  Other; RN

## 2019-10-14 NOTE — PROGRESS NOTE ADULT - SUBJECTIVE AND OBJECTIVE BOX
Gastroenterology  Patient seen and examined bedside resting comfortably.  No abdominal pain or active bleeding noted  Denies nausea and vomiting. Tolerating diet.  Normal flatus/BM.     T(F): 97 (10-14-19 @ 10:36), Max: 97.5 (10-14-19 @ 00:14)  HR: 84 (10-14-19 @ 10:36) (8 - 92)  BP: 122/69 (10-14-19 @ 10:36) (98/56 - 122/69)  RR: 18 (10-14-19 @ 10:36) (18 - 18)  SpO2: 94% (10-14-19 @ 10:36) (94% - 100%)  Wt(kg): --  CAPILLARY BLOOD GLUCOSE      POCT Blood Glucose.: 197 mg/dL (14 Oct 2019 07:17)  POCT Blood Glucose.: 229 mg/dL (13 Oct 2019 23:21)  POCT Blood Glucose.: 152 mg/dL (13 Oct 2019 16:25)  POCT Blood Glucose.: 109 mg/dL (13 Oct 2019 11:16)      PHYSICAL EXAM:  General: NAD, WDWN.   Neuro:  Alert & responsive  HEENT: NCAT, EOMI, conjunctiva clear  CV: +S1+S2 regular rate and rhythm  Lung: clear to ausculation bilaterally, respirations nonlabored, good inspiratory effort  Abdomen: soft, Non Tender, No distention Normal active BS  Extremities: no cyanosis, clubbing or edema    LABS:                        7.6    5.96  )-----------( 271      ( 13 Oct 2019 06:29 )             25.3     10-13    128<L>  |  92<L>  |  33<H>  ----------------------------<  116<H>  5.3   |  24  |  1.19    Ca    9.8      13 Oct 2019 06:29          I&O's Detail    10-13 @ 06:29    128 | 92 | 33  /9.8 | -- | --  _______________________/  5.3 | 24 | 1.19                           \par

## 2019-10-15 LAB
ANION GAP SERPL CALC-SCNC: 11 MMOL/L — SIGNIFICANT CHANGE UP (ref 5–17)
BLD GP AB SCN SERPL QL: SIGNIFICANT CHANGE UP
BUN SERPL-MCNC: 18 MG/DL — SIGNIFICANT CHANGE UP (ref 7–23)
CALCIUM SERPL-MCNC: 9.7 MG/DL — SIGNIFICANT CHANGE UP (ref 8.5–10.1)
CHLORIDE SERPL-SCNC: 91 MMOL/L — LOW (ref 96–108)
CO2 SERPL-SCNC: 25 MMOL/L — SIGNIFICANT CHANGE UP (ref 22–31)
CREAT SERPL-MCNC: 0.88 MG/DL — SIGNIFICANT CHANGE UP (ref 0.5–1.3)
GLUCOSE BLDC GLUCOMTR-MCNC: 132 MG/DL — HIGH (ref 70–99)
GLUCOSE BLDC GLUCOMTR-MCNC: 139 MG/DL — HIGH (ref 70–99)
GLUCOSE BLDC GLUCOMTR-MCNC: 169 MG/DL — HIGH (ref 70–99)
GLUCOSE BLDC GLUCOMTR-MCNC: 175 MG/DL — HIGH (ref 70–99)
GLUCOSE SERPL-MCNC: 95 MG/DL — SIGNIFICANT CHANGE UP (ref 70–99)
HCT VFR BLD CALC: 26 % — LOW (ref 34.5–45)
HGB BLD-MCNC: 7.8 G/DL — LOW (ref 11.5–15.5)
MCHC RBC-ENTMCNC: 21.8 PG — LOW (ref 27–34)
MCHC RBC-ENTMCNC: 30 GM/DL — LOW (ref 32–36)
MCV RBC AUTO: 72.6 FL — LOW (ref 80–100)
NRBC # BLD: 17 /100 WBCS — HIGH (ref 0–0)
PLATELET # BLD AUTO: 340 K/UL — SIGNIFICANT CHANGE UP (ref 150–400)
POTASSIUM SERPL-MCNC: 4.7 MMOL/L — SIGNIFICANT CHANGE UP (ref 3.5–5.3)
POTASSIUM SERPL-SCNC: 4.7 MMOL/L — SIGNIFICANT CHANGE UP (ref 3.5–5.3)
RBC # BLD: 3.58 M/UL — LOW (ref 3.8–5.2)
RBC # FLD: 22.3 % — HIGH (ref 10.3–14.5)
SODIUM SERPL-SCNC: 127 MMOL/L — LOW (ref 135–145)
WBC # BLD: 5.28 K/UL — SIGNIFICANT CHANGE UP (ref 3.8–10.5)
WBC # FLD AUTO: 5.28 K/UL — SIGNIFICANT CHANGE UP (ref 3.8–10.5)

## 2019-10-15 PROCEDURE — 90792 PSYCH DIAG EVAL W/MED SRVCS: CPT

## 2019-10-15 PROCEDURE — 99233 SBSQ HOSP IP/OBS HIGH 50: CPT

## 2019-10-15 PROCEDURE — 99232 SBSQ HOSP IP/OBS MODERATE 35: CPT

## 2019-10-15 RX ORDER — TOLVAPTAN 15 MG/1
15 TABLET ORAL ONCE
Refills: 0 | Status: COMPLETED | OUTPATIENT
Start: 2019-10-15 | End: 2019-10-15

## 2019-10-15 RX ORDER — FUROSEMIDE 40 MG
40 TABLET ORAL EVERY 12 HOURS
Refills: 0 | Status: COMPLETED | OUTPATIENT
Start: 2019-10-15 | End: 2019-10-16

## 2019-10-15 RX ORDER — IRON SUCROSE 20 MG/ML
200 INJECTION, SOLUTION INTRAVENOUS ONCE
Refills: 0 | Status: COMPLETED | OUTPATIENT
Start: 2019-10-15 | End: 2019-10-15

## 2019-10-15 RX ADMIN — Medication 81 MILLIGRAM(S): at 11:46

## 2019-10-15 RX ADMIN — PANTOPRAZOLE SODIUM 40 MILLIGRAM(S): 20 TABLET, DELAYED RELEASE ORAL at 07:08

## 2019-10-15 RX ADMIN — IRON SUCROSE 110 MILLIGRAM(S): 20 INJECTION, SOLUTION INTRAVENOUS at 11:49

## 2019-10-15 RX ADMIN — Medication 3 MILLILITER(S): at 05:59

## 2019-10-15 RX ADMIN — Medication 3 MILLILITER(S): at 11:39

## 2019-10-15 RX ADMIN — APIXABAN 10 MILLIGRAM(S): 2.5 TABLET, FILM COATED ORAL at 07:07

## 2019-10-15 RX ADMIN — Medication 40 MILLIGRAM(S): at 07:07

## 2019-10-15 RX ADMIN — TRAMADOL HYDROCHLORIDE 25 MILLIGRAM(S): 50 TABLET ORAL at 18:16

## 2019-10-15 RX ADMIN — Medication 3 MILLILITER(S): at 18:29

## 2019-10-15 RX ADMIN — ONDANSETRON 4 MILLIGRAM(S): 8 TABLET, FILM COATED ORAL at 18:21

## 2019-10-15 RX ADMIN — Medication 1: at 11:52

## 2019-10-15 RX ADMIN — MIDODRINE HYDROCHLORIDE 10 MILLIGRAM(S): 2.5 TABLET ORAL at 09:13

## 2019-10-15 RX ADMIN — Medication 25 MICROGRAM(S): at 07:07

## 2019-10-15 RX ADMIN — ATORVASTATIN CALCIUM 20 MILLIGRAM(S): 80 TABLET, FILM COATED ORAL at 22:15

## 2019-10-15 RX ADMIN — MIDODRINE HYDROCHLORIDE 10 MILLIGRAM(S): 2.5 TABLET ORAL at 22:15

## 2019-10-15 RX ADMIN — MIDODRINE HYDROCHLORIDE 10 MILLIGRAM(S): 2.5 TABLET ORAL at 00:00

## 2019-10-15 RX ADMIN — Medication 1: at 17:20

## 2019-10-15 RX ADMIN — APIXABAN 10 MILLIGRAM(S): 2.5 TABLET, FILM COATED ORAL at 17:24

## 2019-10-15 RX ADMIN — TOLVAPTAN 15 MILLIGRAM(S): 15 TABLET ORAL at 17:24

## 2019-10-15 RX ADMIN — MIDODRINE HYDROCHLORIDE 10 MILLIGRAM(S): 2.5 TABLET ORAL at 17:22

## 2019-10-15 RX ADMIN — Medication 40 MILLIGRAM(S): at 17:26

## 2019-10-15 NOTE — BEHAVIORAL HEALTH ASSESSMENT NOTE - OTHER
"It has been hard" appropriate to context deferred at this time "It has been the usual" chronic serious medical issues impairing everyday day life n/a

## 2019-10-15 NOTE — PROGRESS NOTE ADULT - ASSESSMENT
35 y/o Female PMH severe pulmonary hypertension and right sided CHF( RV EF 35%) COPD, HTN, DM2, hyperbilirubinemia, L femoral DVT, who presents to the hospital with shortness of breath and hypotension     GI consulted for anemia, microcytic. Of denies any overt gastrointestinal bleeding and reports heavy mentrual bleeding, worse since starting anticoagluation. Refuses rectal exam    Would recommend empiric protonix. Send stool for occult blood. If positive would discuss utility of EGD/Colonoscopy, however in the setting of heavy mentrual bleeding this is most likely the etiology, and she is high risk for endoscopy in the setting of severe pHTN, morbid obesity, CHF.   *She is refusing rectal exam and is adament that stool is brown and blood is coming from vagina. Recommend gyn consultation

## 2019-10-15 NOTE — BEHAVIORAL HEALTH ASSESSMENT NOTE - RISK ASSESSMENT
Low Acute Suicide Risk Chronic risk factors: single, chronic serious medical illness impacting life and functioning. Protective factors: young; female gender; no history of psych hospitalizations, no suicide attempts; no self-injurious behavior; no hx of aggression/violence; no substance / drug use; stable housing; no legal issues; motivated for help; articulate; strong family support; access to health services.

## 2019-10-15 NOTE — BEHAVIORAL HEALTH ASSESSMENT NOTE - AXIS III
extensive acute nonocclusive thrombus in the left external iliac vein and the left common femoral vein  and left greater saphenous vein; asthma, severe pHTN (previously on Adempas), mildly positive anti-cardiolipin Ab(but does not meet criteria for APLS per heme note); CHF on Bumex; 8/18- hospitalized at [facility] with decompensated right heart failure requiring Milrinone assisted diuresis; obesity; hx of Hyperbilirubinemia; Vitamin D deficiency

## 2019-10-15 NOTE — BEHAVIORAL HEALTH ASSESSMENT NOTE - PAST PSYCHOTROPIC MEDICATION
was given Zoloft 50mg PO qd by a PMD which was discontinued 09/19 in light of the hyponatremia and because it was at a subtheraputic dose.

## 2019-10-15 NOTE — PROGRESS NOTE ADULT - ASSESSMENT
33 yo female with history of Anticardiolipin antibody positive on AC at home, COPD, DM II, Essential hypertension, Vitamin D deficiency & severe Pulm HTN with right sided heart failure p/w SOB & was found to have acute on chronic combined systolic and diastolic congestive heart failure, hyponatremia, DIANN & acute RLE DVT.    Acute resp failure w/hypoxia;  -pulmonary hypertension, morbid obesity, CHF  -o2 supplemented    Acute on chronic combined systolic and diastolic congestive heart failure:   - cont lasix  - monitor daily wt, i/o's  -per cards- Will diuresis the next few days back to a euvolemic state; if she agrees, will then transfer for R and C and eval with Dr. Unger at [facility].  -fluid and salt restriction encouraged.      Worsening acute blood loss anemia in the setting of AC and steroid use with heavy menstruation  cont aspirin. GI/[facility])   PO protonix    severe Pulmonary hypertension:  - c/w riociguat  - on chronic oxygen   - pulmonary following      Type 2 diabetes mellitus with hyperglycemia, without long-term current use of insulin:  - Continue current insulin regimen    Hyponatremia:  - likely due to volume overload/ aggressive diuresis and possible SSRI- on hold  - Renal following   - c/w Fluid restriction    HTN:  - hold all BP meds due to borderline BP    Asthma:  - pulmonary following  - c/w Symbicort    Acute deep vein thrombosis (DVT) of femoral vein of left lower extremity:  - US showed an extensive acute nonocclusive thrombus in the left external iliac vein and the left common femoral vein and left greater saphenous vein  - Eliquis, pt non compliant with INR check.  -per dr poole ; not a candidate for ivc filter    L foot injury due to weight fell on it  -Xray of the foot; neg  -pain mgmt     Acquired hypothyroidism:  - c/w synthroid     Hyperlipidemia:  - c/w Zocor    Depression:  - Zoloft on hold

## 2019-10-15 NOTE — BEHAVIORAL HEALTH ASSESSMENT NOTE - NSBHCHARTREVIEWIMAGING_PSY_A_CORE FT
LE Doppler; No significant change in nonocclusive thrombus in the left external iliac vein and superficial thrombus in the left greater saphenous vein. Chest xray- unremakable

## 2019-10-15 NOTE — CONSULT NOTE ADULT - SUBJECTIVE AND OBJECTIVE BOX
Vascular Attending:    10-14-19 The pt is seen and examined today, the H/O is noted, in hosp course noted and the cardiac and pulmonary eval noted. Pt says she has H/O hypercoagulable stae, has no tbeen compliant with oral AC due to cost/, and has heavy menstrual bleding at times but no GI bleed. No active bleeding at this time. H/O DVT  on AC since few months    HPI:  35 y/o Female PMH pulmonary hypertension and right sided CHF( RV EF 35%) COPD, HTN, DM2, hyperbilirubinemia, L femoral DVT,  Pt. went to PCP today who noted her hypotension and SOB and recommended coming to ER.  Pt. is amenable to staying here tonight, says she's too sob to go home and further she hasn't been able to  meds from pharmacy as outpatient., because she can't walk due to chronic leg swelling, right calf pain.  No other complaints of inciting event, has had poor oral intake for several days,   Pt. is otherwise well. Pt refusing ABG, parenteral anticoagulants; has 22G IV, refusing attempt to insert larger catheter for CTA, will allow oral anticoagulation  ROS: negative for fever, cough, headache, dizziness, chest pain, palpitations, abd pain, nausea, vomiting, diarrhea, rash, paresthesia, and focal weakness--all other systems reviewed are negative. (10 Oct 2019 02:00)      PAST MEDICAL & SURGICAL HISTORY:  Former smoker, stopped smoking in distant past  Vitamin D deficiency  Morbid obesity due to excess calories  Edema extremities  Diabetes mellitus  Hyperbilirubinemia  COPD (chronic obstructive pulmonary disease)  COPD (chronic obstructive pulmonary disease)  Anticoagulation goal of INR 2 to 3  Anticardiolipin antibody positive  Congestive heart failure, unspecified HF chronicity, unspecified heart failure type  Moderate asthma, unspecified whether complicated, unspecified whether persistent  Other secondary hypertension  COPD (chronic obstructive pulmonary disease)  Anticardiolipin antibody positive  Essential hypertension  Pulmonary HTN  Morbid obesity  No significant past surgical history        MEDICATIONS  (STANDING):  ALBUTerol/ipratropium for Nebulization 3 milliLiter(s) Nebulizer every 6 hours  apixaban 10 milliGRAM(s) Oral every 12 hours  aspirin enteric coated 81 milliGRAM(s) Oral daily  atorvastatin 20 milliGRAM(s) Oral at bedtime  dextrose 5%. 1000 milliLiter(s) (50 mL/Hr) IV Continuous <Continuous>  dextrose 50% Injectable 12.5 Gram(s) IV Push once  dextrose 50% Injectable 25 Gram(s) IV Push once  dextrose 50% Injectable 25 Gram(s) IV Push once  furosemide   Injectable 40 milliGRAM(s) IV Push daily  insulin lispro (HumaLOG) corrective regimen sliding scale   SubCutaneous at bedtime  insulin lispro (HumaLOG) corrective regimen sliding scale   SubCutaneous three times a day before meals  levothyroxine 25 MICROGram(s) Oral daily  midodrine. 10 milliGRAM(s) Oral every 8 hours  pantoprazole    Tablet 40 milliGRAM(s) Oral before breakfast    MEDICATIONS  (PRN):  dextrose 40% Gel 15 Gram(s) Oral once PRN Blood Glucose LESS THAN 70 milliGRAM(s)/deciliter  glucagon  Injectable 1 milliGRAM(s) IntraMuscular once PRN Glucose LESS THAN 70 milligrams/deciliter  ondansetron Injectable 4 milliGRAM(s) IV Push every 6 hours PRN Nausea and/or Vomiting  traMADol 25 milliGRAM(s) Oral every 6 hours PRN Moderate Pain (4 - 6)      Allergies    No Known Allergies    Intolerances        SOCIAL HISTORY:      Vital Signs Last 24 Hrs  T(C): 36 (15 Oct 2019 05:38), Max: 36.5 (14 Oct 2019 16:56)  T(F): 96.8 (15 Oct 2019 05:38), Max: 97.7 (14 Oct 2019 16:56)  HR: 87 (15 Oct 2019 05:56) (74 - 95)  BP: 103/59 (15 Oct 2019 05:38) (93/57 - 122/69)  BP(mean): --  RR: 18 (15 Oct 2019 05:38) (18 - 18)  SpO2: 96% (15 Oct 2019 05:56) (92% - 98%)    P/E:- Awake and alert,   CAROTIDS:- Bilateral carotids with no Bruits. No scars of previous catheterisation.  UPPER EXTREMITIES:- Bilateral radial artery pulses are normal and no ischemia of the Hands. No edema of the arms.  ABDOMEN:- No pulsatile mass in the abdomen and no ascites.  LOWER EXTREMITIES:- Bilateral LE with No Edema and no CVI, No varicose veins, no ulcers.The arterial pulse are examined with palpation and Dopplers and the findings are as follows,    Pulses:   Right:                                                                          Left:  FEM [ ]2+ [y ]1+ [ ]doppler                                             FEM [ ]2+ [y ]1+ [ ]doppler    POP [ ]2+ [y ]1+ [ ]doppler                                             POP [ ]2+ [y ]1+ [ ]doppler    DP [ ]2+ [ y]1+ [ ]doppler                                                DP [ ]2+ [y]1+ [ ]doppler  PT[ ]2+ [ y]1+ [ ]doppler                                                  PT [ ]2+ [y ]1+ [ ]doppler      LABS:                        7.1    5.49  )-----------( 288      ( 14 Oct 2019 11:37 )             24.0     10-14    131<L>  |  95<L>  |  24<H>  ----------------------------<  110<H>  4.8   |  28  |  0.89    Ca    9.9      14 Oct 2019 11:37            RADIOLOGY & ADDITIONAL STUDIES    EXAM:  US DPLX LWR EXT VEINS COMPL BI                            PROCEDURE DATE:  10/10/2019          INTERPRETATION:  CLINICAL INFORMATION: Leg swelling. Known DVT on blood   thinner.    COMPARISON: Lower extremity Doppler 9/5/2019    TECHNIQUE: Duplex sonography of the BILATERAL LOWER extremity veins with   color and spectral Doppler, with and without compression.      FINDINGS:    Redemonstrated is nonocclusive thrombus in the left external iliac vein.   Additional superficial thrombus in the left greater saphenous vein is   again seen.    There is normal compressibility of the bilateral common femoral, femoral   and popliteal veins.     IMPRESSION:     No significant change in nonocclusive thrombus in the left external iliac   vein and superficial thrombus in the left greater saphenous vein.                      ARELI BROWN M.D., ATTENDING RADIOLOGIST          Impression and Plan:  continue po AC, consider eval by Hematology,  IVC filter is not a god option due to young age and Hypercoagulable state.

## 2019-10-15 NOTE — BEHAVIORAL HEALTH ASSESSMENT NOTE - HPI (INCLUDE ILLNESS QUALITY, SEVERITY, DURATION, TIMING, CONTEXT, MODIFYING FACTORS, ASSOCIATED SIGNS AND SYMPTOMS)
PATIENT IS KNOWN TO WRITER WHO LAST SAW HER SEPTEMBER 2019 AT VS:   35 yo single  female, childless, noncaregiver, disabled, last worked at the [employer], domiciled with family in a private home including a sister with Down's Syndrome, with no formal psychiatric history, with significant medical history resulting in decreased quality of life and impairment in independent functioning admitted again for SOB and worsening bilateral leg swelling (sent in by her PCP). Of note, Patient gained back 16 kg of weight through likely non-compliance with diet and possibly her meds and did not follow up with Cardiology as recommended stating due to not having money for the transportation.    CVM: no record of patient  ISTOP checked Reference #: [id number]; no record of Patient PATIENT IS KNOWN TO WRITER WHO LAST SAW HER SEPTEMBER 2019 AT VS:   33 yo single  female, childless, noncaregiver, disabled, last worked at the [employer], domiciled with family in a private home including a sister with Down's Syndrome, with no formal psychiatric history, with significant medical history resulting in decreased quality of life and impairment in independent functioning admitted again for SOB and worsening bilateral leg swelling (sent in by her PCP). Of note, Patient gained back 16 kg of weight through likely non-compliance with diet and possibly her meds and did not follow up with Cardiology as recommended stating due to not having money for the transportation.    EXAM: Patient presents the same as last time 9ie. calm, cooperative, polite, fully engages and expresses thoughts and feelings consistent with normal human reaction to a very difficult situation given her young age, losing her independence, feeling guilty about having to have her mother do household chores for her like laundry and cooking; guilty that her mother also had to deal with her other sister's health issues (Down's Syndrome and leukemia)). Patient is complaining about her dietary restrictions and about the Chinese food delivery she ordered not received. Also talked about wanting French fries and Cheez-its. Psychoeducation about the importance of following strict dietary rules for her medical issues was discussed. Patient feeling sad at times when she thinks about her desire to get  and have children including carrying her own baby. Patient very honest about having had thoughts when she thought life is not worth living like this but denies having had a suicide attempt, intent or plan. She has done a lot of research online about what she has and at times feels more hopeful about her future after reading positive stories of patients getting better. Endorses stable mood, regular sleep / appetite / energy level. Denies any symptoms of hypomania/norma/psychosis/major depression/ anxiety/panic. Denies any active or passive suicidal or homicidal ideation. Names protective factors (kareem; family; hope for future).       CVM: no record of patient  ISTOP checked Reference #: [id number]; no record of Patient

## 2019-10-15 NOTE — PROGRESS NOTE ADULT - SUBJECTIVE AND OBJECTIVE BOX
Patient is a 34y old  Female who presents with a chief complaint of Shortness of breath and hypotension. (15 Oct 2019 16:58)    PAST MEDICAL & SURGICAL HISTORY:  Former smoker, stopped smoking in distant past  Vitamin D deficiency  Morbid obesity due to excess calories  Edema extremities  Diabetes mellitus  Hyperbilirubinemia  COPD (chronic obstructive pulmonary disease)  Anticardiolipin antibody positive  Congestive heart failure, unspecified HF chronicity, unspecified heart failure type  Moderate asthma, unspecified whether complicated, unspecified whether persistent  Essential hypertension  Pulmonary HTN  No significant past surgical history    INTERVAL HISTORY: Resting in bed, not in any acute distress   	  MEDICATIONS:  MEDICATIONS  (STANDING):  ALBUTerol/ipratropium for Nebulization 3 milliLiter(s) Nebulizer every 6 hours  apixaban 10 milliGRAM(s) Oral every 12 hours  aspirin enteric coated 81 milliGRAM(s) Oral daily  atorvastatin 20 milliGRAM(s) Oral at bedtime  dextrose 5%. 1000 milliLiter(s) (50 mL/Hr) IV Continuous <Continuous>  dextrose 50% Injectable 12.5 Gram(s) IV Push once  dextrose 50% Injectable 25 Gram(s) IV Push once  dextrose 50% Injectable 25 Gram(s) IV Push once  furosemide   Injectable 40 milliGRAM(s) IV Push every 12 hours  insulin lispro (HumaLOG) corrective regimen sliding scale   SubCutaneous at bedtime  insulin lispro (HumaLOG) corrective regimen sliding scale   SubCutaneous three times a day before meals  levothyroxine 25 MICROGram(s) Oral daily  midodrine. 10 milliGRAM(s) Oral every 8 hours  pantoprazole    Tablet 40 milliGRAM(s) Oral before breakfast    MEDICATIONS  (PRN):  dextrose 40% Gel 15 Gram(s) Oral once PRN Blood Glucose LESS THAN 70 milliGRAM(s)/deciliter  glucagon  Injectable 1 milliGRAM(s) IntraMuscular once PRN Glucose LESS THAN 70 milligrams/deciliter  ondansetron Injectable 4 milliGRAM(s) IV Push every 6 hours PRN Nausea and/or Vomiting  traMADol 25 milliGRAM(s) Oral every 6 hours PRN Moderate Pain (4 - 6)      Vitals:  T(F): 98.3 (10-15-19 @ 11:22), Max: 98.3 (10-15-19 @ 11:22)  HR: 87 (10-15-19 @ 11:45) (75 - 95)  BP: 103/56 (10-15-19 @ 11:22) (93/57 - 103/59)  RR: 18 (10-15-19 @ 11:22) (18 - 18)  SpO2: 98% (10-15-19 @ 11:45) (92% - 98%)  Wt(kg): --105.8 kg    10-14 @ 07:01  -  10-15 @ 07:00  --------------------------------------------------------  IN:    Oral Fluid: 480 mL  Total IN: 480 mL    OUT:    Voided: 352 mL  Total OUT: 352 mL    Total NET: 128 mL      10-15 @ 07:01  -  10-15 @ 18:28  --------------------------------------------------------  IN:    Oral Fluid: 118 mL  Total IN: 118 mL    OUT:  Total OUT: 0 mL    Total NET: 118 mL    PHYSICAL EXAM:  Neuro: Awake, responsive  CV: S1 S2 RRR  Lungs: CTABL  GI: Soft, BS +, ND, NT  Extremities: No edema    TELEMETRY: RSR    RADIOLOGY:   < from: Xray Chest 1 View- PORTABLE-Urgent (10.09.19 @ 21:34) >  Lungs: The lungs are clear.  Heart: There is cardiomegaly.  Mediastinum: Hilar prominence noted.    IMPRESSION:    Clear lungs.    < end of copied text >    DIAGNOSTIC TESTING:    [x ] Echocardiogram:< from: TTE Echo Doppler w/o Cont (07.26.19 @ 08:44) >   1. Left ventricular ejection fraction, by visual estimation, is 45 to   50%.   2. Mildly decreased global left ventricular systolic function.   3. Elevated mean left atrial pressure.   4. Global cardiomyopathy.   5. Increased LV wall thickness.   6. Normal left ventricular internal cavity size.   7. Right ventricular pressure overload.   8. Spectral Doppler shows impaired relaxation pattern of left   ventricular myocardial filling (Grade I diastolic dysfunction).   9. There is mild asymmetric left ventricular hypertrophy.  10. Pulmonary hypertension.  11. Severely enlarged right ventricle.  12. Severely reduced RV systolic function.  13. RV ejection fraction 34%.  14. Small pericardial effusion.  15. Degenerative mitral valve.  16. Mild mitral annular calcification.  17. Mild mitral valve regurgitation.  18. Moderate tricuspid regurgitation.  19. Structurally normal tricuspid valve, with normal leaflet excursion.  20. Mild aortic regurgitation.  21. Mild to moderate pulmonic valve regurgitation.  22. Structurally normal pulmonic valve, with normal leaflet excursion.  23. Estimated pulmonary artery systolic pressure is 87.1 mmHg assuming a   right atrial pressure of 20 mmHg, which is consistent with severe   pulmonary hypertension.  24. Pulmonary hypertension is present.  25. The main pulmonary artery is normal in size.  26. The right atrial pressure is moderately elevated.    < end of copied text >    [x ]  Catheterization:< from: Cardiac Cath Lab - Adult (08.10.18 @ 10:48) >  DIAGNOSTIC IMPRESSIONS: Severe Pulmonary Hypertension with no response with  80ppm of Nitric Oxide  INTERVENTIONAL IMPRESSIONS: Severe Pulmonary Hypertension with no response with  80ppm of Nitric Oxide    < end of copied text >    LABS:	 	    15 Oct 2019 09:50    127    |  91     |  18     ----------------------------<  95     4.7     |  25     |  0.88   14 Oct 2019 11:37    131    |  95     |  24     ----------------------------<  110    4.8     |  28     |  0.89   13 Oct 2019 06:29    128    |  92     |  33     ----------------------------<  116    5.3     |  24     |  1.19     Ca    9.7        15 Oct 2019 09:50                          7.8    5.28  )-----------( 340      ( 15 Oct 2019 09:50 )             26.0 ,                       7.1    5.49  )-----------( 288      ( 14 Oct 2019 11:37 )             24.0 ,                       7.6    5.96  )-----------( 271      ( 13 Oct 2019 06:29 )             25.3 Patient is a 34y old  Female who presents with a chief complaint of Shortness of breath and hypotension. (15 Oct 2019 16:58)    PAST MEDICAL & SURGICAL HISTORY:  Former smoker, stopped smoking in distant past  Vitamin D deficiency  Morbid obesity due to excess calories  Edema extremities  Diabetes mellitus  Hyperbilirubinemia  COPD (chronic obstructive pulmonary disease)  Anticardiolipin antibody positive  Congestive heart failure, unspecified HF chronicity, unspecified heart failure type  Moderate asthma, unspecified whether complicated, unspecified whether persistent  Essential hypertension  Pulmonary HTN  No significant past surgical history    INTERVAL HISTORY: Resting in bed, not in any acute distress   	  MEDICATIONS:  MEDICATIONS  (STANDING):  ALBUTerol/ipratropium for Nebulization 3 milliLiter(s) Nebulizer every 6 hours  apixaban 10 milliGRAM(s) Oral every 12 hours  aspirin enteric coated 81 milliGRAM(s) Oral daily  atorvastatin 20 milliGRAM(s) Oral at bedtime  dextrose 5%. 1000 milliLiter(s) (50 mL/Hr) IV Continuous <Continuous>  dextrose 50% Injectable 12.5 Gram(s) IV Push once  dextrose 50% Injectable 25 Gram(s) IV Push once  dextrose 50% Injectable 25 Gram(s) IV Push once  furosemide   Injectable 40 milliGRAM(s) IV Push every 12 hours  insulin lispro (HumaLOG) corrective regimen sliding scale   SubCutaneous at bedtime  insulin lispro (HumaLOG) corrective regimen sliding scale   SubCutaneous three times a day before meals  levothyroxine 25 MICROGram(s) Oral daily  midodrine. 10 milliGRAM(s) Oral every 8 hours  pantoprazole    Tablet 40 milliGRAM(s) Oral before breakfast    MEDICATIONS  (PRN):  dextrose 40% Gel 15 Gram(s) Oral once PRN Blood Glucose LESS THAN 70 milliGRAM(s)/deciliter  glucagon  Injectable 1 milliGRAM(s) IntraMuscular once PRN Glucose LESS THAN 70 milligrams/deciliter  ondansetron Injectable 4 milliGRAM(s) IV Push every 6 hours PRN Nausea and/or Vomiting  traMADol 25 milliGRAM(s) Oral every 6 hours PRN Moderate Pain (4 - 6)    Vitals:  T(F): 98.3 (10-15-19 @ 11:22), Max: 98.3 (10-15-19 @ 11:22)  HR: 87 (10-15-19 @ 11:45) (75 - 95)  BP: 103/56 (10-15-19 @ 11:22) (93/57 - 103/59)  RR: 18 (10-15-19 @ 11:22) (18 - 18)  SpO2: 98% (10-15-19 @ 11:45) (92% - 98%)  Wt(kg): --105.8 kg    10-14 @ 07:01  -  10-15 @ 07:00  --------------------------------------------------------  IN:    Oral Fluid: 480 mL  Total IN: 480 mL    OUT:    Voided: 352 mL  Total OUT: 352 mL    Total NET: 128 mL      10-15 @ 07:01  -  10-15 @ 18:28  --------------------------------------------------------  IN:    Oral Fluid: 118 mL  Total IN: 118 mL    OUT:  Total OUT: 0 mL    Total NET: 118 mL    PHYSICAL EXAM:  Neuro: Awake, responsive  CV: S1 S2 RRR  Lungs: CTABL  GI: Soft, BS +, ND, NT  Extremities: No edema    TELEMETRY: RSR    RADIOLOGY:   < from: Xray Chest 1 View- PORTABLE-Urgent (10.09.19 @ 21:34) >  Lungs: The lungs are clear.  Heart: There is cardiomegaly.  Mediastinum: Hilar prominence noted.    IMPRESSION:    Clear lungs.    < end of copied text >    DIAGNOSTIC TESTING:    [x ] Echocardiogram:< from: TTE Echo Doppler w/o Cont (07.26.19 @ 08:44) >   1. Left ventricular ejection fraction, by visual estimation, is 45 to   50%.   2. Mildly decreased global left ventricular systolic function.   3. Elevated mean left atrial pressure.   4. Global cardiomyopathy.   5. Increased LV wall thickness.   6. Normal left ventricular internal cavity size.   7. Right ventricular pressure overload.   8. Spectral Doppler shows impaired relaxation pattern of left   ventricular myocardial filling (Grade I diastolic dysfunction).   9. There is mild asymmetric left ventricular hypertrophy.  10. Pulmonary hypertension.  11. Severely enlarged right ventricle.  12. Severely reduced RV systolic function.  13. RV ejection fraction 34%.  14. Small pericardial effusion.  15. Degenerative mitral valve.  16. Mild mitral annular calcification.  17. Mild mitral valve regurgitation.  18. Moderate tricuspid regurgitation.  19. Structurally normal tricuspid valve, with normal leaflet excursion.  20. Mild aortic regurgitation.  21. Mild to moderate pulmonic valve regurgitation.  22. Structurally normal pulmonic valve, with normal leaflet excursion.  23. Estimated pulmonary artery systolic pressure is 87.1 mmHg assuming a   right atrial pressure of 20 mmHg, which is consistent with severe   pulmonary hypertension.  24. Pulmonary hypertension is present.  25. The main pulmonary artery is normal in size.  26. The right atrial pressure is moderately elevated.    < end of copied text >    [x ]  Catheterization:< from: Cardiac Cath Lab - Adult (08.10.18 @ 10:48) >  DIAGNOSTIC IMPRESSIONS: Severe Pulmonary Hypertension with no response with  80ppm of Nitric Oxide  INTERVENTIONAL IMPRESSIONS: Severe Pulmonary Hypertension with no response with  80ppm of Nitric Oxide    < end of copied text >    LABS:	 	    15 Oct 2019 09:50    127    |  91     |  18     ----------------------------<  95     4.7     |  25     |  0.88   14 Oct 2019 11:37    131    |  95     |  24     ----------------------------<  110    4.8     |  28     |  0.89   13 Oct 2019 06:29    128    |  92     |  33     ----------------------------<  116    5.3     |  24     |  1.19     Ca    9.7        15 Oct 2019 09:50                          7.8    5.28  )-----------( 340      ( 15 Oct 2019 09:50 )             26.0 ,                       7.1    5.49  )-----------( 288      ( 14 Oct 2019 11:37 )             24.0 ,                       7.6    5.96  )-----------( 271      ( 13 Oct 2019 06:29 )             25.3 Patient is a 34y old  Female who presents with a chief complaint of Shortness of breath and hypotension. (15 Oct 2019 16:58)    PAST MEDICAL & SURGICAL HISTORY:  Former smoker, stopped smoking in distant past  Vitamin D deficiency  Morbid obesity due to excess calories  Edema extremities  Diabetes mellitus  Hyperbilirubinemia  COPD (chronic obstructive pulmonary disease)  Anticardiolipin antibody positive  Congestive heart failure, unspecified HF chronicity, unspecified heart failure type  Moderate asthma, unspecified whether complicated, unspecified whether persistent  Essential hypertension  Pulmonary HTN  No significant past surgical history    INTERVAL HISTORY: Resting in bed, not in any acute distress, c/o feeling weak, currently having her period with mild abdominal discomfort    	  MEDICATIONS:  MEDICATIONS  (STANDING):  ALBUTerol/ipratropium for Nebulization 3 milliLiter(s) Nebulizer every 6 hours  apixaban 10 milliGRAM(s) Oral every 12 hours  aspirin enteric coated 81 milliGRAM(s) Oral daily  atorvastatin 20 milliGRAM(s) Oral at bedtime  dextrose 5%. 1000 milliLiter(s) (50 mL/Hr) IV Continuous <Continuous>  dextrose 50% Injectable 12.5 Gram(s) IV Push once  dextrose 50% Injectable 25 Gram(s) IV Push once  dextrose 50% Injectable 25 Gram(s) IV Push once  furosemide   Injectable 40 milliGRAM(s) IV Push every 12 hours  insulin lispro (HumaLOG) corrective regimen sliding scale   SubCutaneous at bedtime  insulin lispro (HumaLOG) corrective regimen sliding scale   SubCutaneous three times a day before meals  levothyroxine 25 MICROGram(s) Oral daily  midodrine. 10 milliGRAM(s) Oral every 8 hours  pantoprazole    Tablet 40 milliGRAM(s) Oral before breakfast    MEDICATIONS  (PRN):  dextrose 40% Gel 15 Gram(s) Oral once PRN Blood Glucose LESS THAN 70 milliGRAM(s)/deciliter  glucagon  Injectable 1 milliGRAM(s) IntraMuscular once PRN Glucose LESS THAN 70 milligrams/deciliter  ondansetron Injectable 4 milliGRAM(s) IV Push every 6 hours PRN Nausea and/or Vomiting  traMADol 25 milliGRAM(s) Oral every 6 hours PRN Moderate Pain (4 - 6)    Vitals:  T(F): 98.3 (10-15-19 @ 11:22), Max: 98.3 (10-15-19 @ 11:22)  HR: 87 (10-15-19 @ 11:45) (75 - 95)  BP: 103/56 (10-15-19 @ 11:22) (93/57 - 103/59)  RR: 18 (10-15-19 @ 11:22) (18 - 18)  SpO2: 98% (10-15-19 @ 11:45) (92% - 98%)  Wt(kg): --105.8 kg    10-14 @ 07:01  -  10-15 @ 07:00  --------------------------------------------------------  IN:    Oral Fluid: 480 mL  Total IN: 480 mL    OUT:    Voided: 352 mL  Total OUT: 352 mL    Total NET: 128 mL      10-15 @ 07:01  -  10-15 @ 18:28  --------------------------------------------------------  IN:    Oral Fluid: 118 mL  Total IN: 118 mL    OUT:  Total OUT: 0 mL    Total NET: 118 mL    PHYSICAL EXAM:  Neuro: Awake, responsive  CV: S1 S2 RRR  Lungs: CTABL  GI: Soft, BS +, ND, NT, + anasarca   Extremities: ++++ LE edema upto lower abdomen and flank region     TELEMETRY: RSR    RADIOLOGY:   < from: Xray Chest 1 View- PORTABLE-Urgent (10.09.19 @ 21:34) >  Lungs: The lungs are clear.  Heart: There is cardiomegaly.  Mediastinum: Hilar prominence noted.    IMPRESSION:    Clear lungs.    < end of copied text >    DIAGNOSTIC TESTING:    [x ] Echocardiogram:< from: TTE Echo Doppler w/o Cont (07.26.19 @ 08:44) >   1. Left ventricular ejection fraction, by visual estimation, is 45 to   50%.   2. Mildly decreased global left ventricular systolic function.   3. Elevated mean left atrial pressure.   4. Global cardiomyopathy.   5. Increased LV wall thickness.   6. Normal left ventricular internal cavity size.   7. Right ventricular pressure overload.   8. Spectral Doppler shows impaired relaxation pattern of left   ventricular myocardial filling (Grade I diastolic dysfunction).   9. There is mild asymmetric left ventricular hypertrophy.  10. Pulmonary hypertension.  11. Severely enlarged right ventricle.  12. Severely reduced RV systolic function.  13. RV ejection fraction 34%.  14. Small pericardial effusion.  15. Degenerative mitral valve.  16. Mild mitral annular calcification.  17. Mild mitral valve regurgitation.  18. Moderate tricuspid regurgitation.  19. Structurally normal tricuspid valve, with normal leaflet excursion.  20. Mild aortic regurgitation.  21. Mild to moderate pulmonic valve regurgitation.  22. Structurally normal pulmonic valve, with normal leaflet excursion.  23. Estimated pulmonary artery systolic pressure is 87.1 mmHg assuming a   right atrial pressure of 20 mmHg, which is consistent with severe   pulmonary hypertension.  24. Pulmonary hypertension is present.  25. The main pulmonary artery is normal in size.  26. The right atrial pressure is moderately elevated.    < end of copied text >    [x ]  Catheterization:< from: Cardiac Cath Lab - Adult (08.10.18 @ 10:48) >  DIAGNOSTIC IMPRESSIONS: Severe Pulmonary Hypertension with no response with  80ppm of Nitric Oxide  INTERVENTIONAL IMPRESSIONS: Severe Pulmonary Hypertension with no response with  80ppm of Nitric Oxide    < end of copied text >    LABS:	 	    15 Oct 2019 09:50    127    |  91     |  18     ----------------------------<  95     4.7     |  25     |  0.88   14 Oct 2019 11:37    131    |  95     |  24     ----------------------------<  110    4.8     |  28     |  0.89   13 Oct 2019 06:29    128    |  92     |  33     ----------------------------<  116    5.3     |  24     |  1.19     Ca    9.7        15 Oct 2019 09:50                          7.8    5.28  )-----------( 340      ( 15 Oct 2019 09:50 )             26.0 ,                       7.1    5.49  )-----------( 288      ( 14 Oct 2019 11:37 )             24.0 ,                       7.6    5.96  )-----------( 271      ( 13 Oct 2019 06:29 )             25.3

## 2019-10-15 NOTE — PROGRESS NOTE ADULT - PROBLEM SELECTOR PLAN 1
-GYN consultation for vaginal bleeding   -Send occult blood   -Trend hg daily; transfuse < 7   **Pending above work-up consider outpatient EGD/Colonsocopy for evaluation of anemia (however she is very high risk given pulmonary hypertension, morbid obesity, CHF)  -Reconsult as needed
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
## 2019-10-15 NOTE — BEHAVIORAL HEALTH ASSESSMENT NOTE - DESCRIPTION
extensive acute nonocclusive thrombus in the left external iliac vein and the left common femoral vein  and left greater saphenous vein; asthma, severe pHTN (previously on Adempas), mildly positive anti-cardiolipin Ab(but does not meet criteria for APLS per heme note); CHF on Bumex; 8/18- hospitalized at [facility] with decompensated right heart failure requiring Milrinone assisted diuresis; obesity; hx of Hyperbilirubinemia; Vitamin D deficiency

## 2019-10-15 NOTE — BEHAVIORAL HEALTH ASSESSMENT NOTE - SUMMARY
Patient is at her known psychiatric baseline; continued difficulty adjusting to having to follow strict dietary rules, having to help herself improve herself and following medical recommendations which has been an ongoing issue for Patient. Has been referred to support groups for young patients in her age group dealing with debilitating/serious illnesses several times but have never tried/gone to. Patient is at her known psychiatric baseline; continued difficulty adjusting to having to follow strict dietary rules, having to help herself improve herself and following medical recommendations which has been an ongoing issue for Patient. Has been referred to support groups for young patients in her age group dealing with debilitating/serious illnesses several times but have never tried/gone to.  - Patient has capacity to make her medical decisions

## 2019-10-15 NOTE — BEHAVIORAL HEALTH ASSESSMENT NOTE - NSBHCONSULTMEDS_PSY_A_CORE FT
any psychiatric medications at this time would be risks > benefits given hypercoaguable state and noncompliance with Cardiology/outpatient medical appointments

## 2019-10-15 NOTE — PROGRESS NOTE ADULT - SUBJECTIVE AND OBJECTIVE BOX
INTERVAL HPI:  33 y/o Female with severe Pulmonary Hypertension and right sided CHF( RV EF 35%) COPD, HTN, DM2, Hyperbilirubinemia, L femoral DVT. Not taking anticoagulant since she her last admission(claims prescription was not sent).   Pt. went to PCP today who noted her hypotension and SOB and recommended coming to ER.  She hasn't been able to  meds from pharmacy as outpatient., because she can't walk due to chronic leg swelling, right calf pain.  No other complaints of inciting event, has had poor oral intake for several days,   Pt. is otherwise well. Refused  ABG, parenteral anticoagulants;  On home O2. Denies tobacco abuse.    OVERNIGHT EVENTS:  Resting comfortably.    Vital Signs Last 24 Hrs  T(C): 36.8 (15 Oct 2019 11:22), Max: 36.8 (15 Oct 2019 11:22)  T(F): 98.3 (15 Oct 2019 11:22), Max: 98.3 (15 Oct 2019 11:22)  HR: 87 (15 Oct 2019 11:45) (75 - 95)  BP: 103/56 (15 Oct 2019 11:22) (93/57 - 103/59)  BP(mean): --  RR: 18 (15 Oct 2019 11:22) (18 - 18)  SpO2: 98% (15 Oct 2019 11:45) (92% - 98%)    PHYSICAL EXAM:  GEN:        comfortable.  HEENT:    Normal.    RESP:       no distress    CVS:          Regular rate and rhythm.   ABD:         Soft, non-tender, non-distended;     MEDICATIONS  (STANDING):  ALBUTerol/ipratropium for Nebulization 3 milliLiter(s) Nebulizer every 6 hours  apixaban 10 milliGRAM(s) Oral every 12 hours  aspirin enteric coated 81 milliGRAM(s) Oral daily  atorvastatin 20 milliGRAM(s) Oral at bedtime  dextrose 5%. 1000 milliLiter(s) (50 mL/Hr) IV Continuous <Continuous>  dextrose 50% Injectable 12.5 Gram(s) IV Push once  dextrose 50% Injectable 25 Gram(s) IV Push once  dextrose 50% Injectable 25 Gram(s) IV Push once  furosemide   Injectable 40 milliGRAM(s) IV Push every 12 hours  insulin lispro (HumaLOG) corrective regimen sliding scale   SubCutaneous at bedtime  insulin lispro (HumaLOG) corrective regimen sliding scale   SubCutaneous three times a day before meals  levothyroxine 25 MICROGram(s) Oral daily  midodrine. 10 milliGRAM(s) Oral every 8 hours  pantoprazole    Tablet 40 milliGRAM(s) Oral before breakfast  tolvaptan 15 milliGRAM(s) Oral once    MEDICATIONS  (PRN):  dextrose 40% Gel 15 Gram(s) Oral once PRN Blood Glucose LESS THAN 70 milliGRAM(s)/deciliter  glucagon  Injectable 1 milliGRAM(s) IntraMuscular once PRN Glucose LESS THAN 70 milligrams/deciliter  ondansetron Injectable 4 milliGRAM(s) IV Push every 6 hours PRN Nausea and/or Vomiting  traMADol 25 milliGRAM(s) Oral every 6 hours PRN Moderate Pain (4 - 6)    LABS:                        7.8    5.28  )-----------( 340      ( 15 Oct 2019 09:50 )             26.0     10-15    127<L>  |  91<L>  |  18  ----------------------------<  95  4.7   |  25  |  0.88    Ca    9.7      15 Oct 2019 09:50    ASSESSMENT AND PLAN:  ·	SOB.  ·	Severe pulmonary HTN.  ·	Cor pulmonale.  ·	Fluid Retention.  ·	Left external DVT.  ·	Non Compliance with treatment.    Continue O2, diuretics and nebulizer  Follow up with Dr. Unger for pHTN.

## 2019-10-15 NOTE — PROGRESS NOTE ADULT - SUBJECTIVE AND OBJECTIVE BOX
Gastroenterology  Patient seen and examined bedside resting comfortably.  Denies any GI bleeding and having normal brown bm. Reports continued vaginal bleeding after getting anticoagulants.     T(F): 98.3 (10-15-19 @ 11:22), Max: 98.3 (10-15-19 @ 11:22)  HR: 87 (10-15-19 @ 11:45) (75 - 95)  BP: 103/56 (10-15-19 @ 11:22) (93/57 - 103/59)  RR: 18 (10-15-19 @ 11:22) (18 - 18)  SpO2: 98% (10-15-19 @ 11:45) (92% - 98%)  Wt(kg): --  CAPILLARY BLOOD GLUCOSE      POCT Blood Glucose.: 175 mg/dL (15 Oct 2019 16:36)  POCT Blood Glucose.: 169 mg/dL (15 Oct 2019 11:44)  POCT Blood Glucose.: 132 mg/dL (15 Oct 2019 08:40)      PHYSICAL EXAM:  General: NAD, obese  Neuro:  Alert & responsive  HEENT: NCAT, EOMI, conjunctiva clear  CV: +S1+S2 regular rate and rhythm  Lung: clear to ausculation bilaterally, respirations nonlabored, good inspiratory effort  Abdomen: soft, Non Tender, No distention Normal active BS  Extremities: + le edema     LABS:                        7.8    5.28  )-----------( 340      ( 15 Oct 2019 09:50 )             26.0     10-15    127<L>  |  91<L>  |  18  ----------------------------<  95  4.7   |  25  |  0.88    Ca    9.7      15 Oct 2019 09:50          I&O's Detail    14 Oct 2019 07:01  -  15 Oct 2019 07:00  --------------------------------------------------------  IN:    Oral Fluid: 480 mL  Total IN: 480 mL    OUT:    Voided: 352 mL  Total OUT: 352 mL    Total NET: 128 mL      15 Oct 2019 07:01  -  15 Oct 2019 16:57  --------------------------------------------------------  IN:    Oral Fluid: 118 mL  Total IN: 118 mL    OUT:  Total OUT: 0 mL    Total NET: 118 mL        10-15 @ 09:50    127 | 91 | 18  /9.7 | -- | --  _______________________/  4.7 | 25 | 0.88                           \par   Iron Total, Serum: 12 ug/dL (10-14 @ 14:19)

## 2019-10-15 NOTE — PROGRESS NOTE ADULT - SUBJECTIVE AND OBJECTIVE BOX
Patient is a 34y old  Female who presents with a chief complaint of Shortness of breath and hypotension. (15 Oct 2019 08:26)      OVERNIGHT EVENTS:  none     REVIEW OF SYSTEMS: denies chest pain/SOB, diaphoresis, no F/C, cough, dizziness, headache, blurry vision, nausea, vomiting, abdominal pain. All others review of systems negative     MEDICATIONS  (STANDING):  ALBUTerol/ipratropium for Nebulization 3 milliLiter(s) Nebulizer every 6 hours  apixaban 10 milliGRAM(s) Oral every 12 hours  aspirin enteric coated 81 milliGRAM(s) Oral daily  atorvastatin 20 milliGRAM(s) Oral at bedtime  dextrose 5%. 1000 milliLiter(s) (50 mL/Hr) IV Continuous <Continuous>  dextrose 50% Injectable 12.5 Gram(s) IV Push once  dextrose 50% Injectable 25 Gram(s) IV Push once  dextrose 50% Injectable 25 Gram(s) IV Push once  furosemide   Injectable 40 milliGRAM(s) IV Push every 12 hours  insulin lispro (HumaLOG) corrective regimen sliding scale   SubCutaneous at bedtime  insulin lispro (HumaLOG) corrective regimen sliding scale   SubCutaneous three times a day before meals  levothyroxine 25 MICROGram(s) Oral daily  midodrine. 10 milliGRAM(s) Oral every 8 hours  pantoprazole    Tablet 40 milliGRAM(s) Oral before breakfast    MEDICATIONS  (PRN):  dextrose 40% Gel 15 Gram(s) Oral once PRN Blood Glucose LESS THAN 70 milliGRAM(s)/deciliter  glucagon  Injectable 1 milliGRAM(s) IntraMuscular once PRN Glucose LESS THAN 70 milligrams/deciliter  ondansetron Injectable 4 milliGRAM(s) IV Push every 6 hours PRN Nausea and/or Vomiting  traMADol 25 milliGRAM(s) Oral every 6 hours PRN Moderate Pain (4 - 6)      Allergies    No Known Allergies    Intolerances        T(F): 98.3 (10-15-19 @ 11:22), Max: 98.3 (10-15-19 @ 11:22)  HR: 87 (10-15-19 @ 11:45) (75 - 95)  BP: 103/56 (10-15-19 @ 11:22) (93/57 - 103/59)  RR: 18 (10-15-19 @ 11:22) (18 - 18)  SpO2: 98% (10-15-19 @ 11:45) (92% - 98%)  Wt(kg): --    PHYSICAL EXAM:  GENERAL: NAD, well-groomed, well-developed  HEAD:  Atraumatic, Normocephalic  EYES: EOMI, PERRLA, conjunctiva and sclera clear  ENMT: No tonsillar erythema, exudates, or enlargement; Moist mucous membranes, Good dentition, No lesions  NECK: Supple, No JVD, Normal thyroid  NERVOUS SYSTEM:  Alert & Oriented X3, Good concentration; Motor Strength 5/5 B/L upper and lower extremities; DTRs 2+ intact and symmetric  CHEST/LUNG: Clear to percussion bilaterally; No rales, rhonchi, wheezing, or rubs BL  HEART: Regular rate and rhythm; No murmurs, rubs, or gallops  ABDOMEN: Soft, Nontender, Nondistended; Bowel sounds present  EXTREMITIES:  3+ Peripheral  edema BL LE  d  SKIN: moist    LABS:                        7.8    5.28  )-----------( 340      ( 15 Oct 2019 09:50 )             26.0     10-15    127<L>  |  91<L>  |  18  ----------------------------<  95  4.7   |  25  |  0.88    Ca    9.7      15 Oct 2019 09:50          Cultures;   CAPILLARY BLOOD GLUCOSE      POCT Blood Glucose.: 169 mg/dL (15 Oct 2019 11:44)  POCT Blood Glucose.: 132 mg/dL (15 Oct 2019 08:40)  POCT Blood Glucose.: 211 mg/dL (14 Oct 2019 16:36)    Lipid panel:     RADIOLOGY & ADDITIONAL TESTS:    Imaging Personally Reviewed:  [ x] YES    < from: Xray Chest 1 View- PORTABLE-Urgent (10.09.19 @ 21:34) >  Clear lungs.    < from: TTE Echo Doppler w/o Cont (07.26.19 @ 08:44) >  1. Left ventricular ejection fraction, by visual estimation, is 45 to   50%.   2. Mildly decreased global left ventricular systolic function.   3. Elevated mean left atrial pressure.   4. Global cardiomyopathy.   5. Increased LV wall thickness.   6. Normal left ventricular internal cavity size.   7. Right ventricular pressure overload.   8. Spectral Doppler shows impaired relaxation pattern of left   ventricular myocardial filling (Grade I diastolic dysfunction).   9. There is mild asymmetric left ventricular hypertrophy.  10. Pulmonary hypertension.  11. Severely enlarged right ventricle.  12. Severely reduced RV systolic function.  13. RV ejection fraction 34%.  14. Small pericardial effusion.  15. Degenerative mitral valve.  16. Mild mitral annular calcification.  17. Mild mitral valve regurgitation.  18. Moderate tricuspid regurgitation.  19. Structurally normal tricuspid valve, with normal leaflet excursion.  20. Mild aortic regurgitation.  21. Mild to moderate pulmonic valve regurgitation.  22. Structurally normal pulmonic valve, with normal leaflet excursion.  23. Estimated pulmonary artery systolic pressure is 87.1 mmHg assuming a   right atrial pressure of 20 mmHg, which is consistent with severe   pulmonary hypertension.  24. Pulmonary hypertension is present.  25. The main pulmonary artery is normal in size.  26. The right atrial pressure is moderately elevated.    < end of copied text >    Consultant(s) Notes Reviewed:  [ x] YES     Care Discussed with [x ] Consultants [X ] Patient [ ] Family  [x ]    [x ]  Other; RN

## 2019-10-15 NOTE — PROGRESS NOTE ADULT - SUBJECTIVE AND OBJECTIVE BOX
[facility] NEPHROLOGY SERVICES, [facility]  NEPHROLOGY AND HYPERTENSION  [address]  [address]  [place] [address]  [phone]    MD FABIAN FERREIRA MD ANDREY GONCHARUK, MD MADHU KORRAPATI, MD YELENA ROSENBERG, MD BINNY KOSHY, MD CHRISTOPHER CAPUTO, MD RENEA ALCALA MD          Patient feels well no complaints today.    MEDICATIONS  (STANDING):  ALBUTerol/ipratropium for Nebulization 3 milliLiter(s) Nebulizer every 6 hours  apixaban 10 milliGRAM(s) Oral every 12 hours  aspirin enteric coated 81 milliGRAM(s) Oral daily  atorvastatin 20 milliGRAM(s) Oral at bedtime  dextrose 5%. 1000 milliLiter(s) (50 mL/Hr) IV Continuous <Continuous>  dextrose 50% Injectable 12.5 Gram(s) IV Push once  dextrose 50% Injectable 25 Gram(s) IV Push once  dextrose 50% Injectable 25 Gram(s) IV Push once  furosemide   Injectable 40 milliGRAM(s) IV Push every 12 hours  insulin lispro (HumaLOG) corrective regimen sliding scale   SubCutaneous at bedtime  insulin lispro (HumaLOG) corrective regimen sliding scale   SubCutaneous three times a day before meals  levothyroxine 25 MICROGram(s) Oral daily  midodrine. 10 milliGRAM(s) Oral every 8 hours  pantoprazole    Tablet 40 milliGRAM(s) Oral before breakfast    MEDICATIONS  (PRN):  dextrose 40% Gel 15 Gram(s) Oral once PRN Blood Glucose LESS THAN 70 milliGRAM(s)/deciliter  glucagon  Injectable 1 milliGRAM(s) IntraMuscular once PRN Glucose LESS THAN 70 milligrams/deciliter  ondansetron Injectable 4 milliGRAM(s) IV Push every 6 hours PRN Nausea and/or Vomiting  traMADol 25 milliGRAM(s) Oral every 6 hours PRN Moderate Pain (4 - 6)      10-14-19 @ 07:01  -  10-15-19 @ 07:00  --------------------------------------------------------  IN: 480 mL / OUT: 352 mL / NET: 128 mL    10-15-19 @ 07:01  -  10-15-19 @ 16:15  --------------------------------------------------------  IN: 118 mL / OUT: 0 mL / NET: 118 mL      PHYSICAL EXAM:      T(C): 36.8 (10-15-19 @ 11:22), Max: 36.8 (10-15-19 @ 11:22)  HR: 87 (10-15-19 @ 11:45) (75 - 95)  BP: 103/56 (10-15-19 @ 11:22) (93/57 - 103/59)  RR: 18 (10-15-19 @ 11:22) (18 - 18)  SpO2: 98% (10-15-19 @ 11:45) (92% - 98%)  Wt(kg): --  Respiratory: clear anteriorly, decreased BS at bases  Cardiovascular: S1 S2  Gastrointestinal: soft NT ND +BS  Extremities:   3 edema                                    7.8    5.28  )-----------( 340      ( 15 Oct 2019 09:50 )             26.0     10-15    127<L>  |  91<L>  |  18  ----------------------------<  95  4.7   |  25  |  0.88    Ca    9.7      15 Oct 2019 09:50          Creatinine Trend: 0.88<--, 0.89<--, 1.19<--, 1.05<--, 1.34<--, 1.16<--      Assessment   Hypervolemic hyponatremia; CRS:     Plan:    Increase loop diuretic IV 40 mg q 12  Tolvaptan 15 mg once;   Will follow.  Clint Linares MD

## 2019-10-16 LAB
ANION GAP SERPL CALC-SCNC: 12 MMOL/L — SIGNIFICANT CHANGE UP (ref 5–17)
BUN SERPL-MCNC: 12 MG/DL — SIGNIFICANT CHANGE UP (ref 7–23)
CALCIUM SERPL-MCNC: 8.8 MG/DL — SIGNIFICANT CHANGE UP (ref 8.5–10.1)
CHLORIDE SERPL-SCNC: 91 MMOL/L — LOW (ref 96–108)
CO2 SERPL-SCNC: 24 MMOL/L — SIGNIFICANT CHANGE UP (ref 22–31)
CREAT SERPL-MCNC: 1.11 MG/DL — SIGNIFICANT CHANGE UP (ref 0.5–1.3)
GLUCOSE BLDC GLUCOMTR-MCNC: 166 MG/DL — HIGH (ref 70–99)
GLUCOSE SERPL-MCNC: 123 MG/DL — HIGH (ref 70–99)
HCT VFR BLD CALC: 23.6 % — LOW (ref 34.5–45)
HCT VFR BLD CALC: 26.7 % — LOW (ref 34.5–45)
HGB BLD-MCNC: 6.9 G/DL — CRITICAL LOW (ref 11.5–15.5)
HGB BLD-MCNC: 7.8 G/DL — LOW (ref 11.5–15.5)
MCHC RBC-ENTMCNC: 21.6 PG — LOW (ref 27–34)
MCHC RBC-ENTMCNC: 22.6 PG — LOW (ref 27–34)
MCHC RBC-ENTMCNC: 29.2 GM/DL — LOW (ref 32–36)
MCHC RBC-ENTMCNC: 29.2 GM/DL — LOW (ref 32–36)
MCV RBC AUTO: 74 FL — LOW (ref 80–100)
MCV RBC AUTO: 77.4 FL — LOW (ref 80–100)
NRBC # BLD: 16 /100 WBCS — HIGH (ref 0–0)
NRBC # BLD: 29 /100 WBCS — HIGH (ref 0–0)
PHOSPHATE SERPL-MCNC: 2.6 MG/DL — SIGNIFICANT CHANGE UP (ref 2.5–4.5)
PLATELET # BLD AUTO: 273 K/UL — SIGNIFICANT CHANGE UP (ref 150–400)
PLATELET # BLD AUTO: 326 K/UL — SIGNIFICANT CHANGE UP (ref 150–400)
POTASSIUM SERPL-MCNC: 4.5 MMOL/L — SIGNIFICANT CHANGE UP (ref 3.5–5.3)
POTASSIUM SERPL-SCNC: 4.5 MMOL/L — SIGNIFICANT CHANGE UP (ref 3.5–5.3)
RBC # BLD: 3.19 M/UL — LOW (ref 3.8–5.2)
RBC # BLD: 3.45 M/UL — LOW (ref 3.8–5.2)
RBC # FLD: 21.8 % — HIGH (ref 10.3–14.5)
RBC # FLD: 23.2 % — HIGH (ref 10.3–14.5)
SODIUM SERPL-SCNC: 127 MMOL/L — LOW (ref 135–145)
WBC # BLD: 6.71 K/UL — SIGNIFICANT CHANGE UP (ref 3.8–10.5)
WBC # BLD: 9.97 K/UL — SIGNIFICANT CHANGE UP (ref 3.8–10.5)
WBC # FLD AUTO: 6.71 K/UL — SIGNIFICANT CHANGE UP (ref 3.8–10.5)
WBC # FLD AUTO: 9.97 K/UL — SIGNIFICANT CHANGE UP (ref 3.8–10.5)

## 2019-10-16 PROCEDURE — 99233 SBSQ HOSP IP/OBS HIGH 50: CPT

## 2019-10-16 PROCEDURE — 99232 SBSQ HOSP IP/OBS MODERATE 35: CPT

## 2019-10-16 RX ORDER — SALIVA SUBSTITUTE COMB NO.11 351 MG
5 POWDER IN PACKET (EA) MUCOUS MEMBRANE
Refills: 0 | Status: DISCONTINUED | OUTPATIENT
Start: 2019-10-16 | End: 2019-10-23

## 2019-10-16 RX ORDER — NYSTATIN 500MM UNIT
500000 POWDER (EA) MISCELLANEOUS
Refills: 0 | Status: DISCONTINUED | OUTPATIENT
Start: 2019-10-16 | End: 2019-10-23

## 2019-10-16 RX ORDER — FUROSEMIDE 40 MG
40 TABLET ORAL
Refills: 0 | Status: DISCONTINUED | OUTPATIENT
Start: 2019-10-16 | End: 2019-10-16

## 2019-10-16 RX ORDER — FUROSEMIDE 40 MG
80 TABLET ORAL EVERY 12 HOURS
Refills: 0 | Status: COMPLETED | OUTPATIENT
Start: 2019-10-16 | End: 2019-10-16

## 2019-10-16 RX ADMIN — Medication 500000 UNIT(S): at 12:36

## 2019-10-16 RX ADMIN — Medication 25 MICROGRAM(S): at 06:53

## 2019-10-16 RX ADMIN — Medication 5 MILLILITER(S): at 15:18

## 2019-10-16 RX ADMIN — Medication 1: at 11:27

## 2019-10-16 RX ADMIN — Medication 500000 UNIT(S): at 17:15

## 2019-10-16 RX ADMIN — TRAMADOL HYDROCHLORIDE 25 MILLIGRAM(S): 50 TABLET ORAL at 10:33

## 2019-10-16 RX ADMIN — Medication 80 MILLIGRAM(S): at 20:14

## 2019-10-16 RX ADMIN — TRAMADOL HYDROCHLORIDE 25 MILLIGRAM(S): 50 TABLET ORAL at 11:29

## 2019-10-16 RX ADMIN — Medication 40 MILLIGRAM(S): at 06:53

## 2019-10-16 RX ADMIN — Medication 81 MILLIGRAM(S): at 12:37

## 2019-10-16 RX ADMIN — TRAMADOL HYDROCHLORIDE 25 MILLIGRAM(S): 50 TABLET ORAL at 19:04

## 2019-10-16 RX ADMIN — Medication 1: at 08:08

## 2019-10-16 RX ADMIN — MIDODRINE HYDROCHLORIDE 10 MILLIGRAM(S): 2.5 TABLET ORAL at 15:17

## 2019-10-16 RX ADMIN — MIDODRINE HYDROCHLORIDE 10 MILLIGRAM(S): 2.5 TABLET ORAL at 06:53

## 2019-10-16 RX ADMIN — PANTOPRAZOLE SODIUM 40 MILLIGRAM(S): 20 TABLET, DELAYED RELEASE ORAL at 06:53

## 2019-10-16 RX ADMIN — TRAMADOL HYDROCHLORIDE 25 MILLIGRAM(S): 50 TABLET ORAL at 20:04

## 2019-10-16 RX ADMIN — Medication 3 MILLILITER(S): at 05:46

## 2019-10-16 RX ADMIN — Medication 3 MILLILITER(S): at 17:11

## 2019-10-16 RX ADMIN — Medication 3 MILLILITER(S): at 12:15

## 2019-10-16 RX ADMIN — Medication 3 MILLILITER(S): at 00:09

## 2019-10-16 NOTE — PROGRESS NOTE ADULT - SUBJECTIVE AND OBJECTIVE BOX
INTERVAL HPI:  35 y/o Female with severe Pulmonary Hypertension and right sided CHF( RV EF 35%) COPD, HTN, DM2, Hyperbilirubinemia, L femoral DVT. Not taking anticoagulant since she her last admission(claims prescription was not sent).   Pt. went to PCP today who noted her hypotension and SOB and recommended coming to ER.  She hasn't been able to  meds from pharmacy as outpatient., because she can't walk due to chronic leg swelling, right calf pain.  No other complaints of inciting event, has had poor oral intake for several days,   Pt. is otherwise well. Refused  ABG, parenteral anticoagulants;  On home O2. Denies tobacco abuse.    OVERNIGHT EVENTS:  Getting PRBC for low H & H.    Vital Signs Last 24 Hrs  T(C): 36.4 (16 Oct 2019 12:10), Max: 36.4 (16 Oct 2019 00:32)  T(F): 97.5 (16 Oct 2019 12:10), Max: 97.6 (16 Oct 2019 00:32)  HR: 91 (16 Oct 2019 12:10) (80 - 91)  BP: 102/57 (16 Oct 2019 12:10) (102/57 - 118/63)  BP(mean): --  RR: 16 (16 Oct 2019 12:10) (16 - 18)  SpO2: 93% (16 Oct 2019 12:10) (93% - 99%)    PHYSICAL EXAM:  GEN:         Awake, responsive and comfortable.  HEENT:    Normal.    RESP:       no   CVS:         Regular rate and rhythm.     MEDICATIONS  (STANDING):  ALBUTerol/ipratropium for Nebulization 3 milliLiter(s) Nebulizer every 6 hours  aspirin enteric coated 81 milliGRAM(s) Oral daily  atorvastatin 20 milliGRAM(s) Oral at bedtime  dextrose 5%. 1000 milliLiter(s) (50 mL/Hr) IV Continuous <Continuous>  dextrose 50% Injectable 12.5 Gram(s) IV Push once  dextrose 50% Injectable 25 Gram(s) IV Push once  dextrose 50% Injectable 25 Gram(s) IV Push once  furosemide   Injectable 80 milliGRAM(s) IV Push every 12 hours  insulin lispro (HumaLOG) corrective regimen sliding scale   SubCutaneous at bedtime  insulin lispro (HumaLOG) corrective regimen sliding scale   SubCutaneous three times a day before meals  levothyroxine 25 MICROGram(s) Oral daily  midodrine. 10 milliGRAM(s) Oral every 8 hours  nystatin    Suspension 478817 Unit(s) Swish and Swallow four times a day  pantoprazole    Tablet 40 milliGRAM(s) Oral before breakfast    MEDICATIONS  (PRN):  Biotene Dry Mouth Oral Rinse 5 milliLiter(s) Swish and Spit four times a day PRN dry mouth  dextrose 40% Gel 15 Gram(s) Oral once PRN Blood Glucose LESS THAN 70 milliGRAM(s)/deciliter  glucagon  Injectable 1 milliGRAM(s) IntraMuscular once PRN Glucose LESS THAN 70 milligrams/deciliter  ondansetron Injectable 4 milliGRAM(s) IV Push every 6 hours PRN Nausea and/or Vomiting  traMADol 25 milliGRAM(s) Oral every 6 hours PRN Moderate Pain (4 - 6)    LABS:                        6.9    6.71  )-----------( 326      ( 16 Oct 2019 07:37 )             23.6     10-16    127<L>  |  91<L>  |  12  ----------------------------<  123<H>  4.5   |  24  |  1.11    Ca    8.8      16 Oct 2019 07:37  Phos  2.6     10-16    ASSESSMENT AND PLAN:  ·	SOB.  ·	Severe pulmonary HTN.  ·	Cor pulmonale.  ·	Fluid Retention.  ·	Left external DVT.  ·	Non Compliance with treatment.    PRBC transfusion, follow H & H.  Continue O2, diuretics and nebulizer  Follow up with Dr. Unger for pHTN.

## 2019-10-16 NOTE — PROGRESS NOTE ADULT - SUBJECTIVE AND OBJECTIVE BOX
[facility] NEPHROLOGY SERVICES, [facility]  NEPHROLOGY AND HYPERTENSION  [address]  [address]  [place] [address]  [phone]    MD FABIAN FERREIRA MD ANDREY GONCHARUK, MD MADHU KORRAPATI, MD YELENA ROSENBERG, MD BINNY KOSHY, MD CHRISTOPHER CAPUTO, MD EDWARD BOVER, MD          Patient feels well no complaints today.    MEDICATIONS  (STANDING):  ALBUTerol/ipratropium for Nebulization 3 milliLiter(s) Nebulizer every 6 hours  aspirin enteric coated 81 milliGRAM(s) Oral daily  atorvastatin 20 milliGRAM(s) Oral at bedtime  dextrose 5%. 1000 milliLiter(s) (50 mL/Hr) IV Continuous <Continuous>  dextrose 50% Injectable 12.5 Gram(s) IV Push once  dextrose 50% Injectable 25 Gram(s) IV Push once  dextrose 50% Injectable 25 Gram(s) IV Push once  furosemide   Injectable 80 milliGRAM(s) IV Push every 12 hours  insulin lispro (HumaLOG) corrective regimen sliding scale   SubCutaneous at bedtime  insulin lispro (HumaLOG) corrective regimen sliding scale   SubCutaneous three times a day before meals  levothyroxine 25 MICROGram(s) Oral daily  midodrine. 10 milliGRAM(s) Oral every 8 hours  nystatin    Suspension [id number] Unit(s) Swish and Swallow four times a day  pantoprazole    Tablet 40 milliGRAM(s) Oral before breakfast    MEDICATIONS  (PRN):  Biotene Dry Mouth Oral Rinse 5 milliLiter(s) Swish and Spit four times a day PRN dry mouth  dextrose 40% Gel 15 Gram(s) Oral once PRN Blood Glucose LESS THAN 70 milliGRAM(s)/deciliter  glucagon  Injectable 1 milliGRAM(s) IntraMuscular once PRN Glucose LESS THAN 70 milligrams/deciliter  ondansetron Injectable 4 milliGRAM(s) IV Push every 6 hours PRN Nausea and/or Vomiting  traMADol 25 milliGRAM(s) Oral every 6 hours PRN Moderate Pain (4 - 6)      10-15-19 @ 07:01  -  10-16-19 @ 07:00  --------------------------------------------------------  IN: 598 mL / OUT: 0 mL / NET: 598 mL    10-16-19 @ 07:01  -  10-16-19 @ 16:39  --------------------------------------------------------  IN: 1200 mL / OUT: 0 mL / NET: 1200 mL      PHYSICAL EXAM:      T(C): 36.4 (10-16-19 @ 12:10), Max: 36.4 (10-16-19 @ 00:32)  HR: 91 (10-16-19 @ 12:10) (80 - 91)  BP: 102/57 (10-16-19 @ 12:10) (102/57 - 118/63)  RR: 16 (10-16-19 @ 12:10) (16 - 18)  SpO2: 93% (10-16-19 @ 12:10) (93% - 99%)  Wt(kg): --  Respiratory: clear anteriorly, decreased BS at bases  Cardiovascular: S1 S2  Gastrointestinal: soft NT ND +BS  Extremities:  3 edema                                    6.9    6.71  )-----------( 326      ( 16 Oct 2019 07:37 )             23.6     10-16    127<L>  |  91<L>  |  12  ----------------------------<  123<H>  4.5   |  24  |  1.11    Ca    8.8      16 Oct 2019 07:37  Phos  2.6     10-16          Creatinine Trend: 1.11<--, 0.88<--, 0.89<--, 1.19<--, 1.05<--, 1.34<--  Assessment    Hypervolemic hyponatremia; CRS:   Acute and chronic anemia    Plan:    PRBC  Increase loop diuretic IV 40 mg q 12  Tolvaptan 15 mg yesterday   Will follow.        Clint Linares MD

## 2019-10-16 NOTE — PROGRESS NOTE ADULT - ASSESSMENT
35 yo female with severe pulmonary hypertension and right sided HF (RV EF 35%), COPD, HTN, DM2, hyperbilirubinemia, L femoral DVT,  chronic hypotension admitted with symptoms of worsening right heart failure/hypotension. cardiac cath negative for any CAD in 2013.   Demand troponin levels noted.  BNP 1900->3600  Despite aggressive diuresis on her previous admission and significant improvement in her edema, she has now gained back 16 kg since last admission, likely non-compliance with diet and possibly her meds.  She missed two appointments with Dr. Unger and claims it's because she has no money for transportation.  She also never followed up with Dr Adamson as requested by Dr. Unger to arrange for further heart failure care.    Plan    Would continue diuresis, Lasix dosing as per renal, monitor electrolytes closely, daily weight   Still on midodrine 10mg q 8 for BP support during aggressive diuresing    offered to arrange  transfer for R+C at [place] but patient has refused any transfer.  she becomes very emotional and cries easily about  her clinical condition, Psych following, social work eval   h/h trending down,  currently with heavy menstruation on Eliquis,  Eliquis discontinued, for PRBC transfusion today, monitor h/h closely 33 yo female with severe pulmonary hypertension and right sided HF (RV EF 35%), COPD, HTN, DM2, hyperbilirubinemia, L femoral DVT,  chronic hypotension admitted with symptoms of worsening right heart failure/hypotension. cardiac cath negative for any CAD in 2013.   Demand troponin levels noted.  BNP 1900->3600  Despite aggressive diuresis on her previous admission and significant improvement in her edema, she has now gained back 16 kg since last admission, likely non-compliance with diet and possibly her meds.  She missed two appointments with Dr. Unger and claims it's because she has no money for transportation.  She also never followed up with Dr Adamson as requested by Dr. Unger to arrange for further heart failure care.    Plan    Would continue diuresis, Lasix dosing as per renal, monitor electrolytes closely, daily weight   Still on midodrine 10mg q 8 for BP support during aggressive diuresing    offered to arrange  transfer for R+LHC at [place] but patient has refused any transfer.  Remains emotional and cries easily about  her clinical condition, Psych following, social work eval   h/h trending down,  currently with heavy menstruation on Eliquis,  Eliquis discontinued, for PRBC transfusion today, monitor h/h closely 35 yo female with severe pulmonary hypertension and right sided HF (RV EF 35%), COPD, HTN, DM2, hyperbilirubinemia, L femoral DVT,  chronic hypotension admitted with symptoms of worsening right heart failure/hypotension. cardiac cath negative for any CAD in 2013.   Demand troponin levels noted.  BNP 1900->3600  Despite aggressive diuresis on her previous admission and significant improvement in her edema, she has now gained back 16 kg since last admission, likely non-compliance with diet and possibly her meds.  She missed two appointments with Dr. Unger and claims it's because she has no money for transportation.  She also never followed up with Dr Adamson as requested by Dr. Unger to arrange for further heart failure care.    Plan    Would continue diuresis, Lasix dosing as per renal, monitor electrolytes closely, daily weight   Still on midodrine 10mg q 8 for BP support during aggressive diuresing    offered to arrange  transfer for R+LHC at [place] but patient has refused any transfer. Pt prefers to follow up with Dr. Unger as outpatient   Remains emotional and cries easily about  her clinical condition, Psych following, social work eval   h/h trending down,  currently with heavy menstruation on Eliquis,  Eliquis discontinued, for PRBC transfusion today, monitor h/h closely  will also dc aspirin in setting of acute anemia

## 2019-10-16 NOTE — PROGRESS NOTE ADULT - SUBJECTIVE AND OBJECTIVE BOX
Patient is a 34y old  Female who presents with a chief complaint of Shortness of breath and hypotension. (15 Oct 2019 18:28)      PAST MEDICAL & SURGICAL HISTORY:  Former smoker, stopped smoking in distant past  Vitamin D deficiency  Morbid obesity due to excess calories  Edema extremities  Diabetes mellitus  Hyperbilirubinemia  COPD (chronic obstructive pulmonary disease)  COPD (chronic obstructive pulmonary disease)  Anticoagulation goal of INR 2 to 3  Anticardiolipin antibody positive  Congestive heart failure, unspecified HF chronicity, unspecified heart failure type  Moderate asthma, unspecified whether complicated, unspecified whether persistent  Other secondary hypertension  COPD (chronic obstructive pulmonary disease)  Anticardiolipin antibody positive  Essential hypertension  Pulmonary HTN  Morbid obesity  No significant past surgical history      INTERVAL HISTORY:  	  MEDICATIONS:  MEDICATIONS  (STANDING):  ALBUTerol/ipratropium for Nebulization 3 milliLiter(s) Nebulizer every 6 hours  aspirin enteric coated 81 milliGRAM(s) Oral daily  atorvastatin 20 milliGRAM(s) Oral at bedtime  dextrose 5%. 1000 milliLiter(s) (50 mL/Hr) IV Continuous <Continuous>  dextrose 50% Injectable 12.5 Gram(s) IV Push once  dextrose 50% Injectable 25 Gram(s) IV Push once  dextrose 50% Injectable 25 Gram(s) IV Push once  furosemide   Injectable 80 milliGRAM(s) IV Push every 12 hours  insulin lispro (HumaLOG) corrective regimen sliding scale   SubCutaneous at bedtime  insulin lispro (HumaLOG) corrective regimen sliding scale   SubCutaneous three times a day before meals  levothyroxine 25 MICROGram(s) Oral daily  midodrine. 10 milliGRAM(s) Oral every 8 hours  nystatin    Suspension 393883 Unit(s) Swish and Swallow four times a day  pantoprazole    Tablet 40 milliGRAM(s) Oral before breakfast    MEDICATIONS  (PRN):  Biotene Dry Mouth Oral Rinse 5 milliLiter(s) Swish and Spit four times a day PRN dry mouth  dextrose 40% Gel 15 Gram(s) Oral once PRN Blood Glucose LESS THAN 70 milliGRAM(s)/deciliter  glucagon  Injectable 1 milliGRAM(s) IntraMuscular once PRN Glucose LESS THAN 70 milligrams/deciliter  ondansetron Injectable 4 milliGRAM(s) IV Push every 6 hours PRN Nausea and/or Vomiting  traMADol 25 milliGRAM(s) Oral every 6 hours PRN Moderate Pain (4 - 6)      Vitals:  T(F): 96.5 (10-16-19 @ 05:28), Max: 97.6 (10-16-19 @ 00:32)  HR: 87 (10-16-19 @ 09:52) (80 - 91)  BP: 118/63 (10-16-19 @ 05:28) (109/63 - 118/63)  RR: 18 (10-16-19 @ 05:28) (18 - 18)  SpO2: 96% (10-16-19 @ 09:52) (96% - 99%)  Wt(kg): --    10-15 @ 07:01  -  10-16 @ 07:00  --------------------------------------------------------  IN:    Oral Fluid: 598 mL  Total IN: 598 mL    OUT:  Total OUT: 0 mL    Total NET: 598 mL              PHYSICAL EXAM:  Neuro: Awake, responsive  CV: S1 S2 RRR  Lungs: CTABL  GI: Soft, BS +, ND, NT  Extremities: No edema    TELEMETRY: RSR    RADIOLOGY:  < from: Xray Chest 1 View- PORTABLE-Urgent (10.09.19 @ 21:34) >  Lungs: The lungs are clear.  Heart: There is cardiomegaly.  Mediastinum: Hilar prominence noted.    IMPRESSION:    Clear lungs.    < end of copied text >  < from: US Duplex Venous Lower Ext Complete, Bilateral (10.10.19 @ 03:24) >  No significant change in nonocclusive thrombus in the left external iliac   vein and superficial thrombus in the left greater saphenous vein.    < end of copied text >    DIAGNOSTIC TESTING:    [x ] Echocardiogram: < from: TTE Echo Doppler w/o Cont (07.26.19 @ 08:44) >   1. Left ventricular ejection fraction, by visual estimation, is 45 to   50%.   2. Mildly decreased global left ventricular systolic function.   3. Elevated mean left atrial pressure.   4. Global cardiomyopathy.   5. Increased LV wall thickness.   6. Normal left ventricular internal cavity size.   7. Right ventricular pressure overload.   8. Spectral Doppler shows impaired relaxation pattern of left   ventricular myocardial filling (Grade I diastolic dysfunction).   9. There is mild asymmetric left ventricular hypertrophy.  10. Pulmonary hypertension.  11. Severely enlarged right ventricle.  12. Severely reduced RV systolic function.  13. RV ejection fraction 34%.  14. Small pericardial effusion.  15. Degenerative mitral valve.  16. Mild mitral annular calcification.  17. Mild mitral valve regurgitation.  18. Moderate tricuspid regurgitation.  19. Structurally normal tricuspid valve, with normal leaflet excursion.  20. Mild aortic regurgitation.  21. Mild to moderate pulmonic valve regurgitation.  22. Structurally normal pulmonic valve, with normal leaflet excursion.  23. Estimated pulmonary artery systolic pressure is 87.1 mmHg assuming a   right atrial pressure of 20 mmHg, which is consistent with severe   pulmonary hypertension.  24. Pulmonary hypertension is present.  25. The main pulmonary artery is normal in size.  26. The right atrial pressure is moderately elevated.    < end of copied text >    [x ] RT heart  Catheterization: < from: Cardiac Cath Lab - Adult (08.10.18 @ 10:48) >  Severe Pulmonary Hypertension with no response with  80ppm of Nitric Oxide    < end of copied text >    LABS:	 	    16 Oct 2019 07:37    127    |  91     |  12     ----------------------------<  123    4.5     |  24     |  1.11   15 Oct 2019 09:50    127    |  91     |  18     ----------------------------<  95     4.7     |  25     |  0.88   14 Oct 2019 11:37    131    |  95     |  24     ----------------------------<  110    4.8     |  28     |  0.89     Ca    8.8        16 Oct 2019 07:37  Phos  2.6       16 Oct 2019 07:37                        6.9    6.71  )-----------( 326      ( 16 Oct 2019 07:37 )             23.6 ,                       7.8    5.28  )-----------( 340      ( 15 Oct 2019 09:50 )             26.0 ,                       7.1    5.49  )-----------( 288      ( 14 Oct 2019 11:37 )             24.0 Patient is a 34y old  Female who presents with a chief complaint of Shortness of breath and hypotension. (15 Oct 2019 18:28)    PAST MEDICAL & SURGICAL HISTORY:  Former smoker, stopped smoking in distant past  Vitamin D deficiency  Morbid obesity due to excess calories  Edema extremities  Diabetes mellitus  Hyperbilirubinemia  COPD (chronic obstructive pulmonary disease)  COPD (chronic obstructive pulmonary disease)  Anticoagulation goal of INR 2 to 3  Anticardiolipin antibody positive  Congestive heart failure, unspecified HF chronicity, unspecified heart failure type  Moderate asthma, unspecified whether complicated, unspecified whether persistent  Other secondary hypertension  COPD (chronic obstructive pulmonary disease)  Anticardiolipin antibody positive  Essential hypertension  Pulmonary HTN  Morbid obesity  No significant past surgical history    INTERVAL HISTORY: resting in bed, not in any distress, c/o abd discomfort from her menstrual period   	  MEDICATIONS:  MEDICATIONS  (STANDING):  ALBUTerol/ipratropium for Nebulization 3 milliLiter(s) Nebulizer every 6 hours  aspirin enteric coated 81 milliGRAM(s) Oral daily  atorvastatin 20 milliGRAM(s) Oral at bedtime  dextrose 5%. 1000 milliLiter(s) (50 mL/Hr) IV Continuous <Continuous>  dextrose 50% Injectable 12.5 Gram(s) IV Push once  dextrose 50% Injectable 25 Gram(s) IV Push once  dextrose 50% Injectable 25 Gram(s) IV Push once  furosemide   Injectable 80 milliGRAM(s) IV Push every 12 hours  insulin lispro (HumaLOG) corrective regimen sliding scale   SubCutaneous at bedtime  insulin lispro (HumaLOG) corrective regimen sliding scale   SubCutaneous three times a day before meals  levothyroxine 25 MICROGram(s) Oral daily  midodrine. 10 milliGRAM(s) Oral every 8 hours  nystatin    Suspension [id number] Unit(s) Swish and Swallow four times a day  pantoprazole    Tablet 40 milliGRAM(s) Oral before breakfast    MEDICATIONS  (PRN):  Biotene Dry Mouth Oral Rinse 5 milliLiter(s) Swish and Spit four times a day PRN dry mouth  dextrose 40% Gel 15 Gram(s) Oral once PRN Blood Glucose LESS THAN 70 milliGRAM(s)/deciliter  glucagon  Injectable 1 milliGRAM(s) IntraMuscular once PRN Glucose LESS THAN 70 milligrams/deciliter  ondansetron Injectable 4 milliGRAM(s) IV Push every 6 hours PRN Nausea and/or Vomiting  traMADol 25 milliGRAM(s) Oral every 6 hours PRN Moderate Pain (4 - 6)      Vitals:  T(F): 96.5 (10-16-19 @ 05:28), Max: 97.6 (10-16-19 @ 00:32)  HR: 87 (10-16-19 @ 09:52) (80 - 91)  BP: 118/63 (10-16-19 @ 05:28) (109/63 - 118/63)  RR: 18 (10-16-19 @ 05:28) (18 - 18)  SpO2: 96% (10-16-19 @ 09:52) (96% - 99%)  Wt(kg): --104.6 kg    10-15 @ 07:01  -  10-16 @ 07:00  --------------------------------------------------------  IN:    Oral Fluid: 598 mL  Total IN: 598 mL    OUT:  Total OUT: 0 mL    Total NET: 598 mL    PHYSICAL EXAM:  Neuro: Awake, responsive  CV: S1 S2 RRR  Lungs: CTABL  GI: Soft, BS +, ND, NT, + anasarca   Extremities: ++++ LE edema    TELEMETRY: RSR    RADIOLOGY:  < from: Xray Chest 1 View- PORTABLE-Urgent (10.09.19 @ 21:34) >  Lungs: The lungs are clear.  Heart: There is cardiomegaly.  Mediastinum: Hilar prominence noted.    IMPRESSION:    Clear lungs.    < end of copied text >  < from: US Duplex Venous Lower Ext Complete, Bilateral (10.10.19 @ 03:24) >  No significant change in nonocclusive thrombus in the left external iliac   vein and superficial thrombus in the left greater saphenous vein.    < end of copied text >    DIAGNOSTIC TESTING:    [x ] Echocardiogram: < from: TTE Echo Doppler w/o Cont (07.26.19 @ 08:44) >   1. Left ventricular ejection fraction, by visual estimation, is 45 to   50%.   2. Mildly decreased global left ventricular systolic function.   3. Elevated mean left atrial pressure.   4. Global cardiomyopathy.   5. Increased LV wall thickness.   6. Normal left ventricular internal cavity size.   7. Right ventricular pressure overload.   8. Spectral Doppler shows impaired relaxation pattern of left   ventricular myocardial filling (Grade I diastolic dysfunction).   9. There is mild asymmetric left ventricular hypertrophy.  10. Pulmonary hypertension.  11. Severely enlarged right ventricle.  12. Severely reduced RV systolic function.  13. RV ejection fraction 34%.  14. Small pericardial effusion.  15. Degenerative mitral valve.  16. Mild mitral annular calcification.  17. Mild mitral valve regurgitation.  18. Moderate tricuspid regurgitation.  19. Structurally normal tricuspid valve, with normal leaflet excursion.  20. Mild aortic regurgitation.  21. Mild to moderate pulmonic valve regurgitation.  22. Structurally normal pulmonic valve, with normal leaflet excursion.  23. Estimated pulmonary artery systolic pressure is 87.1 mmHg assuming a   right atrial pressure of 20 mmHg, which is consistent with severe   pulmonary hypertension.  24. Pulmonary hypertension is present.  25. The main pulmonary artery is normal in size.  26. The right atrial pressure is moderately elevated.    < end of copied text >    [x ] RT heart  Catheterization: < from: Cardiac Cath Lab - Adult (08.10.18 @ 10:48) >  Severe Pulmonary Hypertension with no response with  80ppm of Nitric Oxide    < end of copied text >    LABS:	 	    16 Oct 2019 07:37    127    |  91     |  12     ----------------------------<  123    4.5     |  24     |  1.11   15 Oct 2019 09:50    127    |  91     |  18     ----------------------------<  95     4.7     |  25     |  0.88   14 Oct 2019 11:37    131    |  95     |  24     ----------------------------<  110    4.8     |  28     |  0.89     Ca    8.8        16 Oct 2019 07:37  Phos  2.6       16 Oct 2019 07:37                        6.9    6.71  )-----------( 326      ( 16 Oct 2019 07:37 )             23.6 ,                       7.8    5.28  )-----------( 340      ( 15 Oct 2019 09:50 )             26.0 ,                       7.1    5.49  )-----------( 288      ( 14 Oct 2019 11:37 )             24.0

## 2019-10-16 NOTE — PROGRESS NOTE ADULT - ASSESSMENT
33 yo female with history of Anticardiolipin antibody positive on AC at home, COPD, DM II, Essential hypertension, Vitamin D deficiency & severe Pulm HTN with right sided heart failure p/w SOB & was found to have acute on chronic combined systolic and diastolic congestive heart failure, hyponatremia, DIANN & acute RLE DVT.    Acute on chronic combined systolic and diastolic RIGHT SIDED congestive heart failure:   - Echo in 7/2019 showed EF 45-50% w/ elevated mean left atrial pressure w/ global cardiomyopathy, grade I diastolic dysfunction, pulmonary hypertension w/ a severely enlarged right ventricle and severely reduced RV systolic function w/ RV ejection fraction 34%, moderate tricuspid regurgitation, mild to moderate pulmonic valve regurgitation & severe pulmonary hypertension   - cont lasix 40mg IV BID  - monitor daily wt, i/o's  -Will continue diuresis the next few days back to a euvolemic state; will add midodrine for BP support   -if she agrees, will then transfer for R and LHC and eval with Dr. Unger at [facility] however currently refusing transfer stating she had cath before and doesn't see why she should have another one. Patient refusing despite education on importance of cath.   -fluid and salt restriction encouraged. Although patient is non compliant--ordering chinese food, french fries, per RN --not compliant with water restriction  -cardiology following    -follow ECHO       Worsening acute blood loss anemia in the setting of AC and steroid use with heavy menstruation  H/H dropped 6.9/23  will give 1u PRBC, lasix after transfusion  monitor CBC   hold eliquis and ASA for now     severe Pulmonary hypertension:  - c/w riociguat  - continue with supplemental oxygen to maintain O2 sat >92%  - pulmonary following      Type 2 diabetes mellitus with hyperglycemia, without long-term current use of insulin:  - Continue current insulin regimen    Hyponatremia:  - likely due to volume overload/ aggressive diuresis and possible SSRI- on hold  - Renal following , s/p tolvaptan 15mg x 1 10/15/19  - c/w Fluid restriction    HTN:  - hold all BP meds due to low BP  -currently on Midodrine for BP support     Moderate persistent Asthma:  - pulmonary following  - c/w Symbicort    Acute deep vein thrombosis (DVT) of femoral vein of left lower extremity:  - US showed an extensive acute nonocclusive thrombus in the left external iliac vein and the left common femoral vein and left greater saphenous vein  - Eliquis, pt non compliant with INR checks and doctor/follow-up visits   -per Dr. Ling ; not a candidate for ivc filter    L foot injury due to weight fell on it  -Xray of the foot; neg  -pain mgmt     Acquired hypothyroidism:  - c/w synthroid     Hyperlipidemia:  - c/w Zocor    Depression:  - Zoloft on hold  -denies SI/HI  -Psych consult appreciated     Preventative measure  SCD-dvt ppx, eliquis on hold for now   fall precautions

## 2019-10-16 NOTE — PROGRESS NOTE ADULT - SUBJECTIVE AND OBJECTIVE BOX
Patient is a 34y old  Female who presents with a chief complaint of Shortness of breath and hypotension. (15 Oct 2019 08:26)    Patient seen and examined bedside.   OVERNIGHT EVENTS:  none     REVIEW OF SYSTEMS: denies chest pain/SOB, diaphoresis, cough, dizziness, headache, blurry vision, nausea, vomiting, abdominal pain.       MEDICATIONS  (STANDING):  ALBUTerol/ipratropium for Nebulization 3 milliLiter(s) Nebulizer every 6 hours  apixaban 10 milliGRAM(s) Oral every 12 hours  aspirin enteric coated 81 milliGRAM(s) Oral daily  atorvastatin 20 milliGRAM(s) Oral at bedtime  dextrose 5%. 1000 milliLiter(s) (50 mL/Hr) IV Continuous <Continuous>  dextrose 50% Injectable 12.5 Gram(s) IV Push once  dextrose 50% Injectable 25 Gram(s) IV Push once  dextrose 50% Injectable 25 Gram(s) IV Push once  furosemide   Injectable 40 milliGRAM(s) IV Push every 12 hours  insulin lispro (HumaLOG) corrective regimen sliding scale   SubCutaneous at bedtime  insulin lispro (HumaLOG) corrective regimen sliding scale   SubCutaneous three times a day before meals  levothyroxine 25 MICROGram(s) Oral daily  midodrine. 10 milliGRAM(s) Oral every 8 hours  pantoprazole    Tablet 40 milliGRAM(s) Oral before breakfast    MEDICATIONS  (PRN):  dextrose 40% Gel 15 Gram(s) Oral once PRN Blood Glucose LESS THAN 70 milliGRAM(s)/deciliter  glucagon  Injectable 1 milliGRAM(s) IntraMuscular once PRN Glucose LESS THAN 70 milligrams/deciliter  ondansetron Injectable 4 milliGRAM(s) IV Push every 6 hours PRN Nausea and/or Vomiting  traMADol 25 milliGRAM(s) Oral every 6 hours PRN Moderate Pain (4 - 6)      Allergies    No Known Allergies    Intolerances      Vital Signs Last 24 Hrs  T(C): 36.3 (16 Oct 2019 20:03), Max: 36.4 (16 Oct 2019 00:32)  T(F): 97.4 (16 Oct 2019 20:03), Max: 97.6 (16 Oct 2019 00:32)  HR: 105 (16 Oct 2019 20:03) (81 - 105)  BP: 91/52 (16 Oct 2019 20:03) (91/52 - 118/63)  BP(mean): --  RR: 18 (16 Oct 2019 20:03) (16 - 18)  SpO2: 96% (16 Oct 2019 20:03) (93% - 100%)    PHYSICAL EXAM:  GENERAL: NAD, speaking in full sentences, NOT using accessory muscles   HEAD:  Atraumatic, Normocephalic  EYES: EOMI, PERRLA, conjunctiva and sclera clear  ENMT: No tonsillar erythema, exudates, or enlargement; Moist mucous membranes  NECK: Supple, No JVD  NERVOUS SYSTEM:  Alert & Oriented X3, no focal neuro deficits   CHEST/LUNG: GOOD B/L AIR ENTRY, no w/r/r  HEART: Regular rate and rhythm; No murmurs, rubs, or gallops  ABDOMEN: Soft, Nontender, Nondistended; Bowel sounds present  : vaginal bleeding with clots   EXTREMITIES:  3-4+ Peripheral  edema BL LE, chronic skin changes of b/l LE. No cyanosis or clubbing noted.       Labs and imaging reviewed.                         6.9    6.71  )-----------( 326      ( 16 Oct 2019 07:37 )             23.6   10-16    127<L>  |  91<L>  |  12  ----------------------------<  123<H>  4.5   |  24  |  1.11    Ca    8.8      16 Oct 2019 07:37  Phos  2.6     10-16        Lipid Profile (07.28.19 @ 11:27)    Total Cholesterol/HDL Ratio Measurement: 7.6 RATIO    Cholesterol, Serum: 76 mg/dL    Triglycerides, Serum: 76 mg/dL    HDL Cholesterol, Serum: 10: HDL Levels >/= 60 mg/dL are considered beneficial and a "negative" risk  factor.  Effective 08/15/2018: New reference range and interpretive comment. mg/dL    Direct LDL: 51: LDL Cholesterol (mg/dL) --- Interpretive Comment (for adults 18 and over)  Optimal LDL Level may vary based on clinical situation  Below 70                   Ideal for people at very high risk of heart  disease  Below 100                  Ideal for people at risk of heart disease  100 - 129                    [place]  130 - 159                    Borderline high  160 - 189                    High  190 and Above           Very high mg/dL        RADIOLOGY & ADDITIONAL TESTS:    Imaging Personally Reviewed:  [ x] YES    < from: Xray Chest 1 View- PORTABLE-Urgent (10.09.19 @ 21:34) >  Clear lungs.    < from: TTE Echo Doppler w/o Cont (07.26.19 @ 08:44) >  1. Left ventricular ejection fraction, by visual estimation, is 45 to   50%.   2. Mildly decreased global left ventricular systolic function.   3. Elevated mean left atrial pressure.   4. Global cardiomyopathy.   5. Increased LV wall thickness.   6. Normal left ventricular internal cavity size.   7. Right ventricular pressure overload.   8. Spectral Doppler shows impaired relaxation pattern of left   ventricular myocardial filling (Grade I diastolic dysfunction).   9. There is mild asymmetric left ventricular hypertrophy.  10. Pulmonary hypertension.  11. Severely enlarged right ventricle.  12. Severely reduced RV systolic function.  13. RV ejection fraction 34%.  14. Small pericardial effusion.  15. Degenerative mitral valve.  16. Mild mitral annular calcification.  17. Mild mitral valve regurgitation.  18. Moderate tricuspid regurgitation.  19. Structurally normal tricuspid valve, with normal leaflet excursion.  20. Mild aortic regurgitation.  21. Mild to moderate pulmonic valve regurgitation.  22. Structurally normal pulmonic valve, with normal leaflet excursion.  23. Estimated pulmonary artery systolic pressure is 87.1 mmHg assuming a   right atrial pressure of 20 mmHg, which is consistent with severe   pulmonary hypertension.  24. Pulmonary hypertension is present.  25. The main pulmonary artery is normal in size.  26. The right atrial pressure is moderately elevated.    < end of copied text >    < from: Xray Chest 1 View- PORTABLE-Urgent (10.09.19 @ 21:34) >  IMPRESSION:    Clear lungs.    < end of copied text >      Consultant(s) Notes Reviewed:  [ x] YES     Care Discussed with [x ] Consultants [X ] Patient [ ] Family  [x ]    [x ]  Other; RN

## 2019-10-17 LAB
ALBUMIN SERPL ELPH-MCNC: 2.7 G/DL — LOW (ref 3.3–5)
ALP SERPL-CCNC: 83 U/L — SIGNIFICANT CHANGE UP (ref 40–120)
ALT FLD-CCNC: 51 U/L — SIGNIFICANT CHANGE UP (ref 12–78)
ANION GAP SERPL CALC-SCNC: 14 MMOL/L — SIGNIFICANT CHANGE UP (ref 5–17)
AST SERPL-CCNC: 78 U/L — HIGH (ref 15–37)
BILIRUB DIRECT SERPL-MCNC: 2.65 MG/DL — HIGH (ref 0.05–0.2)
BILIRUB INDIRECT FLD-MCNC: 1.8 MG/DL — HIGH (ref 0.2–1)
BILIRUB SERPL-MCNC: 4.4 MG/DL — HIGH (ref 0.2–1.2)
BUN SERPL-MCNC: 15 MG/DL — SIGNIFICANT CHANGE UP (ref 7–23)
CALCIUM SERPL-MCNC: 9 MG/DL — SIGNIFICANT CHANGE UP (ref 8.5–10.1)
CHLORIDE SERPL-SCNC: 88 MMOL/L — LOW (ref 96–108)
CO2 SERPL-SCNC: 22 MMOL/L — SIGNIFICANT CHANGE UP (ref 22–31)
CREAT SERPL-MCNC: 1.25 MG/DL — SIGNIFICANT CHANGE UP (ref 0.5–1.3)
GLUCOSE SERPL-MCNC: 160 MG/DL — HIGH (ref 70–99)
HCT VFR BLD CALC: 24.5 % — LOW (ref 34.5–45)
HGB BLD-MCNC: 7.2 G/DL — LOW (ref 11.5–15.5)
MAGNESIUM SERPL-MCNC: 2.2 MG/DL — SIGNIFICANT CHANGE UP (ref 1.6–2.6)
MCHC RBC-ENTMCNC: 22.6 PG — LOW (ref 27–34)
MCHC RBC-ENTMCNC: 29.4 GM/DL — LOW (ref 32–36)
MCV RBC AUTO: 76.8 FL — LOW (ref 80–100)
NRBC # BLD: 13 /100 WBCS — HIGH (ref 0–0)
PHOSPHATE SERPL-MCNC: 3 MG/DL — SIGNIFICANT CHANGE UP (ref 2.5–4.5)
PLATELET # BLD AUTO: 284 K/UL — SIGNIFICANT CHANGE UP (ref 150–400)
POTASSIUM SERPL-MCNC: 4.7 MMOL/L — SIGNIFICANT CHANGE UP (ref 3.5–5.3)
POTASSIUM SERPL-SCNC: 4.7 MMOL/L — SIGNIFICANT CHANGE UP (ref 3.5–5.3)
PROT SERPL-MCNC: 6.2 GM/DL — SIGNIFICANT CHANGE UP (ref 6–8.3)
RBC # BLD: 3.19 M/UL — LOW (ref 3.8–5.2)
RBC # FLD: 22.8 % — HIGH (ref 10.3–14.5)
SODIUM SERPL-SCNC: 124 MMOL/L — LOW (ref 135–145)
WBC # BLD: 10.24 K/UL — SIGNIFICANT CHANGE UP (ref 3.8–10.5)
WBC # FLD AUTO: 10.24 K/UL — SIGNIFICANT CHANGE UP (ref 3.8–10.5)

## 2019-10-17 PROCEDURE — 99232 SBSQ HOSP IP/OBS MODERATE 35: CPT

## 2019-10-17 PROCEDURE — 99233 SBSQ HOSP IP/OBS HIGH 50: CPT

## 2019-10-17 RX ORDER — FUROSEMIDE 40 MG
40 TABLET ORAL
Refills: 0 | Status: DISCONTINUED | OUTPATIENT
Start: 2019-10-17 | End: 2019-10-18

## 2019-10-17 RX ADMIN — Medication 500000 UNIT(S): at 00:31

## 2019-10-17 RX ADMIN — ATORVASTATIN CALCIUM 20 MILLIGRAM(S): 80 TABLET, FILM COATED ORAL at 00:30

## 2019-10-17 RX ADMIN — Medication 500000 UNIT(S): at 23:31

## 2019-10-17 RX ADMIN — Medication 500000 UNIT(S): at 11:24

## 2019-10-17 RX ADMIN — MIDODRINE HYDROCHLORIDE 10 MILLIGRAM(S): 2.5 TABLET ORAL at 14:23

## 2019-10-17 RX ADMIN — Medication 3 MILLILITER(S): at 00:29

## 2019-10-17 RX ADMIN — ATORVASTATIN CALCIUM 20 MILLIGRAM(S): 80 TABLET, FILM COATED ORAL at 22:54

## 2019-10-17 RX ADMIN — Medication 2: at 08:03

## 2019-10-17 RX ADMIN — Medication 3 MILLILITER(S): at 11:31

## 2019-10-17 RX ADMIN — Medication 5 MILLILITER(S): at 23:32

## 2019-10-17 RX ADMIN — TRAMADOL HYDROCHLORIDE 25 MILLIGRAM(S): 50 TABLET ORAL at 23:20

## 2019-10-17 RX ADMIN — MIDODRINE HYDROCHLORIDE 10 MILLIGRAM(S): 2.5 TABLET ORAL at 00:31

## 2019-10-17 RX ADMIN — MIDODRINE HYDROCHLORIDE 10 MILLIGRAM(S): 2.5 TABLET ORAL at 22:54

## 2019-10-17 RX ADMIN — Medication 6: at 17:21

## 2019-10-17 RX ADMIN — Medication 5 MILLILITER(S): at 06:58

## 2019-10-17 RX ADMIN — Medication 3 MILLILITER(S): at 06:08

## 2019-10-17 RX ADMIN — Medication 25 MICROGRAM(S): at 06:55

## 2019-10-17 RX ADMIN — MIDODRINE HYDROCHLORIDE 10 MILLIGRAM(S): 2.5 TABLET ORAL at 06:55

## 2019-10-17 RX ADMIN — Medication 3 MILLILITER(S): at 17:27

## 2019-10-17 RX ADMIN — Medication 40 MILLIGRAM(S): at 15:54

## 2019-10-17 RX ADMIN — TRAMADOL HYDROCHLORIDE 25 MILLIGRAM(S): 50 TABLET ORAL at 23:59

## 2019-10-17 RX ADMIN — Medication 4: at 11:24

## 2019-10-17 RX ADMIN — PANTOPRAZOLE SODIUM 40 MILLIGRAM(S): 20 TABLET, DELAYED RELEASE ORAL at 06:55

## 2019-10-17 NOTE — PROGRESS NOTE ADULT - SUBJECTIVE AND OBJECTIVE BOX
Patient is a 34y old  Female who presents with a chief complaint of Shortness of breath and hypotension. (15 Oct 2019 08:26)    Patient seen and examined bedside.   OVERNIGHT EVENTS:  none   states she feels that the edema has gone down a little   REVIEW OF SYSTEMS: denies chest pain/SOB, diaphoresis, cough, dizziness, headache, blurry vision, nausea, vomiting, abdominal pain.       MEDICATIONS  (STANDING):  ALBUTerol/ipratropium for Nebulization 3 milliLiter(s) Nebulizer every 6 hours  apixaban 10 milliGRAM(s) Oral every 12 hours  aspirin enteric coated 81 milliGRAM(s) Oral daily  atorvastatin 20 milliGRAM(s) Oral at bedtime  dextrose 5%. 1000 milliLiter(s) (50 mL/Hr) IV Continuous <Continuous>  dextrose 50% Injectable 12.5 Gram(s) IV Push once  dextrose 50% Injectable 25 Gram(s) IV Push once  dextrose 50% Injectable 25 Gram(s) IV Push once  furosemide   Injectable 40 milliGRAM(s) IV Push every 12 hours  insulin lispro (HumaLOG) corrective regimen sliding scale   SubCutaneous at bedtime  insulin lispro (HumaLOG) corrective regimen sliding scale   SubCutaneous three times a day before meals  levothyroxine 25 MICROGram(s) Oral daily  midodrine. 10 milliGRAM(s) Oral every 8 hours  pantoprazole    Tablet 40 milliGRAM(s) Oral before breakfast    MEDICATIONS  (PRN):  dextrose 40% Gel 15 Gram(s) Oral once PRN Blood Glucose LESS THAN 70 milliGRAM(s)/deciliter  glucagon  Injectable 1 milliGRAM(s) IntraMuscular once PRN Glucose LESS THAN 70 milligrams/deciliter  ondansetron Injectable 4 milliGRAM(s) IV Push every 6 hours PRN Nausea and/or Vomiting  traMADol 25 milliGRAM(s) Oral every 6 hours PRN Moderate Pain (4 - 6)      Allergies    No Known Allergies    Intolerances    Vitals noted.     PHYSICAL EXAM:  GENERAL: NAD, speaking in full sentences, NOT using accessory muscles   HEAD:  Atraumatic, Normocephalic  EYES: EOMI, PERRLA, conjunctiva and sclera clear  ENMT: No tonsillar erythema, exudates, or enlargement; Moist mucous membranes  NECK: Supple, No JVD  NERVOUS SYSTEM:  Alert & Oriented X3, no focal neuro deficits   CHEST/LUNG: GOOD B/L AIR ENTRY, no w/r/r  HEART: Regular rate and rhythm; No murmurs, rubs, or gallops  ABDOMEN: Soft, Nontender, Nondistended; Bowel sounds present  : vaginal bleeding with clots   EXTREMITIES:  3-4+ Peripheral  edema BL LE, chronic skin changes of b/l LE. No cyanosis or clubbing noted.       Labs and imaging reviewed.                                    7.2    10.24 )-----------( 284      ( 17 Oct 2019 12:42 )             24.5   10-17    124<L>  |  88<L>  |  15  ----------------------------<  160<H>  4.7   |  22  |  1.25    Ca    9.0      17 Oct 2019 12:42  Phos  3.0     10-17  Mg     2.2     10-17    TPro  6.2  /  Alb  2.7<L>  /  TBili  4.4<H>  /  DBili  2.65<H>  /  AST  78<H>  /  ALT  51  /  AlkPhos  83  10-17      Lipid Profile (07.28.19 @ 11:27)    Total Cholesterol/HDL Ratio Measurement: 7.6 RATIO    Cholesterol, Serum: 76 mg/dL    Triglycerides, Serum: 76 mg/dL    HDL Cholesterol, Serum: 10: HDL Levels >/= 60 mg/dL are considered beneficial and a "negative" risk  factor.  Effective 08/15/2018: New reference range and interpretive comment. mg/dL    Direct LDL: 51: LDL Cholesterol (mg/dL) --- Interpretive Comment (for adults 18 and over)  Optimal LDL Level may vary based on clinical situation  Below 70                   Ideal for people at very high risk of heart  disease  Below 100                  Ideal for people at risk of heart disease  100 - 129                    [place]  130 - 159                    Borderline high  160 - 189                    High  190 and Above           Very high mg/dL        RADIOLOGY & ADDITIONAL TESTS:    Imaging Personally Reviewed:  [ x] YES    < from: Xray Chest 1 View- PORTABLE-Urgent (10.09.19 @ 21:34) >  Clear lungs.    < from: TTE Echo Doppler w/o Cont (07.26.19 @ 08:44) >  1. Left ventricular ejection fraction, by visual estimation, is 45 to   50%.   2. Mildly decreased global left ventricular systolic function.   3. Elevated mean left atrial pressure.   4. Global cardiomyopathy.   5. Increased LV wall thickness.   6. Normal left ventricular internal cavity size.   7. Right ventricular pressure overload.   8. Spectral Doppler shows impaired relaxation pattern of left   ventricular myocardial filling (Grade I diastolic dysfunction).   9. There is mild asymmetric left ventricular hypertrophy.  10. Pulmonary hypertension.  11. Severely enlarged right ventricle.  12. Severely reduced RV systolic function.  13. RV ejection fraction 34%.  14. Small pericardial effusion.  15. Degenerative mitral valve.  16. Mild mitral annular calcification.  17. Mild mitral valve regurgitation.  18. Moderate tricuspid regurgitation.  19. Structurally normal tricuspid valve, with normal leaflet excursion.  20. Mild aortic regurgitation.  21. Mild to moderate pulmonic valve regurgitation.  22. Structurally normal pulmonic valve, with normal leaflet excursion.  23. Estimated pulmonary artery systolic pressure is 87.1 mmHg assuming a   right atrial pressure of 20 mmHg, which is consistent with severe   pulmonary hypertension.  24. Pulmonary hypertension is present.  25. The main pulmonary artery is normal in size.  26. The right atrial pressure is moderately elevated.    < end of copied text >    < from: Xray Chest 1 View- PORTABLE-Urgent (10.09.19 @ 21:34) >  IMPRESSION:    Clear lungs.    < end of copied text >      Consultant(s) Notes Reviewed:  [ x] YES     Care Discussed with [x ] Consultants [X ] Patient [ ] Family  [x ]    [x ]  Other; RN

## 2019-10-17 NOTE — PROGRESS NOTE ADULT - SUBJECTIVE AND OBJECTIVE BOX
Subjective: no change in total body edema subjectively.      MEDICATIONS  (STANDING):  ALBUTerol/ipratropium for Nebulization 3 milliLiter(s) Nebulizer every 6 hours  atorvastatin 20 milliGRAM(s) Oral at bedtime  dextrose 5%. 1000 milliLiter(s) (50 mL/Hr) IV Continuous <Continuous>  dextrose 50% Injectable 12.5 Gram(s) IV Push once  dextrose 50% Injectable 25 Gram(s) IV Push once  dextrose 50% Injectable 25 Gram(s) IV Push once  insulin lispro (HumaLOG) corrective regimen sliding scale   SubCutaneous at bedtime  insulin lispro (HumaLOG) corrective regimen sliding scale   SubCutaneous three times a day before meals  levothyroxine 25 MICROGram(s) Oral daily  midodrine. 10 milliGRAM(s) Oral every 8 hours  nystatin    Suspension 385122 Unit(s) Swish and Swallow four times a day  pantoprazole    Tablet 40 milliGRAM(s) Oral before breakfast    MEDICATIONS  (PRN):  Biotene Dry Mouth Oral Rinse 5 milliLiter(s) Swish and Spit four times a day PRN dry mouth  dextrose 40% Gel 15 Gram(s) Oral once PRN Blood Glucose LESS THAN 70 milliGRAM(s)/deciliter  glucagon  Injectable 1 milliGRAM(s) IntraMuscular once PRN Glucose LESS THAN 70 milligrams/deciliter  ondansetron Injectable 4 milliGRAM(s) IV Push every 6 hours PRN Nausea and/or Vomiting  traMADol 25 milliGRAM(s) Oral every 6 hours PRN Moderate Pain (4 - 6)          T(C): 35.9 (10-17-19 @ 05:54), Max: 36.8 (10-17-19 @ 00:15)  HR: 90 (10-17-19 @ 05:54) (87 - 105)  BP: 100/52 (10-17-19 @ 05:54) (91/52 - 108/63)  RR: 18 (10-17-19 @ 05:54) (16 - 18)  SpO2: 98% (10-17-19 @ 05:54) (90% - 100%)  Wt(kg): --        I&O's Detail    16 Oct 2019 07:01  -  17 Oct 2019 07:00  --------------------------------------------------------  IN:    Oral Fluid: 1560 mL  Total IN: 1560 mL    OUT:    Indwelling Catheter - Urethral: 1150 mL  Total OUT: 1150 mL    Total NET: 410 mL               PHYSICAL EXAM:    GENERAL: NAD  EYES: EOMI, PERRLA, conjunctiva and sclera clear  NECK: Supple, no inc in JVP  CHEST/LUNG: Clear  HEART: S1S2  ABDOMEN: Soft, Nontender, Nondistended; Bowel sounds present  EXTREMITIES:  2-3+ edema        LABS:  CBC Full  -  ( 16 Oct 2019 22:03 )  WBC Count : 9.97 K/uL  RBC Count : 3.45 M/uL  Hemoglobin : 7.8 g/dL  Hematocrit : 26.7 %  Platelet Count - Automated : 273 K/uL  Mean Cell Volume : 77.4 fl  Mean Cell Hemoglobin : 22.6 pg  Mean Cell Hemoglobin Concentration : 29.2 gm/dL  Auto Neutrophil # : x  Auto Lymphocyte # : x  Auto Monocyte # : x  Auto Eosinophil # : x  Auto Basophil # : x  Auto Neutrophil % : x  Auto Lymphocyte % : x  Auto Monocyte % : x  Auto Eosinophil % : x  Auto Basophil % : x    10-16    127<L>  |  91<L>  |  12  ----------------------------<  123<H>  4.5   |  24  |  1.11    Ca    8.8      16 Oct 2019 07:37  Phos  2.6     10-16        Impression:  * HypoNa -- hypervolemic, aldactone effect  * Severe RHF, pulm HTN ( ? primary )  * Chronic hypotension due to above  * L fem DVT    Recommendations:   * Received V2 receptor antagonist yest. Repeat Na still pending  * Resume IV lasix 40 BID  * Cont to hold Aldactone   * FR 1000cc/day stressed with pt.   * Needs outpt f/u with HF clinic immediately after dc

## 2019-10-17 NOTE — PROGRESS NOTE ADULT - SUBJECTIVE AND OBJECTIVE BOX
INTERVAL HPI:  33 y/o Female with severe Pulmonary Hypertension and right sided CHF( RV EF 35%) COPD, HTN, DM2, Hyperbilirubinemia, L femoral DVT. Not taking anticoagulant since she her last admission(claims prescription was not sent).   Pt. went to PCP today who noted her hypotension and SOB and recommended coming to ER.  She hasn't been able to  meds from pharmacy as outpatient., because she can't walk due to chronic leg swelling, right calf pain.  No other complaints of inciting event, has had poor oral intake for several days,   Pt. is otherwise well. Refused  ABG, parenteral anticoagulants;  On home O2. Denies tobacco abuse.    OVERNIGHT EVENTS:  Comfortable    Vital Signs Last 24 Hrs  T(C): 35.9 (17 Oct 2019 05:54), Max: 36.8 (17 Oct 2019 00:15)  T(F): 96.7 (17 Oct 2019 05:54), Max: 98.3 (17 Oct 2019 00:15)  HR: 89 (17 Oct 2019 09:31) (89 - 105)  BP: 100/52 (17 Oct 2019 05:54) (91/52 - 108/63)  BP(mean): --  RR: 18 (17 Oct 2019 05:54) (16 - 18)  SpO2: 94% (17 Oct 2019 09:31) (90% - 100%)    PHYSICAL EXAM:  GEN:         Awake, responsive and comfortable.  HEENT:    Normal.    RESP:        no wheezing.  CVS:           Regular rate and rhythm.   ABD:         Soft, non-tender, non-distended;     MEDICATIONS  (STANDING):  ALBUTerol/ipratropium for Nebulization 3 milliLiter(s) Nebulizer every 6 hours  atorvastatin 20 milliGRAM(s) Oral at bedtime  dextrose 5%. 1000 milliLiter(s) (50 mL/Hr) IV Continuous <Continuous>  dextrose 50% Injectable 12.5 Gram(s) IV Push once  dextrose 50% Injectable 25 Gram(s) IV Push once  dextrose 50% Injectable 25 Gram(s) IV Push once  insulin lispro (HumaLOG) corrective regimen sliding scale   SubCutaneous at bedtime  insulin lispro (HumaLOG) corrective regimen sliding scale   SubCutaneous three times a day before meals  levothyroxine 25 MICROGram(s) Oral daily  midodrine. 10 milliGRAM(s) Oral every 8 hours  nystatin    Suspension 865019 Unit(s) Swish and Swallow four times a day  pantoprazole    Tablet 40 milliGRAM(s) Oral before breakfast    MEDICATIONS  (PRN):  Biotene Dry Mouth Oral Rinse 5 milliLiter(s) Swish and Spit four times a day PRN dry mouth  dextrose 40% Gel 15 Gram(s) Oral once PRN Blood Glucose LESS THAN 70 milliGRAM(s)/deciliter  glucagon  Injectable 1 milliGRAM(s) IntraMuscular once PRN Glucose LESS THAN 70 milligrams/deciliter  ondansetron Injectable 4 milliGRAM(s) IV Push every 6 hours PRN Nausea and/or Vomiting  traMADol 25 milliGRAM(s) Oral every 6 hours PRN Moderate Pain (4 - 6)    LABS:                        7.8    9.97  )-----------( 273      ( 16 Oct 2019 22:03 )             26.7     10-16    127<L>  |  91<L>  |  12  ----------------------------<  123<H>  4.5   |  24  |  1.11    Ca    8.8      16 Oct 2019 07:37  Phos  2.6     10-16    ASSESSMENT AND PLAN:  ·	SOB.  ·	Severe pulmonary HTN.  ·	Cor pulmonale.  ·	Fluid Retention.  ·	Left external DVT.  ·	Non Compliance with treatment.    Continue O2, diuretics and nebulizer  Follow up with Dr. Unger for pHTN.  Very poor compliance with physician follow up, and taking medication as prescribed is main issue.

## 2019-10-17 NOTE — PROGRESS NOTE ADULT - SUBJECTIVE AND OBJECTIVE BOX
Patient is a 34y old  Female who presents with a chief complaint of Shortness of breath and hypotension. (17 Oct 2019 11:48)    PAST MEDICAL & SURGICAL HISTORY:  Former smoker, stopped smoking in distant past  Vitamin D deficiency  Morbid obesity due to excess calories  Edema extremities  Diabetes mellitus  Hyperbilirubinemia  Anticardiolipin antibody positive  Congestive heart failure, unspecified HF chronicity, unspecified heart failure type  Moderate asthma, unspecified whether complicated, unspecified whether persistent  Other secondary hypertension  COPD (chronic obstructive pulmonary disease)  Anticardiolipin antibody positive  Essential hypertension  Pulmonary HTN  Morbid obesity    INTERVAL HISTORY: Resting in bed, not in any acute distress   	  MEDICATIONS:  MEDICATIONS  (STANDING):  ALBUTerol/ipratropium for Nebulization 3 milliLiter(s) Nebulizer every 6 hours  atorvastatin 20 milliGRAM(s) Oral at bedtime  dextrose 5%. 1000 milliLiter(s) (50 mL/Hr) IV Continuous <Continuous>  dextrose 50% Injectable 12.5 Gram(s) IV Push once  dextrose 50% Injectable 25 Gram(s) IV Push once  dextrose 50% Injectable 25 Gram(s) IV Push once  insulin lispro (HumaLOG) corrective regimen sliding scale   SubCutaneous at bedtime  insulin lispro (HumaLOG) corrective regimen sliding scale   SubCutaneous three times a day before meals  levothyroxine 25 MICROGram(s) Oral daily  midodrine. 10 milliGRAM(s) Oral every 8 hours  nystatin    Suspension 528248 Unit(s) Swish and Swallow four times a day  pantoprazole    Tablet 40 milliGRAM(s) Oral before breakfast    MEDICATIONS  (PRN):  Biotene Dry Mouth Oral Rinse 5 milliLiter(s) Swish and Spit four times a day PRN dry mouth  dextrose 40% Gel 15 Gram(s) Oral once PRN Blood Glucose LESS THAN 70 milliGRAM(s)/deciliter  glucagon  Injectable 1 milliGRAM(s) IntraMuscular once PRN Glucose LESS THAN 70 milligrams/deciliter  ondansetron Injectable 4 milliGRAM(s) IV Push every 6 hours PRN Nausea and/or Vomiting  traMADol 25 milliGRAM(s) Oral every 6 hours PRN Moderate Pain (4 - 6)    Vitals:  T(F): 97.2 (10-17-19 @ 12:16), Max: 98.3 (10-17-19 @ 00:15)  HR: 91 (10-17-19 @ 12:16) (89 - 105)  BP: 104/55 (10-17-19 @ 12:16) (91/52 - 108/63)  RR: 17 (10-17-19 @ 12:16) (17 - 18)  SpO2: 94% (10-17-19 @ 12:16) (90% - 100%)  Wt(kg): -- 103.2 kg     10-16 @ 07:01  -  10-17 @ 07:00  --------------------------------------------------------  IN:    Oral Fluid: 1560 mL  Total IN: 1560 mL    OUT:    Indwelling Catheter - Urethral: 1150 mL  Total OUT: 1150 mL    Total NET: 410 mL      10-17 @ 07:01  -  10-17 @ 13:28  --------------------------------------------------------  IN:    Oral Fluid: 300 mL  Total IN: 300 mL    OUT:  Total OUT: 0 mL    Total NET: 300 mL    PHYSICAL EXAM:  Neuro: Awake, responsive  CV: S1 S2 RRR  Lungs: CTABL  GI: Soft, BS +, ND, NT, + anasarca   Extremities: ++++ LE edema    TELEMETRY: RSR    RADIOLOGY: < from: Xray Chest 1 View- PORTABLE-Urgent (10.09.19 @ 21:34) >  Lungs: The lungs are clear.  Heart: There is cardiomegaly.  Mediastinum: Hilar prominence noted.    IMPRESSION:    Clear lungs.    < end of copied text >    DIAGNOSTIC TESTING:    [x ] Echocardiogram: < from: TTE Echo Doppler w/o Cont (07.26.19 @ 08:44) >   1. Left ventricular ejection fraction, by visual estimation, is 45 to   50%.   2. Mildly decreased global left ventricular systolic function.   3. Elevated mean left atrial pressure.   4. Global cardiomyopathy.   5. Increased LV wall thickness.   6. Normal left ventricular internal cavity size.   7. Right ventricular pressure overload.   8. Spectral Doppler shows impaired relaxation pattern of left   ventricular myocardial filling (Grade I diastolic dysfunction).   9. There is mild asymmetric left ventricular hypertrophy.  10. Pulmonary hypertension.  11. Severely enlarged right ventricle.  12. Severely reduced RV systolic function.  13. RV ejection fraction 34%.  14. Small pericardial effusion.  15. Degenerative mitral valve.  16. Mild mitral annular calcification.  17. Mild mitral valve regurgitation.  18. Moderate tricuspid regurgitation.  19. Structurally normal tricuspid valve, with normal leaflet excursion.  20. Mild aortic regurgitation.  21. Mild to moderate pulmonic valve regurgitation.  22. Structurally normal pulmonic valve, with normal leaflet excursion.  23. Estimated pulmonary artery systolic pressure is 87.1 mmHg assuming a   right atrial pressure of 20 mmHg, which is consistent with severe   pulmonary hypertension.  24. Pulmonary hypertension is present.  25. The main pulmonary artery is normal in size.  26. The right atrial pressure is moderately elevated.    < end of copied text >    [x ]  Catheterization: < from: Cardiac Cath Lab - Adult (08.10.18 @ 10:48) >  Severe Pulmonary Hypertension with no response with  80ppm of Nitric Oxide    < end of copied text >    LABS:	 	    17 Oct 2019 12:42    124    |  88     |  15     ----------------------------<  160    4.7     |  22     |  1.25   16 Oct 2019 07:37    127    |  91     |  12     ----------------------------<  123    4.5     |  24     |  1.11   15 Oct 2019 09:50    127    |  91     |  18     ----------------------------<  95     4.7     |  25     |  0.88     Ca    9.0        17 Oct 2019 12:42  Phos  3.0       17 Oct 2019 12:42  Mg     2.2       17 Oct 2019 12:42    TPro  6.2    /  Alb  2.7    /  TBili  4.4    /  DBili  2.65   /  AST  78     /  ALT  51     /  AlkPhos  83     17 Oct 2019 12:42                          7.2    10.24 )-----------( 284      ( 17 Oct 2019 12:42 )             24.5 ,                       7.8    9.97  )-----------( 273      ( 16 Oct 2019 22:03 )             26.7 ,                       6.9    6.71  )-----------( 326      ( 16 Oct 2019 07:37 )             23.6 ,                       7.8    5.28  )-----------( 340      ( 15 Oct 2019 09:50 )             26.0

## 2019-10-17 NOTE — PROGRESS NOTE ADULT - ASSESSMENT
35 yo female with severe pulmonary hypertension and right sided HF (RV EF 35%), COPD, HTN, DM2, hyperbilirubinemia, L femoral DVT,  chronic hypotension admitted with symptoms of worsening right heart failure/hypotension. cardiac cath negative for any CAD in 2013.   Demand troponin levels noted.  BNP 1900->3600  Despite aggressive diuresis on her previous admission and significant improvement in her edema, she has now gained back 16 kg since last admission, likely non-compliance with diet and possibly her meds.  She missed two appointments with Dr. Unger and claims it's because she has no money for transportation.  She also never followed up with Dr Adamson as requested by Dr. Unger to arrange for further heart failure care.    Plan    Would continue diuresis, Lasix dosing as per renal, monitor electrolytes closely, daily weight   supplemental o2  Still on midodrine 10mg q 8 for BP support during aggressive diuresing    offered to arrange  transfer for [facility] at [place] but patient has refused any transfer. Pt prefers to follow up with Dr. Unger as outpatient   Remains emotional and cries easily about  her clinical condition, Psych following, social work eval   s/p PRBC transfusion, h/h remains low,  hold both  Eliquis/asa in setting of acute anemia, monitor h/h closely  Activity as tolearted 33 yo female with severe pulmonary hypertension and right sided HF (RV EF 35%), COPD, HTN, DM2, hyperbilirubinemia, L femoral DVT,  chronic hypotension admitted with symptoms of worsening right heart failure/hypotension. cardiac cath negative for any CAD in 2013.   Demand troponin levels noted.  BNP 1900->3600  Despite aggressive diuresis on her previous admission and significant improvement in her edema, she has now gained back 16 kg since last admission, likely non-compliance with diet and possibly her meds.  She missed two appointments with Dr. Unger and claims it's because she has no money for transportation.  She also never followed up with Dr Adamson as requested by Dr. Unger to arrange for further heart failure care.    Plan    Would continue diuresis, Lasix dosing as per renal, monitor electrolytes closely, daily weight   supplemental O2  Still on midodrine 10mg q 8 for BP support during aggressive diuresing    offered to arrange  transfer for R+[facility] at [place] but patient has refused any transfer. Pt prefers to follow up with Dr. Unger as outpatient   Remains emotional and cries easily about  her clinical condition, Psych following, social work eval   s/p PRBC transfusion, h/h remains low,  hold both  Eliquis/asa in setting of acute anemia, monitor h/h closely, concern of increased bleed during menstruation with Eliquis, ? restart coumadin   Activity as tolerated

## 2019-10-17 NOTE — PROGRESS NOTE ADULT - ASSESSMENT
33 yo female with history of Anticardiolipin antibody positive on AC at home, COPD, DM II, Essential hypertension, Vitamin D deficiency & severe Pulm HTN with right sided heart failure p/w SOB & was found to have acute on chronic combined systolic and diastolic congestive heart failure, hyponatremia, DIANN & acute RLE DVT.    Acute on chronic combined systolic and diastolic RIGHT SIDED congestive heart failure:   - Echo in 7/2019 showed EF 45-50% w/ elevated mean left atrial pressure w/ global cardiomyopathy, grade I diastolic dysfunction, pulmonary hypertension w/ a severely enlarged right ventricle and severely reduced RV systolic function w/ RV ejection fraction 34%, moderate tricuspid regurgitation, mild to moderate pulmonic valve regurgitation & severe pulmonary hypertension   - cont lasix 40mg IV BID  - monitor daily wt, i/o's  -Will continue diuresis the next few days back to a euvolemic state; will add midodrine for BP support   -if she agrees, will then transfer for R and C and eval with Dr. Unger at [facility] however currently refusing transfer stating she had cath before and doesn't see why she should have another one. Patient refusing despite education on importance of cath.   -fluid and salt restriction encouraged. Although patient is non compliant--ordering chinese food, french fries, per RN --not compliant with water restriction  -cardiology following    -follow ECHO       Worsening acute blood loss anemia in the setting of AC and steroid use with heavy menstruation,  reviewed iron studies 10/14/19--consistent with iron deficiency anemia   s/p 1u PRBC, lasix after transfusion, responded appropriately  H/H slightly trending down   monitor CBC   hold eliquis and ASA for now   add venofer 100mg IV q7days x 4 doses     severe Pulmonary hypertension:  - c/w riociguat  - continue with supplemental oxygen to maintain O2 sat >92%  - pulmonary following      Type 2 diabetes mellitus with hyperglycemia, without long-term current use of insulin:  - Continue current insulin regimen    Hyponatremia:  - likely due to volume overload/ aggressive diuresis and possible SSRI- on hold  - Renal following , s/p tolvaptan 15mg x 1 10/15/19  - c/w Fluid restriction    HTN:  - hold all BP meds due to low BP  -currently on Midodrine for BP support     Moderate persistent Asthma:  - pulmonary following  - c/w Symbicort    Acute deep vein thrombosis (DVT) of femoral vein of left lower extremity:  - US showed an extensive acute nonocclusive thrombus in the left external iliac vein and the left common femoral vein and left greater saphenous vein  - was on Eliquis, pt non compliant with INR checks and doctor/follow-up visits , however per patient coumadin does not make her have vaginal bleeding like Eliquis and agreeing to follow PCP for INR. Also, patient to follow with GYN outpatient.   -per Dr. Ling ; not a candidate for ivc filter    L foot injury due to weight fell on it  -Xray of the foot; neg  -pain mgmt     Acquired hypothyroidism:  - c/w synthroid     Hyperlipidemia:  - c/w Zocor    Depression:  - Zoloft on hold  -denies SI/HI  -Psych consult appreciated     Preventative measure  SCD-dvt ppx, eliquis on hold for now   fall precautions 33 yo female with history of Anticardiolipin antibody positive on AC at home, COPD, DM II, Essential hypertension, Vitamin D deficiency & severe Pulm HTN with right sided heart failure p/w SOB & was found to have acute on chronic combined systolic and diastolic congestive heart failure, hyponatremia, DIANN & acute RLE DVT.    Acute on chronic combined systolic and diastolic RIGHT SIDED congestive heart failure:   - Echo in 7/2019 showed EF 45-50% w/ elevated mean left atrial pressure w/ global cardiomyopathy, grade I diastolic dysfunction, pulmonary hypertension w/ a severely enlarged right ventricle and severely reduced RV systolic function w/ RV ejection fraction 34%, moderate tricuspid regurgitation, mild to moderate pulmonic valve regurgitation & severe pulmonary hypertension   - cont lasix 40mg IV BID  - monitor daily wt, i/o's  -Will continue diuresis the next few days back to a euvolemic state; will add midodrine for BP support   -if she agrees, will then transfer for R and C and eval with Dr. Unger at [facility] however currently refusing transfer stating she had cath before and doesn't see why she should have another one. Patient refusing despite education on importance of cath.   -fluid and salt restriction encouraged. Although patient is non compliant--ordering chinese food, french fries, per RN --not compliant with water restriction  -cardiology following    -follow ECHO       Worsening acute blood loss anemia in the setting of AC and steroid use with heavy menstruation,  reviewed iron studies 10/14/19--consistent with iron deficiency anemia   s/p 1u PRBC, lasix after transfusion, responded appropriately  H/H slightly trending down   monitor CBC   hold eliquis and ASA for now   add venofer 100mg IV q7days x 4 doses     severe Pulmonary hypertension:  - c/w riociguat  - continue with supplemental oxygen to maintain O2 sat >92%  - pulmonary following    #Direct hyperbilirubinemia  -reviewed prior Abd US 7/2019 --cholelithiasis   -will obtain repeat Abd US        Type 2 diabetes mellitus with hyperglycemia, without long-term current use of insulin:  - Continue current insulin regimen    Hyponatremia:  - likely due to volume overload/ aggressive diuresis and possible SSRI- on hold  - Renal following , s/p tolvaptan 15mg x 1 10/15/19  - c/w Fluid restriction    HTN:  - hold all BP meds due to low BP  -currently on Midodrine for BP support     Moderate persistent Asthma:  - pulmonary following  - c/w Symbicort    Acute deep vein thrombosis (DVT) of femoral vein of left lower extremity:  - US showed an extensive acute nonocclusive thrombus in the left external iliac vein and the left common femoral vein and left greater saphenous vein  - was on Eliquis, pt non compliant with INR checks and doctor/follow-up visits , however per patient coumadin does not make her have vaginal bleeding like Eliquis and agreeing to follow PCP for INR. Also, patient to follow with GYN outpatient.   -per Dr. Ling ; not a candidate for ivc filter    L foot injury due to weight fell on it  -Xray of the foot; neg  -pain mgmt     Acquired hypothyroidism:  - c/w synthroid     Hyperlipidemia:  - c/w Zocor    Depression:  - Zoloft on hold  -denies SI/HI  -Psych consult appreciated     Preventative measure  SCD-dvt ppx, eliquis on hold for now   fall precautions

## 2019-10-18 ENCOUNTER — APPOINTMENT (OUTPATIENT)
Dept: HEPATOLOGY | Facility: CLINIC | Age: 34
End: 2019-10-18

## 2019-10-18 LAB
ALBUMIN SERPL ELPH-MCNC: 2.8 G/DL — LOW (ref 3.3–5)
ALP SERPL-CCNC: 88 U/L — SIGNIFICANT CHANGE UP (ref 40–120)
ALT FLD-CCNC: 58 U/L — SIGNIFICANT CHANGE UP (ref 12–78)
ANION GAP SERPL CALC-SCNC: 12 MMOL/L — SIGNIFICANT CHANGE UP (ref 5–17)
AST SERPL-CCNC: 80 U/L — HIGH (ref 15–37)
BILIRUB DIRECT SERPL-MCNC: 2.46 MG/DL — HIGH (ref 0.05–0.2)
BILIRUB INDIRECT FLD-MCNC: 1.5 MG/DL — HIGH (ref 0.2–1)
BILIRUB SERPL-MCNC: 4 MG/DL — HIGH (ref 0.2–1.2)
BUN SERPL-MCNC: 13 MG/DL — SIGNIFICANT CHANGE UP (ref 7–23)
CALCIUM SERPL-MCNC: 8.8 MG/DL — SIGNIFICANT CHANGE UP (ref 8.5–10.1)
CHLORIDE SERPL-SCNC: 89 MMOL/L — LOW (ref 96–108)
CO2 SERPL-SCNC: 26 MMOL/L — SIGNIFICANT CHANGE UP (ref 22–31)
CREAT SERPL-MCNC: 0.99 MG/DL — SIGNIFICANT CHANGE UP (ref 0.5–1.3)
GLUCOSE SERPL-MCNC: 135 MG/DL — HIGH (ref 70–99)
HCT VFR BLD CALC: 24.7 % — LOW (ref 34.5–45)
HGB BLD-MCNC: 7.4 G/DL — LOW (ref 11.5–15.5)
MAGNESIUM SERPL-MCNC: 2.1 MG/DL — SIGNIFICANT CHANGE UP (ref 1.6–2.6)
MCHC RBC-ENTMCNC: 22.7 PG — LOW (ref 27–34)
MCHC RBC-ENTMCNC: 30 GM/DL — LOW (ref 32–36)
MCV RBC AUTO: 75.8 FL — LOW (ref 80–100)
NRBC # BLD: 3 /100 WBCS — HIGH (ref 0–0)
PHOSPHATE SERPL-MCNC: 2.4 MG/DL — LOW (ref 2.5–4.5)
PLATELET # BLD AUTO: 266 K/UL — SIGNIFICANT CHANGE UP (ref 150–400)
POTASSIUM SERPL-MCNC: 4.1 MMOL/L — SIGNIFICANT CHANGE UP (ref 3.5–5.3)
POTASSIUM SERPL-SCNC: 4.1 MMOL/L — SIGNIFICANT CHANGE UP (ref 3.5–5.3)
PROT SERPL-MCNC: 6.3 GM/DL — SIGNIFICANT CHANGE UP (ref 6–8.3)
RBC # BLD: 3.26 M/UL — LOW (ref 3.8–5.2)
RBC # FLD: 23.4 % — HIGH (ref 10.3–14.5)
SODIUM SERPL-SCNC: 127 MMOL/L — LOW (ref 135–145)
URATE SERPL-MCNC: 10.1 MG/DL — HIGH (ref 2.5–7)
WBC # BLD: 14.06 K/UL — HIGH (ref 3.8–10.5)
WBC # FLD AUTO: 14.06 K/UL — HIGH (ref 3.8–10.5)

## 2019-10-18 PROCEDURE — 99231 SBSQ HOSP IP/OBS SF/LOW 25: CPT

## 2019-10-18 PROCEDURE — 71045 X-RAY EXAM CHEST 1 VIEW: CPT | Mod: 26

## 2019-10-18 PROCEDURE — 76700 US EXAM ABDOM COMPLETE: CPT | Mod: 26

## 2019-10-18 PROCEDURE — 99233 SBSQ HOSP IP/OBS HIGH 50: CPT

## 2019-10-18 PROCEDURE — 93970 EXTREMITY STUDY: CPT | Mod: 26

## 2019-10-18 PROCEDURE — 99232 SBSQ HOSP IP/OBS MODERATE 35: CPT

## 2019-10-18 RX ORDER — MIDODRINE HYDROCHLORIDE 2.5 MG/1
20 TABLET ORAL THREE TIMES A DAY
Refills: 0 | Status: DISCONTINUED | OUTPATIENT
Start: 2019-10-18 | End: 2019-10-22

## 2019-10-18 RX ORDER — FUROSEMIDE 40 MG
80 TABLET ORAL EVERY 12 HOURS
Refills: 0 | Status: DISCONTINUED | OUTPATIENT
Start: 2019-10-18 | End: 2019-10-18

## 2019-10-18 RX ORDER — WARFARIN SODIUM 2.5 MG/1
5 TABLET ORAL ONCE
Refills: 0 | Status: COMPLETED | OUTPATIENT
Start: 2019-10-18 | End: 2019-10-18

## 2019-10-18 RX ORDER — IRON SUCROSE 20 MG/ML
100 INJECTION, SOLUTION INTRAVENOUS
Refills: 0 | Status: DISCONTINUED | OUTPATIENT
Start: 2019-10-18 | End: 2019-10-23

## 2019-10-18 RX ORDER — FUROSEMIDE 40 MG
40 TABLET ORAL
Refills: 0 | Status: DISCONTINUED | OUTPATIENT
Start: 2019-10-18 | End: 2019-10-19

## 2019-10-18 RX ORDER — ALLOPURINOL 300 MG
300 TABLET ORAL DAILY
Refills: 0 | Status: DISCONTINUED | OUTPATIENT
Start: 2019-10-18 | End: 2019-10-23

## 2019-10-18 RX ORDER — SODIUM CHLORIDE 9 MG/ML
500 INJECTION INTRAMUSCULAR; INTRAVENOUS; SUBCUTANEOUS ONCE
Refills: 0 | Status: COMPLETED | OUTPATIENT
Start: 2019-10-18 | End: 2019-10-18

## 2019-10-18 RX ORDER — TRAMADOL HYDROCHLORIDE 50 MG/1
25 TABLET ORAL EVERY 4 HOURS
Refills: 0 | Status: DISCONTINUED | OUTPATIENT
Start: 2019-10-18 | End: 2019-10-18

## 2019-10-18 RX ORDER — MORPHINE SULFATE 50 MG/1
2 CAPSULE, EXTENDED RELEASE ORAL ONCE
Refills: 0 | Status: DISCONTINUED | OUTPATIENT
Start: 2019-10-18 | End: 2019-10-18

## 2019-10-18 RX ORDER — MORPHINE SULFATE 50 MG/1
2 CAPSULE, EXTENDED RELEASE ORAL EVERY 4 HOURS
Refills: 0 | Status: DISCONTINUED | OUTPATIENT
Start: 2019-10-18 | End: 2019-10-18

## 2019-10-18 RX ADMIN — TRAMADOL HYDROCHLORIDE 25 MILLIGRAM(S): 50 TABLET ORAL at 05:59

## 2019-10-18 RX ADMIN — MORPHINE SULFATE 2 MILLIGRAM(S): 50 CAPSULE, EXTENDED RELEASE ORAL at 13:42

## 2019-10-18 RX ADMIN — SODIUM CHLORIDE 1000 MILLILITER(S): 9 INJECTION INTRAMUSCULAR; INTRAVENOUS; SUBCUTANEOUS at 17:46

## 2019-10-18 RX ADMIN — Medication 3 MILLILITER(S): at 00:57

## 2019-10-18 RX ADMIN — SODIUM CHLORIDE 1000 MILLILITER(S): 9 INJECTION INTRAMUSCULAR; INTRAVENOUS; SUBCUTANEOUS at 21:50

## 2019-10-18 RX ADMIN — MORPHINE SULFATE 2 MILLIGRAM(S): 50 CAPSULE, EXTENDED RELEASE ORAL at 14:27

## 2019-10-18 RX ADMIN — Medication 3 MILLILITER(S): at 17:19

## 2019-10-18 RX ADMIN — MIDODRINE HYDROCHLORIDE 10 MILLIGRAM(S): 2.5 TABLET ORAL at 11:12

## 2019-10-18 RX ADMIN — Medication 62.5 MILLIMOLE(S): at 13:43

## 2019-10-18 RX ADMIN — MIDODRINE HYDROCHLORIDE 20 MILLIGRAM(S): 2.5 TABLET ORAL at 21:49

## 2019-10-18 RX ADMIN — TRAMADOL HYDROCHLORIDE 25 MILLIGRAM(S): 50 TABLET ORAL at 09:40

## 2019-10-18 RX ADMIN — Medication 3 MILLILITER(S): at 23:56

## 2019-10-18 RX ADMIN — ATORVASTATIN CALCIUM 20 MILLIGRAM(S): 80 TABLET, FILM COATED ORAL at 21:50

## 2019-10-18 RX ADMIN — TRAMADOL HYDROCHLORIDE 25 MILLIGRAM(S): 50 TABLET ORAL at 08:39

## 2019-10-18 RX ADMIN — MORPHINE SULFATE 2 MILLIGRAM(S): 50 CAPSULE, EXTENDED RELEASE ORAL at 01:15

## 2019-10-18 RX ADMIN — Medication 2: at 11:11

## 2019-10-18 RX ADMIN — IRON SUCROSE 210 MILLIGRAM(S): 20 INJECTION, SOLUTION INTRAVENOUS at 11:02

## 2019-10-18 RX ADMIN — WARFARIN SODIUM 5 MILLIGRAM(S): 2.5 TABLET ORAL at 23:15

## 2019-10-18 RX ADMIN — MIDODRINE HYDROCHLORIDE 10 MILLIGRAM(S): 2.5 TABLET ORAL at 05:14

## 2019-10-18 RX ADMIN — TRAMADOL HYDROCHLORIDE 25 MILLIGRAM(S): 50 TABLET ORAL at 05:30

## 2019-10-18 RX ADMIN — MORPHINE SULFATE 2 MILLIGRAM(S): 50 CAPSULE, EXTENDED RELEASE ORAL at 01:50

## 2019-10-18 RX ADMIN — Medication 3 MILLILITER(S): at 12:28

## 2019-10-18 RX ADMIN — Medication 40 MILLIGRAM(S): at 05:12

## 2019-10-18 RX ADMIN — Medication 25 MICROGRAM(S): at 05:14

## 2019-10-18 RX ADMIN — Medication 500000 UNIT(S): at 11:12

## 2019-10-18 RX ADMIN — Medication 80 MILLIGRAM(S): at 13:42

## 2019-10-18 RX ADMIN — Medication 3 MILLILITER(S): at 06:06

## 2019-10-18 RX ADMIN — PANTOPRAZOLE SODIUM 40 MILLIGRAM(S): 20 TABLET, DELAYED RELEASE ORAL at 08:40

## 2019-10-18 NOTE — PROGRESS NOTE ADULT - ASSESSMENT
35 yo female with severe pulmonary hypertension and right sided HF (RV EF 35%), COPD, HTN, DM2, hyperbilirubinemia, L femoral DVT,  chronic hypotension admitted with symptoms of worsening right heart failure/hypotension. cardiac cath negative for any CAD in 2013.   Demand troponin levels noted.  BNP 1900->3600  Despite aggressive diuresis on her previous admission and significant improvement in her edema, she has now gained back 16 kg since last admission, likely non-compliance with diet and possibly her meds.  She missed two appointments with Dr. Unger and claims it's because she has no money for transportation.  She also never followed up with Dr Adamson as requested by Dr. Unger to arrange for further heart failure care.    Plan    Would continue diuresis, Lasix dosing as per renal, monitor electrolytes closely, daily weight   supplemental O2/pulm following   Still on midodrine 10mg q 8 for BP support during aggressive diuresing    offered to arrange  transfer to [facility] for further management of Pulmonary hypertension with the specialist there, but patient has refused any transfer. Pt prefers to follow up with mia [facility] HTN speacialist as outpatient   Remains emotional and cries easily about  her clinical condition, Psych/ social work following   s/p PRBC transfusion, h/h remains low,  hold both  Eliquis/asa in setting of acute anemia, monitor h/h closely, concern of increased bleed during menstruation with Eliquis, ? restart coumadin, as per pt  coumadin does not make her have vaginal bleeding like Eliquis. Also, needs GYN followup as outpatient.   Activity as tolerated

## 2019-10-18 NOTE — PROGRESS NOTE BEHAVIORAL HEALTH - NSBHFUPINTERVALHXFT_PSY_A_CORE
Patient seemingly has been giving a hard time to certain staff members and resistant to care, difficulty following dietary restrictions and rejecting treatment options/offers which are baseline behaviors already apparent from previous admissions and even personally known to Writer. Patient has been open to talking with Writer and has been cooperative with her; she never gave Writer a "hard time" during exams. Patient is aware of her medical issues (both acute and chronic) and is still declining a transfer to [facility] (also offered other hospitals). Patient's family has been coming to visit and are a source of support for her. Patient says that she has been to [facility] before, does not think the additional intervention will significant change anything but she will "consider it." Patient otherwise denies any symptoms of hypomania/norma/psychosis/major depression/ anxiety/panic. Denies any active or passive suicidal or homicidal ideation. Names protective factors (kareem; family; hope for future). + manifests expected thoughts and feelings of a young person essentially "trapped" in a sick, debilitated body whose medical condition is worsening, who has been in/out of hospitals and who has limited quality of life overall. Patient is reflecting on this and is in the mind-set of "whatever happens" - she has done a lot of research online before (especially during the first year of her diagnosis) and is well aware of the prognosis and limitations of treatment success and is skeptical if anything would substantially make a difference to her. As for giving staff a hard time, Patient feels like she has no control over her body/medical status/life in general - she can exert control over only small things like what food she eats, when to take or not take medications etc. This is not likely to be personal towards staff; it is just a reflection of a displacement. Patient seemingly has been giving a hard time to certain staff members and resistant to care, difficulty following dietary restrictions and rejecting treatment options/offers which are baseline behaviors already apparent from previous admissions and even personally known to Writer. Patient has been open to talking with Writer and has been cooperative with her; she never gave Writer a "hard time" during exams. Patient is aware of her medical issues (both acute and chronic) and is still declining a transfer to [facility] (also offered other hospitals). Patient's family has been coming to visit and are a source of support for her. Patient says that she has been to [facility] before, does not think the additional intervention will significant change anything but she will "consider it." Patient otherwise denies any symptoms of hypomania/norma/psychosis/major depression/ anxiety/panic. Denies any active or passive suicidal or homicidal ideation. Names protective factors (kareem; family; hope for future). Later on, JOHNSON Wagner joined the conversation and also encouraged Patient to consider to [facility] transfer and to see s specialist. Patient seemingly has been giving a hard time to certain staff members and resistant to care, difficulty following dietary restrictions and rejecting treatment options/offers which are baseline behaviors already apparent from previous admissions and even personally known to Writer. Patient has been open to talking with Writer and has been cooperative with her; she never gave Writer a "hard time" during exams. Patient is aware of her medical issues (both acute and chronic) and is still declining a transfer to [facility] (also offered other hospitals). Patient's family has been coming to visit and are a source of support for her. Patient says that she has been to [facility] before, does not think the additional intervention will significant change anything but she will "consider it." Patient otherwise denies any symptoms of hypomania/norma/psychosis/major depression/ anxiety/panic. Denies any active or passive suicidal or homicidal ideation. Names protective factors (kareem; family; hope for future). Later on, JOHNSON Wagner joined the conversation and also encouraged Patient to consider to [facility] transfer and to see s specialist. Writer also provided extensive support, encouragement, education about agreeing to [facility] transfer.

## 2019-10-18 NOTE — PROGRESS NOTE ADULT - SUBJECTIVE AND OBJECTIVE BOX
Patient is a 34y old  Female who presents with a chief complaint of Shortness of breath and hypotension. (18 Oct 2019 12:24)    PAST MEDICAL & SURGICAL HISTORY:  Former smoker, stopped smoking in distant past  Vitamin D deficiency  Morbid obesity due to excess calories  Edema extremities  Diabetes mellitus  Hyperbilirubinemia  COPD (chronic obstructive pulmonary disease)  Anticoagulation goal of INR 2 to 3  Anticardiolipin antibody positive  Congestive heart failure, unspecified HF chronicity, unspecified heart failure type  Moderate asthma, unspecified whether complicated, unspecified whether persistent  Other secondary hypertension  Anticardiolipin antibody positive  Pulmonary HTN  Morbid obesity    INTERVAL HISTORY:  	  MEDICATIONS:  MEDICATIONS  (STANDING):  ALBUTerol/ipratropium for Nebulization 3 milliLiter(s) Nebulizer every 6 hours  allopurinol 300 milliGRAM(s) Oral daily  atorvastatin 20 milliGRAM(s) Oral at bedtime  furosemide   Injectable 80 milliGRAM(s) IV Push every 12 hours  insulin lispro (HumaLOG) corrective regimen sliding scale   SubCutaneous at bedtime  insulin lispro (HumaLOG) corrective regimen sliding scale   SubCutaneous three times a day before meals  iron sucrose IVPB 100 milliGRAM(s) IV Intermittent every 7 days  levothyroxine 25 MICROGram(s) Oral daily  midodrine. 10 milliGRAM(s) Oral every 8 hours  nystatin    Suspension [id number] Unit(s) Swish and Swallow four times a day  pantoprazole    Tablet 40 milliGRAM(s) Oral before breakfast  sodium phosphate IVPB 15 milliMole(s) IV Intermittent once    MEDICATIONS  (PRN):  Biotene Dry Mouth Oral Rinse 5 milliLiter(s) Swish and Spit four times a day PRN dry mouth  dextrose 40% Gel 15 Gram(s) Oral once PRN Blood Glucose LESS THAN 70 milliGRAM(s)/deciliter  glucagon  Injectable 1 milliGRAM(s) IntraMuscular once PRN Glucose LESS THAN 70 milligrams/deciliter  ondansetron Injectable 4 milliGRAM(s) IV Push every 6 hours PRN Nausea and/or Vomiting  traMADol 25 milliGRAM(s) Oral every 4 hours PRN Moderate Pain (4 - 6)    Vitals:  T(F): 97.8 (10-18-19 @ 12:01), Max: 97.8 (10-18-19 @ 05:23)  HR: 97 (10-18-19 @ 12:01) (80 - 101)  BP: 153/74 (10-18-19 @ 12:01) (94/48 - 153/74)  RR: 20 (10-18-19 @ 12:01) (16 - 20)  SpO2: 99% (10-18-19 @ 12:01) (93% - 99%)  Wt(kg): --105.2 kg    10-17 @ 07:01  -  10-18 @ 07:00  --------------------------------------------------------  IN:    Oral Fluid: 980 mL  Total IN: 980 mL    OUT:    Indwelling Catheter - Urethral: 2975 mL  Total OUT: 2975 mL    Total NET: -1995 mL    PHYSICAL EXAM:  Neuro: Awake, responsive  CV: S1 S2 RRR  Lungs: CTABL  GI: Soft, BS +, ND, NT, + anasarca   Extremities: ++++ LE edema    TELEMETRY: RSR    RADIOLOGY: < from: Xray Chest 1 View- PORTABLE-Urgent (10.09.19 @ 21:34) >  Lungs: The lungs are clear.  Heart: There is cardiomegaly.  Mediastinum: Hilar prominence noted.    IMPRESSION:    Clear lungs.    < end of copied text >    DIAGNOSTIC TESTING:    [ x] Echocardiogram:< from: TTE Echo Doppler w/o Cont (07.26.19 @ 08:44) >   1. Left ventricular ejection fraction, by visual estimation, is 45 to   50%.   2. Mildly decreased global left ventricular systolic function.   3. Elevated mean left atrial pressure.   4. Global cardiomyopathy.   5. Increased LV wall thickness.   6. Normal left ventricular internal cavity size.   7. Right ventricular pressure overload.   8. Spectral Doppler shows impaired relaxation pattern of left   ventricular myocardial filling (Grade I diastolic dysfunction).   9. There is mild asymmetric left ventricular hypertrophy.  10. Pulmonary hypertension.  11. Severely enlarged right ventricle.  12. Severely reduced RV systolic function.  13. RV ejection fraction 34%.  14. Small pericardial effusion.  15. Degenerative mitral valve.  16. Mild mitral annular calcification.  17. Mild mitral valve regurgitation.  18. Moderate tricuspid regurgitation.  19. Structurally normal tricuspid valve, with normal leaflet excursion.  20. Mild aortic regurgitation.  21. Mild to moderate pulmonic valve regurgitation.  22. Structurally normal pulmonic valve, with normal leaflet excursion.  23. Estimated pulmonary artery systolic pressure is 87.1 mmHg assuming a   right atrial pressure of 20 mmHg, which is consistent with severe   pulmonary hypertension.  24. Pulmonary hypertension is present.  25. The main pulmonary artery is normal in size.  26. The right atrial pressure is moderately elevated.    < end of copied text >    LABS:	 	    18 Oct 2019 09:46    127    |  89     |  13     ----------------------------<  135    4.1     |  26     |  0.99   17 Oct 2019 12:42    124    |  88     |  15     ----------------------------<  160    4.7     |  22     |  1.25   16 Oct 2019 07:37    127    |  91     |  12     ----------------------------<  123    4.5     |  24     |  1.11     Ca    8.8        18 Oct 2019 09:46  Phos  2.4       18 Oct 2019 09:46  Mg     2.1       18 Oct 2019 09:46    TPro  6.3    /  Alb  2.8    /  TBili  4.0    /  DBili  2.46   /  AST  80     /  ALT  58     /  AlkPhos  88     18 Oct 2019 09:46                        7.4    14.06 )-----------( 266      ( 18 Oct 2019 09:46 )             24.7 ,                       7.2    10.24 )-----------( 284      ( 17 Oct 2019 12:42 )             24.5 ,                       7.8    9.97  )-----------( 273      ( 16 Oct 2019 22:03 )             26.7 ,                       6.9    6.71  )-----------( 326      ( 16 Oct 2019 07:37 )             23.6 Patient is a 34y old  Female who presents with a chief complaint of Shortness of breath and hypotension. (18 Oct 2019 12:24)    PAST MEDICAL & SURGICAL HISTORY:  Former smoker, stopped smoking in distant past  Vitamin D deficiency  Morbid obesity due to excess calories  Edema extremities  Diabetes mellitus  Hyperbilirubinemia  COPD (chronic obstructive pulmonary disease)  Anticoagulation goal of INR 2 to 3  Anticardiolipin antibody positive  Congestive heart failure, unspecified HF chronicity, unspecified heart failure type  Moderate asthma, unspecified whether complicated, unspecified whether persistent  Other secondary hypertension  Anticardiolipin antibody positive  Pulmonary HTN  Morbid obesity    INTERVAL HISTORY: resting in bed, not in any acute distress   	  MEDICATIONS:  MEDICATIONS  (STANDING):  ALBUTerol/ipratropium for Nebulization 3 milliLiter(s) Nebulizer every 6 hours  allopurinol 300 milliGRAM(s) Oral daily  atorvastatin 20 milliGRAM(s) Oral at bedtime  furosemide   Injectable 80 milliGRAM(s) IV Push every 12 hours  insulin lispro (HumaLOG) corrective regimen sliding scale   SubCutaneous at bedtime  insulin lispro (HumaLOG) corrective regimen sliding scale   SubCutaneous three times a day before meals  iron sucrose IVPB 100 milliGRAM(s) IV Intermittent every 7 days  levothyroxine 25 MICROGram(s) Oral daily  midodrine. 10 milliGRAM(s) Oral every 8 hours  nystatin    Suspension 295195 Unit(s) Swish and Swallow four times a day  pantoprazole    Tablet 40 milliGRAM(s) Oral before breakfast  sodium phosphate IVPB 15 milliMole(s) IV Intermittent once    MEDICATIONS  (PRN):  Biotene Dry Mouth Oral Rinse 5 milliLiter(s) Swish and Spit four times a day PRN dry mouth  dextrose 40% Gel 15 Gram(s) Oral once PRN Blood Glucose LESS THAN 70 milliGRAM(s)/deciliter  glucagon  Injectable 1 milliGRAM(s) IntraMuscular once PRN Glucose LESS THAN 70 milligrams/deciliter  ondansetron Injectable 4 milliGRAM(s) IV Push every 6 hours PRN Nausea and/or Vomiting  traMADol 25 milliGRAM(s) Oral every 4 hours PRN Moderate Pain (4 - 6)    Vitals:  T(F): 97.8 (10-18-19 @ 12:01), Max: 97.8 (10-18-19 @ 05:23)  HR: 97 (10-18-19 @ 12:01) (80 - 101)  BP: 153/74 (10-18-19 @ 12:01) (94/48 - 153/74)  RR: 20 (10-18-19 @ 12:01) (16 - 20)  SpO2: 99% (10-18-19 @ 12:01) (93% - 99%)  Wt(kg): --105.2 kg    10-17 @ 07:01  -  10-18 @ 07:00  --------------------------------------------------------  IN:    Oral Fluid: 980 mL  Total IN: 980 mL    OUT:    Indwelling Catheter - Urethral: 2975 mL  Total OUT: 2975 mL    Total NET: -1995 mL    PHYSICAL EXAM:  Neuro: Awake, responsive  CV: S1 S2 RRR  Lungs: CTABL  GI: Soft, BS +, ND, NT, + anasarca   Extremities: ++++ LE edema    TELEMETRY: RSR    RADIOLOGY: < from: Xray Chest 1 View- PORTABLE-Urgent (10.09.19 @ 21:34) >  Lungs: The lungs are clear.  Heart: There is cardiomegaly.  Mediastinum: Hilar prominence noted.    IMPRESSION:    Clear lungs.    < end of copied text >  < from: US Abdomen Complete (10.18.19 @ 13:16) >  Fatty liver.  No cholelithiasis or biliary ductal dilatation.    < end of copied text >  < from: US Duplex Venous Lower Ext Complete, Bilateral (10.18.19 @ 13:15) >  No change in left external iliac and left greater saphenous vein   thrombosis.    < end of copied text >    DIAGNOSTIC TESTING:    [ x] Echocardiogram:< from: TTE Echo Doppler w/o Cont (07.26.19 @ 08:44) >   1. Left ventricular ejection fraction, by visual estimation, is 45 to   50%.   2. Mildly decreased global left ventricular systolic function.   3. Elevated mean left atrial pressure.   4. Global cardiomyopathy.   5. Increased LV wall thickness.   6. Normal left ventricular internal cavity size.   7. Right ventricular pressure overload.   8. Spectral Doppler shows impaired relaxation pattern of left   ventricular myocardial filling (Grade I diastolic dysfunction).   9. There is mild asymmetric left ventricular hypertrophy.  10. Pulmonary hypertension.  11. Severely enlarged right ventricle.  12. Severely reduced RV systolic function.  13. RV ejection fraction 34%.  14. Small pericardial effusion.  15. Degenerative mitral valve.  16. Mild mitral annular calcification.  17. Mild mitral valve regurgitation.  18. Moderate tricuspid regurgitation.  19. Structurally normal tricuspid valve, with normal leaflet excursion.  20. Mild aortic regurgitation.  21. Mild to moderate pulmonic valve regurgitation.  22. Structurally normal pulmonic valve, with normal leaflet excursion.  23. Estimated pulmonary artery systolic pressure is 87.1 mmHg assuming a   right atrial pressure of 20 mmHg, which is consistent with severe   pulmonary hypertension.  24. Pulmonary hypertension is present.  25. The main pulmonary artery is normal in size.  26. The right atrial pressure is moderately elevated.    < end of copied text >    LABS:	 	    18 Oct 2019 09:46    127    |  89     |  13     ----------------------------<  135    4.1     |  26     |  0.99   17 Oct 2019 12:42    124    |  88     |  15     ----------------------------<  160    4.7     |  22     |  1.25   16 Oct 2019 07:37    127    |  91     |  12     ----------------------------<  123    4.5     |  24     |  1.11     Ca    8.8        18 Oct 2019 09:46  Phos  2.4       18 Oct 2019 09:46  Mg     2.1       18 Oct 2019 09:46    TPro  6.3    /  Alb  2.8    /  TBili  4.0    /  DBili  2.46   /  AST  80     /  ALT  58     /  AlkPhos  88     18 Oct 2019 09:46                        7.4    14.06 )-----------( 266      ( 18 Oct 2019 09:46 )             24.7 ,                       7.2    10.24 )-----------( 284      ( 17 Oct 2019 12:42 )             24.5 ,                       7.8    9.97  )-----------( 273      ( 16 Oct 2019 22:03 )             26.7 ,                       6.9    6.71  )-----------( 326      ( 16 Oct 2019 07:37 )             23.6

## 2019-10-18 NOTE — PROGRESS NOTE ADULT - SUBJECTIVE AND OBJECTIVE BOX
[facility] NEPHROLOGY SERVICES, [facility]  NEPHROLOGY AND HYPERTENSION  [address]  [address]  [place] [address]  [phone]    MD FABIAN FERREIRA MD ANDREY GONCHARUK, MD MADHU KORRAPATI, MD YELENA ROSENBERG, MD BINNY KOSHY, MD CHRISTOPHER CAPUTO, MD EDWARD BOVER, MD          Patient ok   Wt trends noted;   Urine output last 24 hrs noted.    MEDICATIONS  (STANDING):  ALBUTerol/ipratropium for Nebulization 3 milliLiter(s) Nebulizer every 6 hours  allopurinol 300 milliGRAM(s) Oral daily  atorvastatin 20 milliGRAM(s) Oral at bedtime  dextrose 5%. 1000 milliLiter(s) (50 mL/Hr) IV Continuous <Continuous>  dextrose 50% Injectable 12.5 Gram(s) IV Push once  dextrose 50% Injectable 25 Gram(s) IV Push once  dextrose 50% Injectable 25 Gram(s) IV Push once  furosemide   Injectable 80 milliGRAM(s) IV Push every 12 hours  insulin lispro (HumaLOG) corrective regimen sliding scale   SubCutaneous at bedtime  insulin lispro (HumaLOG) corrective regimen sliding scale   SubCutaneous three times a day before meals  iron sucrose IVPB 100 milliGRAM(s) IV Intermittent every 7 days  levothyroxine 25 MICROGram(s) Oral daily  midodrine. 10 milliGRAM(s) Oral every 8 hours  nystatin    Suspension [id number] Unit(s) Swish and Swallow four times a day  pantoprazole    Tablet 40 milliGRAM(s) Oral before breakfast  sodium phosphate IVPB 15 milliMole(s) IV Intermittent once    MEDICATIONS  (PRN):  Biotene Dry Mouth Oral Rinse 5 milliLiter(s) Swish and Spit four times a day PRN dry mouth  dextrose 40% Gel 15 Gram(s) Oral once PRN Blood Glucose LESS THAN 70 milliGRAM(s)/deciliter  glucagon  Injectable 1 milliGRAM(s) IntraMuscular once PRN Glucose LESS THAN 70 milligrams/deciliter  ondansetron Injectable 4 milliGRAM(s) IV Push every 6 hours PRN Nausea and/or Vomiting  traMADol 25 milliGRAM(s) Oral every 4 hours PRN Moderate Pain (4 - 6)      10-17-19 @ 07:01  -  10-18-19 @ 07:00  --------------------------------------------------------  IN: 980 mL / OUT: 2975 mL / NET: -1995 mL      PHYSICAL EXAM:      T(C): 36.6 (10-18-19 @ 05:23), Max: 36.6 (10-18-19 @ 05:23)  HR: 97 (10-18-19 @ 09:50) (80 - 101)  BP: 99/60 (10-18-19 @ 05:23) (94/48 - 110/68)  RR: 18 (10-18-19 @ 05:23) (16 - 19)  SpO2: 93% (10-18-19 @ 09:50) (93% - 99%)  Wt(kg): --  Respiratory: clear anteriorly, decreased BS at bases  Cardiovascular: S1 S2  Gastrointestinal: soft NT ND +BS  Extremities:  3 edema                                    7.4    14.06 )-----------( 266      ( 18 Oct 2019 09:46 )             24.7     10-18    127<L>  |  89<L>  |  13  ----------------------------<  135<H>  4.1   |  26  |  0.99    Ca    8.8      18 Oct 2019 09:46  Phos  2.4     10-18  Mg     2.1     10-18    TPro  6.3  /  Alb  2.8<L>  /  TBili  4.0<H>  /  DBili  2.46<H>  /  AST  80<H>  /  ALT  58  /  AlkPhos  88  10-18      LIVER FUNCTIONS - ( 18 Oct 2019 09:46 )  Alb: 2.8 g/dL / Pro: 6.3 gm/dL / ALK PHOS: 88 U/L / ALT: 58 U/L / AST: 80 U/L / GGT: x           Creatinine Trend: 0.99<--, 1.25<--, 1.11<--, 0.88<--, 0.89<--, 1.19<--    Assessment    Hypervolemic hyponatremia; CRS:   Fe deficiency anemia, acute on chronic;   Hyponatremia poor prognostic indicator    Plan  Increase Lasix to 80 mg q 12;   Follow uric acid trend; Allopurinol added;   -1000 ml  Will consider Tolvaptan again pending LFTs;   Encouraged solute intake;   AM cortisol    Clint Linares MD

## 2019-10-18 NOTE — PROGRESS NOTE ADULT - SUBJECTIVE AND OBJECTIVE BOX
INTERVAL HPI:  33 y/o Female with severe Pulmonary Hypertension and right sided CHF( RV EF 35%) COPD, HTN, DM2, Hyperbilirubinemia, L femoral DVT. Not taking anticoagulant since she her last admission(claims prescription was not sent).   Pt. went to PCP today who noted her hypotension and SOB and recommended coming to ER.  She hasn't been able to  meds from pharmacy as outpatient., because she can't walk due to chronic leg swelling, right calf pain.  No other complaints of inciting event, has had poor oral intake for several days,   Pt. is otherwise well. Refused  ABG, parenteral anticoagulants;  On home O2. Denies tobacco abuse.    OVERNIGHT EVENTS:  Awake and responsive    Vital Signs Last 24 Hrs  T(C): 36.6 (18 Oct 2019 12:01), Max: 36.6 (18 Oct 2019 05:23)  T(F): 97.8 (18 Oct 2019 12:01), Max: 97.8 (18 Oct 2019 05:23)  HR: 97 (18 Oct 2019 12:01) (80 - 101)  BP: 153/74 (18 Oct 2019 12:01) (94/48 - 153/74)  BP(mean): --  RR: 20 (18 Oct 2019 12:01) (16 - 20)  SpO2: 99% (18 Oct 2019 12:01) (93% - 99%)    PHYSICAL EXAM:  GEN:         Awake, responsive and comfortable.  HEENT:    Normal.    RESP:       no distress  CVS:           Regular rate and rhythm.   ABD:         Soft, non-tender, non-distended;     MEDICATIONS  (STANDING):  ALBUTerol/ipratropium for Nebulization 3 milliLiter(s) Nebulizer every 6 hours  allopurinol 300 milliGRAM(s) Oral daily  atorvastatin 20 milliGRAM(s) Oral at bedtime  dextrose 5%. 1000 milliLiter(s) (50 mL/Hr) IV Continuous <Continuous>  dextrose 50% Injectable 12.5 Gram(s) IV Push once  dextrose 50% Injectable 25 Gram(s) IV Push once  dextrose 50% Injectable 25 Gram(s) IV Push once  furosemide   Injectable 80 milliGRAM(s) IV Push every 12 hours  insulin lispro (HumaLOG) corrective regimen sliding scale   SubCutaneous at bedtime  insulin lispro (HumaLOG) corrective regimen sliding scale   SubCutaneous three times a day before meals  iron sucrose IVPB 100 milliGRAM(s) IV Intermittent every 7 days  levothyroxine 25 MICROGram(s) Oral daily  midodrine. 10 milliGRAM(s) Oral every 8 hours  nystatin    Suspension 216156 Unit(s) Swish and Swallow four times a day  pantoprazole    Tablet 40 milliGRAM(s) Oral before breakfast  sodium phosphate IVPB 15 milliMole(s) IV Intermittent once    MEDICATIONS  (PRN):  Biotene Dry Mouth Oral Rinse 5 milliLiter(s) Swish and Spit four times a day PRN dry mouth  dextrose 40% Gel 15 Gram(s) Oral once PRN Blood Glucose LESS THAN 70 milliGRAM(s)/deciliter  glucagon  Injectable 1 milliGRAM(s) IntraMuscular once PRN Glucose LESS THAN 70 milligrams/deciliter  ondansetron Injectable 4 milliGRAM(s) IV Push every 6 hours PRN Nausea and/or Vomiting  traMADol 25 milliGRAM(s) Oral every 4 hours PRN Moderate Pain (4 - 6)    LABS:                        7.4    14.06 )-----------( 266      ( 18 Oct 2019 09:46 )             24.7     10-18    127<L>  |  89<L>  |  13  ----------------------------<  135<H>  4.1   |  26  |  0.99    Ca    8.8      18 Oct 2019 09:46  Phos  2.4     10-18  Mg     2.1     10-18    TPro  6.3  /  Alb  2.8<L>  /  TBili  4.0<H>  /  DBili  2.46<H>  /  AST  80<H>  /  ALT  58  /  AlkPhos  88  10-18    ASSESSMENT AND PLAN:  ·	SOB.  ·	Severe pulmonary HTN.  ·	Cor pulmonale.  ·	Fluid Retention.  ·	Left external DVT.  ·	Non Compliance with treatment.    Eliquis held due to heavy menses and low H & H.  For duplex LE due to pain.  Continue diuretics as needed.

## 2019-10-18 NOTE — CHART NOTE - NSCHARTNOTEFT_GEN_A_CORE
consulted for this 34 year old female with PPHTN, Cor Pulmonale poor compliance to medical therapies and interventions offered, attempted to visit with this pt multiple times this afternoon however pt had received 2mg of morphine at 1pm and has been very lethargic unable to stay awake to interview. Will follow up when pt can participate.

## 2019-10-18 NOTE — PROGRESS NOTE ADULT - ASSESSMENT
33 yo female with history of Anticardiolipin antibody positive on AC at home, COPD, DM II, Essential hypertension, Vitamin D deficiency & severe Pulm HTN with right sided heart failure p/w SOB & was found to have acute on chronic combined systolic and diastolic congestive heart failure, hyponatremia, DIANN & acute RLE DVT.    Acute on chronic combined systolic and diastolic RIGHT SIDED congestive heart failure:   - Echo in 7/2019 showed EF 45-50% w/ elevated mean left atrial pressure w/ global cardiomyopathy, grade I diastolic dysfunction, pulmonary hypertension w/ a severely enlarged right ventricle and severely reduced RV systolic function w/ RV ejection fraction 34%, moderate tricuspid regurgitation, mild to moderate pulmonic valve regurgitation & severe pulmonary hypertension   - cont lasix 40mg IV BID, attempted to increase to 80mg IV bid however pt hypotensive   -s/p 500cc bolus with limited response in BP, given another 500cc bolus and increase midodrine to 20mg tid.   - monitor daily wt, i/o's  -jimenez removed, will attempt to monitor I/Os with primafit   -Have been encouraging patient daily for transfer to [facility], have explained importance of the transfer. However patient continues to refuse stating she does not think her condition will change as she did research on her condition and understands that there is limited intervention/treatment success for her.   -if she agrees, will then transfer for R and LHC and eval with Dr. Unger at [facility]., however currently refusing transfer stating she had cath before and doesn't see why she should have another one. Patient refusing despite education on importance of cath.   -fluid and salt restriction encouraged. Although patient is non compliant--ordering chinese food, french fries, per RN --not compliant with water restriction  -cardiology following    -follow ECHO   -will hold pain meds sec to hypotension.       Worsening acute blood loss anemia in the setting of AC and steroid use with heavy menstruation,  reviewed iron studies 10/14/19--consistent with iron deficiency anemia   s/p 1u PRBC, lasix after transfusion, responded appropriately  H/H stable so far   will restart coumadin , follow INR, monitor CBC   add venofer 100mg IV q7days x 4 doses   will need GYN    Left foot pain, possibly from gout?  LE duplex  : No change in left external iliac and left greater saphenous vein   thrombosis.  uric acid elevated   allopurinol started    DIANN, most likely secondary to hypotension/diuresis   improving slowly now   monitor Cr     severe Pulmonary hypertension:  - c/w riociguat  - continue with supplemental oxygen to maintain O2 sat >92%  - pulmonary following    Direct hyperbilirubinemia, most likely from hepatic congestion   -reviewed prior Abd US 7/2019 --cholelithiasis   -10/18/19 Abd US: fatty liver , no cholelithiasis or biliary ductal dilatation       Type 2 diabetes mellitus with hyperglycemia, without long-term current use of insulin:  - Continue current insulin regimen    Hyponatremia:  - likely due to volume overload/ aggressive diuresis and possible SSRI- on hold  - Renal following , s/p tolvaptan 15mg x 1 10/15/19  - c/w Fluid restriction    HTN:  - hold all BP meds due to low BP  -currently on Midodrine for BP support     Moderate persistent Asthma:  - pulmonary following  - c/w Symbicort    Acute deep vein thrombosis (DVT) of femoral vein of left lower extremity:  - US showed an extensive acute nonocclusive thrombus in the left external iliac vein and the left common femoral vein and left greater saphenous vein  - was on Eliquis, pt non compliant with INR checks and doctor/follow-up visits , however per patient coumadin does not make her have vaginal bleeding like Eliquis and agreeing to follow PCP for INR. Also, patient to follow with GYN outpatient.   -per Dr. Ling ; not a candidate for ivc filter  -will restart coumadin     L foot injury due to weight fell on it  -Xray of the foot; neg  -pain mgmt     Acquired hypothyroidism:  - c/w synthroid     Hyperlipidemia:  - c/w Zocor    Depression:  - Zoloft on hold  -denies SI/HI  -Psych following     Preventative measure  fall precautions 33 yo female with history of Anticardiolipin antibody positive on AC at home, COPD, DM II, Essential hypertension, Vitamin D deficiency & severe Pulm HTN with right sided heart failure p/w SOB & was found to have acute on chronic combined systolic and diastolic congestive heart failure, hyponatremia, DIANN & acute RLE DVT.    Acute on chronic combined systolic and diastolic RIGHT SIDED congestive heart failure:   - Echo in 7/2019 showed EF 45-50% w/ elevated mean left atrial pressure w/ global cardiomyopathy, grade I diastolic dysfunction, pulmonary hypertension w/ a severely enlarged right ventricle and severely reduced RV systolic function w/ RV ejection fraction 34%, moderate tricuspid regurgitation, mild to moderate pulmonic valve regurgitation & severe pulmonary hypertension   - cont lasix 40mg IV BID, attempted to increase to 80mg IV bid however pt hypotensive   -s/p 500cc bolus with limited response in BP, given another 500cc bolus and increase midodrine to 20mg tid.   - monitor daily wt, i/o's  -jimenez removed, will attempt to monitor I/Os with primafit   -Have been encouraging patient daily for transfer to [facility], have explained importance of the transfer. However patient continues to refuse stating she does not think her condition will change as she did research on her condition and understands that there is limited intervention/treatment success for her.   -if she agrees, will then transfer for R and LHC and eval with Dr. Unger at [facility]., however currently refusing transfer stating she had cath before and doesn't see why she should have another one. Patient refusing despite education on importance of cath.   -fluid and salt restriction encouraged. Although patient is non compliant--ordering chinese food, french fries, per RN --not compliant with water restriction  -cardiology following    -follow ECHO   -will hold pain meds sec to hypotension.       Worsening acute blood loss anemia in the setting of AC and steroid use with heavy menstruation,  reviewed iron studies 10/14/19--consistent with iron deficiency anemia   s/p 1u PRBC, lasix after transfusion, responded appropriately  H/H stable so far   will restart coumadin , follow INR, monitor CBC   add venofer 100mg IV q7days x 4 doses   will need GYN    Left foot pain, possibly from gout?  LE duplex  : No change in left external iliac and left greater saphenous vein   thrombosis.  uric acid elevated   allopurinol started      severe Pulmonary hypertension:  - c/w riociguat  - continue with supplemental oxygen to maintain O2 sat >92%  - pulmonary following    Direct hyperbilirubinemia, most likely from hepatic congestion   -reviewed prior Abd US 7/2019 --cholelithiasis   -10/18/19 Abd US: fatty liver , no cholelithiasis or biliary ductal dilatation       Type 2 diabetes mellitus with hyperglycemia, without long-term current use of insulin:  - Continue current insulin regimen    Hyponatremia:  - likely due to volume overload/ aggressive diuresis and possible SSRI- on hold  - Renal following , s/p tolvaptan 15mg x 1 10/15/19  - c/w Fluid restriction    HTN:  - hold all BP meds due to low BP  -currently on Midodrine for BP support     Moderate persistent Asthma:  - pulmonary following  - c/w Symbicort    Acute deep vein thrombosis (DVT) of femoral vein of left lower extremity:  - US showed an extensive acute nonocclusive thrombus in the left external iliac vein and the left common femoral vein and left greater saphenous vein  - was on Eliquis, pt non compliant with INR checks and doctor/follow-up visits , however per patient coumadin does not make her have vaginal bleeding like Eliquis and agreeing to follow PCP for INR. Also, patient to follow with GYN outpatient.   -per Dr. Ling ; not a candidate for ivc filter  -will restart coumadin     L foot injury due to weight fell on it  -Xray of the foot; neg  -pain mgmt     Acquired hypothyroidism:  - c/w synthroid     Hyperlipidemia:  - c/w Zocor    Depression:  - Zoloft on hold  -denies SI/HI  -Psych following     Preventative measure  fall precautions

## 2019-10-18 NOTE — PROGRESS NOTE ADULT - SUBJECTIVE AND OBJECTIVE BOX
Patient is a 34y old  Female who presents with a chief complaint of Shortness of breath and hypotension. (15 Oct 2019 08:26)    Patient seen and examined bedside.   OVERNIGHT EVENTS:  none   complaining of Left foot pain     REVIEW OF SYSTEMS: denies chest pain/SOB, diaphoresis, cough, dizziness, headache, blurry vision, nausea, vomiting, abdominal pain.       MEDICATIONS  (STANDING):  ALBUTerol/ipratropium for Nebulization 3 milliLiter(s) Nebulizer every 6 hours  apixaban 10 milliGRAM(s) Oral every 12 hours  aspirin enteric coated 81 milliGRAM(s) Oral daily  atorvastatin 20 milliGRAM(s) Oral at bedtime  dextrose 5%. 1000 milliLiter(s) (50 mL/Hr) IV Continuous <Continuous>  dextrose 50% Injectable 12.5 Gram(s) IV Push once  dextrose 50% Injectable 25 Gram(s) IV Push once  dextrose 50% Injectable 25 Gram(s) IV Push once  furosemide   Injectable 40 milliGRAM(s) IV Push every 12 hours  insulin lispro (HumaLOG) corrective regimen sliding scale   SubCutaneous at bedtime  insulin lispro (HumaLOG) corrective regimen sliding scale   SubCutaneous three times a day before meals  levothyroxine 25 MICROGram(s) Oral daily  midodrine. 10 milliGRAM(s) Oral every 8 hours  pantoprazole    Tablet 40 milliGRAM(s) Oral before breakfast    MEDICATIONS  (PRN):  dextrose 40% Gel 15 Gram(s) Oral once PRN Blood Glucose LESS THAN 70 milliGRAM(s)/deciliter  glucagon  Injectable 1 milliGRAM(s) IntraMuscular once PRN Glucose LESS THAN 70 milligrams/deciliter  ondansetron Injectable 4 milliGRAM(s) IV Push every 6 hours PRN Nausea and/or Vomiting  traMADol 25 milliGRAM(s) Oral every 6 hours PRN Moderate Pain (4 - 6)      Allergies    No Known Allergies    Intolerances    Vital Signs Last 24 Hrs  T(C): 36.8 (18 Oct 2019 21:14), Max: 36.8 (18 Oct 2019 21:14)  T(F): 98.3 (18 Oct 2019 21:14), Max: 98.3 (18 Oct 2019 21:14)  HR: 95 (18 Oct 2019 21:27) (77 - 106)  BP: 83/43 (18 Oct 2019 21:15) (83/40 - 153/74)  BP(mean): --  RR: 18 (18 Oct 2019 21:14) (18 - 20)  SpO2: 93% (18 Oct 2019 21:27) (85% - 100%)    PHYSICAL EXAM:  GENERAL: NAD, speaking in full sentences, NOT using accessory muscles   HEAD:  Atraumatic, Normocephalic  EYES: EOMI, PERRLA, slight icterus b/l   ENMT: No tonsillar erythema, exudates, or enlargement; Moist mucous membranes  NECK: Supple, No JVD  NERVOUS SYSTEM:  Alert & Oriented X3, no focal neuro deficits   CHEST/LUNG: GOOD B/L AIR ENTRY, no w/r/r  HEART: Regular rate and rhythm; No murmurs, rubs, or gallops  ABDOMEN: Soft, Nontender, Nondistended; Bowel sounds present  : vaginal bleeding with clots   EXTREMITIES:  3-4+ Peripheral  edema BL LE, chronic skin changes of b/l LE. No cyanosis or clubbing noted.       Labs and imaging reviewed.                          7.4    14.06 )-----------( 266      ( 18 Oct 2019 09:46 )             24.7   10-18    127<L>  |  89<L>  |  13  ----------------------------<  135<H>  4.1   |  26  |  0.99    Ca    8.8      18 Oct 2019 09:46  Phos  2.4     10-18  Mg     2.1     10-18    TPro  6.3  /  Alb  2.8<L>  /  TBili  4.0<H>  /  DBili  2.46<H>  /  AST  80<H>  /  ALT  58  /  AlkPhos  88  10-18    Lipid Profile (07.28.19 @ 11:27)    Total Cholesterol/HDL Ratio Measurement: 7.6 RATIO    Cholesterol, Serum: 76 mg/dL    Triglycerides, Serum: 76 mg/dL    HDL Cholesterol, Serum: 10: HDL Levels >/= 60 mg/dL are considered beneficial and a "negative" risk  factor.  Effective 08/15/2018: New reference range and interpretive comment. mg/dL    Direct LDL: 51: LDL Cholesterol (mg/dL) --- Interpretive Comment (for adults 18 and over)  Optimal LDL Level may vary based on clinical situation  Below 70                   Ideal for people at very high risk of heart  disease  Below 100                  Ideal for people at risk of heart disease  100 - 129                    [place]  130 - 159                    Borderline high  160 - 189                    High  190 and Above           Very high mg/dL        RADIOLOGY & ADDITIONAL TESTS:    Imaging Personally Reviewed:  [ x] YES    < from: Xray Chest 1 View- PORTABLE-Urgent (10.09.19 @ 21:34) >  Clear lungs.    < from: TTE Echo Doppler w/o Cont (07.26.19 @ 08:44) >  1. Left ventricular ejection fraction, by visual estimation, is 45 to   50%.   2. Mildly decreased global left ventricular systolic function.   3. Elevated mean left atrial pressure.   4. Global cardiomyopathy.   5. Increased LV wall thickness.   6. Normal left ventricular internal cavity size.   7. Right ventricular pressure overload.   8. Spectral Doppler shows impaired relaxation pattern of left   ventricular myocardial filling (Grade I diastolic dysfunction).   9. There is mild asymmetric left ventricular hypertrophy.  10. Pulmonary hypertension.  11. Severely enlarged right ventricle.  12. Severely reduced RV systolic function.  13. RV ejection fraction 34%.  14. Small pericardial effusion.  15. Degenerative mitral valve.  16. Mild mitral annular calcification.  17. Mild mitral valve regurgitation.  18. Moderate tricuspid regurgitation.  19. Structurally normal tricuspid valve, with normal leaflet excursion.  20. Mild aortic regurgitation.  21. Mild to moderate pulmonic valve regurgitation.  22. Structurally normal pulmonic valve, with normal leaflet excursion.  23. Estimated pulmonary artery systolic pressure is 87.1 mmHg assuming a   right atrial pressure of 20 mmHg, which is consistent with severe   pulmonary hypertension.  24. Pulmonary hypertension is present.  25. The main pulmonary artery is normal in size.  26. The right atrial pressure is moderately elevated.    < end of copied text >    < from: Xray Chest 1 View- PORTABLE-Urgent (10.09.19 @ 21:34) >  IMPRESSION:    Clear lungs.    < end of copied text >      Consultant(s) Notes Reviewed:  [ x] YES     Care Discussed with [x ] Consultants [X ] Patient [ ] Family  [x ]    [x ]  Other; RN

## 2019-10-19 LAB
ALBUMIN SERPL ELPH-MCNC: 2.6 G/DL — LOW (ref 3.3–5)
ALP SERPL-CCNC: 90 U/L — SIGNIFICANT CHANGE UP (ref 40–120)
ALT FLD-CCNC: 80 U/L — HIGH (ref 12–78)
ANION GAP SERPL CALC-SCNC: 9 MMOL/L — SIGNIFICANT CHANGE UP (ref 5–17)
AST SERPL-CCNC: 108 U/L — HIGH (ref 15–37)
BILIRUB DIRECT SERPL-MCNC: 2.41 MG/DL — HIGH (ref 0.05–0.2)
BILIRUB INDIRECT FLD-MCNC: 0.9 MG/DL — SIGNIFICANT CHANGE UP (ref 0.2–1)
BILIRUB SERPL-MCNC: 3.3 MG/DL — HIGH (ref 0.2–1.2)
BUN SERPL-MCNC: 12 MG/DL — SIGNIFICANT CHANGE UP (ref 7–23)
CALCIUM SERPL-MCNC: 8.4 MG/DL — LOW (ref 8.5–10.1)
CHLORIDE SERPL-SCNC: 90 MMOL/L — LOW (ref 96–108)
CO2 SERPL-SCNC: 27 MMOL/L — SIGNIFICANT CHANGE UP (ref 22–31)
CREAT SERPL-MCNC: 0.83 MG/DL — SIGNIFICANT CHANGE UP (ref 0.5–1.3)
GLUCOSE SERPL-MCNC: 168 MG/DL — HIGH (ref 70–99)
HCT VFR BLD CALC: 22 % — LOW (ref 34.5–45)
HGB BLD-MCNC: 6.8 G/DL — CRITICAL LOW (ref 11.5–15.5)
MAGNESIUM SERPL-MCNC: 2 MG/DL — SIGNIFICANT CHANGE UP (ref 1.6–2.6)
MCHC RBC-ENTMCNC: 23.5 PG — LOW (ref 27–34)
MCHC RBC-ENTMCNC: 30.9 GM/DL — LOW (ref 32–36)
MCV RBC AUTO: 76.1 FL — LOW (ref 80–100)
NRBC # BLD: 3 /100 WBCS — HIGH (ref 0–0)
PHOSPHATE SERPL-MCNC: 2.8 MG/DL — SIGNIFICANT CHANGE UP (ref 2.5–4.5)
PLATELET # BLD AUTO: 252 K/UL — SIGNIFICANT CHANGE UP (ref 150–400)
POTASSIUM SERPL-MCNC: 4 MMOL/L — SIGNIFICANT CHANGE UP (ref 3.5–5.3)
POTASSIUM SERPL-SCNC: 4 MMOL/L — SIGNIFICANT CHANGE UP (ref 3.5–5.3)
PROT SERPL-MCNC: 5.9 GM/DL — LOW (ref 6–8.3)
RBC # BLD: 2.89 M/UL — LOW (ref 3.8–5.2)
RBC # FLD: 25 % — HIGH (ref 10.3–14.5)
SODIUM SERPL-SCNC: 126 MMOL/L — LOW (ref 135–145)
WBC # BLD: 10.19 K/UL — SIGNIFICANT CHANGE UP (ref 3.8–10.5)
WBC # FLD AUTO: 10.19 K/UL — SIGNIFICANT CHANGE UP (ref 3.8–10.5)

## 2019-10-19 PROCEDURE — 99232 SBSQ HOSP IP/OBS MODERATE 35: CPT

## 2019-10-19 PROCEDURE — 99233 SBSQ HOSP IP/OBS HIGH 50: CPT

## 2019-10-19 RX ORDER — BENZOCAINE AND MENTHOL 5; 1 G/100ML; G/100ML
1 LIQUID ORAL EVERY 6 HOURS
Refills: 0 | Status: DISCONTINUED | OUTPATIENT
Start: 2019-10-19 | End: 2019-10-23

## 2019-10-19 RX ORDER — FUROSEMIDE 40 MG
40 TABLET ORAL EVERY 6 HOURS
Refills: 0 | Status: DISCONTINUED | OUTPATIENT
Start: 2019-10-19 | End: 2019-10-22

## 2019-10-19 RX ORDER — FLUCONAZOLE 150 MG/1
200 TABLET ORAL EVERY 24 HOURS
Refills: 0 | Status: DISCONTINUED | OUTPATIENT
Start: 2019-10-19 | End: 2019-10-23

## 2019-10-19 RX ADMIN — MIDODRINE HYDROCHLORIDE 20 MILLIGRAM(S): 2.5 TABLET ORAL at 17:34

## 2019-10-19 RX ADMIN — PANTOPRAZOLE SODIUM 40 MILLIGRAM(S): 20 TABLET, DELAYED RELEASE ORAL at 06:48

## 2019-10-19 RX ADMIN — Medication 3 MILLILITER(S): at 11:30

## 2019-10-19 RX ADMIN — Medication 3 MILLILITER(S): at 05:54

## 2019-10-19 RX ADMIN — Medication 40 MILLIGRAM(S): at 17:34

## 2019-10-19 RX ADMIN — Medication 25 MICROGRAM(S): at 06:48

## 2019-10-19 RX ADMIN — ATORVASTATIN CALCIUM 20 MILLIGRAM(S): 80 TABLET, FILM COATED ORAL at 21:30

## 2019-10-19 RX ADMIN — MIDODRINE HYDROCHLORIDE 20 MILLIGRAM(S): 2.5 TABLET ORAL at 06:48

## 2019-10-19 RX ADMIN — FLUCONAZOLE 100 MILLIGRAM(S): 150 TABLET ORAL at 15:31

## 2019-10-19 RX ADMIN — Medication 3 MILLILITER(S): at 23:59

## 2019-10-19 RX ADMIN — Medication 40 MILLIGRAM(S): at 06:47

## 2019-10-19 RX ADMIN — Medication 3 MILLILITER(S): at 17:25

## 2019-10-19 RX ADMIN — MIDODRINE HYDROCHLORIDE 20 MILLIGRAM(S): 2.5 TABLET ORAL at 11:46

## 2019-10-19 RX ADMIN — Medication 300 MILLIGRAM(S): at 11:46

## 2019-10-19 NOTE — PROGRESS NOTE ADULT - SUBJECTIVE AND OBJECTIVE BOX
INTERVAL HPI:  33 y/o Female with severe Pulmonary Hypertension and right sided CHF( RV EF 35%) COPD, HTN, DM2, Hyperbilirubinemia, L femoral DVT. Not taking anticoagulant since she her last admission(claims prescription was not sent).   Pt. went to PCP today who noted her hypotension and SOB and recommended coming to ER.  She hasn't been able to  meds from pharmacy as outpatient., because she can't walk due to chronic leg swelling, right calf pain.  No other complaints of inciting event, has had poor oral intake for several days,   Pt. is otherwise well. Refused  ABG, parenteral anticoagulants;  On home O2. Denies tobacco abuse.    OVERNIGHT EVENTS:  Comfortable looking    Vital Signs Last 24 Hrs  T(C): 36.6 (19 Oct 2019 17:23), Max: 36.8 (18 Oct 2019 21:14)  T(F): 97.8 (19 Oct 2019 17:23), Max: 98.3 (18 Oct 2019 21:14)  HR: 91 (19 Oct 2019 17:23) (75 - 104)  BP: 90/44 (19 Oct 2019 17:23) (83/40 - 98/53)  BP(mean): --  RR: 19 (19 Oct 2019 17:23) (17 - 20)  SpO2: 95% (19 Oct 2019 17:23) (90% - 100%)    PHYSICAL EXAM:  GEN:         Awake, responsive and comfortable.  HEENT:    Normal.    RESP:       no distress.  CVS:          Regular rate and rhythm.   ABD:         Soft, non-tender, non-distended;     MEDICATIONS  (STANDING):  ALBUTerol/ipratropium for Nebulization 3 milliLiter(s) Nebulizer every 6 hours  allopurinol 300 milliGRAM(s) Oral daily  atorvastatin 20 milliGRAM(s) Oral at bedtime  dextrose 5%. 1000 milliLiter(s) (50 mL/Hr) IV Continuous <Continuous>  dextrose 50% Injectable 12.5 Gram(s) IV Push once  dextrose 50% Injectable 25 Gram(s) IV Push once  dextrose 50% Injectable 25 Gram(s) IV Push once  fluconAZOLE IVPB 200 milliGRAM(s) IV Intermittent every 24 hours  furosemide   Injectable 40 milliGRAM(s) IV Push two times a day  insulin lispro (HumaLOG) corrective regimen sliding scale   SubCutaneous at bedtime  insulin lispro (HumaLOG) corrective regimen sliding scale   SubCutaneous three times a day before meals  iron sucrose IVPB 100 milliGRAM(s) IV Intermittent every 7 days  levothyroxine 25 MICROGram(s) Oral daily  midodrine. 20 milliGRAM(s) Oral three times a day  nystatin    Suspension 041231 Unit(s) Swish and Swallow four times a day  pantoprazole    Tablet 40 milliGRAM(s) Oral before breakfast    MEDICATIONS  (PRN):  aluminum hydroxide/magnesium hydroxide/simethicone Suspension 30 milliLiter(s) Oral every 4 hours PRN Dyspepsia  benzocaine 15 mG/menthol 3.6 mG (Sugar-Free) Lozenge 1 Lozenge Oral every 6 hours PRN Mouth Sores  Biotene Dry Mouth Oral Rinse 5 milliLiter(s) Swish and Spit four times a day PRN dry mouth  dextrose 40% Gel 15 Gram(s) Oral once PRN Blood Glucose LESS THAN 70 milliGRAM(s)/deciliter  glucagon  Injectable 1 milliGRAM(s) IntraMuscular once PRN Glucose LESS THAN 70 milligrams/deciliter  ondansetron Injectable 4 milliGRAM(s) IV Push every 6 hours PRN Nausea and/or Vomiting    LABS:                        7.4    14.06 )-----------( 266      ( 18 Oct 2019 09:46 )             24.7     10-18    127<L>  |  89<L>  |  13  ----------------------------<  135<H>  4.1   |  26  |  0.99    Ca    8.8      18 Oct 2019 09:46  Phos  2.4     10-18  Mg     2.1     10-18    TPro  6.3  /  Alb  2.8<L>  /  TBili  4.0<H>  /  DBili  2.46<H>  /  AST  80<H>  /  ALT  58  /  AlkPhos  88  10-18    ASSESSMENT AND PLAN:  ·	SOB.  ·	Severe pulmonary HTN.  ·	Cor pulmonale.  ·	Fluid Retention.  ·	Left external DVT.  ·	Non Compliance with treatment.    Compliance with medications and follow up with her physicians has been the main issue.  Again refused to be transferred  to [facility] for pulmonary HTN treatment.

## 2019-10-19 NOTE — CHART NOTE - NSCHARTNOTEFT_GEN_A_CORE
Pt admitted c SOB & worsening b/l leg swelling; pt c multiple chronic medical issues impairing everyday life. Pt is non compliant c necessary diet restrictions; insisting on foods she is supposed to limit/avoid; difficulty adjusting to strict dietary & medical recommendations is an ongoing issues; psychiatrist involved.     Factors impacting intake: [ ] none [ ] nausea  [ ] vomiting [ ] diarrhea [ ] constipation  [ ]chewing problems [ ] swallowing issues  [X ] other: refusal to follow diet recommendations; requesting foods that should be limited/avoided    Diet Prescription: Diet, DASH/TLC:   Sodium & Cholesterol Restricted  1000mL Fluid Restriction ([id number])  Supplement Feeding Modality:  Oral  Health Shake Cans or Servings Per Day:  3       Frequency:  Three Times a day (10-18-19 @ 18:43)    Intake:  varies greatly from 0 - 100%; most meals approx. 50% of what she orders; pt likes health shake; requesting ice cream & Ensure  Advised diet office to reduce dinner fluid so that pt can have ice cream     Current Weight:  105.2 kg (10/19); previous wt 106.2 kg (10/10)  % Weight Change: 1% loss x 9 days; wt affected by fluid fluctuations    Physical Appearance:  4+ edema noted b/l legs    Pertinent Medications: MEDICATIONS  (STANDING):  ALBUTerol/ipratropium for Nebulization 3 milliLiter(s) Nebulizer every 6 hours  allopurinol 300 milliGRAM(s) Oral daily  atorvastatin 20 milliGRAM(s) Oral at bedtime  dextrose 5%. 1000 milliLiter(s) (50 mL/Hr) IV Continuous <Continuous>  dextrose 50% Injectable 12.5 Gram(s) IV Push once  dextrose 50% Injectable 25 Gram(s) IV Push once  dextrose 50% Injectable 25 Gram(s) IV Push once  fluconAZOLE IVPB 200 milliGRAM(s) IV Intermittent every 24 hours  furosemide   Injectable 40 milliGRAM(s) IV Push two times a day  insulin lispro (HumaLOG) corrective regimen sliding scale   SubCutaneous at bedtime  insulin lispro (HumaLOG) corrective regimen sliding scale   SubCutaneous three times a day before meals  iron sucrose IVPB 100 milliGRAM(s) IV Intermittent every 7 days  levothyroxine 25 MICROGram(s) Oral daily  midodrine. 20 milliGRAM(s) Oral three times a day  nystatin    Suspension 830958 Unit(s) Swish and Swallow four times a day  pantoprazole    Tablet 40 milliGRAM(s) Oral before breakfast    MEDICATIONS  (PRN):  aluminum hydroxide/magnesium hydroxide/simethicone Suspension 30 milliLiter(s) Oral every 4 hours PRN Dyspepsia  benzocaine 15 mG/menthol 3.6 mG (Sugar-Free) Lozenge 1 Lozenge Oral every 6 hours PRN Mouth Sores  Biotene Dry Mouth Oral Rinse 5 milliLiter(s) Swish and Spit four times a day PRN dry mouth  dextrose 40% Gel 15 Gram(s) Oral once PRN Blood Glucose LESS THAN 70 milliGRAM(s)/deciliter  glucagon  Injectable 1 milliGRAM(s) IntraMuscular once PRN Glucose LESS THAN 70 milligrams/deciliter  ondansetron Injectable 4 milliGRAM(s) IV Push every 6 hours PRN Nausea and/or Vomiting    Pertinent Labs:  10-18 Na127 mmol/L<L> Glu 135 mg/dL<H> K+ 4.1 mmol/L Cr  0.99 mg/dL BUN 13 mg/dL 10-18 Phos 2.4 mg/dL<L> 10-18 Alb 2.8 g/dL<L> 09-19 [id number] 5.9 %<H>  10-18 POCT:  153, 200, 219, 216    Skin:   WDL    Estimated Needs:   [x ] no change since previous assessment  (10/13)  [ ] recalculated:     Previous Nutrition Diagnosis:   [X ] Inadequate Energy Intake     Nutrition Diagnosis is [X ] ongoing  [ ] resolved [ ] not applicable     New Nutrition Diagnosis: [X ] not applicable    Interventions:     Recommend  [X ] Change Diet To:   Low Na   [X  ] Nutrition Supplement: Health Shake x 2/day (provides 600 kcal, 16 g protein) ++ Ensure Enlive x 1/day (provides 350 kcal, 20 g protein)   [ ] Nutrition Support  [ X] Other: continue to monitor BG levels & if levels become elevated; add CCHO diet restriction to order; for now continue liberalized diet    Monitoring and Evaluation:   [X ] PO intake [ x ] Tolerance to diet prescription [ x ] weights [ x ] labs[ x ] follow up per protocol  [ ] other: Pt admitted c SOB & worsening b/l leg swelling; pt c multiple chronic medical issues impairing everyday life. Pt is non compliant c necessary diet restrictions; insisting on foods she is supposed to limit/avoid; difficulty adjusting to strict dietary & medical recommendations is an ongoing issues; psychiatrist involved.     Factors impacting intake: [ ] none [ ] nausea  [ ] vomiting [ ] diarrhea [ ] constipation  [ ]chewing problems [ ] swallowing issues  [X ] other: refusal to follow diet recommendations; requesting foods that should be limited/avoided    Diet Prescription: Diet, DASH/TLC:   Sodium & Cholesterol Restricted  1000mL Fluid Restriction ([id number])  Supplement Feeding Modality:  Oral  Health Shake Cans or Servings Per Day:  3       Frequency:  Three Times a day (10-18-19 @ 18:43)    Intake:  varies greatly from 0 - 100%; most meals approx. 50% of what she orders; pt likes health shake; requesting ice cream & Ensure  Advised diet office to reduce dinner fluid so that pt can have ice cream     Current Weight:  105.2 kg (10/19); previous wt 106.2 kg (10/10)  % Weight Change: 1% loss x 9 days; wt affected by fluid fluctuations    Physical Appearance:  4+ edema noted b/l legs    Pertinent Medications: MEDICATIONS  (STANDING):  ALBUTerol/ipratropium for Nebulization 3 milliLiter(s) Nebulizer every 6 hours  allopurinol 300 milliGRAM(s) Oral daily  atorvastatin 20 milliGRAM(s) Oral at bedtime  dextrose 5%. 1000 milliLiter(s) (50 mL/Hr) IV Continuous <Continuous>  dextrose 50% Injectable 12.5 Gram(s) IV Push once  dextrose 50% Injectable 25 Gram(s) IV Push once  dextrose 50% Injectable 25 Gram(s) IV Push once  fluconAZOLE IVPB 200 milliGRAM(s) IV Intermittent every 24 hours  furosemide   Injectable 40 milliGRAM(s) IV Push two times a day  insulin lispro (HumaLOG) corrective regimen sliding scale   SubCutaneous at bedtime  insulin lispro (HumaLOG) corrective regimen sliding scale   SubCutaneous three times a day before meals  iron sucrose IVPB 100 milliGRAM(s) IV Intermittent every 7 days  levothyroxine 25 MICROGram(s) Oral daily  midodrine. 20 milliGRAM(s) Oral three times a day  nystatin    Suspension [id number] Unit(s) Swish and Swallow four times a day  pantoprazole    Tablet 40 milliGRAM(s) Oral before breakfast    MEDICATIONS  (PRN):  aluminum hydroxide/magnesium hydroxide/simethicone Suspension 30 milliLiter(s) Oral every 4 hours PRN Dyspepsia  benzocaine 15 mG/menthol 3.6 mG (Sugar-Free) Lozenge 1 Lozenge Oral every 6 hours PRN Mouth Sores  Biotene Dry Mouth Oral Rinse 5 milliLiter(s) Swish and Spit four times a day PRN dry mouth  dextrose 40% Gel 15 Gram(s) Oral once PRN Blood Glucose LESS THAN 70 milliGRAM(s)/deciliter  glucagon  Injectable 1 milliGRAM(s) IntraMuscular once PRN Glucose LESS THAN 70 milligrams/deciliter  ondansetron Injectable 4 milliGRAM(s) IV Push every 6 hours PRN Nausea and/or Vomiting    Pertinent Labs:  10-18 Na127 mmol/L<L> Glu 135 mg/dL<H> K+ 4.1 mmol/L Cr  0.99 mg/dL BUN 13 mg/dL 10-18 Phos 2.4 mg/dL<L> 10-18 Alb 2.8 g/dL<L> 09-19 [id number] 5.9 %<H>  10-18 POCT:  153, 200, 219, 216    Skin:   WDL    Estimated Needs:   [x ] no change since previous assessment  (10/13)  [ ] recalculated:     Previous Nutrition Diagnosis:   [X ] Inadequate Energy Intake     Nutrition Diagnosis is [X ] ongoing  [ ] resolved [ ] not applicable     New Nutrition Diagnosis: [X ] not applicable    Interventions:     Recommend  [X ] Change Diet To:   Low Na, 1000 ml fluid restriction  [X  ] Nutrition Supplement: Health Shake x 2/day (provides 600 kcal, 16 g protein) ++ Ensure Enlive x 1/day (provides 350 kcal, 20 g protein)   [ ] Nutrition Support  [ X] Other: continue to monitor BG levels & if levels become elevated; add CCHO diet restriction to order; for now continue liberalized diet    Monitoring and Evaluation:   [X ] PO intake [ x ] Tolerance to diet prescription [ x ] weights [ x ] labs[ x ] follow up per protocol  [ ] other:

## 2019-10-19 NOTE — PROGRESS NOTE ADULT - SUBJECTIVE AND OBJECTIVE BOX
Patient seen in follow up for chronic hyponatremia. Not dyspneic. She is aware of fluid restriction request.    Former smoker, stopped smoking in distant past  Vitamin D deficiency  Morbid obesity due to excess calories  Edema extremities  Diabetes mellitus  Hyperbilirubinemia  COPD (chronic obstructive pulmonary disease)  COPD (chronic obstructive pulmonary disease)  Anticoagulation goal of INR 2 to 3  Anticardiolipin antibody positive  Congestive heart failure, unspecified HF chronicity, unspecified heart failure type  Moderate asthma, unspecified whether complicated, unspecified whether persistent  Other secondary hypertension  COPD (chronic obstructive pulmonary disease)  Anticardiolipin antibody positive  Essential hypertension  Pulmonary HTN  Morbid obesity  Diabetes    MEDICATIONS  (STANDING):  ALBUTerol/ipratropium for Nebulization 3 milliLiter(s) Nebulizer every 6 hours  allopurinol 300 milliGRAM(s) Oral daily  atorvastatin 20 milliGRAM(s) Oral at bedtime  dextrose 5%. 1000 milliLiter(s) (50 mL/Hr) IV Continuous <Continuous>  dextrose 50% Injectable 12.5 Gram(s) IV Push once  dextrose 50% Injectable 25 Gram(s) IV Push once  dextrose 50% Injectable 25 Gram(s) IV Push once  fluconAZOLE IVPB 200 milliGRAM(s) IV Intermittent every 24 hours  furosemide   Injectable 40 milliGRAM(s) IV Push two times a day  insulin lispro (HumaLOG) corrective regimen sliding scale   SubCutaneous at bedtime  insulin lispro (HumaLOG) corrective regimen sliding scale   SubCutaneous three times a day before meals  iron sucrose IVPB 100 milliGRAM(s) IV Intermittent every 7 days  levothyroxine 25 MICROGram(s) Oral daily  midodrine. 20 milliGRAM(s) Oral three times a day  nystatin    Suspension [id number] Unit(s) Swish and Swallow four times a day  pantoprazole    Tablet 40 milliGRAM(s) Oral before breakfast    MEDICATIONS  (PRN):  aluminum hydroxide/magnesium hydroxide/simethicone Suspension 30 milliLiter(s) Oral every 4 hours PRN Dyspepsia  benzocaine 15 mG/menthol 3.6 mG (Sugar-Free) Lozenge 1 Lozenge Oral every 6 hours PRN Mouth Sores  Biotene Dry Mouth Oral Rinse 5 milliLiter(s) Swish and Spit four times a day PRN dry mouth  dextrose 40% Gel 15 Gram(s) Oral once PRN Blood Glucose LESS THAN 70 milliGRAM(s)/deciliter  glucagon  Injectable 1 milliGRAM(s) IntraMuscular once PRN Glucose LESS THAN 70 milligrams/deciliter  ondansetron Injectable 4 milliGRAM(s) IV Push every 6 hours PRN Nausea and/or Vomiting    T(C): 36.6 (10-19-19 @ 17:23), Max: 36.8 (10-18-19 @ 21:14)  HR: 91 (10-19-19 @ 18:27) (75 - 106)  BP: 90/44 (10-19-19 @ 17:23) (83/40 - 153/74)  RR: 19 (10-19-19 @ 17:23) (17 - 20)  SpO2: 93% (10-19-19 @ 18:27) (85% - 100%)  Wt(kg): --  I&O's Detail    18 Oct 2019 07:01  -  19 Oct 2019 07:00  --------------------------------------------------------  IN:    Oral Fluid: 680 mL  Total IN: 680 mL    OUT:    Indwelling Catheter - Urethral: 610 mL  Total OUT: 610 mL    Total NET: 70 mL          PHYSICAL EXAM:  General: NAD, alert and conversant  Respiratory: b/l air entry  Cardiovascular: S1 S2  Gastrointestinal: soft  Extremities:  large edema    CBC Full  -  ( 19 Oct 2019 18:25 )  WBC Count : 10.19 K/uL  RBC Count : 2.89 M/uL  Hemoglobin : 6.8 g/dL  Hematocrit : 22.0 %  Platelet Count - Automated : 252 K/uL  Mean Cell Volume : 76.1 fl  Mean Cell Hemoglobin : 23.5 pg  Mean Cell Hemoglobin Concentration : 30.9 gm/dL  Auto Neutrophil # : x  Auto Lymphocyte # : x  Auto Monocyte # : x  Auto Eosinophil # : x  Auto Basophil # : x  Auto Neutrophil % : x  Auto Lymphocyte % : x  Auto Monocyte % : x  Auto Eosinophil % : x  Auto Basophil % : x    10-19    126<L>  |  90<L>  |  12  ----------------------------<  168<H>  4.0   |  27  |  0.83    Ca    8.4<L>      19 Oct 2019 18:25  Phos  2.8     10-19  Mg     2.0     10-19    TPro  5.9<L>  /  Alb  2.6<L>  /  TBili  3.3<H>  /  DBili  2.41<H>  /  AST  108<H>  /  ALT  80<H>  /  AlkPhos  90  10-19        Sodium, Serum: 126 (10-19 @ 18:25)  Sodium, Serum: 127 (10-18 @ 09:46)  Sodium, Serum: 124 (10-17 @ 12:42)    Creatinine, Serum: 0.83 (10-19 @ 18:25)  Creatinine, Serum: 0.99 (10-18 @ 09:46)  Creatinine, Serum: 1.25 (10-17 @ 12:42)    Potassium, Serum: 4.0 (10-19 @ 18:25)  Potassium, Serum: 4.1 (10-18 @ 09:46)  Potassium, Serum: 4.7 (10-17 @ 12:42)    Hemoglobin: 6.8 (10-19 @ 18:25)  Hemoglobin: 7.4 (10-18 @ 09:46)  Hemoglobin: 7.2 (10-17 @ 12:42)  Hemoglobin: 7.8 (10-16 @ 22:03)

## 2019-10-19 NOTE — CHART NOTE - NSCHARTNOTEFT_GEN_A_CORE
Medicine Hospitalist PA    Requested by RN to obtain blood specimen from patient. Pt difficult stick, unsuccessful attempts from phlebotomist. Obtained blood from . Pt tolerated procedure well. No hematoma or bleeding noted. Specimen labeled at bedside and given to the RN. Medicine Hospitalist PA    Requested by RN to obtain blood specimen from patient. Pt difficult stick, unsuccessful attempts from phlebotomist. Obtained blood from R antecub using ultrasound guidance. Pt tolerated procedure well. No hematoma or bleeding noted. Specimen labeled at bedside and given to the RN.

## 2019-10-19 NOTE — PROGRESS NOTE ADULT - SUBJECTIVE AND OBJECTIVE BOX
Patient is a 34y old  Female who presents with a chief complaint of Shortness of breath and hypotension. (18 Oct 2019 12:24)    PAST MEDICAL & SURGICAL HISTORY:  Former smoker, stopped smoking in distant past  Vitamin D deficiency  Morbid obesity due to excess calories  Edema extremities  Diabetes mellitus  Hyperbilirubinemia  COPD (chronic obstructive pulmonary disease)  Anticoagulation goal of INR 2 to 3  Anticardiolipin antibody positive  Congestive heart failure, unspecified HF chronicity, unspecified heart failure type  Moderate asthma, unspecified whether complicated, unspecified whether persistent  Other secondary hypertension  Anticardiolipin antibody positive  Pulmonary HTN  Morbid obesity    INTERVAL HISTORY: resting in bed, not in any acute distress   	  MEDICATIONS:  MEDICATIONS  (STANDING):  ALBUTerol/ipratropium for Nebulization 3 milliLiter(s) Nebulizer every 6 hours  allopurinol 300 milliGRAM(s) Oral daily  atorvastatin 20 milliGRAM(s) Oral at bedtime  furosemide   Injectable 80 milliGRAM(s) IV Push every 12 hours  insulin lispro (HumaLOG) corrective regimen sliding scale   SubCutaneous at bedtime  insulin lispro (HumaLOG) corrective regimen sliding scale   SubCutaneous three times a day before meals  iron sucrose IVPB 100 milliGRAM(s) IV Intermittent every 7 days  levothyroxine 25 MICROGram(s) Oral daily  midodrine. 10 milliGRAM(s) Oral every 8 hours  nystatin    Suspension [id number] Unit(s) Swish and Swallow four times a day  pantoprazole    Tablet 40 milliGRAM(s) Oral before breakfast  sodium phosphate IVPB 15 milliMole(s) IV Intermittent once    MEDICATIONS  (PRN):  Biotene Dry Mouth Oral Rinse 5 milliLiter(s) Swish and Spit four times a day PRN dry mouth  dextrose 40% Gel 15 Gram(s) Oral once PRN Blood Glucose LESS THAN 70 milliGRAM(s)/deciliter  glucagon  Injectable 1 milliGRAM(s) IntraMuscular once PRN Glucose LESS THAN 70 milligrams/deciliter  ondansetron Injectable 4 milliGRAM(s) IV Push every 6 hours PRN Nausea and/or Vomiting  traMADol 25 milliGRAM(s) Oral every 4 hours PRN Moderate Pain (4 - 6)    Vitals:  T(F): 97.8 (10-18-19 @ 12:01), Max: 97.8 (10-18-19 @ 05:23)  HR: 97 (10-18-19 @ 12:01) (80 - 101)  BP: 153/74 (10-18-19 @ 12:01) (94/48 - 153/74)  RR: 20 (10-18-19 @ 12:01) (16 - 20)  SpO2: 99% (10-18-19 @ 12:01) (93% - 99%)  Wt(kg): --105.2 kg    10-17 @ 07:01  -  10-18 @ 07:00  --------------------------------------------------------  IN:    Oral Fluid: 980 mL  Total IN: 980 mL    OUT:    Indwelling Catheter - Urethral: 2975 mL  Total OUT: 2975 mL    Total NET: -1995 mL    PHYSICAL EXAM:  Neuro: Awake, responsive  CV: S1 S2 RRR  Lungs: CTABL  GI: Soft, BS +, ND, NT, + anasarca   Extremities: ++++ LE edema    TELEMETRY: RSR    RADIOLOGY: < from: Xray Chest 1 View- PORTABLE-Urgent (10.09.19 @ 21:34) >  Lungs: The lungs are clear.  Heart: There is cardiomegaly.  Mediastinum: Hilar prominence noted.    IMPRESSION:    Clear lungs.    < end of copied text >  < from: US Abdomen Complete (10.18.19 @ 13:16) >  Fatty liver.  No cholelithiasis or biliary ductal dilatation.    < end of copied text >  < from: US Duplex Venous Lower Ext Complete, Bilateral (10.18.19 @ 13:15) >  No change in left external iliac and left greater saphenous vein   thrombosis.    < end of copied text >    DIAGNOSTIC TESTING:    [ x] Echocardiogram:< from: TTE Echo Doppler w/o Cont (07.26.19 @ 08:44) >   1. Left ventricular ejection fraction, by visual estimation, is 45 to   50%.   2. Mildly decreased global left ventricular systolic function.   3. Elevated mean left atrial pressure.   4. Global cardiomyopathy.   5. Increased LV wall thickness.   6. Normal left ventricular internal cavity size.   7. Right ventricular pressure overload.   8. Spectral Doppler shows impaired relaxation pattern of left   ventricular myocardial filling (Grade I diastolic dysfunction).   9. There is mild asymmetric left ventricular hypertrophy.  10. Pulmonary hypertension.  11. Severely enlarged right ventricle.  12. Severely reduced RV systolic function.  13. RV ejection fraction 34%.  14. Small pericardial effusion.  15. Degenerative mitral valve.  16. Mild mitral annular calcification.  17. Mild mitral valve regurgitation.  18. Moderate tricuspid regurgitation.  19. Structurally normal tricuspid valve, with normal leaflet excursion.  20. Mild aortic regurgitation.  21. Mild to moderate pulmonic valve regurgitation.  22. Structurally normal pulmonic valve, with normal leaflet excursion.  23. Estimated pulmonary artery systolic pressure is 87.1 mmHg assuming a   right atrial pressure of 20 mmHg, which is consistent with severe   pulmonary hypertension.  24. Pulmonary hypertension is present.  25. The main pulmonary artery is normal in size.  26. The right atrial pressure is moderately elevated.    < end of copied text >    LABS:	 	    18 Oct 2019 09:46    127    |  89     |  13     ----------------------------<  135    4.1     |  26     |  0.99   17 Oct 2019 12:42    124    |  88     |  15     ----------------------------<  160    4.7     |  22     |  1.25   16 Oct 2019 07:37    127    |  91     |  12     ----------------------------<  123    4.5     |  24     |  1.11     Ca    8.8        18 Oct 2019 09:46  Phos  2.4       18 Oct 2019 09:46  Mg     2.1       18 Oct 2019 09:46    TPro  6.3    /  Alb  2.8    /  TBili  4.0    /  DBili  2.46   /  AST  80     /  ALT  58     /  AlkPhos  88     18 Oct 2019 09:46                        7.4    14.06 )-----------( 266      ( 18 Oct 2019 09:46 )             24.7 ,                       7.2    10.24 )-----------( 284      ( 17 Oct 2019 12:42 )             24.5 ,                       7.8    9.97  )-----------( 273      ( 16 Oct 2019 22:03 )             26.7 ,                       6.9    6.71  )-----------( 326      ( 16 Oct 2019 07:37 )             23.6 Patient is a 34y old  Female who presents with a chief complaint of Shortness of breath and hypotension. (18 Oct 2019 12:24)    PAST MEDICAL & SURGICAL HISTORY:  Former smoker, stopped smoking in distant past  Vitamin D deficiency  Morbid obesity due to excess calories  Edema extremities  Diabetes mellitus  Hyperbilirubinemia  COPD (chronic obstructive pulmonary disease)  Anticoagulation goal of INR 2 to 3  Anticardiolipin antibody positive  Congestive heart failure, unspecified HF chronicity, unspecified heart failure type  Moderate asthma, unspecified whether complicated, unspecified whether persistent  Other secondary hypertension  Anticardiolipin antibody positive  Pulmonary HTN  Morbid obesity    INTERVAL HISTORY: resting in bed, NAD. As per RN still c/o significant leg pains, given several doses of Morphine, now being held.   	  MEDICATIONS:  MEDICATIONS  (STANDING):  ALBUTerol/ipratropium for Nebulization 3 milliLiter(s) Nebulizer every 6 hours  allopurinol 300 milliGRAM(s) Oral daily  atorvastatin 20 milliGRAM(s) Oral at bedtime  furosemide   Injectable 80 milliGRAM(s) IV Push every 12 hours  insulin lispro (HumaLOG) corrective regimen sliding scale   SubCutaneous at bedtime  insulin lispro (HumaLOG) corrective regimen sliding scale   SubCutaneous three times a day before meals  iron sucrose IVPB 100 milliGRAM(s) IV Intermittent every 7 days  levothyroxine 25 MICROGram(s) Oral daily  midodrine. 10 milliGRAM(s) Oral every 8 hours  nystatin    Suspension [id number] Unit(s) Swish and Swallow four times a day  pantoprazole    Tablet 40 milliGRAM(s) Oral before breakfast  sodium phosphate IVPB 15 milliMole(s) IV Intermittent once    MEDICATIONS  (PRN):  Biotene Dry Mouth Oral Rinse 5 milliLiter(s) Swish and Spit four times a day PRN dry mouth  dextrose 40% Gel 15 Gram(s) Oral once PRN Blood Glucose LESS THAN 70 milliGRAM(s)/deciliter  glucagon  Injectable 1 milliGRAM(s) IntraMuscular once PRN Glucose LESS THAN 70 milligrams/deciliter  ondansetron Injectable 4 milliGRAM(s) IV Push every 6 hours PRN Nausea and/or Vomiting  traMADol 25 milliGRAM(s) Oral every 4 hours PRN Moderate Pain (4 - 6)    Vitals:  Vital Signs Last 24 Hrs  T(C): 36.4 (19 Oct 2019 05:19), Max: 36.8 (18 Oct 2019 21:14)  T(F): 97.6 (19 Oct 2019 05:19), Max: 98.3 (18 Oct 2019 21:14)  HR: 86 (19 Oct 2019 08:24) (75 - 106)  BP: 98/53 (19 Oct 2019 05:19) (83/40 - 153/74)  RR: 18 (19 Oct 2019 05:19) (18 - 20)  SpO2: 93% (19 Oct 2019 08:24) (85% - 100%)    PHYSICAL EXAM:  Neuro: Awake, responsive  CV: S1 S2 RRR  Lungs: CTABL  GI: Soft, BS +, ND, NT, + anasarca   Extremities: no significant change in LE edema    TELEMETRY: RSR    RADIOLOGY: < from: Xray Chest 1 View- PORTABLE-Urgent (10.09.19 @ 21:34) >  Lungs: The lungs are clear.  Heart: There is cardiomegaly.  Mediastinum: Hilar prominence noted.    IMPRESSION:    Clear lungs.    < end of copied text >  < from: US Abdomen Complete (10.18.19 @ 13:16) >  Fatty liver.  No cholelithiasis or biliary ductal dilatation.    < end of copied text >  < from: US Duplex Venous Lower Ext Complete, Bilateral (10.18.19 @ 13:15) >  No change in left external iliac and left greater saphenous vein   thrombosis.    < end of copied text >    DIAGNOSTIC TESTING:    [ x] Echocardiogram:< from: TTE Echo Doppler w/o Cont (07.26.19 @ 08:44) >   1. Left ventricular ejection fraction, by visual estimation, is 45 to   50%.   2. Mildly decreased global left ventricular systolic function.   3. Elevated mean left atrial pressure.   4. Global cardiomyopathy.   5. Increased LV wall thickness.   6. Normal left ventricular internal cavity size.   7. Right ventricular pressure overload.   8. Spectral Doppler shows impaired relaxation pattern of left   ventricular myocardial filling (Grade I diastolic dysfunction).   9. There is mild asymmetric left ventricular hypertrophy.  10. Pulmonary hypertension.  11. Severely enlarged right ventricle.  12. Severely reduced RV systolic function.  13. RV ejection fraction 34%.  14. Small pericardial effusion.  15. Degenerative mitral valve.  16. Mild mitral annular calcification.  17. Mild mitral valve regurgitation.  18. Moderate tricuspid regurgitation.  19. Structurally normal tricuspid valve, with normal leaflet excursion.  20. Mild aortic regurgitation.  21. Mild to moderate pulmonic valve regurgitation.  22. Structurally normal pulmonic valve, with normal leaflet excursion.  23. Estimated pulmonary artery systolic pressure is 87.1 mmHg assuming a   right atrial pressure of 20 mmHg, which is consistent with severe   pulmonary hypertension.  24. Pulmonary hypertension is present.  25. The main pulmonary artery is normal in size.  26. The right atrial pressure is moderately elevated.    < end of copied text >    LABS:	 	    10-18    127<L>  |  89<L>  |  13  ----------------------------<  135<H>  4.1   |  26  |  0.99    Ca    8.8      18 Oct 2019 09:46  Phos  2.4     10-18  Mg     2.1     10-18    TPro  6.3  /  Alb  2.8<L>  /  TBili  4.0<H>  /  DBili  2.46<H>  /  AST  80<H>  /  ALT  58  /  AlkPhos  88  10-18                 7.4    14.06 )-----------( 266      ( 18 Oct 2019 09:46 )             24.7 ,                             No labs today.

## 2019-10-19 NOTE — PROGRESS NOTE ADULT - SUBJECTIVE AND OBJECTIVE BOX
Patient is a 34y old  Female who presents with a chief complaint of Shortness of breath and hypotension. (15 Oct 2019 08:26)    Patient seen and examined bedside.   OVERNIGHT EVENTS:  none   left foot improved   complaining of mouth being sore     REVIEW OF SYSTEMS: denies chest pain/SOB, diaphoresis, cough, dizziness, headache, blurry vision, nausea, vomiting, abdominal pain.       MEDICATIONS  (STANDING):  ALBUTerol/ipratropium for Nebulization 3 milliLiter(s) Nebulizer every 6 hours  apixaban 10 milliGRAM(s) Oral every 12 hours  aspirin enteric coated 81 milliGRAM(s) Oral daily  atorvastatin 20 milliGRAM(s) Oral at bedtime  dextrose 5%. 1000 milliLiter(s) (50 mL/Hr) IV Continuous <Continuous>  dextrose 50% Injectable 12.5 Gram(s) IV Push once  dextrose 50% Injectable 25 Gram(s) IV Push once  dextrose 50% Injectable 25 Gram(s) IV Push once  furosemide   Injectable 40 milliGRAM(s) IV Push every 12 hours  insulin lispro (HumaLOG) corrective regimen sliding scale   SubCutaneous at bedtime  insulin lispro (HumaLOG) corrective regimen sliding scale   SubCutaneous three times a day before meals  levothyroxine 25 MICROGram(s) Oral daily  midodrine. 10 milliGRAM(s) Oral every 8 hours  pantoprazole    Tablet 40 milliGRAM(s) Oral before breakfast    MEDICATIONS  (PRN):  dextrose 40% Gel 15 Gram(s) Oral once PRN Blood Glucose LESS THAN 70 milliGRAM(s)/deciliter  glucagon  Injectable 1 milliGRAM(s) IntraMuscular once PRN Glucose LESS THAN 70 milligrams/deciliter  ondansetron Injectable 4 milliGRAM(s) IV Push every 6 hours PRN Nausea and/or Vomiting  traMADol 25 milliGRAM(s) Oral every 6 hours PRN Moderate Pain (4 - 6)      Allergies    No Known Allergies    Intolerances    Vital Signs Last 24 Hrs  T(C): 36.6 (19 Oct 2019 17:23), Max: 36.8 (18 Oct 2019 21:14)  T(F): 97.8 (19 Oct 2019 17:23), Max: 98.3 (18 Oct 2019 21:14)  HR: 90 (19 Oct 2019 20:21) (75 - 97)  BP: 90/41 (19 Oct 2019 20:21) (83/40 - 98/53)  BP(mean): --  RR: 18 (19 Oct 2019 20:21) (17 - 20)  SpO2: 94% (19 Oct 2019 20:21) (90% - 99%)    PHYSICAL EXAM:  GENERAL: NAD, speaking in full sentences, NOT using accessory muscles   HEAD:  Atraumatic, Normocephalic  EYES: EOMI, PERRLA, slight icterus b/l   ENMT: No tonsillar erythema, exudates, or enlargement; Moist mucous membranes  NECK: Supple, No JVD  NERVOUS SYSTEM:  Alert & Oriented X3, no focal neuro deficits   CHEST/LUNG: GOOD B/L AIR ENTRY, no w/r/r  HEART: Regular rate and rhythm; No murmurs, rubs, or gallops  ABDOMEN: Soft, Nontender, Nondistended; Bowel sounds present  : vaginal bleeding with clots   EXTREMITIES:  3-4+ Peripheral  edema BL LE, chronic skin changes of b/l LE. No cyanosis or clubbing noted.       Labs and imaging reviewed.                                      6.8    10.19 )-----------( 252      ( 19 Oct 2019 18:25 )             22.0   10-19    126<L>  |  90<L>  |  12  ----------------------------<  168<H>  4.0   |  27  |  0.83    Ca    8.4<L>      19 Oct 2019 18:25  Phos  2.8     10-19  Mg     2.0     10-19    TPro  5.9<L>  /  Alb  2.6<L>  /  TBili  3.3<H>  /  DBili  2.41<H>  /  AST  108<H>  /  ALT  80<H>  /  AlkPhos  90  10-19    Lipid Profile (07.28.19 @ 11:27)    Total Cholesterol/HDL Ratio Measurement: 7.6 RATIO    Cholesterol, Serum: 76 mg/dL    Triglycerides, Serum: 76 mg/dL    HDL Cholesterol, Serum: 10: HDL Levels >/= 60 mg/dL are considered beneficial and a "negative" risk  factor.  Effective 08/15/2018: New reference range and interpretive comment. mg/dL    Direct LDL: 51: LDL Cholesterol (mg/dL) --- Interpretive Comment (for adults 18 and over)  Optimal LDL Level may vary based on clinical situation  Below 70                   Ideal for people at very high risk of heart  disease  Below 100                  Ideal for people at risk of heart disease  100 - 129                    [place]  130 - 159                    Borderline high  160 - 189                    High  190 and Above           Very high mg/dL        RADIOLOGY & ADDITIONAL TESTS:    Imaging Personally Reviewed:  [ x] YES    < from: Xray Chest 1 View- PORTABLE-Urgent (10.09.19 @ 21:34) >  Clear lungs.    < from: TTE Echo Doppler w/o Cont (07.26.19 @ 08:44) >  1. Left ventricular ejection fraction, by visual estimation, is 45 to   50%.   2. Mildly decreased global left ventricular systolic function.   3. Elevated mean left atrial pressure.   4. Global cardiomyopathy.   5. Increased LV wall thickness.   6. Normal left ventricular internal cavity size.   7. Right ventricular pressure overload.   8. Spectral Doppler shows impaired relaxation pattern of left   ventricular myocardial filling (Grade I diastolic dysfunction).   9. There is mild asymmetric left ventricular hypertrophy.  10. Pulmonary hypertension.  11. Severely enlarged right ventricle.  12. Severely reduced RV systolic function.  13. RV ejection fraction 34%.  14. Small pericardial effusion.  15. Degenerative mitral valve.  16. Mild mitral annular calcification.  17. Mild mitral valve regurgitation.  18. Moderate tricuspid regurgitation.  19. Structurally normal tricuspid valve, with normal leaflet excursion.  20. Mild aortic regurgitation.  21. Mild to moderate pulmonic valve regurgitation.  22. Structurally normal pulmonic valve, with normal leaflet excursion.  23. Estimated pulmonary artery systolic pressure is 87.1 mmHg assuming a   right atrial pressure of 20 mmHg, which is consistent with severe   pulmonary hypertension.  24. Pulmonary hypertension is present.  25. The main pulmonary artery is normal in size.  26. The right atrial pressure is moderately elevated.    < end of copied text >    < from: Xray Chest 1 View- PORTABLE-Urgent (10.09.19 @ 21:34) >  IMPRESSION:    Clear lungs.    < end of copied text >      Consultant(s) Notes Reviewed:  [ x] YES     Care Discussed with [x ] Consultants [X ] Patient [ ] Family  [x ]    [x ]  Other; RN

## 2019-10-19 NOTE — PROGRESS NOTE ADULT - ASSESSMENT
RV failure/pulmonary hypertension with right sided failure, high ADH state with chronic hyponatremia.  I re-educated the patient on the reasons for fluid restriction and she expressed her understanding.  No objection to increase Lasix to 40 mg I.V. every 6 hours.

## 2019-10-19 NOTE — PROGRESS NOTE ADULT - ASSESSMENT
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    Acute on chronic combined systolic and diastolic RIGHT SIDED congestive heart failure:   - Echo in 7/2019 showed EF 45-50% w/ elevated mean left atrial pressure w/ global cardiomyopathy, grade I diastolic dysfunction, pulmonary hypertension w/ a severely enlarged right ventricle and severely reduced RV systolic function w/ RV ejection fraction 34%, moderate tricuspid regurgitation, mild to moderate pulmonic valve regurgitation & severe pulmonary hypertension   - cont lasix 40mg IV BID, attempted to increase to 80mg IV bid however pt hypotensive   -c/w midodrine to 20mg tid.   - monitor daily wt, i/o's  -jimenez removed, will attempt to monitor I/Os with primafit   -Have been encouraging patient daily for transfer to [facility], have explained importance of the transfer. However patient continues to refuse stating she does not think her condition will change as she did research on her condition and understands that there is limited intervention/treatment success for her.   -if she agrees, will then transfer for R and LHC and eval with Dr. Unger at [facility]., however currently refusing transfer stating she had cath before and doesn't see why she should have another one. Patient refusing despite education on importance of cath.   -fluid and salt restriction encouraged. Although patient is non compliant--ordering chinese food, french fries, per RN --not compliant with water restriction and also at times refusing meds and blood draws   -cardiology following    -will hold pain meds sec to hypotension.       Worsening acute blood loss anemia in the setting of AC and steroid use with heavy menstruation,  reviewed iron studies 10/14/19--consistent with iron deficiency anemia   continue with venofer 100mg IV q7days x 4 doses   will need GYN  H/H dropped again will give 1u PRBC , monitor CBC after transfusion and lasix after transfusion   (this will be her 2nd PRBC this admission)  will hold coumadin dose today  monitor H/H    Left foot pain, possibly from gout?  LE duplex  : No change in left external iliac and left greater saphenous vein   thrombosis.  uric acid elevated   allopurinol started    DIANN, most likely secondary to hypotension/diuresis   improving slowly now   monitor Cr     severe Pulmonary hypertension:  - c/w riociguat  - continue with supplemental oxygen to maintain O2 sat >92%  - pulmonary following    Direct hyperbilirubinemia, most likely from hepatic congestion   -reviewed prior Abd US 7/2019 --cholelithiasis   -10/18/19 Abd US: fatty liver , no cholelithiasis or biliary ductal dilatation       Type 2 diabetes mellitus with hyperglycemia, without long-term current use of insulin:  - Continue current insulin regimen    Hyponatremia:  - likely due to volume overload/ aggressive diuresis and possible SSRI- on hold  - Renal following , s/p tolvaptan 15mg x 1 10/15/19  - c/w Fluid restriction    HTN:  - hold all BP meds due to low BP  -currently on Midodrine for BP support     Moderate persistent Asthma:  - pulmonary following  - c/w Symbicort    Acute deep vein thrombosis (DVT) of femoral vein of left lower extremity:  - US showed an extensive acute nonocclusive thrombus in the left external iliac vein and the left common femoral vein and left greater saphenous vein  - was on Eliquis, pt non compliant with INR checks and doctor/follow-up visits , however per patient coumadin does not make her have vaginal bleeding like Eliquis and agreeing to follow PCP for INR. Also, patient to follow with GYN outpatient.   -per Dr. Ling ; not a candidate for ivc filter  -will restart coumadin once H/H stable   -will most likely require PICC line as patient is very difficulty stick and needs frequent labs     L foot injury due to weight fell on it  -Xray of the foot; neg  -pain mgmt     Acquired hypothyroidism:  - c/w synthroid     Hyperlipidemia:  - c/w Zocor    Depression:  - Zoloft on hold  -denies SI/HI  -Psych following     Preventative measure  fall precautions 33 yo female with history of Anticardiolipin antibody positive on AC at home, COPD, DM II, Essential hypertension, Vitamin D deficiency & severe Pulm HTN with right sided heart failure p/w SOB & was found to have acute on chronic combined systolic and diastolic congestive heart failure, hyponatremia, DIANN & acute RLE DVT.    Acute on chronic combined systolic and diastolic RIGHT SIDED congestive heart failure:   - Echo in 7/2019 showed EF 45-50% w/ elevated mean left atrial pressure w/ global cardiomyopathy, grade I diastolic dysfunction, pulmonary hypertension w/ a severely enlarged right ventricle and severely reduced RV systolic function w/ RV ejection fraction 34%, moderate tricuspid regurgitation, mild to moderate pulmonic valve regurgitation & severe pulmonary hypertension   - cont lasix 40mg IV BID, attempted to increase to 80mg IV bid however pt hypotensive   -c/w midodrine to 20mg tid.   - monitor daily wt, i/o's  -jimenez removed, will attempt to monitor I/Os with primafit   -Have been encouraging patient daily for transfer to [facility], have explained importance of the transfer. However patient continues to refuse stating she does not think her condition will change as she did research on her condition and understands that there is limited intervention/treatment success for her.   -if she agrees, will then transfer for R and LHC and eval with Dr. Unger at [facility]., however currently refusing transfer stating she had cath before and doesn't see why she should have another one. Patient refusing despite education on importance of cath.   -fluid and salt restriction encouraged. Although patient is non compliant--ordering chinese food, french fries, per RN --not compliant with water restriction and also at times refusing meds and blood draws   -cardiology following    -will hold pain meds sec to hypotension.       Worsening acute blood loss anemia in the setting of AC and steroid use with heavy menstruation,  reviewed iron studies 10/14/19--consistent with iron deficiency anemia   continue with venofer 100mg IV q7days x 4 doses   will need GYN  H/H dropped again will give 1u PRBC , monitor CBC after transfusion and lasix after transfusion   (this will be her 2nd PRBC this admission)  will hold coumadin dose today  monitor H/H    Left foot pain, possibly from gout?  LE duplex  : No change in left external iliac and left greater saphenous vein   thrombosis.  uric acid elevated   allopurinol started    severe Pulmonary hypertension:  - c/w riociguat  - continue with supplemental oxygen to maintain O2 sat >92%  - pulmonary following    Direct hyperbilirubinemia, most likely from hepatic congestion   -reviewed prior Abd US 7/2019 --cholelithiasis   -10/18/19 Abd US: fatty liver , no cholelithiasis or biliary ductal dilatation       Type 2 diabetes mellitus with hyperglycemia, without long-term current use of insulin:  - Continue current insulin regimen    Hyponatremia:  - likely due to volume overload/ aggressive diuresis and possible SSRI- on hold  - Renal following , s/p tolvaptan 15mg x 1 10/15/19  - c/w Fluid restriction    HTN:  - hold all BP meds due to low BP  -currently on Midodrine for BP support     Moderate persistent Asthma:  - pulmonary following  - c/w Symbicort    Acute deep vein thrombosis (DVT) of femoral vein of left lower extremity:  - US showed an extensive acute nonocclusive thrombus in the left external iliac vein and the left common femoral vein and left greater saphenous vein  - was on Eliquis, pt non compliant with INR checks and doctor/follow-up visits , however per patient coumadin does not make her have vaginal bleeding like Eliquis and agreeing to follow PCP for INR. Also, patient to follow with GYN outpatient.   -per Dr. Ling ; not a candidate for ivc filter  -will restart coumadin once H/H stable   -will most likely require PICC line as patient is very difficulty stick and needs frequent labs     L foot injury due to weight fell on it  -Xray of the foot; neg  -pain mgmt     Acquired hypothyroidism:  - c/w synthroid     Hyperlipidemia:  - c/w Zocor    Depression:  - Zoloft on hold  -denies SI/HI  -Psych following     Preventative measure  fall precautions

## 2019-10-19 NOTE — PROGRESS NOTE ADULT - ASSESSMENT
33 yo female with severe pulmonary hypertension and right sided HF (RV EF 35%), COPD, HTN, DM2, hyperbilirubinemia, L femoral DVT,  chronic hypotension admitted with symptoms of worsening right heart failure/hypotension. cardiac cath negative for any CAD in 2013.   Demand troponin levels noted.  According to weights, lost 15 pounds overnight(?)    Plan    Would continue diuresis, Lasix dosing as per renal, monitor electrolytes closely, daily weight   supplemental O2/pulm following   Still on midodrine 10mg q 8 for BP support during aggressive diuresing    offered to arrange  transfer to [facility] for further management of Pulmonary hypertension with the specialist there, but patient has refused any transfer. Pt prefers to follow up with Dr. Unger, [facility] HTN specialist as outpatient   Remains emotional and cries easily about  her clinical condition, Psych/ social work following   s/p PRBC transfusion, h/h remains low,  hold both  Eliquis/asa in setting of acute anemia, monitor h/h closely, concern of increased bleed during menstruation with Eliquis, ? restart coumadin, as per pt  coumadin does not make her have vaginal bleeding like Eliquis. Also, needs GYN followup as outpatient.   Activity as tolerated 35 yo female with severe pulmonary hypertension and right sided HF (RV EF 35%), COPD, HTN, DM2, hyperbilirubinemia, L femoral DVT,  chronic hypotension admitted with symptoms of worsening right heart failure/hypotension. cardiac cath negative for any CAD in 2013.   Demand troponin levels noted.  No significant weight loss noted.    Plan:    Would advance more aggressive diuretics, no evidence of any significant weight loss. Addition of Metolazone if okay with renal in addition to high dose Lasix.  Supplemental O2/pulm following   Still on midodrine 10mg q 8 for BP support during aggressive diuresing    Still declining transfer to [facility] for advanced management of her severe pulmonary hypertension .  Psych/ Palliative care following   s/p PRBC transfusion, no labs yet today. patient refusing blood draws (difficult stick).  Holding Eliquis/asa in setting of severe anemia, concern of increased bleed during menstruation with Eliquis, consider restarting coumadin (as per pt., Coumadin does not make her bleed as much)  Needs GYN followup as outpatient.   Cautious use of opiate analgesics, patient lethargic.  Activity as tolerated

## 2019-10-20 LAB
ALBUMIN SERPL ELPH-MCNC: 2.6 G/DL — LOW (ref 3.3–5)
ALP SERPL-CCNC: 102 U/L — SIGNIFICANT CHANGE UP (ref 40–120)
ALT FLD-CCNC: 87 U/L — HIGH (ref 12–78)
ANION GAP SERPL CALC-SCNC: 11 MMOL/L — SIGNIFICANT CHANGE UP (ref 5–17)
AST SERPL-CCNC: 114 U/L — HIGH (ref 15–37)
BILIRUB DIRECT SERPL-MCNC: 2.54 MG/DL — HIGH (ref 0.05–0.2)
BILIRUB INDIRECT FLD-MCNC: 2 MG/DL — HIGH (ref 0.2–1)
BILIRUB SERPL-MCNC: 4.5 MG/DL — HIGH (ref 0.2–1.2)
BUN SERPL-MCNC: 11 MG/DL — SIGNIFICANT CHANGE UP (ref 7–23)
CALCIUM SERPL-MCNC: 8.5 MG/DL — SIGNIFICANT CHANGE UP (ref 8.5–10.1)
CHLORIDE SERPL-SCNC: 88 MMOL/L — LOW (ref 96–108)
CO2 SERPL-SCNC: 29 MMOL/L — SIGNIFICANT CHANGE UP (ref 22–31)
CORTIS AM PEAK SERPL-MCNC: 13.6 UG/DL — SIGNIFICANT CHANGE UP (ref 6–18.4)
CORTIS AM PEAK SERPL-MCNC: 15 UG/DL — SIGNIFICANT CHANGE UP (ref 6–18.4)
CREAT SERPL-MCNC: 0.96 MG/DL — SIGNIFICANT CHANGE UP (ref 0.5–1.3)
GLUCOSE SERPL-MCNC: 107 MG/DL — HIGH (ref 70–99)
HCT VFR BLD CALC: 25.8 % — LOW (ref 34.5–45)
HGB BLD-MCNC: 7.8 G/DL — LOW (ref 11.5–15.5)
MAGNESIUM SERPL-MCNC: 2.3 MG/DL — SIGNIFICANT CHANGE UP (ref 1.6–2.6)
MCHC RBC-ENTMCNC: 23.6 PG — LOW (ref 27–34)
MCHC RBC-ENTMCNC: 30.2 GM/DL — LOW (ref 32–36)
MCV RBC AUTO: 78.2 FL — LOW (ref 80–100)
NRBC # BLD: 2 /100 WBCS — HIGH (ref 0–0)
PHOSPHATE SERPL-MCNC: 2.7 MG/DL — SIGNIFICANT CHANGE UP (ref 2.5–4.5)
PLATELET # BLD AUTO: 230 K/UL — SIGNIFICANT CHANGE UP (ref 150–400)
POTASSIUM SERPL-MCNC: 3.8 MMOL/L — SIGNIFICANT CHANGE UP (ref 3.5–5.3)
POTASSIUM SERPL-SCNC: 3.8 MMOL/L — SIGNIFICANT CHANGE UP (ref 3.5–5.3)
PROT SERPL-MCNC: 6.4 GM/DL — SIGNIFICANT CHANGE UP (ref 6–8.3)
RBC # BLD: 3.3 M/UL — LOW (ref 3.8–5.2)
RBC # FLD: 25.8 % — HIGH (ref 10.3–14.5)
SODIUM SERPL-SCNC: 128 MMOL/L — LOW (ref 135–145)
WBC # BLD: 9.44 K/UL — SIGNIFICANT CHANGE UP (ref 3.8–10.5)
WBC # FLD AUTO: 9.44 K/UL — SIGNIFICANT CHANGE UP (ref 3.8–10.5)

## 2019-10-20 PROCEDURE — 99232 SBSQ HOSP IP/OBS MODERATE 35: CPT

## 2019-10-20 PROCEDURE — 99195 PHLEBOTOMY: CPT

## 2019-10-20 PROCEDURE — 76937 US GUIDE VASCULAR ACCESS: CPT | Mod: 26

## 2019-10-20 PROCEDURE — 99233 SBSQ HOSP IP/OBS HIGH 50: CPT

## 2019-10-20 RX ORDER — POTASSIUM CHLORIDE 20 MEQ
40 PACKET (EA) ORAL ONCE
Refills: 0 | Status: COMPLETED | OUTPATIENT
Start: 2019-10-20 | End: 2019-10-21

## 2019-10-20 RX ORDER — SENNA PLUS 8.6 MG/1
2 TABLET ORAL AT BEDTIME
Refills: 0 | Status: DISCONTINUED | OUTPATIENT
Start: 2019-10-20 | End: 2019-10-23

## 2019-10-20 RX ORDER — POLYETHYLENE GLYCOL 3350 17 G/17G
17 POWDER, FOR SOLUTION ORAL DAILY
Refills: 0 | Status: DISCONTINUED | OUTPATIENT
Start: 2019-10-20 | End: 2019-10-23

## 2019-10-20 RX ORDER — DOCUSATE SODIUM 100 MG
100 CAPSULE ORAL
Refills: 0 | Status: DISCONTINUED | OUTPATIENT
Start: 2019-10-20 | End: 2019-10-23

## 2019-10-20 RX ADMIN — Medication 40 MILLIGRAM(S): at 11:44

## 2019-10-20 RX ADMIN — Medication 40 MILLIGRAM(S): at 00:24

## 2019-10-20 RX ADMIN — Medication 25 MICROGRAM(S): at 06:13

## 2019-10-20 RX ADMIN — Medication 1: at 08:32

## 2019-10-20 RX ADMIN — SENNA PLUS 2 TABLET(S): 8.6 TABLET ORAL at 22:14

## 2019-10-20 RX ADMIN — Medication 3 MILLILITER(S): at 23:29

## 2019-10-20 RX ADMIN — Medication 40 MILLIGRAM(S): at 18:07

## 2019-10-20 RX ADMIN — MIDODRINE HYDROCHLORIDE 20 MILLIGRAM(S): 2.5 TABLET ORAL at 11:38

## 2019-10-20 RX ADMIN — Medication 40 MILLIGRAM(S): at 06:17

## 2019-10-20 RX ADMIN — ATORVASTATIN CALCIUM 20 MILLIGRAM(S): 80 TABLET, FILM COATED ORAL at 22:14

## 2019-10-20 RX ADMIN — Medication 300 MILLIGRAM(S): at 11:37

## 2019-10-20 RX ADMIN — Medication 100 MILLIGRAM(S): at 18:06

## 2019-10-20 RX ADMIN — PANTOPRAZOLE SODIUM 40 MILLIGRAM(S): 20 TABLET, DELAYED RELEASE ORAL at 06:12

## 2019-10-20 RX ADMIN — Medication 500000 UNIT(S): at 22:13

## 2019-10-20 RX ADMIN — Medication 3 MILLILITER(S): at 11:46

## 2019-10-20 RX ADMIN — MIDODRINE HYDROCHLORIDE 20 MILLIGRAM(S): 2.5 TABLET ORAL at 06:12

## 2019-10-20 RX ADMIN — Medication 40 MILLIGRAM(S): at 22:14

## 2019-10-20 RX ADMIN — Medication 500000 UNIT(S): at 18:07

## 2019-10-20 RX ADMIN — Medication 3 MILLILITER(S): at 05:56

## 2019-10-20 RX ADMIN — FLUCONAZOLE 100 MILLIGRAM(S): 150 TABLET ORAL at 12:33

## 2019-10-20 RX ADMIN — Medication 3 MILLILITER(S): at 18:15

## 2019-10-20 RX ADMIN — MIDODRINE HYDROCHLORIDE 20 MILLIGRAM(S): 2.5 TABLET ORAL at 18:06

## 2019-10-20 RX ADMIN — Medication 500000 UNIT(S): at 06:14

## 2019-10-20 NOTE — PROGRESS NOTE ADULT - ASSESSMENT
RV failure/pulmonary hypertension with right sided failure, high ADH state with chronic hyponatremia. Sodium is better today.  Continue with fluid restriction and Lasix 40 mg I.V. every 6 hours. Replete K.

## 2019-10-20 NOTE — PROGRESS NOTE ADULT - ASSESSMENT
33 yo female with severe pulmonary hypertension and right sided HF (RV EF 35%), COPD, HTN, DM2, hyperbilirubinemia, L femoral DVT,  chronic hypotension admitted with symptoms of worsening right heart failure/hypotension. cardiac cath negative for any CAD in 2013.   Demand troponin levels noted.  Lost 1.5 kg since admission, finally responding to higher dose IV Lasix.    Plan:    c/o significant abdominal cramping pains, ?constipation sec to opiates. Stool softeners, followup with medicine.  Would advance more aggressive diuretics, no evidence of any significant weight loss. Addition of Metolazone if fluid loss plateaus.  Supplemental O2/pulm following   Still on midodrine 10mg q 8 for BP support during aggressive diuresing , this can be tapered as it is not contributing much to hemodynamic support.  Still declining transfer to [facility] for advanced management of her severe pulmonary hypertension .  Psych/ Palliative care following.  s/p PRBC transfusion, H/H better.  Holding Eliquis/asa in setting of severe anemia, concern of increased bleed during menstruation with Eliquis, consider restarting coumadin (as per pt., Coumadin does not make her bleed as much) but patient will have difficulty managing INR.  Needs GYN followup as outpatient.   Activity as tolerated

## 2019-10-20 NOTE — PROGRESS NOTE ADULT - SUBJECTIVE AND OBJECTIVE BOX
Patient is a 34y old  Female who presents with a chief complaint of Shortness of breath and hypotension. (18 Oct 2019 12:24)    PAST MEDICAL & SURGICAL HISTORY:  Former smoker, stopped smoking in distant past  Vitamin D deficiency  Morbid obesity due to excess calories  Edema extremities  Diabetes mellitus  Hyperbilirubinemia  COPD (chronic obstructive pulmonary disease)  Anticoagulation goal of INR 2 to 3  Anticardiolipin antibody positive  Congestive heart failure, unspecified HF chronicity, unspecified heart failure type  Moderate asthma, unspecified whether complicated, unspecified whether persistent  Other secondary hypertension  Anticardiolipin antibody positive  Pulmonary HTN  Morbid obesity    INTERVAL HISTORY: resting in bed, c/o significant and constant abdominal pains,  Morphine now being held. s/p PRBC x 1 yesterday.  	  MEDICATIONS:  MEDICATIONS  (STANDING):  ALBUTerol/ipratropium for Nebulization 3 milliLiter(s) Nebulizer every 6 hours  allopurinol 300 milliGRAM(s) Oral daily  atorvastatin 20 milliGRAM(s) Oral at bedtime  furosemide   Injectable 80 milliGRAM(s) IV Push every 12 hours  insulin lispro (HumaLOG) corrective regimen sliding scale   SubCutaneous at bedtime  insulin lispro (HumaLOG) corrective regimen sliding scale   SubCutaneous three times a day before meals  iron sucrose IVPB 100 milliGRAM(s) IV Intermittent every 7 days  levothyroxine 25 MICROGram(s) Oral daily  midodrine. 10 milliGRAM(s) Oral every 8 hours  nystatin    Suspension 181000 Unit(s) Swish and Swallow four times a day  pantoprazole    Tablet 40 milliGRAM(s) Oral before breakfast  sodium phosphate IVPB 15 milliMole(s) IV Intermittent once    MEDICATIONS  (PRN):  Biotene Dry Mouth Oral Rinse 5 milliLiter(s) Swish and Spit four times a day PRN dry mouth  dextrose 40% Gel 15 Gram(s) Oral once PRN Blood Glucose LESS THAN 70 milliGRAM(s)/deciliter  glucagon  Injectable 1 milliGRAM(s) IntraMuscular once PRN Glucose LESS THAN 70 milligrams/deciliter  ondansetron Injectable 4 milliGRAM(s) IV Push every 6 hours PRN Nausea and/or Vomiting  traMADol 25 milliGRAM(s) Oral every 4 hours PRN Moderate Pain (4 - 6)    Vitals:  Vital Signs Last 24 Hrs  T(C): 36.8 (20 Oct 2019 05:39), Max: 36.9 (20 Oct 2019 00:25)  T(F): 98.2 (20 Oct 2019 05:39), Max: 98.5 (20 Oct 2019 00:25)  HR: 86 (20 Oct 2019 06:24) (76 - 95)  BP: 96/52 (20 Oct 2019 05:39) (89/59 - 103/50)  BP(mean): --  RR: 18 (20 Oct 2019 05:39) (17 - 19)  SpO2: 96% (20 Oct 2019 06:24) (91% - 100%)      PHYSICAL EXAM:  Neuro: Awake, responsive  CV: S1 S2 RRR  Lungs: CTABL  GI: Soft, BS +, ND, NT, + anasarca   Extremities: no significant change in LE edema    TELEMETRY: RSR    RADIOLOGY:     [ x] Echocardiogram:< from: TTE Echo Doppler w/o Cont (07.26.19 @ 08:44) >   1. Left ventricular ejection fraction, by visual estimation, is 45 to   50%.   2. Mildly decreased global left ventricular systolic function.   3. Elevated mean left atrial pressure.   4. Global cardiomyopathy.   5. Increased LV wall thickness.   6. Normal left ventricular internal cavity size.   7. Right ventricular pressure overload.   8. Spectral Doppler shows impaired relaxation pattern of left   ventricular myocardial filling (Grade I diastolic dysfunction).   9. There is mild asymmetric left ventricular hypertrophy.  10. Pulmonary hypertension.  11. Severely enlarged right ventricle.  12. Severely reduced RV systolic function.  13. RV ejection fraction 34%.  14. Small pericardial effusion.  15. Degenerative mitral valve.  16. Mild mitral annular calcification.  17. Mild mitral valve regurgitation.  18. Moderate tricuspid regurgitation.  19. Structurally normal tricuspid valve, with normal leaflet excursion.  20. Mild aortic regurgitation.  21. Mild to moderate pulmonic valve regurgitation.  22. Structurally normal pulmonic valve, with normal leaflet excursion.  23. Estimated pulmonary artery systolic pressure is 87.1 mmHg assuming a   right atrial pressure of 20 mmHg, which is consistent with severe   pulmonary hypertension.  24. Pulmonary hypertension is present.  25. The main pulmonary artery is normal in size.  26. The right atrial pressure is moderately elevated.    < end of copied text >    LABS:	 	                          7.8    9.44  )-----------( 230      ( 20 Oct 2019 07:31 )             25.8   10-20    128<L>  |  88<L>  |  11  ----------------------------<  107<H>  3.8   |  29  |  0.96    Ca    8.5      20 Oct 2019 07:31  Phos  2.7     10-20  Mg     2.3     10-20    TPro  6.4  /  Alb  2.6<L>  /  TBili  4.5<H>  /  DBili  2.54<H>  /  AST  114<H>  /  ALT  87<H>  /  AlkPhos  102  10-20

## 2019-10-20 NOTE — PROGRESS NOTE ADULT - SUBJECTIVE AND OBJECTIVE BOX
Patient is a 34y old  Female who presents with a chief complaint of Shortness of breath and hypotension. (15 Oct 2019 08:26)    Patient seen and examined bedside.   OVERNIGHT EVENTS:  none   complaining of abd pain , states has not had a BM for few days, most likely constipation from opiates   states mouth pain has improved   REVIEW OF SYSTEMS: denies chest pain/SOB, diaphoresis, cough, dizziness, headache, blurry vision, nausea, vomiting      MEDICATIONS  (STANDING):  ALBUTerol/ipratropium for Nebulization 3 milliLiter(s) Nebulizer every 6 hours  apixaban 10 milliGRAM(s) Oral every 12 hours  aspirin enteric coated 81 milliGRAM(s) Oral daily  atorvastatin 20 milliGRAM(s) Oral at bedtime  dextrose 5%. 1000 milliLiter(s) (50 mL/Hr) IV Continuous <Continuous>  dextrose 50% Injectable 12.5 Gram(s) IV Push once  dextrose 50% Injectable 25 Gram(s) IV Push once  dextrose 50% Injectable 25 Gram(s) IV Push once  furosemide   Injectable 40 milliGRAM(s) IV Push every 12 hours  insulin lispro (HumaLOG) corrective regimen sliding scale   SubCutaneous at bedtime  insulin lispro (HumaLOG) corrective regimen sliding scale   SubCutaneous three times a day before meals  levothyroxine 25 MICROGram(s) Oral daily  midodrine. 10 milliGRAM(s) Oral every 8 hours  pantoprazole    Tablet 40 milliGRAM(s) Oral before breakfast    MEDICATIONS  (PRN):  dextrose 40% Gel 15 Gram(s) Oral once PRN Blood Glucose LESS THAN 70 milliGRAM(s)/deciliter  glucagon  Injectable 1 milliGRAM(s) IntraMuscular once PRN Glucose LESS THAN 70 milligrams/deciliter  ondansetron Injectable 4 milliGRAM(s) IV Push every 6 hours PRN Nausea and/or Vomiting  traMADol 25 milliGRAM(s) Oral every 6 hours PRN Moderate Pain (4 - 6)      Allergies    No Known Allergies    Intolerances    Vital Signs Last 24 Hrs  T(C): 36.8 (20 Oct 2019 05:39), Max: 36.9 (20 Oct 2019 00:25)  T(F): 98.2 (20 Oct 2019 05:39), Max: 98.5 (20 Oct 2019 00:25)  HR: 82 (20 Oct 2019 18:15) (76 - 90)  BP: 96/52 (20 Oct 2019 05:39) (89/59 - 103/50)  BP(mean): --  RR: 18 (20 Oct 2019 05:39) (18 - 18)  SpO2: 99% (20 Oct 2019 18:15) (91% - 100%)    PHYSICAL EXAM:  GENERAL: NAD, speaking in full sentences, NOT using accessory muscles   HEAD:  Atraumatic, Normocephalic  EYES: EOMI, PERRLA, slight icterus b/l   ENMT: No tonsillar erythema, exudates, or enlargement; Moist mucous membranes  NECK: Supple, No JVD  NERVOUS SYSTEM:  Alert & Oriented X3, no focal neuro deficits   CHEST/LUNG: GOOD B/L AIR ENTRY, no w/r/r  HEART: Regular rate and rhythm; No murmurs, rubs, or gallops  ABDOMEN: Soft, Nontender, Nondistended; Bowel sounds present  : vaginal bleeding with clots   EXTREMITIES:  3-4+ Peripheral  edema BL LE, chronic skin changes of b/l LE. No cyanosis or clubbing noted.       Labs and imaging reviewed.                                     7.8    9.44  )-----------( 230      ( 20 Oct 2019 07:31 )             25.8   10-20    128<L>  |  88<L>  |  11  ----------------------------<  107<H>  3.8   |  29  |  0.96    Ca    8.5      20 Oct 2019 07:31  Phos  2.7     10-20  Mg     2.3     10-20    TPro  6.4  /  Alb  2.6<L>  /  TBili  4.5<H>  /  DBili  2.54<H>  /  AST  114<H>  /  ALT  87<H>  /  AlkPhos  102  10-20    Lipid Profile (07.28.19 @ 11:27)    Total Cholesterol/HDL Ratio Measurement: 7.6 RATIO    Cholesterol, Serum: 76 mg/dL    Triglycerides, Serum: 76 mg/dL    HDL Cholesterol, Serum: 10: HDL Levels >/= 60 mg/dL are considered beneficial and a "negative" risk  factor.  Effective 08/15/2018: New reference range and interpretive comment. mg/dL    Direct LDL: 51: LDL Cholesterol (mg/dL) --- Interpretive Comment (for adults 18 and over)  Optimal LDL Level may vary based on clinical situation  Below 70                   Ideal for people at very high risk of heart  disease  Below 100                  Ideal for people at risk of heart disease  100 - 129                    [place]  130 - 159                    Borderline high  160 - 189                    High  190 and Above           Very high mg/dL        RADIOLOGY & ADDITIONAL TESTS:    Imaging Personally Reviewed:  [ x] YES    < from: Xray Chest 1 View- PORTABLE-Urgent (10.09.19 @ 21:34) >  Clear lungs.    < from: TTE Echo Doppler w/o Cont (07.26.19 @ 08:44) >  1. Left ventricular ejection fraction, by visual estimation, is 45 to   50%.   2. Mildly decreased global left ventricular systolic function.   3. Elevated mean left atrial pressure.   4. Global cardiomyopathy.   5. Increased LV wall thickness.   6. Normal left ventricular internal cavity size.   7. Right ventricular pressure overload.   8. Spectral Doppler shows impaired relaxation pattern of left   ventricular myocardial filling (Grade I diastolic dysfunction).   9. There is mild asymmetric left ventricular hypertrophy.  10. Pulmonary hypertension.  11. Severely enlarged right ventricle.  12. Severely reduced RV systolic function.  13. RV ejection fraction 34%.  14. Small pericardial effusion.  15. Degenerative mitral valve.  16. Mild mitral annular calcification.  17. Mild mitral valve regurgitation.  18. Moderate tricuspid regurgitation.  19. Structurally normal tricuspid valve, with normal leaflet excursion.  20. Mild aortic regurgitation.  21. Mild to moderate pulmonic valve regurgitation.  22. Structurally normal pulmonic valve, with normal leaflet excursion.  23. Estimated pulmonary artery systolic pressure is 87.1 mmHg assuming a   right atrial pressure of 20 mmHg, which is consistent with severe   pulmonary hypertension.  24. Pulmonary hypertension is present.  25. The main pulmonary artery is normal in size.  26. The right atrial pressure is moderately elevated.    < end of copied text >    < from: Xray Chest 1 View- PORTABLE-Urgent (10.09.19 @ 21:34) >  IMPRESSION:    Clear lungs.    < end of copied text >      Consultant(s) Notes Reviewed:  [ x] YES     Care Discussed with [x ] Consultants [X ] Patient [ ] Family  [x ]    [x ]  Other; RN

## 2019-10-20 NOTE — PROGRESS NOTE ADULT - SUBJECTIVE AND OBJECTIVE BOX
Patient seen in follow up for edema and chronic hyponatremia. C/o constipation with abdominal discomfort, received laxative. No SOB at rest.    Former smoker, stopped smoking in distant past  Vitamin D deficiency  Morbid obesity due to excess calories  Edema extremities  Diabetes mellitus  Hyperbilirubinemia  COPD (chronic obstructive pulmonary disease)  COPD (chronic obstructive pulmonary disease)  Anticoagulation goal of INR 2 to 3  Anticardiolipin antibody positive  Congestive heart failure, unspecified HF chronicity, unspecified heart failure type  Moderate asthma, unspecified whether complicated, unspecified whether persistent  Other secondary hypertension  COPD (chronic obstructive pulmonary disease)  Anticardiolipin antibody positive  Essential hypertension  Pulmonary HTN  Morbid obesity  Diabetes    MEDICATIONS  (STANDING):  ALBUTerol/ipratropium for Nebulization 3 milliLiter(s) Nebulizer every 6 hours  allopurinol 300 milliGRAM(s) Oral daily  atorvastatin 20 milliGRAM(s) Oral at bedtime  dextrose 5%. 1000 milliLiter(s) (50 mL/Hr) IV Continuous <Continuous>  dextrose 50% Injectable 12.5 Gram(s) IV Push once  dextrose 50% Injectable 25 Gram(s) IV Push once  dextrose 50% Injectable 25 Gram(s) IV Push once  docusate sodium 100 milliGRAM(s) Oral two times a day  fluconAZOLE IVPB 200 milliGRAM(s) IV Intermittent every 24 hours  furosemide   Injectable 40 milliGRAM(s) IV Push every 6 hours  insulin lispro (HumaLOG) corrective regimen sliding scale   SubCutaneous at bedtime  insulin lispro (HumaLOG) corrective regimen sliding scale   SubCutaneous three times a day before meals  iron sucrose IVPB 100 milliGRAM(s) IV Intermittent every 7 days  levothyroxine 25 MICROGram(s) Oral daily  midodrine. 20 milliGRAM(s) Oral three times a day  nystatin    Suspension [id number] Unit(s) Swish and Swallow four times a day  pantoprazole    Tablet 40 milliGRAM(s) Oral before breakfast    MEDICATIONS  (PRN):  aluminum hydroxide/magnesium hydroxide/simethicone Suspension 30 milliLiter(s) Oral every 4 hours PRN Dyspepsia  benzocaine 15 mG/menthol 3.6 mG (Sugar-Free) Lozenge 1 Lozenge Oral every 6 hours PRN Mouth Sores  Biotene Dry Mouth Oral Rinse 5 milliLiter(s) Swish and Spit four times a day PRN dry mouth  dextrose 40% Gel 15 Gram(s) Oral once PRN Blood Glucose LESS THAN 70 milliGRAM(s)/deciliter  glucagon  Injectable 1 milliGRAM(s) IntraMuscular once PRN Glucose LESS THAN 70 milligrams/deciliter  ondansetron Injectable 4 milliGRAM(s) IV Push every 6 hours PRN Nausea and/or Vomiting    T(C): 36.8 (10-20-19 @ 05:39), Max: 36.9 (10-20-19 @ 00:25)  HR: 79 (10-20-19 @ 11:48) (75 - 95)  BP: 96/52 (10-20-19 @ 05:39) (89/59 - 103/50)  RR: 18 (10-20-19 @ 05:39) (17 - 19)  SpO2: 100% (10-20-19 @ 11:48) (91% - 100%)  Wt(kg): --  I&O's Detail    19 Oct 2019 07:01  -  20 Oct 2019 07:00  --------------------------------------------------------  IN:  Total IN: 0 mL    OUT:    Indwelling Catheter - Urethral: 1550 mL  Total OUT: 1550 mL    Total NET: -1550 mL      PHYSICAL EXAM:  General: NAD, alert and conversant  Respiratory: b/l air entry  Cardiovascular: S1 S2  Gastrointestinal: soft  Extremities:  large edema    CBC Full  -  ( 20 Oct 2019 07:31 )  WBC Count : 9.44 K/uL  RBC Count : 3.30 M/uL  Hemoglobin : 7.8 g/dL  Hematocrit : 25.8 %  Platelet Count - Automated : 230 K/uL  Mean Cell Volume : 78.2 fl  Mean Cell Hemoglobin : 23.6 pg  Mean Cell Hemoglobin Concentration : 30.2 gm/dL  Auto Neutrophil # : x  Auto Lymphocyte # : x  Auto Monocyte # : x  Auto Eosinophil # : x  Auto Basophil # : x  Auto Neutrophil % : x  Auto Lymphocyte % : x  Auto Monocyte % : x  Auto Eosinophil % : x  Auto Basophil % : x    10-20    128<L>  |  88<L>  |  11  ----------------------------<  107<H>  3.8   |  29  |  0.96    Ca    8.5      20 Oct 2019 07:31  Phos  2.7     10-20  Mg     2.3     10-20    TPro  6.4  /  Alb  2.6<L>  /  TBili  4.5<H>  /  DBili  2.54<H>  /  AST  114<H>  /  ALT  87<H>  /  AlkPhos  102  10-20        Sodium, Serum: 128 (10-20 @ 07:31)  Sodium, Serum: 126 (10-19 @ 18:25)  Sodium, Serum: 127 (10-18 @ 09:46)    Creatinine, Serum: 0.96 (10-20 @ 07:31)  Creatinine, Serum: 0.83 (10-19 @ 18:25)  Creatinine, Serum: 0.99 (10-18 @ 09:46)    Potassium, Serum: 3.8 (10-20 @ 07:31)  Potassium, Serum: 4.0 (10-19 @ 18:25)  Potassium, Serum: 4.1 (10-18 @ 09:46)    Hemoglobin: 7.8 (10-20 @ 07:31)  Hemoglobin: 6.8 (10-19 @ 18:25)  Hemoglobin: 7.4 (10-18 @ 09:46)

## 2019-10-20 NOTE — PROGRESS NOTE ADULT - ASSESSMENT
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    Acute on chronic combined systolic and diastolic RIGHT SIDED congestive heart failure:   - Echo in 7/2019 showed EF 45-50% w/ elevated mean left atrial pressure w/ global cardiomyopathy, grade I diastolic dysfunction, pulmonary hypertension w/ a severely enlarged right ventricle and severely reduced RV systolic function w/ RV ejection fraction 34%, moderate tricuspid regurgitation, mild to moderate pulmonic valve regurgitation & severe pulmonary hypertension   - cont lasix 40mg IV tid  -c/w midodrine to 20mg tid.   - monitor daily wt, i/o's  -jimenez removed, will attempt to monitor I/Os with primafit   -Have been encouraging patient daily for transfer to [facility], have explained importance of the transfer. However patient continues to refuse stating she does not think her condition will change as she did research on her condition and understands that there is limited intervention/treatment success for her.   -if she agrees, will then transfer for R and LHC and eval with Dr. Unger at [facility]., however currently refusing transfer stating she had cath before and doesn't see why she should have another one. Patient refusing despite education on importance of cath.   -fluid and salt restriction encouraged. Although patient is non compliant--ordering chinese food, french fries, per RN --not compliant with water restriction and also at times refusing meds and blood draws . many empty sprite cans at bedside. family bringing food for her as well.   -cardiology following    -will hold pain meds sec to hypotension.   - lost 1.3kg since admission       Worsening acute blood loss anemia in the setting of AC and steroid use with heavy menstruation,  reviewed iron studies 10/14/19--consistent with iron deficiency anemia   continue with venofer 100mg IV q7days x 4 doses   will need GYN  s/p 2nd PRBC this admission   will hold coumadin dose until 2 stable H/H values   patient refusing blood draws more than once daily   monitor H/H    constipation , sec to opiates   bowel regimen     Left foot pain, possibly from gout?  LE duplex  : No change in left external iliac and left greater saphenous vein   thrombosis.  uric acid elevated   allopurinol started    severe Pulmonary hypertension:  - c/w riociguat  - continue with supplemental oxygen to maintain O2 sat >92%  - pulmonary following    Direct hyperbilirubinemia, most likely from hepatic congestion   -reviewed prior Abd US 7/2019 --cholelithiasis   -10/18/19 Abd US: fatty liver , no cholelithiasis or biliary ductal dilatation       Type 2 diabetes mellitus with hyperglycemia, without long-term current use of insulin:  - Continue current insulin regimen    Hyponatremia:  - likely due to volume overload/ aggressive diuresis and possible SSRI- on hold  - Renal following , s/p tolvaptan 15mg x 1 10/15/19  - c/w Fluid restriction    HTN:  - hold all BP meds due to low BP  -currently on Midodrine for BP support     Moderate persistent Asthma:  - pulmonary following  - c/w Symbicort    Acute deep vein thrombosis (DVT) of femoral vein of left lower extremity:  - US showed an extensive acute nonocclusive thrombus in the left external iliac vein and the left common femoral vein and left greater saphenous vein  - was on Eliquis, pt non compliant with INR checks and doctor/follow-up visits , however per patient coumadin does not make her have vaginal bleeding like Eliquis and agreeing to follow PCP for INR. Also, patient to follow with GYN outpatient.   -per Dr. Ling ; not a candidate for ivc filter  -will restart coumadin once H/H stable   -will most likely require PICC line as patient is very difficulty stick and needs frequent labs     L foot injury due to weight fell on it  -Xray of the foot; neg  -pain mgmt     Acquired hypothyroidism:  - c/w synthroid     Hyperlipidemia:  - c/w Zocor    Depression:  - Zoloft on hold  -denies SI/HI  -Psych following     Preventative measure  fall precautions

## 2019-10-21 LAB
ALBUMIN SERPL ELPH-MCNC: 2.6 G/DL — LOW (ref 3.3–5)
ALP SERPL-CCNC: 111 U/L — SIGNIFICANT CHANGE UP (ref 40–120)
ALT FLD-CCNC: 97 U/L — HIGH (ref 12–78)
ANION GAP SERPL CALC-SCNC: 13 MMOL/L — SIGNIFICANT CHANGE UP (ref 5–17)
AST SERPL-CCNC: 113 U/L — HIGH (ref 15–37)
BILIRUB DIRECT SERPL-MCNC: 2.68 MG/DL — HIGH (ref 0.05–0.2)
BILIRUB INDIRECT FLD-MCNC: 1.4 MG/DL — HIGH (ref 0.2–1)
BILIRUB SERPL-MCNC: 4.1 MG/DL — HIGH (ref 0.2–1.2)
BUN SERPL-MCNC: 14 MG/DL — SIGNIFICANT CHANGE UP (ref 7–23)
CALCIUM SERPL-MCNC: 8.6 MG/DL — SIGNIFICANT CHANGE UP (ref 8.5–10.1)
CHLORIDE SERPL-SCNC: 86 MMOL/L — LOW (ref 96–108)
CO2 SERPL-SCNC: 27 MMOL/L — SIGNIFICANT CHANGE UP (ref 22–31)
CREAT SERPL-MCNC: 1.24 MG/DL — SIGNIFICANT CHANGE UP (ref 0.5–1.3)
GLUCOSE SERPL-MCNC: 139 MG/DL — HIGH (ref 70–99)
HCT VFR BLD CALC: 27.5 % — LOW (ref 34.5–45)
HGB BLD-MCNC: 8.3 G/DL — LOW (ref 11.5–15.5)
MAGNESIUM SERPL-MCNC: 2.2 MG/DL — SIGNIFICANT CHANGE UP (ref 1.6–2.6)
MCHC RBC-ENTMCNC: 23.8 PG — LOW (ref 27–34)
MCHC RBC-ENTMCNC: 30.2 GM/DL — LOW (ref 32–36)
MCV RBC AUTO: 78.8 FL — LOW (ref 80–100)
NRBC # BLD: 2 /100 WBCS — HIGH (ref 0–0)
PHOSPHATE SERPL-MCNC: 4 MG/DL — SIGNIFICANT CHANGE UP (ref 2.5–4.5)
PLATELET # BLD AUTO: 188 K/UL — SIGNIFICANT CHANGE UP (ref 150–400)
POTASSIUM SERPL-MCNC: 3.9 MMOL/L — SIGNIFICANT CHANGE UP (ref 3.5–5.3)
POTASSIUM SERPL-SCNC: 3.9 MMOL/L — SIGNIFICANT CHANGE UP (ref 3.5–5.3)
PROT SERPL-MCNC: 6.3 GM/DL — SIGNIFICANT CHANGE UP (ref 6–8.3)
RBC # BLD: 3.49 M/UL — LOW (ref 3.8–5.2)
RBC # FLD: 27 % — HIGH (ref 10.3–14.5)
SODIUM SERPL-SCNC: 126 MMOL/L — LOW (ref 135–145)
WBC # BLD: 7.33 K/UL — SIGNIFICANT CHANGE UP (ref 3.8–10.5)
WBC # FLD AUTO: 7.33 K/UL — SIGNIFICANT CHANGE UP (ref 3.8–10.5)

## 2019-10-21 PROCEDURE — 99233 SBSQ HOSP IP/OBS HIGH 50: CPT

## 2019-10-21 PROCEDURE — 99231 SBSQ HOSP IP/OBS SF/LOW 25: CPT

## 2019-10-21 PROCEDURE — 99232 SBSQ HOSP IP/OBS MODERATE 35: CPT

## 2019-10-21 RX ORDER — WARFARIN SODIUM 2.5 MG/1
5 TABLET ORAL ONCE
Refills: 0 | Status: COMPLETED | OUTPATIENT
Start: 2019-10-21 | End: 2019-10-21

## 2019-10-21 RX ORDER — ACETAMINOPHEN 500 MG
1000 TABLET ORAL ONCE
Refills: 0 | Status: COMPLETED | OUTPATIENT
Start: 2019-10-21 | End: 2019-10-21

## 2019-10-21 RX ORDER — ACETAMINOPHEN 500 MG
650 TABLET ORAL ONCE
Refills: 0 | Status: DISCONTINUED | OUTPATIENT
Start: 2019-10-21 | End: 2019-10-21

## 2019-10-21 RX ORDER — MORPHINE SULFATE 50 MG/1
2 CAPSULE, EXTENDED RELEASE ORAL EVERY 6 HOURS
Refills: 0 | Status: DISCONTINUED | OUTPATIENT
Start: 2019-10-21 | End: 2019-10-21

## 2019-10-21 RX ORDER — MULTIVIT WITH MIN/MFOLATE/K2 340-15/3 G
1 POWDER (GRAM) ORAL ONCE
Refills: 0 | Status: COMPLETED | OUTPATIENT
Start: 2019-10-21 | End: 2019-10-21

## 2019-10-21 RX ORDER — ACETAMINOPHEN 500 MG
650 TABLET ORAL ONCE
Refills: 0 | Status: COMPLETED | OUTPATIENT
Start: 2019-10-21 | End: 2019-10-21

## 2019-10-21 RX ADMIN — SENNA PLUS 2 TABLET(S): 8.6 TABLET ORAL at 21:35

## 2019-10-21 RX ADMIN — Medication 650 MILLIGRAM(S): at 01:46

## 2019-10-21 RX ADMIN — Medication 40 MILLIGRAM(S): at 13:08

## 2019-10-21 RX ADMIN — Medication 1000 MILLIGRAM(S): at 21:50

## 2019-10-21 RX ADMIN — Medication 500000 UNIT(S): at 13:09

## 2019-10-21 RX ADMIN — Medication 300 MILLIGRAM(S): at 13:08

## 2019-10-21 RX ADMIN — FLUCONAZOLE 100 MILLIGRAM(S): 150 TABLET ORAL at 15:42

## 2019-10-21 RX ADMIN — MIDODRINE HYDROCHLORIDE 20 MILLIGRAM(S): 2.5 TABLET ORAL at 09:13

## 2019-10-21 RX ADMIN — MORPHINE SULFATE 2 MILLIGRAM(S): 50 CAPSULE, EXTENDED RELEASE ORAL at 09:12

## 2019-10-21 RX ADMIN — Medication 40 MILLIEQUIVALENT(S): at 15:41

## 2019-10-21 RX ADMIN — Medication 3 MILLILITER(S): at 06:07

## 2019-10-21 RX ADMIN — MIDODRINE HYDROCHLORIDE 20 MILLIGRAM(S): 2.5 TABLET ORAL at 20:08

## 2019-10-21 RX ADMIN — Medication 650 MILLIGRAM(S): at 01:59

## 2019-10-21 RX ADMIN — ONDANSETRON 4 MILLIGRAM(S): 8 TABLET, FILM COATED ORAL at 18:09

## 2019-10-21 RX ADMIN — Medication 3 MILLILITER(S): at 18:55

## 2019-10-21 RX ADMIN — ONDANSETRON 4 MILLIGRAM(S): 8 TABLET, FILM COATED ORAL at 09:12

## 2019-10-21 RX ADMIN — WARFARIN SODIUM 5 MILLIGRAM(S): 2.5 TABLET ORAL at 23:51

## 2019-10-21 RX ADMIN — Medication 40 MILLIGRAM(S): at 18:09

## 2019-10-21 RX ADMIN — Medication 400 MILLIGRAM(S): at 21:25

## 2019-10-21 RX ADMIN — Medication 40 MILLIGRAM(S): at 23:51

## 2019-10-21 RX ADMIN — Medication 3 MILLILITER(S): at 11:15

## 2019-10-21 RX ADMIN — MORPHINE SULFATE 2 MILLIGRAM(S): 50 CAPSULE, EXTENDED RELEASE ORAL at 09:27

## 2019-10-21 RX ADMIN — Medication 3 MILLILITER(S): at 23:53

## 2019-10-21 RX ADMIN — ATORVASTATIN CALCIUM 20 MILLIGRAM(S): 80 TABLET, FILM COATED ORAL at 21:34

## 2019-10-21 NOTE — PROGRESS NOTE BEHAVIORAL HEALTH - AXIS III
extensive acute nonocclusive thrombus in the left external iliac vein and the left common femoral vein  and left greater saphenous vein; asthma, severe pHTN (previously on Adempas), mildly positive anti-cardiolipin Ab(but does not meet criteria for APLS per heme note); CHF on Bumex; 8/18- hospitalized at [facility] with decompensated right heart failure requiring Milrinone assisted diuresis; obesity; hx of Hyperbilirubinemia; Vitamin D deficiency
extensive acute nonocclusive thrombus in the left external iliac vein and the left common femoral vein  and left greater saphenous vein; asthma, severe pHTN (previously on Adempas), mildly positive anti-cardiolipin Ab(but does not meet criteria for APLS per heme note); CHF on Bumex; 8/18- hospitalized at [facility] with decompensated right heart failure requiring Milrinone assisted diuresis; obesity; hx of Hyperbilirubinemia; Vitamin D deficiency

## 2019-10-21 NOTE — PROGRESS NOTE ADULT - ASSESSMENT
33 yo female with history of Anticardiolipin antibody positive on AC at home, COPD, DM II, Essential hypertension, Vitamin D deficiency & severe Pulm HTN with right sided heart failure p/w SOB & was found to have acute on chronic combined systolic and diastolic congestive heart failure, hyponatremia, DIANN & acute RLE DVT.    Acute on chronic combined systolic and diastolic RIGHT SIDED congestive heart failure:   - Echo in 7/2019 showed EF 45-50% w/ elevated mean left atrial pressure w/ global cardiomyopathy, grade I diastolic dysfunction, pulmonary hypertension w/ a severely enlarged right ventricle and severely reduced RV systolic function w/ RV ejection fraction 34%, moderate tricuspid regurgitation, mild to moderate pulmonic valve regurgitation & severe pulmonary hypertension   - cont lasix 40mg IV tid  -c/w midodrine to 20mg tid.   - monitor daily wt, i/o's  -jimenez removed, will attempt to monitor I/Os with primafit   -fluid and salt restriction encouraged. Although patient is non compliant--ordering chinese food, french fries, per RN --not compliant with water restriction and also at times refusing meds and blood draws . many empty sprite cans at bedside. family bringing food for her as well.   -cardiology following    -will hold pain meds sec to hypotension.   -patient agreed for transfer to [facility] , Dr. Moncho Adamson at [facility], cardiology arranging transfer , awaiting bed       Worsening acute blood loss anemia in the setting of AC and steroid use with heavy menstruation,  reviewed iron studies 10/14/19--consistent with iron deficiency anemia   continue with venofer 100mg IV q7days x 4 doses   will need GYN  s/p 2nd PRBC this admission   patient refusing blood draws more than once daily   H/H stable, will give coumadin 5mg today  INR in AM   monitor CBC      constipation , sec to opiates   given mag cit today  continue with bowel regimen     Left foot pain, possibly from gout?  LE duplex  : No change in left external iliac and left greater saphenous vein   thrombosis.  uric acid elevated   allopurinol started    severe Pulmonary hypertension:  - c/w riociguat  - continue with supplemental oxygen to maintain O2 sat >92%  - pulmonary following    Direct hyperbilirubinemia, most likely from hepatic congestion   -reviewed prior Abd US 7/2019 --cholelithiasis   -10/18/19 Abd US: fatty liver , no cholelithiasis or biliary ductal dilatation       Type 2 diabetes mellitus with hyperglycemia, without long-term current use of insulin:  - Continue current insulin regimen    Hyponatremia:  - likely due to volume overload/ aggressive diuresis and possible SSRI- on hold  - Renal following , s/p tolvaptan 15mg x 1 10/15/19  - c/w Fluid restriction    HTN:  - hold all BP meds due to low BP  -currently on Midodrine for BP support     Moderate persistent Asthma:  - pulmonary following  - c/w Symbicort    Acute deep vein thrombosis (DVT) of femoral vein of left lower extremity:  - US showed an extensive acute nonocclusive thrombus in the left external iliac vein and the left common femoral vein and left greater saphenous vein  - was on Eliquis, pt non compliant with INR checks and doctor/follow-up visits , however per patient coumadin does not make her have vaginal bleeding like Eliquis and agreeing to follow PCP for INR. Also, patient to follow with GYN outpatient.   -per Dr. Ling ; not a candidate for ivc filter  -coumadin 5 mg PO at bedtime , monitor INR     L foot injury due to weight fell on it  -Xray of the foot; neg  -pain mgmt     Acquired hypothyroidism:  - c/w synthroid     Hyperlipidemia:  - c/w Zocor    Depression:  - Zoloft on hold  -denies SI/HI  -Psych following     Preventative measure  fall precautions

## 2019-10-21 NOTE — PROGRESS NOTE BEHAVIORAL HEALTH - NSBHCONSULTFOLLOWAFTERCARE_PSY_A_CORE FT
to be determined once Patient decides what she would like to do regarding her treatment
transfer to [facility]

## 2019-10-21 NOTE — PROGRESS NOTE ADULT - SUBJECTIVE AND OBJECTIVE BOX
INTERVAL HPI:  35 y/o Female with severe Pulmonary Hypertension and right sided CHF( RV EF 35%) COPD, HTN, DM2, Hyperbilirubinemia, L femoral DVT. Not taking anticoagulant since she her last admission(claims prescription was not sent).   Pt. went to PCP today who noted her hypotension and SOB and recommended coming to ER.  She hasn't been able to  meds from pharmacy as outpatient., because she can't walk due to chronic leg swelling, right calf pain.  No other complaints of inciting event, has had poor oral intake for several days,   Pt. is otherwise well. Refused  ABG, parenteral anticoagulants;  On home O2. Denies tobacco abuse.    OVERNIGHT EVENTS:  Comfortable.    Vital Signs Last 24 Hrs  T(C): 36.8 (21 Oct 2019 16:25), Max: 37.7 (21 Oct 2019 00:34)  T(F): 98.2 (21 Oct 2019 16:25), Max: 99.9 (21 Oct 2019 00:34)  HR: 86 (21 Oct 2019 16:25) (78 - 96)  BP: 96/54 (21 Oct 2019 16:25) (96/54 - 108/62)  BP(mean): --  RR: 20 (21 Oct 2019 16:25) (18 - 20)  SpO2: 93% (21 Oct 2019 16:25) (93% - 98%)    PHYSICAL EXAM:  GEN:         Awake, responsive and comfortable.  HEENT:     Normal.    RESP:       no distress  CVS:           Regular rate and rhythm.   ABD:         Soft, non-tender, non-distended;     MEDICATIONS  (STANDING):  ALBUTerol/ipratropium for Nebulization 3 milliLiter(s) Nebulizer every 6 hours  allopurinol 300 milliGRAM(s) Oral daily  atorvastatin 20 milliGRAM(s) Oral at bedtime  dextrose 5%. 1000 milliLiter(s) (50 mL/Hr) IV Continuous <Continuous>  dextrose 50% Injectable 12.5 Gram(s) IV Push once  dextrose 50% Injectable 25 Gram(s) IV Push once  dextrose 50% Injectable 25 Gram(s) IV Push once  docusate sodium 100 milliGRAM(s) Oral two times a day  fluconAZOLE IVPB 200 milliGRAM(s) IV Intermittent every 24 hours  furosemide   Injectable 40 milliGRAM(s) IV Push every 6 hours  insulin lispro (HumaLOG) corrective regimen sliding scale   SubCutaneous at bedtime  insulin lispro (HumaLOG) corrective regimen sliding scale   SubCutaneous three times a day before meals  iron sucrose IVPB 100 milliGRAM(s) IV Intermittent every 7 days  levothyroxine 25 MICROGram(s) Oral daily  midodrine. 20 milliGRAM(s) Oral three times a day  nystatin    Suspension [id number] Unit(s) Swish and Swallow four times a day  pantoprazole    Tablet 40 milliGRAM(s) Oral before breakfast  polyethylene glycol 3350 17 Gram(s) Oral daily  senna 2 Tablet(s) Oral at bedtime    MEDICATIONS  (PRN):  aluminum hydroxide/magnesium hydroxide/simethicone Suspension 30 milliLiter(s) Oral every 4 hours PRN Dyspepsia  benzocaine 15 mG/menthol 3.6 mG (Sugar-Free) Lozenge 1 Lozenge Oral every 6 hours PRN Mouth Sores  Biotene Dry Mouth Oral Rinse 5 milliLiter(s) Swish and Spit four times a day PRN dry mouth  dextrose 40% Gel 15 Gram(s) Oral once PRN Blood Glucose LESS THAN 70 milliGRAM(s)/deciliter  glucagon  Injectable 1 milliGRAM(s) IntraMuscular once PRN Glucose LESS THAN 70 milligrams/deciliter  ondansetron Injectable 4 milliGRAM(s) IV Push every 6 hours PRN Nausea and/or Vomiting    LABS:                        8.3    7.33  )-----------( 188      ( 21 Oct 2019 18:55 )             27.5     10-21    126<L>  |  86<L>  |  14  ----------------------------<  139<H>  3.9   |  27  |  1.24    Ca    8.6      21 Oct 2019 18:55  Phos  4.0     10-21  Mg     2.2     10-21    TPro  6.3  /  Alb  2.6<L>  /  TBili  4.1<H>  /  DBili  2.68<H>  /  AST  113<H>  /  ALT  97<H>  /  AlkPhos  111  10-21    ASSESSMENT AND PLAN:  ·	SOB.  ·	Severe pulmonary HTN.  ·	Cor pulmonale.  ·	Fluid Retention.  ·	Left external DVT.  ·	Non Compliance with treatment.    Compliance with medications and follow up with her physicians has been the main issue.  Now agrees for [facility] transfer.

## 2019-10-21 NOTE — CHART NOTE - NSCHARTNOTEFT_GEN_A_CORE
met with patient this morning,  briefly explained the mission of Palliative Care and pt stated she wanted to go to [facility] to pursue higher level of care/interventions. She did not want to discuss care any further with me. I brought this to the attention of the team rounding and the hospitalist in charge. Will not actively follow unless plans change.

## 2019-10-21 NOTE — PROGRESS NOTE ADULT - ATTENDING COMMENTS
Still has not diuresed much , refractory right sided heart failure who may benefit from advanced therapeutics such as prostacyclin pathway agonists, endothelin receptor antagonists and/or NO-cGMP enhancers after repeat right heart cath to establish pulmonary pressures. She should be followed by a pulmonary hypertension specialist and has agreed to transfer to [facility] when bed available.    Call placed to Dr. Moncho Adamson at [facility], awaiting call back.
I agree with psych assessment regarding adjustment disorder and depressive mood making it difficult for patient to remain compliant with medication regimen and dietary restrictions. She would be better served by transfer to [facility] for management by heart failure team and consideration of r/l heart cath to better assess treatment plan. Patient has repeatedly declined transfer and there is little indication she plans on pursuing this course as an out patient. [place] aggressive diuresis will likely result once again in significant electrolyte abnormalities as well.  S\Doubt that Eliquis solely responsible for heavy menstrual bleeds causing severe anemia, consider GYN evaluation.

## 2019-10-21 NOTE — PROGRESS NOTE ADULT - SUBJECTIVE AND OBJECTIVE BOX
Patient is a 34y old  Female who presents with a chief complaint of Shortness of breath and hypotension. (20 Oct 2019 20:47)    PAST MEDICAL & SURGICAL HISTORY:  Former smoker, stopped smoking in distant past  Vitamin D deficiency  Morbid obesity due to excess calories  Edema extremities  Diabetes mellitus  Hyperbilirubinemia  COPD (chronic obstructive pulmonary disease)  Anticoagulation goal of INR 2 to 3  Anticardiolipin antibody positive  Congestive heart failure, unspecified HF chronicity, unspecified heart failure type  Moderate asthma, unspecified whether complicated, unspecified whether persistent  Other secondary hypertension  COPD (chronic obstructive pulmonary disease)  Anticardiolipin antibody positive  Essential hypertension  Pulmonary HTN  Morbid obesity  No significant past surgical history    INTERVAL HISTORY: Resting in bed not in acute distress   	  MEDICATIONS:  MEDICATIONS  (STANDING):  ALBUTerol/ipratropium for Nebulization 3 milliLiter(s) Nebulizer every 6 hours  allopurinol 300 milliGRAM(s) Oral daily  atorvastatin 20 milliGRAM(s) Oral at bedtime  dextrose 5%. 1000 milliLiter(s) (50 mL/Hr) IV Continuous <Continuous>  dextrose 50% Injectable 12.5 Gram(s) IV Push once  dextrose 50% Injectable 25 Gram(s) IV Push once  dextrose 50% Injectable 25 Gram(s) IV Push once  docusate sodium 100 milliGRAM(s) Oral two times a day  fluconAZOLE IVPB 200 milliGRAM(s) IV Intermittent every 24 hours  furosemide   Injectable 40 milliGRAM(s) IV Push every 6 hours  insulin lispro (HumaLOG) corrective regimen sliding scale   SubCutaneous at bedtime  insulin lispro (HumaLOG) corrective regimen sliding scale   SubCutaneous three times a day before meals  iron sucrose IVPB 100 milliGRAM(s) IV Intermittent every 7 days  levothyroxine 25 MICROGram(s) Oral daily  midodrine. 20 milliGRAM(s) Oral three times a day  nystatin    Suspension 833343 Unit(s) Swish and Swallow four times a day  pantoprazole    Tablet 40 milliGRAM(s) Oral before breakfast  polyethylene glycol 3350 17 Gram(s) Oral daily  potassium chloride    Tablet ER 40 milliEquivalent(s) Oral once  senna 2 Tablet(s) Oral at bedtime    MEDICATIONS  (PRN):  aluminum hydroxide/magnesium hydroxide/simethicone Suspension 30 milliLiter(s) Oral every 4 hours PRN Dyspepsia  benzocaine 15 mG/menthol 3.6 mG (Sugar-Free) Lozenge 1 Lozenge Oral every 6 hours PRN Mouth Sores  Biotene Dry Mouth Oral Rinse 5 milliLiter(s) Swish and Spit four times a day PRN dry mouth  dextrose 40% Gel 15 Gram(s) Oral once PRN Blood Glucose LESS THAN 70 milliGRAM(s)/deciliter  glucagon  Injectable 1 milliGRAM(s) IntraMuscular once PRN Glucose LESS THAN 70 milligrams/deciliter  morphine  - Injectable 2 milliGRAM(s) IV Push every 6 hours PRN Mild Pain (1 - 3)  ondansetron Injectable 4 milliGRAM(s) IV Push every 6 hours PRN Nausea and/or Vomiting    Vitals:  T(F): 98.6 (10-21-19 @ 06:10), Max: 99.9 (10-21-19 @ 00:34)  HR: 88 (10-21-19 @ 06:17) (78 - 96)  BP: 108/61 (10-21-19 @ 06:10) (108/61 - 108/62)  RR: 18 (10-21-19 @ 06:10) (18 - 18)  SpO2: 97% (10-21-19 @ 06:17) (96% - 100%)  Wt(kg): --103. 8 kg    10-20 @ 07:01  -  10-21 @ 07:00  --------------------------------------------------------  IN:    Oral Fluid: 480 mL  Total IN: 480 mL    OUT:    Indwelling Catheter - Urethral: 800 mL  Total OUT: 800 mL    Total NET: -320 mL    PHYSICAL EXAM:  Neuro: Awake, responsive  CV: S1 S2 RRR  Lungs: CTABL  GI: Soft, BS +, ND, NT  Extremities: +++ LE edema    TELEMETRY: RSR    RADIOLOGY:  < from: Xray Chest 1 View-PORTABLE IMMEDIATE (10.18.19 @ 18:23) >  Pulmonary artery segments are quite large in the aortic knob is barely   visible. This suggests congenital heart disease and pulmonary   hypertension.    Heart obscures the left base. There is no visible lung finding.    Chest is similar to October 9.    IMPRESSION: Heart enlargement and quite large main pulmonary arteries and   diminutive aorta suggests congenital heart disease.    < end of copied text >  < from: US Abdomen Complete (10.18.19 @ 13:16) >    Fatty liver.  No cholelithiasis or biliary ductal dilatation.    < end of copied text >      DIAGNOSTIC TESTING:    [x ] Echocardiogram: < from: TTE Echo Doppler w/o Cont (07.26.19 @ 08:44) >   1. Left ventricular ejection fraction, by visual estimation, is 45 to   50%.   2. Mildly decreased global left ventricular systolic function.   3. Elevated mean left atrial pressure.   4. Global cardiomyopathy.   5. Increased LV wall thickness.   6. Normal left ventricular internal cavity size.   7. Right ventricular pressure overload.   8. Spectral Doppler shows impaired relaxation pattern of left   ventricular myocardial filling (Grade I diastolic dysfunction).   9. There is mild asymmetric left ventricular hypertrophy.  10. Pulmonary hypertension.  11. Severely enlarged right ventricle.  12. Severely reduced RV systolic function.  13. RV ejection fraction 34%.  14. Small pericardial effusion.  15. Degenerative mitral valve.  16. Mild mitral annular calcification.  17. Mild mitral valve regurgitation.  18. Moderate tricuspid regurgitation.  19. Structurally normal tricuspid valve, with normal leaflet excursion.  20. Mild aortic regurgitation.  21. Mild to moderate pulmonic valve regurgitation.  22. Structurally normal pulmonic valve, with normal leaflet excursion.  23. Estimated pulmonary artery systolic pressure is 87.1 mmHg assuming a   right atrial pressure of 20 mmHg, which is consistent with severe   pulmonary hypertension.  24. Pulmonary hypertension is present.  25. The main pulmonary artery is normal in size.  26. The right atrial pressure is moderately elevated.    < end of copied text >    LABS:	 	    20 Oct 2019 07:31    128    |  88     |  11     ----------------------------<  107    3.8     |  29     |  0.96   19 Oct 2019 18:25    126    |  90     |  12     ----------------------------<  168    4.0     |  27     |  0.83     Ca    8.5        20 Oct 2019 07:31  Phos  2.7       20 Oct 2019 07:31  Mg     2.3       20 Oct 2019 07:31    TPro  6.4    /  Alb  2.6    /  TBili  4.5    /  DBili  2.54   /  AST  114    /  ALT  87     /  AlkPhos  102    20 Oct 2019 07:31                        7.8    9.44  )-----------( 230      ( 20 Oct 2019 07:31 )             25.8 ,                       6.8    10.19 )-----------( 252      ( 19 Oct 2019 18:25 )             22.0 Patient is a 34y old  Female who presents with a chief complaint of Shortness of breath and hypotension. (20 Oct 2019 20:47)    PAST MEDICAL & SURGICAL HISTORY:  Former smoker, stopped smoking in distant past  Vitamin D deficiency  Morbid obesity due to excess calories  Edema extremities  Diabetes mellitus  Hyperbilirubinemia  COPD (chronic obstructive pulmonary disease)  Anticoagulation goal of INR 2 to 3  Anticardiolipin antibody positive  Congestive heart failure, unspecified HF chronicity, unspecified heart failure type  Moderate asthma, unspecified whether complicated, unspecified whether persistent  Other secondary hypertension  COPD (chronic obstructive pulmonary disease)  Anticardiolipin antibody positive  Essential hypertension  Pulmonary HTN  Morbid obesity  No significant past surgical history    INTERVAL HISTORY: Resting in bed not in acute distress, c/o mild abdominal discomfort   	  MEDICATIONS:  MEDICATIONS  (STANDING):  ALBUTerol/ipratropium for Nebulization 3 milliLiter(s) Nebulizer every 6 hours  allopurinol 300 milliGRAM(s) Oral daily  atorvastatin 20 milliGRAM(s) Oral at bedtime  dextrose 5%. 1000 milliLiter(s) (50 mL/Hr) IV Continuous <Continuous>  dextrose 50% Injectable 12.5 Gram(s) IV Push once  dextrose 50% Injectable 25 Gram(s) IV Push once  dextrose 50% Injectable 25 Gram(s) IV Push once  docusate sodium 100 milliGRAM(s) Oral two times a day  fluconAZOLE IVPB 200 milliGRAM(s) IV Intermittent every 24 hours  furosemide   Injectable 40 milliGRAM(s) IV Push every 6 hours  insulin lispro (HumaLOG) corrective regimen sliding scale   SubCutaneous at bedtime  insulin lispro (HumaLOG) corrective regimen sliding scale   SubCutaneous three times a day before meals  iron sucrose IVPB 100 milliGRAM(s) IV Intermittent every 7 days  levothyroxine 25 MICROGram(s) Oral daily  midodrine. 20 milliGRAM(s) Oral three times a day  nystatin    Suspension [id number] Unit(s) Swish and Swallow four times a day  pantoprazole    Tablet 40 milliGRAM(s) Oral before breakfast  polyethylene glycol 3350 17 Gram(s) Oral daily  potassium chloride    Tablet ER 40 milliEquivalent(s) Oral once  senna 2 Tablet(s) Oral at bedtime    MEDICATIONS  (PRN):  aluminum hydroxide/magnesium hydroxide/simethicone Suspension 30 milliLiter(s) Oral every 4 hours PRN Dyspepsia  benzocaine 15 mG/menthol 3.6 mG (Sugar-Free) Lozenge 1 Lozenge Oral every 6 hours PRN Mouth Sores  Biotene Dry Mouth Oral Rinse 5 milliLiter(s) Swish and Spit four times a day PRN dry mouth  dextrose 40% Gel 15 Gram(s) Oral once PRN Blood Glucose LESS THAN 70 milliGRAM(s)/deciliter  glucagon  Injectable 1 milliGRAM(s) IntraMuscular once PRN Glucose LESS THAN 70 milligrams/deciliter  morphine  - Injectable 2 milliGRAM(s) IV Push every 6 hours PRN Mild Pain (1 - 3)  ondansetron Injectable 4 milliGRAM(s) IV Push every 6 hours PRN Nausea and/or Vomiting    Vitals:  T(F): 98.6 (10-21-19 @ 06:10), Max: 99.9 (10-21-19 @ 00:34)  HR: 88 (10-21-19 @ 06:17) (78 - 96)  BP: 108/61 (10-21-19 @ 06:10) (108/61 - 108/62)  RR: 18 (10-21-19 @ 06:10) (18 - 18)  SpO2: 97% (10-21-19 @ 06:17) (96% - 100%)  Wt(kg): --103. 8 kg    10-20 @ 07:01  -  10-21 @ 07:00  --------------------------------------------------------  IN:    Oral Fluid: 480 mL  Total IN: 480 mL    OUT:    Indwelling Catheter - Urethral: 800 mL  Total OUT: 800 mL    Total NET: -320 mL    PHYSICAL EXAM:  Neuro: Awake, responsive  CV: S1 S2 RRR  Lungs: diminished to bases   GI: Soft, BS +, ND, NT  Extremities: ++++  LE edema    TELEMETRY: RSR    RADIOLOGY:  < from: Xray Chest 1 View-PORTABLE IMMEDIATE (10.18.19 @ 18:23) >  Pulmonary artery segments are quite large in the aortic knob is barely   visible. This suggests congenital heart disease and pulmonary   hypertension.    Heart obscures the left base. There is no visible lung finding.    Chest is similar to October 9.    IMPRESSION: Heart enlargement and quite large main pulmonary arteries and   diminutive aorta suggests congenital heart disease.    < end of copied text >  < from: US Abdomen Complete (10.18.19 @ 13:16) >    Fatty liver.  No cholelithiasis or biliary ductal dilatation.    < end of copied text >      DIAGNOSTIC TESTING:    [x ] Echocardiogram: < from: TTE Echo Doppler w/o Cont (07.26.19 @ 08:44) >   1. Left ventricular ejection fraction, by visual estimation, is 45 to   50%.   2. Mildly decreased global left ventricular systolic function.   3. Elevated mean left atrial pressure.   4. Global cardiomyopathy.   5. Increased LV wall thickness.   6. Normal left ventricular internal cavity size.   7. Right ventricular pressure overload.   8. Spectral Doppler shows impaired relaxation pattern of left   ventricular myocardial filling (Grade I diastolic dysfunction).   9. There is mild asymmetric left ventricular hypertrophy.  10. Pulmonary hypertension.  11. Severely enlarged right ventricle.  12. Severely reduced RV systolic function.  13. RV ejection fraction 34%.  14. Small pericardial effusion.  15. Degenerative mitral valve.  16. Mild mitral annular calcification.  17. Mild mitral valve regurgitation.  18. Moderate tricuspid regurgitation.  19. Structurally normal tricuspid valve, with normal leaflet excursion.  20. Mild aortic regurgitation.  21. Mild to moderate pulmonic valve regurgitation.  22. Structurally normal pulmonic valve, with normal leaflet excursion.  23. Estimated pulmonary artery systolic pressure is 87.1 mmHg assuming a   right atrial pressure of 20 mmHg, which is consistent with severe   pulmonary hypertension.  24. Pulmonary hypertension is present.  25. The main pulmonary artery is normal in size.  26. The right atrial pressure is moderately elevated.    < end of copied text >    LABS:	 	    20 Oct 2019 07:31    128    |  88     |  11     ----------------------------<  107    3.8     |  29     |  0.96   19 Oct 2019 18:25    126    |  90     |  12     ----------------------------<  168    4.0     |  27     |  0.83     Ca    8.5        20 Oct 2019 07:31  Phos  2.7       20 Oct 2019 07:31  Mg     2.3       20 Oct 2019 07:31    TPro  6.4    /  Alb  2.6    /  TBili  4.5    /  DBili  2.54   /  AST  114    /  ALT  87     /  AlkPhos  102    20 Oct 2019 07:31                        7.8    9.44  )-----------( 230      ( 20 Oct 2019 07:31 )             25.8 ,                       6.8    10.19 )-----------( 252      ( 19 Oct 2019 18:25 )             22.0

## 2019-10-21 NOTE — PROGRESS NOTE BEHAVIORAL HEALTH - NSBHFUPINTERVALHXFT_PSY_A_CORE
Patient changed her mind and now is agreeable to be transferred to [facility]. Writer asked what prompted the change and she said "oh, I want a higher level of care." Suspecting Patient was putting off the decision and delaying the inevitable and a visit from Palliative Care was theraputic in that it made her realize the seriousness of her situation and importance of proceeding with advanced treatment. Otherwise, same psychiatric clinical presentation - was on the phone talking with friends/family which she often does during the day and asked if there was any available Cheeze-its or Cheetos.

## 2019-10-21 NOTE — PROGRESS NOTE ADULT - SUBJECTIVE AND OBJECTIVE BOX
Patient is a 34y old  Female who presents with a chief complaint of Shortness of breath and hypotension. (15 Oct 2019 08:26)    Patient seen and examined bedside.   OVERNIGHT EVENTS:  none   complaining of abd pain , states has not had a BM for few days, most likely constipation from opiates   states mouth pain has improved   REVIEW OF SYSTEMS: denies chest pain/SOB, diaphoresis, cough, dizziness, headache, blurry vision, nausea, vomiting      MEDICATIONS  (STANDING):  ALBUTerol/ipratropium for Nebulization 3 milliLiter(s) Nebulizer every 6 hours  apixaban 10 milliGRAM(s) Oral every 12 hours  aspirin enteric coated 81 milliGRAM(s) Oral daily  atorvastatin 20 milliGRAM(s) Oral at bedtime  dextrose 5%. 1000 milliLiter(s) (50 mL/Hr) IV Continuous <Continuous>  dextrose 50% Injectable 12.5 Gram(s) IV Push once  dextrose 50% Injectable 25 Gram(s) IV Push once  dextrose 50% Injectable 25 Gram(s) IV Push once  furosemide   Injectable 40 milliGRAM(s) IV Push every 12 hours  insulin lispro (HumaLOG) corrective regimen sliding scale   SubCutaneous at bedtime  insulin lispro (HumaLOG) corrective regimen sliding scale   SubCutaneous three times a day before meals  levothyroxine 25 MICROGram(s) Oral daily  midodrine. 10 milliGRAM(s) Oral every 8 hours  pantoprazole    Tablet 40 milliGRAM(s) Oral before breakfast    MEDICATIONS  (PRN):  dextrose 40% Gel 15 Gram(s) Oral once PRN Blood Glucose LESS THAN 70 milliGRAM(s)/deciliter  glucagon  Injectable 1 milliGRAM(s) IntraMuscular once PRN Glucose LESS THAN 70 milligrams/deciliter  ondansetron Injectable 4 milliGRAM(s) IV Push every 6 hours PRN Nausea and/or Vomiting  traMADol 25 milliGRAM(s) Oral every 6 hours PRN Moderate Pain (4 - 6)      Allergies    No Known Allergies    Intolerances    Vital Signs Last 24 Hrs  T(C): 36.8 (21 Oct 2019 16:25), Max: 37.7 (21 Oct 2019 00:34)  T(F): 98.2 (21 Oct 2019 16:25), Max: 99.9 (21 Oct 2019 00:34)  HR: 89 (21 Oct 2019 20:23) (78 - 96)  BP: 90/48 (21 Oct 2019 20:23) (90/48 - 108/62)  BP(mean): --  RR: 20 (21 Oct 2019 20:23) (18 - 20)  SpO2: 96% (21 Oct 2019 20:23) (93% - 98%)    PHYSICAL EXAM:  GENERAL: NAD, speaking in full sentences, NOT using accessory muscles   HEAD:  Atraumatic, Normocephalic  EYES: EOMI, PERRLA, slight icterus b/l   ENMT: No tonsillar erythema, exudates, or enlargement; Moist mucous membranes  NECK: Supple, No JVD  NERVOUS SYSTEM:  Alert & Oriented X3, no focal neuro deficits   CHEST/LUNG: GOOD B/L AIR ENTRY, no w/r/r  HEART: Regular rate and rhythm; No murmurs, rubs, or gallops  ABDOMEN: Soft, Nontender, Nondistended; Bowel sounds present  : vaginal bleeding with clots   EXTREMITIES:  3-4+ Peripheral  edema BL LE, chronic skin changes of b/l LE. No cyanosis or clubbing noted.       Labs and imaging reviewed.                                     8.3    7.33  )-----------( 188      ( 21 Oct 2019 18:55 )             27.5   10-21    126<L>  |  86<L>  |  14  ----------------------------<  139<H>  3.9   |  27  |  1.24    Ca    8.6      21 Oct 2019 18:55  Phos  4.0     10-21  Mg     2.2     10-21    TPro  6.3  /  Alb  2.6<L>  /  TBili  4.1<H>  /  DBili  2.68<H>  /  AST  113<H>  /  ALT  97<H>  /  AlkPhos  111  10-21    Lipid Profile (07.28.19 @ 11:27)    Total Cholesterol/HDL Ratio Measurement: 7.6 RATIO    Cholesterol, Serum: 76 mg/dL    Triglycerides, Serum: 76 mg/dL    HDL Cholesterol, Serum: 10: HDL Levels >/= 60 mg/dL are considered beneficial and a "negative" risk  factor.  Effective 08/15/2018: New reference range and interpretive comment. mg/dL    Direct LDL: 51: LDL Cholesterol (mg/dL) --- Interpretive Comment (for adults 18 and over)  Optimal LDL Level may vary based on clinical situation  Below 70                   Ideal for people at very high risk of heart  disease  Below 100                  Ideal for people at risk of heart disease  100 - 129                    [place]  130 - 159                    Borderline high  160 - 189                    High  190 and Above           Very high mg/dL        RADIOLOGY & ADDITIONAL TESTS:    Imaging Personally Reviewed:  [ x] YES    < from: Xray Chest 1 View- PORTABLE-Urgent (10.09.19 @ 21:34) >  Clear lungs.    < from: TTE Echo Doppler w/o Cont (07.26.19 @ 08:44) >  1. Left ventricular ejection fraction, by visual estimation, is 45 to   50%.   2. Mildly decreased global left ventricular systolic function.   3. Elevated mean left atrial pressure.   4. Global cardiomyopathy.   5. Increased LV wall thickness.   6. Normal left ventricular internal cavity size.   7. Right ventricular pressure overload.   8. Spectral Doppler shows impaired relaxation pattern of left   ventricular myocardial filling (Grade I diastolic dysfunction).   9. There is mild asymmetric left ventricular hypertrophy.  10. Pulmonary hypertension.  11. Severely enlarged right ventricle.  12. Severely reduced RV systolic function.  13. RV ejection fraction 34%.  14. Small pericardial effusion.  15. Degenerative mitral valve.  16. Mild mitral annular calcification.  17. Mild mitral valve regurgitation.  18. Moderate tricuspid regurgitation.  19. Structurally normal tricuspid valve, with normal leaflet excursion.  20. Mild aortic regurgitation.  21. Mild to moderate pulmonic valve regurgitation.  22. Structurally normal pulmonic valve, with normal leaflet excursion.  23. Estimated pulmonary artery systolic pressure is 87.1 mmHg assuming a   right atrial pressure of 20 mmHg, which is consistent with severe   pulmonary hypertension.  24. Pulmonary hypertension is present.  25. The main pulmonary artery is normal in size.  26. The right atrial pressure is moderately elevated.    < end of copied text >    < from: Xray Chest 1 View- PORTABLE-Urgent (10.09.19 @ 21:34) >  IMPRESSION:    Clear lungs.    < end of copied text >      Consultant(s) Notes Reviewed:  [ x] YES     Care Discussed with [x ] Consultants [X ] Patient [ ] Family  [x ]    [x ]  Other; RN

## 2019-10-21 NOTE — PROVIDER CONTACT NOTE (OTHER) - ASSESSMENT
pt non compliant. refuses all am meds and blood drawn. binges on food brought by her sister from home. refuses to do anything. wants pain meds and total am care. very difficult.

## 2019-10-21 NOTE — PROGRESS NOTE ADULT - SUBJECTIVE AND OBJECTIVE BOX
[facility] NEPHROLOGY SERVICES, [facility]  NEPHROLOGY AND HYPERTENSION  [address]  [address]  [place] [address]  [phone]    MD FABIAN FERREIRA MD ANDREY GONCHARUK, MD MADHU KORRAPATI, MD YELENA ROSENBERG, MD BINNY KOSHY, MD CHRISTOPHER CAPUTO, MD EDWARD BOVER, MD          Patient feels ok no acute distress;   no new medical complaints;     MEDICATIONS  (STANDING):  ALBUTerol/ipratropium for Nebulization 3 milliLiter(s) Nebulizer every 6 hours  allopurinol 300 milliGRAM(s) Oral daily  atorvastatin 20 milliGRAM(s) Oral at bedtime  dextrose 5%. 1000 milliLiter(s) (50 mL/Hr) IV Continuous <Continuous>  dextrose 50% Injectable 12.5 Gram(s) IV Push once  dextrose 50% Injectable 25 Gram(s) IV Push once  dextrose 50% Injectable 25 Gram(s) IV Push once  docusate sodium 100 milliGRAM(s) Oral two times a day  fluconAZOLE IVPB 200 milliGRAM(s) IV Intermittent every 24 hours  furosemide   Injectable 40 milliGRAM(s) IV Push every 6 hours  insulin lispro (HumaLOG) corrective regimen sliding scale   SubCutaneous at bedtime  insulin lispro (HumaLOG) corrective regimen sliding scale   SubCutaneous three times a day before meals  iron sucrose IVPB 100 milliGRAM(s) IV Intermittent every 7 days  levothyroxine 25 MICROGram(s) Oral daily  magnesium citrate Oral Solution 1 Bottle Oral once  midodrine. 20 milliGRAM(s) Oral three times a day  nystatin    Suspension 134891 Unit(s) Swish and Swallow four times a day  pantoprazole    Tablet 40 milliGRAM(s) Oral before breakfast  polyethylene glycol 3350 17 Gram(s) Oral daily  potassium chloride    Tablet ER 40 milliEquivalent(s) Oral once  senna 2 Tablet(s) Oral at bedtime    MEDICATIONS  (PRN):  aluminum hydroxide/magnesium hydroxide/simethicone Suspension 30 milliLiter(s) Oral every 4 hours PRN Dyspepsia  benzocaine 15 mG/menthol 3.6 mG (Sugar-Free) Lozenge 1 Lozenge Oral every 6 hours PRN Mouth Sores  Biotene Dry Mouth Oral Rinse 5 milliLiter(s) Swish and Spit four times a day PRN dry mouth  dextrose 40% Gel 15 Gram(s) Oral once PRN Blood Glucose LESS THAN 70 milliGRAM(s)/deciliter  glucagon  Injectable 1 milliGRAM(s) IntraMuscular once PRN Glucose LESS THAN 70 milligrams/deciliter  ondansetron Injectable 4 milliGRAM(s) IV Push every 6 hours PRN Nausea and/or Vomiting      10-20-19 @ 07:01  -  10-21-19 @ 07:00  --------------------------------------------------------  IN: 480 mL / OUT: 800 mL / NET: -320 mL      PHYSICAL EXAM:      T(C): 36.3 (10-21-19 @ 11:45), Max: 37.7 (10-21-19 @ 00:34)  HR: 84 (10-21-19 @ 11:45) (78 - 96)  BP: 107/90 (10-21-19 @ 11:45) (107/90 - 108/62)  RR: 18 (10-21-19 @ 11:45) (18 - 18)  SpO2: 96% (10-21-19 @ 11:45) (95% - 99%)  Wt(kg): --  Respiratory: clear anteriorly, decreased BS at bases  Cardiovascular: S1 S2  Gastrointestinal: soft NT ND +BS  Extremities:   3 edema                                    7.8    9.44  )-----------( 230      ( 20 Oct 2019 07:31 )             25.8     10-20    128<L>  |  88<L>  |  11  ----------------------------<  107<H>  3.8   |  29  |  0.96    Ca    8.5      20 Oct 2019 07:31  Phos  2.7     10-20  Mg     2.3     10-20    TPro  6.4  /  Alb  2.6<L>  /  TBili  4.5<H>  /  DBili  2.54<H>  /  AST  114<H>  /  ALT  87<H>  /  AlkPhos  102  10-20      LIVER FUNCTIONS - ( 20 Oct 2019 07:31 )  Alb: 2.6 g/dL / Pro: 6.4 gm/dL / ALK PHOS: 102 U/L / ALT: 87 U/L / AST: 114 U/L / GGT: x           Creatinine Trend: 0.96<--, 0.83<--, 0.99<--, 1.25<--, 1.11<--, 0.88<--      Assessment     Hypervolemic hyponatremia; CRS:   Fe deficiency anemia, acute on chronic;   Hyponatremia poor prognostic indicator  Hyperuricemia due to diuretic, CRS physiology;     Plan:  Continue supportive care;   Loop diuretic; FR ;         Clint Linares MD

## 2019-10-21 NOTE — PROGRESS NOTE ADULT - ASSESSMENT
33 yo female with severe pulmonary hypertension and right sided HF (RV EF 35%), COPD, HTN, DM2, hyperbilirubinemia, L femoral DVT,  chronic hypotension admitted with symptoms of worsening right heart failure/hypotension. cardiac cath negative for any CAD in 2013.   Demand troponin levels noted.  Lost 2.4 kg since admission,  responding to higher dose IV Lasix now.    Plan:    c/o significant abdominal cramping pains,  No acute findings in abdominal US  Cont with aggressive diuretics, renal following  Supplemental O2/pulm following   on midodrine 20mg q 8 for BP support during aggressive diuresing , this can be tapered as it is not contributing much to hemodynamic support.  Still declining transfer to [facility] for advanced management of her severe pulmonary hypertension .  Psych/ Palliative care following.  s/p PRBC transfusion, H/H better.  Holding Eliquis/asa in setting of severe anemia, concern of increased bleed during menstruation with Eliquis, consider restarting coumadin (as per pt., Coumadin does not make her bleed as much) but patient will have difficulty managing INR.  Needs GYN followup as outpatient.   Activity as tolerated 35 yo female with severe pulmonary hypertension and right sided HF (RV EF 35%), COPD, HTN, DM2, hyperbilirubinemia, L femoral DVT,  chronic hypotension admitted with symptoms of worsening right heart failure/hypotension. cardiac cath negative for any CAD in 2013.   Demand troponin levels noted.  Lost 2.4 kg since admission,  responding to higher dose IV Lasix now.    Plan:    c/o abdominal cramping pains,  No acute findings in abdominal US  Cont with aggressive diuretics, renal following  Supplemental O2/pulm following   on midodrine 20mg q 8 for BP support during aggressive diuresing , this can be tapered as it is not contributing much to hemodynamic support.  Psych/ Palliative care following.  s/p PRBC transfusion, H/H better.  Holding Eliquis/asa in setting of severe anemia, concern of increased bleed during menstruation with Eliquis, consider restarting coumadin (as per pt., Coumadin does not make her bleed as much) but patient will have difficulty managing INR.  Needs GYN followup as outpatient.   Activity as tolerated  Pt now agreed to transfer to [facility] for advanced management of her severe pulmonary hypertension/Rt heart failure. Awaiting call back from Dr. Moncho Adamson, cardiology at [facility]

## 2019-10-21 NOTE — PROGRESS NOTE BEHAVIORAL HEALTH - SUMMARY
+ manifests expected thoughts and feelings of a young person essentially "trapped" in a sick, debilitated body whose medical condition is worsening, who has been in/out of hospitals and who has limited quality of life overall. Patient is reflecting on this and is in the mind-set of "whatever happens" - she has done a lot of research online before (especially during the first year of her diagnosis) and is well aware of the prognosis and limitations of treatment success and is skeptical if anything would substantially make a difference to her.   - As for giving staff a hard time, Patient feels like she has no control over her body/medical status/life in general - she can exert control over only small things like what food she eats, when to take or not take medications etc. This is not likely to be personal towards staff; it is just a reflection of a displacement with a touch of "acting out."  - has capacity to make her medical decisions at this time
- has capacity to make her medical decisions at this time  - cleared for [facility] transfer where she should have psychiatry continue to follow her for emotional support

## 2019-10-21 NOTE — PROGRESS NOTE BEHAVIORAL HEALTH - RISK ASSESSMENT
Chronic risk factors: single, chronic serious medical illness impacting life and functioning. Protective factors: young; female gender; no history of psych hospitalizations, no suicide attempts; no self-injurious behavior; no hx of aggression/violence; no substance / drug use; stable housing; no legal issues; motivated for help; articulate; strong family support; access to health service

## 2019-10-22 LAB
ALBUMIN SERPL ELPH-MCNC: 2.8 G/DL — LOW (ref 3.3–5)
ALP SERPL-CCNC: 114 U/L — SIGNIFICANT CHANGE UP (ref 40–120)
ALT FLD-CCNC: 116 U/L — HIGH (ref 12–78)
ANION GAP SERPL CALC-SCNC: 16 MMOL/L — SIGNIFICANT CHANGE UP (ref 5–17)
AST SERPL-CCNC: 147 U/L — HIGH (ref 15–37)
BASE EXCESS BLDA CALC-SCNC: -4 MMOL/L — LOW (ref -2–2)
BILIRUB DIRECT SERPL-MCNC: 2.86 MG/DL — HIGH (ref 0.05–0.2)
BILIRUB INDIRECT FLD-MCNC: 1.5 MG/DL — HIGH (ref 0.2–1)
BILIRUB SERPL-MCNC: 4.4 MG/DL — HIGH (ref 0.2–1.2)
BLOOD GAS COMMENTS: SIGNIFICANT CHANGE UP
BLOOD GAS COMMENTS: SIGNIFICANT CHANGE UP
BLOOD GAS SOURCE: SIGNIFICANT CHANGE UP
BUN SERPL-MCNC: 16 MG/DL — SIGNIFICANT CHANGE UP (ref 7–23)
CALCIUM SERPL-MCNC: 9.1 MG/DL — SIGNIFICANT CHANGE UP (ref 8.5–10.1)
CHLORIDE SERPL-SCNC: 88 MMOL/L — LOW (ref 96–108)
CO2 SERPL-SCNC: 21 MMOL/L — LOW (ref 22–31)
CREAT SERPL-MCNC: 1.38 MG/DL — HIGH (ref 0.5–1.3)
GLUCOSE SERPL-MCNC: 122 MG/DL — HIGH (ref 70–99)
HCO3 BLDA-SCNC: 18 MMOL/L — LOW (ref 21–29)
HCT VFR BLD CALC: 27.9 % — LOW (ref 34.5–45)
HGB BLD-MCNC: 8.5 G/DL — LOW (ref 11.5–15.5)
HOROWITZ INDEX BLDA+IHG-RTO: 35 — SIGNIFICANT CHANGE UP
INR BLD: 1.8 RATIO — HIGH (ref 0.88–1.16)
LACTATE SERPL-SCNC: 9.5 MMOL/L — CRITICAL HIGH (ref 0.7–2)
MAGNESIUM SERPL-MCNC: 2.2 MG/DL — SIGNIFICANT CHANGE UP (ref 1.6–2.6)
MCHC RBC-ENTMCNC: 24.1 PG — LOW (ref 27–34)
MCHC RBC-ENTMCNC: 30.5 GM/DL — LOW (ref 32–36)
MCV RBC AUTO: 79 FL — LOW (ref 80–100)
NRBC # BLD: 2 /100 WBCS — HIGH (ref 0–0)
PCO2 BLDA: 24 MMHG — LOW (ref 32–46)
PH BLD: 7.49 — HIGH (ref 7.35–7.45)
PHOSPHATE SERPL-MCNC: 4.6 MG/DL — HIGH (ref 2.5–4.5)
PLATELET # BLD AUTO: 217 K/UL — SIGNIFICANT CHANGE UP (ref 150–400)
PO2 BLDA: 56 MMHG — LOW (ref 74–108)
POTASSIUM SERPL-MCNC: 4.2 MMOL/L — SIGNIFICANT CHANGE UP (ref 3.5–5.3)
POTASSIUM SERPL-SCNC: 4.2 MMOL/L — SIGNIFICANT CHANGE UP (ref 3.5–5.3)
PROT SERPL-MCNC: 6.7 GM/DL — SIGNIFICANT CHANGE UP (ref 6–8.3)
PROTHROM AB SERPL-ACNC: 20.5 SEC — HIGH (ref 10–12.9)
RBC # BLD: 3.53 M/UL — LOW (ref 3.8–5.2)
RBC # FLD: 27.2 % — HIGH (ref 10.3–14.5)
SAO2 % BLDA: 88 % — LOW (ref 92–96)
SODIUM SERPL-SCNC: 125 MMOL/L — LOW (ref 135–145)
WBC # BLD: 9.27 K/UL — SIGNIFICANT CHANGE UP (ref 3.8–10.5)
WBC # FLD AUTO: 9.27 K/UL — SIGNIFICANT CHANGE UP (ref 3.8–10.5)

## 2019-10-22 PROCEDURE — 36000 PLACE NEEDLE IN VEIN: CPT

## 2019-10-22 PROCEDURE — 99232 SBSQ HOSP IP/OBS MODERATE 35: CPT

## 2019-10-22 PROCEDURE — 71275 CT ANGIOGRAPHY CHEST: CPT | Mod: 26

## 2019-10-22 PROCEDURE — 76937 US GUIDE VASCULAR ACCESS: CPT | Mod: 26

## 2019-10-22 PROCEDURE — 99233 SBSQ HOSP IP/OBS HIGH 50: CPT

## 2019-10-22 PROCEDURE — 36410 VNPNXR 3YR/> PHY/QHP DX/THER: CPT

## 2019-10-22 PROCEDURE — 74018 RADEX ABDOMEN 1 VIEW: CPT | Mod: 26

## 2019-10-22 PROCEDURE — 74177 CT ABD & PELVIS W/CONTRAST: CPT | Mod: 26

## 2019-10-22 RX ORDER — WARFARIN SODIUM 2.5 MG/1
5 TABLET ORAL ONCE
Refills: 0 | Status: COMPLETED | OUTPATIENT
Start: 2019-10-22 | End: 2019-10-22

## 2019-10-22 RX ORDER — BUMETANIDE 0.25 MG/ML
0.5 INJECTION INTRAMUSCULAR; INTRAVENOUS
Qty: 20 | Refills: 0 | Status: DISCONTINUED | OUTPATIENT
Start: 2019-10-22 | End: 2019-10-23

## 2019-10-22 RX ORDER — MORPHINE SULFATE 50 MG/1
1 CAPSULE, EXTENDED RELEASE ORAL ONCE
Refills: 0 | Status: DISCONTINUED | OUTPATIENT
Start: 2019-10-22 | End: 2019-10-22

## 2019-10-22 RX ORDER — TOLVAPTAN 15 MG/1
30 TABLET ORAL ONCE
Refills: 0 | Status: COMPLETED | OUTPATIENT
Start: 2019-10-22 | End: 2019-10-22

## 2019-10-22 RX ORDER — NOREPINEPHRINE BITARTRATE/D5W 8 MG/250ML
0.05 PLASTIC BAG, INJECTION (ML) INTRAVENOUS
Qty: 8 | Refills: 0 | Status: DISCONTINUED | OUTPATIENT
Start: 2019-10-22 | End: 2019-10-23

## 2019-10-22 RX ORDER — DEXTROSE 50 % IN WATER 50 %
12.5 SYRINGE (ML) INTRAVENOUS ONCE
Refills: 0 | Status: DISCONTINUED | OUTPATIENT
Start: 2019-10-22 | End: 2019-10-23

## 2019-10-22 RX ORDER — MIDODRINE HYDROCHLORIDE 2.5 MG/1
20 TABLET ORAL ONCE
Refills: 0 | Status: COMPLETED | OUTPATIENT
Start: 2019-10-22 | End: 2019-10-22

## 2019-10-22 RX ADMIN — Medication 40 MILLIGRAM(S): at 10:31

## 2019-10-22 RX ADMIN — MIDODRINE HYDROCHLORIDE 20 MILLIGRAM(S): 2.5 TABLET ORAL at 18:06

## 2019-10-22 RX ADMIN — Medication 3 MILLILITER(S): at 12:50

## 2019-10-22 RX ADMIN — TOLVAPTAN 30 MILLIGRAM(S): 15 TABLET ORAL at 15:52

## 2019-10-22 RX ADMIN — Medication 100 MILLIGRAM(S): at 06:04

## 2019-10-22 RX ADMIN — FLUCONAZOLE 100 MILLIGRAM(S): 150 TABLET ORAL at 15:51

## 2019-10-22 RX ADMIN — MORPHINE SULFATE 1 MILLIGRAM(S): 50 CAPSULE, EXTENDED RELEASE ORAL at 13:16

## 2019-10-22 RX ADMIN — MORPHINE SULFATE 1 MILLIGRAM(S): 50 CAPSULE, EXTENDED RELEASE ORAL at 12:23

## 2019-10-22 RX ADMIN — Medication 300 MILLIGRAM(S): at 15:42

## 2019-10-22 RX ADMIN — Medication 40 MILLIGRAM(S): at 15:49

## 2019-10-22 RX ADMIN — MIDODRINE HYDROCHLORIDE 20 MILLIGRAM(S): 2.5 TABLET ORAL at 06:03

## 2019-10-22 RX ADMIN — Medication 25 MICROGRAM(S): at 06:04

## 2019-10-22 RX ADMIN — Medication 3 MILLILITER(S): at 05:54

## 2019-10-22 RX ADMIN — MIDODRINE HYDROCHLORIDE 20 MILLIGRAM(S): 2.5 TABLET ORAL at 12:19

## 2019-10-22 RX ADMIN — POLYETHYLENE GLYCOL 3350 17 GRAM(S): 17 POWDER, FOR SOLUTION ORAL at 12:23

## 2019-10-22 RX ADMIN — Medication 100 MILLIGRAM(S): at 18:09

## 2019-10-22 RX ADMIN — MIDODRINE HYDROCHLORIDE 20 MILLIGRAM(S): 2.5 TABLET ORAL at 03:07

## 2019-10-22 RX ADMIN — Medication 3 MILLILITER(S): at 18:09

## 2019-10-22 NOTE — CHART NOTE - NSCHARTNOTEFT_GEN_A_CORE
Medicine NP note    asked to place IV access.   Patient lost IV access overnight, RN unable to obtain access X 2 attempts.     20 guage IV place with ease with use of ultrasound.   + blood return, no sign if infiltration.     10:00 AM - RN notified that IV access was lost again? accidentally removed.    patient does require multiple IV medications, and is scheduled to be transferred to [facility].   At this time it is preferred to place midline which will allow for better vascular access.

## 2019-10-22 NOTE — PROGRESS NOTE ADULT - ASSESSMENT
35 yo female with severe pulmonary hypertension and right sided HF (RV EF 35%), COPD, HTN, DM2, hyperbilirubinemia, L femoral DVT,  chronic hypotension admitted with symptoms of worsening right heart failure/hypotension. cardiac cath negative for any CAD in 2013.   Demand troponin levels noted.  Lost 2.4 kg since admission,  responding to higher dose IV Lasix now.    Plan:    c/o abdominal cramping pains,  No acute findings in abdominal US  Cont with aggressive diuretics, renal following  Supplemental O2/pulm following   on midodrine 20mg q 8 for BP support during aggressive diuresing , this can be tapered as it is not contributing much to hemodynamic support.  Psych/ Palliative care following.  s/p PRBC transfusion, H/H better.  Holding Eliquis/asa in setting of severe anemia, concern of increased bleed during menstruation with Eliquis, consider restarting coumadin (as per pt., Coumadin does not make her bleed as much) but patient will have difficulty managing INR.  Needs GYN followup as outpatient.   Activity as tolerated  Pt now agreed to transfer to [facility] for advanced management of her severe pulmonary hypertension/Rt heart failure. Awaiting call back from Dr. Moncho Adamson, cardiology at [facility] 35 yo female with severe pulmonary hypertension and right sided HF (RV EF 35%), COPD, HTN, DM2, hyperbilirubinemia, L femoral DVT,  chronic hypotension admitted with symptoms of worsening right heart failure/hypotension. cardiac cath negative for any CAD in 2013.   Demand troponin levels noted.  Lost 2.4 kg since admission,  responding to higher dose IV Lasix now.    Plan:    c/o abdominal cramping pains,  No acute findings in abdominal US, GI eval  Cont with aggressive diuretics, renal following  Supplemental O2/pulm following   on midodrine 20mg q 8 for BP support during aggressive diuresing , this can be tapered as it is not contributing much to hemodynamic support.  Psych/ Palliative care following.  s/p PRBC transfusion for acute anemia, H/H improved  Now started on coumadin for AC  Needs GYN follow up as outpatient.   Activity as tolerated  Pt now agreed to transfer to [facility] for advanced management of her severe pulmonary hypertension/Rt heart failure. Awaiting call back from Dr. Moncho Adamson, cardiology at [facility] 33 yo female with severe pulmonary hypertension and right sided HF (RV EF 35%), COPD, HTN, DM2, hyperbilirubinemia, L femoral DVT,  chronic hypotension admitted with symptoms of worsening right heart failure/hypotension. cardiac cath negative for any CAD in 2013.   Demand troponin levels noted.  Lost 2.4 kg since admission,  responding to higher dose IV Lasix now.    Plan:    c/o abdominal cramping pains,  No acute findings in abdominal US, awaiting GI eval, ?need for further A/P imaging    Cont with aggressive diuretics, renal following  Supplemental O2/pulm following   on midodrine 20mg q 8 for BP support during aggressive diuresing , this can be tapered as it is not contributing much to hemodynamic support.  Psych/ Palliative care following.  s/p PRBC transfusion for acute anemia, H/H improved  Now started on coumadin for AC  Needs GYN follow up as outpatient.   Activity as tolerated  Pt now agreed to transfer to [facility] for advanced management of her severe pulmonary hypertension/Rt heart failure. will make arrangements for transfer once GI issue is addressed while pt is here

## 2019-10-22 NOTE — PROCEDURE NOTE - ADDITIONAL PROCEDURE DETAILS
RN attempted multiple times at IV insertion however unsuccessful. #20G inserted into left AC under ultrasound guidance. IV flushed well with good flash back. IV secured with tegaderm and tape. Patient tolerated procedure well without any complications. RN aware. PRBC may be transfused via IV inserted.
pt s/p left arm midline placement inserted into left brachial vein under US guidance.  4fr x 20cm SL.  Pt tolerated procedure well  -midline can be accessed

## 2019-10-22 NOTE — PROGRESS NOTE ADULT - SUBJECTIVE AND OBJECTIVE BOX
Patient is a 34y old  Female who presents with a chief complaint of Shortness of breath and hypotension. (21 Oct 2019 22:18)    PAST MEDICAL & SURGICAL HISTORY:  Former smoker, stopped smoking in distant past  Vitamin D deficiency  Morbid obesity due to excess calories  Edema extremities  Diabetes mellitus  Hyperbilirubinemia  COPD (chronic obstructive pulmonary disease)  Anticoagulation goal of INR 2 to 3  Anticardiolipin antibody positive  Congestive heart failure, unspecified HF chronicity, unspecified heart failure type  Moderate asthma, unspecified whether complicated, unspecified whether persistent  Other secondary hypertension  Anticardiolipin antibody positive  Essential hypertension  Pulmonary HTN  Morbid obesity    INTERVAL HISTORY: Resting in bed  	  MEDICATIONS:  MEDICATIONS  (STANDING):  ALBUTerol/ipratropium for Nebulization 3 milliLiter(s) Nebulizer every 6 hours  allopurinol 300 milliGRAM(s) Oral daily  atorvastatin 20 milliGRAM(s) Oral at bedtime  docusate sodium 100 milliGRAM(s) Oral two times a day  fluconAZOLE IVPB 200 milliGRAM(s) IV Intermittent every 24 hours  furosemide   Injectable 40 milliGRAM(s) IV Push every 6 hours  insulin lispro (HumaLOG) corrective regimen sliding scale   SubCutaneous at bedtime  insulin lispro (HumaLOG) corrective regimen sliding scale   SubCutaneous three times a day before meals  iron sucrose IVPB 100 milliGRAM(s) IV Intermittent every 7 days  levothyroxine 25 MICROGram(s) Oral daily  midodrine. 20 milliGRAM(s) Oral three times a day  morphine  - Injectable 1 milliGRAM(s) IV Push once  nystatin    Suspension 849357 Unit(s) Swish and Swallow four times a day  pantoprazole    Tablet 40 milliGRAM(s) Oral before breakfast  polyethylene glycol 3350 17 Gram(s) Oral daily  senna 2 Tablet(s) Oral at bedtime    MEDICATIONS  (PRN):  aluminum hydroxide/magnesium hydroxide/simethicone Suspension 30 milliLiter(s) Oral every 4 hours PRN Dyspepsia  benzocaine 15 mG/menthol 3.6 mG (Sugar-Free) Lozenge 1 Lozenge Oral every 6 hours PRN Mouth Sores  Biotene Dry Mouth Oral Rinse 5 milliLiter(s) Swish and Spit four times a day PRN dry mouth  dextrose 40% Gel 15 Gram(s) Oral once PRN Blood Glucose LESS THAN 70 milliGRAM(s)/deciliter  glucagon  Injectable 1 milliGRAM(s) IntraMuscular once PRN Glucose LESS THAN 70 milligrams/deciliter  ondansetron Injectable 4 milliGRAM(s) IV Push every 6 hours PRN Nausea and/or Vomiting    Vitals:  T(F): 97.1 (10-22-19 @ 10:27), Max: 98.5 (10-22-19 @ 05:34)  HR: 86 (10-22-19 @ 10:27) (69 - 90)  BP: 103/54 (10-22-19 @ 10:27) (89/50 - 107/90)  RR: 16 (10-22-19 @ 10:27) (16 - 20)  SpO2: 95% (10-22-19 @ 10:27) (93% - 96%)  Wt(kg): -- 103.8 kg     10-21 @ 07:01  -  10-22 @ 07:00  --------------------------------------------------------  IN:    Oral Fluid: 480 mL  Total IN: 480 mL    OUT:    Indwelling Catheter - Urethral: 850 mL  Total OUT: 850 mL    Total NET: -370 mL    PHYSICAL EXAM:  Neuro: Awake, responsive  CV: S1 S2 RRR  Lungs: CTABL  GI: Soft, BS +, ND, NT  Extremities: ++++ LE edema    TELEMETRY: RSR    RADIOLOGY: < from: Xray Chest 1 View-PORTABLE IMMEDIATE (10.18.19 @ 18:23) >   Heart enlargement and quite large main pulmonary arteries and   diminutive aorta suggests congenital heart disease.    < end of copied text >    DIAGNOSTIC TESTING:    [ x] Echocardiogram: < from: TTE Echo Doppler w/o Cont (07.26.19 @ 08:44) >   1. Left ventricular ejection fraction, by visual estimation, is 45 to   50%.   2. Mildly decreased global left ventricular systolic function.   3. Elevated mean left atrial pressure.   4. Global cardiomyopathy.   5. Increased LV wall thickness.   6. Normal left ventricular internal cavity size.   7. Right ventricular pressure overload.   8. Spectral Doppler shows impaired relaxation pattern of left   ventricular myocardial filling (Grade I diastolic dysfunction).   9. There is mild asymmetric left ventricular hypertrophy.  10. Pulmonary hypertension.  11. Severely enlarged right ventricle.  12. Severely reduced RV systolic function.  13. RV ejection fraction 34%.  14. Small pericardial effusion.  15. Degenerative mitral valve.  16. Mild mitral annular calcification.  17. Mild mitral valve regurgitation.  18. Moderate tricuspid regurgitation.  19. Structurally normal tricuspid valve, with normal leaflet excursion.  20. Mild aortic regurgitation.  21. Mild to moderate pulmonic valve regurgitation.  22. Structurally normal pulmonic valve, with normal leaflet excursion.  23. Estimated pulmonary artery systolic pressure is 87.1 mmHg assuming a   right atrial pressure of 20 mmHg, which is consistent with severe   pulmonary hypertension.  24. Pulmonary hypertension is present.  25. The main pulmonary artery is normal in size.  26. The right atrial pressure is moderately elevated.    < end of copied text >    LABS:	 	    22 Oct 2019 08:27    125    |  88     |  16     ----------------------------<  122    4.2     |  21     |  1.38   21 Oct 2019 18:55    126    |  86     |  14     ----------------------------<  139    3.9     |  27     |  1.24   20 Oct 2019 07:31    128    |  88     |  11     ----------------------------<  107    3.8     |  29     |  0.96     Ca    9.1        22 Oct 2019 08:27  Phos  4.6       22 Oct 2019 08:27  Mg     2.2       22 Oct 2019 08:27    TPro  6.7    /  Alb  2.8    /  TBili  4.4    /  DBili  2.86   /  AST  147    /  ALT  116    /  AlkPhos  114    22 Oct 2019 08:27                          8.5    9.27  )-----------( 217      ( 22 Oct 2019 08:27 )             27.9 ,                       8.3    7.33  )-----------( 188      ( 21 Oct 2019 18:55 )             27.5 ,                       7.8    9.44  )-----------( 230      ( 20 Oct 2019 07:31 )             25.8 ,                       6.8    10.19 )-----------( 252      ( 19 Oct 2019 18:25 )             22.0     PT/PTT- ( 22 Oct 2019 08:27 )   PT: 20.5 sec;  PTT: x        INR: 1.80 ratio (10-22 @ 08:27) Patient is a 34y old  Female who presents with a chief complaint of Shortness of breath and hypotension. (21 Oct 2019 22:18)    PAST MEDICAL & SURGICAL HISTORY:  Former smoker, stopped smoking in distant past  Vitamin D deficiency  Morbid obesity due to excess calories  Edema extremities  Diabetes mellitus  Hyperbilirubinemia  COPD (chronic obstructive pulmonary disease)  Anticoagulation goal of INR 2 to 3  Anticardiolipin antibody positive  Congestive heart failure, unspecified HF chronicity, unspecified heart failure type  Moderate asthma, unspecified whether complicated, unspecified whether persistent  Other secondary hypertension  Anticardiolipin antibody positive  Essential hypertension  Pulmonary HTN  Morbid obesity    INTERVAL HISTORY: Resting in bed, c/o abdominal discomfort/pain  	  MEDICATIONS:  MEDICATIONS  (STANDING):  ALBUTerol/ipratropium for Nebulization 3 milliLiter(s) Nebulizer every 6 hours  allopurinol 300 milliGRAM(s) Oral daily  atorvastatin 20 milliGRAM(s) Oral at bedtime  docusate sodium 100 milliGRAM(s) Oral two times a day  fluconAZOLE IVPB 200 milliGRAM(s) IV Intermittent every 24 hours  furosemide   Injectable 40 milliGRAM(s) IV Push every 6 hours  insulin lispro (HumaLOG) corrective regimen sliding scale   SubCutaneous at bedtime  insulin lispro (HumaLOG) corrective regimen sliding scale   SubCutaneous three times a day before meals  iron sucrose IVPB 100 milliGRAM(s) IV Intermittent every 7 days  levothyroxine 25 MICROGram(s) Oral daily  midodrine. 20 milliGRAM(s) Oral three times a day  morphine  - Injectable 1 milliGRAM(s) IV Push once  nystatin    Suspension 167310 Unit(s) Swish and Swallow four times a day  pantoprazole    Tablet 40 milliGRAM(s) Oral before breakfast  polyethylene glycol 3350 17 Gram(s) Oral daily  senna 2 Tablet(s) Oral at bedtime    MEDICATIONS  (PRN):  aluminum hydroxide/magnesium hydroxide/simethicone Suspension 30 milliLiter(s) Oral every 4 hours PRN Dyspepsia  benzocaine 15 mG/menthol 3.6 mG (Sugar-Free) Lozenge 1 Lozenge Oral every 6 hours PRN Mouth Sores  Biotene Dry Mouth Oral Rinse 5 milliLiter(s) Swish and Spit four times a day PRN dry mouth  dextrose 40% Gel 15 Gram(s) Oral once PRN Blood Glucose LESS THAN 70 milliGRAM(s)/deciliter  glucagon  Injectable 1 milliGRAM(s) IntraMuscular once PRN Glucose LESS THAN 70 milligrams/deciliter  ondansetron Injectable 4 milliGRAM(s) IV Push every 6 hours PRN Nausea and/or Vomiting    Vitals:  T(F): 97.1 (10-22-19 @ 10:27), Max: 98.5 (10-22-19 @ 05:34)  HR: 86 (10-22-19 @ 10:27) (69 - 90)  BP: 103/54 (10-22-19 @ 10:27) (89/50 - 107/90)  RR: 16 (10-22-19 @ 10:27) (16 - 20)  SpO2: 95% (10-22-19 @ 10:27) (93% - 96%)  Wt(kg): -- 103.8 kg     10-21 @ 07:01  -  10-22 @ 07:00  --------------------------------------------------------  IN:    Oral Fluid: 480 mL  Total IN: 480 mL    OUT:    Indwelling Catheter - Urethral: 850 mL  Total OUT: 850 mL    Total NET: -370 mL    PHYSICAL EXAM:  Neuro: Awake, responsive  CV: S1 S2 RRR  Lungs: CTABL  GI: Soft, BS +, ND, mild tenderness   Extremities: ++++ LE edema    TELEMETRY: RSR    RADIOLOGY: < from: Xray Chest 1 View-PORTABLE IMMEDIATE (10.18.19 @ 18:23) >   Heart enlargement and quite large main pulmonary arteries and   diminutive aorta suggests congenital heart disease.    < end of copied text >    DIAGNOSTIC TESTING:    [ x] Echocardiogram: < from: TTE Echo Doppler w/o Cont (07.26.19 @ 08:44) >   1. Left ventricular ejection fraction, by visual estimation, is 45 to   50%.   2. Mildly decreased global left ventricular systolic function.   3. Elevated mean left atrial pressure.   4. Global cardiomyopathy.   5. Increased LV wall thickness.   6. Normal left ventricular internal cavity size.   7. Right ventricular pressure overload.   8. Spectral Doppler shows impaired relaxation pattern of left   ventricular myocardial filling (Grade I diastolic dysfunction).   9. There is mild asymmetric left ventricular hypertrophy.  10. Pulmonary hypertension.  11. Severely enlarged right ventricle.  12. Severely reduced RV systolic function.  13. RV ejection fraction 34%.  14. Small pericardial effusion.  15. Degenerative mitral valve.  16. Mild mitral annular calcification.  17. Mild mitral valve regurgitation.  18. Moderate tricuspid regurgitation.  19. Structurally normal tricuspid valve, with normal leaflet excursion.  20. Mild aortic regurgitation.  21. Mild to moderate pulmonic valve regurgitation.  22. Structurally normal pulmonic valve, with normal leaflet excursion.  23. Estimated pulmonary artery systolic pressure is 87.1 mmHg assuming a   right atrial pressure of 20 mmHg, which is consistent with severe   pulmonary hypertension.  24. Pulmonary hypertension is present.  25. The main pulmonary artery is normal in size.  26. The right atrial pressure is moderately elevated.    < end of copied text >    LABS:	 	    22 Oct 2019 08:27    125    |  88     |  16     ----------------------------<  122    4.2     |  21     |  1.38   21 Oct 2019 18:55    126    |  86     |  14     ----------------------------<  139    3.9     |  27     |  1.24   20 Oct 2019 07:31    128    |  88     |  11     ----------------------------<  107    3.8     |  29     |  0.96     Ca    9.1        22 Oct 2019 08:27  Phos  4.6       22 Oct 2019 08:27  Mg     2.2       22 Oct 2019 08:27    TPro  6.7    /  Alb  2.8    /  TBili  4.4    /  DBili  2.86   /  AST  147    /  ALT  116    /  AlkPhos  114    22 Oct 2019 08:27                          8.5    9.27  )-----------( 217      ( 22 Oct 2019 08:27 )             27.9 ,                       8.3    7.33  )-----------( 188      ( 21 Oct 2019 18:55 )             27.5 ,                       7.8    9.44  )-----------( 230      ( 20 Oct 2019 07:31 )             25.8 ,                       6.8    10.19 )-----------( 252      ( 19 Oct 2019 18:25 )             22.0     PT/PTT- ( 22 Oct 2019 08:27 )   PT: 20.5 sec;  PTT: x        INR: 1.80 ratio (10-22 @ 08:27)

## 2019-10-22 NOTE — PROGRESS NOTE ADULT - PROVIDER SPECIALTY LIST ADULT
Cardiology
Gastroenterology
Gastroenterology
Hospitalist
Nephrology
Pulmonology
Hospitalist
Pulmonology
Cardiology
Pulmonology
Cardiology
Hospitalist
Hospitalist
Cardiology
Hospitalist

## 2019-10-22 NOTE — PROGRESS NOTE ADULT - SUBJECTIVE AND OBJECTIVE BOX
Patient is a 34y old  Female who presents with a chief complaint of Shortness of breath and hypotension. (22 Oct 2019 14:42)      INTERVAL HPI/OVERNIGHT EVENTS:  Pt was seen and examined, no acute events.  She continues to complain of abdominal pain.    MEDICATIONS  (STANDING):  ALBUTerol/ipratropium for Nebulization 3 milliLiter(s) Nebulizer every 6 hours  allopurinol 300 milliGRAM(s) Oral daily  atorvastatin 20 milliGRAM(s) Oral at bedtime  dextrose 5%. 1000 milliLiter(s) (50 mL/Hr) IV Continuous <Continuous>  dextrose 50% Injectable 12.5 Gram(s) IV Push once  dextrose 50% Injectable 25 Gram(s) IV Push once  dextrose 50% Injectable 25 Gram(s) IV Push once  docusate sodium 100 milliGRAM(s) Oral two times a day  fluconAZOLE IVPB 200 milliGRAM(s) IV Intermittent every 24 hours  furosemide   Injectable 40 milliGRAM(s) IV Push every 6 hours  insulin lispro (HumaLOG) corrective regimen sliding scale   SubCutaneous at bedtime  insulin lispro (HumaLOG) corrective regimen sliding scale   SubCutaneous three times a day before meals  iron sucrose IVPB 100 milliGRAM(s) IV Intermittent every 7 days  levothyroxine 25 MICROGram(s) Oral daily  midodrine. 20 milliGRAM(s) Oral three times a day  nystatin    Suspension 254849 Unit(s) Swish and Swallow four times a day  pantoprazole    Tablet 40 milliGRAM(s) Oral before breakfast  polyethylene glycol 3350 17 Gram(s) Oral daily  senna 2 Tablet(s) Oral at bedtime    MEDICATIONS  (PRN):  aluminum hydroxide/magnesium hydroxide/simethicone Suspension 30 milliLiter(s) Oral every 4 hours PRN Dyspepsia  benzocaine 15 mG/menthol 3.6 mG (Sugar-Free) Lozenge 1 Lozenge Oral every 6 hours PRN Mouth Sores  Biotene Dry Mouth Oral Rinse 5 milliLiter(s) Swish and Spit four times a day PRN dry mouth  dextrose 40% Gel 15 Gram(s) Oral once PRN Blood Glucose LESS THAN 70 milliGRAM(s)/deciliter  glucagon  Injectable 1 milliGRAM(s) IntraMuscular once PRN Glucose LESS THAN 70 milligrams/deciliter  ondansetron Injectable 4 milliGRAM(s) IV Push every 6 hours PRN Nausea and/or Vomiting      Allergies  No Known Allergies        Vital Signs Last 24 Hrs  T(C): 36.8 (22 Oct 2019 18:04), Max: 36.9 (22 Oct 2019 05:34)  T(F): 98.2 (22 Oct 2019 18:04), Max: 98.5 (22 Oct 2019 05:34)  HR: 89 (22 Oct 2019 18:15) (69 - 94)  BP: 87/52 (22 Oct 2019 18:04) (87/52 - 103/54)  BP(mean): --  RR: 18 (22 Oct 2019 18:04) (16 - 20)  SpO2: 96% (22 Oct 2019 18:15) (93% - 96%)      PHYSICAL EXAM:  GENERAL: NAD  HEAD:  Atraumatic  EYES: PERRLA  NERVOUS SYSTEM:  A, O X 3, non focal  CHEST/LUNG: Clear  HEART: RRR  ABDOMEN: Soft, tender, + guarding  EXTREMITIES: 4+ edema      LABS:                        8.5    9.27  )-----------( 217      ( 22 Oct 2019 08:27 )             27.9     10-22    125<L>  |  88<L>  |  16  ----------------------------<  122<H>  4.2   |  21<L>  |  1.38<H>    Ca    9.1      22 Oct 2019 08:27  Phos  4.6     10-22  Mg     2.2     10-22    TPro  6.7  /  Alb  2.8<L>  /  TBili  4.4<H>  /  DBili  2.86<H>  /  AST  147<H>  /  ALT  116<H>  /  AlkPhos  114  10-22    PT/INR - ( 22 Oct 2019 08:27 )   PT: 20.5 sec;   INR: 1.80 ratio             CAPILLARY BLOOD GLUCOSE      POCT Blood Glucose.: 124 mg/dL (22 Oct 2019 18:01)  POCT Blood Glucose.: 151 mg/dL (22 Oct 2019 11:13)  POCT Blood Glucose.: 132 mg/dL (22 Oct 2019 07:17)  POCT Blood Glucose.: 122 mg/dL (21 Oct 2019 21:38)      RADIOLOGY & ADDITIONAL TESTS:    Imaging Personally Reviewed:  [ ] YES  [ ] NO    Consultant(s) Notes Reviewed:  [ ] YES  [ ] NO    Care Discussed with Consultants/Other Providers [ ] YES  [ ] NO

## 2019-10-22 NOTE — PROGRESS NOTE ADULT - REASON FOR ADMISSION
Shortness of breath and hypotension.

## 2019-10-22 NOTE — CONSULT NOTE ADULT - CONSULT REASON
HypoNa
SOB
anemia
dyspnea
hypotension and severe abdominal pain with elevate lactate
Eval for IVC filter

## 2019-10-22 NOTE — CONSULT NOTE ADULT - CONSULT REQUESTED DATE/TIME
10-Oct-2019 10:24
10-Oct-2019 12:20
10-Oct-2019 13:06
13-Oct-2019 12:52
22-Oct-2019 23:20
15-Oct-2019 08:28

## 2019-10-22 NOTE — PROGRESS NOTE ADULT - ASSESSMENT
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      Acute on chronic combined systolic and diastolic congestive heart failure:   - Echo in 7/2019 showed EF 45-50% w/ elevated mean left atrial pressure w/ global cardiomyopathy, grade I diastolic dysfunction, pulmonary hypertension w/ a severely enlarged right ventricle and severely reduced RV systolic function w/ RV ejection fraction 34%, moderate tricuspid regurgitation, mild to moderate pulmonic valve regurgitation & severe pulmonary hypertension   - cont lasix 40mg IV tid  - c/w midodrine to 20mg tid.   - monitor daily wt, i/o's  - jimenez removed, will attempt to monitor I/O  - fluid and salt restriction encouraged. Although patient is non compliant--ordering chinese food, french fries, per RN --not compliant with water restriction and also at times refusing meds and blood draws.  - cardiology following    - will hold pain meds sec to hypotension.   - patient agreed for transfer to [facility] , Dr. Moncho Adamson at [facility], cardiology arranging transfer , awaiting bed       Severe Pulmonary hypertension:  - c/w riociguat  - continue with supplemental oxygen to maintain O2 sat >92%  - pulmonary following      Cardio renal syndrome:  - Cr overall stable  - Continue current dose diuresis      Hypotension:  - BP meds held  - continue midodrine    Hyponatremia:  - hypervolemic  - In the setting of CHF  - Renal following , s/p tolvaptan 15mg x 1 10/15/19  - Fluid restriction  - Continue diuretic      Worsening acute blood loss anemia in the setting of AC and steroid use with heavy menstruation:  - iron studies 10/14/19--consistent with iron deficiency anemia   - continue with venofer 100mg IV q7days x 4 doses   - will need GYN outpt  - s/p 2nd PRBC this admission   - H/H stable, Will continue coumadin per INR      Left foot pain, possibly from gout:  - Hyperuricemia secondary to diuretic  - LE duplex  : No change in left external iliac and left greater saphenous vein thrombosis.  - Uric acid elevated   - Allopurinol started      Constipation:  - Sec to opiates   - Continue with bowel regimen       Elevated LFTs:  - Worsening  - Possibly secondary to rt heart congestion  - Check viral panel.  - GI to follow up      Abdominal pain:  - Unclear etiology  - Maintain NPO now  - Might need to do CT A/P?  - In the setting of Anticardiolipin antibody positive, cant rule out mesenteric ischemia  - GI follow up      Anticardiolipin antibody positive:  - continue coumadin per INR      Acute deep vein thrombosis (DVT) of femoral vein of left lower extremity:  - US showed an extensive acute nonocclusive thrombus in the left external iliac vein and the left common femoral vein and left greater saphenous vein  - was on Eliquis, pt non compliant with INR checks and doctor/follow-up visits , however per patient coumadin does not make her have vaginal bleeding like Eliquis and agreeing to follow PCP for INR. Also, patient to follow with GYN outpatient.   -per Dr. Ling ; not a candidate for ivc filter  -coumadin 5 mg PO at bedtime , monitor INR       Type 2 diabetes mellitus with hyperglycemia, without long-term current use of insulin:  - Continue current insulin regimen      Moderate persistent Asthma:  - pulmonary following  - c/w Symbicort      L foot injury due to weight fell on it  -Xray of the foot; neg  -pain mgmt       Acquired hypothyroidism:  - c/w synthroid       Hyperlipidemia:  - c/w Zocor      Depression:  - Zoloft on hold  -denies SI/HI  -Psych following       Preventative measure  fall precautions

## 2019-10-22 NOTE — PROGRESS NOTE ADULT - SUBJECTIVE AND OBJECTIVE BOX
[facility] NEPHROLOGY SERVICES, [facility]  NEPHROLOGY AND HYPERTENSION  [address]  [address]  [place] [address]  [phone]    MD FABIAN FERREIRA MD ANDREY GONCHARUK, MD MADHU KORRAPATI, MD YELENA ROSENBERG, MD BINNY KOSHY, MD CHRISTOPHER CAPUTO, MD EDWARD BOVER, MD          Patient up set v8 juice thrown away  mild epigastric discomfort;   no n/v;     MEDICATIONS  (STANDING):  ALBUTerol/ipratropium for Nebulization 3 milliLiter(s) Nebulizer every 6 hours  allopurinol 300 milliGRAM(s) Oral daily  atorvastatin 20 milliGRAM(s) Oral at bedtime  dextrose 5%. 1000 milliLiter(s) (50 mL/Hr) IV Continuous <Continuous>  dextrose 50% Injectable 12.5 Gram(s) IV Push once  dextrose 50% Injectable 25 Gram(s) IV Push once  dextrose 50% Injectable 25 Gram(s) IV Push once  docusate sodium 100 milliGRAM(s) Oral two times a day  fluconAZOLE IVPB 200 milliGRAM(s) IV Intermittent every 24 hours  furosemide   Injectable 40 milliGRAM(s) IV Push every 6 hours  insulin lispro (HumaLOG) corrective regimen sliding scale   SubCutaneous at bedtime  insulin lispro (HumaLOG) corrective regimen sliding scale   SubCutaneous three times a day before meals  iron sucrose IVPB 100 milliGRAM(s) IV Intermittent every 7 days  levothyroxine 25 MICROGram(s) Oral daily  midodrine. 20 milliGRAM(s) Oral three times a day  nystatin    Suspension [id number] Unit(s) Swish and Swallow four times a day  pantoprazole    Tablet 40 milliGRAM(s) Oral before breakfast  polyethylene glycol 3350 17 Gram(s) Oral daily  senna 2 Tablet(s) Oral at bedtime    MEDICATIONS  (PRN):  aluminum hydroxide/magnesium hydroxide/simethicone Suspension 30 milliLiter(s) Oral every 4 hours PRN Dyspepsia  benzocaine 15 mG/menthol 3.6 mG (Sugar-Free) Lozenge 1 Lozenge Oral every 6 hours PRN Mouth Sores  Biotene Dry Mouth Oral Rinse 5 milliLiter(s) Swish and Spit four times a day PRN dry mouth  dextrose 40% Gel 15 Gram(s) Oral once PRN Blood Glucose LESS THAN 70 milliGRAM(s)/deciliter  glucagon  Injectable 1 milliGRAM(s) IntraMuscular once PRN Glucose LESS THAN 70 milligrams/deciliter  ondansetron Injectable 4 milliGRAM(s) IV Push every 6 hours PRN Nausea and/or Vomiting      10-21-19 @ 07:01  -  10-22-19 @ 07:00  --------------------------------------------------------  IN: 480 mL / OUT: 850 mL / NET: -370 mL      PHYSICAL EXAM:      T(C): 36.2 (10-22-19 @ 10:27), Max: 36.9 (10-22-19 @ 05:34)  HR: 86 (10-22-19 @ 10:27) (69 - 90)  BP: 103/54 (10-22-19 @ 10:27) (89/50 - 103/54)  RR: 16 (10-22-19 @ 10:27) (16 - 20)  SpO2: 95% (10-22-19 @ 10:27) (93% - 96%)  Wt(kg): --  Respiratory: clear anteriorly, decreased BS at bases  Cardiovascular: S1 S2  Gastrointestinal: soft mild epigastric tenderness   Extremities:   2-3 lymphedema                                    8.5    9.27  )-----------( 217      ( 22 Oct 2019 08:27 )             27.9     10-22    125<L>  |  88<L>  |  16  ----------------------------<  122<H>  4.2   |  21<L>  |  1.38<H>    Ca    9.1      22 Oct 2019 08:27  Phos  4.6     10-22  Mg     2.2     10-22    TPro  6.7  /  Alb  2.8<L>  /  TBili  4.4<H>  /  DBili  2.86<H>  /  AST  147<H>  /  ALT  116<H>  /  AlkPhos  114  10-22      LIVER FUNCTIONS - ( 22 Oct 2019 08:27 )  Alb: 2.8 g/dL / Pro: 6.7 gm/dL / ALK PHOS: 114 U/L / ALT: 116 U/L / AST: 147 U/L / GGT: x           Creatinine Trend: 1.38<--, 1.24<--, 0.96<--, 0.83<--, 0.99<--, 1.25<--      Assessment   DIANN; Hypervolemic hyponatremia; CRS: hypotension  Fe deficiency anemia, acute on chronic;   Hyponatremia poor prognostic indicator  Hyperuricemia due to diuretic, CRS physiology;     Plan:  Encouraged  -1000  ml  Tolvaptan 30 mg one dose, will follow response;   IV Loop diuretic;   Miralax trial; abd xray noted;         Clint Linares MD

## 2019-10-22 NOTE — CONSULT NOTE ADULT - ASSESSMENT
Neuro: Pain control with   CV:  Pulm:  GI:  Renal/Metabolic:  ID:  Heme:  Endo:  Skin:  Dispo: 35 yo F with pulm hypertension, right sided heart failure with RV EF 35%, COPD, HTN, DM2, hyperbilirubinemia, L femoral DVT, admitted to the hospital for increasing shortness of breath, now noted to have extensive acute non-occlusive thrombus of the L external iliac vein, L common femoral vein and L greater saphenous vein.  Pt noted to have severe abdominal pain with associated elevated lactate in the setting of hypercoaguable state (APLS and known clot, subtherapeutic INR), concern for possible PE given RV diltattion and possible ischemic colitis vs mesenteric ischemia, patient underwent CT angio of chest abd /pelvis to further evaluate.  Noted to be hypotensive on the floors with persistent right sided heart failure, now concern for worsening RV failure with severe dilatation on bedside echo with associated hypotension, now requiring vasopressor and possible  induced diuresis.    Neuro: Pain control with Fentanyl 25mcg q4H PRN for pain.    CV: Biventricular heart failure, now with RV dilatation and decreased RV systolic function, noted to only be neg 3 kg since admission despite lasix 40mg Q6H.  Will start levophed for hypotension and bumex drip for RV failure in the setting of hypotension.   Pulm: Severe pulmonary hypertension  with RV dilatation requiring supplement O2.  Pending CT angio read to further evaluate for possible PE given LLE clot and worsening RV dilatation.  GI: Severe abdominal pain - pending ct angio to evaluate for intraabdominal pathology, will give fentanyl PRN for pain, hold for sedation or hypotension.  Continue with Protonix and zofran for nausea  Renal/Metabolic: DIANN in the setting of hypotension and diuresis.  Will continue to trend, with notable lactate of 9.  Received contrast for angio study given high risk for PE and high risk for thrombotic intraabdominal vascular disease.  Will require vasopressor assisted diuresis.  Trend strict I/Os, monitor daily weights.  Trend BUN /Cr.  Given dilated RV with severe RV dysfunction, IV fluids would likely overload RV and worsen hypotension.   ID: Continue with nystatin for oral thrush  Heme: Acute thrombus in L iliac, L CFV currently on warfarin x 2 days, currently pending todays dose, previously on Eliquis, however refused by patient 2/2 severe vaginal bleeding during menses.  INR noted to be 1.8, subtherapeutic, received 5mg x Anemia, receiving IV iron therapy  Endo: Hypothyroid c/w levothyroixine  Dispo: Admit to ICU for further management and pressor assisted diuresis.  May ultimately require transfer to [facility] for consult with Dr. Unger for Pulm HTN and Dr. Adasmon of Cards.

## 2019-10-22 NOTE — PROGRESS NOTE ADULT - SUBJECTIVE AND OBJECTIVE BOX
INTERVAL HPI:   33 y/o Female with severe Pulmonary Hypertension and right sided CHF( RV EF 35%) COPD, HTN, DM2, Hyperbilirubinemia, L femoral DVT. Not taking anticoagulant since she her last admission(claims prescription was not sent).   Pt. went to PCP today who noted her hypotension and SOB and recommended coming to ER.  She hasn't been able to  meds from pharmacy as outpatient., because she can't walk due to chronic leg swelling, right calf pain.  No other complaints of inciting event, has had poor oral intake for several days,   Pt. is otherwise well. Refused  ABG, parenteral anticoagulants;  On home O2. Denies tobacco abuse.    OVERNIGHT EVENTS:  Awake responsive.    Vital Signs Last 24 Hrs  T(C): 36.2 (22 Oct 2019 10:27), Max: 36.9 (22 Oct 2019 05:34)  T(F): 97.1 (22 Oct 2019 10:27), Max: 98.5 (22 Oct 2019 05:34)  HR: 86 (22 Oct 2019 10:27) (69 - 90)  BP: 103/54 (22 Oct 2019 10:27) (89/50 - 103/54)  BP(mean): --  RR: 16 (22 Oct 2019 10:27) (16 - 20)  SpO2: 95% (22 Oct 2019 10:27) (93% - 96%)    PHYSICAL EXAM:  GEN:         Awake, responsive and comfortable.  HEENT:    Normal.    RESP:       no distress  CVS:          Regular rate and rhythm.   ABD:         Soft, non-tender, non-distended;     MEDICATIONS  (STANDING):  ALBUTerol/ipratropium for Nebulization 3 milliLiter(s) Nebulizer every 6 hours  allopurinol 300 milliGRAM(s) Oral daily  atorvastatin 20 milliGRAM(s) Oral at bedtime  dextrose 5%. 1000 milliLiter(s) (50 mL/Hr) IV Continuous <Continuous>  dextrose 50% Injectable 12.5 Gram(s) IV Push once  dextrose 50% Injectable 25 Gram(s) IV Push once  dextrose 50% Injectable 25 Gram(s) IV Push once  docusate sodium 100 milliGRAM(s) Oral two times a day  fluconAZOLE IVPB 200 milliGRAM(s) IV Intermittent every 24 hours  furosemide   Injectable 40 milliGRAM(s) IV Push every 6 hours  insulin lispro (HumaLOG) corrective regimen sliding scale   SubCutaneous at bedtime  insulin lispro (HumaLOG) corrective regimen sliding scale   SubCutaneous three times a day before meals  iron sucrose IVPB 100 milliGRAM(s) IV Intermittent every 7 days  levothyroxine 25 MICROGram(s) Oral daily  midodrine. 20 milliGRAM(s) Oral three times a day  nystatin    Suspension 823012 Unit(s) Swish and Swallow four times a day  pantoprazole    Tablet 40 milliGRAM(s) Oral before breakfast  polyethylene glycol 3350 17 Gram(s) Oral daily  senna 2 Tablet(s) Oral at bedtime    MEDICATIONS  (PRN):  aluminum hydroxide/magnesium hydroxide/simethicone Suspension 30 milliLiter(s) Oral every 4 hours PRN Dyspepsia  benzocaine 15 mG/menthol 3.6 mG (Sugar-Free) Lozenge 1 Lozenge Oral every 6 hours PRN Mouth Sores  Biotene Dry Mouth Oral Rinse 5 milliLiter(s) Swish and Spit four times a day PRN dry mouth  dextrose 40% Gel 15 Gram(s) Oral once PRN Blood Glucose LESS THAN 70 milliGRAM(s)/deciliter  glucagon  Injectable 1 milliGRAM(s) IntraMuscular once PRN Glucose LESS THAN 70 milligrams/deciliter  ondansetron Injectable 4 milliGRAM(s) IV Push every 6 hours PRN Nausea and/or Vomiting    LABS:                        8.5    9.27  )-----------( 217      ( 22 Oct 2019 08:27 )             27.9     10-22    125<L>  |  88<L>  |  16  ----------------------------<  122<H>  4.2   |  21<L>  |  1.38<H>    Ca    9.1      22 Oct 2019 08:27  Phos  4.6     10-22  Mg     2.2     10-22    TPro  6.7  /  Alb  2.8<L>  /  TBili  4.4<H>  /  DBili  2.86<H>  /  AST  147<H>  /  ALT  116<H>  /  AlkPhos  114  10-22    PT/INR - ( 22 Oct 2019 08:27 )   PT: 20.5 sec;   INR: 1.80 ratio       ASSESSMENT AND PLAN:  ·	SOB.  ·	Severe pulmonary HTN.  ·	Cor pulmonale.  ·	Fluid Retention.  ·	Left external DVT.  ·	Non Compliance with treatment.    Awaiting transfer to [facility].

## 2019-10-22 NOTE — CONSULT NOTE ADULT - SUBJECTIVE AND OBJECTIVE BOX
Patient is a 34y old  Female who presents with a chief complaint of Shortness of breath and hypotension. (22 Oct 2019 18:58)      HPI:  33 yo F with pulm hypertension, right sided heart failure with RV EF 35%, COPD, HTN, DM2, hyperbilirubinemia, L femoral DVT, admitted to the hospital for increasing shortness of breath, now noted to have extensive acute non-occlusive thrombus of the L external iliac vein, L common femoral vein and L greater saphenous vein.  As per chart review, patient was discharged on Eliquis as opposed to coumadin 2/2 lack of follow up for INR, and non-compliant with Eliquis 2/2 heavy vaginal bleeding.  Pt also noted to have gain ~16 kg since last discharge over the past month, contributing to her LE edema and shortness of breath.  Since admission, patient has been aggressively diuresed with admission weight 106.2 and current weight today 103kg.  Pt over the last 2-3 days has been having border line blood pressures requiring initiation of midodrine.      24 hour events: Patient was noted to be non-compliant with therapies in the past, noted to have hypotension 80/40s today while on midodrine while being diuresed.  Patient noted to have lactate 9.5 today with severe abdominal pain and associated shortness of breath.  Pt had midline placed earlier today.  Patient mentating well AAOx3 despite hypotension.      Allergies    No Known Allergies    Intolerances    MEDICATIONS  NEURO  Meds: ondansetron Injectable 4 milliGRAM(s) IV Push every 6 hours PRN Nausea and/or Vomiting    RESPIRATORY  ABG - ( 22 Oct 2019 22:45 )  pH: x     /  pCO2: 24    /  pO2: 56    / HCO3: 18    / Base Excess: -4.0  /  SaO2: 88      Lactate: x        Meds: ALBUTerol/ipratropium for Nebulization 3 milliLiter(s) Nebulizer every 6 hours    CARDIOVASCULAR  Meds: buMETAnide Infusion 0.5 mG/Hr (2.5 mL/Hr) IV Continuous <Continuous>  furosemide   Injectable 40 milliGRAM(s) IV Push every 6 hours  midodrine. 20 milliGRAM(s) Oral three times a day  norepinephrine Infusion 0.05 MICROgram(s)/kG/Min (9.956 mL/Hr) IV Continuous <Continuous>    GI/NUTRITION  Meds: aluminum hydroxide/magnesium hydroxide/simethicone Suspension 30 milliLiter(s) Oral every 4 hours PRN Dyspepsia  docusate sodium 100 milliGRAM(s) Oral two times a day  pantoprazole    Tablet 40 milliGRAM(s) Oral before breakfast  polyethylene glycol 3350 17 Gram(s) Oral daily  senna 2 Tablet(s) Oral at bedtime    GENITOURINARY  Meds: dextrose 5%. 1000 milliLiter(s) IV Continuous <Continuous>  iron sucrose IVPB 100 milliGRAM(s) IV Intermittent every 7 days    HEMATOLOGIC  Meds: warfarin 5 milliGRAM(s) Oral once    [x] VTE Prophylaxis  INFECTIOUS DISEASES  Meds: fluconAZOLE IVPB 200 milliGRAM(s) IV Intermittent every 24 hours  nystatin    Suspension 163630 Unit(s) Swish and Swallow four times a day    ENDOCRINE  CAPILLARY BLOOD GLUCOSE      POCT Blood Glucose.: 91 mg/dL (22 Oct 2019 21:36)  POCT Blood Glucose.: 124 mg/dL (22 Oct 2019 18:01)  POCT Blood Glucose.: 151 mg/dL (22 Oct 2019 11:13)  POCT Blood Glucose.: 132 mg/dL (22 Oct 2019 07:17)    Meds: allopurinol 300 milliGRAM(s) Oral daily  atorvastatin 20 milliGRAM(s) Oral at bedtime  dextrose 40% Gel 15 Gram(s) Oral once PRN  dextrose 50% Injectable 12.5 Gram(s) IV Push once  dextrose 50% Injectable 25 Gram(s) IV Push once  dextrose 50% Injectable 25 Gram(s) IV Push once  dextrose 50% Injectable 12.5 milliLiter(s) IV Push once  glucagon  Injectable 1 milliGRAM(s) IntraMuscular once PRN  insulin lispro (HumaLOG) corrective regimen sliding scale   SubCutaneous at bedtime  insulin lispro (HumaLOG) corrective regimen sliding scale   SubCutaneous three times a day before meals  levothyroxine 25 MICROGram(s) Oral daily    OTHER MEDICATIONS:  benzocaine 15 mG/menthol 3.6 mG (Sugar-Free) Lozenge 1 Lozenge Oral every 6 hours PRN  Biotene Dry Mouth Oral Rinse 5 milliLiter(s) Swish and Spit four times a day PRN  :    Drug Dosing Weight  Height (cm): 175.26 (09 Oct 2019 19:59)  Weight (kg): 106.2 (10 Oct 2019 04:10)  BMI (kg/m2): 34.6 (10 Oct 2019 04:10)  BSA (m2): 2.21 (10 Oct 2019 04:10)    PAST MEDICAL & SURGICAL HISTORY:  Former smoker, stopped smoking in distant past  Vitamin D deficiency  Morbid obesity due to excess calories  Edema extremities  Diabetes mellitus  Hyperbilirubinemia  COPD (chronic obstructive pulmonary disease)  COPD (chronic obstructive pulmonary disease)  Anticoagulation goal of INR 2 to 3  Anticardiolipin antibody positive  Congestive heart failure, unspecified HF chronicity, unspecified heart failure type  Moderate asthma, unspecified whether complicated, unspecified whether persistent  Other secondary hypertension  COPD (chronic obstructive pulmonary disease)  Anticardiolipin antibody positive  Essential hypertension  Pulmonary HTN  Morbid obesity  No significant past surgical history      FAMILY HISTORY:  Family history of leukemia (Sibling)      SOCIAL HISTORY:    ADVANCE DIRECTIVES:    REVIEW OF SYSTEMS:    CONSTITUTIONAL: No fever, weight loss, or fatigue  ENMT:  Throat pain resolving; No difficulty hearing, tinnitus, vertigo  NECK: No pain or stiffness  BREASTS: No pain, masses, or nipple discharge  RESPIRATORY: + shortness of breath No cough, wheezing, chills or hemoptysis  CARDIOVASCULAR: No chest pain, palpitations, dizziness, or leg swelling  GASTROINTESTINAL: Severe abdominal pain. No nausea, vomiting, or hematemesis; No diarrhea or constipation. No melena or hematochezia.  GENITOURINARY: No dysuria, frequency, hematuria, or incontinence  NEUROLOGICAL: No headaches, memory loss, loss of strength, numbness, or tremors  SKIN: No itching, burning, rashes, or lesions   LYMPH NODES: No enlarged glands  MUSCULOSKELETAL: No joint pain or swelling; No muscle, back, or extremity pain  PSYCHIATRIC: No depression, anxiety, mood swings, or difficulty sleeping  ALLERGY AND IMMUNOLOGIC: No hives or eczema    ICU Vital Signs Last 24 Hrs  T(C): 36 (22 Oct 2019 21:45), Max: 36.9 (22 Oct 2019 05:34)  T(F): 96.8 (22 Oct 2019 21:45), Max: 98.5 (22 Oct 2019 05:34)  HR: 96 (22 Oct 2019 21:45) (69 - 96)  BP: 81/43 (22 Oct 2019 21:45) (81/43 - 103/54)  BP(mean): --  ABP: --  ABP(mean): --  RR: 18 (22 Oct 2019 21:45) (16 - 20)  SpO2: 96% (22 Oct 2019 21:45) (93% - 96%)      ABG - ( 22 Oct 2019 22:45 )  pH, Arterial: x     pH, Blood: 7.49  /  pCO2: 24    /  pO2: 56    / HCO3: 18    / Base Excess: -4.0  /  SaO2: 88        I&O's Detail    21 Oct 2019 07:01  -  22 Oct 2019 07:00  --------------------------------------------------------  IN:    Oral Fluid: 480 mL  Total IN: 480 mL    OUT:    Indwelling Catheter - Urethral: 850 mL  Total OUT: 850 mL    Total NET: -370 mL    PHYSICAL EXAM:    GENERAL: chronically ill appearing  HEAD:  Atraumatic, Normocephalic  EYES: EOMI, PERRLA, conjunctiva and sclera clear  ENMT: No tonsillar erythema, exudates, or enlargement; Moist mucous membranes, Good dentition, No lesions  NECK: Supple, + JVD, Normal thyroid  CHEST/LUNG: Clear lung sounds bilaterally; No rales, rhonchi, wheezing, or rubs  HEART: Regular rate and rhythm; No murmurs, rubs, or gallops  ABDOMEN: Soft, diffusely tender to palpation; Bowel sounds present  EXTREMITIES:  2+ Peripheral Pulses, 4+ pitting edema bilaterally   NERVOUS SYSTEM:  Alert & Oriented X3, Good concentration; Motor Strength 5/5 B/L upper and lower extremities; DTRs 2+ intact and symmetric    LABS:                       8.5    9.27  )-----------( 217      ( 22 Oct 2019 08:27 )             27.9     10-22  125<L>  |  88<L>  |  16  ----------------------------<  122<H>  4.2   |  21<L>  |  1.38<H>  Ca    9.1      22 Oct 2019 08:27 Phos  4.6     10-22 Mg     2.2     10-22  TPro  6.7  /  Alb  2.8<L>  /  TBili  4.4<H>  /  DBili  2.86<H>  /  AST  147<H>  /  ALT  116<H>  /  AlkPhos  114  10-22    CAPILLARY BLOOD GLUCOSE    POCT Blood Glucose.: 91 mg/dL (22 Oct 2019 21:36)    PT/INR - ( 22 Oct 2019 08:27 )   PT: 20.5 sec;   INR: 1.80 ratio      Patient name: Jo Burleson  YOB: 1985   Age: 33 (F)   MR#: [id number]  Study Date: 8/6/2018  Location: [address] [place], [place] [address]onographer: JOHNNIE Kamara  Study quality: Technically good  Referring Physician: Renee Nazario MD  BloodPressure: 131/95 mmHg  Height: 175 cm  Weight: 134 kg  BSA: 2.4 m2  ------------------------------------------------------------------------  PROCEDURE: Transthoracic echocardiogram with 2-D, M-Mode  and complete spectral and color flow Doppler.  INDICATION: Other specified pulmonary heart diseases  (I27.89)  ------------------------------------------------------------------------  CONCLUSIONS:  1. Severe right atrial enlargement.  2. Right ventricular enlargement with decreased right  ventricular systolic function.  3. Estimated right ventricular systolic pressure equals 93  mm Hg, assuming right atrial pressure equals 10 mm Hg,  consistent with severe pulmonary hypertension.  ------------------------------------------------------------------------  Confirmed on  8/6/2018 - 15:20:20 by Moncho Callahan M.D.    ECHO, US:  Severe RV dilatation with severe RV dysfunction; LV compressed by RV and RV:LV size 2:1. Patient is a 34y old  Female who presents with a chief complaint of Shortness of breath and hypotension. (22 Oct 2019 18:58)      HPI:  35 yo F with pulm hypertension, right sided heart failure with RV EF 35%, COPD, HTN, DM2, hyperbilirubinemia, L femoral DVT, admitted to the hospital for increasing shortness of breath, now noted to have extensive acute non-occlusive thrombus of the L external iliac vein, L common femoral vein and L greater saphenous vein.  As per chart review, patient was discharged on Eliquis as opposed to coumadin 2/2 lack of follow up for INR, and non-compliant with Eliquis 2/2 heavy vaginal bleeding.  Pt also noted to have gain ~16 kg since last discharge over the past month, contributing to her LE edema and shortness of breath.  Since admission, patient has been aggressively diuresed with admission weight 106.2 and current weight today 103kg.  Pt over the last 2-3 days has been having border line blood pressures requiring initiation of midodrine.      24 hour events: Patient was noted to be non-compliant with therapies in the past, noted to have hypotension 80/40s today while on midodrine while being diuresed.  Patient noted to have lactate 9.5 today with severe abdominal pain and associated shortness of breath.  Pt had midline placed earlier today.  Patient mentating well AAOx3 despite hypotension.      Of note, patient has been awaiting transfer to [facility] for further evaluation by cardiology and pulm htn specialist.    Allergies    No Known Allergies    Intolerances    MEDICATIONS  NEURO  Meds: ondansetron Injectable 4 milliGRAM(s) IV Push every 6 hours PRN Nausea and/or Vomiting    RESPIRATORY  ABG - ( 22 Oct 2019 22:45 )  pH: x     /  pCO2: 24    /  pO2: 56    / HCO3: 18    / Base Excess: -4.0  /  SaO2: 88      Lactate: x        Meds: ALBUTerol/ipratropium for Nebulization 3 milliLiter(s) Nebulizer every 6 hours    CARDIOVASCULAR  Meds: buMETAnide Infusion 0.5 mG/Hr (2.5 mL/Hr) IV Continuous <Continuous>  furosemide   Injectable 40 milliGRAM(s) IV Push every 6 hours  midodrine. 20 milliGRAM(s) Oral three times a day  norepinephrine Infusion 0.05 MICROgram(s)/kG/Min (9.956 mL/Hr) IV Continuous <Continuous>    GI/NUTRITION  Meds: aluminum hydroxide/magnesium hydroxide/simethicone Suspension 30 milliLiter(s) Oral every 4 hours PRN Dyspepsia  docusate sodium 100 milliGRAM(s) Oral two times a day  pantoprazole    Tablet 40 milliGRAM(s) Oral before breakfast  polyethylene glycol 3350 17 Gram(s) Oral daily  senna 2 Tablet(s) Oral at bedtime    GENITOURINARY  Meds: dextrose 5%. 1000 milliLiter(s) IV Continuous <Continuous>  iron sucrose IVPB 100 milliGRAM(s) IV Intermittent every 7 days    HEMATOLOGIC  Meds: warfarin 5 milliGRAM(s) Oral once    [x] VTE Prophylaxis  INFECTIOUS DISEASES  Meds: fluconAZOLE IVPB 200 milliGRAM(s) IV Intermittent every 24 hours  nystatin    Suspension [id number] Unit(s) Swish and Swallow four times a day    ENDOCRINE  CAPILLARY BLOOD GLUCOSE      POCT Blood Glucose.: 91 mg/dL (22 Oct 2019 21:36)  POCT Blood Glucose.: 124 mg/dL (22 Oct 2019 18:01)  POCT Blood Glucose.: 151 mg/dL (22 Oct 2019 11:13)  POCT Blood Glucose.: 132 mg/dL (22 Oct 2019 07:17)    Meds: allopurinol 300 milliGRAM(s) Oral daily  atorvastatin 20 milliGRAM(s) Oral at bedtime  dextrose 40% Gel 15 Gram(s) Oral once PRN  dextrose 50% Injectable 12.5 Gram(s) IV Push once  dextrose 50% Injectable 25 Gram(s) IV Push once  dextrose 50% Injectable 25 Gram(s) IV Push once  dextrose 50% Injectable 12.5 milliLiter(s) IV Push once  glucagon  Injectable 1 milliGRAM(s) IntraMuscular once PRN  insulin lispro (HumaLOG) corrective regimen sliding scale   SubCutaneous at bedtime  insulin lispro (HumaLOG) corrective regimen sliding scale   SubCutaneous three times a day before meals  levothyroxine 25 MICROGram(s) Oral daily    OTHER MEDICATIONS:  benzocaine 15 mG/menthol 3.6 mG (Sugar-Free) Lozenge 1 Lozenge Oral every 6 hours PRN  Biotene Dry Mouth Oral Rinse 5 milliLiter(s) Swish and Spit four times a day PRN  :    Drug Dosing Weight  Height (cm): 175.26 (09 Oct 2019 19:59)  Weight (kg): 106.2 (10 Oct 2019 04:10)  BMI (kg/m2): 34.6 (10 Oct 2019 04:10)  BSA (m2): 2.21 (10 Oct 2019 04:10)    PAST MEDICAL & SURGICAL HISTORY:  Former smoker, stopped smoking in distant past  Vitamin D deficiency  Morbid obesity due to excess calories  Edema extremities  Diabetes mellitus  Hyperbilirubinemia  COPD (chronic obstructive pulmonary disease)  COPD (chronic obstructive pulmonary disease)  Anticoagulation goal of INR 2 to 3  Anticardiolipin antibody positive  Congestive heart failure, unspecified HF chronicity, unspecified heart failure type  Moderate asthma, unspecified whether complicated, unspecified whether persistent  Other secondary hypertension  COPD (chronic obstructive pulmonary disease)  Anticardiolipin antibody positive  Essential hypertension  Pulmonary HTN  Morbid obesity  No significant past surgical history      FAMILY HISTORY:  Family history of leukemia (Sibling)      SOCIAL HISTORY:    ADVANCE DIRECTIVES:    REVIEW OF SYSTEMS:    CONSTITUTIONAL: No fever, weight loss, or fatigue  ENMT:  Throat pain resolving; No difficulty hearing, tinnitus, vertigo  NECK: No pain or stiffness  BREASTS: No pain, masses, or nipple discharge  RESPIRATORY: + shortness of breath No cough, wheezing, chills or hemoptysis  CARDIOVASCULAR: No chest pain, palpitations, dizziness, or leg swelling  GASTROINTESTINAL: Severe abdominal pain. No nausea, vomiting, or hematemesis; No diarrhea or constipation. No melena or hematochezia.  GENITOURINARY: No dysuria, frequency, hematuria, or incontinence  NEUROLOGICAL: No headaches, memory loss, loss of strength, numbness, or tremors  SKIN: No itching, burning, rashes, or lesions   LYMPH NODES: No enlarged glands  MUSCULOSKELETAL: No joint pain or swelling; No muscle, back, or extremity pain  PSYCHIATRIC: No depression, anxiety, mood swings, or difficulty sleeping  ALLERGY AND IMMUNOLOGIC: No hives or eczema    ICU Vital Signs Last 24 Hrs  T(C): 36 (22 Oct 2019 21:45), Max: 36.9 (22 Oct 2019 05:34)  T(F): 96.8 (22 Oct 2019 21:45), Max: 98.5 (22 Oct 2019 05:34)  HR: 96 (22 Oct 2019 21:45) (69 - 96)  BP: 81/43 (22 Oct 2019 21:45) (81/43 - 103/54)  BP(mean): --  ABP: --  ABP(mean): --  RR: 18 (22 Oct 2019 21:45) (16 - 20)  SpO2: 96% (22 Oct 2019 21:45) (93% - 96%)      ABG - ( 22 Oct 2019 22:45 )  pH, Arterial: x     pH, Blood: 7.49  /  pCO2: 24    /  pO2: 56    / HCO3: 18    / Base Excess: -4.0  /  SaO2: 88        I&O's Detail    21 Oct 2019 07:01  -  22 Oct 2019 07:00  --------------------------------------------------------  IN:    Oral Fluid: 480 mL  Total IN: 480 mL    OUT:    Indwelling Catheter - Urethral: 850 mL  Total OUT: 850 mL    Total NET: -370 mL    PHYSICAL EXAM:    GENERAL: chronically ill appearing  HEAD:  Atraumatic, Normocephalic  EYES: EOMI, PERRLA, conjunctiva and sclera clear  ENMT: No tonsillar erythema, exudates, or enlargement; Moist mucous membranes, Good dentition, No lesions  NECK: Supple, + JVD, Normal thyroid  CHEST/LUNG: Clear lung sounds bilaterally; No rales, rhonchi, wheezing, or rubs  HEART: Regular rate and rhythm; No murmurs, rubs, or gallops  ABDOMEN: Soft, diffusely tender to palpation; Bowel sounds present  EXTREMITIES:  2+ Peripheral Pulses, 4+ pitting edema bilaterally   NERVOUS SYSTEM:  Alert & Oriented X3, Good concentration; Motor Strength 5/5 B/L upper and lower extremities; DTRs 2+ intact and symmetric    LABS:                       8.5    9.27  )-----------( 217      ( 22 Oct 2019 08:27 )             27.9     10-22  125<L>  |  88<L>  |  16  ----------------------------<  122<H>  4.2   |  21<L>  |  1.38<H>  Ca    9.1      22 Oct 2019 08:27 Phos  4.6     10-22 Mg     2.2     10-22  TPro  6.7  /  Alb  2.8<L>  /  TBili  4.4<H>  /  DBili  2.86<H>  /  AST  147<H>  /  ALT  116<H>  /  AlkPhos  114  10-22    CAPILLARY BLOOD GLUCOSE    POCT Blood Glucose.: 91 mg/dL (22 Oct 2019 21:36)    PT/INR - ( 22 Oct 2019 08:27 )   PT: 20.5 sec;   INR: 1.80 ratio      Patient name: Jo Burleson  YOB: 1985   Age: 33 (F)   MR#: [id number]  Study Date: 8/6/2018  Location: [address] [place], [place] [address]onographer: JOHNNIE Kamara  Study quality: Technically good  Referring Physician: Renee Nazario MD  BloodPressure: 131/95 mmHg  Height: 175 cm  Weight: 134 kg  BSA: 2.4 m2  ------------------------------------------------------------------------  PROCEDURE: Transthoracic echocardiogram with 2-D, M-Mode  and complete spectral and color flow Doppler.  INDICATION: Other specified pulmonary heart diseases  (I27.89)  ------------------------------------------------------------------------  CONCLUSIONS:  1. Severe right atrial enlargement.  2. Right ventricular enlargement with decreased right  ventricular systolic function.  3. Estimated right ventricular systolic pressure equals 93  mm Hg, assuming right atrial pressure equals 10 mm Hg,  consistent with severe pulmonary hypertension.  ------------------------------------------------------------------------  Confirmed on  8/6/2018 - 15:20:20 by Moncho Callahan M.D.    [ x] Echocardiogram:< from: TTE Echo Doppler w/o Cont (07.26.19 @ 08:44) >   1. Left ventricular ejection fraction, by visual estimation, is 45 to   50%.   2. Mildly decreased global left ventricular systolic function.   3. Elevated mean left atrial pressure.   4. Global cardiomyopathy.   5. Increased LV wall thickness.   6. Normal left ventricular internal cavity size.   7. Right ventricular pressure overload.   8. Spectral Doppler shows impaired relaxation pattern of left   ventricular myocardial filling (Grade I diastolic dysfunction).   9. There is mild asymmetric left ventricular hypertrophy.  10. Pulmonary hypertension.  11. Severely enlarged right ventricle.  12. Severely reduced RV systolic function.  13. RV ejection fraction 34%.  14. Small pericardial effusion.  15. Degenerative mitral valve.  16. Mild mitral annular calcification.  17. Mild mitral valve regurgitation.  18. Moderate tricuspid regurgitation.  19. Structurally normal tricuspid valve, with normal leaflet excursion.  20. Mild aortic regurgitation.  21. Mild to moderate pulmonic valve regurgitation.  22. Structurally normal pulmonic valve, with normal leaflet excursion.  23. Estimated pulmonary artery systolic pressure is 87.1 mmHg assuming a   right atrial pressure of 20 mmHg, which is consistent with severe   pulmonary hypertension.  24. Pulmonary hypertension is present.  25. The main pulmonary artery is normal in size.  26. The right atrial pressure is moderately elevated.    < end of copied text >      ECHO, US:  Severe RV dilatation with severe RV dysfunction; LV compressed by RV and RV:LV size 2:1.

## 2019-10-22 NOTE — CONSULT NOTE ADULT - REASON FOR ADMISSION
Shortness of breath and hypotension.

## 2019-10-23 VITALS — HEART RATE: 131 BPM | SYSTOLIC BLOOD PRESSURE: 185 MMHG | DIASTOLIC BLOOD PRESSURE: 11 MMHG | RESPIRATION RATE: 58 BRPM

## 2019-10-23 LAB
ALBUMIN SERPL ELPH-MCNC: 2.7 G/DL — LOW (ref 3.3–5)
ALBUMIN SERPL ELPH-MCNC: 2.8 G/DL — LOW (ref 3.3–5)
ALP SERPL-CCNC: 121 U/L — HIGH (ref 40–120)
ALP SERPL-CCNC: 125 U/L — HIGH (ref 40–120)
ALT FLD-CCNC: 127 U/L — HIGH (ref 12–78)
ALT FLD-CCNC: 129 U/L — HIGH (ref 12–78)
ANION GAP SERPL CALC-SCNC: 19 MMOL/L — HIGH (ref 5–17)
ANION GAP SERPL CALC-SCNC: 25 MMOL/L — HIGH (ref 5–17)
APPEARANCE UR: ABNORMAL
APTT BLD: 134 SEC — CRITICAL HIGH (ref 28.5–37)
APTT BLD: 29.4 SEC — SIGNIFICANT CHANGE UP (ref 28.5–37)
AST SERPL-CCNC: 156 U/L — HIGH (ref 15–37)
AST SERPL-CCNC: 187 U/L — HIGH (ref 15–37)
BACTERIA # UR AUTO: ABNORMAL
BASOPHILS # BLD AUTO: 0.02 K/UL — SIGNIFICANT CHANGE UP (ref 0–0.2)
BASOPHILS NFR BLD AUTO: 0.2 % — SIGNIFICANT CHANGE UP (ref 0–2)
BILIRUB SERPL-MCNC: 4.5 MG/DL — HIGH (ref 0.2–1.2)
BILIRUB SERPL-MCNC: 4.6 MG/DL — HIGH (ref 0.2–1.2)
BILIRUB UR-MCNC: NEGATIVE — SIGNIFICANT CHANGE UP
BUN SERPL-MCNC: 21 MG/DL — SIGNIFICANT CHANGE UP (ref 7–23)
BUN SERPL-MCNC: 23 MG/DL — SIGNIFICANT CHANGE UP (ref 7–23)
CALCIUM SERPL-MCNC: 9.1 MG/DL — SIGNIFICANT CHANGE UP (ref 8.5–10.1)
CALCIUM SERPL-MCNC: 9.4 MG/DL — SIGNIFICANT CHANGE UP (ref 8.5–10.1)
CHLORIDE SERPL-SCNC: 86 MMOL/L — LOW (ref 96–108)
CHLORIDE SERPL-SCNC: 86 MMOL/L — LOW (ref 96–108)
CO2 SERPL-SCNC: 12 MMOL/L — LOW (ref 22–31)
CO2 SERPL-SCNC: 19 MMOL/L — LOW (ref 22–31)
COLOR SPEC: YELLOW — SIGNIFICANT CHANGE UP
CREAT SERPL-MCNC: 1.74 MG/DL — HIGH (ref 0.5–1.3)
CREAT SERPL-MCNC: 1.91 MG/DL — HIGH (ref 0.5–1.3)
DIFF PNL FLD: ABNORMAL
EOSINOPHIL # BLD AUTO: 0.01 K/UL — SIGNIFICANT CHANGE UP (ref 0–0.5)
EOSINOPHIL NFR BLD AUTO: 0.1 % — SIGNIFICANT CHANGE UP (ref 0–6)
EPI CELLS # UR: SIGNIFICANT CHANGE UP
GLUCOSE SERPL-MCNC: 19 MG/DL — CRITICAL LOW (ref 70–99)
GLUCOSE SERPL-MCNC: 61 MG/DL — LOW (ref 70–99)
GLUCOSE UR QL: NEGATIVE MG/DL — SIGNIFICANT CHANGE UP
HAV IGM SER-ACNC: SIGNIFICANT CHANGE UP
HBV CORE IGM SER-ACNC: SIGNIFICANT CHANGE UP
HBV SURFACE AG SER-ACNC: SIGNIFICANT CHANGE UP
HCT VFR BLD CALC: 29 % — LOW (ref 34.5–45)
HCT VFR BLD CALC: 29.6 % — LOW (ref 34.5–45)
HCT VFR BLD CALC: 37.4 % — SIGNIFICANT CHANGE UP (ref 34.5–45)
HCV AB S/CO SERPL IA: 0.07 S/CO — SIGNIFICANT CHANGE UP (ref 0–0.99)
HCV AB SERPL-IMP: SIGNIFICANT CHANGE UP
HGB BLD-MCNC: 11.9 G/DL — SIGNIFICANT CHANGE UP (ref 11.5–15.5)
HGB BLD-MCNC: 8.6 G/DL — LOW (ref 11.5–15.5)
HGB BLD-MCNC: 8.7 G/DL — LOW (ref 11.5–15.5)
IMM GRANULOCYTES NFR BLD AUTO: 0.9 % — SIGNIFICANT CHANGE UP (ref 0–1.5)
INR BLD: 1.13 RATIO — SIGNIFICANT CHANGE UP (ref 0.88–1.16)
INR BLD: 2.09 RATIO — HIGH (ref 0.88–1.16)
INR BLD: 3.2 RATIO — HIGH (ref 0.88–1.16)
KETONES UR-MCNC: NEGATIVE — SIGNIFICANT CHANGE UP
LACTATE SERPL-SCNC: 12.4 MMOL/L — CRITICAL HIGH (ref 0.7–2)
LACTATE SERPL-SCNC: 16.3 MMOL/L — CRITICAL HIGH (ref 0.7–2)
LEUKOCYTE ESTERASE UR-ACNC: ABNORMAL
LYMPHOCYTES # BLD AUTO: 1.35 K/UL — SIGNIFICANT CHANGE UP (ref 1–3.3)
LYMPHOCYTES # BLD AUTO: 14.1 % — SIGNIFICANT CHANGE UP (ref 13–44)
MAGNESIUM SERPL-MCNC: 2.3 MG/DL — SIGNIFICANT CHANGE UP (ref 1.6–2.6)
MAGNESIUM SERPL-MCNC: 2.4 MG/DL — SIGNIFICANT CHANGE UP (ref 1.6–2.6)
MCHC RBC-ENTMCNC: 23.8 PG — LOW (ref 27–34)
MCHC RBC-ENTMCNC: 24 PG — LOW (ref 27–34)
MCHC RBC-ENTMCNC: 28.3 PG — SIGNIFICANT CHANGE UP (ref 27–34)
MCHC RBC-ENTMCNC: 29.1 GM/DL — LOW (ref 32–36)
MCHC RBC-ENTMCNC: 30 GM/DL — LOW (ref 32–36)
MCHC RBC-ENTMCNC: 31.8 GM/DL — LOW (ref 32–36)
MCV RBC AUTO: 79.5 FL — LOW (ref 80–100)
MCV RBC AUTO: 82.7 FL — SIGNIFICANT CHANGE UP (ref 80–100)
MCV RBC AUTO: 88.8 FL — SIGNIFICANT CHANGE UP (ref 80–100)
MONOCYTES # BLD AUTO: 0.58 K/UL — SIGNIFICANT CHANGE UP (ref 0–0.9)
MONOCYTES NFR BLD AUTO: 6 % — SIGNIFICANT CHANGE UP (ref 2–14)
NEUTROPHILS # BLD AUTO: 7.54 K/UL — HIGH (ref 1.8–7.4)
NEUTROPHILS NFR BLD AUTO: 78.7 % — HIGH (ref 43–77)
NITRITE UR-MCNC: NEGATIVE — SIGNIFICANT CHANGE UP
NRBC # BLD: 0 /100 WBCS — SIGNIFICANT CHANGE UP (ref 0–0)
NRBC # BLD: 2 /100 WBCS — HIGH (ref 0–0)
NRBC # BLD: 2 /100 WBCS — HIGH (ref 0–0)
PH UR: 5 — SIGNIFICANT CHANGE UP (ref 5–8)
PHOSPHATE SERPL-MCNC: 5.6 MG/DL — HIGH (ref 2.5–4.5)
PHOSPHATE SERPL-MCNC: 6.8 MG/DL — HIGH (ref 2.5–4.5)
PLATELET # BLD AUTO: 209 K/UL — SIGNIFICANT CHANGE UP (ref 150–400)
PLATELET # BLD AUTO: 224 K/UL — SIGNIFICANT CHANGE UP (ref 150–400)
PLATELET # BLD AUTO: 241 K/UL — SIGNIFICANT CHANGE UP (ref 150–400)
POTASSIUM SERPL-MCNC: 4.6 MMOL/L — SIGNIFICANT CHANGE UP (ref 3.5–5.3)
POTASSIUM SERPL-MCNC: 5 MMOL/L — SIGNIFICANT CHANGE UP (ref 3.5–5.3)
POTASSIUM SERPL-SCNC: 4.6 MMOL/L — SIGNIFICANT CHANGE UP (ref 3.5–5.3)
POTASSIUM SERPL-SCNC: 5 MMOL/L — SIGNIFICANT CHANGE UP (ref 3.5–5.3)
PROT SERPL-MCNC: 6.8 GM/DL — SIGNIFICANT CHANGE UP (ref 6–8.3)
PROT SERPL-MCNC: 6.9 GM/DL — SIGNIFICANT CHANGE UP (ref 6–8.3)
PROT UR-MCNC: 30 MG/DL
PROTHROM AB SERPL-ACNC: 12.7 SEC — SIGNIFICANT CHANGE UP (ref 10–12.9)
PROTHROM AB SERPL-ACNC: 24 SEC — HIGH (ref 10–12.9)
PROTHROM AB SERPL-ACNC: 37.1 SEC — HIGH (ref 10–12.9)
RBC # BLD: 3.58 M/UL — LOW (ref 3.8–5.2)
RBC # BLD: 3.65 M/UL — LOW (ref 3.8–5.2)
RBC # BLD: 4.21 M/UL — SIGNIFICANT CHANGE UP (ref 3.8–5.2)
RBC # FLD: 14.7 % — HIGH (ref 10.3–14.5)
RBC # FLD: 27.8 % — HIGH (ref 10.3–14.5)
RBC # FLD: 28.1 % — HIGH (ref 10.3–14.5)
RBC CASTS # UR COMP ASSIST: ABNORMAL /HPF (ref 0–4)
SODIUM SERPL-SCNC: 123 MMOL/L — LOW (ref 135–145)
SODIUM SERPL-SCNC: 124 MMOL/L — LOW (ref 135–145)
SP GR SPEC: 1.01 — SIGNIFICANT CHANGE UP (ref 1.01–1.02)
UROBILINOGEN FLD QL: 4 MG/DL
WBC # BLD: 10.47 K/UL — SIGNIFICANT CHANGE UP (ref 3.8–10.5)
WBC # BLD: 12.14 K/UL — HIGH (ref 3.8–10.5)
WBC # BLD: 9.59 K/UL — SIGNIFICANT CHANGE UP (ref 3.8–10.5)
WBC # FLD AUTO: 10.47 K/UL — SIGNIFICANT CHANGE UP (ref 3.8–10.5)
WBC # FLD AUTO: 12.14 K/UL — HIGH (ref 3.8–10.5)
WBC # FLD AUTO: 9.59 K/UL — SIGNIFICANT CHANGE UP (ref 3.8–10.5)
WBC UR QL: SIGNIFICANT CHANGE UP

## 2019-10-23 PROCEDURE — 92950 HEART/LUNG RESUSCITATION CPR: CPT

## 2019-10-23 PROCEDURE — 99239 HOSP IP/OBS DSCHRG MGMT >30: CPT | Mod: 25

## 2019-10-23 PROCEDURE — 36620 INSERTION CATHETER ARTERY: CPT

## 2019-10-23 RX ORDER — NOREPINEPHRINE BITARTRATE/D5W 8 MG/250ML
0.05 PLASTIC BAG, INJECTION (ML) INTRAVENOUS
Qty: 16 | Refills: 0 | Status: DISCONTINUED | OUTPATIENT
Start: 2019-10-23 | End: 2019-10-23

## 2019-10-23 RX ORDER — BUMETANIDE 0.25 MG/ML
1 INJECTION INTRAMUSCULAR; INTRAVENOUS
Qty: 20 | Refills: 0 | Status: DISCONTINUED | OUTPATIENT
Start: 2019-10-23 | End: 2019-10-23

## 2019-10-23 RX ORDER — EPINEPHRINE 0.3 MG/.3ML
0.2 INJECTION INTRAMUSCULAR; SUBCUTANEOUS
Qty: 8 | Refills: 0 | Status: DISCONTINUED | OUTPATIENT
Start: 2019-10-23 | End: 2019-10-23

## 2019-10-23 RX ORDER — ACETAMINOPHEN 500 MG
1000 TABLET ORAL ONCE
Refills: 0 | Status: COMPLETED | OUTPATIENT
Start: 2019-10-23 | End: 2019-10-23

## 2019-10-23 RX ORDER — MORPHINE SULFATE 50 MG/1
2 CAPSULE, EXTENDED RELEASE ORAL EVERY 4 HOURS
Refills: 0 | Status: DISCONTINUED | OUTPATIENT
Start: 2019-10-23 | End: 2019-10-23

## 2019-10-23 RX ORDER — HEPARIN SODIUM 5000 [USP'U]/ML
4000 INJECTION INTRAVENOUS; SUBCUTANEOUS EVERY 6 HOURS
Refills: 0 | Status: DISCONTINUED | OUTPATIENT
Start: 2019-10-23 | End: 2019-10-23

## 2019-10-23 RX ORDER — HEPARIN SODIUM 5000 [USP'U]/ML
9000 INJECTION INTRAVENOUS; SUBCUTANEOUS EVERY 6 HOURS
Refills: 0 | Status: DISCONTINUED | OUTPATIENT
Start: 2019-10-23 | End: 2019-10-23

## 2019-10-23 RX ORDER — HEPARIN SODIUM 5000 [USP'U]/ML
INJECTION INTRAVENOUS; SUBCUTANEOUS
Qty: 25000 | Refills: 0 | Status: DISCONTINUED | OUTPATIENT
Start: 2019-10-23 | End: 2019-10-23

## 2019-10-23 RX ORDER — BUMETANIDE 0.25 MG/ML
2 INJECTION INTRAMUSCULAR; INTRAVENOUS ONCE
Refills: 0 | Status: DISCONTINUED | OUTPATIENT
Start: 2019-10-23 | End: 2019-10-23

## 2019-10-23 RX ADMIN — HEPARIN SODIUM 1900 UNIT(S)/HR: 5000 INJECTION INTRAVENOUS; SUBCUTANEOUS at 02:06

## 2019-10-23 RX ADMIN — Medication 3 MILLILITER(S): at 01:01

## 2019-10-23 RX ADMIN — WARFARIN SODIUM 5 MILLIGRAM(S): 2.5 TABLET ORAL at 01:12

## 2019-10-23 RX ADMIN — Medication 9.96 MICROGRAM(S)/KG/MIN: at 00:32

## 2019-10-23 RX ADMIN — Medication 1000 MILLIGRAM(S): at 02:55

## 2019-10-23 RX ADMIN — BUMETANIDE 2.5 MG/HR: 0.25 INJECTION INTRAMUSCULAR; INTRAVENOUS at 00:33

## 2019-10-23 RX ADMIN — Medication 400 MILLIGRAM(S): at 00:33

## 2019-10-23 RX ADMIN — Medication 9.96 MICROGRAM(S)/KG/MIN: at 01:05

## 2019-10-23 NOTE — PROVIDER CONTACT NOTE (EICU) - ASSESSMENT
Pt in respiratory distress, on bipap, hx of severe pulm htn with associated right heart failure.
patient with progressive decline in BP and perfusion.  IO placed. art line placed.  epi started. bumex push given.  despite efforts patient went into cardiac arrest
worsening reps status worsening right heart failure on levo and bumex

## 2019-10-23 NOTE — PROVIDER CONTACT NOTE (CRITICAL VALUE NOTIFICATION) - NAME OF MD/NP/PA/DO NOTIFIED:
Dr. Slade
Kim Pennington and Dr Green called.
Lavell Piedra
Moy STEWART
TERRY Winter
TERRY Winter
TERRY Alvarenga made aware.

## 2019-10-23 NOTE — PROCEDURE NOTE - NSPOSTCAREGUIDE_GEN_A_CORE
Keep the cast/splint/dressing clean and dry/Instructed patient/caregiver to follow-up with primary care physician/Instructed patient/caregiver regarding signs and symptoms of infection
Care for catheter as per unit/ICU protocols

## 2019-10-23 NOTE — DISCHARGE NOTE FOR THE EXPIRED PATIENT - HOSPITAL COURSE
Patient is a 33 yo F with pulm hypertension, right sided heart failure with RV EF 35%, COPD, HTN, DM2, hyperbilirubinemia, L femoral DVT, admitted to the hospital for increasing shortness of breath, now noted to have extensive acute non-occlusive thrombus of the L external iliac vein, L common femoral vein and L greater saphenous vein.  As per chart review, patient was discharged on Eliquis as opposed to coumadin 2/2 lack of follow up for INR, and non-compliant with Eliquis 2/2 heavy vaginal bleeding.  Pt also noted to have gain ~16 kg since last discharge over the past month, contributing to her LE edema and shortness of breath.  Since admission, patient has been aggressively diuresed with admission weight 106.2 and current weight today 103kg.  Pt over the last 2-3 days has been having border line blood pressures requiring initiation of midodrine.       Patient was noted to be non-compliant with therapies in the past, noted to have hypotension 80/40s today while on midodrine while being diuresed.  Patient noted to have lactate 9.5 today with severe abdominal pain and associated shortness of breath.  Pt had midline placed earlier today.  Patient mentating well AAOx3 despite hypotension.  Patient transferred to critical care in shock, likely obstructive from right heart failure from severe pulmonary hypertension. Patient with progressive decline in blood pressure and perfusion, with lactate increased to 16. IO was placed and arterial line placed. Patient started on epinephrine drip on top of levophed drip, and bumex push with bumex drip. Patient went into cardiac arrest.  During the initial code - patient was found to be in VFib - defiberating shock administered.  After 15 minutes of ACLS including five doses of epinephrine, 1 amp of bicarb, 1 dose of calcium, and D50, patient achieved ROSC for a short time.  Went back into asystole.  ROSC achieved again for a short time after 20 minutes of ACLS  with 8 doses of epinephrine, 1 amp of bicarbonate.  Patient's rhythm returned to asystole.  ACLS performed for another 3 minutes before the code was called.  No heart sounds heard, pupils fixed and dilated.  Asystole on monitor. Time  of death called at 7:04 am on 10/23/19.  Patient family was called, sister Cyndie, who came in after the code with mother. Spoke to patient's brother as well who is a physician.  Patient comforted at the bedside. Patient is a 33 yo F with pulm hypertension, right sided heart failure with RV EF 35%, COPD, HTN, DM2, hyperbilirubinemia, L femoral DVT, admitted to the hospital for increasing shortness of breath, now noted to have extensive acute non-occlusive thrombus of the L external iliac vein, L common femoral vein and L greater saphenous vein.  As per chart review, patient was discharged on Eliquis as opposed to coumadin 2/2 lack of follow up for INR, and non-compliant with Eliquis 2/2 heavy vaginal bleeding.  Pt also noted to have gain ~16 kg since last discharge over the past month, contributing to her LE edema and shortness of breath.  Since admission, patient has been aggressively diuresed with admission weight 106.2 and current weight today 103kg.  Pt over the last 2-3 days has been having border line blood pressures requiring initiation of midodrine.       Patient was noted to be non-compliant with therapies in the past, noted to have hypotension 80/40s today while on midodrine while being diuresed.  Patient noted to have lactate 9.5 today with severe abdominal pain and associated shortness of breath.  Pt had midline placed earlier today.  Patient mentating well AAOx3 despite hypotension.  Patient transferred to critical care in shock, likely obstructive from right heart failure from severe pulmonary hypertension. Patient with progressive decline in blood pressure and perfusion, with lactate increased to 16. IO was placed and arterial line placed. Patient started on epinephrine drip on top of levophed drip, and bumex push with bumex drip. Patient went into cardiac arrest.  During the initial code - patient was found to be in VFib - defiberating shock administered.  After 15 minutes of ACLS including five doses of epinephrine, 1 amp of bicarb, 1 dose of calcium, and D50, patient achieved ROSC for a short time.  Went back into asystole.  ROSC achieved again for a short time after 20 minutes of ACLS  with 8 doses of epinephrine, 1 amp of bicarbonate.  Patient's rhythm returned to asystole.  ACLS performed for another 3 minutes before the code was called.  No heart sounds heard, pupils fixed and dilated.  Asystole on monitor. Time  of death called at 7:04 am on 10/23/19.  Patient family was called, sister Cyndie, who came in after the code with mother. Spoke to patient's brother as well who is a physician.  Patient comforted at the bedside.      ATTENDING ADDENDUM:  Pt coded and  before I was able to exam her.  Condolences and emotional support provided for family by overnight team.  Autopsy offered to family who have yet to make a final decision regarding autopsy.

## 2019-10-23 NOTE — PROVIDER CONTACT NOTE (EICU) - RECOMMENDATIONS
unable to place central line 2/2 right atrial clot and LE dvts.  patient on bipap.  will place io.  Unable to use michelle as will worsen cardiac output, patient too tachy for epi. if BP improves will attempt  assisted diuresis

## 2019-10-23 NOTE — PROVIDER CONTACT NOTE (EICU) - ACTION/TREATMENT ORDERED:
Repeat labs as requested. s/p IO placement, 16mg in 250ml levophed ordered for BP support.
discussed with bedside PA

## 2019-10-23 NOTE — CHART NOTE - NSCHARTNOTEFT_GEN_A_CORE
Code blue called overhead around 6:20 am.  ACLS performed in ICU.  Patient found to have asystole/PEA.  During the initial code - patient was found to be in VFib - debrillating shock administered.  After 15 minutes of ACLS including five doses of epinephrine, 1 amp of bicarb, 1 dose of calcium, and D50, patient achieved ROSC for a short time.  Went back into asystole.  ROSC achieved again for a short time after 20 minutes of ACLS  with 8 doses of epinephrine, 1 amp of bicarbonate.  Patient's rhythm returned to asystole.  ACLS performed for another 3 minutes before the code was called.  No heart sounds heard, pupils fixed and dilated.  Asystole on telemetry.  Time of death called at 7:04 am on 10/23/19.  Family was informed during the code and plans to come to [facility].

## 2019-10-26 DIAGNOSIS — D62 ACUTE POSTHEMORRHAGIC ANEMIA: ICD-10-CM

## 2019-10-26 DIAGNOSIS — Z91.19 PATIENT'S NONCOMPLIANCE WITH OTHER MEDICAL TREATMENT AND REGIMEN: ICD-10-CM

## 2019-10-26 DIAGNOSIS — S99.922A UNSPECIFIED INJURY OF LEFT FOOT, INITIAL ENCOUNTER: ICD-10-CM

## 2019-10-26 DIAGNOSIS — Z91.14 PATIENT'S OTHER NONCOMPLIANCE WITH MEDICATION REGIMEN: ICD-10-CM

## 2019-10-26 DIAGNOSIS — T40.605A ADVERSE EFFECT OF UNSPECIFIED NARCOTICS, INITIAL ENCOUNTER: ICD-10-CM

## 2019-10-26 DIAGNOSIS — I82.422 ACUTE EMBOLISM AND THROMBOSIS OF LEFT ILIAC VEIN: ICD-10-CM

## 2019-10-26 DIAGNOSIS — D50.9 IRON DEFICIENCY ANEMIA, UNSPECIFIED: ICD-10-CM

## 2019-10-26 DIAGNOSIS — M10.9 GOUT, UNSPECIFIED: ICD-10-CM

## 2019-10-26 DIAGNOSIS — E55.9 VITAMIN D DEFICIENCY, UNSPECIFIED: ICD-10-CM

## 2019-10-26 DIAGNOSIS — F43.21 ADJUSTMENT DISORDER WITH DEPRESSED MOOD: ICD-10-CM

## 2019-10-26 DIAGNOSIS — J96.01 ACUTE RESPIRATORY FAILURE WITH HYPOXIA: ICD-10-CM

## 2019-10-26 DIAGNOSIS — K76.1 CHRONIC PASSIVE CONGESTION OF LIVER: ICD-10-CM

## 2019-10-26 DIAGNOSIS — N17.9 ACUTE KIDNEY FAILURE, UNSPECIFIED: ICD-10-CM

## 2019-10-26 DIAGNOSIS — I95.9 HYPOTENSION, UNSPECIFIED: ICD-10-CM

## 2019-10-26 DIAGNOSIS — E78.5 HYPERLIPIDEMIA, UNSPECIFIED: ICD-10-CM

## 2019-10-26 DIAGNOSIS — K59.03 DRUG INDUCED CONSTIPATION: ICD-10-CM

## 2019-10-26 DIAGNOSIS — E66.9 OBESITY, UNSPECIFIED: ICD-10-CM

## 2019-10-26 DIAGNOSIS — I82.492 ACUTE EMBOLISM AND THROMBOSIS OF OTHER SPECIFIED DEEP VEIN OF LEFT LOWER EXTREMITY: ICD-10-CM

## 2019-10-26 DIAGNOSIS — I82.412 ACUTE EMBOLISM AND THROMBOSIS OF LEFT FEMORAL VEIN: ICD-10-CM
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
## 2019-10-26 DIAGNOSIS — D68.59 OTHER PRIMARY THROMBOPHILIA: ICD-10-CM

## 2019-10-26 DIAGNOSIS — T50.2X5A ADVERSE EFFECT OF CARBONIC-ANHYDRASE INHIBITORS, BENZOTHIADIAZIDES AND OTHER DIURETICS, INITIAL ENCOUNTER: ICD-10-CM

## 2019-10-26 DIAGNOSIS — I11.0 HYPERTENSIVE HEART DISEASE WITH HEART FAILURE: ICD-10-CM

## 2019-10-26 DIAGNOSIS — E11.65 TYPE 2 DIABETES MELLITUS WITH HYPERGLYCEMIA: ICD-10-CM

## 2019-10-26 DIAGNOSIS — Z87.891 PERSONAL HISTORY OF NICOTINE DEPENDENCE: ICD-10-CM

## 2019-10-26 DIAGNOSIS — I27.20 PULMONARY HYPERTENSION, UNSPECIFIED: ICD-10-CM

## 2019-10-26 DIAGNOSIS — I49.01 VENTRICULAR FIBRILLATION: ICD-10-CM

## 2019-10-26 DIAGNOSIS — J45.40 MODERATE PERSISTENT ASTHMA, UNCOMPLICATED: ICD-10-CM

## 2019-10-26 DIAGNOSIS — Z99.81 DEPENDENCE ON SUPPLEMENTAL OXYGEN: ICD-10-CM
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
## 2019-10-26 DIAGNOSIS — I27.81 COR PULMONALE (CHRONIC): ICD-10-CM

## 2019-10-26 DIAGNOSIS — E03.9 HYPOTHYROIDISM, UNSPECIFIED: ICD-10-CM

## 2019-10-26 DIAGNOSIS — Z59.8 OTHER PROBLEMS RELATED TO HOUSING AND ECONOMIC CIRCUMSTANCES: ICD-10-CM

## 2019-10-26 DIAGNOSIS — B37.0 CANDIDAL STOMATITIS: ICD-10-CM

## 2019-10-26 DIAGNOSIS — E80.7 DISORDER OF BILIRUBIN METABOLISM, UNSPECIFIED: ICD-10-CM

## 2019-10-26 DIAGNOSIS — I50.43 ACUTE ON CHRONIC COMBINED SYSTOLIC (CONGESTIVE) AND DIASTOLIC (CONGESTIVE) HEART FAILURE: ICD-10-CM

## 2019-10-26 DIAGNOSIS — E87.1 HYPO-OSMOLALITY AND HYPONATREMIA: ICD-10-CM

## 2019-10-26 SDOH — ECONOMIC STABILITY - INCOME SECURITY: OTHER PROBLEMS RELATED TO HOUSING AND ECONOMIC CIRCUMSTANCES: Z59.8

## 2019-11-07 DIAGNOSIS — E66.01 MORBID (SEVERE) OBESITY DUE TO EXCESS CALORIES: ICD-10-CM

## 2019-11-07 DIAGNOSIS — R57.0 CARDIOGENIC SHOCK: ICD-10-CM

## 2019-11-07 DIAGNOSIS — Z91.11 PATIENT'S NONCOMPLIANCE WITH DIETARY REGIMEN: ICD-10-CM

## 2020-01-09 NOTE — DISCHARGE NOTE NURSING/CASE MANAGEMENT/SOCIAL WORK - NSTOBACCOREFERRAL_GEN_A_CS
Yes
Pt is a 70 y/o F with PMH of HTN, HLD, Latent TB PSH of rt. knee surgery who presented with cough, runny nose, weakness and fever since 1 and half week.   Pt is being admitted for CAP.

## 2020-03-12 NOTE — PROGRESS NOTE ADULT - PROBLEM SELECTOR PROBLEM 3
3/10 encounter reviewed. Order was sent to [facility] in error as pt did request the order to [facility].   Spoke with [facility]. Reports family member picked up medication this am. They are unable to take it back since it has left the pharmacy.                                                                                                       Pt notified. Apologies extended for the error.                                                                                                                                                                                                                                                Chest pain, unspecified type

## 2020-08-11 NOTE — PROVIDER CONTACT NOTE (CRITICAL VALUE NOTIFICATION) - PERSON GIVING RESULT:
Ajay ch8in
Bill
Jade Kam/Core Lab
Lavell Carmona
Lavell Freitas/Core Lab
Lawanda
Rahel Ortega from Lab
[facility]
Zahra Concepcion - Lab
armand Hein/Core Lab
dennis gonsales
josette murray
sigrid
[FreeTextEntry1] : follow up after colonoscopy

## 2020-09-05 NOTE — ED ADULT NURSE NOTE - CAS TRG GEN SKIN COLOR
[facility] Hospitalist Team  Progress Note      Darryle Median  7/1/1966    Assessment/Plan:       #N/V - likely 2/2 covid  -covid test is +, he does have positive contact in house (son)  -IVF, zofran prn  -CLD ordered advance as tolerated    #Covid 19 infectio 09/03/20  2104   ALT 31   AST 17   ALB 3.9       No results for input(s): PGLU in the last 168 hours.     Meds:   Scheduled:   • predniSONE  10 mg Oral Daily with breakfast   • hydrochlorothiazide  12.5 mg Oral Daily   • Pantoprazole Sodium  40 mg Oral QAM Normal for race

## 2020-10-05 NOTE — ED ADULT NURSE NOTE - NS ED NURSE REPORT GIVEN TO FT
JEAN MARIE Tse Zygomaticofacial Flap Text: Given the location of the defect, shape of the defect and the proximity to free margins a zygomaticofacial flap was deemed most appropriate for repair.  Using a sterile surgical marker, the appropriate flap was drawn incorporating the defect and placing the expected incisions within the relaxed skin tension lines where possible. The area thus outlined was incised deep to adipose tissue with a #15 scalpel blade with preservation of a vascular pedicle.  The skin margins were undermined to an appropriate distance in all directions utilizing iris scissors.  The flap was then placed into the defect and anchored with interrupted buried subcutaneous sutures.

## 2020-10-23 NOTE — ED PROVIDER NOTE - CPE EDP MUSC NORM
The patient returns today for follow-up of her left knee. She also had a left ankle injury which is is fine now she is well her knee feels better she gets intermittent very sharp pains and points mainly medially. She gets no locking or giving way. ROS: Pertinent items are noted in HPI. No notes on file    Past Medical History:  No date:  Anxiety  No date: Sinus headache     Past Surgical History:  No date:  SECTION, CLASSIC      Comment:  x2  No date: SINUS SURGERY  No date: TUBAL LIGATION    Review of patient's family history indicates:  Problem: Other      Relation: Father          Age of Onset: (Not Specified)          Comment: quadrapelgic      Social History    Socioeconomic History      Marital status:       Spouse name: None      Number of children: None      Years of education: None      Highest education level: None    Occupational History      None    Social Needs      Financial resource strain: None      Food insecurity        Worry: None        Inability: None      Transportation needs        Medical: None        Non-medical: None    Tobacco Use      Smoking status: Never Smoker      Smokeless tobacco: Never Used    Substance and Sexual Activity      Alcohol use: No      Drug use: No      Sexual activity: Yes        Partners: Male    Lifestyle      Physical activity        Days per week: None        Minutes per session: None      Stress: None    Relationships      Social connections        Talks on phone: None        Gets together: None        Attends Quaker service: None        Active member of club or organization: None        Attends meetings of clubs or organizations: None        Relationship status: None      Intimate partner violence        Fear of current or ex partner: None        Emotionally abused: None        Physically abused: None        Forced sexual activity: None    Other Topics      Concerns:        None    Social History Narrative      None      Current Outpatient Medications:  escitalopram (LEXAPRO) 5 MG tablet, Take 5 mg by mouth every other day, Disp: , Rfl:   ALPRAZolam (XANAX) 1 MG tablet, Take 1 mg by mouth nightly as needed. , Disp: , Rfl:   zolpidem (AMBIEN) 5 MG tablet, Take 5 mg by mouth nightly as needed for Sleep., Disp: , Rfl:   diclofenac (VOLTAREN) 50 MG EC tablet, Take 1 tablet by mouth 2 times daily (with meals), Disp: 60 tablet, Rfl: 3  ibuprofen (ADVIL) 200 MG tablet, Take 2 tablets by mouth every 8 hours as needed, Disp: 120 tablet, Rfl: 0  escitalopram (LEXAPRO) 10 MG tablet, Take 10 mg by mouth every other day, Disp: , Rfl:   alprazolam (XANAX) 0.5 MG tablet, Take 1 mg by mouth nightly as needed. , Disp: , Rfl:     No current facility-administered medications for this visit. No Known Allergies    VITAL SIGNS:  Temp 97.4 °F (36.3 °C)   Ht 5' 1\" (1.549 m)   Wt 156 lb (70.8 kg)   BMI 29.48 kg/m²   On examination today of the left knee there is no warmth, erythema, or effusion. She clearly has some quadriceps atrophy on the side although she can do straight leg raise. She has negative Lachman's and pivot shift. She has no lateral joint line or lateral retinacular tenderness. She has some tenderness along the inferomedial retinaculum she has most tenderness over in the mid medial joint line. Fadia's testing increases pain but does not produce clunk or shift. There is no medial joint line opening with valgus stress. Previous x-rays show normal joint space no obvious signs of arthritis. Impression apparent resolved left ankle injury and possible medial meniscus tear left knee. Plan: Would like to request an MRI to rule out medial meniscus tear. normal...

## 2020-11-10 NOTE — DISCHARGE NOTE NURSING/CASE MANAGEMENT/SOCIAL WORK - NSDCPEPTCAREGIVEDUMATLIST _GEN_ALL_CORE
Pt calling to inform Dr Milligan that he has tested positive for COVID 19        Wade MIGUEL    [facility] [place]    
Pt tested positive for COVID 19. Was seen virtual on 11/06/2020.    Routing to provider as an EUSEBIA Ventura RN  Care Coordinator  [facility]     
Thank you, information noted.      Jessica Milligan MD  [facility] [place]    
Heart Failure/Diabetes/Coumadin/Warfarin

## 2021-06-21 NOTE — PROGRESS NOTE ADULT - PROBLEM SELECTOR PLAN 6
daily coumadin and INR  INR fluctuating bc of dosing times; pt takes coumadin in AM at home; doses have been alternating AM and PM  will give only 2mg today and cont to monitor  no evidence of bleeding [[id number]]

## 2021-07-26 NOTE — PATIENT PROFILE ADULT. - PAIN SCALE PREFERRED, PROFILE
Quality 111:Pneumonia Vaccination Status For Older Adults: Pneumococcal Vaccination not Administered or Previously Received, Reason not Otherwise Specified Detail Level: Detailed Quality 110: Preventive Care And Screening: Influenza Immunization: Influenza Immunization not Administered for Documented Reasons. numerical 0-10

## 2021-08-01 NOTE — ED ADULT NURSE NOTE - ATTEMPT TO OOB
Pt presents to ED with CC high blood pressure. Denies headache, chest pain, dizziness, or increased swelling. She is 2 months post partum. Hx HTN & preeclampsia.    no

## 2022-06-11 NOTE — PROGRESS NOTE ADULT - PROBLEM SELECTOR PLAN 7
Continue diltiazem 180 mg, losartan 25 mg, lasix drip as above  Monitor BP   Low salt diet 11-Jun-2022 19:29

## 2022-08-16 NOTE — ED ADULT TRIAGE NOTE - SOURCE OF INFORMATION
Per admit team hold labs as pt maybe discharged per pt and family request  Admit team to tiger text this RN once a decision is made on admission vs discharge     Jeffry Savage RN  08/16/22 [id number] EMS/Patient

## 2022-09-27 NOTE — ED PROVIDER NOTE - CADM POA PRESS ULCER
Pt's negative strep culture results called to her mother at this time.  No answer, so message was left with results and for her to contact Urgent Care with any questions or concerns.  
No

## 2023-04-06 NOTE — BEHAVIORAL HEALTH ASSESSMENT NOTE - MOOD
Left message for patient stating she can try oral Clindamycin instead, which I sent to her pharmacy. Also sent a probiotic to take with the Clindamycin. If symptoms persist or worsen, she should be re-evaluated.    Depressed

## 2023-05-15 NOTE — DISCHARGE NOTE FOR THE EXPIRED PATIENT - NS PATIENT DEATH CRITERIA
Patient is dead based on Cardiopulmonary criteria including absent breath sounds, pulselessness and/or asystole rolling walker

## 2023-07-12 NOTE — PROGRESS NOTE BEHAVIORAL HEALTH - RISK ASSESSMENT
Pt is not an acute risk to herself or others. Pt is not an acute risk to herself or others. however pt should not leave AMA. Spironolactone Pregnancy And Lactation Text: This medication can cause feminization of the male fetus and should be avoided during pregnancy. The active metabolite is also found in breast milk.

## 2023-08-08 NOTE — CHART NOTE - NSCHARTNOTESELECT_GEN_ALL_CORE
2nd chart note During the current hospitalization, patient has been addressing psychiatric rehabilitation goals pertaining to identifying coping skills in decreasing depressive symptoms and SI. Patient has demonstrated progress towards psychiatric rehabilitation goals during the current hospitalization. Patient exhibited progress through participating in individual and group therapy and developing additional coping skills to assist with improving mood and decreasing symptoms. Pt gained insight into symptoms and was receptive. Pt was able to identify warning signs to prevent relapse. Pt has been managing symptom with positive distraction and self soothing skills. Pt utilized coping skills such as radical acceptance, interpersonal skills with mother, Stop, gym/walking, Art therapy, self affirmation, positive distraction, Yoga, Please skill, and positive distraction. Patient reports she will continue to practice coping skills. Patient was compliant with medication during current hospitalization. Pt is future oriented. Pt will have her mother lock up her medication. Denied SI/HI.  show

## 2023-09-05 NOTE — PROGRESS NOTE ADULT - MOUTH
SYNOVIAL CRYSTALS NOTED IN JOINT FLUID. CALLED PATIENT: KNEE PAIN AND SWELLING CONTINUES. RX PREDNISONE SENT TO PHARMACY [facility]

## 2023-10-11 NOTE — PROGRESS NOTE ADULT - PROBLEM/PLAN-3
Message left for Jing that her  UA is positive. She should keep taking the keflex orderd on 7/13 (or order new antibiotics if needed per [facility]) if symptomatic.  Culture from Monday contaminated, hopefull this culture will show the bacteria.  We will call back when the culture results.   
no rashes , no jaundice present , good turgor , no masses , no tenderness on palpation
DISPLAY PLAN FREE TEXT
DISPLAY PLAN FREE TEXT

## 2024-01-05 NOTE — PROGRESS NOTE BEHAVIORAL HEALTH - NSBHFUPVIOL_PSY_A_CORE
Is the patient currently in the state of [place]? YES    Visit mode:VIDEO    If the visit is dropped, the patient can be reconnected by: VIDEO VISIT: Text to cell phone:   Telephone Information:   Mobile [phone]       Will anyone else be joining the visit? NO  (If patient encounters technical issues they should call [phone]    How would you like to obtain your AVS? MyChart    Are changes needed to the allergy or medication list? No    Reason for visit: Consult    Rozina FAN      
none known

## 2024-02-27 NOTE — PROVIDER CONTACT NOTE (CRITICAL VALUE NOTIFICATION) - TEST AND RESULT REPORTED:
[facility]  _______________________________________  MD Emily Haines, PATTY Castanon, MD Betzy Dominguez MD    [address] [place] [id number]  Phone: [phone]  Fax: [phone]    Billie Jo Abercrombie (:  
K 2.8
Potassium
Potassium 2.6
Potassium 2.7
Potassium 2/9
Troponin 0.290
lactate 4
potassium 2.1
potassium 2.1
potassium 2.2
pottasium 2.8
pottaxium 2.5
troponin 0.66

## 2024-04-23 NOTE — CONSULT NOTE ADULT - SUBJECTIVE AND OBJECTIVE BOX
34 YEAR OLD FEMALE WITH PULMONARY HYPERTENSION BI VENTRICULAR DYSFUNCTION , WELL KNOWN TO PULMONARY SERVICE  ALL LABS/RADIOLOGY/MEDICATIONS REVIEWED;  ON EXAM FLUID OVERLOADED JVP COARSE BREATH SOUNDS  DISTANT HEART SOUNDS PROTRUBERANT ABDOMEN  EDEMA    I WILL DEFER CARE TO PULMONARY SERVICE; UNFORTUNEATELY I HAVE LITTLE TO ADD TO THE CASE BEYOND THERE WELL OUTLINED PLAN OF CARE  I WONDER IS PATIENT A TRANSPLANT CANDIDATE OR DOES HISTORY OF NONCOMPLIANCE PRECLUDE?    WILL FOLLOW AS NEEDED     MEJIA KEARNS MD, FACC 2

## 2024-09-04 NOTE — H&P ADULT - NSHPPOACENTRALVENOUSCATHETER_GEN_ALL_CORE
Anesthesia Post-op Note    Patient: Mode Moulton  Procedure(s) Performed: ESOPHAGOGASTRODUODENOSCOPY (EGD)   Anesthesia type: MAC    Vitals Value Taken Time   Temp 36.5 09/04/24 1051   Pulse 78 09/04/24 1051   Resp 14 09/04/24 1051   SpO2 100 09/04/24 1051   /64 09/04/24 1051         Patient Location: PACU Phase 1  Post-op Vital Signs:stable  Level of Consciousness: awake and alert  Respiratory Status: spontaneous ventilation  Cardiovascular stable  Hydration: euvolemic  Pain Management: adequately controlled  Handoff: Handoff to receiving clinician was performed and questions were answered  Vomiting: none  Nausea: None  Airway Patency:patent  Post-op Assessment: no complications and patient tolerated procedure well      No notable events documented.                       no

## 2024-09-16 NOTE — DISCHARGE NOTE ADULT - CARE PROVIDERS DIRECT ADDRESSES
[email or web address]
Cardiac

## 2024-10-22 NOTE — PROGRESS NOTE ADULT - PROBLEM SELECTOR PLAN 8
[FreeTextEntry1] : no evidence of recurrence on PE or prior imaging.  Silva sent. will consider decreasing based on symptoms.  patient will contact office to review. rto 1 year  I have answered their questions to the best of my ability.  continue with synthroid

## 2025-03-30 NOTE — PATIENT PROFILE ADULT - FALLEN IN THE PAST
[facility] [phone]    [facility] VIDEO VISIT PROGRESS NOTE    CHIEF COMPLAINT  Chief Complaint   Patient presents with    UTI    Video Visit     SUBJECTIVE:  HISTORY OF PRESENT ILLNESS:   Jono Greer is a 42 year old female who consents to a two-way Video Visit (V-Visit) for evaluation of urinary symptoms.     Jono complains of burning with urination, urgency, frequency, cloudy urine, and suprapubic cramping and discomfort .   Symptom onset: 2 days.   Denies blood in the urine, fevers or chills, nausea or vomiting, abdominal pain, flank pain, vaginal symptoms or sensation of incomplete bladder emptying. The patient denies history of recurrent urinary tract infections and denies history of pyelonephritis.   Denies concern for sexually transmitted infections.   For symptom relief, the patient has tried AZO which has been not very effective.    The patient denies possibility of pregnancy and denies breastfeeding.     HISTORIES  Current medications, allergies, past medical history, past surgical history and social history have been reviewed and updated in the electronic medical record.    ALLERGIES  ALLERGIES:   Allergen Reactions    Seasonal Other (See Comments)      MEDICATIONS  Current Outpatient Medications   Medication Sig    Multiple Vitamins-Minerals (vitamin - therapeutic multivitamins w/minerals) tablet Take 1 tablet by mouth daily.     No current facility-administered medications for this visit.      OBJECTIVE  PHYSICAL EXAM:   Information acquired with patient assistance, demonstration, and feedback due to two-way video visit method of visit. Portions of assessment may be difficult to visualize precisely given nature of technology and limitations of assessment with virtual platform.   GENERAL: Awake, alert, oriented and in no acute distress.  HENT: Normocephalic, atraumatic.   RESPIRATORY:  Breathing effort is normal. Able to speak in full sentences. No evidence of  respiratory distress.  PSYCHIATRIC: The patient was able to demonstrate good judgement and reason without abnormal affect or abnormal behaviors during the examination.     ASSESSMENT/PLAN   Dysuria  - Urinalysis & Reflex Microscopy With Culture If Indicated; Future  - Post Virtual Visit Testing; Future    Urinary frequency  - Urinalysis & Reflex Microscopy With Culture If Indicated; Future  - Post Virtual Visit Testing; Future    If urinalysis is positive, will treat with antibiotics and await final culture. If urinalysis or urine culture are negative and patient has unresolved symptoms, they have been advised to seek in person evaluation to avoid a missed diagnosis that could lead to upper UTI/pyelonephritis.     The patient is in no acute distress and no evidence of red flag symptoms including urinary retention, acute flank pain, fever/chills, nausea/vomiting indicative of emergent urologic evaluation.     FOLLOW-UP   Return if symptoms worsen or fail to improve, for As needed .  If the patient has unresolved symptoms, they have been advised to seek an in person evaluation with their primary care provider or at an Urgent Care/Immediate Care.    If symptoms get worse, the patient should be seen immediately at an Urgent Care/Immediate Care or the Emergency Department.      PATIENT INSTRUCTIONS  Attached in After Visit Summary  The patient verbalizes understanding of the diagnosis and plan of care. There were no further questions or concerns.   They were advised to contact the [facility] RN with any questions at [phone].    CONSENT:  This visit is being performed virtually via Video visit using [employer].     [facility]  Patient is located at home in the [facility]    Esther Lee CNP  3/30/2025  2:35 AM                   no

## 2025-03-30 NOTE — DISCHARGE NOTE PROVIDER - HOSPITAL COURSE
No Patient is a 34 year old woman with CHF, asthma, pulmonary HTN presents to ED with complaint of SOB and leg swelling.  She states that she can only walk 1-2 blocks at baseline and recently her exercise tolerance has decreased, and was admitted to medicine floor.        Patient could not receive bumex while on the floor due to low BP, patient transferred critical care, 7/27and started on dobutamine drip. had increased UO but pressures    remained low. Levophed started as well and central line for difficult access, which is removed 8/4 now.    Patient was on bumex and Zaroxolyn and spironolactone for diuresis, patient had total of 38L of output during the stay, and is now net negative of 20L.    Patient was on HFNC, and is now on NC 2L, with improvement on SOB, had been OOB to chair, tolerating ok.    IS off leveophed and dobutamine now, BP has been stable.    Patient is tolerating diet as well.        will continue bumex 1mg q12, spironolactone 50mg qd    adempas and zoloft.    on coumadin, INR this am 1.7, will give 5mg coumadin tonight    goal INR is 2-3        Patient is now stable for transfer to medicne floor    to followed by pulmonologist.

## 2025-06-19 NOTE — ED ADULT TRIAGE NOTE - CCCP TRG CHIEF CMPLNT
Oncology Social Work  Spoke to the patient who was informed that per [employer] [facility] they are sending the patient a $500 gift card. TIMO Torres  
Vomiting